# Patient Record
Sex: MALE | Race: BLACK OR AFRICAN AMERICAN | NOT HISPANIC OR LATINO | Employment: FULL TIME | URBAN - METROPOLITAN AREA
[De-identification: names, ages, dates, MRNs, and addresses within clinical notes are randomized per-mention and may not be internally consistent; named-entity substitution may affect disease eponyms.]

---

## 2020-04-05 DIAGNOSIS — F43.12 POST-TRAUMATIC STRESS DISORDER, CHRONIC: ICD-10-CM

## 2020-04-06 RX ORDER — ARIPIPRAZOLE 5 MG/1
5 TABLET ORAL
COMMUNITY
Start: 2020-03-20 | End: 2021-05-24

## 2020-04-06 RX ORDER — CLONAZEPAM 1 MG/1
TABLET ORAL
COMMUNITY
Start: 2020-03-20 | End: 2021-05-24

## 2020-04-06 RX ORDER — MIRTAZAPINE 45 MG/1
45 TABLET, FILM COATED ORAL EVERY EVENING
COMMUNITY
Start: 2020-03-24 | End: 2021-05-24

## 2020-04-06 RX ORDER — TRAZODONE HYDROCHLORIDE 50 MG/1
TABLET ORAL
Qty: 45 TABLET | Refills: 1 | Status: SHIPPED | OUTPATIENT
Start: 2020-04-06 | End: 2020-10-05

## 2020-04-06 RX ORDER — MIRTAZAPINE 15 MG/1
15 TABLET, FILM COATED ORAL DAILY
COMMUNITY
Start: 2020-03-03 | End: 2021-05-24

## 2020-04-06 RX ORDER — DULOXETIN HYDROCHLORIDE 60 MG/1
60 CAPSULE, DELAYED RELEASE ORAL DAILY
COMMUNITY
Start: 2020-03-20 | End: 2021-05-24

## 2020-04-06 RX ORDER — TRAZODONE HYDROCHLORIDE 100 MG/1
TABLET ORAL
COMMUNITY
Start: 2020-02-20 | End: 2021-05-24

## 2020-09-18 RX ORDER — PRAZOSIN HYDROCHLORIDE 2 MG/1
CAPSULE ORAL
COMMUNITY
Start: 2020-09-11 | End: 2021-05-24

## 2020-10-01 ENCOUNTER — HOSPITAL ENCOUNTER (EMERGENCY)
Facility: HOSPITAL | Age: 35
Discharge: HOME | End: 2020-10-01
Attending: EMERGENCY MEDICINE | Admitting: EMERGENCY MEDICINE
Payer: MEDICARE

## 2020-10-01 VITALS
RESPIRATION RATE: 20 BRPM | SYSTOLIC BLOOD PRESSURE: 154 MMHG | HEART RATE: 101 BPM | TEMPERATURE: 97.9 F | DIASTOLIC BLOOD PRESSURE: 86 MMHG

## 2020-10-01 DIAGNOSIS — M43.6 RIGHT TORTICOLLIS: Primary | ICD-10-CM

## 2020-10-01 PROCEDURE — 99284 EMERGENCY DEPT VISIT MOD MDM: CPT | Performed by: PHYSICIAN ASSISTANT

## 2020-10-01 PROCEDURE — 99283 EMERGENCY DEPT VISIT LOW MDM: CPT

## 2020-10-01 RX ORDER — DIAZEPAM 5 MG/1
5 TABLET ORAL ONCE
Status: COMPLETED | OUTPATIENT
Start: 2020-10-01 | End: 2020-10-01

## 2020-10-01 RX ORDER — DIAZEPAM 5 MG/1
5 TABLET ORAL EVERY 8 HOURS PRN
Qty: 15 TABLET | Refills: 0 | Status: SHIPPED | OUTPATIENT
Start: 2020-10-01 | End: 2021-05-24

## 2020-10-01 RX ORDER — IBUPROFEN 600 MG/1
600 TABLET ORAL EVERY 6 HOURS PRN
Qty: 20 TABLET | Refills: 0 | Status: SHIPPED | OUTPATIENT
Start: 2020-10-01 | End: 2021-05-24

## 2020-10-01 RX ORDER — IBUPROFEN 600 MG/1
600 TABLET ORAL ONCE
Status: COMPLETED | OUTPATIENT
Start: 2020-10-01 | End: 2020-10-01

## 2020-10-01 RX ADMIN — IBUPROFEN 600 MG: 600 TABLET, FILM COATED ORAL at 14:21

## 2020-10-01 RX ADMIN — DIAZEPAM 5 MG: 5 TABLET ORAL at 14:21

## 2020-10-04 ENCOUNTER — HOSPITAL ENCOUNTER (EMERGENCY)
Facility: HOSPITAL | Age: 35
Discharge: HOME | End: 2020-10-04
Payer: MEDICARE

## 2020-10-04 ENCOUNTER — APPOINTMENT (EMERGENCY)
Dept: RADIOLOGY | Facility: HOSPITAL | Age: 35
End: 2020-10-04
Payer: MEDICARE

## 2020-10-04 ENCOUNTER — APPOINTMENT (EMERGENCY)
Dept: CT IMAGING | Facility: HOSPITAL | Age: 35
End: 2020-10-04
Payer: MEDICARE

## 2020-10-04 VITALS
HEART RATE: 107 BPM | WEIGHT: 160 LBS | SYSTOLIC BLOOD PRESSURE: 135 MMHG | OXYGEN SATURATION: 98 % | RESPIRATION RATE: 17 BRPM | DIASTOLIC BLOOD PRESSURE: 90 MMHG | TEMPERATURE: 97.6 F

## 2020-10-04 DIAGNOSIS — M25.511 ACUTE PAIN OF RIGHT SHOULDER: Primary | ICD-10-CM

## 2020-10-04 PROCEDURE — 99284 EMERGENCY DEPT VISIT MOD MDM: CPT

## 2020-10-04 PROCEDURE — 72125 CT NECK SPINE W/O DYE: CPT

## 2020-10-04 PROCEDURE — G1004 CDSM NDSC: HCPCS

## 2020-10-04 PROCEDURE — 73030 X-RAY EXAM OF SHOULDER: CPT

## 2020-10-04 PROCEDURE — 96372 THER/PROPH/DIAG INJ SC/IM: CPT

## 2020-10-04 RX ORDER — KETOROLAC TROMETHAMINE 30 MG/ML
60 INJECTION, SOLUTION INTRAMUSCULAR; INTRAVENOUS ONCE
Status: COMPLETED | OUTPATIENT
Start: 2020-10-04 | End: 2020-10-04

## 2020-10-04 RX ORDER — TRAMADOL HYDROCHLORIDE 50 MG/1
50 TABLET ORAL EVERY 6 HOURS PRN
Qty: 12 TABLET | Refills: 0 | Status: SHIPPED | OUTPATIENT
Start: 2020-10-04 | End: 2020-10-14

## 2020-10-04 RX ADMIN — KETOROLAC TROMETHAMINE 60 MG: 30 INJECTION, SOLUTION INTRAMUSCULAR at 17:49

## 2020-10-05 DIAGNOSIS — F43.12 POST-TRAUMATIC STRESS DISORDER, CHRONIC: ICD-10-CM

## 2020-10-05 RX ORDER — TRAZODONE HYDROCHLORIDE 50 MG/1
TABLET ORAL
Qty: 45 TABLET | Refills: 0 | Status: SHIPPED | OUTPATIENT
Start: 2020-10-05 | End: 2021-05-24

## 2020-10-12 ENCOUNTER — HOSPITAL ENCOUNTER (EMERGENCY)
Facility: HOSPITAL | Age: 35
Discharge: HOME | End: 2020-10-12
Attending: EMERGENCY MEDICINE
Payer: MEDICARE

## 2020-10-12 VITALS
SYSTOLIC BLOOD PRESSURE: 140 MMHG | OXYGEN SATURATION: 98 % | TEMPERATURE: 99 F | HEART RATE: 111 BPM | BODY MASS INDEX: 23.15 KG/M2 | RESPIRATION RATE: 18 BRPM | DIASTOLIC BLOOD PRESSURE: 94 MMHG | HEIGHT: 67 IN | WEIGHT: 147.49 LBS

## 2020-10-12 DIAGNOSIS — M54.2 NECK PAIN: Primary | ICD-10-CM

## 2020-10-12 DIAGNOSIS — M54.12 CERVICAL RADICULOPATHY: ICD-10-CM

## 2020-10-12 PROCEDURE — 99284 EMERGENCY DEPT VISIT MOD MDM: CPT

## 2020-10-12 PROCEDURE — 99284 EMERGENCY DEPT VISIT MOD MDM: CPT | Performed by: PHYSICIAN ASSISTANT

## 2020-10-12 RX ORDER — NAPROXEN 500 MG/1
500 TABLET ORAL 2 TIMES DAILY WITH MEALS
Qty: 30 TABLET | Refills: 0 | Status: SHIPPED | OUTPATIENT
Start: 2020-10-12 | End: 2021-05-24

## 2020-10-12 RX ORDER — OXYCODONE HYDROCHLORIDE 5 MG/1
5 TABLET ORAL ONCE
Status: COMPLETED | OUTPATIENT
Start: 2020-10-12 | End: 2020-10-12

## 2020-10-12 RX ORDER — OXYCODONE HYDROCHLORIDE 5 MG/1
5 TABLET ORAL EVERY 6 HOURS PRN
Qty: 6 TABLET | Refills: 0 | Status: SHIPPED | OUTPATIENT
Start: 2020-10-12 | End: 2020-10-15

## 2020-10-12 RX ORDER — METHYLPREDNISOLONE 4 MG/1
TABLET ORAL
Qty: 21 TABLET | Refills: 0 | Status: SHIPPED | OUTPATIENT
Start: 2020-10-12 | End: 2021-05-24

## 2020-10-12 RX ADMIN — OXYCODONE HYDROCHLORIDE 5 MG: 5 TABLET ORAL at 12:28

## 2021-03-06 ENCOUNTER — APPOINTMENT (EMERGENCY)
Dept: RADIOLOGY | Facility: HOSPITAL | Age: 36
DRG: 023 | End: 2021-03-06
Payer: MEDICARE

## 2021-03-06 ENCOUNTER — ANESTHESIA EVENT (INPATIENT)
Dept: PERIOP | Facility: HOSPITAL | Age: 36
DRG: 023 | End: 2021-03-06
Payer: MEDICARE

## 2021-03-06 ENCOUNTER — ANESTHESIA (INPATIENT)
Dept: PERIOP | Facility: HOSPITAL | Age: 36
DRG: 023 | End: 2021-03-06
Payer: MEDICARE

## 2021-03-06 ENCOUNTER — APPOINTMENT (INPATIENT)
Dept: RADIOLOGY | Facility: HOSPITAL | Age: 36
DRG: 023 | End: 2021-03-06
Payer: MEDICARE

## 2021-03-06 ENCOUNTER — HOSPITAL ENCOUNTER (INPATIENT)
Facility: HOSPITAL | Age: 36
LOS: 10 days | DRG: 023 | End: 2021-03-16
Attending: SURGERY | Admitting: SURGERY
Payer: MEDICARE

## 2021-03-06 DIAGNOSIS — S14.129A CENTRAL CORD SYNDROME (HCC): ICD-10-CM

## 2021-03-06 DIAGNOSIS — S01.119A LACERATION OF EYEBROW: ICD-10-CM

## 2021-03-06 DIAGNOSIS — S14.109A INJURY OF CERVICAL SPINAL CORD, INITIAL ENCOUNTER (HCC): Primary | ICD-10-CM

## 2021-03-06 DIAGNOSIS — S14.109A CONTUSION OF CERVICAL CORD, INITIAL ENCOUNTER (HCC): ICD-10-CM

## 2021-03-06 DIAGNOSIS — Z98.890 POST-OPERATIVE STATE: ICD-10-CM

## 2021-03-06 LAB
ABO GROUP BLD: NORMAL
ANION GAP SERPL CALCULATED.3IONS-SCNC: 6 MMOL/L (ref 4–13)
APAP SERPL-MCNC: <2 UG/ML (ref 10–20)
BASE EXCESS BLDA CALC-SCNC: 0 MMOL/L (ref -2–3)
BASOPHILS # BLD AUTO: 0.09 THOUSANDS/ΜL (ref 0–0.1)
BASOPHILS NFR BLD AUTO: 1 % (ref 0–1)
BLD GP AB SCN SERPL QL: NEGATIVE
BUN SERPL-MCNC: 10 MG/DL (ref 5–25)
CALCIUM SERPL-MCNC: 8.5 MG/DL (ref 8.3–10.1)
CHLORIDE SERPL-SCNC: 113 MMOL/L (ref 100–108)
CO2 SERPL-SCNC: 26 MMOL/L (ref 21–32)
CREAT SERPL-MCNC: 1.07 MG/DL (ref 0.6–1.3)
EOSINOPHIL # BLD AUTO: 0.11 THOUSAND/ΜL (ref 0–0.61)
EOSINOPHIL NFR BLD AUTO: 1 % (ref 0–6)
ERYTHROCYTE [DISTWIDTH] IN BLOOD BY AUTOMATED COUNT: 12.4 % (ref 11.6–15.1)
ETHANOL SERPL-MCNC: <3 MG/DL (ref 0–3)
GFR SERPL CREATININE-BSD FRML MDRD: 102 ML/MIN/1.73SQ M
GLUCOSE SERPL-MCNC: 87 MG/DL (ref 65–140)
GLUCOSE SERPL-MCNC: 91 MG/DL (ref 65–140)
HCO3 BLDA-SCNC: 25 MMOL/L (ref 24–30)
HCT VFR BLD AUTO: 35.4 % (ref 36.5–49.3)
HCT VFR BLD CALC: 37 % (ref 36.5–49.3)
HGB BLD-MCNC: 11.7 G/DL (ref 12–17)
HGB BLDA-MCNC: 12.6 G/DL (ref 12–17)
IMM GRANULOCYTES # BLD AUTO: 0.04 THOUSAND/UL (ref 0–0.2)
IMM GRANULOCYTES NFR BLD AUTO: 0 % (ref 0–2)
INR PPP: 1.08 (ref 0.84–1.19)
LYMPHOCYTES # BLD AUTO: 3.89 THOUSANDS/ΜL (ref 0.6–4.47)
LYMPHOCYTES NFR BLD AUTO: 32 % (ref 14–44)
MCH RBC QN AUTO: 31 PG (ref 26.8–34.3)
MCHC RBC AUTO-ENTMCNC: 33.1 G/DL (ref 31.4–37.4)
MCV RBC AUTO: 94 FL (ref 82–98)
MONOCYTES # BLD AUTO: 1.1 THOUSAND/ΜL (ref 0.17–1.22)
MONOCYTES NFR BLD AUTO: 9 % (ref 4–12)
NEUTROPHILS # BLD AUTO: 7.04 THOUSANDS/ΜL (ref 1.85–7.62)
NEUTS SEG NFR BLD AUTO: 57 % (ref 43–75)
NRBC BLD AUTO-RTO: 0 /100 WBCS
PCO2 BLD: 26 MMOL/L (ref 21–32)
PCO2 BLD: 41 MM HG (ref 42–50)
PH BLD: 7.39 [PH] (ref 7.3–7.4)
PLATELET # BLD AUTO: 326 THOUSANDS/UL (ref 149–390)
PMV BLD AUTO: 9.3 FL (ref 8.9–12.7)
PO2 BLD: 72 MM HG (ref 35–45)
POTASSIUM BLD-SCNC: 3.5 MMOL/L (ref 3.5–5.3)
POTASSIUM SERPL-SCNC: 3.9 MMOL/L (ref 3.5–5.3)
PROTHROMBIN TIME: 14 SECONDS (ref 11.6–14.5)
RBC # BLD AUTO: 3.77 MILLION/UL (ref 3.88–5.62)
RH BLD: POSITIVE
SALICYLATES SERPL-MCNC: <3 MG/DL (ref 3–20)
SAO2 % BLD FROM PO2: 94 % (ref 60–85)
SODIUM BLD-SCNC: 143 MMOL/L (ref 136–145)
SODIUM SERPL-SCNC: 145 MMOL/L (ref 136–145)
SPECIMEN EXPIRATION DATE: NORMAL
SPECIMEN SOURCE: ABNORMAL
WBC # BLD AUTO: 12.27 THOUSAND/UL (ref 4.31–10.16)

## 2021-03-06 PROCEDURE — 86850 RBC ANTIBODY SCREEN: CPT | Performed by: SURGERY

## 2021-03-06 PROCEDURE — 99222 1ST HOSP IP/OBS MODERATE 55: CPT | Performed by: SURGERY

## 2021-03-06 PROCEDURE — C1713 ANCHOR/SCREW BN/BN,TIS/BN: HCPCS | Performed by: NEUROLOGICAL SURGERY

## 2021-03-06 PROCEDURE — 86901 BLOOD TYPING SEROLOGIC RH(D): CPT | Performed by: SURGERY

## 2021-03-06 PROCEDURE — 80143 DRUG ASSAY ACETAMINOPHEN: CPT | Performed by: SURGERY

## 2021-03-06 PROCEDURE — 90471 IMMUNIZATION ADMIN: CPT

## 2021-03-06 PROCEDURE — 63015 REMOVE SPINE LAMINA >2 CRVCL: CPT | Performed by: NEUROLOGICAL SURGERY

## 2021-03-06 PROCEDURE — 72040 X-RAY EXAM NECK SPINE 2-3 VW: CPT

## 2021-03-06 PROCEDURE — 84132 ASSAY OF SERUM POTASSIUM: CPT

## 2021-03-06 PROCEDURE — 82803 BLOOD GASES ANY COMBINATION: CPT

## 2021-03-06 PROCEDURE — 85025 COMPLETE CBC W/AUTO DIFF WBC: CPT | Performed by: SURGERY

## 2021-03-06 PROCEDURE — 72141 MRI NECK SPINE W/O DYE: CPT

## 2021-03-06 PROCEDURE — 90715 TDAP VACCINE 7 YRS/> IM: CPT | Performed by: SURGERY

## 2021-03-06 PROCEDURE — 99223 1ST HOSP IP/OBS HIGH 75: CPT | Performed by: NEUROLOGICAL SURGERY

## 2021-03-06 PROCEDURE — 36415 COLL VENOUS BLD VENIPUNCTURE: CPT | Performed by: SURGERY

## 2021-03-06 PROCEDURE — 72146 MRI CHEST SPINE W/O DYE: CPT

## 2021-03-06 PROCEDURE — 82077 ASSAY SPEC XCP UR&BREATH IA: CPT | Performed by: SURGERY

## 2021-03-06 PROCEDURE — 86900 BLOOD TYPING SEROLOGIC ABO: CPT | Performed by: SURGERY

## 2021-03-06 PROCEDURE — G0390 TRAUMA RESPONS W/HOSP CRITI: HCPCS

## 2021-03-06 PROCEDURE — 20936 SP BONE AGRFT LOCAL ADD-ON: CPT | Performed by: NEUROLOGICAL SURGERY

## 2021-03-06 PROCEDURE — 80179 DRUG ASSAY SALICYLATE: CPT | Performed by: SURGERY

## 2021-03-06 PROCEDURE — 22600 ARTHRD PST TQ 1NTRSPC CRV: CPT | Performed by: NEUROLOGICAL SURGERY

## 2021-03-06 PROCEDURE — 85014 HEMATOCRIT: CPT

## 2021-03-06 PROCEDURE — 0RG2071 FUSION OF 2 OR MORE CERVICAL VERTEBRAL JOINTS WITH AUTOLOGOUS TISSUE SUBSTITUTE, POSTERIOR APPROACH, POSTERIOR COLUMN, OPEN APPROACH: ICD-10-PCS | Performed by: NEUROLOGICAL SURGERY

## 2021-03-06 PROCEDURE — 82947 ASSAY GLUCOSE BLOOD QUANT: CPT

## 2021-03-06 PROCEDURE — G1004 CDSM NDSC: HCPCS

## 2021-03-06 PROCEDURE — 71270 CT THORAX DX C-/C+: CPT

## 2021-03-06 PROCEDURE — 22842 INSERT SPINE FIXATION DEVICE: CPT | Performed by: NEUROLOGICAL SURGERY

## 2021-03-06 PROCEDURE — 72148 MRI LUMBAR SPINE W/O DYE: CPT

## 2021-03-06 PROCEDURE — NC001 PR NO CHARGE: Performed by: EMERGENCY MEDICINE

## 2021-03-06 PROCEDURE — 22614 ARTHRD PST TQ 1NTRSPC EA ADD: CPT | Performed by: NEUROLOGICAL SURGERY

## 2021-03-06 PROCEDURE — 85610 PROTHROMBIN TIME: CPT | Performed by: SURGERY

## 2021-03-06 PROCEDURE — 80048 BASIC METABOLIC PNL TOTAL CA: CPT | Performed by: SURGERY

## 2021-03-06 PROCEDURE — 70450 CT HEAD/BRAIN W/O DYE: CPT

## 2021-03-06 PROCEDURE — 84295 ASSAY OF SERUM SODIUM: CPT

## 2021-03-06 PROCEDURE — 72125 CT NECK SPINE W/O DYE: CPT

## 2021-03-06 PROCEDURE — 99291 CRITICAL CARE FIRST HOUR: CPT

## 2021-03-06 PROCEDURE — 74178 CT ABD&PLV WO CNTR FLWD CNTR: CPT

## 2021-03-06 RX ORDER — NOREPINEPHRINE BITARTRATE 1 MG/ML
INJECTION, SOLUTION INTRAVENOUS
Status: COMPLETED
Start: 2021-03-06 | End: 2021-03-07

## 2021-03-06 RX ADMIN — PROPOFOL 160 MCG/KG/MIN: 10 INJECTION, EMULSION INTRAVENOUS at 23:45

## 2021-03-06 RX ADMIN — PROPOFOL 200 MG: 10 INJECTION, EMULSION INTRAVENOUS at 23:42

## 2021-03-06 RX ADMIN — ONDANSETRON 4 MG: 2 INJECTION INTRAMUSCULAR; INTRAVENOUS at 23:46

## 2021-03-06 RX ADMIN — IOHEXOL 100 ML: 350 INJECTION, SOLUTION INTRAVENOUS at 21:19

## 2021-03-06 RX ADMIN — TETANUS TOXOID, REDUCED DIPHTHERIA TOXOID AND ACELLULAR PERTUSSIS VACCINE, ADSORBED 0.5 ML: 5; 2.5; 8; 8; 2.5 SUSPENSION INTRAMUSCULAR at 21:13

## 2021-03-06 RX ADMIN — MIDAZOLAM 2 MG: 1 INJECTION INTRAMUSCULAR; INTRAVENOUS at 23:34

## 2021-03-06 RX ADMIN — SODIUM CHLORIDE 0.2 MCG/KG/HR: 9 INJECTION, SOLUTION INTRAVENOUS at 23:45

## 2021-03-06 RX ADMIN — SODIUM CHLORIDE: 0.9 INJECTION, SOLUTION INTRAVENOUS at 23:56

## 2021-03-06 RX ADMIN — Medication 100 MG: at 23:42

## 2021-03-06 RX ADMIN — FENTANYL CITRATE 25 MCG: 50 INJECTION INTRAMUSCULAR; INTRAVENOUS at 23:49

## 2021-03-06 RX ADMIN — DEXAMETHASONE SODIUM PHOSPHATE 10 MG: 10 INJECTION, SOLUTION INTRAMUSCULAR; INTRAVENOUS at 23:46

## 2021-03-06 RX ADMIN — PHENYLEPHRINE HYDROCHLORIDE 40 MCG/MIN: 10 INJECTION INTRAVENOUS at 23:45

## 2021-03-06 RX ADMIN — REMIFENTANIL HYDROCHLORIDE 0.2 MCG/KG/MIN: 1 INJECTION, POWDER, LYOPHILIZED, FOR SOLUTION INTRAVENOUS at 23:44

## 2021-03-06 RX ADMIN — FENTANYL CITRATE 25 MCG: 50 INJECTION INTRAMUSCULAR; INTRAVENOUS at 23:42

## 2021-03-06 RX ADMIN — SODIUM CHLORIDE, SODIUM LACTATE, POTASSIUM CHLORIDE, AND CALCIUM CHLORIDE: .6; .31; .03; .02 INJECTION, SOLUTION INTRAVENOUS at 23:35

## 2021-03-06 RX ADMIN — LIDOCAINE HYDROCHLORIDE 65 MG: 10 INJECTION, SOLUTION EPIDURAL; INFILTRATION; INTRACAUDAL; PERINEURAL at 23:42

## 2021-03-07 ENCOUNTER — APPOINTMENT (INPATIENT)
Dept: RADIOLOGY | Facility: HOSPITAL | Age: 36
DRG: 023 | End: 2021-03-07
Payer: MEDICARE

## 2021-03-07 PROBLEM — J98.4 PULMONARY INSUFFICIENCY: Status: ACTIVE | Noted: 2021-03-07

## 2021-03-07 PROBLEM — S14.129A CENTRAL CORD SYNDROME (HCC): Status: ACTIVE | Noted: 2021-03-07

## 2021-03-07 LAB
ANION GAP SERPL CALCULATED.3IONS-SCNC: 4 MMOL/L (ref 4–13)
BASE EXCESS BLDA CALC-SCNC: -1.3 MMOL/L
BASE EXCESS BLDA CALC-SCNC: 0.6 MMOL/L
BASE EXCESS BLDA CALC-SCNC: 0.8 MMOL/L
BASOPHILS # BLD AUTO: 0.04 THOUSANDS/ΜL (ref 0–0.1)
BASOPHILS NFR BLD AUTO: 0 % (ref 0–1)
BODY TEMPERATURE: 98.6 DEGREES FEHRENHEIT
BUN SERPL-MCNC: 8 MG/DL (ref 5–25)
CALCIUM SERPL-MCNC: 8.7 MG/DL (ref 8.3–10.1)
CHLORIDE SERPL-SCNC: 111 MMOL/L (ref 100–108)
CO2 SERPL-SCNC: 27 MMOL/L (ref 21–32)
CREAT SERPL-MCNC: 0.89 MG/DL (ref 0.6–1.3)
EOSINOPHIL # BLD AUTO: 0 THOUSAND/ΜL (ref 0–0.61)
EOSINOPHIL NFR BLD AUTO: 0 % (ref 0–6)
ERYTHROCYTE [DISTWIDTH] IN BLOOD BY AUTOMATED COUNT: 12.4 % (ref 11.6–15.1)
GFR SERPL CREATININE-BSD FRML MDRD: 126 ML/MIN/1.73SQ M
GLUCOSE SERPL-MCNC: 152 MG/DL (ref 65–140)
HCO3 BLDA-SCNC: 23 MMOL/L (ref 22–28)
HCO3 BLDA-SCNC: 24.4 MMOL/L (ref 22–28)
HCO3 BLDA-SCNC: 26.1 MMOL/L (ref 22–28)
HCT VFR BLD AUTO: 37.9 % (ref 36.5–49.3)
HGB BLD-MCNC: 12.5 G/DL (ref 12–17)
HOROWITZ INDEX BLDA+IHG-RTO: 40 MM[HG]
IMM GRANULOCYTES # BLD AUTO: 0.09 THOUSAND/UL (ref 0–0.2)
IMM GRANULOCYTES NFR BLD AUTO: 1 % (ref 0–2)
LYMPHOCYTES # BLD AUTO: 1.37 THOUSANDS/ΜL (ref 0.6–4.47)
LYMPHOCYTES NFR BLD AUTO: 8 % (ref 14–44)
MAGNESIUM SERPL-MCNC: 2 MG/DL (ref 1.6–2.6)
MCH RBC QN AUTO: 31 PG (ref 26.8–34.3)
MCHC RBC AUTO-ENTMCNC: 33 G/DL (ref 31.4–37.4)
MCV RBC AUTO: 94 FL (ref 82–98)
MONOCYTES # BLD AUTO: 0.28 THOUSAND/ΜL (ref 0.17–1.22)
MONOCYTES NFR BLD AUTO: 2 % (ref 4–12)
NEUTROPHILS # BLD AUTO: 15.68 THOUSANDS/ΜL (ref 1.85–7.62)
NEUTS SEG NFR BLD AUTO: 89 % (ref 43–75)
NRBC BLD AUTO-RTO: 0 /100 WBCS
O2 CT BLDA-SCNC: 17.8 ML/DL (ref 16–23)
O2 CT BLDA-SCNC: 18.1 ML/DL (ref 16–23)
O2 CT BLDA-SCNC: 18.5 ML/DL (ref 16–23)
OXYHGB MFR BLDA: 96.6 % (ref 94–97)
OXYHGB MFR BLDA: 98.5 % (ref 94–97)
OXYHGB MFR BLDA: 98.6 % (ref 94–97)
PCO2 BLDA: 29.8 MM HG (ref 36–44)
PCO2 BLDA: 36.7 MM HG (ref 36–44)
PCO2 BLDA: 55.4 MM HG (ref 36–44)
PEEP RESPIRATORY: 6 CM[H2O]
PH BLDA: 7.29 [PH] (ref 7.35–7.45)
PH BLDA: 7.44 [PH] (ref 7.35–7.45)
PH BLDA: 7.5 [PH] (ref 7.35–7.45)
PHOSPHATE SERPL-MCNC: 3.6 MG/DL (ref 2.7–4.5)
PLATELET # BLD AUTO: 381 THOUSANDS/UL (ref 149–390)
PMV BLD AUTO: 9.2 FL (ref 8.9–12.7)
PO2 BLDA: 123.9 MM HG (ref 75–129)
PO2 BLDA: 162.7 MM HG (ref 75–129)
PO2 BLDA: 171.2 MM HG (ref 75–129)
POTASSIUM SERPL-SCNC: 4.2 MMOL/L (ref 3.5–5.3)
PRESSURE CONTROL: 15
PRESSURE CONTROL: ABNORMAL
RBC # BLD AUTO: 4.03 MILLION/UL (ref 3.88–5.62)
SODIUM SERPL-SCNC: 142 MMOL/L (ref 136–145)
SPECIMEN SOURCE: ABNORMAL
VENT AC: 10
VENT AC: 14
VENT AC: 14
VENT- AC: AC
VT SETTING VENT: 500 ML
WBC # BLD AUTO: 17.46 THOUSAND/UL (ref 4.31–10.16)

## 2021-03-07 PROCEDURE — 83735 ASSAY OF MAGNESIUM: CPT | Performed by: PHYSICIAN ASSISTANT

## 2021-03-07 PROCEDURE — NC001 PR NO CHARGE: Performed by: SURGERY

## 2021-03-07 PROCEDURE — 99024 POSTOP FOLLOW-UP VISIT: CPT | Performed by: NEUROLOGICAL SURGERY

## 2021-03-07 PROCEDURE — 82805 BLOOD GASES W/O2 SATURATION: CPT | Performed by: EMERGENCY MEDICINE

## 2021-03-07 PROCEDURE — 82805 BLOOD GASES W/O2 SATURATION: CPT | Performed by: PHYSICIAN ASSISTANT

## 2021-03-07 PROCEDURE — 94002 VENT MGMT INPAT INIT DAY: CPT

## 2021-03-07 PROCEDURE — 80048 BASIC METABOLIC PNL TOTAL CA: CPT | Performed by: PHYSICIAN ASSISTANT

## 2021-03-07 PROCEDURE — 12013 RPR F/E/E/N/L/M 2.6-5.0 CM: CPT | Performed by: SURGERY

## 2021-03-07 PROCEDURE — 84100 ASSAY OF PHOSPHORUS: CPT | Performed by: PHYSICIAN ASSISTANT

## 2021-03-07 PROCEDURE — 99291 CRITICAL CARE FIRST HOUR: CPT | Performed by: SURGERY

## 2021-03-07 PROCEDURE — 0HQ1XZZ REPAIR FACE SKIN, EXTERNAL APPROACH: ICD-10-PCS | Performed by: SURGERY

## 2021-03-07 PROCEDURE — 94760 N-INVAS EAR/PLS OXIMETRY 1: CPT

## 2021-03-07 PROCEDURE — 71045 X-RAY EXAM CHEST 1 VIEW: CPT

## 2021-03-07 PROCEDURE — 82805 BLOOD GASES W/O2 SATURATION: CPT | Performed by: NURSE PRACTITIONER

## 2021-03-07 PROCEDURE — 85025 COMPLETE CBC W/AUTO DIFF WBC: CPT | Performed by: PHYSICIAN ASSISTANT

## 2021-03-07 DEVICE — SCREW 6958714 3.5 X 14MM MAS
Type: IMPLANTABLE DEVICE | Site: POSTERIOR CERVICAL | Status: FUNCTIONAL
Brand: VERTEX® RECONSTRUCTION SYSTEM
Removed: 2022-05-12

## 2021-03-07 DEVICE — ROD 7753770 PRE-CUT 3.5MM X 70MM
Type: IMPLANTABLE DEVICE | Site: POSTERIOR CERVICAL | Status: FUNCTIONAL
Brand: VERTEX® RECONSTRUCTION SYSTEM

## 2021-03-07 DEVICE — DBM T42275 8MMX1CMX10CM 2 EACH GRAFTON M
Type: IMPLANTABLE DEVICE | Site: POSTERIOR CERVICAL | Status: FUNCTIONAL
Brand: GRAFTON®AND GRAFTON PLUS®DEMINERALIZED BONE MATRIX (DBM)

## 2021-03-07 DEVICE — SET SCREW 6950315 M6 SET SCREW
Type: IMPLANTABLE DEVICE | Site: POSTERIOR CERVICAL | Status: NON-FUNCTIONAL
Brand: VERTEX® RECONSTRUCTION SYSTEM
Removed: 2022-05-12

## 2021-03-07 RX ORDER — SUCCINYLCHOLINE/SOD CL,ISO/PF 100 MG/5ML
SYRINGE (ML) INTRAVENOUS AS NEEDED
Status: DISCONTINUED | OUTPATIENT
Start: 2021-03-06 | End: 2021-03-07

## 2021-03-07 RX ORDER — PROPOFOL 10 MG/ML
5-50 INJECTION, EMULSION INTRAVENOUS
Status: DISCONTINUED | OUTPATIENT
Start: 2021-03-07 | End: 2021-03-07

## 2021-03-07 RX ORDER — FENTANYL CITRATE 50 UG/ML
50 INJECTION, SOLUTION INTRAMUSCULAR; INTRAVENOUS ONCE
Status: COMPLETED | OUTPATIENT
Start: 2021-03-07 | End: 2021-03-07

## 2021-03-07 RX ORDER — OXYCODONE HYDROCHLORIDE 5 MG/1
5 TABLET ORAL ONCE
Status: COMPLETED | OUTPATIENT
Start: 2021-03-07 | End: 2021-03-07

## 2021-03-07 RX ORDER — FENTANYL CITRATE 50 UG/ML
INJECTION, SOLUTION INTRAMUSCULAR; INTRAVENOUS
Status: COMPLETED
Start: 2021-03-07 | End: 2021-03-07

## 2021-03-07 RX ORDER — SODIUM CHLORIDE, SODIUM LACTATE, POTASSIUM CHLORIDE, CALCIUM CHLORIDE 600; 310; 30; 20 MG/100ML; MG/100ML; MG/100ML; MG/100ML
INJECTION, SOLUTION INTRAVENOUS CONTINUOUS PRN
Status: DISCONTINUED | OUTPATIENT
Start: 2021-03-06 | End: 2021-03-07

## 2021-03-07 RX ORDER — FENTANYL CITRATE-0.9 % NACL/PF 10 MCG/ML
100 PLASTIC BAG, INJECTION (ML) INTRAVENOUS CONTINUOUS
Status: DISCONTINUED | OUTPATIENT
Start: 2021-03-07 | End: 2021-03-07

## 2021-03-07 RX ORDER — AMOXICILLIN 250 MG
1 CAPSULE ORAL 2 TIMES DAILY
Status: DISCONTINUED | OUTPATIENT
Start: 2021-03-07 | End: 2021-03-08

## 2021-03-07 RX ORDER — ACETAMINOPHEN 160 MG/5ML
650 SUSPENSION, ORAL (FINAL DOSE FORM) ORAL ONCE
Status: COMPLETED | OUTPATIENT
Start: 2021-03-07 | End: 2021-03-07

## 2021-03-07 RX ORDER — CEFAZOLIN SODIUM 1 G/50ML
1000 SOLUTION INTRAVENOUS EVERY 8 HOURS
Status: COMPLETED | OUTPATIENT
Start: 2021-03-07 | End: 2021-03-07

## 2021-03-07 RX ORDER — ACETAMINOPHEN 325 MG/1
975 TABLET ORAL EVERY 6 HOURS SCHEDULED
Status: DISCONTINUED | OUTPATIENT
Start: 2021-03-08 | End: 2021-03-07

## 2021-03-07 RX ORDER — PROPOFOL 10 MG/ML
INJECTION, EMULSION INTRAVENOUS CONTINUOUS PRN
Status: DISCONTINUED | OUTPATIENT
Start: 2021-03-06 | End: 2021-03-07

## 2021-03-07 RX ORDER — FENTANYL CITRATE 50 UG/ML
50 INJECTION, SOLUTION INTRAMUSCULAR; INTRAVENOUS ONCE
Status: DISCONTINUED | OUTPATIENT
Start: 2021-03-07 | End: 2021-03-07

## 2021-03-07 RX ORDER — FENTANYL CITRATE 50 UG/ML
INJECTION, SOLUTION INTRAMUSCULAR; INTRAVENOUS AS NEEDED
Status: DISCONTINUED | OUTPATIENT
Start: 2021-03-06 | End: 2021-03-07

## 2021-03-07 RX ORDER — OXYCODONE HYDROCHLORIDE 5 MG/1
5 TABLET ORAL EVERY 6 HOURS
Status: DISCONTINUED | OUTPATIENT
Start: 2021-03-07 | End: 2021-03-08

## 2021-03-07 RX ORDER — SENNOSIDES 8.6 MG
1 TABLET ORAL DAILY
Status: DISCONTINUED | OUTPATIENT
Start: 2021-03-07 | End: 2021-03-07

## 2021-03-07 RX ORDER — VANCOMYCIN HYDROCHLORIDE 1 G/20ML
INJECTION, POWDER, LYOPHILIZED, FOR SOLUTION INTRAVENOUS AS NEEDED
Status: DISCONTINUED | OUTPATIENT
Start: 2021-03-07 | End: 2021-03-07 | Stop reason: HOSPADM

## 2021-03-07 RX ORDER — DEXAMETHASONE SODIUM PHOSPHATE 10 MG/ML
INJECTION, SOLUTION INTRAMUSCULAR; INTRAVENOUS AS NEEDED
Status: DISCONTINUED | OUTPATIENT
Start: 2021-03-06 | End: 2021-03-07

## 2021-03-07 RX ORDER — POLYETHYLENE GLYCOL 3350 17 G/17G
17 POWDER, FOR SOLUTION ORAL DAILY
Status: DISCONTINUED | OUTPATIENT
Start: 2021-03-07 | End: 2021-03-12

## 2021-03-07 RX ORDER — PROPOFOL 10 MG/ML
INJECTION, EMULSION INTRAVENOUS AS NEEDED
Status: DISCONTINUED | OUTPATIENT
Start: 2021-03-06 | End: 2021-03-07

## 2021-03-07 RX ORDER — SODIUM CHLORIDE 9 MG/ML
125 INJECTION, SOLUTION INTRAVENOUS CONTINUOUS
Status: DISCONTINUED | OUTPATIENT
Start: 2021-03-07 | End: 2021-03-07

## 2021-03-07 RX ORDER — ONDANSETRON 2 MG/ML
4 INJECTION INTRAMUSCULAR; INTRAVENOUS EVERY 6 HOURS PRN
Status: DISCONTINUED | OUTPATIENT
Start: 2021-03-07 | End: 2021-03-16 | Stop reason: HOSPADM

## 2021-03-07 RX ORDER — METHOCARBAMOL 750 MG/1
750 TABLET, FILM COATED ORAL EVERY 6 HOURS SCHEDULED
Status: DISCONTINUED | OUTPATIENT
Start: 2021-03-07 | End: 2021-03-16 | Stop reason: HOSPADM

## 2021-03-07 RX ORDER — CEFAZOLIN SODIUM 1 G/3ML
INJECTION, POWDER, FOR SOLUTION INTRAMUSCULAR; INTRAVENOUS AS NEEDED
Status: DISCONTINUED | OUTPATIENT
Start: 2021-03-07 | End: 2021-03-07

## 2021-03-07 RX ORDER — DOCUSATE SODIUM 100 MG/1
100 CAPSULE, LIQUID FILLED ORAL 2 TIMES DAILY
Status: DISCONTINUED | OUTPATIENT
Start: 2021-03-07 | End: 2021-03-07

## 2021-03-07 RX ORDER — ROCURONIUM BROMIDE 10 MG/ML
INJECTION, SOLUTION INTRAVENOUS AS NEEDED
Status: DISCONTINUED | OUTPATIENT
Start: 2021-03-07 | End: 2021-03-07

## 2021-03-07 RX ORDER — FENTANYL CITRATE-0.9 % NACL/PF 10 MCG/ML
PLASTIC BAG, INJECTION (ML) INTRAVENOUS
Status: COMPLETED
Start: 2021-03-07 | End: 2021-03-07

## 2021-03-07 RX ORDER — ACETAMINOPHEN 325 MG/1
975 TABLET ORAL EVERY 6 HOURS PRN
Status: DISCONTINUED | OUTPATIENT
Start: 2021-03-07 | End: 2021-03-08

## 2021-03-07 RX ORDER — SODIUM CHLORIDE 9 MG/ML
INJECTION, SOLUTION INTRAVENOUS CONTINUOUS PRN
Status: DISCONTINUED | OUTPATIENT
Start: 2021-03-06 | End: 2021-03-07

## 2021-03-07 RX ORDER — CHLORHEXIDINE GLUCONATE 0.12 MG/ML
15 RINSE ORAL EVERY 12 HOURS SCHEDULED
Status: DISCONTINUED | OUTPATIENT
Start: 2021-03-07 | End: 2021-03-08

## 2021-03-07 RX ORDER — LIDOCAINE HYDROCHLORIDE AND EPINEPHRINE 10; 10 MG/ML; UG/ML
INJECTION, SOLUTION INFILTRATION; PERINEURAL AS NEEDED
Status: DISCONTINUED | OUTPATIENT
Start: 2021-03-07 | End: 2021-03-07 | Stop reason: HOSPADM

## 2021-03-07 RX ORDER — BUPIVACAINE HYDROCHLORIDE AND EPINEPHRINE 5; 5 MG/ML; UG/ML
INJECTION, SOLUTION EPIDURAL; INTRACAUDAL; PERINEURAL AS NEEDED
Status: DISCONTINUED | OUTPATIENT
Start: 2021-03-07 | End: 2021-03-07 | Stop reason: HOSPADM

## 2021-03-07 RX ORDER — SODIUM CHLORIDE, SODIUM GLUCONATE, SODIUM ACETATE, POTASSIUM CHLORIDE, MAGNESIUM CHLORIDE, SODIUM PHOSPHATE, DIBASIC, AND POTASSIUM PHOSPHATE .53; .5; .37; .037; .03; .012; .00082 G/100ML; G/100ML; G/100ML; G/100ML; G/100ML; G/100ML; G/100ML
50 INJECTION, SOLUTION INTRAVENOUS CONTINUOUS
Status: DISCONTINUED | OUTPATIENT
Start: 2021-03-07 | End: 2021-03-08

## 2021-03-07 RX ORDER — LIDOCAINE HYDROCHLORIDE 10 MG/ML
INJECTION, SOLUTION EPIDURAL; INFILTRATION; INTRACAUDAL; PERINEURAL AS NEEDED
Status: DISCONTINUED | OUTPATIENT
Start: 2021-03-06 | End: 2021-03-07

## 2021-03-07 RX ORDER — ACETAMINOPHEN 325 MG/1
975 TABLET ORAL EVERY 6 HOURS SCHEDULED
Status: DISCONTINUED | OUTPATIENT
Start: 2021-03-07 | End: 2021-03-07

## 2021-03-07 RX ORDER — MIDAZOLAM HYDROCHLORIDE 2 MG/2ML
INJECTION, SOLUTION INTRAMUSCULAR; INTRAVENOUS AS NEEDED
Status: DISCONTINUED | OUTPATIENT
Start: 2021-03-06 | End: 2021-03-07

## 2021-03-07 RX ORDER — MAGNESIUM HYDROXIDE 1200 MG/15ML
LIQUID ORAL AS NEEDED
Status: DISCONTINUED | OUTPATIENT
Start: 2021-03-07 | End: 2021-03-07 | Stop reason: HOSPADM

## 2021-03-07 RX ORDER — PROPOFOL 10 MG/ML
INJECTION, EMULSION INTRAVENOUS
Status: COMPLETED
Start: 2021-03-07 | End: 2021-03-07

## 2021-03-07 RX ORDER — HYDROMORPHONE HCL/PF 1 MG/ML
0.5 SYRINGE (ML) INJECTION
Status: DISCONTINUED | OUTPATIENT
Start: 2021-03-07 | End: 2021-03-08

## 2021-03-07 RX ORDER — ONDANSETRON 2 MG/ML
INJECTION INTRAMUSCULAR; INTRAVENOUS AS NEEDED
Status: DISCONTINUED | OUTPATIENT
Start: 2021-03-06 | End: 2021-03-07

## 2021-03-07 RX ADMIN — CHLORHEXIDINE GLUCONATE 0.12% ORAL RINSE 15 ML: 1.2 LIQUID ORAL at 08:11

## 2021-03-07 RX ADMIN — PROPOFOL 50 MCG/KG/MIN: 10 INJECTION, EMULSION INTRAVENOUS at 03:15

## 2021-03-07 RX ADMIN — POLYETHYLENE GLYCOL 3350 17 G: 17 POWDER, FOR SOLUTION ORAL at 11:16

## 2021-03-07 RX ADMIN — CEFAZOLIN SODIUM 1000 MG: 1 SOLUTION INTRAVENOUS at 07:45

## 2021-03-07 RX ADMIN — HYDROMORPHONE HYDROCHLORIDE 0.5 MG: 1 INJECTION, SOLUTION INTRAMUSCULAR; INTRAVENOUS; SUBCUTANEOUS at 17:07

## 2021-03-07 RX ADMIN — Medication 100 MCG/HR: at 04:25

## 2021-03-07 RX ADMIN — ACETAMINOPHEN 650 MG: 650 SUSPENSION ORAL at 16:30

## 2021-03-07 RX ADMIN — HYDROMORPHONE HYDROCHLORIDE 0.5 MG: 1 INJECTION, SOLUTION INTRAMUSCULAR; INTRAVENOUS; SUBCUTANEOUS at 20:11

## 2021-03-07 RX ADMIN — SODIUM CHLORIDE, SODIUM GLUCONATE, SODIUM ACETATE, POTASSIUM CHLORIDE, MAGNESIUM CHLORIDE, SODIUM PHOSPHATE, DIBASIC, AND POTASSIUM PHOSPHATE 50 ML/HR: .53; .5; .37; .037; .03; .012; .00082 INJECTION, SOLUTION INTRAVENOUS at 04:46

## 2021-03-07 RX ADMIN — CHLORHEXIDINE GLUCONATE 0.12% ORAL RINSE 15 ML: 1.2 LIQUID ORAL at 21:41

## 2021-03-07 RX ADMIN — NOREPINEPHRINE BITARTRATE 3 MCG/MIN: 1 INJECTION, SOLUTION, CONCENTRATE INTRAVENOUS at 02:22

## 2021-03-07 RX ADMIN — FENTANYL CITRATE 50 MCG: 50 INJECTION INTRAMUSCULAR; INTRAVENOUS at 04:00

## 2021-03-07 RX ADMIN — PROPOFOL 20 MCG/KG/MIN: 10 INJECTION, EMULSION INTRAVENOUS at 16:04

## 2021-03-07 RX ADMIN — Medication 100 MCG/HR: at 10:12

## 2021-03-07 RX ADMIN — STANDARDIZED SENNA CONCENTRATE 8.6 MG: 8.6 TABLET ORAL at 08:11

## 2021-03-07 RX ADMIN — FENTANYL CITRATE 50 MCG: 50 INJECTION INTRAMUSCULAR; INTRAVENOUS at 02:08

## 2021-03-07 RX ADMIN — ROCURONIUM BROMIDE 15 MG: 50 INJECTION, SOLUTION INTRAVENOUS at 00:44

## 2021-03-07 RX ADMIN — METHOCARBAMOL TABLETS 750 MG: 750 TABLET, COATED ORAL at 07:45

## 2021-03-07 RX ADMIN — NOREPINEPHRINE BITARTRATE 3 MCG/MIN: 1 INJECTION, SOLUTION, CONCENTRATE INTRAVENOUS at 05:04

## 2021-03-07 RX ADMIN — HYDROMORPHONE HYDROCHLORIDE 0.5 MG: 1 INJECTION, SOLUTION INTRAMUSCULAR; INTRAVENOUS; SUBCUTANEOUS at 23:18

## 2021-03-07 RX ADMIN — OXYCODONE HYDROCHLORIDE 5 MG: 5 TABLET ORAL at 18:11

## 2021-03-07 RX ADMIN — PROPOFOL 45 MCG/KG/MIN: 10 INJECTION, EMULSION INTRAVENOUS at 06:53

## 2021-03-07 RX ADMIN — FENTANYL CITRATE 50 MCG: 50 INJECTION INTRAMUSCULAR; INTRAVENOUS at 00:05

## 2021-03-07 RX ADMIN — SODIUM CHLORIDE 125 ML/HR: 0.9 INJECTION, SOLUTION INTRAVENOUS at 16:04

## 2021-03-07 RX ADMIN — CEFAZOLIN 2000 MG: 1 INJECTION, POWDER, FOR SOLUTION INTRAVENOUS at 00:06

## 2021-03-07 RX ADMIN — OXYCODONE HYDROCHLORIDE 5 MG: 5 TABLET ORAL at 13:12

## 2021-03-07 RX ADMIN — NOREPINEPHRINE BITARTRATE: 1 INJECTION, SOLUTION, CONCENTRATE INTRAVENOUS at 04:27

## 2021-03-07 RX ADMIN — METHOCARBAMOL TABLETS 750 MG: 750 TABLET, COATED ORAL at 11:16

## 2021-03-07 RX ADMIN — CEFAZOLIN SODIUM 1000 MG: 1 SOLUTION INTRAVENOUS at 16:04

## 2021-03-07 RX ADMIN — OXYCODONE HYDROCHLORIDE 5 MG: 5 TABLET ORAL at 21:40

## 2021-03-07 RX ADMIN — SODIUM CHLORIDE 125 ML/HR: 0.9 INJECTION, SOLUTION INTRAVENOUS at 06:56

## 2021-03-07 RX ADMIN — ACETAMINOPHEN 975 MG: 325 TABLET ORAL at 21:40

## 2021-03-07 RX ADMIN — FENTANYL CITRATE 50 MCG: 50 INJECTION INTRAMUSCULAR; INTRAVENOUS at 03:45

## 2021-03-07 NOTE — ANESTHESIA POSTPROCEDURE EVALUATION
Post-Op Assessment Note    CV Status:  Stable  Pain Score: 0    Pain management: adequate     Mental Status:  Somnolent   Hydration Status:  Stable   PONV Controlled:  None   Airway Patency:  Patent  Airway: intubated      Post Op Vitals Reviewed: Yes      Staff: CRNA   Comments: pt transfered to  icu on a transport monitor   ventilated via ambu for transport by crna        No complications documented      BP   124/84   Temp   97 8   Pulse  60   Resp   10   SpO2   99 % 50% fio2

## 2021-03-07 NOTE — OP NOTE
Neurosurgery Operative Room Note    Anel Patel  3/6/2021    Pre-op Diagnosis:   Injury of cervical spinal cord, initial encounter ([facility] ) [S14 109A]  Contusion of cervical cord, initial encounter ([facility] ) [L93 651R]    Post-op Dignosis:     Post-Op Diagnosis Codes:     * Injury of cervical spinal cord, initial encounter ([facility] ) [E28 466M]     * Contusion of cervical cord, initial encounter ([facility] [place] [facility] ) [J69 788D]    Procedure:  Procedure(s):  POSTERIOR C3-5 laminectomy, C2-7 fixation fusion    Surgeon: Surgeon(s) and Role:     * Tex Nyhan, MD - Primary     Anesthesia: General    Staff:   Circulator: Maria Luz Haines RN  Radiology Technologist: Crystal Rosas  Scrub Person: Rachel Moreland CST  No qualified Resident was available  Estimated Blood Loss: 150 mL    Specimens:                * No orders in the log *    Drains:   Closed/Suction Drain Posterior;Right Neck Bulb (Active)       Urethral Catheter Latex 16 Fr  (Active)       Findings:  Fractured C5 lamina and spinous process  Disrupted posterior ligamentous tissues X1-6, D3-5     Complications:  none    OR note:      Details of Procedure    The patient was brought to OR and successfully induced with general endotracheal anesthesia  A radial arterial line was placed  The patient was placed in Oneal pins, and then log rolled onto chest rolls on the OR table, and the Oneal connected to the OR table  A/P and lateral fluoroscopy was used to confirm good alignment, lordosis, etc      A midline posterior incision was marked  Hair was minimally clipped  The posterior cervical area was prepped and draped in the standard fashion  A timeout was performed  The patient received antibiotics per protocol, 10mg of Decadron, and MAPs were kept > 85 at all times  The skin was incised with a skin scalpel and dissection carried down through the midline raphe, and a subperiosteal dissection carried out over the intended posterior cervical spine   There was a fracture of the C5 lamina, and extensive disruption of soft tissues noted  A fluorograph was taken to confirm the levels exposed prior to any bone/ligamentous removal/disruption  The medial aspect of the pars of C2 was palpated with a nerve hook  A  2 mm round ball bur was used to create an entry point in the peak of the lamina/lateral mass ridge of C2  Directing toward the medial border, using lateral fluoroscopy to assess the angle, the trauma drill with the 16 mm depth guard was used to cannulate the bilateral pars  These  holes were sounded, found to be competent  They were tapped with a 3 5 mm tapped, resounded, and then bilaterally 16 mm by 4 mm diameter screws were placed from the [employer] set  The ball bur on the Midas Femi drill was to create entry points to the posterior cortex of the lateral masses of C3-7 bilaterally  The lateral masses were then prepared by drilling in the typical up and out trajectory using a 14 mm depth guard  These were sounded and found to be competent  They were tapped as above  14 mm long by 3 5 mm diameter lateral mass screws were placed at these levels without difficulty  Rods were cut and bent to fit, but not yet secured  The Midas was used to create troughs in the bilateral lamina of C3-5  The C2-3 and C5-6 interspinous ligament was divided  The C5 spinous process was grasped and came part away from its fracture  The rest of C5 and then C4 & C3 lamina were removed as a piece  Epidural hemostasis was achieved  The area was irrigated copiously with antibiotic irrigation  The exposed remaining lateral mass as well as the facets were drilled out with the 538 Stacey drill  The previously cut and bent rods were secured with set screws, and final tightened  Bone graft strips were placed lateral to the hardware over the decorticated bone  and into the decorticated facet joints to create an arthrodesis   Morcelized autograft from the laminectomy was applied over this and compacted onto the decorticated bone  1G of vancomycin powder was applied over the hardware to minimize infection risk  A 7 flat Demetri-De La Torre drain was placed epidural and tunneled out through separate stab incision  The deep musculature and then the fascia was closed with interrupted 0 Vicryl Plus sutures  The deep dermis was closed with inverted interrupted 2-0 Vicryl Plus sutures  The skin was closed staples  Drain secured with drain stitch  Incision was blocked with 0 5% Marcaine with epinephrine  All instrument counts, sponge counts, and needle counts were correct prior to closure the skin  Incision was dressed with Acticoat, 4x4s, and Tegaderms  The drapes were withdrawn  The Orange head reyes detached from the OR table, and the patient rotated supine onto his hospital bed  Oneal pins were removed; there was some bleeding from the patient's right, and this pin site was stapled  The patient was awoken from general endotracheal anesthesia, extubated, and taken to the PACU in cardiovascularly stable condition             Williams Lai MD     Date: 3/7/2021  Time: 2:23 AM

## 2021-03-07 NOTE — RESPIRATORY THERAPY NOTE
resp care      03/07/21 1711   Respiratory Protocol   Protocol Initiated? Yes   Protocol Selection Airway Clearance   Language Barrier? Yes   Medical & Social History Reviewed? Yes   Diagnostic Studies Reviewed? Yes   Physical Assessment Performed? Yes   Respiratory Plan Discontinue Protocol   Airway Clearance Plan Incentive Spirometer   Respiratory Assessment   Assessment Type Assess only   General Appearance Alert; Awake   Respiratory Pattern Normal   Chest Assessment Chest expansion symmetrical   Bilateral Breath Sounds Clear;Diminished   Cough None   Resp Comments pt instructed on IS tolerated well, will continue to monitor per protocol   O2 Device nc

## 2021-03-07 NOTE — RESPIRATORY THERAPY NOTE
RT Ventilator Management Note  Ronald Romero 38 y o  male MRN: [id number]  Unit/Bed#: ICU 06 Encounter: [id number]      Daily Screen       3/7/2021  6913             Patient safety screen outcome[de-identified]  Failed    Not Ready for Weaning due to[de-identified]  Underline problem not resolved            Physical Exam:   Assessment Type: Assess only  General Appearance: Sedated  Respiratory Pattern: Assisted  Chest Assessment: Chest expansion symmetrical  Bilateral Breath Sounds: Coarse, Diminished  O2 Device: (P) vent  Subjective Data: Intubated      Resp Comments: (P) CCS changed vent settings, pt appears comfortable, tolerating well, will continue to monitor

## 2021-03-07 NOTE — NURSING NOTE
Patient to OR for neurosurgery  Preop Patient had yellow ER cervical collar removed and the Aspen collar applied before moving the patient from stretcher to ICU bed with slider board  Levophed running @ 5 mcg/min via left PIV AC  Patient bathed with CHG wipes, preop labs from ER completed  Patient denies taking any medications including OTC aspirin  No allergies to foods or medications

## 2021-03-07 NOTE — H&P
H&P Exam - Trauma   Tolu Hall 38 y o  male MRN: [id number]  Unit/Bed#: [facility] Encounter: [id number]    Assessment/Plan   Trauma Alert: Level A  Model of Arrival: Ambulance  Trauma Team: Attending Flavio Bishop and Elisha Goodman  Consultants: Neurosurgery for surgical cord compression, Dr Flavio Bishop discussed with Dr Ying Savers:   C3-C5 cord compression  Forehead Lac    Trauma Plan:   Emergent MRI of C/T/L spine  Neurosurgery consulted and taking patient for emergent decompression, laminectomy, and cervical fixation  Maintain MAP greater than 85  Tetanus updated  Possible lac repair after OR  Admit to ICU    Chief Complaint: Paralysis    History of Present Illness   HPI:  Tolu Hall is a 38 y o  male who presents with paralysis after being found at the bottom of some stairs  EMS said that friends left him for about 30 minutes and after returning found him at the bottom of a set of stairs  Patient has amnesia of event  Denies change in vision, blood thinner use, or significant past medical history  Mechanism:Fall    Review of Systems   Constitutional: Negative for activity change, chills, fatigue and fever  Respiratory: Negative for cough and shortness of breath  Cardiovascular: Negative for chest pain and palpitations  Gastrointestinal: Negative for abdominal distention, abdominal pain, constipation, diarrhea, nausea and vomiting  Genitourinary: Negative for dysuria and hematuria  Musculoskeletal: Positive for neck pain  Negative for arthralgias and myalgias  Neurological: Positive for weakness and numbness  Negative for dizziness, syncope, facial asymmetry, light-headedness and headaches  All other systems reviewed and are negative  12-point, complete review of systems was reviewed and negative except as stated above  Historical Information   History is unobtainable from the patient due to none    Efforts to obtain history included the following sources: other medical personnel    No past medical history on file  No past surgical history on file  Social History   Social History     Substance and Sexual Activity   Alcohol Use Not on file     Social History     Substance and Sexual Activity   Drug Use Not on file     Social History     Tobacco Use   Smoking Status Not on file     No existing history information found  No existing history information found  Immunization History   Administered Date(s) Administered    Tdap 03/06/2021     Last Tetanus: Today  Family History: Non-contributory  Unable to obtain/limited by None      Meds/Allergies   all current active meds have been reviewed    No Known Allergies      PHYSICAL EXAM    PE limited by: None    Objective   Vitals:   First set: Temperature: 97 9 °F (36 6 °C) (03/06/21 2107)  Pulse: 85 (03/06/21 2104)  Respirations: 22 (03/06/21 2104)  Blood Pressure: 127/73 (03/06/21 2104)    Primary Survey:   (A) Airway: Patent  (B) Breathing: Bilateral breath sound  (C) Circulation: Pulses:   pedal  2/4, radial  2/4 and femoral  2/4  (D) Disabliity:  GCS Total:  15  (E) Expose:  Completed    Secondary Survey: (Click on Physical Exam tab above)  Physical Exam  Vitals signs reviewed  Constitutional:       General: He is not in acute distress  Appearance: He is well-developed  He is not diaphoretic  HENT:      Head: Normocephalic  Comments: Laceration above right eyebrow     Right Ear: External ear normal       Left Ear: External ear normal    Eyes:      General:         Right eye: No discharge  Left eye: No discharge  Conjunctiva/sclera: Conjunctivae normal       Pupils: Pupils are equal, round, and reactive to light  Neck:      Musculoskeletal: Normal range of motion and neck supple  Vascular: No JVD  Trachea: No tracheal deviation  Cardiovascular:      Rate and Rhythm: Normal rate and regular rhythm  Heart sounds: Normal heart sounds  No murmur  No friction rub  No gallop  Pulmonary:      Effort: Pulmonary effort is normal  No respiratory distress  Breath sounds: Normal breath sounds  No wheezing or rales  Abdominal:      General: Bowel sounds are normal  There is no distension  Palpations: Abdomen is soft  There is no mass  Tenderness: There is no abdominal tenderness  There is no guarding  Musculoskeletal:         General: No deformity  Neurological:      Mental Status: He is alert and oriented to person, place, and time  Cranial Nerves: No cranial nerve deficit  Sensory: Sensory deficit present  Motor: Weakness present  No abnormal muscle tone  Deep Tendon Reflexes: Reflexes abnormal       Comments: Patient with flaccid paralysis of all extremities, movement of face only while in collar  No sensation below level of nipple  No babinski  No clonus   Psychiatric:         Behavior: Behavior normal          Thought Content:  Thought content normal          Judgment: Judgment normal          Invasive Devices     Peripheral Intravenous Line            Peripheral IV 03/06/21 Left Antecubital less than 1 day    Peripheral IV 03/06/21 Right Hand less than 1 day                Lab Results:   BMP/CMP:   Lab Results   Component Value Date    SODIUM 145 03/06/2021    K 3 9 03/06/2021     (H) 03/06/2021    CO2 26 03/06/2021    CO2 26 03/06/2021    BUN 10 03/06/2021    CREATININE 1 07 03/06/2021    GLUCOSE 87 03/06/2021    CALCIUM 8 5 03/06/2021    EGFR 102 03/06/2021   , CBC:   Lab Results   Component Value Date    WBC 12 27 (H) 03/06/2021    HGB 11 7 (L) 03/06/2021    HCT 35 4 (L) 03/06/2021    MCV 94 03/06/2021     03/06/2021    MCH 31 0 03/06/2021    MCHC 33 1 03/06/2021    RDW 12 4 03/06/2021    MPV 9 3 03/06/2021    NRBC 0 03/06/2021    and Coagulation:   Lab Results   Component Value Date    INR 1 08 03/06/2021     Imaging/EKG Studies: CT Scan Head: No acute pathology, CT Scan C-Spine: Central disc potrusion of c3-5, CT Scan Abdomen/Pelvis: No acute pathology  Other Studies: MRI of C/T/L spine    Code Status: Level 1 - Full Code  Advance Directive and Living Will:      Power of :    POLST:

## 2021-03-07 NOTE — CONSULTS
Consultation - Neurosurgery   Patrick Ruelas 38 y o  male MRN: [id number]  Unit/Bed#: ICU 06 Encounter: [id number]      Assessment/Plan     · 39 yo M s/p fall down stairs  · [facility]WR[facility] B CSpine on exam, more consistent with central cord  · CT CSpine with C5 spinous process, lamina fracture  · CSpine MRI with contusion centered at C4, posterior ligamentous injury C3-6  · Emergent Posterior C3-5 lami/decompression, C2-7 fixation/fusion, possible additional levels  Verbal consent obtained  · MAP > 85    Case reviewed/discussed with trauma attending at 922pm    History of Present Illness     38y o  year old male s/p fall down stairs  Low neck/upper thoracic pain,     Review of Systems   Unable to perform ROS: Acuity of condition       Historical Information     No past medical history on file  No past surgical history on file  Social History     Substance and Sexual Activity   Alcohol Use Not on file     Social History     Substance and Sexual Activity   Drug Use Not on file     Social History     Tobacco Use   Smoking Status Not on file       No family history on file  Above past medical, surgical, social, and family history personally reviewed  Meds/Allergies     all current active meds have been reviewed, current meds:   Current Facility-Administered Medications   Medication Dose Route Frequency    neomycin-polymyxin B (NEOSPORIN-) 1 mL in sodium chloride 0 9 % 1,000 mL irrigation bottle   Irrigation Once    norepinephrine (LEVOPHED) 1 mg/mL injection **ADS Override Pull**        and PTA meds:   None       No Known Allergies      Objective     No intake or output data in the 24 hours ending 03/06/21 7299    Vitals:Blood pressure 152/86, pulse 70, temperature 97 9 °F (36 6 °C), temperature source Tympanic, resp  rate 17, weight 64 3 kg (141 lb 12 1 oz), SpO2 97 %  ,There is no height or weight on file to calculate BMI  Physical Exam  Vitals signs reviewed     Constitutional:       Appearance: Normal appearance  He is well-developed  HENT:      Head: Normocephalic and atraumatic  Comments: Right frontal laceration  Eyes:      General: No scleral icterus  Neck:      Musculoskeletal: Neck supple  Cardiovascular:      Rate and Rhythm: Normal rate  Pulmonary:      Effort: Pulmonary effort is normal    Abdominal:      General: Abdomen is flat  Skin:     General: Skin is warm and dry  Neurological:      Sensory: No sensory deficit  Comments: Some dysestheic sensations in UE/LE, R>L   Psychiatric:         Speech: Speech normal          Behavior: Behavior is cooperative  Neurologic Exam     Mental Status   Attention: normal  Concentration: normal    Speech: speech is normal     Motor Exam   Right arm tone: decreased  Left arm tone: decreased  Right leg tone: decreased  Left leg tone: decreased    Strength   Right biceps: 0/5  Left biceps: 0/5  Right triceps: 0/5  Left triceps: 0/5  Right wrist flexion: 0/5  Left wrist flexion: 0/5  Right anterior tibial: 0/5  Left anterior tibial: 0/5  Right gastroc: 0/5  Left gastroc: 0/5      Lab Results:   I have personally reviewed pertinent results  Lab Results   Component Value Date    WBC 12 27 (H) 03/06/2021    HGB 11 7 (L) 03/06/2021    HCT 35 4 (L) 03/06/2021    MCV 94 03/06/2021     03/06/2021    MCH 31 0 03/06/2021    MCHC 33 1 03/06/2021    RDW 12 4 03/06/2021    MPV 9 3 03/06/2021    NRBC 0 03/06/2021    SODIUM 145 03/06/2021     (H) 03/06/2021    CO2 26 03/06/2021    BUN 10 03/06/2021    CREATININE 1 07 03/06/2021    GLUCOSE 87 03/06/2021    CALCIUM 8 5 03/06/2021    EGFR 102 03/06/2021    ABO A 03/06/2021    INR 1 08 03/06/2021       Imaging Studies:     Ct Chest Abdomen Pelvis W Wo Contrast    Result Date: 3/6/2021  Narrative: CT CHEST, ABDOMEN AND PELVIS WITH IV CONTRAST INDICATION:   trauma   COMPARISON:  Chest radiograph from earlier the same day TECHNIQUE: CT evaluation of the chest, abdomen and pelvis was performed after the administration of intravenous contrast was performed  Axial, sagittal, and coronal 2D reformatted images were created from the source data and submitted for interpretation  Radiation dose length product (DLP) for this visit:  628 43 mGy-cm   This examination, like all CT scans performed in the [facility], was performed utilizing techniques to minimize radiation dose exposure, including the use of iterative  reconstruction and automated exposure control  IV Contrast:  100 mL of iohexol (OMNIPAQUE) Enteric Contrast:  Enteric contrast was not administered  FINDINGS: LUNGS:  Bilateral dependent atelectasis  No focal consolidation PLEURA:  Unremarkable  HEART/GREAT VESSELS:  Unremarkable for patient's age  MEDIASTINUM AND CONTRERAS:  Small hiatal hernia noted  No mediastinal or hilar lymphadenopathy  CHEST WALL AND LOWER NECK:   Unremarkable  VISUALIZED STRUCTURES IN THE UPPER ABDOMEN:  Unremarkable  OSSEOUS STRUCTURES:  No acute fracture or destructive osseous lesion  ABDOMEN RIGHT KIDNEY AND URETER: No solid renal mass  No detectable urothelial mass  No hydronephrosis or hydroureter  No urinary tract calculi  No perinephric collection  LEFT KIDNEY AND URETER: No solid renal mass  No detectable urothelial mass  No hydronephrosis or hydroureter  No urinary tract calculi  No perinephric collection  URINARY BLADDER: No bladder wall mass  No calculi  LIVER/BILIARY TREE:  Unremarkable  GALLBLADDER:  No calcified gallstones  No pericholecystic inflammatory change  SPLEEN:  Unremarkable  PANCREAS:  Unremarkable  ADRENAL GLANDS:  Unremarkable  STOMACH AND BOWEL:  Unremarkable  ABDOMINOPELVIC CAVITY:  No ascites  No free intraperitoneal air  No lymphadenopathy  VESSELS:  Unremarkable for patient's age  PELVIS REPRODUCTIVE ORGANS:  Unremarkable for patient's age  APPENDIX: No findings to suggest appendicitis  ABDOMINAL WALL/INGUINAL REGIONS:  Unremarkable   OSSEOUS STRUCTURES:  No acute fracture or destructive osseous lesion  Left os acromiale  Impression: No acute traumatic injury identified  I personally discussed this study with [place] on 3/6/2021 at 9:53 PM  Workstation performed: [id number]     Ct Head Without Contrast    Result Date: 3/6/2021  Narrative: CT BRAIN - WITHOUT CONTRAST INDICATION:   trauma  COMPARISON:  None  TECHNIQUE:  CT examination of the brain was performed  In addition to axial images, sagittal and coronal 2D reformatted images were created and submitted for interpretation  Radiation dose length product (DLP) for this visit:  986 27 mGy-cm   This examination, like all CT scans performed in the [facility], was performed utilizing techniques to minimize radiation dose exposure, including the use of iterative  reconstruction and automated exposure control  IMAGE QUALITY:  Diagnostic  FINDINGS: PARENCHYMA:  No intracranial mass, mass effect or midline shift  No CT signs of acute infarction  No acute parenchymal hemorrhage  VENTRICLES AND EXTRA-AXIAL SPACES:  Normal for the patient's age  VISUALIZED ORBITS AND PARANASAL SINUSES:  Unremarkable  CALVARIUM AND EXTRACRANIAL SOFT TISSUES:  Normal      Impression: No acute intracranial abnormality  I personally discussed this study with [place] on 3/6/2021 at 9:53 PM  Workstation performed: [id number]     Ct Spine Cervical Without Contrast    Addendum Date: 3/6/2021 Addendum:   ADDENDUM: There is a nondisplaced fracture through the spinous process and right lamina of C5  I personally discussed this study with [place] on 3/6/2021 at 10:14 PM      Result Date: 3/6/2021  Narrative: CT CERVICAL SPINE - WITHOUT CONTRAST INDICATION:   trauma  COMPARISON:  CT cervical spine 10/4/2020 TECHNIQUE:  CT examination of the cervical spine was performed without intravenous contrast   Contiguous axial images were obtained  Sagittal and coronal reconstructions were performed    Radiation dose length product (DLP) for this visit:  503 56 mGy-cm   This examination, like all CT scans performed in the [facility], was performed utilizing techniques to minimize radiation dose exposure, including the use of iterative  reconstruction and automated exposure control  IMAGE QUALITY:  Diagnostic  FINDINGS: ALIGNMENT:  Normal alignment of the cervical spine  No subluxation  VERTEBRAL BODIES:  No fracture  DEGENERATIVE CHANGES:  No significant cervical degenerative changes are noted  There appear to the central disc protrusions at C2-C3, C3-C4, and C4-C5, causing mild to moderate canal stenosis  PREVERTEBRAL AND PARASPINAL SOFT TISSUES:  Unremarkable  THORACIC INLET:  Normal      Impression: No cervical spine fracture or traumatic malalignment  Central disc protrusions at C2-C3, C3-C4, and C4-C5  Unfortunately, this is incompletely evaluated  Given the reported neurological symptoms in this trauma patient, recommend MRI  I personally discussed this study with Jenna Lee on 3/6/2021 at 9:53 PM  Workstation performed: [id number]       I have personally reviewed pertinent reports     and I have personally reviewed pertinent films in PACS    VTE Prophylaxis: Sequential compression device (Venodyne)  and RX contraindicated due to: emergent surgery

## 2021-03-07 NOTE — ANESTHESIA PREPROCEDURE EVALUATION
Procedure:  POSTERIOR C3-5 laminectomy, C2-7 fixation fusion, possible additional levels (N/A Spine Cervical)    Relevant Problems   No relevant active problems        Physical Exam    Airway  Comment: No exam  Mallampati score: II  TM Distance: >3 FB  Neck ROM: full     Dental   Comment: No exam, No notable dental hx     Cardiovascular  Cardiovascular exam normal    Pulmonary  Pulmonary exam normal     Other Findings        Anesthesia Plan  ASA Score- 4 Emergent    Anesthesia Type- general with ASA Monitors  Additional Monitors: arterial line  Airway Plan: ETT  Plan Factors-Exercise tolerance (METS): >4 METS  Chart reviewed  Existing labs reviewed  Patient is a current smoker  Patient instructed to abstain from smoking on day of procedure  Patient did not smoke on day of surgery  Induction- intravenous  Postoperative Plan-     Informed Consent-   I personally reviewed this patient with the CRNA  Discussed and agreed on the Anesthesia Plan with the CRNA  Malachi Faith

## 2021-03-07 NOTE — RESPIRATORY THERAPY NOTE
RT Ventilator Management Note  Mauricio Car 38 y o  male MRN: [id number]  Unit/Bed#: ICU 06 Encounter: [id number]      Daily Screen       3/7/2021  1026             Patient safety screen outcome[de-identified]  Failed    Not Ready for Weaning due to[de-identified]  Underline problem not resolved            Physical Exam:   Assessment Type: Assess only  General Appearance: Sedated  Respiratory Pattern: Assisted  Chest Assessment: Chest expansion symmetrical  Bilateral Breath Sounds: Coarse, Diminished  O2 Device: (P) vent      Resp Comments: (P) vent changes by CCS, to cpap/ps, pt awake, alert following commands, pt appears much more comfortable will continue to monitor

## 2021-03-07 NOTE — RESPIRATORY THERAPY NOTE
RT Ventilator Management Note  Thomas Magana 38 y o  male MRN: [id number]  Unit/Bed#: ICU 06 Encounter: [id number]      Daily Screen       3/7/2021  6936             Patient safety screen outcome[de-identified]  Failed    Not Ready for Weaning due to[de-identified]  Underline problem not resolved            Physical Exam:   Assessment Type: (P) Assess only  General Appearance: (P) Sedated  Respiratory Pattern: (P) Assisted  Chest Assessment: (P) Chest expansion symmetrical  Bilateral Breath Sounds: (P) Coarse, Diminished  O2 Device: (P) vent  Subjective Data: Intubated      Resp Comments: (P) pt arouses easily, appears comfortable on current settings, will continue to monitor

## 2021-03-07 NOTE — PLAN OF CARE
Problem: Prexisting or High Potential for Compromised Skin Integrity  Goal: Skin integrity is maintained or improved  Description: INTERVENTIONS:  - Identify patients at risk for skin breakdown  - Assess and monitor skin integrity  - Assess and monitor nutrition and hydration status  - Monitor labs   - Assess for incontinence   - Turn and reposition patient  - Assist with mobility/ambulation  - Relieve pressure over bony prominences  - Avoid friction and shearing  - Provide appropriate hygiene as needed including keeping skin clean and dry  - Evaluate need for skin moisturizer/barrier cream  - Collaborate with interdisciplinary team   - Patient/family teaching  - Consider wound care consult   Outcome: Progressing     Problem: Potential for Falls  Goal: Patient will remain free of falls  Description: INTERVENTIONS:  - Assess patient frequently for physical needs  -  Identify cognitive and physical deficits and behaviors that affect risk of falls  -  [place] fall precautions as indicated by assessment   - Educate patient/family on patient safety including physical limitations  - Instruct patient to call for assistance with activity based on assessment  - Modify environment to reduce risk of injury  - Consider OT/PT consult to assist with strengthening/mobility  Outcome: Progressing     Problem: Nutrition/Hydration-ADULT  Goal: Nutrient/Hydration intake appropriate for improving, restoring or maintaining nutritional needs  Description: Monitor and assess patient's nutrition/hydration status for malnutrition  Collaborate with interdisciplinary team and initiate plan and interventions as ordered  Monitor patient's weight and dietary intake as ordered or per policy  Utilize nutrition screening tool and intervene as necessary  Determine patient's food preferences and provide high-protein, high-caloric foods as appropriate       INTERVENTIONS:  - Monitor oral intake, urinary output, labs, and treatment plans  - Assess nutrition and hydration status and recommend course of action  - Evaluate amount of meals eaten  - Assist patient with eating if necessary   - Allow adequate time for meals  - Recommend/ encourage appropriate diets, oral nutritional supplements, and vitamin/mineral supplements  - Order, calculate, and assess calorie counts as needed  - Recommend, monitor, and adjust tube feedings and TPN/PPN based on assessed needs  - Assess need for intravenous fluids  - Provide specific nutrition/hydration education as appropriate  - Include patient/family/caregiver in decisions related to nutrition  Outcome: Progressing     Problem: SAFETY,RESTRAINT: NV/NON-SELF DESTRUCTIVE BEHAVIOR  Goal: Remains free of harm/injury (restraint for non violent/non self-detsructive behavior)  Description: INTERVENTIONS:  - Instruct patient/family regarding restraint use   - Assess and monitor physiologic and psychological status   - Provide interventions and comfort measures to meet assessed patient needs   - Identify and implement measures to help patient regain control  - Assess readiness for release of restraint   Outcome: Progressing  Goal: Returns to optimal restraint-free functioning  Description: INTERVENTIONS:  - Assess the patient's behavior and symptoms that indicate continued need for restraint  - Identify and implement measures to help patient regain control  - Assess readiness for release of restraint   Outcome: Progressing

## 2021-03-07 NOTE — TRAUMA DOCUMENTATION
MAP of 84 obtained while pt being prepped for MRI, per Dr Samra Paredes (trauma) Levophed gtt 4mg/250mL initiated at 5 mcg - 18 8 mL/hr  Will continue to closely monitor pt for bradycardia and hypotension

## 2021-03-07 NOTE — PROCEDURES
Laceration repair    Date/Time: 3/7/2021 5:08 AM  Performed by: Andrea Huerta MD  Authorized by: Andrea Huerta MD   Body area: head/neck  Location details: right eyebrow  Laceration length: 4 cm  Foreign bodies: no foreign bodies  Tendon involvement: none  Nerve involvement: none  Vascular damage: no    Wound Dehiscence:  Superficial Wound Dehiscence: simple closure      Procedure Details:  Irrigation solution: saline  Irrigation method: syringe  Amount of cleaning: standard  Skin closure: Steri-Strips and glue  Approximation: close  Approximation difficulty: simple  Patient tolerance: patient tolerated the procedure well with no immediate complications

## 2021-03-07 NOTE — ANESTHESIA PROCEDURE NOTES
Arterial Line Insertion  Performed by: Lizzy Juan CRNA  Authorized by: Lizzy Juan CRNA   Consent: The procedure was performed in an emergent situation  Verbal consent obtained  Risks and benefits: risks, benefits and alternatives were discussed  Consent given by: patient  Patient understanding: patient states understanding of the procedure being performed  Patient consent: the patient's understanding of the procedure matches consent given  Procedure consent: procedure consent matches procedure scheduled  Relevant documents: relevant documents present and verified  Patient identity confirmed: arm band and hospital-assigned identification number  Time out: Immediately prior to procedure a "time out" was called to verify the correct patient, procedure, equipment, support staff and site/side marked as required  Preparation: Patient was prepped and draped in the usual sterile fashion  Indications: hemodynamic monitoring    Location: radial artery  Sedation:  Patient sedated: geta      Procedure Details:  Aniceto's test normal: yes  Needle gauge: 20  Seldinger technique: Seldinger technique used  Number of attempts: 1    Post-procedure:  Post-procedure: dressing applied

## 2021-03-07 NOTE — RESPIRATORY THERAPY NOTE
resp care      03/07/21 1996   Respiratory Assessment   Resp Comments pt extubated to [place], 02 sat 96%, no stridor noted, prior to extubation +cuff leak, after extubation pt has good verbalization, pt appears comfortable will continue to monitor   O2 Device vent   Additional Assessments   SpO2 96 %

## 2021-03-07 NOTE — ED PROVIDER NOTES
Emergency Department Airway Evaluation and Management Form    History  Obtained from: EMS, pt  Patient has no allergy information on record  No chief complaint on file  HPI    37 yo male BIBA after falling down stairs  Remembers falling, doesn't remember anything else  15 minutes later he was still at the bottom of the stairs not moving, friends called EMS  He was found supine on the ground  C/o unable to feel below his nipples, cannot move his legs  No past medical history on file  No past surgical history on file  No family history on file  Social History     Tobacco Use    Smoking status: Not on file   Substance Use Topics    Alcohol use: Not on file    Drug use: Not on file     I have reviewed and agree with the history as documented      Review of Systems     Sensory loss below nipples  BLE motor loss    Physical Exam  /58   Pulse 77   Temp 97 9 °F (36 6 °C) (Tympanic)   Resp 20   Wt 64 3 kg (141 lb 12 1 oz)   SpO2 98%     Physical Exam     No oral trauma  No stridor  Lungs CTAB  GCS 15    ED Medications  Medications   tetanus-diphtheria-acellular pertussis (BOOSTRIX) IM injection 0 5 mL (0 5 mL Intramuscular Given 3/6/21 2113)       Intubation  Procedures    Notes      Final Diagnosis  Final diagnoses:   None       ED Provider  Electronically Signed by     Fatmata Rebolledo DO  03/06/21 2115

## 2021-03-07 NOTE — PROGRESS NOTES
Pastoral Care Progress Note    3/6/2021  Patient: Danielle Donohue : 1985  Admission Date & Time: 3/6/2021 2102  MRN: [id number] CSN: [id number]           responded to Trauma Alert  Offered silent prayer for the patient during the medical care of the patient   met the patient's girlfriend upon her arrival at [facility]  Provided psychosocial and emotional support to the girlfriend  The Pastoral Care Department will continue to offer spiritual and emotional support as needed       21   Clinical Encounter Type   Visited With Patient not available  (Pt's girlfriend)   Crisis Visit Trauma   Adventist Encounters   Adventist Needs Prayer                 Relationship Building: Cultivated a relationship of care and support, Listened empathically, Hospitality and Provided silent and supportive presence

## 2021-03-07 NOTE — OCCUPATIONAL THERAPY NOTE
Occupational Therapy         Patient Name: Rock Murrell  JYDDZ'L Date: 3/7/2021       03/07/21 0700   OT Last Visit   OT Visit Date 03/07/21   Note Type   Note type Evaluation   Cancel Reasons Other     Pt intubated - will defer and await medical stability to initiate OT zane Cruz, OT

## 2021-03-07 NOTE — RESPIRATORY THERAPY NOTE
RT Ventilator Management Note  Sindy Guan 38 y o  male MRN: [id number]  Unit/Bed#: ICU 06 Encounter: [id number]      Daily Screen       3/7/2021  7987             Patient safety screen outcome[de-identified]  Failed    Not Ready for Weaning due to[de-identified]  Underline problem not resolved            Physical Exam:   Assessment Type: Assess only  General Appearance: Sedated  Respiratory Pattern: Assisted  Chest Assessment: Chest expansion symmetrical  Bilateral Breath Sounds: Coarse, Diminished  O2 Device: vent  Subjective Data: Intubated      Resp Comments: (P) pt placed on cpap/ps, pt's RR <8, pt appeared uncomfortable, pt placed back on previous settings, will continue to monitor

## 2021-03-07 NOTE — NUTRITION
03/07/21 1538   Recommendations/Interventions   Nutrition Recommendations Other (Specify)  (with propofol @13 5ml/hr rec TF with Jevity 1 2 @10ml/hr advance as tolerated to goal rate 40ml/hr + 2 packets liquid prosource daily to provide 960ml, 1628kcal (with propofol), 83g pro, 778ml free water   Monitor weight and electrolytes )

## 2021-03-07 NOTE — PROGRESS NOTES
ICU Acceptance Note - Ladarius Kidd 38 y o  male MRN: [id number]  Unit/Bed#: OR POOL Encounter: [id number]    Assessment & Plan:    Neuro:   1  Traumatic spinal cord injury, POD#0 C3-5 laminectomy + C2-7 fusion  - q 1 hour neuro checks  - maintain map greater than 85  - neurosurgery following  - C-spine precautions    2  Sedation  - propofol 50  - fentanyl 100     CV:  No acute issues  - maintain map greater than 85 as above  - currently on levophed 3 to maintain     Lun  Intubated for airway protection, postop  - Vent: ACPC 15 / 10 / 6 / 40%  - maintain SpO2 greater than 90%  - weaning trial when appropriate     GI:  No acute issues     FEN:   F: isolyte 50 cc/hr  E:  Replete as needed  N:  NPO     :  No acute issues  - Billingsley in place  - monitor urine output  - monitor BUN/creatinine     ID:  No acute issues  - monitor for fever, leukocytosis     Heme:  No acute issues  - holding DVT prophylaxis until cleared by NSG     Endo:  No acute issues  - maintain euglycemia                Msk/Skin:   1  R forehead laceration  - tetanus updated  - repaired with dermabond/steristrips    - local wound care for surgical incision  - PT OT when appropriate  - frequent repositioning, monitor for skin breakdown     Disposition:  Critical care    Chief Complaint: intubated    HPI/24hr events:   49-year-old male with history of depression who presented to [facility]"" on 2021 as a level A trauma alert after falling down stairs  He reportedly was found at the bottom of stairs by friends  They are unsure how long he was down, but they had last seen him approximately 30 minutes prior  Patient does not recall any events regarding how he fell  On arrival to the [facility], he was found to have absent sensation below the nipple line and flaccid paralysis of all 4 extremities  CT C-spine showed C5 spinous process and right lamina fracture, as well as central disc protrusions at C2/C3, C3/4, C4/5    MRI showed evidence of cord edema in these areas  Patient was taken emergently to the OR by neuro surgery for C3-5 laminectomy and C2-7 fixation  On arrival to the ICU, he is intubated and sedated  He is hemodynamically stable  Physical Exam:   Physical Exam  Vitals signs and nursing note reviewed  Constitutional:       Interventions: He is sedated and intubated  Cervical collar in place  HENT:      Head:      Comments: 4 cm laceration through the right eyebrow  Bleeding controlled  Nose: Nose normal       Mouth/Throat:      Mouth: Mucous membranes are dry  Eyes:      Extraocular Movements: Extraocular movements intact  Pupils: Pupils are equal, round, and reactive to light  Neck:      Comments: C-collar in place  Cardiovascular:      Rate and Rhythm: Normal rate and regular rhythm  Heart sounds: No murmur  No friction rub  No gallop  Pulmonary:      Effort: He is intubated  Breath sounds: Normal breath sounds  No wheezing, rhonchi or rales  Abdominal:      General: Bowel sounds are normal  There is no distension  Palpations: Abdomen is soft  Tenderness: There is no abdominal tenderness  Musculoskeletal:         General: No swelling or tenderness  Skin:     General: Skin is warm and dry  Capillary Refill: Capillary refill takes less than 2 seconds  Coloration: Skin is not pale  Findings: No rash  Neurological:      Mental Status: He is easily aroused  Comments: GCS 10T  Insensate below the nipple line  Flaccid paralysis of bilateral upper and lower extremities  There is occasional muscle twitching in the bilateral upper extremities, but no purposeful movement with effort             Vitals:    03/06/21 2145 03/06/21 2200 03/06/21 2215 03/06/21 2300   BP: 119/76 139/85 152/86 133/78   Pulse: 80 63 70 70   Resp: 18 18 17 21   Temp:    98 °F (36 7 °C)   TempSrc:    Oral   SpO2: 99% 97% 97% 98%   Weight:                 Temperature:   Temp (24hrs), Av °F (36 7 °C), Min:97 9 °F (36 6 °C), Max:98 °F (36 7 °C)    Current: Temperature: 98 °F (36 7 °C)    Weights:   IBW: -88 kg    There is no height or weight on file to calculate BMI  Weight (last 2 days)     Date/Time   Weight    21   64 3 (141 76)              Hemodynamic Monitoring:  N/A     Non-Invasive/Invasive Ventilation Settings:  Respiratory    Lab Data (Last 4 hours)    None         O2/Vent Data (Last 4 hours)    None              No results found for: PHART, WQJ4AGS, PO2ART, FSU9TDA, U7EXWGHY, BEART, SOURCE  SpO2: SpO2: 100 %    Intake and Outputs:  I/O     None           Nutrition:        Diet Orders   (From admission, onward)             Start     Ordered    21  Diet NPO  Diet effective now     Question Answer Comment   Diet Type NPO    RD to adjust diet per protocol? No        21                  Labs:   Results from last 7 days   Lab Units 21   WBC Thousand/uL 12 27*  --    HEMOGLOBIN g/dL 11 7*  --    I STAT HEMOGLOBIN g/dl  --  12 6   HEMATOCRIT % 35 4*  --    HEMATOCRIT, ISTAT %  --  37   PLATELETS Thousands/uL 326  --    NEUTROS PCT % 57  --    MONOS PCT % 9  --       Results from last 7 days   Lab Units 21   SODIUM mmol/L 145  --    POTASSIUM mmol/L 3 9  --    CHLORIDE mmol/L 113*  --    CO2 mmol/L 26  --    CO2, I-STAT mmol/L  --  26   BUN mg/dL 10  --    CREATININE mg/dL 1 07  --    CALCIUM mg/dL 8 5  --    GLUCOSE, ISTAT mg/dl  --  87              Results from last 7 days   Lab Units 21   INR  1 08         No results found for: TROPONINI    Imaging:   XR spine cervical 2 or 3 vw injury   Final Result by Shwetha Cheung MD ( [id number])      Fluoroscopic guidance provided for procedure guidance  Please refer to the separate procedure notes for additional details           Workstation performed: [id number]         CT head without contrast   Final Result by Mena Mckee DO ( 0520) No acute intracranial abnormality  I personally discussed this study with Lizzette Vee on 3/6/2021 at 9:53 PM          Workstation performed: [id number]         CT spine cervical without contrast   Final Result by  (03/07 0425)   Addendum 1 of 1 by Kelsey Ardon DO (03/06 2215)   ADDENDUM:      There is a nondisplaced fracture through the spinous process and right    lamina of C5  I personally discussed this study with Lizzette Vee on 3/6/2021 at    10:14 PM                Final      CT chest abdomen pelvis w wo contrast   Final Result by Kelsey Ardon DO (03/06 2154)      No acute traumatic injury identified  I personally discussed this study with Lizzette Vee on 3/6/2021 at 9:53 PM                Workstation performed: [id number]         XR trauma multiple    (Results Pending)   XR chest 1 view    (Results Pending)   MRI cervical spine wo contrast    (Results Pending)   MRI thoracic spine wo contrast    (Results Pending)   MRI lumbar spine wo contrast    (Results Pending)   X-ray chest 1 view    (Results Pending)      I have personally reviewed pertinent reports  Micro:  No results found for: Ashley Maxim, WOUNDCULT, SPUTUMCULTUR    Allergies: No Known Allergies    Medications:   Scheduled Meds:  Current Facility-Administered Medications   Medication Dose Route Frequency Provider Last Rate    neomycin-polymyxin B  1 mL in sodium chloride 0 9% 1000 mL irrigation bottle   Irrigation Once Silvia Mckeon MD      norepinephrine            Continuous Infusions:   PRN Meds:       VTE Pharmacologic Prophylaxis: Reason for no pharmacologic prophylaxis NSG  VTE Mechanical Prophylaxis: sequential compression device    Invasive lines and devices:   Invasive Devices     Peripheral Intravenous Line            Peripheral IV 03/06/21 Left Antecubital 1 day    Peripheral IV 03/06/21 Right Hand 1 day                   Counseling / Coordination of Care  Total Critical Care time spent 15 minutes excluding procedures, teaching and family updates  Code Status: Level 1 - Full Code     Portions of the record may have been created with voice recognition software  Occasional wrong word or "sound a like" substitutions may have occurred due to the inherent limitations of voice recognition software  Read the chart carefully and recognize, using context, where substitutions have occurred       Maddison Krishnamurthy MD

## 2021-03-08 ENCOUNTER — TELEPHONE (OUTPATIENT)
Dept: RADIOLOGY | Facility: HOSPITAL | Age: 36
End: 2021-03-08

## 2021-03-08 ENCOUNTER — APPOINTMENT (INPATIENT)
Dept: RADIOLOGY | Facility: HOSPITAL | Age: 36
DRG: 023 | End: 2021-03-08
Payer: MEDICARE

## 2021-03-08 LAB
ANION GAP SERPL CALCULATED.3IONS-SCNC: 4 MMOL/L (ref 4–13)
BASOPHILS # BLD AUTO: 0.05 THOUSANDS/ΜL (ref 0–0.1)
BASOPHILS NFR BLD AUTO: 0 % (ref 0–1)
BUN SERPL-MCNC: 6 MG/DL (ref 5–25)
CALCIUM SERPL-MCNC: 8.4 MG/DL (ref 8.3–10.1)
CHLORIDE SERPL-SCNC: 109 MMOL/L (ref 100–108)
CO2 SERPL-SCNC: 29 MMOL/L (ref 21–32)
CREAT SERPL-MCNC: 0.95 MG/DL (ref 0.6–1.3)
EOSINOPHIL # BLD AUTO: 0.02 THOUSAND/ΜL (ref 0–0.61)
EOSINOPHIL NFR BLD AUTO: 0 % (ref 0–6)
ERYTHROCYTE [DISTWIDTH] IN BLOOD BY AUTOMATED COUNT: 12.2 % (ref 11.6–15.1)
GFR SERPL CREATININE-BSD FRML MDRD: 118 ML/MIN/1.73SQ M
GLUCOSE SERPL-MCNC: 111 MG/DL (ref 65–140)
HCT VFR BLD AUTO: 34.5 % (ref 36.5–49.3)
HGB BLD-MCNC: 11.3 G/DL (ref 12–17)
IMM GRANULOCYTES # BLD AUTO: 0.13 THOUSAND/UL (ref 0–0.2)
IMM GRANULOCYTES NFR BLD AUTO: 1 % (ref 0–2)
LYMPHOCYTES # BLD AUTO: 4.7 THOUSANDS/ΜL (ref 0.6–4.47)
LYMPHOCYTES NFR BLD AUTO: 22 % (ref 14–44)
MCH RBC QN AUTO: 30.7 PG (ref 26.8–34.3)
MCHC RBC AUTO-ENTMCNC: 32.8 G/DL (ref 31.4–37.4)
MCV RBC AUTO: 94 FL (ref 82–98)
MONOCYTES # BLD AUTO: 2.22 THOUSAND/ΜL (ref 0.17–1.22)
MONOCYTES NFR BLD AUTO: 11 % (ref 4–12)
NEUTROPHILS # BLD AUTO: 13.91 THOUSANDS/ΜL (ref 1.85–7.62)
NEUTS SEG NFR BLD AUTO: 66 % (ref 43–75)
NRBC BLD AUTO-RTO: 0 /100 WBCS
PLATELET # BLD AUTO: 316 THOUSANDS/UL (ref 149–390)
PMV BLD AUTO: 9.1 FL (ref 8.9–12.7)
POTASSIUM SERPL-SCNC: 3.4 MMOL/L (ref 3.5–5.3)
RBC # BLD AUTO: 3.68 MILLION/UL (ref 3.88–5.62)
SODIUM SERPL-SCNC: 142 MMOL/L (ref 136–145)
WBC # BLD AUTO: 21.03 THOUSAND/UL (ref 4.31–10.16)

## 2021-03-08 PROCEDURE — 99223 1ST HOSP IP/OBS HIGH 75: CPT | Performed by: STUDENT IN AN ORGANIZED HEALTH CARE EDUCATION/TRAINING PROGRAM

## 2021-03-08 PROCEDURE — 99291 CRITICAL CARE FIRST HOUR: CPT | Performed by: SURGERY

## 2021-03-08 PROCEDURE — 36569 INSJ PICC 5 YR+ W/O IMAGING: CPT

## 2021-03-08 PROCEDURE — 99024 POSTOP FOLLOW-UP VISIT: CPT | Performed by: NURSE PRACTITIONER

## 2021-03-08 PROCEDURE — NC001 PR NO CHARGE: Performed by: PHYSICIAN ASSISTANT

## 2021-03-08 PROCEDURE — 92610 EVALUATE SWALLOWING FUNCTION: CPT

## 2021-03-08 PROCEDURE — C1751 CATH, INF, PER/CENT/MIDLINE: HCPCS

## 2021-03-08 PROCEDURE — 80048 BASIC METABOLIC PNL TOTAL CA: CPT | Performed by: NURSE PRACTITIONER

## 2021-03-08 PROCEDURE — 36573 INSJ PICC RS&I 5 YR+: CPT | Performed by: RADIOLOGY

## 2021-03-08 PROCEDURE — 94760 N-INVAS EAR/PLS OXIMETRY 1: CPT

## 2021-03-08 PROCEDURE — 97163 PT EVAL HIGH COMPLEX 45 MIN: CPT

## 2021-03-08 PROCEDURE — 02HV33Z INSERTION OF INFUSION DEVICE INTO SUPERIOR VENA CAVA, PERCUTANEOUS APPROACH: ICD-10-PCS | Performed by: RADIOLOGY

## 2021-03-08 PROCEDURE — 97167 OT EVAL HIGH COMPLEX 60 MIN: CPT

## 2021-03-08 PROCEDURE — 85025 COMPLETE CBC W/AUTO DIFF WBC: CPT | Performed by: NURSE PRACTITIONER

## 2021-03-08 RX ORDER — HYDROMORPHONE HCL/PF 1 MG/ML
1 SYRINGE (ML) INJECTION
Status: DISCONTINUED | OUTPATIENT
Start: 2021-03-08 | End: 2021-03-12

## 2021-03-08 RX ORDER — AMOXICILLIN 250 MG
2 CAPSULE ORAL 2 TIMES DAILY
Status: DISCONTINUED | OUTPATIENT
Start: 2021-03-08 | End: 2021-03-10

## 2021-03-08 RX ORDER — MIDODRINE HYDROCHLORIDE 5 MG/1
10 TABLET ORAL
Status: DISCONTINUED | OUTPATIENT
Start: 2021-03-08 | End: 2021-03-09

## 2021-03-08 RX ORDER — OXYCODONE HYDROCHLORIDE 5 MG/1
5 TABLET ORAL
Status: DISCONTINUED | OUTPATIENT
Start: 2021-03-08 | End: 2021-03-08

## 2021-03-08 RX ORDER — BISACODYL 10 MG
10 SUPPOSITORY, RECTAL RECTAL DAILY PRN
Status: DISCONTINUED | OUTPATIENT
Start: 2021-03-08 | End: 2021-03-09

## 2021-03-08 RX ORDER — OXYCODONE HYDROCHLORIDE 10 MG/1
10 TABLET ORAL EVERY 6 HOURS
Status: DISCONTINUED | OUTPATIENT
Start: 2021-03-08 | End: 2021-03-08

## 2021-03-08 RX ORDER — OXYCODONE HYDROCHLORIDE 10 MG/1
10 TABLET ORAL
Status: DISCONTINUED | OUTPATIENT
Start: 2021-03-08 | End: 2021-03-08

## 2021-03-08 RX ORDER — LANOLIN ALCOHOL/MO/W.PET/CERES
3 CREAM (GRAM) TOPICAL
Status: DISCONTINUED | OUTPATIENT
Start: 2021-03-08 | End: 2021-03-16 | Stop reason: HOSPADM

## 2021-03-08 RX ORDER — PANTOPRAZOLE SODIUM 40 MG/1
40 TABLET, DELAYED RELEASE ORAL
Status: DISCONTINUED | OUTPATIENT
Start: 2021-03-08 | End: 2021-03-08

## 2021-03-08 RX ORDER — GABAPENTIN 300 MG/1
300 CAPSULE ORAL 3 TIMES DAILY
Status: DISCONTINUED | OUTPATIENT
Start: 2021-03-08 | End: 2021-03-12

## 2021-03-08 RX ORDER — HYDROMORPHONE HCL/PF 1 MG/ML
0.2 SYRINGE (ML) INJECTION ONCE
Status: COMPLETED | OUTPATIENT
Start: 2021-03-08 | End: 2021-03-08

## 2021-03-08 RX ORDER — LIDOCAINE HYDROCHLORIDE 10 MG/ML
INJECTION, SOLUTION EPIDURAL; INFILTRATION; INTRACAUDAL; PERINEURAL CODE/TRAUMA/SEDATION MEDICATION
Status: COMPLETED | OUTPATIENT
Start: 2021-03-08 | End: 2021-03-08

## 2021-03-08 RX ORDER — OXYCODONE HYDROCHLORIDE 10 MG/1
10 TABLET ORAL
Status: DISCONTINUED | OUTPATIENT
Start: 2021-03-08 | End: 2021-03-10

## 2021-03-08 RX ORDER — POTASSIUM CHLORIDE 20 MEQ/1
40 TABLET, EXTENDED RELEASE ORAL ONCE
Status: COMPLETED | OUTPATIENT
Start: 2021-03-08 | End: 2021-03-08

## 2021-03-08 RX ORDER — MIDODRINE HYDROCHLORIDE 5 MG/1
10 TABLET ORAL
Status: DISCONTINUED | OUTPATIENT
Start: 2021-03-08 | End: 2021-03-08

## 2021-03-08 RX ORDER — ACETAMINOPHEN 325 MG/1
975 TABLET ORAL EVERY 8 HOURS SCHEDULED
Status: DISCONTINUED | OUTPATIENT
Start: 2021-03-08 | End: 2021-03-13

## 2021-03-08 RX ADMIN — METHOCARBAMOL TABLETS 750 MG: 750 TABLET, COATED ORAL at 11:30

## 2021-03-08 RX ADMIN — OXYCODONE HYDROCHLORIDE 10 MG: 10 TABLET ORAL at 06:08

## 2021-03-08 RX ADMIN — ACETAMINOPHEN 975 MG: 325 TABLET, FILM COATED ORAL at 13:00

## 2021-03-08 RX ADMIN — OXYCODONE HYDROCHLORIDE 10 MG: 10 TABLET ORAL at 11:30

## 2021-03-08 RX ADMIN — MIDODRINE HYDROCHLORIDE 10 MG: 5 TABLET ORAL at 16:07

## 2021-03-08 RX ADMIN — METHOCARBAMOL TABLETS 750 MG: 750 TABLET, COATED ORAL at 23:04

## 2021-03-08 RX ADMIN — POLYETHYLENE GLYCOL 3350 17 G: 17 POWDER, FOR SOLUTION ORAL at 08:11

## 2021-03-08 RX ADMIN — HYDROMORPHONE HYDROCHLORIDE 0.5 MG: 1 INJECTION, SOLUTION INTRAMUSCULAR; INTRAVENOUS; SUBCUTANEOUS at 06:25

## 2021-03-08 RX ADMIN — HYDROMORPHONE HYDROCHLORIDE 0.5 MG: 1 INJECTION, SOLUTION INTRAMUSCULAR; INTRAVENOUS; SUBCUTANEOUS at 02:50

## 2021-03-08 RX ADMIN — OXYCODONE HYDROCHLORIDE 15 MG: 10 TABLET ORAL at 19:42

## 2021-03-08 RX ADMIN — METHOCARBAMOL TABLETS 750 MG: 750 TABLET, COATED ORAL at 17:29

## 2021-03-08 RX ADMIN — GABAPENTIN 300 MG: 300 CAPSULE ORAL at 16:07

## 2021-03-08 RX ADMIN — NOREPINEPHRINE BITARTRATE 7 MCG/MIN: 1 INJECTION, SOLUTION, CONCENTRATE INTRAVENOUS at 12:54

## 2021-03-08 RX ADMIN — POTASSIUM CHLORIDE 40 MEQ: 1500 TABLET, EXTENDED RELEASE ORAL at 06:25

## 2021-03-08 RX ADMIN — METHOCARBAMOL TABLETS 750 MG: 750 TABLET, COATED ORAL at 00:33

## 2021-03-08 RX ADMIN — NOREPINEPHRINE BITARTRATE 1 MCG/MIN: 1 INJECTION, SOLUTION, CONCENTRATE INTRAVENOUS at 00:25

## 2021-03-08 RX ADMIN — SENNOSIDES, DOCUSATE SODIUM 2 TABLET: 8.6; 5 TABLET ORAL at 17:29

## 2021-03-08 RX ADMIN — OXYCODONE HYDROCHLORIDE 15 MG: 10 TABLET ORAL at 23:04

## 2021-03-08 RX ADMIN — ACETAMINOPHEN 975 MG: 325 TABLET, FILM COATED ORAL at 21:19

## 2021-03-08 RX ADMIN — ACETAMINOPHEN 975 MG: 325 TABLET ORAL at 04:57

## 2021-03-08 RX ADMIN — GABAPENTIN 300 MG: 300 CAPSULE ORAL at 08:11

## 2021-03-08 RX ADMIN — ENOXAPARIN SODIUM 40 MG: 40 INJECTION SUBCUTANEOUS at 08:11

## 2021-03-08 RX ADMIN — NOREPINEPHRINE BITARTRATE 3 MCG/MIN: 1 INJECTION, SOLUTION, CONCENTRATE INTRAVENOUS at 23:04

## 2021-03-08 RX ADMIN — HYDROMORPHONE HYDROCHLORIDE 1 MG: 1 INJECTION, SOLUTION INTRAMUSCULAR; INTRAVENOUS; SUBCUTANEOUS at 17:29

## 2021-03-08 RX ADMIN — HYDROMORPHONE HYDROCHLORIDE 0.2 MG: 1 INJECTION, SOLUTION INTRAMUSCULAR; INTRAVENOUS; SUBCUTANEOUS at 01:25

## 2021-03-08 RX ADMIN — OXYCODONE HYDROCHLORIDE 15 MG: 10 TABLET ORAL at 16:07

## 2021-03-08 RX ADMIN — GABAPENTIN 300 MG: 300 CAPSULE ORAL at 21:19

## 2021-03-08 RX ADMIN — HYDROMORPHONE HYDROCHLORIDE 1 MG: 1 INJECTION, SOLUTION INTRAMUSCULAR; INTRAVENOUS; SUBCUTANEOUS at 21:18

## 2021-03-08 RX ADMIN — SENNOSIDES, DOCUSATE SODIUM 2 TABLET: 8.6; 5 TABLET ORAL at 08:11

## 2021-03-08 RX ADMIN — HYDROMORPHONE HYDROCHLORIDE 1 MG: 1 INJECTION, SOLUTION INTRAMUSCULAR; INTRAVENOUS; SUBCUTANEOUS at 08:11

## 2021-03-08 RX ADMIN — METHOCARBAMOL TABLETS 750 MG: 750 TABLET, COATED ORAL at 06:08

## 2021-03-08 RX ADMIN — LIDOCAINE HYDROCHLORIDE 5 ML: 10 INJECTION, SOLUTION EPIDURAL; INFILTRATION; INTRACAUDAL; PERINEURAL at 18:40

## 2021-03-08 RX ADMIN — MIDODRINE HYDROCHLORIDE 10 MG: 5 TABLET ORAL at 11:30

## 2021-03-08 RX ADMIN — MELATONIN TAB 3 MG 3 MG: 3 TAB at 23:04

## 2021-03-08 RX ADMIN — HYDROMORPHONE HYDROCHLORIDE 1 MG: 1 INJECTION, SOLUTION INTRAMUSCULAR; INTRAVENOUS; SUBCUTANEOUS at 12:58

## 2021-03-08 NOTE — PHYSICAL THERAPY NOTE
Physical Therapy Evaluation     Patient's Name: Maicol Bonner    Admitting Diagnosis  Injury of cervical spinal cord, initial encounter ([facility] [address] ) [I21 176O]  Contusion of cervical cord, initial encounter ([facility] [address] ) [W33 445C]  Unspecified multiple injuries, initial encounter [T07  XXXA]    Problem List  Patient Active Problem List   Diagnosis    Central cord syndrome [facility])    Pulmonary insufficiency       Past Medical History  No past medical history on file  Past Surgical History  Past Surgical History:   Procedure Laterality Date    CERVICAL FUSION N/A 3/6/2021    Procedure: POSTERIOR C3-5 laminectomy, C2-7 fixation fusion, possible additional levels;  Surgeon: Carlos Rios MD;  Location: [facility];  Service: Neurosurgery        03/08/21 0935   PT Last Visit   PT Visit Date 03/08/21   Note Type   Note type Evaluation   Pain Assessment   Pain Assessment Tool 0-10   Pain Score Worst Possible Pain   Pain Location/Orientation Orientation: Upper;Orientation: Lower; Location: Neck   Hospital Pain Intervention(s) Repositioned; Ambulation/increased activity   Home Living   Type of Home House   Home Layout Two level  (4 DARÍO, 13 steps to 2nd floor)   Bathroom Shower/Tub Tub/shower unit   Bathroom Toilet Standard   Bathroom Equipment Grab bars in shower   Prior Function   Level of [place] Independent with ADLs and functional mobility   Lives With Friend(s)   Receives Help From Friend(s)   ADL Assistance Independent   IADLs Independent   Falls in the last 6 months 1 to 4  (1 current admission)   Vocational Unemployed   Comments does not drive   Restrictions/Precautions   Weight Bearing Precautions Per Order No   Braces or Orthoses C/S Collar   Other Precautions Bed Alarm;Multiple lines;Telemetry; Fall Risk;Pain;Spinal precautions   General   Family/Caregiver Present No   Cognition   Overall Cognitive Status WFL   Orientation Level Oriented X4   Comments Pt cooperative  Encouragement provided throughout session     JARED Assessment   RLE Assessment X  (RLE flaccid)   LLE Assessment   LLE Assessment X  (LLE flaccid)   Coordination   Sensation X   Light Touch   RLE Light Touch Impaired   RLE Light Touch Comments Able to feel, but not identify location, when palpating along medial arch  No sensation above knee per RN   LLE Light Touch Impaired   LLE Light Touch Comments No sensation above left knee per RN  Bed Mobility   Supine to Sit 1  Dependent   Additional items Assist x 2;HOB elevated;Verbal cues   Additional Comments Attempted 3 long sits to assess postural muscle contraction  No palpable core muscle contraction  Ambulation/Elevation   Gait pattern Not appropriate   Balance   Static Sitting Zero   Endurance Deficit   Endurance Deficit Yes   Endurance Deficit Description limited by pain, fatigue, activity tolerance, medical condition   Activity Tolerance   Activity Tolerance Patient limited by fatigue;Patient limited by pain;Treatment limited secondary to medical complications (Comment)   Medical Staff Made Aware OT   Nurse Made Aware Yes   Assessment   Prognosis Fair   Problem List Decreased strength;Decreased endurance; Impaired balance;Decreased mobility; Impaired sensation; Impaired tone;Pain   Assessment Pt is a 38 y o male who presents to [facility] with paralysis after being found at bottom of stairs  CT and MRI imaging revealed injuries to cervical spine associated with Central Cord Syndrome  Pt s/p posterior C3-C5 laminectomy and C2-C7 fusion on 3/7/2021  Pt has no PMH on file  Pt with active PT orders to assess mobility and determine D/C needs  Pt with C/S collar and active spinal precautions  Pt confirmed home setup as documented by CM  Pt lives with roommates in a 2  with 4 DARÍO with a bedroom on the 2nd floor and a full bathroom on 1st floor  Pt states he was independent prior to admission with ADLs/IADLs and does not drive  Pt was cooperative throughout session and educated about importance of early mobilization   Pt was dependent with transfers from supine with HOB elevated to long sit  This transfer was completed three times to improve tolerance to position  Able to maintain long sit position with dependent Ax2 for about a minute before being returned to supported chair position in bed  PT observed slight drop in BP from rest with activity (138/78 > 125/58) but pt reported no orthostatic symptoms  Pt was returned to chair position in bed to improve tolerance to upright position  Modified call bell was issued secondary to pt's inability to use UE's  All other needs met  Other objective measures can be found above  Pt is of high complexity secondary to deficits in strength, sensation, tone, activity tolerance, and pt's level of dependence  Pt would benefit from skilled PT 3-5x/wk to address pt's LE strength deficits, improve pt's seated balance, and increase tolerance to positional changes to improve pt's function to a level appropriate for D/C  The patient's AM-PAC Basic Mobility Inpatient Short Form Low Function Raw Score 13 , Standardized Score is 20  14  A standardized score less 42 9 suggests the patient may benefit from discharge to post-acute rehab services  Please also refer to the recommendation of the Physical Therapist for safe discharge planning  Barriers to Discharge Inaccessible home environment;Decreased caregiver support   Goals   Patient Goals None stated at this time   STG Expiration Date 03/20/21   Short Term Goal #1 STG1: Pt will be able to tolerate 3 hours in the chair position on Kreg bed to improve tolerance to upright position  STG2: Pt will be able to improve B/L LE strength to at least 2/5 to improve capcitance to assist with bed mobility and transfers  STG3: Pt will be able to tolerate 30 minutes of exercise to improve activity tolerance and improve endurance  STG4: Pt will be independent with following spinal precautions to decrease burden on caregiver   STG5: Pt will improve balance to a Mod Ax1 level to decrease burden on caregiver and improve postural stability  PT Treatment Day 0   Plan   Treatment/Interventions Functional transfer training;LE strengthening/ROM; Therapeutic exercise; Bed mobility;Spoke to nursing;Spoke to case management;OT;Equipment eval/education; Compensatory technique education   PT Frequency Other (Comment)  (3-5x/wk)   Recommendation   PT Discharge Recommendation Post-Acute Rehabilitation Services   Equipment Recommended Other (Comment)  (Kreg bed)   PT - OK to Discharge No   AM-PAC Basic Mobility Inpatient   Turning in Bed Without Bedrails 1   Lying on Back to Sitting on Edge of Flat Bed 1   Moving Bed to Chair 1   Standing Up From Chair 1   Walk in Room 1   Climb 3-5 Stairs 1   Basic Mobility Inpatient Raw Score 6   Turning Head Towards Sound 3   Follow Simple Instructions 4   Low Function Basic Mobility Raw Score 13   Low Function Basic Mobility Standardized Score 20 14           Qi Bernal, SPT

## 2021-03-08 NOTE — TELEPHONE ENCOUNTER
Spoke with KAYLA Jerez, requested to try tomorrow for upright cervical spine xrays due to pt being in a lot of pain- may not be comfortable for xrays, also pt having a procedure done in IR and will be off the floor for a period of time  DE

## 2021-03-08 NOTE — PROGRESS NOTES
Progress Note Mable Vargas 1985, 38 y o  male MRN: [id number]    Unit/Bed#: ICU 06 Encounter: [id number]    Primary Care Provider: Anil Drew MD   Date and time admitted to hospital: 3/6/2021  9:02 PM        * Central cord syndrome [facility])  Assessment & Plan  POD1 PCDF C2-7 with C3-5 laminectomy with Dr Martin Begun on 3/7/2021  · LAWRENCE B cord injury s/p fall down stairs with C5 spinous process and lamina fractures  · On exam minimal movement bilateral UE 2/5 IO and wrists, otherwise 0/5 throughout  Partial T8 sensory level bilaterally  Imaging:  · Cervical spine MRI 3/7/2021: Increased T2 cord signal/edema involving stenotic levels C3 to inferior C5 most consistent with central cord syndrome in the setting of trauma  2   Nondisplaced fracture involving C5 spinous process and right lamina  Bone marrow edema within the spinous processes of C3-C5  3   Edema within posterior paravertebral musculature and increased T2 signal along the interspinous ligaments at levels C3-C6 suggestive of ligamentous injury  Anterior and posterior longitudinal ligaments appear intact  4   Degenerative canal stenosis more notable at level C3-4, as detailed  Plan:  · Frequent neuro checks  · MAP goal > 85 mmHg x 5 days, currently on Levophed  · VISTA collar at all times, babak collar for showering  · Post-op x-rays pending  · Bruce drain 170 mL/24 hours  · PMR evaluation  · DVT ppx: SCDs, Lovenox  Neurosurgery will continue to follow  Please call with any questions or concerns  Subjective/Objective   Chief Complaint: "When will I walk again?"    Subjective: Complaining of some neck pain  Still states unable to move extremities  Wondering if he will be able to walk again  Objective: Minimal deltoid strength and movement to bilateral IO and wrists  Sensation to pinprick bilateral LE and DST/JPS but unable to feel tug at jimenez  T8 patchy sensory level       I/O       03/06 6675 - 03/07 0700 03/07 7027 - 03/08 0700 03/08 0701 - 03/09 0700    I V  (mL/kg) 1300 4 (20 2) 1693 5 (25 4) 75 2 (1 1)    IV Piggyback  100     Total Intake(mL/kg) 1300 4 (20 2) 1793 5 (26 9) 75 2 (1 1)    Urine (mL/kg/hr) 1800 2350 (1 5) 350 (1 1)    Emesis/NG output 400      Drains 50 170     Blood 150      Total Output 2400 2520 350    Net -1099 6 -726 5 -274 8                 Invasive Devices     Peripheral Intravenous Line            Peripheral IV 03/07/21 Distal;Right;Upper;Ventral (anterior) Arm 1 day    Peripheral IV 03/07/21 Left Wrist 1 day    Peripheral IV 03/07/21 Right Forearm 1 day          Arterial Line            Arterial Line 03/07/21 Radial 1 day          Drain            Closed/Suction Drain Posterior;Right Neck Bulb 1 day    Urethral Catheter Latex 16 Fr  1 day                Physical Exam:  Vitals: Blood pressure 143/70, pulse 78, temperature 99 1 °F (37 3 °C), temperature source Oral, resp  rate 14, height 5' 11" (1 803 m), weight 66 7 kg (147 lb 0 8 oz), SpO2 94 %  ,Body mass index is 20 51 kg/m²      Hemodynamic Monitoring: MAP: Arterial Line MAP (mmHg): 88 mmHg    General appearance: alert, appears stated age, cooperative and no distress  Head: Normocephalic, right forehead laceration  Eyes: EOMI, PERRL  Neck: supple, VISTA collar  Back: no kyphosis present  Lungs: non labored breathing  Heart: regular heart rate  Neurologic:   Mental status: Alert, oriented x3, thought content appropriate  Cranial nerves: grossly intact (Cranial nerves II-XII)  Sensory: patchy T8 sensory level  Motor: Bilateral wrist IO 2/5, otherwise 0/5      Lab Results:  Results from last 7 days   Lab Units 03/08/21 0446 03/07/21 0402 03/06/21 2116   WBC Thousand/uL 21 03* 17 46* 12 27*   HEMOGLOBIN g/dL 11 3* 12 5 11 7*   HEMATOCRIT % 34 5* 37 9 35 4*   PLATELETS Thousands/uL 316 381 326   NEUTROS PCT % 66 89* 57   MONOS PCT % 11 2* 9     Results from last 7 days   Lab Units 03/08/21 0446 03/07/21  0402 03/06/21 2116 03/06/21 2112   POTASSIUM mmol/L 3 4* 4 2 3 9  --    CHLORIDE mmol/L 109* 111* 113*  --    CO2 mmol/L 29 27 26  --    CO2, I-STAT mmol/L  --   --   --  26   BUN mg/dL 6 8 10  --    CREATININE mg/dL 0 95 0 89 1 07  --    CALCIUM mg/dL 8 4 8 7 8 5  --    GLUCOSE, ISTAT mg/dl  --   --   --  87     Results from last 7 days   Lab Units 03/07/21  0402   MAGNESIUM mg/dL 2 0     Results from last 7 days   Lab Units 03/07/21  0402   PHOSPHORUS mg/dL 3 6     Results from last 7 days   Lab Units 03/06/21  2116   INR  1 08     No results found for: TROPONINT  ABG:  Lab Results   Component Value Date    PHART 7 441 03/07/2021    XLQ1IHM 36 7 03/07/2021    PO2ART 162 7 (H) 03/07/2021    FLT5PUV 24 4 03/07/2021    BEART 0 6 03/07/2021    SOURCE Line, Arterial 03/07/2021       Imaging Studies: I have personally reviewed pertinent reports  and I have personally reviewed pertinent films in PACS    Ct Chest Abdomen Pelvis W Wo Contrast    Result Date: 3/6/2021  Impression: No acute traumatic injury identified  I personally discussed this study with Shaggy Bagley on 3/6/2021 at 9:53 PM  Workstation performed: [id number]     Xr Spine Cervical 2 Or 3 Vw Injury    Result Date: 3/7/2021  Impression: Fluoroscopic guidance provided for procedure guidance  Please refer to the separate procedure notes for additional details  Workstation performed: [id number]     Ct Head Without Contrast    Result Date: 3/6/2021  Impression: No acute intracranial abnormality  I personally discussed this study with Shaggy Bagley on 3/6/2021 at 9:53 PM  Workstation performed: [id number]     Ct Spine Cervical Without Contrast    Addendum Date: 3/6/2021    ADDENDUM: There is a nondisplaced fracture through the spinous process and right lamina of C5  I personally discussed this study with Shaggy Bagley on 3/6/2021 at 10:14 PM      Result Date: 3/6/2021  Impression: No cervical spine fracture or traumatic malalignment  Central disc protrusions at C2-C3, C3-C4, and C4-C5  Unfortunately, this is incompletely evaluated  Given the reported neurological symptoms in this trauma patient, recommend MRI  I personally discussed this study with Bradly Ya on 3/6/2021 at 9:53 PM  Workstation performed: [id number]     Mri Cervical Spine Wo Contrast    Result Date: 3/7/2021  Impression: 1  Increased T2 cord signal/edema involving stenotic levels C3 to inferior C5 most consistent with central cord syndrome in the setting of trauma  2   Nondisplaced fracture involving C5 spinous process and right lamina  Bone marrow edema within the spinous processes of C3-C5  3   Edema within posterior paravertebral musculature and increased T2 signal along the interspinous ligaments at levels C3-C6 suggestive of ligamentous injury  Anterior and posterior longitudinal ligaments appear intact  4   Degenerative canal stenosis more notable at level C3-4, as detailed  I personally discussed this study with Amos Guardado on 3/7/2021 at 7:35 AM  Workstation performed: [id number]     Mri Thoracic Spine Wo Contrast    Result Date: 3/7/2021  Impression: No acute finding  Unremarkable MRI of the thoracic spine  Workstation performed: [id number]     Mri Lumbar Spine Wo Contrast    Result Date: 3/7/2021  Impression: No acute finding  Unremarkable MRI of the lumbar spine  Workstation performed: [id number]     X-ray Chest 1 View    Result Date: 3/7/2021  Impression: The tip of the endotracheal tube projects 2 4 cm above the odalys  Clear lungs  Workstation performed: [id number]     Xr Trauma Multiple    Result Date: 3/7/2021  Impression: No acute cardiopulmonary disease within limitations of supine imaging  Workstation performed: [id number]       EKG, Pathology, and Other Studies: I have personally reviewed pertinent reports        VTE Pharmacologic Prophylaxis: Sequential compression device (Venodyne)  and Enoxaparin (Lovenox)    VTE Mechanical Prophylaxis: sequential compression device

## 2021-03-08 NOTE — PROGRESS NOTES
Pastoral Care Progress Note    3/8/2021  Patient: Sindy Guan : 1985  Admission Date & Time: 3/6/2021 2102  MRN: [id number] [facility]: [id number]                     Chaplaincy Interventions Utilized:   Empowerment: Normalized experience of patient/family and Provided chaplaincy education    Exploration: Explored hope, Explored emotional needs & resources and Explored spiritual needs & resources    Relationship Building: Cultivated a relationship of care and support and Listened empathically    Chaplaincy Outcomes Achieved:   Identified meaningful connections and Unknown outcome    Spiritual Coping Strategies Utilized:   Connectedness     21 1135   Clinical Encounter Type   Visited With Patient   Crisis Visit Critical Care

## 2021-03-08 NOTE — PROGRESS NOTES
PICC team evaluation for bedside picc placement  Per protocol and INS guidelines it is not recommended to have a PICC with flacid/low mobility extremity  D/W Dr Herminia Walker who recommends central line access short term and tunneled PICC if needed long term  Same reported to care team and Jeovany Kelly RN

## 2021-03-08 NOTE — TELEMEDICINE
INTERPROFESSIONAL (PHONE) CONSULTATION - Interventional Radiology  Carmencita Mills 38 y o  male MRN: [id number]  Unit/Bed#: ICU 06 Encounter: [id number]    IR has been consulted to evaluate the patient, determine the appropriate procedure, and whether or not a procedure can and should be performed regarding the care of Carmencita Mills  We were consulted by critical care concerning need for line access, and to possibly perform a central line placement if medically appropriate for the patient  IP Consult to IR  Consult performed by: Sigrid Roque PA-C  Consult ordered by: Elyssa Jones PA-C        03/08/21      Assessment/Recommendation:     38year old male presenting as trauma after fall, current paralysis 4 extremities, need for pressor support    - will plan on placing central line  Will not place PICC as patient is unable to move extremities  Increased risk of DVT   - can tunnel central line at later time in hospital stay if needed  Total time spent in review of data, discussion with requesting provider and rendering advice was 25 minutes       Patient or appropriate family member was verbally informed by critical care of this consultative service on their behalf to provide more timely access to specialty care in lieu of an in person consultation  Verbal consent was obtained  Thank you for allowing Interventional Radiology to participate in the care of Carmencita Mills  Please don't hesitate to call or TigerText us with any questions       Sigrid Roque PA-C

## 2021-03-08 NOTE — CASE MANAGEMENT
Pt recommended for IP rehab  Specifically a dedicated SCI unit  FELIPA discussed with patient  A post acute care recommendation was made by your care team for Acute Rehab  Discussed [place] of Choice with patient  List of facilities given to patient via in person  patient aware the list is custom filtered for them by preference  and that [facility] post acute providers are designated  Pt would like a referral to [facility]  CM placed  [facility] will need, once appropriate, to have the pt be in chair position for 2 hours at a time to ensure he can tolerate without his pressures dropping

## 2021-03-08 NOTE — RESPIRATORY THERAPY NOTE
resp care      03/08/21 0729   Respiratory Assessment   Cough None   Resp Comments no resp meds at home, pt states he has been smoking 1ppd x 19 yrs, breath sounds clear, no distress noted, d/c'd resp protocol   O2 Device ra

## 2021-03-08 NOTE — OCCUPATIONAL THERAPY NOTE
Occupational Therapy Evaluation     Patient Name: Cara Grimm  [id number]'P Date: 3/8/2021  Problem List  Principal Problem:    Central cord syndrome [facility])  Active Problems:    Pulmonary insufficiency    Past Medical History  No past medical history on file  Past Surgical History  Past Surgical History:   Procedure Laterality Date    CERVICAL FUSION N/A 3/6/2021    Procedure: POSTERIOR C3-5 laminectomy, C2-7 fixation fusion, possible additional levels;  Surgeon: Sumeet Spear MD;  Location: [facility];  Service: Neurosurgery           03/08/21 0934   OT Last Visit   OT Visit Date 03/08/21   Note Type   Note type Evaluation   Restrictions/Precautions   Weight Bearing Precautions Per Order No   Braces or Orthoses C/S Collar   Other Precautions Bed Alarm;Multiple lines;Telemetry; Fall Risk;Pain;Spinal precautions   Pain Assessment   Pain Assessment Tool 0-10   Pain Score Worst Possible Pain   Pain Location/Orientation Orientation: Bilateral;Location: Neck   Hospital Pain Intervention(s) Repositioned; Ambulation/increased activity; Emotional support   Home Living   Type of [address] [place] [address] Two level; Access  (2 SH 4 DARÍO with 13 steps to 2nd floor)   Bathroom Shower/Tub Tub/shower unit   Bathroom Toilet Standard   Bathroom Equipment Grab bars in shower   [address] Other (Comment)  (No DME PTA)   Additional Comments Per chart review, pt resides in 2  with 4 DARÍO and 13 steps to 2nd floor with roommate; full bathroom on 2nd floor with tub/shower and standard toilet; bedroom upstairs   Prior Function   Level of [place] Independent with ADLs and functional mobility   Lives With Friend(s)   Receives Help From Friend(s)   ADL Assistance Independent   IADLs Independent   Falls in the last 6 months 1 to 4  (1 this admission)   Vocational Unemployed   Comments (-) driving; reports that he currently is not working, but previously worked in a [employer]   Lifestyle Autonomy PTA, pt was independent in ADLs, IADLs, and functional mobility   Reciprocal Relationships Lives with roommate   Service to Others Currently unemployed, but previously worked in [address] Enjoys watching TV   Psychosocial   Psychosocial ([address]) X   Patient Behaviors/Mood Anxious; Cooperative; Sad   ADL   Eating Assistance 1  Total Assistance   Grooming Assistance 1  Total Assistance   UB Bathing Assistance 1  Total Assistance   LB Bathing Assistance 1  Total Assistance   UB Dressing Assistance 1  Total Assistance   LB Dressing Assistance 1  Total [address]  1  Total Assistance   Functional Assistance 1  Total Assistance   Bed Mobility   Supine to Sit 1  Dependent   Additional items Assist x 2;HOB elevated; Increased time required;Verbal cues   Sit to Supine 1  Dependent   Additional items Assist x 2;HOB elevated; Increased time required;Verbal cues   Additional Comments Attempted 3 long sits in bed with total assistance x2 for UB postural support; unable to maintain unsupported sitting position; pt's b/l UE's flaccid   Transfers   Additional Comments Unable to assess @ this time   Functional Mobility   Additional Comments Unable to assess @ this time   Balance   Static Sitting Zero   Activity Tolerance   Activity Tolerance Patient tolerated treatment well;Patient limited by fatigue;Patient limited by pain   Medical Staff Made Aware PT Nina, [facility] [id number] [facility] yes, RN cleared for OT session   RUE Assessment   RUE Assessment X  (RUE flaccid; per nursing, able to shoulder shrug)   LUE Assessment   LUE Assessment X  (LUE flaccid; per nursing, able to shoulder shrug)   Hand Function   Gross Motor Coordination Impaired   Fine Motor Coordination Impaired   Sensation   Light Touch Severe deficits in the RUE;Severe deficits in the LUE;Severe deficits in the RLE; Severe deficits in the LLE   Additional Comments Inconsistent sensation reports; pt reports that he was unable to feel b/l UE and LE sensation; reports he was able to feel therapy staff don b/l socks; per nursing, pt was able to feel sensation on back; will continue to assess   Vision - Complex Assessment   Ocular Range of Motion WFL   Head Position WDL   Additional Comments Reports no changes in vision   Cognition   Overall Cognitive Status [facility]   Arousal/Participation Responsive; Cooperative   Attention Within functional limits   Orientation Level Oriented X4   Memory Within functional limits   Following Commands Follows all commands and directions without difficulty   Comments Pt was cooperative, but overall presented with flat affect; emotional support and encouragement provided   Assessment   Limitation Decreased ADL status; Decreased UE ROM; Decreased UE strength;Decreased endurance;Decreased sensation;Decreased fine motor control;Decreased self-care trans;Decreased high-level ADLs; Non-func R UE;Non-func L UE   Prognosis Fair   Assessment Pt is a 38 y o male who presents to [facility] with paralysis after being found @ bottom of stairs  Pt diagnosed with central cord syndrome as MRI revealed C2-3, 3-4, 4-5 disc protrusions, cord edema from C3-5, nondisplaced fx of C5 spinous/right lamina, bone marrow edema with spinous processes, and ligamentous C3-6 injuries  Pt s/p  "POSTERIOR C3-5 laminectomy, C2-7 fixation fusion" on 3/7/2021  Pt  has no past medical history on file  Pt with active OT orders and has a c/s collar with spinal precautions  OT consulted to assess pt's functional status and occupational performance to determine d/c needs  Pt lives with his roommate in [address] [place] [address] with 4 DARÍO and 13 steps inside  PTA, pt was independent in ADLs, IADLs, and functional mobility and reports using no DME  Pt reports, ( - ) driving and does not have a license  Currently, pt demonstrates the following occupational deficits: bed mobility with total assistance x2 and UB/LB ADLs with total assistance   These occupational deficits are a result of the following limitations/impairments: quadriplegia, pain, fatigue, spinal precautions, decreased forward functional reach, decreased strength, generalized weakness, decreased endurance, decreased postural stability/trunk control, sensory deficits, decreased activity tolerance, decreased tone, decreased bilateral coordination, and an overall decreased independence in ADLs/IADLs/functional mobility  Treatment interventions to be addressed include: bed mobility, functional transfer training, functional mobility, ADL retraining, UE strengthening/ROM, endurance training, patient education, equipment evaluation/education, neuromuscular re-education, bilateral coordination activities, compensatory technique education, energy conservation techniques, safety awareness, increased activity tolerance and increased social participation  From an OT standpoint, recommend discharge to post-acute rehabilitation services (dedicated SCI rehab) once medically stable  The patient's raw score on the -PAC Daily Activity inpatient short form low function score is 13, standardized score is Low Function Daily Activity Standardized Score: 23 16  Patients with a standardized score less than 39 4 are likely to benefit from discharge to post-acute rehab services  Please refer to the recommendation of the Occupational Therapist for safe discharge planning  Pt would benefit from skilled OT services 3-5/wk to address immediate acute care needs and underlying performance skills to promote safety and enhance occupational performance to return to OF  Goals to be met within the next 10-14 days  Goals   Patient Goals To go home and regain independence   Short Term Goal #1    LTG Time Frame 10-14   Long Term Goal #1 See goals listed below   Plan   Treatment Interventions ADL retraining;Functional transfer training;UE strengthening/ROM; Endurance training;Patient/family training;Equipment evaluation/education; Neuromuscular reeducation; Fine motor coordination activities; Compensatory technique education;Continued evaluation; Energy conservation; Activityengagement   Goal Expiration Date 03/22/21   OT Frequency 3-5x/wk   Recommendation   OT Discharge Recommendation Post-Acute Rehabilitation Services  (dedicated SCI rehab)   OT - OK to Discharge Yes  (When medically appropriate)   AM-PAC Daily Activity Inpatient   Lower Body Dressing 1   Bathing 1   Toileting 1   Upper Body Dressing 1   Grooming 1   Eating 1   Daily Activity Raw Score 6   Turning Head Towards Sound 3   Follow Simple Instructions 4   Low Function Daily Activity Raw Score 13   Low Function Daily Activity Standardized Score 23 16   AM-PAC Applied Cognition Inpatient   Following a Speech/Presentation 3   Understanding Ordinary Conversation 4   Taking Medications 4   Remembering Where Things Are Placed or Put Away 4   Remembering List of 4-5 Errands 4   Taking Care of Complicated Tasks 3   Applied Cognition Raw Score 22   Applied Cognition Standardized Score 47 83       OT GOALS:    Pt will improve activity tolerance during ADL/IADL/leisure tasks for at least 15 minutes with no more than 2 rest breaks to increase endurance during functional tasks using AE/DME prn  Pt will be A/Ox4 100% of the time and follow at least 2 step commands during functional activities with no verbal cues to increase attention, safety, and engagement in ADL/IADL/leisure tasks  Pt will independently demonstrate/verbalize spinal precautions and at least 2 energy conservation techniques to ensure carryover during ADL/IADL/leisure tasks  Pt will demonstrate 100% carryover UE/LE PROM/AAROM/AROM s/p education on exercise program to improve bilateral coordination in ADL/IADL/leisure tasks with S  Pt will independently direct caregivers 100% of the time to ensure carryover of all basic ADL, repositioning, and personal needs      Pt will complete light grooming ADL task with Max A using AE prn to increase functional independence and engagement  Pt will complete UB ADL tasks with Max A using AE/DME prn to increase functional independence in ADL/IADL/leisure tasks  Pt will complete LB ADL tasks with Max A using AE/DME prn to increase functional independence in ADL/IADL/leisure tasks  Pt will engage in depression screen/leisure interest checklist w/ G participation to monitor s/s depression and identify 3 positive coping strategies to assist w/ emotional regulation and management  Pt will tolerate tilting in [facility] bed for at least 20-30 minutes with stable vitals to serve as a prerequisite for EOB/OOB engagement in ADL/IADL/leisure tasks to increase functional independence  Pt will maintain chair position in bed for at least 30 minutes with stable vitals and engage in UB ADL/IADL/leisure tasks to increase functional independence and engagement  Pt will engage in unsupported sitting in chair position with Mod A x2 and stable vitals for at least 5 minutes to serve as a prerequisite for EOB/OOB ADL/IADL/leisure tasks and increased functional independence         Yanick Tse

## 2021-03-08 NOTE — SPEECH THERAPY NOTE
Speech-Language Pathology Bedside Swallow Evaluation      Patient Name: Denver Shadow    [id number]'[id number] Date: 3/8/2021     Problem List  Principal Problem:    Central cord syndrome [facility])  Active Problems:    Pulmonary insufficiency      Past Medical/Surgical History  No past medical/surgical history on file  Summary   Pt presented with functional appearing oral and pharyngeal stage swallowing skills with limited materials administered today  Recommended Diet: regular diet and thin liquids as desired  Recommended Form of Meds: whole with liquid       Current Medical Status  Denver Shadow is a 39 yo male BIBA after falling down stairs  Remembers falling, doesn't remember anything else  15 minutes later he was still at the bottom of the stairs not moving, C/o unable to feel below his nipples, cannot move his legs  DX: central cord syndrome  He is s/p posterior C3-5 laminectomy, C2-7 fixation fusion on 3/6  Pt was extubated last pm   Swallowing Evaluation ordered at this time  Current Precautions:  Fall, Delirium    Allergies:  No known food allergies    Past medical history:  Please see H&P for details    Social/Education/Vocational Hx:  Pt lives in [address] [place] [address]   Current Risks for Dysphagia & Aspiration: recent intubation  Current Diet: NPO   Baseline Diet: regular diet and thin liquids      Baseline Assessment   Behavior/Cognition: alert  Speech/Language Status: comprehended simple questions/commands, participated in simple conversation   Patient Positionin* upright   Pain Status/Interventions/Response to Interventions:  No nonverbal indications of pain    C/O no appetite this am        Swallow Mechanism Exam  Facial: symmetrical  Labial: WFL  Lingual: WFL  Velum: symmetrical  Mandible: adequate ROM  Dentition: adequate  Vocal quality:clear/adequate   Respiratory Status: on RA       Consistencies Assessed and Performance   Consistencies/Materials administered: jello, thin liquids (and meds with thin liquid c RN)    Oral Stage: WFL with limited materials  Adequate retrieval, prompt transfer and no s/s reduced oral control  Pharyngeal Stage: WFL suspected  Swallow Mechanics:  Swallowing initiation appeared prompt  Laryngeal rise was palpated and judged to be within functional limits  No coughing, throat clearing, change in vocal quality or respiratory status noted today       Esophageal Concerns: none reported    Summary and Recommendations (see above)    Results Reviewed with: patient and RN     Treatment Recommended: None at this time

## 2021-03-08 NOTE — PLAN OF CARE
Problem: OCCUPATIONAL THERAPY ADULT  Goal: Performs self-care activities at highest level of function for planned discharge setting  See evaluation for individualized goals  Description: Treatment Interventions: ADL retraining, Functional transfer training, UE strengthening/ROM, Endurance training, Patient/family training, Equipment evaluation/education, Neuromuscular reeducation, Fine motor coordination activities, Compensatory technique education, Continued evaluation, Energy conservation, Activityengagement          See flowsheet documentation for full assessment, interventions and recommendations  Note: Limitation: Decreased ADL status, Decreased UE ROM, Decreased UE strength, Decreased endurance, Decreased sensation, Decreased fine motor control, Decreased self-care trans, Decreased high-level ADLs, Non-func R UE, Non-func L UE  Prognosis: Fair  Assessment: (P) Pt is a 38 y o male who [facility] [place] with paralysis after being found @ bottom of stairs  Pt diagnosed with central cord syndrome as MRI revealed C2-3, 3-4, 4-5 disc protrusions, cord edema from C3-5, nondisplaced fx of C5 spinous/right lamina, bone marrow edema with spinous processes, and ligamentous C3-6 injuries  Pt s/p  "POSTERIOR C3-5 laminectomy, C2-7 fixation fusion" on 3/7/2021  Pt  has no past medical history on file  Pt with active OT orders and has a c/s collar with spinal precautions  OT consulted to assess pt's functional status and occupational performance to determine d/c needs  Pt lives with his roommate in [address] [place] [address] with 4 DARÍO and 13 steps inside  PTA, pt was independent in ADLs, IADLs, and functional mobility and reports using no DME  Pt reports, ( - ) driving and does not have a license  Currently, pt demonstrates the following occupational deficits: bed mobility with total assistance x2 and UB/LB ADLs with total assistance   These occupational deficits are a result of the following limitations/impairments: quadriplegia, pain, fatigue, spinal precautions, decreased forward functional reach, decreased strength, generalized weakness, decreased endurance, decreased postural stability/trunk control, sensory deficits, decreased activity tolerance, decreased tone, decreased bilateral coordination, and an overall decreased independence in ADLs/IADLs/functional mobility  Treatment interventions to be addressed include: bed mobility, functional transfer training, functional mobility, ADL retraining, UE strengthening/ROM, endurance training, patient education, equipment evaluation/education, neuromuscular re-education, bilateral coordination activities, compensatory technique education, energy conservation techniques, safety awareness, increased activity tolerance and increased social participation  From an OT standpoint, recommend discharge to post-acute rehabilitation services (dedicated SCI rehab) once medically stable  The patient's raw score on the AM-PAC Daily Activity inpatient short form low function score is 13, standardized score is Low Function Daily Activity Standardized Score: 23 16  Patients with a standardized score less than 39 4 are likely to benefit from discharge to post-acute rehab services  Please refer to the recommendation of the Occupational Therapist for safe discharge planning   Pt would benefit from skilled OT services 3-5/wk to address immediate acute care needs and underlying performance skills to promote safety and enhanc     OT Discharge Recommendation: Post-Acute Rehabilitation Services(dedicated SCI rehab)  OT - OK to Discharge: Yes(When medically appropriate)     KESHIA Valladares

## 2021-03-08 NOTE — CONSULTS
PHYSICAL MEDICINE AND REHABILITATION CONSULT NOTE  Danielle Donohue 38 y o  male MRN: [id number]  Unit/Bed#: ICU 06 Encounter: [id number]    Requested by (Physician/Service): Humberto Ruiz DO  Reason for Consultation:  Assessment of rehabilitation needs    Assessment:  Rehabilitation Diagnosis:    Traumatic spinal cord injury   S/p C3-C5 laminectomy and C2-C7 fusion    Central cord syndrome    Impaired mobility and self care    Recommendations:  Rehabilitation Plan:   Continue PT/OT while on acute care   Recommend Kreg bed   [place] d/c, monitor for urinary retention   The patient will likely require dedicated spinal cord inpatient rehabilitation  Will continue to follow functional and medical progress   Covid-19 Testing: [facility] rehabilitation units require testing within 48 hours of potential admission for all general admissions  Please re-test if needed  *Re-testing is NOT required for patients recovering from COVID-19 infection if isolation has been discontinued per CDC criteria  Medical Co-morbidities Plan:  · Quadriplegia   · Acute traumatic pain   · Neurogenic bowel  · Neurogenic bladder  · Hypokalemia   · DVT ppx:  lovenox and SCD    Thank you for this consultation  Do not hesitate to contact service with further questions  JAYLYN Mckinney  PM&R    History of Present Illness:  Danielle Donohue is a 38 y o  male who presented to the [address] on 3/6/21 after being found by friends at the bottom of a set of stairs  He has loss of sensation below the nipple line and flaccid paralysis in all 4 extremities  MRI showed increased T2 cord signal/edema involving stenotic levels C3 to inferior C5 most consistent with central cord syndrome  Non-displaced fracture involving C5 spinous process and right lamina, bone marrow edema within the spinous processes of C3-C5    There was edema within posterior paravertebral musculature and increased T2 signal along the interspinous ligaments at levels C3-C6 suggestive of ligamentous injury  He underwent C3-5 laminectomy and C2-C7 fusion as well as laceration repair of right eyebrow on 3/7/21  He has been febrile and required levophed for MAP goals  He was extubated post-operatively  PM&R are consulted for rehabilitation recommendations  The patient was seen in the ICU  He reports pain and is not able to feel below nipple line  Review of Systems: 10 point ROS negative except for what is noted in HPI    Function:  Prior level of function and living situation:  The patient lives with a roommate in a 2 story home with 4 [address]  He is able to have a 1st floor set up  He was independent  Current level of function:  Physical Therapy: Pending   Occupational Therapy:  Pending   Speech Therapy:  Regular diet with thins    Physical Exam:  /68   Pulse 76   Temp 100 2 °F (37 9 °C) (Oral)   Resp 16   Ht 5' 11" (1 803 m)   Wt 66 7 kg (147 lb 0 8 oz)   SpO2 96%   BMI 20 51 kg/m²        Intake/Output Summary (Last 24 hours) at 3/8/2021 0943  Last data filed at 3/8/2021 0600  Gross per 24 hour   Intake 1487 12 ml   Output 2135 ml   Net -647 88 ml       Body mass index is 20 51 kg/m²  Physical Exam  HENT:      Head: Normocephalic  Eyes:      Comments: Laceration above right eye    Cardiovascular:      Rate and Rhythm: Regular rhythm  Pulmonary:      Effort: Pulmonary effort is normal    Musculoskeletal:      Comments: quadriplegia    Neurological:      Mental Status: He is alert  Sensory: Sensory deficit present     Psychiatric:         Mood and Affect: Mood normal         Social History:    Social History     Socioeconomic History    Marital status: Single     Spouse name: Not on file    Number of children: Not on file    Years of education: Not on file    Highest education level: Not on file   Occupational History    Not on file   Social Needs    Financial resource strain: Not on file    Food insecurity     Worry: Not on file     Inability: Not on file    Transportation needs     Medical: Not on file     Non-medical: Not on file   Tobacco Use    Smoking status: Not on file   Substance and Sexual Activity    Alcohol use: Not on file    Drug use: Not on file    Sexual activity: Not on file   Lifestyle    Physical activity     Days per week: Not on file     Minutes per session: Not on file    Stress: Not on file   Relationships    Social connections     Talks on phone: Not on file     Gets together: Not on file     Attends [employer] service: Not on file     Active member of club or organization: Not on file     Attends meetings of clubs or organizations: Not on file     Relationship status: Not on file    Intimate partner violence     Fear of current or ex partner: Not on file     Emotionally abused: Not on file     Physically abused: Not on file     Forced sexual activity: Not on file   Other Topics Concern    Not on file   Social History Narrative    Not on file        Family History:    No family history on file        Medications:     Current Facility-Administered Medications:     acetaminophen (TYLENOL) tablet 975 mg, 975 mg, Oral, Q8H [facility], Sandra Haile PA-C    bisacodyl (DULCOLAX) rectal suppository 10 mg, 10 mg, Rectal, Daily PRN, Sandra Haile PA-C    enoxaparin (LOVENOX) subcutaneous injection 40 mg, 40 mg, Subcutaneous, Daily, Tameka Shipman MD, 40 mg at 03/08/21 0068    gabapentin (NEURONTIN) capsule 300 mg, 300 mg, Oral, TID, Sandra Haile PA-C, 300 mg at 03/08/21 3594    HYDROmorphone (DILAUDID) injection 1 mg, 1 mg, Intravenous, Q3H PRN, Sandra Haile PA-C, 1 mg at 03/08/21 3635    methocarbamol (ROBAXIN) tablet 750 mg, 750 mg, Oral, Q6H [facility], Tameka Shipman MD, 750 mg at 03/08/21 0608    norepinephrine (LEVOPHED) 4 mg (STANDARD CONCENTRATION) IV in sodium chloride 0 9% 250 mL, 1-30 mcg/min, Intravenous, Titrated, Leandra Noyola PA-C, Last Rate: 18 8 mL/hr at 03/08/21 0500, 5 mcg/min at 03/08/21 0500    ondansetron (ZOFRAN) injection 4 mg, 4 mg, Intravenous, Q6H PRN, Tameka Shipman MD    oxyCODONE (ROXICODONE) IR tablet 5 mg, 5 mg, Oral, Q3H PRN **OR** oxyCODONE (ROXICODONE) immediate release tablet 10 mg, 10 mg, Oral, Q3H PRN, Lynsey Marmolejo MD    polyethylene glycol (MIRALAX) packet 17 g, 17 g, Oral, Daily, JAYLYN Beverly, 17 g at 03/08/21 5938    senna-docusate sodium (SENOKOT S) 8 6-50 mg per tablet 2 tablet, 2 tablet, Oral, BID, MICHAEL Hre-BETH, 2 tablet at 03/08/21 9570    Past Medical History:     No past medical history on file  Past Surgical History:     No past surgical history on file  Allergies:     No Known Allergies        LABORATORY RESULTS:      Lab Results   Component Value Date    HGB 11 3 (L) 03/08/2021    HCT 34 5 (L) 03/08/2021    WBC 21 03 (H) 03/08/2021     Lab Results   Component Value Date    BUN 6 03/08/2021    K 3 4 (L) 03/08/2021     (H) 03/08/2021    GLUCOSE 87 03/06/2021    CREATININE 0 95 03/08/2021     Lab Results   Component Value Date    PROTIME 14 0 03/06/2021    INR 1 08 03/06/2021        DIAGNOSTIC STUDIES: Reviewed  Ct Chest Abdomen Pelvis W Wo Contrast    Result Date: 3/6/2021  Impression: No acute traumatic injury identified  I personally discussed this study with Sid Weaver on 3/6/2021 at 9:53 PM  Workstation performed: [id number]     Xr Spine Cervical 2 Or 3 Vw Injury    Result Date: 3/7/2021  Impression: Fluoroscopic guidance provided for procedure guidance  Please refer to the separate procedure notes for additional details  Workstation performed: [id number]     Ct Head Without Contrast    Result Date: 3/6/2021  Impression: No acute intracranial abnormality   I personally discussed this study with Sid Weaver on 3/6/2021 at 9:53 PM  Workstation performed: [id number]     Ct Spine Cervical Without Contrast    Addendum Date: 3/6/2021    ADDENDUM: There is a nondisplaced fracture through the spinous process and right lamina of C5  I personally discussed this study with Angie Ugarte on 3/6/2021 at 10:14 PM      Result Date: 3/6/2021  Impression: No cervical spine fracture or traumatic malalignment  Central disc protrusions at C2-C3, C3-C4, and C4-C5  Unfortunately, this is incompletely evaluated  Given the reported neurological symptoms in this trauma patient, recommend MRI  I personally discussed this study with Angie Ugarte on 3/6/2021 at 9:53 PM  Workstation performed: [id number]     Mri Cervical Spine Wo Contrast    Result Date: 3/7/2021  Impression: 1  Increased T2 cord signal/edema involving stenotic levels C3 to inferior C5 most consistent with central cord syndrome in the setting of trauma  2   Nondisplaced fracture involving C5 spinous process and right lamina  Bone marrow edema within the spinous processes of C3-C5  3   Edema within posterior paravertebral musculature and increased T2 signal along the interspinous ligaments at levels C3-C6 suggestive of ligamentous injury  Anterior and posterior longitudinal ligaments appear intact  4   Degenerative canal stenosis more notable at level C3-4, as detailed  I personally discussed this study with Laron Hoffmann on 3/7/2021 at 7:35 AM  Workstation performed: [id number]     Mri Thoracic Spine Wo Contrast    Result Date: 3/7/2021  Impression: No acute finding  Unremarkable MRI of the thoracic spine  Workstation performed: [id number]     Mri Lumbar Spine Wo Contrast    Result Date: 3/7/2021  Impression: No acute finding  Unremarkable MRI of the lumbar spine  Workstation performed: [id number]     X-ray Chest 1 View    Result Date: 3/7/2021  Impression: The tip of the endotracheal tube projects 2 4 cm above the odalys  Clear lungs   Workstation performed: [id number]     Xr Trauma Multiple    Result Date: 3/7/2021  Impression: No acute cardiopulmonary disease within limitations of supine imaging   Workstation performed: [id number]

## 2021-03-08 NOTE — PROGRESS NOTES
Daily Progress Note - Critical Care   Saeed Lindsey 38 y o  male MRN: [id number]  Unit/Bed#: ICU 06 Encounter: [id number]        ----------------------------------------------------------------------------------------  HPI/24hr events: OR yesterday for posterior C3-C5 laminectomy, C2-C7 fixation/fusion  Extubated post-operatively  Remains of levophed at 5 for MAP goal > 85    ---------------------------------------------------------------------------------------  SUBJECTIVE  "A lot of pain"    Review of Systems  Review of systems was reviewed and negative unless stated above in HPI/24-hour events   ---------------------------------------------------------------------------------------  Assessment and Plan:    Neuro:    Central cord syndrome, POD 1 s/p C3-C5 laminectomy/C2-C7 fusion  o Q2 neuro checks  MAP goal > 85 for cord perfusion  Repeat post-operative c-spine x-rays pending this morning  Appreciate neurosurgery recommendations  PT/OT   Acute pain secondary to trauma  o Scheduled medications  - Start tylenol 975mg Q8 hours  - Start gabapentin 300mg TID  - Robaxin 750mg Q6 hours  - Discontinue scheduled oxycodone  o Prn medications  - Start oxycodone 5mg/10mg Q3 prn - low threshold to increase to 10mg/15mg if needed  - Dilaudid 0 5mg Q3 prn (4 doses post-operatively) - increase to 1mg      CV:    MAP goal > 85 for cord perfusion  Levophed at 5, consider PICC placement today      Pulm:   Encourage IS, pulmonary toilet  Goal SpO2 > 92%  GI:    Bowel regimen with senokot BID, daily miralax, prn dulcolax   Bowel training      :    Trend I/Os, maintain jimenez     Bladder training      F/E/N:    No continuous fluids   Replete electrolytes as needed for goal Mag > 2 0, Phos >3 0, K >4 0   NPO - speech/swallow evaluation pending      Heme/Onc:    Lovenox for DVT ppx   SCDs      Endo:    Goal -180      ID:    Monitor fever curve/white count      MSK/Skin:    Frequent turns/repositioning, offloading   PT/OT   Multipodus boot      Disposition: Continue Critical Care   Code Status: Level 1 - Full Code  ---------------------------------------------------------------------------------------  ICU CORE MEASURES    Prophylaxis   VTE Pharmacologic Prophylaxis: Enoxaparin (Lovenox)  VTE Mechanical Prophylaxis: sequential compression device  Stress Ulcer Prophylaxis: Prophylaxis Not Indicated       Invasive Devices Review  Invasive Devices     Peripheral Intravenous Line            Peripheral IV 21 Distal;Right;Upper;Ventral (anterior) Arm 1 day    Peripheral IV 21 Left Wrist less than 1 day    Peripheral IV 21 Right Forearm less than 1 day          Arterial Line            Arterial Line 21 Radial 1 day          Drain            Closed/Suction Drain Posterior;Right Neck Bulb 1 day    Urethral Catheter Latex 16 Fr  1 day              Can any invasive devices be discontinued today? Yes  ---------------------------------------------------------------------------------------  OBJECTIVE    Vitals   Vitals:    21 0300 21 0400 21 0500 21 0600   BP:       Pulse: 74 70 84 84   Resp: 12 16 19 18   Temp:  100 2 °F (37 9 °C)     TempSrc:  Oral     SpO2: 95% 95% 96% 96%   Weight:    66 7 kg (147 lb 0 8 oz)   Height:         Temp (24hrs), Av °F (37 2 °C), Min:96 6 °F (35 9 °C), Max:101 2 °F (38 4 °C)  Current: Temperature: 100 2 °F (37 9 °C)      Respiratory:  SpO2: SpO2: 96 %       Invasive/non-invasive ventilation settings   Respiratory    Lab Data (Last 4 hours)    None         O2/Vent Data (Last 4 hours)    None                Physical Exam  Vitals signs and nursing note reviewed  Constitutional:       General: He is awake  He is not in acute distress  Appearance: He is not ill-appearing  Interventions: Cervical collar in place  HENT:      Head: Normocephalic        Comments: R eyebrow laceration s/p repair  Eyes:      Pupils: Pupils are equal, round, and reactive to light  Cardiovascular:      Rate and Rhythm: Normal rate and regular rhythm  Pulses:           Radial pulses are 2+ on the right side and 2+ on the left side  Dorsalis pedis pulses are 2+ on the right side and 2+ on the left side  Heart sounds: Normal heart sounds  Heart sounds not distant  No murmur  No friction rub  No gallop  Pulmonary:      Effort: Pulmonary effort is normal       Breath sounds: Normal breath sounds  No decreased breath sounds, wheezing, rhonchi or rales  Abdominal:      General: Bowel sounds are normal  There is no distension  There are no signs of injury  Palpations: Abdomen is soft  Tenderness: There is no abdominal tenderness  Genitourinary:     Comments: Jose Cruz present  Musculoskeletal:      Right lower leg: No edema  Left lower leg: No edema  Skin:     General: Skin is warm and dry  Neurological:      Mental Status: He is alert and oriented to person, place, and time  Comments: +shoulder shrug bilaterally  No further motor function of UE  Decreased sensation in b/l thumbs  No motor function of b/l LE  Sensation intact and equal b/l in feet, decreased sensation in b/l thighs  Psychiatric:         Behavior: Behavior is cooperative           Laboratory and Diagnostics:  Results from last 7 days   Lab Units 03/08/21 0446 03/07/21 0402 03/06/21 2116 03/06/21 2112   WBC Thousand/uL 21 03* 17 46* 12 27*  --    HEMOGLOBIN g/dL 11 3* 12 5 11 7*  --    I STAT HEMOGLOBIN g/dl  --   --   --  12 6   HEMATOCRIT % 34 5* 37 9 35 4*  --    HEMATOCRIT, ISTAT %  --   --   --  37   PLATELETS Thousands/uL 316 381 326  --    NEUTROS PCT % 66 89* 57  --    MONOS PCT % 11 2* 9  --      Results from last 7 days   Lab Units 03/08/21 0446 03/07/21  0402 03/06/21 2116 03/06/21 2112   SODIUM mmol/L 142 142 145  --    POTASSIUM mmol/L 3 4* 4 2 3 9  --    CHLORIDE mmol/L 109* 111* 113*  --    CO2 mmol/L 29 27 26  --    CO2, I-STAT mmol/L  --   --   -- [address] mmol/L 4 4 6  --    BUN mg/dL 6 8 10  --    CREATININE mg/dL 0 95 0 89 1 07  --    CALCIUM mg/dL 8 4 8 7 8 5  --    GLUCOSE RANDOM mg/dL 111 152* 91  --      Results from last 7 days   Lab Units 03/07/21  0402   MAGNESIUM mg/dL 2 0   PHOSPHORUS mg/dL 3 6      Results from last 7 days   Lab Units 03/06/21  2116   INR  1 08              ABG:  Results from last 7 days   Lab Units 03/07/21  1014   PH ART  7 441   PCO2 ART mm Hg 36 7   PO2 ART mm Hg 162 7*   HCO3 ART mmol/L 24 4   BASE EXC ART mmol/L 0 6   ABG SOURCE  Line, Arterial     VBG:  Results from last 7 days   Lab Units 03/07/21  1014   ABG SOURCE  Line, Arterial       Imaging: No new imaging I have personally reviewed pertinent reports  Intake and Output  I/O       03/06 0701 - 03/07 0700 03/07 0701 - 03/08 0700    I V  (mL/kg) 1300 4 (20 2) 1693 5 (25 4)    IV Piggyback  100    Total Intake(mL/kg) 1300 4 (20 2) 1793 5 (26 9)    Urine (mL/kg/hr) 1800 2225 (1 4)    Emesis/NG output 400     Drains 50 110    Blood 150     Total Output 2400 2335    Net -1099 6 -541 5              UOP: 92 ml/hr     Height and Weights   Height: 5' 11" (180 3 cm)  IBW: 75 3 kg  Body mass index is 20 51 kg/m²  Weight (last 2 days)     Date/Time   Weight    03/08/21 0600   66 7 (147 05)    03/07/21 0726   64 3 (141 76)    03/06/21 2107   64 3 (141 76)                Nutrition       Diet Orders   (From admission, onward)             Start     Ordered    03/07/21 1903  Diet NPO  Diet effective now     Question Answer Comment   Diet Type NPO    RD to adjust diet per protocol?  No        03/07/21 1903              Active Medications  Scheduled Meds:  Current Facility-Administered Medications   Medication Dose Route Frequency Provider Last Rate    acetaminophen  975 mg Oral [facility] John Davis PA-C      bisacodyl  10 mg Rectal Daily PRN John Davis PA-C      enoxaparin  40 mg Subcutaneous Daily Kendall Huang MD      gabapentin  300 mg Oral TID Guillermina Goldberg Ramsey Escobar PA-C      HYDROmorphone  1 mg Intravenous Q3H PRN Charoltte Brambila PA-C      methocarbamol  750 mg Oral Q6H [facility] Za Ordaz MD      norepinephrine  1-30 mcg/min Intravenous Titrated Arnulfo Metcalf PA-C 5 mcg/min (03/08/21 0500)    ondansetron  4 mg Intravenous Q6H PRN Za Ordaz MD      oxyCODONE  5 mg Oral Q3H PRN Charlotte Brambila PA-C      Or    oxyCODONE  10 mg Oral Q3H PRN Charlotte Brambila PA-C      polyethylene glycol  17 g Oral Daily JAYLYN Sterling      senna-docusate sodium  2 tablet Oral BID Charlotte Brambila PA-C       Continuous Infusions:  norepinephrine, 1-30 mcg/min, Last Rate: 5 mcg/min (03/08/21 0500)      PRN Meds:   bisacodyl, 10 mg, Daily PRN  HYDROmorphone, 1 mg, Q3H PRN  ondansetron, 4 mg, Q6H PRN  oxyCODONE, 5 mg, Q3H PRN    Or  oxyCODONE, 10 mg, Q3H PRN        Allergies   No Known Allergies  ---------------------------------------------------------------------------------------  Advance Directive and Living Will:      Power of :    POLST:    ---------------------------------------------------------------------------------------  Care Time Delivered:   No Critical Care time spent     Charlotte Brambila PA-C

## 2021-03-08 NOTE — APP STUDENT NOTE
Daily Progress Note - Critical Care   Roseanna Cooper 38 y o  male MRN: [id number]  Unit/Bed#: ICU 06 Encounter: [id number]        ----------------------------------------------------------------------------------------  HPI:  SH is a 38year old male who presented on 3/6 as a level A trauma after being found at the bottom of the stairs by friends  The friends are unsure how long he was down  In the [facility], he was found to have loss of sensation below the nipple line and flaccid paralysis in all 4 extremities  CT shows central disc protrusions C2/3, C3/4, C4/5  MRI shows cord edema from C3-5 that is consistent with central cord syndrome, nondisplaced fracture of C5 spinous process and right lamina, bone marrow edema within the spinous processes of C3-5, and ligamentous injury at levels C3-6  The patient underwent C3-5 laminectomy and C2-7 fusion  Had laceration repair of R eyebrow on 3/7  Extubated 3/7     24hr events:   Off sedation  Fever spike of 101 2 at 1600  Continuous infusions: levophed 5     ---------------------------------------------------------------------------------------  SUBJECTIVE    Review of Systems   Constitutional: Negative for chills and fever  HENT: Negative for hearing loss  Eyes: Negative for visual disturbance  Respiratory: Negative for cough and shortness of breath  Cardiovascular: Negative for chest pain  Gastrointestinal: Negative for abdominal pain and nausea  Musculoskeletal: Positive for neck pain (10/10)  Negative for back pain          Shoulder pain   Neurological: Negative for dizziness and headaches      ---------------------------------------------------------------------------------------  Assessment and Plan:    Neuro:    Traumatic spinal cord injury with central cord syndrome   - 3/6 CT: "Central disc protrusions at C2-C3, C3-C4, and C4-C5 "  - 3/6 MRI: "Increased T2 cord signal/edema involving stenotic levels C3 to inferior C5 most consistent with central cord syndrome in the setting of trauma  Nondisplaced fracture involving C5 spinous process and right lamina  Bone marrow edema within the spinous processes of C3-C5  Edema within posterior paravertebral musculature and increased T2 signal along the interspinous ligaments at levels C3-C6 suggestive of ligamentous injury    Anterior and posterior longitudinal ligaments appear intact "   o S/p C3-5 laminectomy and C2-7 fusion   o Neuro checks Q1 hr  o Maintain MAP >85   o Neurosurgery following    Analgesia   o Tylenol 975mg Q8, neurontin 300mg TID, robaxin 750mg Q6, dilaudid 1mg Q3 PRN, oxycodone 5mg Q3 PRN and 10mg Q3 PRN   o Received dilaudid 0 2mg and oxycodone 5mg today    Delirium precautions   o CAM-ICU daily   o Regulate sleep/wake cycles     CV:    No acute issues    Currently on levophed 5, wean as tolerated to maintain MAP >85       Pulm:   No acute issues    Extubated 3/7    Maintain SpO2 >90%    Incentive spirometer       GI:    No acute issues    Bowel Regimen   - miralax and senokot       :    No acute issues    Billingsley catheter in place    Monitor UOP       F/E/N:    F: No maintenance fluids    E: replete for Mg >2, P >3, K >4    N: NPO  o Consult speech today to start PO diet or TF       Heme/Onc:    No acute issues    Monitor H/H  - Transfuse hbg if <7    DVT PPX: lovenox 40mg daily and SCDs       Endo:    No acute issues    Maintain euglycemia with goal -180       ID:    No acute issues    Monitor WBC and fever curves       MSK/Skin:    R eyebrow laceration   o Wound care administered with steri-strips and glue   o Tetanus updated    PT/OT consult    Frequent repositioning to offload pressure points    Local wound care as needed       Disposition: Continue Critical Care   Code Status: Level 1 - Full Code  ---------------------------------------------------------------------------------------  ICU CORE MEASURES    Prophylaxis   VTE Pharmacologic Prophylaxis: Enoxaparin (Lovenox)  VTE Mechanical Prophylaxis: sequential compression device  Stress Ulcer Prophylaxis: Prophylaxis Not Indicated       Invasive Devices Review  Invasive Devices     Peripheral Intravenous Line            Peripheral IV 21 Distal;Right;Upper;Ventral (anterior) Arm 1 day    Peripheral IV 21 Left Wrist less than 1 day    Peripheral IV 21 Right Forearm less than 1 day          Arterial Line            Arterial Line 21 Radial 1 day          Drain            Closed/Suction Drain Posterior;Right Neck Bulb 1 day    Urethral Catheter Latex 16 Fr  1 day              Can any invasive devices be discontinued today? No  ---------------------------------------------------------------------------------------  OBJECTIVE    Vitals   Vitals:    21 0300 21 0400 21 0500 21 0600   BP:       Pulse: 74 70 84 84   Resp: 12 16 19 18   Temp:  100 2 °F (37 9 °C)     TempSrc:  Oral     SpO2: 95% 95% 96% 96%   Weight:    66 7 kg (147 lb 0 8 oz)   Height:         Temp (24hrs), Av °F (37 2 °C), Min:96 6 °F (35 9 °C), Max:101 2 °F (38 4 °C)  Current: Temperature: 100 2 °F (37 9 °C)    Respiratory:  SpO2: SpO2: 96 %       Invasive/non-invasive ventilation settings   Respiratory    Lab Data (Last 4 hours)    None         O2/Vent Data (Last 4 hours)    None                Physical Exam  Constitutional:       General: He is not in acute distress  Appearance: He is not ill-appearing  HENT:      Head: Normocephalic and atraumatic  Eyes:      Extraocular Movements: Extraocular movements intact  Pupils: Pupils are equal, round, and reactive to light  Cardiovascular:      Rate and Rhythm: Normal rate and regular rhythm  Heart sounds: No murmur  No friction rub  No gallop  Pulmonary:      Breath sounds: No wheezing, rhonchi or rales  Abdominal:      General: Bowel sounds are normal  There is no distension  Tenderness:  There is no abdominal tenderness  Musculoskeletal:      Right lower leg: No edema  Left lower leg: No edema  Skin:     General: Skin is warm and dry  Capillary Refill: Capillary refill takes less than 2 seconds  Neurological:      Mental Status: He is oriented to person, place, and time  Sensory: Sensory deficit present  Motor: Weakness present  Comments: Loss of sensation in L/R thumb and L/R upper leg above knee  Motor deficit in all 4 extremities, can shrug shoulders           Laboratory and Diagnostics:  Results from last 7 days   Lab Units 03/08/21 0446 03/07/21 0402 03/06/21 2116 03/06/21  2112   WBC Thousand/uL 21 03* 17 46* 12 27*  --    HEMOGLOBIN g/dL 11 3* 12 5 11 7*  --    I STAT HEMOGLOBIN g/dl  --   --   --  12 6   HEMATOCRIT % 34 5* 37 9 35 4*  --    HEMATOCRIT, ISTAT %  --   --   --  37   PLATELETS Thousands/uL 316 381 326  --    NEUTROS PCT % 66 89* 57  --    MONOS PCT % 11 2* 9  --      Results from last 7 days   Lab Units 03/08/21 0446 03/07/21 0402 03/06/21 2116 03/06/21 2112   SODIUM mmol/L 142 142 145  --    POTASSIUM mmol/L 3 4* 4 2 3 9  --    CHLORIDE mmol/L 109* 111* 113*  --    CO2 mmol/L 29 27 26  --    CO2, I-STAT mmol/L  --   --   --  26   ANION GAP mmol/L 4 4 6  --    BUN mg/dL 6 8 10  --    CREATININE mg/dL 0 95 0 89 1 07  --    CALCIUM mg/dL 8 4 8 7 8 5  --    GLUCOSE RANDOM mg/dL 111 152* 91  --      Results from last 7 days   Lab Units 03/07/21  0402   MAGNESIUM mg/dL 2 0   PHOSPHORUS mg/dL 3 6      Results from last 7 days   Lab Units 03/06/21 2116   INR  1 08              ABG:  Results from last 7 days   Lab Units 03/07/21  1014   PH ART  7 441   PCO2 ART mm Hg 36 7   PO2 ART mm Hg 162 7*   HCO3 ART mmol/L 24 4   BASE EXC ART mmol/L 0 6   ABG SOURCE  Line, Arterial     VBG:  Results from last 7 days   Lab Units 03/07/21  1014   ABG SOURCE  Line, Arterial           Micro        EKG:   Imaging:     Intake and Output  I/O       03/06 0701 - 03/07 0700 03/07 0701 - 03/08 0700    I V  (mL/kg) 1300 4 (20 2) 1693 5 (25 4)    IV Piggyback  100    Total Intake(mL/kg) 1300 4 (20 2) 1793 5 (26 9)    Urine (mL/kg/hr) 1800 2350 (1 5)    Emesis/NG output 400     Drains 50 170    Blood 150     Total Output 2400 2520    Net -1099 6 -726 5                  Height and Weights   Height: 5' 11" (180 3 cm)  IBW: 75 3 kg  Body mass index is 20 51 kg/m²  Weight (last 2 days)     Date/Time   Weight    03/08/21 0600   66 7 (147 05)    03/07/21 0726   64 3 (141 76)    03/06/21 2107   64 3 (141 76)                Nutrition       Diet Orders   (From admission, onward)             Start     Ordered    03/07/21 1903  Diet NPO  Diet effective now     Question Answer Comment   Diet Type NPO    RD to adjust diet per protocol?  No        03/07/21 1903                    Active Medications  Scheduled Meds:  Current Facility-Administered Medications   Medication Dose Route Frequency Provider Last Rate    acetaminophen  975 mg Oral Q6H PRN Lien Engel PA-C      enoxaparin  40 mg Subcutaneous Daily Shay Rico MD      HYDROmorphone  0 5 mg Intravenous Q3H PRN JAYLYN Matthews      methocarbamol  750 mg Oral Q6H [facility] Shay Rico MD      multi-electrolyte  50 mL/hr Intravenous Continuous Karen Osorio MD Stopped (03/07/21 0657)    norepinephrine  1-30 mcg/min Intravenous Titrated Lien Engel PA-C 5 mcg/min (03/08/21 0500)    ondansetron  4 mg Intravenous Q6H PRN Shay Rico MD      oxyCODONE  10 mg Oral Q6H Lien Engel PA-C      polyethylene glycol  17 g Oral Daily JAYLYN Matthews      potassium chloride  40 mEq Oral Once Lala Dougherty PA-C      senna-docusate sodium  1 tablet Oral BID JAYLYN Matthews       Continuous Infusions:  multi-electrolyte, 50 mL/hr, Last Rate: Stopped (03/07/21 0657)  norepinephrine, 1-30 mcg/min, Last Rate: 5 mcg/min (03/08/21 0500)      PRN Meds:   acetaminophen, 975 mg, Q6H PRN  HYDROmorphone, 0 5 mg, Q3H PRN  ondansetron, 4 mg, Q6H PRN        Allergies   No Known Allergies  ---------------------------------------------------------------------------------------  Advance Directive and Living Will:      Power of :    POLST:    ---------------------------------------------------------------------------------------  Care Time Delivered:       Luke Owens      Portions of the record may have been created with voice recognition software  Occasional wrong word or "sound a like" substitutions may have occurred due to the inherent limitations of voice recognition software    Read the chart carefully and recognize, using context, where substitutions have occurred

## 2021-03-08 NOTE — ASSESSMENT & PLAN NOTE
POD1 PCDF C2-7 with C3-5 laminectomy with Dr Zeina Lawler on 3/7/2021  · LAWRENCE B cord injury s/p fall down stairs with C5 spinous process and lamina fractures  · On exam minimal movement bilateral UE 2/5 IO and wrists, otherwise 0/5 throughout  Partial T8 sensory level bilaterally  Imaging:  · Cervical spine MRI 3/7/2021: Increased T2 cord signal/edema involving stenotic levels C3 to inferior C5 most consistent with central cord syndrome in the setting of trauma  2   Nondisplaced fracture involving C5 spinous process and right lamina  Bone marrow edema within the spinous processes of C3-C5  3   Edema within posterior paravertebral musculature and increased T2 signal along the interspinous ligaments at levels C3-C6 suggestive of ligamentous injury  Anterior and posterior longitudinal ligaments appear intact  4   Degenerative canal stenosis more notable at level C3-4, as detailed  Plan:  · Frequent neuro checks  · MAP goal > 85 mmHg x 5 days, currently on Levophed  · VISTA collar at all times, babak collar for showering  · Post-op x-rays pending  · Bruce drain 170 mL/24 hours  · PMR evaluation  · DVT ppx: SCDs, Lovenox  Neurosurgery will continue to follow  Please call with any questions or concerns

## 2021-03-08 NOTE — CASE MANAGEMENT
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  Pt reports being independent for all ADL/IADLs  Pt's had 1 fall in the last 6 months  Pt enjoys watching TV  Pt owns no DME or living will  Pt's pharmacy is [facility] on [address] in [facility] [place]  Pt reports no hx of mental health, substance abuse, IP rehab, or VNA  Pt would benefit from a PMR consult  CM will appreciate therapy recommendations when appropriate  CM reviewed d/c planning process including the following: identifying help at home, patient preference for d/c planning needs, Discharge Lounge, [facility] Meds to Bed program, availability of treatment team to discuss questions or concerns patient and/or family may have regarding understanding medications and recognizing signs and symptoms once discharged  CM also encouraged patient to follow up with all recommended appointments after discharge  Patient advised of importance for patient and family to participate in managing patients medical well being

## 2021-03-08 NOTE — PLAN OF CARE
Problem: PHYSICAL THERAPY ADULT  Goal: Performs mobility at highest level of function for planned discharge setting  See evaluation for individualized goals  Description: Treatment/Interventions: Functional transfer training, LE strengthening/ROM, Therapeutic exercise, Bed mobility, Spoke to nursing, Spoke to case management, OT, Equipment eval/education, Compensatory technique education  Equipment Recommended: Other (Comment)(Kre bed)       See flowsheet documentation for full assessment, interventions and recommendations  Note: Prognosis: Fair  Problem List: Decreased strength, Decreased endurance, Impaired balance, Decreased mobility, Impaired sensation, Impaired tone, Pain  Assessment: Pt is a 38 y o male who presents to [facility] with paralysis after being found at bottom of stairs  CT and MRI imaging revealed injuries to cervical spine associated with Central Cord Syndrome  Pt s/p posterior C3-C5 laminectomy and C2-C7 fusion on 3/7/2021  Pt has no PMH on file  Pt with active PT orders to assess mobility and determine D/C needs  Pt with C/S collar and active spinal precautions  Pt confirmed home setup as documented by CM  Pt lives with roommates in a 2  with 4 [address] with a bedroom on the 2nd floor and a full bathroom on 1st floor  Pt states he was independent prior to admission with ADLs/IADLs and does not drive  Pt was cooperative throughout session and educated about importance of early mobilization  Pt was dependent with transfers from supine with HOB elevated to long sit  This transfer was completed three times to improve tolerance to position  Able to maintain long sit position with dependent Ax2 for about a minute before being returned to supported chair position in bed  PT observed slight drop in BP from rest with activity (138/78 > 125/58) but pt reported no orthostatic symptoms  Pt was returned to chair position in bed to improve tolerance to upright position   Modified call bell was issued secondary to pt's inability to use UE's  All other needs met  Other objective measures can be found above  Pt is of high complexity secondary to deficits in strength, sensation, tone, activity tolerance, and pt's level of dependence  Pt would benefit from skilled PT 3-5x/wk to address pt's LE strength deficits, improve pt's seated balance, and increase tolerance to positional changes to improve pt's function to a level appropriate for D/C  The patient's AM-PAC Basic Mobility Inpatient Short Form Low Function Raw Score 13 , Standardized Score is 20  14  A standardized score less 42 9 suggests the patient may benefit from discharge to post-acute rehab services  Please also refer to the recommendation of the Physical Therapist for safe discharge planning  Barriers to Discharge: Inaccessible home environment, Decreased caregiver support     PT Discharge Recommendation: [id number]     PT - OK to Discharge: No    See flowsheet documentation for full assessment

## 2021-03-08 NOTE — UTILIZATION REVIEW
Initial Clinical Review    Admission: Date/Time/Statement:   Admission Orders (From admission, onward)     Ordered        03/06/21 2206  Inpatient Admission  Once                   Orders Placed This Encounter   Procedures    Inpatient Admission     Standing Status:   Standing     Number of Occurrences:   1     Order Specific Question:   Level of Care     Answer:   Critical Care [15]     Order Specific Question:   Estimated length of stay     Answer:   More than 2 Midnights     Order Specific Question:   Certification     Answer:   I certify that inpatient services are medically necessary for this patient for a duration of greater than two midnights  See H&P and MD Progress Notes for additional information about the patient's course of treatment  ED Arrival Information     Expected Arrival Acuity Means of Arrival Escorted By Service Admission Type    - 3/6/2021 21:02 Immediate Ambulance [facility] Emergency Squad Critical Care/ICU Emergency    Arrival Complaint    -        Chief Complaint   Patient presents with    Fall - Major     Pt was found supine position at the bottom of the stairs  Pt c/o loss of sensation from the chest down  Pt unable to recall events           HPI:   38 y o  male who presents with paralysis after being found at the bottom of some stairs  EMS said that friends left him for about 30 minutes and after returning found him at the bottom of a set of stairs  Patient has amnesia of event  Physical exam: Flaccid paralysis of all extremities  No sensation below level of nipple  No Baninski, no clonus  Laceration above right eyebrow  Alert and oriented x3  Plan: Admit to Critical Care for evaluation and treatment of C3-C5 cord compression, forehead laceration:  Emergent MRI of C/T/L spine, maintain MAP >85, consult Neurosurgery  3/6 Neurosurgery consult:  LAWRENCE B CSpine on exam, more consistent with central cord  CT CSpine with C5 spinous process, lamina fracture   CSpine MRI with contusion centered at C4, posterior ligamentous injury C3-6  Emergent Posterior C3-5 lami/decompression, C2-7 fixation/fusion, possible additional levels  Verbal consent obtained  MAP > 85     3/7 Neurosurgery Operative Room Note    Pre-op Diagnosis:   Injury of cervical spinal cord, initial encounter ([facility] [address] ) [S14 109A]  Contusion of cervical cord, initial encounter ([facility] [address] ) [S14 109A]     Post-op Dignosis:    Post-Op Diagnosis Codes:    * Injury of cervical spinal cord, initial encounter ([facility] [address] ) [S14 109A]    * Contusion of cervical cord, initial encounter ([facility] [address] ) [S14 109A]     Procedure(s): POSTERIOR C3-5 laminectomy, C2-7 fixation fusion        Anesthesia: General        Estimated Blood Loss: 150 mL        Drains:   Closed/Suction Drain Posterior;Right Neck Bulb (Active)       Urethral Catheter Latex 16 Fr  (Active)         Findings:  Fractured C5 lamina and spinous process  Disrupted posterior ligamentous tissues C4-5, O1-7          Complications:  none      3/7 Trauma:  Traumatic spinal cord injury, POD#0 C3-5 laminectomy + C2-7 fusion  Q 1 hour neuro checks,  maintain map greater than 85, - C-spine precautions  Propofol and fentanyl for sedation  Currently on levophed 3, intubated, vent settings: ACPC, 15/10/6/40%, wean as appropriate  NPO, IVF, Billingsley  3/8 Trauma:  POD 1 s/p C3-C5 laminectomy/C2-C7 fusion  Q2 neuro checks  MAP goal > 85 for cord perfusion  Repeat post-operative c-spine x-rays pending this morning  Appreciate neurosurgery recommendations  PT/OT  Extubated yesterday, remains on levophed  Scheduled pain medications, oxycodone and dilaudid PRN  NPO, Speech eval, PT/OT consult  Physical exam: Alert and oriented x3  +shoulder shrug bilaterally  No further motor function of UE  Decreased sensation in b/l thumbs  No motor function of b/l LE  Sensation intact and equal b/l in feet, decreased sensation in b/l thighs  Neurosurgery:   On exam minimal movement bilateral UE 2/5 IO and wrists, otherwise 0/5 throughout  Partial T8 sensory level bilaterally  Frequent neuro checks, MAP goal >85, VISTA collar at all times, SARANYA drain 170 ml/24 hours, consult Physical Medicine Rehab    Speech Therapy:  Recommended Diet: regular diet and thin liquids as desired        ED Triage Vitals   Temperature Pulse Respirations Blood Pressure SpO2   03/06/21 2107 03/06/21 2104 03/06/21 2104 03/06/21 2104 03/06/21 2104   97 9 °F (36 6 °C) 85 22 127/73 99 %      Temp Source Heart Rate Source Patient Position - Orthostatic VS BP Location FiO2 (%)   03/06/21 2107 03/06/21 2104 03/06/21 2104 -- 03/07/21 1107   Tympanic Monitor Lying  40      Pain Score       03/06/21 2254       No Pain          Wt Readings from Last 1 Encounters:   03/08/21 66 7 kg (147 lb 0 8 oz)     Additional Vital Signs:       Date/Time  Temp  Pulse  Resp  BP  MAP (mmHg)  Arterial Line BP  MAP  SpO2  FiO2 (%)  O2 Device    03/08/21 1400  --  60  15  --  --  146/70  98 mmHg  96 %  --  --    03/08/21 1300  --  72  16  --  --  156/78  108 mmHg  96 %  --  None (Room air)    03/08/21 1200  98 6 °F (37 °C)  68  17  --  --  136/68  94 mmHg  96 %  --  None (Room air)    03/08/21 1100  --  72  15  --  --  128/60  84 mmHg  95 %  --  None (Room air)    03/08/21 1000  --  78  14  --  --  124/68  88 mmHg  94 %  --  None (Room air)    03/08/21 0900  --  86  16  --  --  134/66  92 mmHg  96 %  --  None (Room air)    03/08/21 0800  99 1 °F (37 3 °C)  76  18  143/70  97  140/68  94 mmHg  95 %  --  None (Room air)    03/08/21 0700  --  76  16  --  --  132/66  90 mmHg  96 %  --  --    03/08/21 0600  --  84  18  --  --  134/64  88 mmHg  96 %  --  --    03/08/21 0500  --  84  19  --  --  132/66  88 mmHg  96 %  --  --    03/08/21 0400  100 2 °F (37 9 °C)  70  16  --  --  128/62  84 mmHg  95 %  --  None (Room air)    03/08/21 0300  --  74  12  --  --  114/58  78 mmHg  95 %  --  --    03/08/21 0200  --  72  15  --  --  130/64  86 mmHg  95 %  --  --    03/08/21 0100  --  68  17  --  --  142/66  90 mmHg  95 %  --  --    03/08/21 0000  --  66  15  --  --  134/62  84 mmHg  95 %  --  --    03/07/21 2300  --  64  18  --  --  144/70  94 mmHg  97 %  --  --    03/07/21 2200  --  66  16  --  --  152/72  98 mmHg  97 %  --  --    03/07/21 2100  99 9 °F (37 7 °C)  70  17  --  --  126/60  82 mmHg  96 %  --  --    03/07/21 2000  --  78  16  --  --  130/64  86 mmHg  97 %  --  --    03/07/21 1900  --  86  17  --  --  118/60  80 mmHg  96 %  --  --    03/07/21 1800  --  94  15  --  --  110/62  78 mmHg  95 %  --  --    03/07/21 1700  --  102  15  --  --  112/74  90 mmHg  95 %  --  --    03/07/21 1600  101 2 °F (38 4 °C)Abnormal   86  9Abnormal   --  --  128/74  94 mmHg  99 %  --  --    03/07/21 1500  --  84  15  --  --  154/84  108 mmHg  100 %  --  --    03/07/21 1400  --  68  9Abnormal   --  --  136/76  98 mmHg  100 %  --  --    03/07/21 1300  --  82  9Abnormal   --  --  146/82  106 mmHg  98 %  --  --    03/07/21 1200  97 1 °F (36 2 °C)Abnormal   62  14  --  --  124/70  90 mmHg  100 %  --  --    03/07/21 1107  --  --  --  --  --  --  --  --  40  Ventilator    03/07/21 1100  --  58  13  --  --  152/78  102 mmHg  100 %  --  --    03/07/21 1000  --  56  14  --  --  152/78  104 mmHg  98 %  --  --    03/07/21 0900  --  62  14  --  --  114/64  82 mmHg  100 %  --  --    03/07/21 0800  96 6 °F (35 9 °C)Abnormal   58  25Abnormal   --  --  172/86  120 mmHg  100 %  --  --    03/07/21 0700  --  48Abnormal   14  --  --  150/72  102 mmHg  100 %  --  --    03/07/21 0628  --  50Abnormal   14  --  --  154/74  104 mmHg  100 %  --  --    03/07/21 0600  --  50Abnormal   13  --  --  156/76  106 mmHg  100 %  --  --    03/07/21 0530  --  52Abnormal   14  --  --  150/74  104 mmHg  100 %  --  --    03/07/21 0500  --  56  13  --  --  142/72  100 mmHg  100 %  --  --    03/07/21 0445  --  70  14  --  --  158/78  108 mmHg  100 %  --  --    03/07/21 0430  --  60  10Abnormal   --  --  138/68  96 mmHg  100 %  --  --    03/07/21 0415  --  82  11Abnormal   -- --  172/94  126 mmHg  100 %  --  --    03/07/21 0400  --  86  11Abnormal   --  --  160/74  102 mmHg  100 %  --  --    03/07/21 0345  --  102  28Abnormal   --  --  164/76  106 mmHg  100 %  --  --    03/07/21 0330  --  82  10Abnormal   --  --  144/70  96 mmHg  100 %  --  --    03/07/21 0315  --  98  17  135/68  87  160/78  108 mmHg  100 %  --  --    03/07/21 0302  --  114Abnormal   14  --  --  182/96  126 mmHg  100 %  --  --    03/06/21 2300  98 °F (36 7 °C)  70  21  133/78  105  --  --  98 %  --  --    03/06/21 2215  --  70  17  152/86  --  --  --  97 %  --  --    03/06/21 2200  --  63  18  139/85  --  --  --  97 %  --  --    03/06/21 2145  --  80  18  119/76  --  --  --  99 %  --  --    03/06/21 2130  --  79  19  119/70  --  --  --  98 %  --  --    03/06/21 2115  --  68  20  118/69  --  --  --  99 %  --  --    03/06/21 2107  97 9 °F (36 6 °C)  77  20  114/58  --  --  --  98 %  --  --        Date and Time Eye Opening Best Verbal Response Best Motor Response Sharona Coma Scale Score   03/08/21 1400 4 5 6 15   03/08/21 1200 4 5 6 15   03/08/21 1000 4 5 6 15   03/08/21 0900 4 5 6 15   03/08/21 0800 4 5 6 15   03/08/21 0600 4 5 6 15   03/08/21 0400 4 5 6 15   03/08/21 0200 4 5 6 15   03/08/21 0000 4 5 6 15   03/07/21 2200 4 5 6 15   03/07/21 2000 4 5 6 15   03/07/21 1707 4 5 6 15   03/07/21 1600 4 1 6 11   03/07/21 1400 4 1 6 11   03/07/21 1200 4 1 6 11   03/07/21 0800 4 1 6 11   03/07/21 0700 4 5 6 15   03/07/21 0600 4 5 6 15   03/07/21 0500 4 5 6 15   03/07/21 0400 4 5 6 15   03/07/21 0300 4 5 6 15   03/07/21 0000 4 5 6 15   03/06/21 2254 4 5 6 15   03/06/21 2215 4 5 6 15   03/06/21 2200 4 5 6 15   03/06/21 2145 4 5 6 15   03/06/21 2130 4 5 6 15   03/06/21 2115 4 5 6 15   03/06/21 2107 4 5 6 15   03/06/21 2104 4 5 6 15         Pertinent Labs/Diagnostic Test Results:     3/6 MRI cervical spine:  1    Increased T2 cord signal/edema involving stenotic levels C3 to inferior C5 most consistent with central cord syndrome in the setting of trauma  2   Nondisplaced fracture involving C5 spinous process and right lamina  Bone marrow edema within the spinous processes of C3-C5  3   Edema within posterior paravertebral musculature and increased T2 signal along the interspinous ligaments at levels C3-C6 suggestive of ligamentous injury  Anterior and posterior longitudinal ligaments appear intact  Degenerative canal stenosis more notable at level C3-4  3/6 MRI lumbar spine:  No acute finding  Unremarkable MRI of the lumbar spine        3/6 MRI thoracic spine: No acute finding  Unremarkable MRI of the thoracic spine  3/6 CT cervical spine: nondisplaced fracture through the spinous process and right lamina of C5  Central disc protrusions at C2-C3, C3-C4, and C4-C5  CT CAP:  No acute traumatic injury identified  3/6 CT head: no acute intracranial abnormality      3/6 CXR: No acute cardiopulmonary disease           Results from last 7 days   Lab Units 03/08/21 0446 03/07/21 0402 03/06/21 2116 03/06/21 2112   WBC Thousand/uL 21 03* 17 46* 12 27*  --    HEMOGLOBIN g/dL 11 3* 12 5 11 7*  --    I STAT HEMOGLOBIN g/dl  --   --   --  12 6   HEMATOCRIT % 34 5* 37 9 35 4*  --    HEMATOCRIT, ISTAT %  --   --   --  37   PLATELETS Thousands/uL 316 381 326  --    NEUTROS ABS Thousands/µL 13 91* 15 68* 7 04  --          Results from last 7 days   Lab Units 03/08/21 0446 03/07/21 0402 03/06/21 2116 03/06/21 2112   SODIUM mmol/L 142 142 145  --    POTASSIUM mmol/L 3 4* 4 2 3 9  --    CHLORIDE mmol/L 109* 111* 113*  --    CO2 mmol/L 29 27 26  --    CO2, I-STAT mmol/L  --   --   --  26   ANION GAP mmol/L 4 4 6  --    BUN mg/dL 6 8 10  --    CREATININE mg/dL 0 95 0 89 1 07  --    EGFR ml/min/1 73sq m 118 126 102  --    CALCIUM mg/dL 8 4 8 7 8 5  --    MAGNESIUM mg/dL  --  2 0  --   --    PHOSPHORUS mg/dL  --  3 6  --   --              Results from last 7 days   Lab Units 03/08/21 0446 03/07/21 0402 03/06/21 2116   GLUCOSE RANDOM mg/dL 111 152* 91               Results from last 7 days   Lab Units 03/07/21  1014 03/07/21  0754 03/07/21  0417   PH ART  7 441 7 505* 7 291*   PCO2 ART mm Hg 36 7 29 8* 55 4*   PO2 ART mm Hg 162 7* 171 2* 123 9   HCO3 ART mmol/L 24 4 23 0 26 1   BASE EXC ART mmol/L 0 6 0 8 -1 3   O2 CONTENT ART mL/dL 17 8 18 5 18 1   O2 HGB, ARTERIAL % 98 5* 98 6* 96 6   ABG SOURCE  Line, Arterial Line, Arterial Line, Arterial         Results from last 7 days   Lab Units 03/06/21  2112   PH, PAOLO I-STAT  7 393   PCO2, PAOLO ISTAT mm HG 41 0*   PO2, PAOLO ISTAT mm HG 72 0*   HCO3, PAOLO ISTAT mmol/L 25 0   I STAT BASE EXC mmol/L 0   I STAT O2 SAT % 94*                 Results from last 7 days   Lab Units 03/06/21  2116   PROTIME seconds 14 0   INR  1 08               Results from last 7 days   Lab Units 03/06/21  2116   ETHANOL LVL mg/dL <3   ACETAMINOPHEN LVL ug/mL <2*   SALICYLATE LVL mg/dL <3*             ED Treatment:   Medication Administration from 03/06/2021 2053 to 03/06/2021 2254       Date/Time Order Dose Route Action     03/06/2021 2113 tetanus-diphtheria-acellular pertussis (BOOSTRIX) IM injection 0 5 mL 0 5 mL Intramuscular Given     03/06/2021 2119 iohexol (OMNIPAQUE) 350 MG/ML injection (MULTI-DOSE) 100 mL 100 mL Intravenous Given        No past medical history on file  Present on Admission:   Central cord syndrome [facility])   Pulmonary insufficiency      Admitting Diagnosis: Injury of cervical spinal cord, initial encounter ([facility] [address] ) [S14 109A]  [facility] [S14 109A]  Unspecified multiple injuries, initial encounter [T07  XXXA]  Age/Sex: 38 y o  male       Admission Orders:    Cardio-Pulmonary monitoring, SCD, maintain urinary catheter, neuro checks, nursing dysphagia assessment,         Scheduled Medications:  acetaminophen, 975 mg, Oral, Q8H [facility]  [START ON 3/9/2021] enoxaparin, 30 mg, Subcutaneous, Q12H [facility]  gabapentin, 300 mg, Oral, TID  methocarbamol, 750 mg, Oral, Q6H [address]  midodrine, 10 mg, Oral, TID AC  polyethylene glycol, 17 g, Oral, Daily  senna-docusate sodium, 2 tablet, Oral, BID      Continuous IV Infusions:  norepinephrine, 1-30 mcg/min, Intravenous, Titrated      PRN Meds:  bisacodyl, 10 mg, Rectal, Daily PRN  HYDROmorphone, 1 mg, Intravenous, Q3H PRN  ondansetron, 4 mg, Intravenous, Q6H PRN  oxyCODONE, 10 mg, Oral, Q3H PRN    Or  oxyCODONE, 15 mg, Oral, Q3H PRN        IP CONSULT TO CASE MANAGEMENT  IP CONSULT TO NUTRITION SERVICES  IP CONSULT TO PICC TEAM  IP CONSULT TO PHYSICAL MEDICINE REHAB  INPATIENT CONSULT TO IR    Network Utilization Review Department  ATTENTION: Please call with any questions or concerns to [phone] and carefully listen to the prompts so that you are directed to the right person  All voicemails are confidential   Benigno Ramirez all requests for admission clinical reviews, approved or denied determinations and any other requests to dedicated fax number below belonging to the campus where the patient is receiving treatment   List of dedicated fax numbers for the Facilities:  [address] DENIALS (Administrative/Medical Necessity) [phone]   [address] (Maternity/NICU/Pediatrics) [phone]   [address] Nelson Cuenca 9043 (Itzel Doshi "Giulia" 103) [address] [address] Wilfrido Burk 1481 [address] [place] [address] [phone]

## 2021-03-09 LAB
ANION GAP SERPL CALCULATED.3IONS-SCNC: 3 MMOL/L (ref 4–13)
BASOPHILS # BLD AUTO: 0.07 THOUSANDS/ΜL (ref 0–0.1)
BASOPHILS NFR BLD AUTO: 0 % (ref 0–1)
BUN SERPL-MCNC: 8 MG/DL (ref 5–25)
CALCIUM SERPL-MCNC: 8.9 MG/DL (ref 8.3–10.1)
CHLORIDE SERPL-SCNC: 105 MMOL/L (ref 100–108)
CO2 SERPL-SCNC: 30 MMOL/L (ref 21–32)
CREAT SERPL-MCNC: 0.83 MG/DL (ref 0.6–1.3)
EOSINOPHIL # BLD AUTO: 0.05 THOUSAND/ΜL (ref 0–0.61)
EOSINOPHIL NFR BLD AUTO: 0 % (ref 0–6)
ERYTHROCYTE [DISTWIDTH] IN BLOOD BY AUTOMATED COUNT: 12.2 % (ref 11.6–15.1)
GFR SERPL CREATININE-BSD FRML MDRD: 130 ML/MIN/1.73SQ M
GLUCOSE SERPL-MCNC: 116 MG/DL (ref 65–140)
HCT VFR BLD AUTO: 34.9 % (ref 36.5–49.3)
HGB BLD-MCNC: 11.5 G/DL (ref 12–17)
IMM GRANULOCYTES # BLD AUTO: 0.09 THOUSAND/UL (ref 0–0.2)
IMM GRANULOCYTES NFR BLD AUTO: 0 % (ref 0–2)
LYMPHOCYTES # BLD AUTO: 2.88 THOUSANDS/ΜL (ref 0.6–4.47)
LYMPHOCYTES NFR BLD AUTO: 14 % (ref 14–44)
MAGNESIUM SERPL-MCNC: 1.8 MG/DL (ref 1.6–2.6)
MCH RBC QN AUTO: 31.1 PG (ref 26.8–34.3)
MCHC RBC AUTO-ENTMCNC: 33 G/DL (ref 31.4–37.4)
MCV RBC AUTO: 94 FL (ref 82–98)
MONOCYTES # BLD AUTO: 1.77 THOUSAND/ΜL (ref 0.17–1.22)
MONOCYTES NFR BLD AUTO: 9 % (ref 4–12)
NEUTROPHILS # BLD AUTO: 15.64 THOUSANDS/ΜL (ref 1.85–7.62)
NEUTS SEG NFR BLD AUTO: 77 % (ref 43–75)
NRBC BLD AUTO-RTO: 0 /100 WBCS
PLATELET # BLD AUTO: 312 THOUSANDS/UL (ref 149–390)
PMV BLD AUTO: 9.2 FL (ref 8.9–12.7)
POTASSIUM SERPL-SCNC: 3.8 MMOL/L (ref 3.5–5.3)
RBC # BLD AUTO: 3.7 MILLION/UL (ref 3.88–5.62)
SODIUM SERPL-SCNC: 138 MMOL/L (ref 136–145)
WBC # BLD AUTO: 20.5 THOUSAND/UL (ref 4.31–10.16)

## 2021-03-09 PROCEDURE — 99024 POSTOP FOLLOW-UP VISIT: CPT | Performed by: NEUROLOGICAL SURGERY

## 2021-03-09 PROCEDURE — 97112 NEUROMUSCULAR REEDUCATION: CPT

## 2021-03-09 PROCEDURE — 83735 ASSAY OF MAGNESIUM: CPT | Performed by: PHYSICIAN ASSISTANT

## 2021-03-09 PROCEDURE — 97530 THERAPEUTIC ACTIVITIES: CPT

## 2021-03-09 PROCEDURE — 99291 CRITICAL CARE FIRST HOUR: CPT | Performed by: SURGERY

## 2021-03-09 PROCEDURE — 85025 COMPLETE CBC W/AUTO DIFF WBC: CPT | Performed by: PHYSICIAN ASSISTANT

## 2021-03-09 PROCEDURE — 97535 SELF CARE MNGMENT TRAINING: CPT

## 2021-03-09 PROCEDURE — 97110 THERAPEUTIC EXERCISES: CPT

## 2021-03-09 PROCEDURE — 80048 BASIC METABOLIC PNL TOTAL CA: CPT | Performed by: PHYSICIAN ASSISTANT

## 2021-03-09 PROCEDURE — 94760 N-INVAS EAR/PLS OXIMETRY 1: CPT

## 2021-03-09 RX ORDER — POTASSIUM CHLORIDE 20 MEQ/1
20 TABLET, EXTENDED RELEASE ORAL ONCE
Status: COMPLETED | OUTPATIENT
Start: 2021-03-09 | End: 2021-03-09

## 2021-03-09 RX ORDER — MAGNESIUM SULFATE HEPTAHYDRATE 40 MG/ML
2 INJECTION, SOLUTION INTRAVENOUS ONCE
Status: COMPLETED | OUTPATIENT
Start: 2021-03-09 | End: 2021-03-09

## 2021-03-09 RX ORDER — BISACODYL 10 MG
10 SUPPOSITORY, RECTAL RECTAL DAILY
Status: DISCONTINUED | OUTPATIENT
Start: 2021-03-09 | End: 2021-03-10

## 2021-03-09 RX ADMIN — OXYCODONE HYDROCHLORIDE 15 MG: 10 TABLET ORAL at 08:43

## 2021-03-09 RX ADMIN — MIDODRINE HYDROCHLORIDE 10 MG: 5 TABLET ORAL at 06:40

## 2021-03-09 RX ADMIN — KETAMINE HYDROCHLORIDE 0.1 MG/KG/HR: 50 INJECTION INTRAMUSCULAR; INTRAVENOUS at 11:19

## 2021-03-09 RX ADMIN — GABAPENTIN 300 MG: 300 CAPSULE ORAL at 08:28

## 2021-03-09 RX ADMIN — ENOXAPARIN SODIUM 30 MG: 30 INJECTION SUBCUTANEOUS at 08:29

## 2021-03-09 RX ADMIN — MAGNESIUM SULFATE HEPTAHYDRATE 2 G: 40 INJECTION, SOLUTION INTRAVENOUS at 05:17

## 2021-03-09 RX ADMIN — ONDANSETRON 4 MG: 2 INJECTION INTRAMUSCULAR; INTRAVENOUS at 17:41

## 2021-03-09 RX ADMIN — HYDROMORPHONE HYDROCHLORIDE 1 MG: 1 INJECTION, SOLUTION INTRAMUSCULAR; INTRAVENOUS; SUBCUTANEOUS at 10:43

## 2021-03-09 RX ADMIN — MELATONIN TAB 3 MG 3 MG: 3 TAB at 22:02

## 2021-03-09 RX ADMIN — NOREPINEPHRINE BITARTRATE 5 MCG/MIN: 1 INJECTION, SOLUTION, CONCENTRATE INTRAVENOUS at 11:20

## 2021-03-09 RX ADMIN — HYDROMORPHONE HYDROCHLORIDE 1 MG: 1 INJECTION, SOLUTION INTRAMUSCULAR; INTRAVENOUS; SUBCUTANEOUS at 03:29

## 2021-03-09 RX ADMIN — OXYCODONE HYDROCHLORIDE 15 MG: 10 TABLET ORAL at 05:30

## 2021-03-09 RX ADMIN — HYDROMORPHONE HYDROCHLORIDE 1 MG: 1 INJECTION, SOLUTION INTRAMUSCULAR; INTRAVENOUS; SUBCUTANEOUS at 00:19

## 2021-03-09 RX ADMIN — ENOXAPARIN SODIUM 30 MG: 30 INJECTION SUBCUTANEOUS at 22:02

## 2021-03-09 RX ADMIN — METHOCARBAMOL TABLETS 750 MG: 750 TABLET, COATED ORAL at 05:23

## 2021-03-09 RX ADMIN — METHOCARBAMOL TABLETS 750 MG: 750 TABLET, COATED ORAL at 11:58

## 2021-03-09 RX ADMIN — METHOCARBAMOL TABLETS 750 MG: 750 TABLET, COATED ORAL at 17:15

## 2021-03-09 RX ADMIN — POLYETHYLENE GLYCOL 3350 17 G: 17 POWDER, FOR SOLUTION ORAL at 08:29

## 2021-03-09 RX ADMIN — SENNOSIDES, DOCUSATE SODIUM 2 TABLET: 8.6; 5 TABLET ORAL at 08:29

## 2021-03-09 RX ADMIN — HYDROMORPHONE HYDROCHLORIDE 1 MG: 1 INJECTION, SOLUTION INTRAMUSCULAR; INTRAVENOUS; SUBCUTANEOUS at 06:34

## 2021-03-09 RX ADMIN — POTASSIUM CHLORIDE 20 MEQ: 1500 TABLET, EXTENDED RELEASE ORAL at 05:23

## 2021-03-09 RX ADMIN — SENNOSIDES, DOCUSATE SODIUM 2 TABLET: 8.6; 5 TABLET ORAL at 17:15

## 2021-03-09 RX ADMIN — BISACODYL 10 MG: 10 SUPPOSITORY RECTAL at 15:56

## 2021-03-09 RX ADMIN — GABAPENTIN 300 MG: 300 CAPSULE ORAL at 15:54

## 2021-03-09 RX ADMIN — GABAPENTIN 300 MG: 300 CAPSULE ORAL at 22:02

## 2021-03-09 NOTE — ASSESSMENT & PLAN NOTE
POD2 PCDF C2-7 with C3-5 laminectomy with Dr Sayda Nieto on 3/7/2021  · LAWRENCE B cord injury s/p fall down stairs with C5 spinous process and lamina fractures  · On exam minimal movement bilateral UE 1/4 forearms, otherwise 0/5 throughout  Patchy sensory level bilaterally  JPS intact  Imaging:  · Cervical spine MRI 3/7/2021: Increased T2 cord signal/edema involving stenotic levels C3 to inferior C5 most consistent with central cord syndrome in the setting of trauma  2   Nondisplaced fracture involving C5 spinous process and right lamina  Bone marrow edema within the spinous processes of C3-C5  3   Edema within posterior paravertebral musculature and increased T2 signal along the interspinous ligaments at levels C3-C6 suggestive of ligamentous injury  Anterior and posterior longitudinal ligaments appear intact  4   Degenerative canal stenosis more notable at level C3-4, as detailed  Plan:  · Frequent neuro checks  · MAP goal > 85 mmHg x 7 days, currently on Levophed  · VISTA collar at all times, babak collar for showering  · Post-op x-rays pending  · Bruce drain 210 mL/24 hours - will maintain  · PMR evaluation - needs dedicated SCI rehab program   · DVT ppx: SCDs, Lovenox  Neurosurgery will continue to follow  Please call with any questions or concerns

## 2021-03-09 NOTE — PROGRESS NOTES
Progress Note - Critical Care   Verónica Johnson 38 y o  male MRN: [id number]  Unit/Bed#: ICU 06 Encounter: [id number]    Assessment & Plan:    Neuro:  1  Central cord syndrome, pod 2 s/p C3-C5 laminectomy and C2-C7 fusion  - Q2 hour neurovascular checks  - maintain map greater than 85 for cord perfusion  - neurosurgery following    2  Acute pain secondary to trauma  - Tylenol 975 mg q 8 hours  - gabapentin 300 mg t i d   - Robaxin 750 mg q 6 hours  - Dilaudid 1 mg q 3 hours p r n   - oxycodone 10 mg and 15 mg q 3 hours p r n   - consider APS consult     CV:   - maintain map greater than 85 for cord perfusion  - Levophed titrated, currently at 4  - PICC placed yesterday evening     Lung:   - incentive spirometry  - maintain SpO2 greater than 92%     GI:   - bowel regimen:  Senokot b i d , daily MiraLax, Dulcolax p r n   - bowel training     FEN:   F:  None  E:  Replete as needed  N:  Regular diet     :   - Billingsley in place  - monitor I/Os  - bladder training     ID:   - monitor for fever, leukocytosis     Heme:   - DVT prophylaxis:  Lovenox, SCDs     Endo:   - maintain euglycemia, goal blood sugar 120-180                Msk/Skin:   - frequent repositioning, monitor for skin breakdown  - PT OT  - multipodus boot     Disposition:  Critical care    Chief Complaint:  Neck pain    HPI/24hr events:   64-year-old male with no past medical history who presented on 03/06/2021 after a presumed fall downstairs  Patient was found to have central cord syndrome  He is status post C3-C5 laminectomy and C2-C7 fixation/fusion  He has had no acute overnight events  Physical Exam:   Physical Exam  Vitals signs and nursing note reviewed  Constitutional:       General: He is not in acute distress  HENT:      Head:      Comments: Laceration of R eyebrow, steristrips intact       Nose: Nose normal       Mouth/Throat:      Mouth: Mucous membranes are moist       Pharynx: No oropharyngeal exudate or posterior oropharyngeal erythema  Eyes:      Extraocular Movements: Extraocular movements intact  Pupils: Pupils are equal, round, and reactive to light  Neck:      Comments: Aspen collar in place  Cardiovascular:      Rate and Rhythm: Normal rate and regular rhythm  Heart sounds: No murmur  No friction rub  No gallop  Pulmonary:      Breath sounds: Normal breath sounds  No wheezing, rhonchi or rales  Abdominal:      General: Bowel sounds are normal  There is no distension  Palpations: Abdomen is soft  Tenderness: There is no abdominal tenderness  Musculoskeletal:         General: No swelling  Skin:     General: Skin is warm and dry  Coloration: Skin is not pale  Findings: No rash  Neurological:      Mental Status: He is alert and oriented to person, place, and time  Comments: Sensation to light touch intact in bilateral upper extremities and bilateral lower extremities above the knee  0/5 motor strength in bilateral upper and lower extremities  Review of Systems   Constitutional: Negative for chills and fever  HENT: Negative for congestion, rhinorrhea and sore throat  Respiratory: Negative for cough, chest tightness and shortness of breath  Cardiovascular: Negative for chest pain and leg swelling  Gastrointestinal: Negative for abdominal pain, diarrhea and nausea  Musculoskeletal: Positive for neck pain  Negative for back pain  Skin: Negative for pallor and rash  Neurological: Negative for light-headedness and headaches         Vitals:    21 0300 21 0400 21 0531 21 0600   BP:       Pulse: 84 78  66   Resp: 15 14  13   Temp:       TempSrc:       SpO2: 96% 96%     Weight:   71 kg (156 lb 8 4 oz)    Height:         Arterial Line BP: 142/62  Arterial Line MAP (mmHg): 90 mmHg    Temperature:   Temp (24hrs), Av 8 °F (37 1 °C), Min:98 5 °F (36 9 °C), Max:99 1 °F (37 3 °C)    Current: Temperature: 98 5 °F (36 9 °C)    Weights:   IBW: 75 3 kg Body mass index is 21 83 kg/m²  Weight (last 2 days)     Date/Time   Weight    03/09/21 0531   71 (156 53)    03/08/21 0600   66 7 (147 05)    03/07/21 0726   64 3 (141 76)              Hemodynamic Monitoring:  N/A     Non-Invasive/Invasive Ventilation Settings:  Respiratory    Lab Data (Last 4 hours)    None         O2/Vent Data (Last 4 hours)    None              No results found for: PHART, WCE2PVZ, PO2ART, DVZ0NUQ, X1FKXIEF, BEART, SOURCE  SpO2: SpO2: 96 %    Intake and Outputs:  I/O       03/07 0701 - 03/08 0700 03/08 0701 - 03/09 0700    P  O   250    I V  (mL/kg) 1693 5 (25 4) 378 4 (5 7)    IV Piggyback 100     Total Intake(mL/kg) 1793 5 (26 9) 628 4 (9 4)    Urine (mL/kg/hr) 2350 (1 5) 1625 (1)    Drains 170 130    Total Output 2520 1755    Net -726 5 -1126 6                 Nutrition:        Diet Orders   (From admission, onward)             Start     Ordered    03/08/21 1431  Diet Regular; Regular House  Diet effective now     Question Answer Comment   Diet Type Regular    Regular Regular House    RD to adjust diet per protocol?  Yes        03/08/21 1431    03/08/21 1430  Dietary nutrition supplements  Once     Question Answer Comment   Select Supplement: Ensure Enlive-Strawberry    Frequency Breakfast, Lunch, Dinner        03/08/21 1429                  Labs:   Results from last 7 days   Lab Units 03/09/21 0436 03/08/21 0446 03/07/21  0402   WBC Thousand/uL 20 50* 21 03* 17 46*   HEMOGLOBIN g/dL 11 5* 11 3* 12 5   HEMATOCRIT % 34 9* 34 5* 37 9   PLATELETS Thousands/uL 312 316 381   NEUTROS PCT % 77* 66 89*   MONOS PCT % 9 11 2*      Results from last 7 days   Lab Units 03/09/21  0434 03/08/21  0446 03/07/21  0402  03/06/21  2112   SODIUM mmol/L 138 142 142   < >  --    POTASSIUM mmol/L 3 8 3 4* 4 2   < >  --    CHLORIDE mmol/L 105 109* 111*   < >  --    CO2 mmol/L 30 29 27   < >  --    CO2, I-STAT mmol/L  --   --   --   --  26   BUN mg/dL 8 6 8   < >  --    CREATININE mg/dL 0 83 0 95 0 89   < >  -- CALCIUM mg/dL 8 9 8 4 8 7   < >  --    GLUCOSE, ISTAT mg/dl  --   --   --   --  87    < > = values in this interval not displayed  Results from last 7 days   Lab Units 03/09/21  0434 03/07/21  0402   MAGNESIUM mg/dL 1 8 2 0     Results from last 7 days   Lab Units 03/07/21  0402   PHOSPHORUS mg/dL 3 6      Results from last 7 days   Lab Units 03/06/21  2116   INR  1 08         No results found for: TROPONINI    Imaging:   X-ray chest 1 view   Final Result by Regino Curry MD (03/07 7906)      The tip of the endotracheal tube projects 2 4 cm above the odalys  Clear lungs  Workstation performed: [id number]         XR spine cervical 2 or 3 vw injury   Final Result by Roger Metz MD (03/07 9908)      Fluoroscopic guidance provided for procedure guidance  Please refer to the separate procedure notes for additional details  Workstation performed: [id number]         MRI lumbar spine wo contrast   Final Result by Nicole Rojas MD (03/07 7515)      No acute finding  Unremarkable MRI of the lumbar spine  Workstation performed: [id number]         MRI thoracic spine wo contrast   Final Result by Nicole Rojas MD (03/07 6267)      No acute finding  Unremarkable MRI of the thoracic spine  Workstation performed: [id number]         MRI cervical spine wo contrast   Final Result by Nicole Rojas MD (03/07 5003)      1  Increased T2 cord signal/edema involving stenotic levels C3 to inferior C5 most consistent with central cord syndrome in the setting of trauma  2   Nondisplaced fracture involving C5 spinous process and right lamina  Bone marrow edema within the spinous processes of C3-C5  3   Edema within posterior paravertebral musculature and increased T2 signal along the interspinous ligaments at levels C3-C6 suggestive of ligamentous injury  Anterior and posterior longitudinal ligaments appear intact        4   Degenerative canal stenosis more notable at level C3-4, as detailed  I personally discussed this study with Ericka Palencia on 3/7/2021 at 7:35 AM                Workstation performed: [id number]         CT head without contrast   Final Result by Dante Knott DO (03/06 2155)      No acute intracranial abnormality  I personally discussed this study with Polo Watson on 3/6/2021 at 9:53 PM          Workstation performed: [id number]         CT spine cervical without contrast   Final Result by  (03/09 4338)   Addendum 1 of 1 by Dante Knott DO (03/06 2215)   ADDENDUM:      There is a nondisplaced fracture through the spinous process and right    lamina of C5  I personally discussed this study with Polo Watson on 3/6/2021 at    10:14 PM                Final      CT chest abdomen pelvis w wo contrast   Final Result by Dante Knott DO (03/06 2154)      No acute traumatic injury identified  I personally discussed this study with Polo Watson on 3/6/2021 at 9:53 PM                Workstation performed: [id number]         XR trauma multiple   Final Result by Lamar Narayan MD (03/07 1349)      No acute cardiopulmonary disease within limitations of supine imaging  Workstation performed: [id number]         XR chest 1 view    (Results Pending)   XR cervical spine 2 or 3 views    (Results Pending)   IR PICC placement double lumen    (Results Pending)      I have personally reviewed pertinent reports            Micro:  No results found for: Blanca Ax, WOUNDCULT, SPUTUMCULTUR    Allergies: No Known Allergies    Medications:   Scheduled Meds:  Current Facility-Administered Medications   Medication Dose Route Frequency Provider Last Rate    acetaminophen  975 mg Oral [facility] Shameka Marsh PA-C      bisacodyl  10 mg Rectal Daily PRN Shameka Marsh PA-C      enoxaparin  30 mg Subcutaneous Q12H [address] [facility] [place]      gabapentin  300 mg Oral TID Shameka Marsh PA-C      HYDROmorphone  1 mg Intravenous Q3H PRN Joseline Saul PA-C      magnesium sulfate  2 g Intravenous Once Tanya Conway MD      melatonin  3 mg Oral HS JAYLYN Carreno      methocarbamol  750 mg Oral Q6H [address] Sudheer Christine MD      midodrine  10 mg Oral TID [facility] Bill Tafoya PA-C      norepinephrine  1-30 mcg/min Intravenous Titrated Rodrigo Mata PA-C 6 mcg/min (03/09/21 0600)    ondansetron  4 mg Intravenous Q6H PRN Sudheer Christine MD      oxyCODONE  10 mg Oral Q3H PRN Joseline Saul PA-C      Or    oxyCODONE  15 mg Oral Q3H PRN Joseline Saul PA-C      polyethylene glycol  17 g Oral Daily JAYLYN Figueredo      senna-docusate sodium  2 tablet Oral BID Joseline Saul PA-C       Continuous Infusions:norepinephrine, 1-30 mcg/min, Last Rate: 6 mcg/min (03/09/21 0600)      PRN Meds:  bisacodyl, 10 mg, Daily PRN  HYDROmorphone, 1 mg, Q3H PRN  ondansetron, 4 mg, Q6H PRN  oxyCODONE, 10 mg, Q3H PRN    Or  oxyCODONE, 15 mg, Q3H PRN        VTE Pharmacologic Prophylaxis: Enoxaparin (Lovenox)  VTE Mechanical Prophylaxis: sequential compression device    Invasive lines and devices: Invasive Devices     Peripherally Inserted Central Catheter Line            PICC Line 37/24/94 Right Basilic less than 1 day          Peripheral Intravenous Line            Peripheral IV 03/07/21 Distal;Right;Upper;Ventral (anterior) Arm 2 days    Peripheral IV 03/07/21 Left Wrist 1 day    Peripheral IV 03/07/21 Right Forearm 1 day          Arterial Line            Arterial Line 03/07/21 Radial 2 days          Drain            Closed/Suction Drain Posterior;Right Neck Bulb 2 days                   Counseling / Coordination of Care  Total Critical Care time spent 15 minutes excluding procedures, teaching and family updates  Code Status: Level 1 - Full Code     Portions of the record may have been created with voice recognition software    Occasional wrong word or "sound a like" substitutions may have occurred due to the inherent limitations of voice recognition software  Read the chart carefully and recognize, using context, where substitutions have occurred       Salma Solis MD

## 2021-03-09 NOTE — OCCUPATIONAL THERAPY NOTE
OccupationalTherapy Progress Note     Patient Name: Anel SANTANA Date: 3/9/2021  Problem List  Principal Problem:    Central cord syndrome [facility])  Active Problems:    Pulmonary insufficiency              03/09/21 1434   OT Last Visit   OT Visit Date 03/09/21   Note Type   Note Type Treatment   Restrictions/Precautions   Weight Bearing Precautions Per Order No   Braces or Orthoses C/S Collar   Other Precautions Bed Alarm;Multiple lines;Telemetry; Fall Risk;Pain;Spinal precautions   Lifestyle   Autonomy PTA, pt was independent in ADLs, IADLs, and functional mobility   Reciprocal Relationships Lives with roommate   Service to Others Currently unemployed, but previously worked in [employer] [address] [employer] watching [employer]   Pain Assessment   Pain Assessment Tool 0-10   Pain Score No Pain   Hospital Pain Intervention(s) Repositioned; Ambulation/increased activity; Emotional support   ADL   Grooming Assistance 1  Total Assistance   Grooming Deficit Setup;Supervision/safety; Increased time to complete;Wash/dry face   Grooming Comments Pt required total assistance during grooming activites during tilting session; AROM observed in b/l UEs however pt was unable to  wash cloth and bring to face, requiring total assistance   Functional Standing Tolerance   Time Approx 30-35 minutes   Activity Pt tolerated tilting session for approx 30-35 minutes in Kreg bed; see PT note for all tilting details and vitals   Comments Engaged in grooming tasks and UE PROM   Bed Mobility   Additional Comments Required total assistance x2 during repositioning in Kreg bed for UB postural support/ unable to maintain unsupported sitting position   Therapeutic Exercise - ROM   UE-ROM Yes   ROM- Right Upper Extremities   R Shoulder PROM   R Elbow AROM   R Wrist AROM   RUE ROM Comment 2 sets of 10 of elbow flexion, 2 sets of 10 of shoulder flexion, and 1 set of 10 for wrist flexion   ROM - Left Upper Extremities    L Shoulder PROM   L Elbow AROM   L Wrist AROM   LUE ROM Comment 2 sets of 10 of elbow flexion, 2 sets of 10 of shoulder flexion, and 1 set of 10 for wrist flexion   Coordination   Gross Motor Able to initiate slight AROM in b/l UEs   Fine Motor Unable to  washcloth during grooming activities   Cognition   Overall Cognitive Status WFL   Arousal/Participation Alert; Responsive;Arousable; Cooperative   Attention Within functional limits   Orientation Level Oriented X4   Memory Within functional limits   Following Commands Follows all commands and directions without difficulty   Comments Pt was pleasant and cooperative; presented with overall flat affect in begining; throughout session, pt became more interative and smiling @ end of session reporting that he is happy he has slight AROM in UEs; increased encourgement provided throughout sesion   Activity Tolerance   Activity Tolerance Patient tolerated treatment well;Patient limited by fatigue   Medical Staff Made Aware PT Shaggy Marking   Assessment   Assessment Pt is a 38 y o male who participated in OT session on 3/9/2021  Treatment focused to build tolearance for further OOB functional activity and engagement in ADL/IADL/leisure tasks  Upon arrival, pt found lying supine in bed and was agreeable to OT session  Pt tolerated tilting session well and maintained stable vitals throughout tilting (see PT note for tilting/vital details)  During tilting, pt tolerated AAROM/PROM exercises well (2 sets of 10 elbow flexion, 2 sets of 10 for shoulder flexion, and 1 setof 10 for wrist flexion)  Pt able to initiate AROM in b/l UEs, but unable to  wash cloth and bring to face to perform grooming activities  Pt currently performing at a level of: total assistance of x2 for repositioning in bed and grooming activities with total assistance during tilting session   In the beginning of the session, pt presented with an overall flat affect however throughout session, pt became more interactive, reporting that he is happy he is able to initiate some AROM  Pt is still limited by the following limitations/impairments which were addressed through skilled instruction: pain, fatigue, spinal precautions, decreased forward functional reach, decreased strength, generalized weakness, decreased endurance, decreased postural stability/trunk control, decreased activity tolerance, and an overall decreased independence in ADLs/IADLs/functional mobility  At the end of the session, pt was left in chair position in Diana bed with all functional needs in reach  At this time, recommend discharge to post-acute rehabilitation services (dedicated SCI rehab) when medically appropriate  The patient's raw score on the AM-PAC Daily Activity inpatient short form low function score is 14, standardized score is Low Function Daily Activity Standardized Score: 24 79  Patients with a standardized score less than 39 4 are likely to benefit from discharge to post-acute rehab services  Please refer to the recommendation of the Occupational Therapist for safe discharge planning  Established OT goals will be continued 3-5/wk to address immediate acute care needs and underlying performance skills to maximize occupational performance and safety to return to OF  Plan   Treatment Interventions ADL retraining;Functional transfer training;UE strengthening/ROM; Endurance training;Patient/family training;Equipment evaluation/education; Compensatory technique education;Continued evaluation; Energy conservation; Activityengagement   Goal Expiration Date 03/22/21   OT Treatment Day 1   OT Frequency 3-5x/wk   Recommendation   OT Discharge Recommendation Post-Acute Rehabilitation Services  (dedicated SCI rehab)   OT - OK to Discharge Yes  (When medically appropriate)   AM-PAC Daily Activity Inpatient   Lower Body Dressing 1   Bathing 1   Toileting 1   Upper Body Dressing 1   Grooming 1   Eating 1   Daily Activity Raw Score 6   Turning Head Towards Sound 4 Follow Simple Instructions 4   Low Function Daily Activity Raw Score 14   Low   Function Daily Activity Standardized Score 24 79   AM-PAC Applied Cognition Inpatient   Following a Speech/Presentation 4   Understanding Ordinary Conversation 4   Taking Medications 4   Remembering Where Things Are Placed or Put Away 4   Remembering List of 4-5 Errands 4   Taking Care of Complicated Tasks 4   Applied Cognition Raw Score 24   Applied Cognition Standardized Score 62 21       Navid Arthur, [facility]

## 2021-03-09 NOTE — CASE MANAGEMENT
[facility] is out of network with pt's insurance  They can obtain an OON exception but CM needs to reach out to other SCI rehabs within a 45 mile radius to check on their bed availability  CM placed referrals with Andrea Romero, and Emir Ramesh in that order   Pt is aware and in agreement

## 2021-03-09 NOTE — PLAN OF CARE
Problem: PHYSICAL THERAPY ADULT  Goal: Performs mobility at highest level of function for planned discharge setting  See evaluation for individualized goals  Description: Treatment/Interventions: Functional transfer training, LE strengthening/ROM, Therapeutic exercise, Bed mobility, Spoke to nursing, Spoke to case management, OT, Equipment eval/education, Compensatory technique education  Equipment Recommended: Other (Comment)(Kre bed)       See flowsheet documentation for full assessment, interventions and recommendations  Outcome: Progressing  Note: Prognosis: Fair  Problem List: Decreased strength, Decreased endurance, Impaired balance, Decreased mobility, Decreased coordination, Decreased safety awareness, Orthopedic restrictions, Pain, Decreased cognition  Assessment: Trial with protective standing in Kreg bed this session  Pt able to tolerate 60 degrees with long rest breaks at every 10 degrees  BP at start was 139/83 with lowest reading at 117/87 although demonstrated ability to stablize after that reading with increased time  Pt demonstrated good motivation to continue with protective standing and demonstrated no additional signs of intolerance  Demonstrated improve UE strength this session which is noted in OT  Demonstrated movement in L toe and questionable trace hamstring contraction in L LE  Pt will benefit from continued inpt skilled PT to maximize functional mobility & safety  Barriers to Discharge: Inaccessible home environment, Decreased caregiver support     PT Discharge Recommendation: [address]     PT - OK to Discharge: No    See flowsheet documentation for full assessment

## 2021-03-09 NOTE — APP STUDENT NOTE
Daily Progress Note - Critical Care   Tolu Hall 38 y o  male MRN: [id number]  Unit/Bed#: ICU 06 Encounter: [id number]        ----------------------------------------------------------------------------------------  HPI  SH is a 38year old male who presented on 3/6 as a level A trauma after being found at the bottom of the stairs by friends  The friends are unsure how long he was down  In the [facility], he was found to have loss of sensation below the nipple line and flaccid paralysis in all 4 extremities  CT shows central disc protrusions C2/3, C3/4, C4/5  MRI shows cord edema from C3-5 that is consistent with central cord syndrome, nondisplaced fracture of C5 spinous process and right lamina, bone marrow edema within the spinous processes of C3-5, and ligamentous injury at levels C3-6  The patient underwent C3-5 laminectomy and C2-7 fusion  Had laceration repair of R eyebrow on 3/7  Extubated 3/7    24hr events:   PICC placed  Complaining of pain     Continuous infusions: levophed 5    ---------------------------------------------------------------------------------------  SUBJECTIVE    Review of Systems   Constitutional: Negative for chills and fever  Respiratory: Negative for cough and shortness of breath  Cardiovascular: Negative for chest pain  Gastrointestinal: Negative for abdominal distention, nausea and vomiting  Musculoskeletal: Positive for neck pain  Negative for back pain  Neurological: Negative for dizziness and headaches      ---------------------------------------------------------------------------------------  Assessment and Plan:    Neuro:   · Traumatic spinal cord injury with central cord syndrome   § 3/6 CT: "Central disc protrusions at C2-C3, C3-C4, and C4-C5 "  § 3/6 MRI: "Increased T2 cord signal/edema involving stenotic levels C3 to inferior C5 most consistent with central cord syndrome in the setting of trauma   Nondisplaced fracture involving C5 spinous process and right lamina   Bone marrow edema within the spinous processes of C3-C5  Edema within posterior paravertebral musculature and increased T2 signal along the interspinous ligaments at levels C3-C6 suggestive of ligamentous injury   Anterior and posterior longitudinal ligaments appear intact "   ? S/p C3-5 laminectomy and C2-7 fusion   ? Neuro checks Q1 hr  ? Maintain MAP >85   ? Neurosurgery following   · Analgesia   ? Tylenol 975mg Q8, neurontin 300mg TID, robaxin 750mg Q6, dilaudid 1mg Q3 PRN, oxycodone 5mg Q3 PRN and 10mg Q3 PRN   · Delirium precautions   ? CAM-ICU daily   ? Regulate sleep/wake cycles      CV:   · No acute issues   · Currently on levophed 5 and midodrine 10mg  · Wean as tolerated to maintain MAP >85         Pulm:  · No acute issues   · Extubated 3/7   · Maintain SpO2 >90%   · Incentive spirometer         GI:   · No acute issues   · Bowel Regimen   § Scheduled miralax and senokot  § Dulcolax PRN         :   · No acute issues   · Billingsley catheter removed yesterday   · Bladder training   · Monitor UOP         F/E/N:   · F: No maintenance fluids   · E: replete for Mg >2, P >3, K >4   · N: regular house diet         Heme/Onc:   · No acute issues   · Monitor H/H  § Transfuse hbg if <7   · DVT PPX: lovenox 30mg daily and SCDs         Endo:   · No acute issues   · Maintain euglycemia with goal -180         ID:   · No acute issues   · Monitor WBC and fever curves         MSK/Skin:   · R eyebrow laceration   ? Wound care administered with steri-strips and glue   ?  Tetanus updated   · PT/OT consulted   · Frequent repositioning to offload pressure points   · Local wound care as needed     Disposition: Continue Critical Care   Code Status: Level 1 - Full Code  ---------------------------------------------------------------------------------------  ICU CORE MEASURES    Prophylaxis   VTE Pharmacologic Prophylaxis: Enoxaparin (Lovenox)  VTE Mechanical Prophylaxis: sequential compression device  Stress Ulcer Prophylaxis: Prophylaxis Not Indicated     Invasive Devices Review  Invasive Devices     Peripherally Inserted Central Catheter Line            PICC Line 77/43/72 Right Basilic less than 1 day          Peripheral Intravenous Line            Peripheral IV 21 Distal;Right;Upper;Ventral (anterior) Arm 2 days    Peripheral IV 21 Left Wrist 1 day    Peripheral IV 21 Right Forearm 1 day          Arterial Line            Arterial Line 21 Radial 2 days          Drain            Closed/Suction Drain Posterior;Right Neck Bulb 2 days              Can any invasive devices be discontinued today? No  ---------------------------------------------------------------------------------------  OBJECTIVE    Vitals   Vitals:    21 0200 21 0300 21 0400 21 0531   BP:       Pulse: 78 84 78    Resp: 12 15 14    Temp:       TempSrc:       SpO2: 95% 96% 96%    Weight:    71 kg (156 lb 8 4 oz)   Height:         Temp (24hrs), Av 8 °F (37 1 °C), Min:98 5 °F (36 9 °C), Max:99 1 °F (37 3 °C)  Current: Temperature: 98 5 °F (36 9 °C)    Respiratory:  SpO2: SpO2: 96 %       Invasive/non-invasive ventilation settings   Respiratory    Lab Data (Last 4 hours)    None         O2/Vent Data (Last 4 hours)    None                Physical Exam  Constitutional:       General: He is not in acute distress  Appearance: He is not ill-appearing  HENT:      Head: Normocephalic and atraumatic  Eyes:      Extraocular Movements: Extraocular movements intact  Pupils: Pupils are equal, round, and reactive to light  Cardiovascular:      Rate and Rhythm: Normal rate and regular rhythm  Heart sounds: No murmur  No friction rub  No gallop  Pulmonary:      Effort: No respiratory distress  Breath sounds: No wheezing, rhonchi or rales  Abdominal:      General: Bowel sounds are normal  There is no distension  Tenderness: There is no abdominal tenderness     Musculoskeletal:      Right lower leg: No edema  Left lower leg: No edema  Skin:     General: Skin is warm  Capillary Refill: Capillary refill takes less than 2 seconds  Findings: No bruising or erythema  Neurological:      Mental Status: He is oriented to person, place, and time  Sensory: Sensory deficit present  Motor: Weakness present  Comments: Loss of sensation in R/L thumb, R/L upper leg above the knee  Flaccid paralysis in all 4 extremities, can shrug shoulders            Laboratory and Diagnostics:  Results from last 7 days   Lab Units 03/09/21 0436 03/08/21 0446 03/07/21 0402 03/06/21 2116 03/06/21 2112   WBC Thousand/uL 20 50* 21 03* 17 46* 12 27*  --    HEMOGLOBIN g/dL 11 5* 11 3* 12 5 11 7*  --    I STAT HEMOGLOBIN g/dl  --   --   --   --  12 6   HEMATOCRIT % 34 9* 34 5* 37 9 35 4*  --    HEMATOCRIT, ISTAT %  --   --   --   --  37   PLATELETS Thousands/uL 312 316 381 326  --    NEUTROS PCT % 77* 66 89* 57  --    MONOS PCT % 9 11 2* 9  --      Results from last 7 days   Lab Units 03/09/21 0434 03/08/21 0446 03/07/21 0402 03/06/21 2116 03/06/21 2112   SODIUM mmol/L 138 142 142 145  --    POTASSIUM mmol/L 3 8 3 4* 4 2 3 9  --    CHLORIDE mmol/L 105 109* 111* 113*  --    CO2 mmol/L 30 29 27 26  --    CO2, I-STAT mmol/L  --   --   --   --  26   ANION GAP mmol/L 3* 4 4 6  --    BUN mg/dL 8 6 8 10  --    CREATININE mg/dL 0 83 0 95 0 89 1 07  --    CALCIUM mg/dL 8 9 8 4 8 7 8 5  --    GLUCOSE RANDOM mg/dL 116 111 152* 91  --      Results from last 7 days   Lab Units 03/09/21 0434 03/07/21  0402   MAGNESIUM mg/dL 1 8 2 0   PHOSPHORUS mg/dL  --  3 6      Results from last 7 days   Lab Units 03/06/21 2116   INR  1 08              ABG:  Results from last 7 days   Lab Units 03/07/21  1014   PH ART  7 441   PCO2 ART mm Hg 36 7   PO2 ART mm Hg 162 7*   HCO3 ART mmol/L 24 4   BASE EXC ART mmol/L 0 6   ABG SOURCE  Line, Arterial     VBG:  Results from last 7 days   Lab Units 03/07/21  1014   ABG SOURCE  Line, Arterial           Micro        EKG:   Imaging:    Intake and Output  I/O       03/07 0701 - 03/08 0700 03/08 0701 - 03/09 0700    P  O   250    I V  (mL/kg) 1693 5 (25 4) 408 4 (5 8)    IV Piggyback 100     Total Intake(mL/kg) 1793 5 (26 9) 658 4 (9 3)    Urine (mL/kg/hr) 2350 (1 5) 1625 (1)    Drains 170 170    Total Output 2520 1795    Net -726 5 -1136 6                  Height and Weights   Height: 5' 11" (180 3 cm)  IBW: 75 3 kg  Body mass index is 21 83 kg/m²  Weight (last 2 days)     Date/Time   Weight    03/09/21 0531   71 (156 53)    03/08/21 0600   66 7 (147 05)    03/07/21 0726   64 3 (141 76)                Nutrition       Diet Orders   (From admission, onward)             Start     Ordered    03/08/21 1431  Diet Regular; Regular House  Diet effective now     Question Answer Comment   Diet Type Regular    Regular Regular House    RD to adjust diet per protocol?  Yes        03/08/21 1431    03/08/21 1430  Dietary nutrition supplements  Once     Question Answer Comment   Select Supplement: Ensure Enlive-Strawberry    Frequency Breakfast, Lunch, Dinner        03/08/21 1429                    Active Medications  Scheduled Meds:  Current Facility-Administered Medications   Medication Dose Route Frequency Provider Last Rate    acetaminophen  975 mg Oral [facility] John Davis PA-C      bisacodyl  10 mg Rectal Daily PRN John Davis PA-C      enoxaparin  30 mg Subcutaneous Q12H [address] [place], [place]      gabapentin  300 mg Oral TID John Davis PA-C      HYDROmorphone  1 mg Intravenous Q3H PRN John Davis PA-C      magnesium sulfate  2 g Intravenous Once Priscilla Barker MD      melatonin  3 mg Oral HS JAYLYN Carreno      methocarbamol  750 mg Oral Q6H [address] Kendall Huang MD      [facility]  [facility]ID [facility]      norepinephrine  1-30 mcg/min Intravenous Titrated Nella Claire PA-C 7 mcg/min (03/09/21 0536)    ondansetron  4 mg Intravenous Q6H PRN Yo Blizzard Marvin Matthews MD      oxyCODONE  10 mg Oral Q3H PRN Tristan Oviedo PA-C      Or    oxyCODONE  15 mg Oral Q3H PRN Tristan Oviedo PA-C      polyethylene glycol  17 g Oral Daily JAYLYN Angeles-docusate sodium  2 tablet Oral BID Tristan Oviedo PA-C       Continuous Infusions:  norepinephrine, 1-30 mcg/min, Last Rate: 7 mcg/min (03/09/21 0536)      PRN Meds:   bisacodyl, 10 mg, Daily PRN  HYDROmorphone, 1 mg, Q3H PRN  ondansetron, 4 mg, Q6H PRN  oxyCODONE, 10 mg, Q3H PRN    Or  oxyCODONE, 15 mg, Q3H PRN        Allergies   No Known Allergies  ---------------------------------------------------------------------------------------  Advance Directive and Living Will:      Power of :    POLST:    ---------------------------------------------------------------------------------------  Care Time Delivered:       Prachi Sanabria      Portions of the record may have been created with voice recognition software  Occasional wrong word or "sound a like" substitutions may have occurred due to the inherent limitations of voice recognition software    Read the chart carefully and recognize, using context, where substitutions have occurred

## 2021-03-09 NOTE — PROCEDURES
Insert PICC line    Date/Time: 3/8/2021 7:01 PM  Performed by: Abhishek Chauhan MD  Authorized by: Black Perdomo PA-C     Patient location:  IR  Consent:     Consent obtained:  Verbal    Consent given by:  Patient    Risks discussed:  Arterial puncture, incorrect placement, nerve damage, pneumothorax, bleeding and infection    Alternatives discussed:  No treatment and delayed treatment  Universal protocol:     Procedure explained and questions answered to patient or proxy's satisfaction: yes      Relevant documents present and verified: yes      Test results available and properly labeled: yes      Radiology Images displayed and confirmed  If images not available, report reviewed: yes      Required blood products, implants, devices, and special equipment available: yes      Site/side marked: yes      Immediately prior to procedure, a time out was called: yes      Patient identity confirmed:  Verbally with patient and arm band  Pre-procedure details:     Hand hygiene: Hand hygiene performed prior to insertion      Sterile barrier technique: All elements of maximal sterile technique followed      Skin preparation:  ChloraPrep    Skin preparation agent: Skin preparation agent completely dried prior to procedure    Indications:     PICC line indications: medications requiring central line    Anesthesia (see MAR for exact dosages):      Anesthesia method:  Local infiltration    Local anesthetic:  Lidocaine 1% w/o epi  Procedure details:     Location:  Basilic    Vessel type: vein      Laterality:  Right    Approach: percutaneous technique used      Patient position:  Flat    Procedural supplies:  Double lumen    Catheter size:  5 Fr    Landmarks identified: yes      Ultrasound guidance: yes      Ultrasound image availability:  Images available in PACS    Sterile ultrasound techniques: Sterile gel and sterile probe covers were used      Number of attempts:  1    Successful placement: yes      Total catheter length (cm):  45 Catheter out on skin (cm):  0    Arm circumference:  29  Post-procedure details:     Post-procedure:  Securement device placed    Assessment:  Blood return through all ports and placement verified by x-ray    Post-procedure complications: none      Patient tolerance of procedure:   Tolerated well, no immediate complications    Observer: Yes      Observer name:  Radha Kelley, RT

## 2021-03-09 NOTE — PROGRESS NOTES
Progress Note - Franko Melendez 1985, 38 y o  male MRN: [id number]    Unit/Bed#: ICU 06 Encounter: [id number]    Primary Care Provider: Barbara Moreno MD   Date and time admitted to hospital: 3/6/2021  9:02 PM        * Central cord syndrome [facility])  Assessment & Plan  POD2 PCDF C2-7 with C3-5 laminectomy with Dr Veronica Eugene on 3/7/2021  · LAWRENCE B cord injury s/p fall down stairs with C5 spinous process and lamina fractures  · On exam minimal movement bilateral UE 1/4 forearms, otherwise 0/5 throughout  Patchy sensory level bilaterally  JPS intact  Imaging:  · Cervical spine MRI 3/7/2021: Increased T2 cord signal/edema involving stenotic levels C3 to inferior C5 most consistent with central cord syndrome in the setting of trauma  2   Nondisplaced fracture involving C5 spinous process and right lamina  Bone marrow edema within the spinous processes of C3-C5  3   Edema within posterior paravertebral musculature and increased T2 signal along the interspinous ligaments at levels C3-C6 suggestive of ligamentous injury  Anterior and posterior longitudinal ligaments appear intact  4   Degenerative canal stenosis more notable at level C3-4, as detailed  Plan:  · Frequent neuro checks  · MAP goal > 85 mmHg x 7 days, currently on Levophed  · VISTA collar at all times, babak collar for showering  · Post-op x-rays pending  · Bruce drain 210 mL/24 hours - will maintain  · PMR evaluation - needs dedicated SCI rehab program   · DVT ppx: SCDs, Lovenox  Neurosurgery will continue to follow  Please call with any questions or concerns  Subjective/Objective   Chief Complaint: "I"m ok "    Subjective: Continues to endorse severe neck pain  Also complaining of lack of appetite  Objective: Laying in bed  Cervical collar in place  BRUCE  Very minimal twitching movement right wrist > left 1/5  Patchy/inconsistent T4 sensory level  JPS intact but some numbness inconsistently      I/O       03/07 0701 - 03/08 0700 03/08 0701 - 03/09 0700 03/09 0701 - 03/10 0700    P  O   250 480    I V  (mL/kg) 1693 5 (25 4) 444 6 (6 3)     IV Piggyback 100  50    Total Intake(mL/kg) 1793 5 (26 9) 694 6 (9 8) 530 (7 5)    Urine (mL/kg/hr) 2350 (1 5) 1625 (1)     Emesis/NG output       Drains 170 210     Blood       Total Output 2520 1835     Net -726 5 -1140 4 +530                 Invasive Devices     Peripherally Inserted Central Catheter Line            PICC Line 90/82/37 Right Basilic less than 1 day          Peripheral Intravenous Line            Peripheral IV 03/07/21 Distal;Right;Upper;Ventral (anterior) Arm 2 days    Peripheral IV 03/07/21 Left Wrist 2 days          Arterial Line            Arterial Line 03/07/21 Radial 2 days          Drain            Closed/Suction Drain Posterior;Right Neck Bulb 2 days                Physical Exam:  Vitals: Blood pressure 143/70, pulse 74, temperature 98 5 °F (36 9 °C), temperature source Oral, resp  rate 15, height 5' 11" (1 803 m), weight 71 kg (156 lb 8 4 oz), SpO2 96 %  ,Body mass index is 21 83 kg/m²      Hemodynamic Monitoring: MAP: Arterial Line MAP (mmHg): 106 mmHg    General appearance: alert, appears stated age, cooperative and no distress  Head: Normocephalic, without obvious abnormality, atraumatic  Eyes: EOMI, PERRL  Neck: supple, symmetrical, cervical VISTA brace  Back: no kyphosis present,   Lungs: non labored breathing  Heart: regular heart rate  Neurologic:   Mental status: Alert, oriented x3, thought content appropriate  Cranial nerves: grossly intact (Cranial nerves II-XII)  Sensory: patchy T4 sensory level  Motor: Minimal movement 1/4 in bilateral wrists R > L  Reflexes: none, no Gomez's or clonus        Lab Results:  Results from last 7 days   Lab Units 03/09/21  0436 03/08/21  0446 03/07/21  0402   WBC Thousand/uL 20 50* 21 03* 17 46*   HEMOGLOBIN g/dL 11 5* 11 3* 12 5   HEMATOCRIT % 34 9* 34 5* 37 9   PLATELETS Thousands/uL 312 316 381   NEUTROS PCT % 77* 66 89*   MONOS PCT % 9 11 2*     Results from last 7 days   Lab Units 03/09/21  0434 03/08/21  0446 03/07/21  0402  03/06/21  2112   POTASSIUM mmol/L 3 8 3 4* 4 2   < >  --    CHLORIDE mmol/L 105 109* 111*   < >  --    CO2 mmol/L 30 29 27   < >  --    CO2, I-STAT mmol/L  --   --   --   --  26   BUN mg/dL 8 6 8   < >  --    CREATININE mg/dL 0 83 0 95 0 89   < >  --    CALCIUM mg/dL 8 9 8 4 8 7   < >  --    GLUCOSE, ISTAT mg/dl  --   --   --   --  87    < > = values in this interval not displayed  Results from last 7 days   Lab Units 03/09/21  0434 03/07/21  0402   MAGNESIUM mg/dL 1 8 2 0     Results from last 7 days   Lab Units 03/07/21  0402   PHOSPHORUS mg/dL 3 6     Results from last 7 days   Lab Units 03/06/21 2116   INR  1 08     No results found for: TROPONINT  ABG:  Lab Results   Component Value Date    PHART 7 441 03/07/2021    ARY2JRO 36 7 03/07/2021    PO2ART 162 7 (H) 03/07/2021    ACI1CIA 24 4 03/07/2021    BEART 0 6 03/07/2021    SOURCE Line, Arterial 03/07/2021       Imaging Studies: I have personally reviewed pertinent reports  and I have personally reviewed pertinent films in PACS    Ct Chest Abdomen Pelvis W Wo Contrast    Result Date: 3/6/2021  Impression: No acute traumatic injury identified  I personally discussed this study with Carlos Evans on 3/6/2021 at 9:53 PM  Workstation performed: [id number]     Xr Spine Cervical 2 Or 3 Vw Injury    Result Date: 3/7/2021  Impression: Fluoroscopic guidance provided for procedure guidance  Please refer to the separate procedure notes for additional details  Workstation performed: [id number]     Ct Head Without Contrast    Result Date: 3/6/2021  Impression: No acute intracranial abnormality   I personally discussed this study with Carlos Evans on 3/6/2021 at 9:53 PM  Workstation performed: [id number]     Ct Spine Cervical Without Contrast    Addendum Date: 3/6/2021    ADDENDUM: There is a nondisplaced fracture through the spinous process and right lamina of C5   I personally discussed this study with Markywesley Elham on 3/6/2021 at 10:14 PM      Result Date: 3/6/2021  Impression: No cervical spine fracture or traumatic malalignment  Central disc protrusions at C2-C3, C3-C4, and C4-C5  Unfortunately, this is incompletely evaluated  Given the reported neurological symptoms in this trauma patient, recommend MRI  I personally discussed this study with Thee Lizarraga on 3/6/2021 at 9:53 PM  Workstation performed: [id number]     Mri Cervical Spine Wo Contrast    Result Date: 3/7/2021  Impression: 1  Increased T2 cord signal/edema involving stenotic levels C3 to inferior C5 most consistent with central cord syndrome in the setting of trauma  2   Nondisplaced fracture involving C5 spinous process and right lamina  Bone marrow edema within the spinous processes of C3-C5  3   Edema within posterior paravertebral musculature and increased T2 signal along the interspinous ligaments at levels C3-C6 suggestive of ligamentous injury  Anterior and posterior longitudinal ligaments appear intact  4   Degenerative canal stenosis more notable at level C3-4, as detailed  I personally discussed this study with Jessica  on 3/7/2021 at 7:35 AM  Workstation performed: [id number]     Mri Thoracic Spine Wo Contrast    Result Date: 3/7/2021  Impression: No acute finding  Unremarkable MRI of the thoracic spine  Workstation performed: [id number]     Mri Lumbar Spine Wo Contrast    Result Date: 3/7/2021  Impression: No acute finding  Unremarkable MRI of the lumbar spine  Workstation performed: [id number]     X-ray Chest 1 View    Result Date: 3/7/2021  Impression: The tip of the endotracheal tube projects 2 4 cm above the odalys  Clear lungs  Workstation performed: [id number]     Xr Trauma Multiple    Result Date: 3/7/2021  Impression: No acute cardiopulmonary disease within limitations of supine imaging   Workstation performed: [id number]       EKG, Pathology, and Other Studies: I have personally reviewed pertinent reports        VTE Pharmacologic Prophylaxis: Sequential compression device (Venodyne)  and Enoxaparin (Lovenox)    VTE Mechanical Prophylaxis: sequential compression device

## 2021-03-09 NOTE — PLAN OF CARE
Problem: OCCUPATIONAL THERAPY ADULT  Goal: Performs self-care activities at highest level of function for planned discharge setting  See evaluation for individualized goals  Description: Treatment Interventions: ADL retraining, Functional transfer training, UE strengthening/ROM, Endurance training, Patient/family training, Equipment evaluation/education, Compensatory technique education, Continued evaluation, Energy conservation, Activityengagement          See flowsheet documentation for full assessment, interventions and recommendations  Outcome: Progressing  Note: Limitation: Decreased ADL status, Decreased UE ROM, Decreased UE strength, Decreased endurance, Decreased sensation, Decreased fine motor control, Decreased self-care trans, Decreased high-level ADLs, Non-func R UE, Non-func L UE  Prognosis: Fair  Assessment: Pt is a 38 y o male who participated in OT session on 3/9/2021  Treatment focused to build tolearance for further OOB functional activity and engagement in ADL/IADL/leisure tasks  Upon arrival, pt found lying supine in bed and was agreeable to OT session  Pt tolerated tilting session well and maintained stable vitals throughout tilting (see PT note for tilting/vital details)  During tilting, pt tolerated AAROM/PROM exercises well (2 sets of 10 elbow flexion, 2 sets of 10 for shoulder flexion, and 1 setof 10 for wrist flexion)  Pt able to initiate AROM in b/l UEs, but unable to  wash cloth and bring to face to perform grooming activities  Pt currently performing at a level of: total assistance of x2 for repositioning in bed and grooming activities with total assistance during tilting session  In the beginning of the session, pt presented with an overall flat affect however throughout session, pt became more interactive, reporting that he is happy he is able to initiate some AROM   Pt is still limited by the following limitations/impairments which were addressed through skilled instruction: pain, fatigue, spinal precautions, decreased forward functional reach, decreased strength, generalized weakness, decreased endurance, decreased postural stability/trunk control, decreased activity tolerance, and an overall decreased independence in ADLs/IADLs/functional mobility  At the end of the session, pt was left in chair position in Diana bed with all functional needs in reach  At this time, recommend discharge to post-acute rehabilitation services (dedicated SCI rehab) when medically appropriate  The patient's raw score on the AM-PAC Daily Activity inpatient short form low function score is 14, standardized score is Low Function Daily Activity Standardized Score: 24 79  Patients with a standardized score less than 39 4 are likely to benefit from discharge to post-acute rehab services  Please refer to the recommendation of the Occupational Therapist for safe discharge planning  Established OT goals will be continued 3-5/wk to address immediate acute care needs and underlying performance skills to maximize occupational performance and safety to return to OF       OT Discharge Recommendation: Post-Acute Rehabilitation Services(dedicated SCI rehab)  OT - OK to Discharge: Yes(When medically appropriate)     KESHIA Armenta

## 2021-03-09 NOTE — SEDATION DOCUMENTATION
Right basilic PICC placed by Dr Burch Screen  Patient tolerated well and VSS  Report given to Sandy Desai RN at bedside and patient to return to room

## 2021-03-09 NOTE — PHYSICAL THERAPY NOTE
PHYSICAL THERAPY NOTE          Patient Name: Rachelle Jones  ZYPZD'C Date: 3/9/2021     03/09/21 1450   PT Last Visit   PT Visit Date 03/09/21   Note Type   Note Type Treatment   Pain Assessment   Pain Assessment Tool 0-10   Pain Score No Pain   Hospital Pain Intervention(s) Repositioned   Restrictions/Precautions   Weight Bearing Precautions Per Order No   Braces or Orthoses C/S Collar   Other Precautions Chair Alarm; Bed Alarm;Multiple lines;Telemetry; Fall Risk;Pain   General   Chart Reviewed Yes   Family/Caregiver Present No   Cognition   Orientation Level Oriented X4   Subjective   Subjective Pt willing and agreeable to PT session   Bed Mobility   Supine to Sit 1  Dependent   Additional items Assist x 2   Sit to Supine 1  Dependent   Additional items Assist x 1   Transfers   Sit to Stand Unable to assess   Stand to Sit Unable to assess   Ambulation/Elevation   Gait pattern Not appropriate   Balance   Static Sitting Zero   Endurance Deficit   Endurance Deficit Yes   Endurance Deficit Description limited activity tolerance   Activity Tolerance   Activity Tolerance Patient limited by fatigue;Patient limited by pain   Medical Staff Made Aware PT   Nurse Made Aware yes   Assessment   Prognosis Fair   Problem List Decreased strength;Decreased endurance; Impaired balance;Decreased mobility; Decreased coordination;Decreased safety awareness;Orthopedic restrictions;Pain;Decreased cognition   Assessment Trial with protective standing in Kreg bed this session  Pt able to tolerate 60 degrees with long rest breaks at every 10 degrees  BP at start was 139/83 with lowest reading at 117/87 although demonstrated ability to stablize after that reading with increased time  Pt demonstrated good motivation to continue with protective standing and demonstrated no additional signs of intolerance   Demonstrated improve UE strength this session which is noted in OT  Demonstrated movement in L toe and questionable trace hamstring contraction in L LE  Pt will benefit from continued inpt skilled PT to maximize functional mobility & safety  Barriers to Discharge Inaccessible home environment;Decreased caregiver support   Goals   Patient Goals None stated   STG Expiration Date 03/20/21   Plan   Treatment/Interventions OT; Spoke to case management;Spoke to nursing;Gait training;Bed mobility; Patient/family training; Endurance training;Functional transfer training;LE strengthening/ROM   PT Frequency Other (Comment)  (3-5x/wk)   Recommendation   PT Discharge Recommendation Post-Acute Rehabilitation Services   Equipment Recommended Other (Comment)   PT - OK to Discharge No   AM-PAC Basic Mobility Inpatient   Turning in Bed Without Bedrails 1   Lying on Back to Sitting on Edge of Flat Bed 1   Moving Bed to Chair 1   Standing Up From Chair 1   Walk in Room 1   Climb 3-5 Stairs 1   Basic Mobility Inpatient Raw Score 6     Pauline Blackman, PT

## 2021-03-10 ENCOUNTER — APPOINTMENT (INPATIENT)
Dept: RADIOLOGY | Facility: HOSPITAL | Age: 36
DRG: 023 | End: 2021-03-10
Payer: MEDICARE

## 2021-03-10 PROBLEM — S12.400A CLOSED FRACTURE OF FIFTH CERVICAL VERTEBRA (HCC): Status: ACTIVE | Noted: 2021-03-10
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
## 2021-03-10 PROBLEM — R52 PAIN: Status: ACTIVE | Noted: 2021-03-10

## 2021-03-10 PROBLEM — S14.109A SPINAL CORD INJURY, C1-C7 (HCC): Status: ACTIVE | Noted: 2021-03-10

## 2021-03-10 LAB
ANION GAP SERPL CALCULATED.3IONS-SCNC: 4 MMOL/L (ref 4–13)
BASOPHILS # BLD AUTO: 0.07 THOUSANDS/ΜL (ref 0–0.1)
BASOPHILS NFR BLD AUTO: 0 % (ref 0–1)
BUN SERPL-MCNC: 9 MG/DL (ref 5–25)
CALCIUM SERPL-MCNC: 8.7 MG/DL (ref 8.3–10.1)
CHLORIDE SERPL-SCNC: 105 MMOL/L (ref 100–108)
CO2 SERPL-SCNC: 29 MMOL/L (ref 21–32)
CREAT SERPL-MCNC: 0.92 MG/DL (ref 0.6–1.3)
EOSINOPHIL # BLD AUTO: 0.24 THOUSAND/ΜL (ref 0–0.61)
EOSINOPHIL NFR BLD AUTO: 1 % (ref 0–6)
ERYTHROCYTE [DISTWIDTH] IN BLOOD BY AUTOMATED COUNT: 12 % (ref 11.6–15.1)
GFR SERPL CREATININE-BSD FRML MDRD: 122 ML/MIN/1.73SQ M
GLUCOSE SERPL-MCNC: 92 MG/DL (ref 65–140)
HCT VFR BLD AUTO: 34.1 % (ref 36.5–49.3)
HGB BLD-MCNC: 11.3 G/DL (ref 12–17)
IMM GRANULOCYTES # BLD AUTO: 0.11 THOUSAND/UL (ref 0–0.2)
IMM GRANULOCYTES NFR BLD AUTO: 1 % (ref 0–2)
LYMPHOCYTES # BLD AUTO: 4.61 THOUSANDS/ΜL (ref 0.6–4.47)
LYMPHOCYTES NFR BLD AUTO: 26 % (ref 14–44)
MAGNESIUM SERPL-MCNC: 1.8 MG/DL (ref 1.6–2.6)
MCH RBC QN AUTO: 31.1 PG (ref 26.8–34.3)
MCHC RBC AUTO-ENTMCNC: 33.1 G/DL (ref 31.4–37.4)
MCV RBC AUTO: 94 FL (ref 82–98)
MONOCYTES # BLD AUTO: 2.14 THOUSAND/ΜL (ref 0.17–1.22)
MONOCYTES NFR BLD AUTO: 12 % (ref 4–12)
NEUTROPHILS # BLD AUTO: 10.38 THOUSANDS/ΜL (ref 1.85–7.62)
NEUTS SEG NFR BLD AUTO: 60 % (ref 43–75)
NRBC BLD AUTO-RTO: 0 /100 WBCS
PHOSPHATE SERPL-MCNC: 3.4 MG/DL (ref 2.7–4.5)
PLATELET # BLD AUTO: 303 THOUSANDS/UL (ref 149–390)
PMV BLD AUTO: 9.4 FL (ref 8.9–12.7)
POTASSIUM SERPL-SCNC: 3.6 MMOL/L (ref 3.5–5.3)
RBC # BLD AUTO: 3.63 MILLION/UL (ref 3.88–5.62)
SODIUM SERPL-SCNC: 138 MMOL/L (ref 136–145)
WBC # BLD AUTO: 17.55 THOUSAND/UL (ref 4.31–10.16)

## 2021-03-10 PROCEDURE — 99024 POSTOP FOLLOW-UP VISIT: CPT | Performed by: PHYSICIAN ASSISTANT

## 2021-03-10 PROCEDURE — 80048 BASIC METABOLIC PNL TOTAL CA: CPT | Performed by: EMERGENCY MEDICINE

## 2021-03-10 PROCEDURE — 85025 COMPLETE CBC W/AUTO DIFF WBC: CPT | Performed by: EMERGENCY MEDICINE

## 2021-03-10 PROCEDURE — 99223 1ST HOSP IP/OBS HIGH 75: CPT | Performed by: PHYSICIAN ASSISTANT

## 2021-03-10 PROCEDURE — 83735 ASSAY OF MAGNESIUM: CPT | Performed by: EMERGENCY MEDICINE

## 2021-03-10 PROCEDURE — 94760 N-INVAS EAR/PLS OXIMETRY 1: CPT

## 2021-03-10 PROCEDURE — 99233 SBSQ HOSP IP/OBS HIGH 50: CPT | Performed by: SURGERY

## 2021-03-10 PROCEDURE — 72040 X-RAY EXAM NECK SPINE 2-3 VW: CPT

## 2021-03-10 PROCEDURE — 84100 ASSAY OF PHOSPHORUS: CPT | Performed by: EMERGENCY MEDICINE

## 2021-03-10 RX ORDER — DIAZEPAM 5 MG/1
5 TABLET ORAL EVERY 6 HOURS
Status: DISCONTINUED | OUTPATIENT
Start: 2021-03-10 | End: 2021-03-16

## 2021-03-10 RX ORDER — GLYCOPYRROLATE 0.2 MG/ML
0.2 INJECTION INTRAMUSCULAR; INTRAVENOUS EVERY 4 HOURS PRN
Status: DISCONTINUED | OUTPATIENT
Start: 2021-03-10 | End: 2021-03-16

## 2021-03-10 RX ORDER — LIDOCAINE 50 MG/G
1 PATCH TOPICAL DAILY
Status: DISCONTINUED | OUTPATIENT
Start: 2021-03-10 | End: 2021-03-16 | Stop reason: HOSPADM

## 2021-03-10 RX ORDER — HYDROMORPHONE HYDROCHLORIDE 2 MG/1
4 TABLET ORAL EVERY 4 HOURS PRN
Status: DISCONTINUED | OUTPATIENT
Start: 2021-03-10 | End: 2021-03-12

## 2021-03-10 RX ORDER — HALOPERIDOL 5 MG/ML
2 INJECTION INTRAMUSCULAR
Status: DISCONTINUED | OUTPATIENT
Start: 2021-03-10 | End: 2021-03-16

## 2021-03-10 RX ORDER — HYDROMORPHONE HYDROCHLORIDE 2 MG/1
6 TABLET ORAL EVERY 4 HOURS PRN
Status: DISCONTINUED | OUTPATIENT
Start: 2021-03-10 | End: 2021-03-12

## 2021-03-10 RX ORDER — DIAZEPAM 2 MG/1
2 TABLET ORAL ONCE
Status: COMPLETED | OUTPATIENT
Start: 2021-03-10 | End: 2021-03-10

## 2021-03-10 RX ORDER — DIAZEPAM 2 MG/1
2 TABLET ORAL EVERY 6 HOURS
Status: DISCONTINUED | OUTPATIENT
Start: 2021-03-10 | End: 2021-03-10

## 2021-03-10 RX ORDER — LORAZEPAM 2 MG/ML
1 INJECTION INTRAMUSCULAR
Status: DISCONTINUED | OUTPATIENT
Start: 2021-03-10 | End: 2021-03-16

## 2021-03-10 RX ADMIN — ENOXAPARIN SODIUM 30 MG: 30 INJECTION SUBCUTANEOUS at 23:02

## 2021-03-10 RX ADMIN — GABAPENTIN 300 MG: 300 CAPSULE ORAL at 23:02

## 2021-03-10 RX ADMIN — OXYCODONE HYDROCHLORIDE 10 MG: 10 TABLET ORAL at 06:34

## 2021-03-10 RX ADMIN — SENNOSIDES, DOCUSATE SODIUM 2 TABLET: 8.6; 5 TABLET ORAL at 09:14

## 2021-03-10 RX ADMIN — LIDOCAINE 1 PATCH: 50 PATCH TOPICAL at 09:15

## 2021-03-10 RX ADMIN — GABAPENTIN 300 MG: 300 CAPSULE ORAL at 09:14

## 2021-03-10 RX ADMIN — METHOCARBAMOL TABLETS 750 MG: 750 TABLET, COATED ORAL at 17:58

## 2021-03-10 RX ADMIN — HYDROMORPHONE HYDROCHLORIDE 1 MG: 1 INJECTION, SOLUTION INTRAMUSCULAR; INTRAVENOUS; SUBCUTANEOUS at 11:21

## 2021-03-10 RX ADMIN — DIAZEPAM 5 MG: 5 TABLET ORAL at 15:38

## 2021-03-10 RX ADMIN — OXYCODONE HYDROCHLORIDE 15 MG: 10 TABLET ORAL at 10:10

## 2021-03-10 RX ADMIN — DIAZEPAM 5 MG: 5 TABLET ORAL at 23:02

## 2021-03-10 RX ADMIN — HYDROMORPHONE HYDROCHLORIDE 1 MG: 1 INJECTION, SOLUTION INTRAMUSCULAR; INTRAVENOUS; SUBCUTANEOUS at 02:21

## 2021-03-10 RX ADMIN — HYDROMORPHONE HYDROCHLORIDE 6 MG: 2 TABLET ORAL at 17:58

## 2021-03-10 RX ADMIN — MELATONIN TAB 3 MG 3 MG: 3 TAB at 23:02

## 2021-03-10 RX ADMIN — KETAMINE HYDROCHLORIDE 0.2 MG/KG/HR: 50 INJECTION INTRAMUSCULAR; INTRAVENOUS at 15:38

## 2021-03-10 RX ADMIN — METHOCARBAMOL TABLETS 750 MG: 750 TABLET, COATED ORAL at 12:53

## 2021-03-10 RX ADMIN — DIAZEPAM 2 MG: 2 TABLET ORAL at 13:16

## 2021-03-10 RX ADMIN — METHOCARBAMOL TABLETS 750 MG: 750 TABLET, COATED ORAL at 06:34

## 2021-03-10 RX ADMIN — KETAMINE HYDROCHLORIDE 0.2 MG/KG/HR: 50 INJECTION INTRAMUSCULAR; INTRAVENOUS at 19:27

## 2021-03-10 RX ADMIN — DIAZEPAM 2 MG: 2 TABLET ORAL at 09:17

## 2021-03-10 RX ADMIN — ENOXAPARIN SODIUM 30 MG: 30 INJECTION SUBCUTANEOUS at 09:15

## 2021-03-10 RX ADMIN — METHOCARBAMOL TABLETS 750 MG: 750 TABLET, COATED ORAL at 00:39

## 2021-03-10 RX ADMIN — NOREPINEPHRINE BITARTRATE 3 MCG/MIN: 1 INJECTION, SOLUTION, CONCENTRATE INTRAVENOUS at 07:30

## 2021-03-10 RX ADMIN — GABAPENTIN 300 MG: 300 CAPSULE ORAL at 15:38

## 2021-03-10 NOTE — PROGRESS NOTES
Daily Progress Note - Critical Care   Thomas Magana 38 y o  male MRN: [id number]  Unit/Bed#: ICU 06 Encounter: [id number]        ----------------------------------------------------------------------------------------  HPI/24hr events:   64-year-old male with no past medical history who presented on 03/06/2021 after a presumed fall downstairs  Patient was found to have central cord syndrome  He is status post C3-C5 laminectomy and C2-C7 fixation/fusion  Billingsley was placed for urinary tension overnight  Ketamine drip was started yesterday, worked well initially but not anymore per patient      ---------------------------------------------------------------------------------------  SUBJECTIVE  Complains 12/10 back pain, no appetite, have loose bowel movement    Review of Systems   Constitutional: Positive for appetite change  HENT: Negative  Eyes: Negative  Respiratory: Negative  Cardiovascular: Negative  Gastrointestinal: Negative  Genitourinary: Positive for difficulty urinating  Musculoskeletal: Positive for neck pain  All other systems reviewed and are negative  Review of systems was reviewed and negative unless stated above in HPI/24-hour events   ---------------------------------------------------------------------------------------  Assessment and Plan:    Neuro:  1  Central cord syndrome, pod 3 s/p C3-C5 laminectomy and C2-C7 fusion  - Q2 hour neurovascular checks  - maintain map greater than 85 for cord perfusion for total of 7 days  - neurosurgery following     2   Acute pain secondary to trauma  - Tylenol 975 mg q 8 hours  - gabapentin 300 mg t i d   - Robaxin 750 mg q 6 hours  - Dilaudid 1 mg q 3 hours p r n   - oxycodone 10 mg and 15 mg q 3 hours p r n   - ketamine drip 0 1mg/kg/h  - Will add lidocaine patch, consider starting valium     CV:   - maintain map greater than 85 for cord perfusion  - Levophed titrated, currently at 4  - PICC placed 3/8     Lung:   - incentive spirometry  - maintain SpO2 greater than 92%     GI:   - On bowel regimen:  Senokot b i d , daily MiraLax, Dulcolax p r n  Consider decreasing bowel resimen  - bowel training     FEN:   F:  None  E:  Replete as needed  N:  Regular diet     :   - Jimenez in place  - monitor I/Os  - Consider removing jimenez for bladder training      ID:   - monitor for fever, leukocytosis     Heme:   - DVT prophylaxis:  Lovenox, SCDs     Endo:   - maintain euglycemia, goal blood sugar 120-180                Msk/Skin:   - frequent repositioning, monitor for skin breakdown  - PT OT  - multipodus boot    Disposition: Continue Critical Care   Code Status: Level 1 - Full Code  ---------------------------------------------------------------------------------------  ICU CORE MEASURES    Prophylaxis   VTE Pharmacologic Prophylaxis: Enoxaparin (Lovenox)  VTE Mechanical Prophylaxis: sequential compression device  Stress Ulcer Prophylaxis: Prophylaxis Not Indicated     ABCDE Protocol (if indicated)  Plan to perform spontaneous awakening trial today? Not applicable  Plan to perform spontaneous breathing trial today? Not applicable  Obvious barriers to extubation? Not applicable  CAM-ICU: Negative    Invasive Devices Review  Invasive Devices     Peripherally Inserted Central Catheter Line            PICC Line 83/75/55 Right Basilic 1 day          Peripheral Intravenous Line            Peripheral IV 03/07/21 Distal;Right;Upper;Ventral (anterior) Arm 3 days          Arterial Line            Arterial Line 03/07/21 Radial 3 days          Drain            Closed/Suction Drain Posterior;Right Neck Bulb 3 days    Urethral Catheter Temperature probe 16 Fr  less than 1 day              Can any invasive devices be discontinued today?  Not applicable  ---------------------------------------------------------------------------------------  OBJECTIVE    Vitals   Vitals:    03/10/21 0000 03/10/21 0300 03/10/21 0400 03/10/21 0407   BP:       BP Location: Pulse: 74 82 84 82   Resp: 14 15 15 14   Temp:   100 °F (37 8 °C)    TempSrc:       SpO2: 97% 97% 98% 98%   Weight:       Height:         Temp (24hrs), Av 5 °F (37 5 °C), Min:99 °F (37 2 °C), Max:100 °F (37 8 °C)  Current: Temperature: 100 °F (37 8 °C)  HR: 76  BP: 100/85  RR: 14  SpO2: 97%    Respiratory:  SpO2: SpO2: 98 %       Invasive/non-invasive ventilation settings   Respiratory    Lab Data (Last 4 hours)    None         O2/Vent Data (Last 4 hours)    None                Physical Exam  Vitals signs and nursing note reviewed  Constitutional:       Appearance: He is normal weight  HENT:      Head: Normocephalic and atraumatic  Right Ear: External ear normal       Left Ear: External ear normal       Nose: Nose normal       Mouth/Throat:      Mouth: Mucous membranes are moist       Pharynx: Oropharynx is clear  Eyes:      Extraocular Movements: Extraocular movements intact  Conjunctiva/sclera: Conjunctivae normal       Pupils: Pupils are equal, round, and reactive to light  Neck:      Comments: In collar  Cardiovascular:      Rate and Rhythm: Normal rate and regular rhythm  Pulses: Normal pulses  Heart sounds: Normal heart sounds  Pulmonary:      Effort: Pulmonary effort is normal       Breath sounds: Normal breath sounds  Abdominal:      General: Abdomen is flat  Bowel sounds are normal       Palpations: Abdomen is soft  Musculoskeletal:      Comments: Shoulder shrug bilaterally, bilateral biceps movement 2/5  No motor function in bilateral LE  Sensation + on bilateral UE, and bilateral LE bellow knee   Skin:     General: Skin is warm  Capillary Refill: Capillary refill takes less than 2 seconds  Neurological:      General: No focal deficit present  Mental Status: He is alert and oriented to person, place, and time  Mental status is at baseline     Psychiatric:         Mood and Affect: Mood normal          Behavior: Behavior normal          Thought Content: Thought content normal          Judgment: Judgment normal          Laboratory and Diagnostics:  Results from last 7 days   Lab Units 03/09/21 0436 03/08/21 0446 03/07/21 0402 03/06/21 2116 03/06/21 2112   WBC Thousand/uL 20 50* 21 03* 17 46* 12 27*  --    HEMOGLOBIN g/dL 11 5* 11 3* 12 5 11 7*  --    I STAT HEMOGLOBIN g/dl  --   --   --   --  12 6   HEMATOCRIT % 34 9* 34 5* 37 9 35 4*  --    HEMATOCRIT, ISTAT %  --   --   --   --  37   PLATELETS Thousands/uL 312 316 381 326  --    NEUTROS PCT % 77* 66 89* 57  --    MONOS PCT % 9 11 2* 9  --      Results from last 7 days   Lab Units 03/09/21 0434 03/08/21 0446 03/07/21 0402 03/06/21 2116 03/06/21 2112   SODIUM mmol/L 138 142 142 145  --    POTASSIUM mmol/L 3 8 3 4* 4 2 3 9  --    CHLORIDE mmol/L 105 109* 111* 113*  --    CO2 mmol/L 30 29 27 26  --    CO2, I-STAT mmol/L  --   --   --   --  26   ANION GAP mmol/L 3* 4 4 6  --    BUN mg/dL 8 6 8 10  --    CREATININE mg/dL 0 83 0 95 0 89 1 07  --    CALCIUM mg/dL 8 9 8 4 8 7 8 5  --    GLUCOSE RANDOM mg/dL 116 111 152* 91  --      Results from last 7 days   Lab Units 03/09/21 0434 03/07/21  0402   MAGNESIUM mg/dL 1 8 2 0   PHOSPHORUS mg/dL  --  3 6      Results from last 7 days   Lab Units 03/06/21 2116   INR  1 08              ABG:  Results from last 7 days   Lab Units 03/07/21  1014   PH ART  7 441   PCO2 ART mm Hg 36 7   PO2 ART mm Hg 162 7*   HCO3 ART mmol/L 24 4   BASE EXC ART mmol/L 0 6   ABG SOURCE  Line, Arterial     VBG:  Results from last 7 days   Lab Units 03/07/21  1014   ABG SOURCE  Line, Arterial           Micro        EKG: NSR      Intake and Output  I/O       03/08 0701 - 03/09 0700 03/09 0701 - 03/10 0700    P  O  250 710    I V  (mL/kg) 444 6 (6 3) 477 5 (6 7)    IV Piggyback  50    Total Intake(mL/kg) 694 6 (9 8) 1237 5 (17 4)    Urine (mL/kg/hr) 1625 (1) 1725 (1)    Drains 210 90    Total Output 1835 1815    Net -1140 4 -577 5              UOP: 150 ml/hr     Height and Weights   Height: 5' 11" (180 3 cm)  IBW: 75 3 kg  Body mass index is 21 83 kg/m²  Weight (last 2 days)     Date/Time   Weight    03/09/21 0531   71 (156 53)    03/08/21 0600   66 7 (147 05)                Nutrition       Diet Orders   (From admission, onward)             Start     Ordered    03/09/21 1633  Dietary nutrition supplements  Once     Question Answer Comment   Select Supplement: [employer], Dinner        03/09/21 1633    03/08/21 1431  Diet Regular; Regular House  Diet effective now     Question Answer Comment   Diet Type Regular    Regular Regular House    RD to adjust diet per protocol?  Yes        03/08/21 1431    03/08/21 1430  Dietary nutrition supplements  Once     Question Answer Comment   Select Supplement: Ensure Enlive-Strawberry    Frequency Breakfast, Lunch, Dinner        03/08/21 1429                  Active Medications  Scheduled Meds:  Current Facility-Administered Medications   Medication Dose Route Frequency Provider Last Rate    acetaminophen  975 mg Oral [facility] Sandra Haile PA-C      bisacodyl  10 mg Rectal Daily Sandra Haile PA-C      enoxaparin  30 mg Subcutaneous Q12H [address] [place], [place]      gabapentin  300 mg Oral TID Sandra Haile PA-C      HYDROmorphone  1 mg Intravenous Q3H PRN Sandra Haile PA-C      ketamine  0 1 mg/kg/hr Intravenous Continuous PiedadMICHAEL Dunlap-BETH 0 1 mg/kg/hr (03/09/21 1119)    melatonin  3 mg Oral HS JAYLYN Carreno      methocarbamol  750 mg Oral Q6H Ravi Brown MD      norepinephrine  1-30 mcg/min Intravenous Titrated Leandra Noyola PA-C 4 mcg/min (03/10/21 0406)    ondansetron  4 mg Intravenous Q6H PRN Tameka Shipman MD      oxyCODONE  10 mg Oral Q3H PRN Sandra Haile PA-C      Or    oxyCODONE  15 mg Oral Q3H PRN Sandra Haile PA-C      polyethylene glycol  17 g Oral Daily JAYLYN Beverly      senna-docusate sodium  2 tablet Oral BID Sandra Haile PA-C       Continuous Infusions:  ketamine, 0 1 mg/kg/hr, Last Rate: 0 1 mg/kg/hr (03/09/21 1119)  norepinephrine, 1-30 mcg/min, Last Rate: 4 mcg/min (03/10/21 2386)      PRN Meds:   HYDROmorphone, 1 mg, Q3H PRN  ondansetron, 4 mg, Q6H PRN  oxyCODONE, 10 mg, Q3H PRN    Or  oxyCODONE, 15 mg, Q3H PRN        Allergies   No Known Allergies  ---------------------------------------------------------------------------------------  Advance Directive and Living Will:      Power of :    POLST:    ---------------------------------------------------------------------------------------  Care Time Delivered:   No Critical Care time spent     Milagros Sifuentes MD      Portions of the record may have been created with voice recognition software  Occasional wrong word or "sound a like" substitutions may have occurred due to the inherent limitations of voice recognition software    Read the chart carefully and recognize, using context, where substitutions have occurred

## 2021-03-10 NOTE — ASSESSMENT & PLAN NOTE
POD3 PCDF C2-7 with C3-5 laminectomy with Dr Julio Cesar Muniz on 3/7/2021  · LAWRENCE B cord injury s/p fall down stairs with C5 spinous process and lamina fractures  · On exam minimal movement bilateral UE 1/4 forearms, otherwise 0/5 throughout  Patchy sensory level bilaterally  JPS intact  Imaging:  · Cervical spine MRI 3/7/2021: Increased T2 cord signal/edema involving stenotic levels C3 to inferior C5 most consistent with central cord syndrome in the setting of trauma  2   Nondisplaced fracture involving C5 spinous process and right lamina  Bone marrow edema within the spinous processes of C3-C5  3   Edema within posterior paravertebral musculature and increased T2 signal along the interspinous ligaments at levels C3-C6 suggestive of ligamentous injury  Anterior and posterior longitudinal ligaments appear intact  4   Degenerative canal stenosis more notable at level C3-4, as detailed  Plan:  · Frequent neuro checks  · MAP goal > 85 mmHg x 7 days, currently on Levophed  · VISTA collar at all times, babak collar for showering  · Post-op x-rays pending  · Bruce drain 100mL/24 hours - will maintain  · PMR evaluation - needs dedicated SCI rehab program  Discussed further with patient  · PT/OT   · DVT ppx: SCDs, Lovenox  Neurosurgery will continue to follow  Please call with any questions or concerns

## 2021-03-10 NOTE — RESTORATIVE TECHNICIAN NOTE
Restorative Specialist Mobility Note       Pt tilted in Kreg bed for a total of 40 mins, maximum tilt achived was 61 degrees  Brian Ser assisted RN aware

## 2021-03-10 NOTE — CONSULTS
Consult Note- Acute Pain Service   Shefali Herrera 38 y o  male MRN: [id number]  Unit/Bed#: ICU 06 Encounter: [id number]               Assessment/Plan     Assessment:   Patient Active Problem List   Diagnosis    Central cord syndrome [facility])    Pulmonary insufficiency    Closed fracture of fifth cervical vertebra (HCC)    Spinal cord injury, C1-C7 ([facility] [address] )    Fall    Pain      Shefali Herrera is a 38 y o  male  Status post fall down stairs resulting in central cord syndrome and quadriplegia  Underwent C3 through C5 laminectomy and C2 through C7 fixation/fusion  Patient has had significant neck pain, placed on ketamine infusion   3/9/21with good results until  3/10/21 when pain began to worsen again  Plan:    Continue Tylenol 975 mg p o  q 8 hours scheduled   Discontinue oral oxycodone   Start Dilaudid 4 mg p o  q 4 hours p r n  moderate pain   Start Dilaudid 6 mg p o  q 4 hours p r n  severe pain   Continue Dilaudid 1 mg IV q 3 hours p r n  breakthrough pain   Increase ketamine infusion to 0 2 mg/kg /HR   Continue Valium 5 mg p o  q 6 hours scheduled   Continue gabapentin 300 mg p o  t i d  scheduled   Continue Robaxin 750 mg p o  q 6 hours scheduled   Continue lidocaine patch for 12 hours daily   Continue bowel regimen to avoid opioid induced constipation  APS will continue to follow  Please call  / [phone] or [facility] (/ between [phone] and on weekends) with questions or concerns    History of Present Illness    Admit Date:  3/6/2021  Hospital Day:  4 days  Primary Service:  Critical Care/ICU  Attending Provider:  Bj Webster DO  Reason for Consult / Principal Problem:   Neck pain  HPI: Shefali Herrera is a 38 y o  male who presents with  Fall down stairs resulting in central cord syndrome and  Quadriplegia  Underwent cervical decompression and fusion    Continues to have significant neck pain not well controlled on oral oxycodone  Started on ketamine infusion yesterday at 0 1 mg/ kg / HR and had good relief but states that throughout the day today his pain has continued to worsen again  Describes the pain as midline neck radiating to the shoulders bilaterally  States it is a burning, sharp, tight sensation at 10/10 currently  Patient denies current tobacco use or alcohol use  Patient also denies any use of drugs or marijuana in the past or present  Review of patient's PDMP reveals a prescription for Suboxone in September of 2019 written by physician at a drug and alcohol rehab center  Current pain location(s):   Midline neck  Pain Scale:    10/10  Quality:  Sharp, burning,  tight  Current Analgesic regimen:    Tylenol 975 mg p o  q 8 hours scheduled  Oxycodone 10 mg p o  q 4 hours p r n  moderate pain  oxycodone 15 mg p o  q 4 hours p r n  severe pain  Dilaudid 1 mg IV q 3 hours p r n  breakthrough pain  Ketamine infusion at 0 1 mg/kg / HR  Valium 5 mg p o  q 6 hours scheduled  Gabapentin 300 mg p o  t i d  scheduled  Robaxin 750 mg p o  q 6 hours scheduled  Lidocaine patch for up to 12 hours daily  Pain History: No history of chronic pain  Pain Management Provider:   No outpatient pain management provider    I have reviewed the patient's controlled substance dispensing history in the Prescription Drug Monitoring Program in compliance with the [facility] regulations before prescribing any controlled substances       Past 1 year review of PDMP:  Fill Date ID   Written Drug Qty Days Prescriber Rx # Pharmacy Refill   Daily Dose* Pymt Type      11/15/2020  2   10/26/2020  Clonazepam 0 5 MG Tablet  60 00  30 Ma Buc   [id number]   Pen (0832)   [place]   10/12/2020  2   10/12/2020  Oxycodone Hcl 5 MG Tablet  6 00  2 Gi Randy   [id number]   Pen (2066)   0  22 50 MME  [place]   10/05/2020  3   10/04/2020  Tramadol Hcl 50 MG Tablet  12 00  3 Ri Randy   [id number]   Pen (8066)   0  20 00 [place]   10/01/2020  2   10/01/2020  Diazepam 5 MG Tablet  15 00  5 Mo Gar   [id number]   Pen (5729)   [place]   08/15/2020  2   07/06/2020  Clonazepam 1 MG Tablet  60 00  30 Ma Buc   [id number]   Pen (8490)   1   [place]   07/06/2020  2   07/06/2020  Clonazepam 1 MG Tablet  60 00  30 Ma Buc   [id number]   Pen (3484)   [place]   05/13/2020  2   04/09/2020  Clonazepam 1 MG Tablet  90 00  30 Je Britany   [id number]   Pen (6818)   [place]   04/16/2020  2   02/27/2020  Clonazepam 1 MG Tablet  90 00  30 Je Britany   [id number]   Pen (8082)   1   [place]   03/20/2020  2   02/27/2020  Clonazepam 1 MG Tablet  90 00  30 Je Britany   [id number]   Pen (1722)   [place]         Consults    Review of Systems   Musculoskeletal: Positive for myalgias and neck pain  All other systems reviewed and are negative  Historical Information   No past medical history on file  Past Surgical History:   Procedure Laterality Date    CERVICAL FUSION N/A 3/6/2021    Procedure: POSTERIOR C3-5 laminectomy, C2-7 fixation fusion, possible additional levels;  Surgeon: Axel Eaton MD;  [facility];  Service: Neurosurgery    IR PICC LINE PLACEMENT DOUBLE LUMEN  3/8/2021     Social History   Social History     Substance and Sexual Activity   Alcohol Use Not on file     Social History     Substance and Sexual Activity   Drug Use Not on file     Social History     Tobacco Use   Smoking Status Not on file     Family History: No family history on file      Meds/Allergies   all current active meds have been reviewed, current meds:   Current Facility-Administered Medications   Medication Dose Route Frequency    acetaminophen (TYLENOL) tablet 975 mg  975 mg Oral Q8H [facility]    diazepam (VALIUM) tablet 5 mg  5 mg Oral Q6H    enoxaparin (LOVENOX) subcutaneous injection 30 mg  30 mg Subcutaneous Q12H [facility]    gabapentin (NEURONTIN) capsule 300 mg  300 mg Oral TID    glycopyrrolate (ROBINUL) injection 0 2 mg  0 2 mg Intravenous Q4H PRN    haloperidol lactate (HALDOL) injection 2 mg  2 mg Intramuscular Q30 Min PRN    HYDROmorphone (DILAUDID) injection 1 mg  1 mg Intravenous Q3H PRN    HYDROmorphone (DILAUDID) tablet 4 mg  4 mg Oral Q4H PRN    Or    HYDROmorphone (DILAUDID) tablet 6 mg  6 mg Oral Q4H PRN    ketamine 250 mg (STANDARD CONCENTRATION) IV in sodium chloride 0 9% 250 mL  0 2 mg/kg/hr Intravenous Continuous    lidocaine (LIDODERM) 5 % patch 1 patch  1 patch Topical Daily    LORazepam (ATIVAN) injection 1 mg  1 mg Intravenous Q1H PRN    melatonin tablet 3 mg  3 mg Oral HS    methocarbamol (ROBAXIN) tablet 750 mg  750 mg Oral Q6H [address]    norepinephrine (LEVOPHED) 4 mg (STANDARD CONCENTRATION) IV in sodium chloride 0 9% 250 mL  1-30 mcg/min Intravenous Titrated    ondansetron (ZOFRAN) injection 4 mg  4 mg Intravenous Q6H PRN    polyethylene glycol (MIRALAX) packet 17 g  17 g Oral Daily    and PTA meds:   None       No Known Allergies    Objective   Temp:  [99 °F (37 2 °C)-100 °F (37 8 °C)] 99 2 °F (37 3 °C)  HR:  [] 94  Resp:  [13-32] 13  BP: (150)/(85) 150/85  Arterial Line BP: ()/(62-90) 132/66    Intake/Output Summary (Last 24 hours) at 3/10/2021 1415  Last data filed at 3/10/2021 1300  Gross per 24 hour   Intake 1144 56 ml   Output 1875 ml   Net -730 44 ml       Physical Exam  Vitals signs and nursing note reviewed  Constitutional:       General: He is awake  He is not in acute distress  Appearance: Normal appearance  He is not ill-appearing, toxic-appearing or diaphoretic  Eyes:      Conjunctiva/sclera: Conjunctivae normal       Pupils: Pupils are equal, round, and reactive to light  Cardiovascular:      Rate and Rhythm: Normal rate and regular rhythm  Skin:     General: Skin is warm and dry  Neurological:      Mental Status: He is alert and oriented to person, place, and time  GCS: GCS eye subscore is 4  GCS verbal subscore is 5  GCS motor subscore is 6     Psychiatric: Behavior: Behavior is cooperative  Lab Results:   I have personally reviewed pertinent labs  , CBC:   Lab Results   Component Value Date    WBC 17 55 (H) 03/10/2021    HGB 11 3 (L) 03/10/2021    HCT 34 1 (L) 03/10/2021    MCV 94 03/10/2021     03/10/2021    MCH 31 1 03/10/2021    MCHC 33 1 03/10/2021    RDW 12 0 03/10/2021    MPV 9 4 03/10/2021    NRBC 0 03/10/2021   , CMP:   Lab Results   Component Value Date    SODIUM 138 03/10/2021    K 3 6 03/10/2021     03/10/2021    CO2 29 03/10/2021    BUN 9 03/10/2021    CREATININE 0 92 03/10/2021    CALCIUM 8 7 03/10/2021    EGFR 122 03/10/2021   , BMP:  Lab Results   Component Value Date    SODIUM 138 03/10/2021    K 3 6 03/10/2021     03/10/2021    CO2 29 03/10/2021    BUN 9 03/10/2021    CREATININE 0 92 03/10/2021    GLUC 92 03/10/2021    CALCIUM 8 7 03/10/2021    AGAP 4 03/10/2021    EGFR 122 03/10/2021   , PT/PTT:No results found for: PT, PTT    Imaging Studies: I have personally reviewed pertinent reports  EKG, Pathology, and Other Studies: I have personally reviewed pertinent reports  Counseling / Coordination of Care  Total floor / unit time spent today Level 5 = 110 minutes  Greater than 50% of total time was spent with the patient and / or family counseling and / or coordination of care  A description of the counseling / coordination of care:  Patient interview, physical examination, review of PDMP, review of medical record, review of imaging and laboratory data, development of pain management plan, discussion of pain management plan with patient and primary service  Please note that the APS provides consultative services regarding pain management only  With the exception of ketamine and epidural infusions and except when indicated, final decisions regarding starting or changing doses of analgesic medications are at the discretion of the consulting service    Off hours consultation and/or medication management is generally not available      Regino Recinos PA-C  Acute Pain Service

## 2021-03-10 NOTE — RESTORATIVE TECHNICIAN NOTE
Restorative Specialist Mobility Note       Activity: (unable to Kreg tilt patient at this time as he states he needs to be medicated; pt's nurse not available)

## 2021-03-10 NOTE — PROGRESS NOTES
[address] [facility]  Progress Note Chyrel Lo 1985, 38 y o  male MRN: [id number]  Unit/Bed#: ICU 06 Encounter: [id number]  Primary Care Provider: Reva Luz MD   Date and time admitted to hospital: 3/6/2021  9:02 PM    * Central cord syndrome [facility])  Assessment & Plan  POD3 PCDF C2-7 with C3-5 laminectomy with Dr Rodney [place]burg on 3/7/2021  · LAWRENCE B cord injury s/p fall down stairs with C5 spinous process and lamina fractures  · On exam minimal movement bilateral UE 1/4 forearms, otherwise 0/5 throughout  Patchy sensory level bilaterally  JPS intact  Imaging:  · Cervical spine MRI 3/7/2021: Increased T2 cord signal/edema involving stenotic levels C3 to inferior C5 most consistent with central cord syndrome in the setting of trauma  2   Nondisplaced fracture involving C5 spinous process and right lamina  Bone marrow edema within the spinous processes of C3-C5  3   Edema within posterior paravertebral musculature and increased T2 signal along the interspinous ligaments at levels C3-C6 suggestive of ligamentous injury  Anterior and posterior longitudinal ligaments appear intact  4   Degenerative canal stenosis more notable at level C3-4, as detailed  Plan:  · Frequent neuro checks  · MAP goal > 85 mmHg x 7 days, currently on Levophed  · VISTA collar at all times, babak collar for showering  · Post-op x-rays pending  · Bruce drain 100mL/24 hours - will maintain  · PMR evaluation - needs dedicated SCI rehab program  Discussed further with patient  · PT/OT   · DVT ppx: SCDs, Lovenox  Neurosurgery will continue to follow  Please call with any questions or concerns  Subjective/Objective   Chief Complaint: I still have pain/postopertaive follow-up    Subjective: Patient continues with postoperative neck pain into the scapular region  He was started on ketamine and original felt some effect but states it is no longer helping   Received IV Dilaudid and rating pain 9/10  He admits to [facility] and feeling when being cleaned  Billingsley placed today for urinary retention and he did not have much sensation during placement but "felt it in his bladder/on the inside "  Complaining of LLQ pain since yesterday  Objective: lying in bed/NAD    I/O       03/08 0701 - 03/09 0700 03/09 0701 - 03/10 0700 03/10 0701 - 03/11 0700    P  O  250 710 460    I V  (mL/kg) 444 6 (6 3) 507 9 (7 2) 126 9 (1 8)    IV Piggyback  50     Total Intake(mL/kg) 694 6 (9 8) 1267 9 (17 9) 586 9 (8 3)    Urine (mL/kg/hr) 1625 (1) 1900 (1 1) 375 (0 8)    Drains 210 100     Total Output 1835 2000 375    Net -1140 4 -732 1 +211 9                 Invasive Devices     Peripherally Inserted Central Catheter Line            PICC Line 04/12/09 Right Basilic 1 day          Peripheral Intravenous Line            Peripheral IV 03/07/21 Distal;Right;Upper;Ventral (anterior) Arm 3 days          Arterial Line            Arterial Line 03/07/21 Radial 3 days          Drain            Closed/Suction Drain Posterior;Right Neck Bulb 3 days    Urethral Catheter Temperature probe 16 Fr  less than 1 day                Physical Exam:  Vitals: Blood pressure 150/85, pulse 94, temperature 99 2 °F (37 3 °C), temperature source Oral, resp  rate 13, height 5' 11" (1 803 m), weight 71 kg (156 lb 8 4 oz), SpO2 97 %  ,Body mass index is 21 83 kg/m²  Hemodynamic Monitoring: MAP: Arterial Line MAP (mmHg): 86 mmHg    General appearance: alert, appears stated age, cooperative and no distress  Head: Normocephalic, without obvious abnormality, atraumatic  Eyes: conjugate gaze and tracks appropriately in room  Neck: collar in place  Drain in place  Lungs: non labored breathing  Heart: regular heart rate  Neurologic:   Mental status: Alert, oriented, thought content appropriate  Cranial nerves: grossly intact (Cranial nerves II-XII)  Sensory: pin and needle to LT diffuse left hand and right tricep, lateral forearm and diffuse hand   States some sensation to LT throughout arms bilaterally and upper chest  Limited sensation in abd below T4/5  States some sensation to light touch diffuse legs L>R with pins and needles bilateral feet  Able to localize touch on legs  Motor: quadraparesis  Right bicep 3-/5, left bicep 2/5  0/5 bilateral tricep and poor tone in b/l wrist  Bilateral quad 3-/5, left PF 2/5  Reflexes: no carson or clonus b/l   No JPS in fingers or wrist      Lab Results:  Results from last 7 days   Lab Units 03/10/21  0640 03/09/21 0436 03/08/21 0446   WBC Thousand/uL 17 55* 20 50* 21 03*   HEMOGLOBIN g/dL 11 3* 11 5* 11 3*   HEMATOCRIT % 34 1* 34 9* 34 5*   PLATELETS Thousands/uL 303 312 316   NEUTROS PCT % 60 77* 66   MONOS PCT % 12 9 11     Results from last 7 days   Lab Units 03/10/21  0640 03/09/21  0434 03/08/21 0446  03/06/21 2112   POTASSIUM mmol/L 3 6 3 8 3 4*   < >  --    CHLORIDE mmol/L 105 105 109*   < >  --    CO2 mmol/L 29 30 29   < >  --    CO2, I-STAT mmol/L  --   --   --   --  26   BUN mg/dL 9 8 6   < >  --    CREATININE mg/dL 0 92 0 83 0 95   < >  --    CALCIUM mg/dL 8 7 8 9 8 4   < >  --    GLUCOSE, ISTAT mg/dl  --   --   --   --  87    < > = values in this interval not displayed  Results from last 7 days   Lab Units 03/10/21  0640 03/09/21  0434 03/07/21  0402   MAGNESIUM mg/dL 1 8 1 8 2 0     Results from last 7 days   Lab Units 03/10/21  0640 03/07/21  0402   PHOSPHORUS mg/dL 3 4 3 6     Results from last 7 days   Lab Units 03/06/21 2116   INR  1 08     No results found for: TROPONINT  ABG:  Lab Results   Component Value Date    PHART 7 441 03/07/2021    GTJ5DBK 36 7 03/07/2021    PO2ART 162 7 (H) 03/07/2021    WLI8ILE 24 4 03/07/2021    BEART 0 6 03/07/2021    SOURCE Line, Arterial 03/07/2021       Imaging Studies: I have personally reviewed pertinent reports     and I have personally reviewed pertinent films in PACS    Ct Chest Abdomen Pelvis W Wo Contrast    Result Date: 3/6/2021  Impression: No acute traumatic injury identified  I personally discussed this study with Carlos Evans on 3/6/2021 at 9:53 PM  Workstation performed: [id number]     Xr Spine Cervical 2 Or 3 Vw Injury    Result Date: 3/7/2021  Impression: Fluoroscopic guidance provided for procedure guidance  Please refer to the separate procedure notes for additional details  Workstation performed: [id number]     Ct Head Without Contrast    Result Date: 3/6/2021  Impression: No acute intracranial abnormality  I personally discussed this study with Carlos Evans on 3/6/2021 at 9:53 PM  Workstation performed: [id number]     Ct Spine Cervical Without Contrast    Addendum Date: 3/6/2021    ADDENDUM: There is a nondisplaced fracture through the spinous process and right lamina of C5  I personally discussed this study with Carlos Evans on 3/6/2021 at 10:14 PM      Result Date: 3/6/2021  Impression: No cervical spine fracture or traumatic malalignment  Central disc protrusions at C2-C3, C3-C4, and C4-C5  Unfortunately, this is incompletely evaluated  Given the reported neurological symptoms in this trauma patient, recommend MRI  I personally discussed this study with Carlos Evans on 3/6/2021 at 9:53 PM  Workstation performed: [id number]     Mri Cervical Spine Wo Contrast    Result Date: 3/7/2021  Impression: 1  Increased T2 cord signal/edema involving stenotic levels C3 to inferior C5 most consistent with central cord syndrome in the setting of trauma  2   Nondisplaced fracture involving C5 spinous process and right lamina  Bone marrow edema within the spinous processes of C3-C5  3   Edema within posterior paravertebral musculature and increased T2 signal along the interspinous ligaments at levels C3-C6 suggestive of ligamentous injury  Anterior and posterior longitudinal ligaments appear intact  4   Degenerative canal stenosis more notable at level C3-4, as detailed    I personally discussed this study with Silvia Tran on 3/7/2021 at 7:35 AM  Workstation performed: [id number]     Mri Thoracic Spine Wo Contrast    Result Date: 3/7/2021  Impression: No acute finding  Unremarkable MRI of the thoracic spine  Workstation performed: [id number]     Mri Lumbar Spine Wo Contrast    Result Date: 3/7/2021  Impression: No acute finding  Unremarkable MRI of the lumbar spine  Workstation performed: [id number]     Xr Chest 1 View    Result Date: 3/10/2021  Impression: No acute cardiopulmonary disease within limitations of supine imaging  Workstation performed: [id number]     X-ray Chest 1 View    Result Date: 3/7/2021  Impression: The tip of the endotracheal tube projects 2 4 cm above the odalys  Clear lungs  Workstation performed: [id number]     Xr Trauma Multiple    Result Date: 3/7/2021  Impression: No acute cardiopulmonary disease within limitations of supine imaging  Workstation performed: [id number]     Ir Picc Placement Double Lumen    Result Date: 3/9/2021  Impression: Ultrasound and fluoroscopically guided placement of a power injectable dual lumen peripherally inserted central venous catheter via the right basilic vein with its tip at the cavoatrial junction under ultrasound and fluoroscopic guidance  PLAN: Patient is at higher than usual risk of thrombosis of his PICC access site due to immobility  When patient is able to remove the cervical collar and position his neck for internal jugular access recommend return to IR for placement of tunneled jugular central venous access and removal of PICC Workstation performed: [id number]       EKG, Pathology, and Other Studies: I have personally reviewed pertinent reports        VTE Pharmacologic Prophylaxis: Enoxaparin (Lovenox)    VTE Mechanical Prophylaxis: sequential compression device

## 2021-03-10 NOTE — RESPIRATORY THERAPY NOTE
RT Protocol Note  Fallon Pepe 38 y o  male MRN: [id number]  Unit/Bed#: ICU 06 Encounter: [id number]    Assessment    Principal Problem:    Central cord syndrome [facility])  Active Problems:    Pulmonary insufficiency      Home Pulmonary Medications:  none       No past medical history on file    Social History     Socioeconomic History    Marital status: Single     Spouse name: Not on file    Number of children: Not on file    Years of education: Not on file    Highest education level: Not on file   Occupational History    Not on file   Social Needs    Financial resource strain: Not on file    Food insecurity     Worry: Not on file     Inability: Not on file    Transportation needs     Medical: Not on file     Non-medical: Not on file   Tobacco Use    Smoking status: Not on file   Substance and Sexual Activity    Alcohol use: Not on file    Drug use: Not on file    Sexual activity: Not on file   Lifestyle    Physical activity     Days per week: Not on file     Minutes per session: Not on file    Stress: Not on file   Relationships    Social connections     Talks on phone: Not on file     Gets together: Not on file     Attends Hindu service: Not on file     Active member of club or organization: Not on file     Attends meetings of clubs or organizations: Not on file     Relationship status: Not on file    Intimate partner violence     Fear of current or ex partner: Not on file     Emotionally abused: Not on file     Physically abused: Not on file     Forced sexual activity: Not on file   Other Topics Concern    Not on file   Social History Narrative    Not on file       Subjective    Subjective Data: Intubated    Objective    Physical Exam:   Assessment Type: Assess only  General Appearance: Alert, Awake  Respiratory Pattern: Normal  Chest Assessment: Chest expansion symmetrical  Bilateral Breath Sounds: Diminished, Clear    Vitals:  Blood pressure 150/85, pulse 87, temperature 100 °F (37 8 °C), resp  rate 14, height 5' 11" (1 803 m), weight 71 kg (156 lb 8 4 oz), SpO2 98 %  Results from last 7 days   Lab Units 03/07/21  1014   PH ART  7 441   PCO2 ART mm Hg 36 7   PO2 ART mm Hg 162 7*   HCO3 ART mmol/L 24 4   BASE EXC ART mmol/L 0 6   O2 CONTENT ART mL/dL 17 8   O2 HGB, ARTERIAL % 98 5*   ABG SOURCE  Line, Arterial       Imaging and other studies: I have personally reviewed pertinent reports  O2 Device: ra     Plan    Respiratory Plan: Discontinue Protocol  Airway Clearance Plan: Discontinue Protocol     Resp Comments: Pt in no acut resp distress  Pt does well with IS, assistance needed  No further resp interventions indicated ACP will be D/C at this time

## 2021-03-10 NOTE — UTILIZATION REVIEW
Continued Stay Review    Date: 3/10/21                          Current Patient Class: inpatient  Current Level of Care: ICU  HPI:35 y o  male initially admitted on 3/6/21 after a presumed fall downstairs  Erick Francis was found to have central cord syndrome   He is status post C3-C5 laminectomy and C2-C7 fixation/fusion  Assessment/Plan:   POD#2: PCDF C2-7 with C3-5 laminectomy  Billingsley was placed for urinary tension overnight   Ketamine drip was started yesterday, worked well initially only, adding Lidoderm patch   Musculoskeletal:   Shoulder shrug bilaterally, bilateral biceps movement 2/5  No motor function in bilateral LE  Sensation + on bilateral UE, and bilateral LE bellow knee   Pertinent Labs/Diagnostic Results:   Results from last 7 days   Lab Units 03/10/21  0640 03/09/21  0436 03/08/21 0446 03/07/21 0402 03/06/21  2116   WBC Thousand/uL 17 55* 20 50* 21 03* 17 46* 12 27*   HEMOGLOBIN g/dL 11 3* 11 5* 11 3* 12 5 11 7*   HEMATOCRIT % 34 1* 34 9* 34 5* 37 9 35 4*   PLATELETS Thousands/uL 303 312 316 381 326   NEUTROS ABS Thousands/µL 10 38* 15 64* 13 91* 15 68* 7 04     Results from last 7 days   Lab Units 03/10/21  0640 03/09/21 0434 03/08/21 0446 03/07/21 0402 03/06/21 2116   SODIUM mmol/L 138 138 142 142 145   POTASSIUM mmol/L 3 6 3 8 3 4* 4 2 3 9   CHLORIDE mmol/L 105 105 109* 111* 113*   CO2 mmol/L 29 30 29 27 26   ANION GAP mmol/L 4 3* 4 4 6   BUN mg/dL 9 8 6 8 10   CREATININE mg/dL 0 92 0 83 0 95 0 89 1 07   EGFR ml/min/1 73sq m 122 130 118 126 102   CALCIUM mg/dL 8 7 8 9 8 4 8 7 8 5   MAGNESIUM mg/dL 1 8 1 8  --  2 0  --    PHOSPHORUS mg/dL 3 4  --   --  3 6  --      Results from last 7 days   Lab Units 03/10/21  0640 03/09/21  0434 03/08/21 0446 03/07/21 0402 03/06/21  2116   GLUCOSE RANDOM mg/dL 92 116 111 152* 91     Results from last 7 days   Lab Units 03/07/21  1014 03/07/21  0754 03/07/21  0417   PH ART  7 441 7 505* 7 291*   PCO2 ART mm Hg 36 7 29 8* 55 4*   PO2 ART mm Hg 162 7* 171 2* 123 9   HCO3 ART mmol/L 24 4 23 0 26 1   BASE EXC ART mmol/L 0 6 0 8 -1 3   O2 CONTENT ART mL/dL 17 8 18 5 18 1   O2 HGB, ARTERIAL % 98 5* 98 6* 96 6   ABG SOURCE  Line, Arterial Line, Arterial Line, Arterial     Results from last 7 days   Lab Units 03/06/21 2112   PH, PAOLO I-STAT  7 393   PCO2, PAOLO ISTAT mm HG 41 0*   PO2, PAOLO ISTAT mm HG 72 0*   HCO3, PAOLO ISTAT mmol/L 25 0   I STAT BASE EXC mmol/L 0   I STAT O2 SAT % 94*     Results from last 7 days   Lab Units 03/06/21 2116   PROTIME seconds 14 0   INR  1 08     Results from last 7 days   Lab Units 03/06/21 2116   ETHANOL LVL mg/dL <3   ACETAMINOPHEN LVL ug/mL <2*   SALICYLATE LVL mg/dL <3*       Vital Signs: /85 (BP Location: Left arm)   Pulse 102   Temp 99 2 °F (37 3 °C) (Oral)   Resp 21   Ht 5' 11" (1 803 m)   Wt 71 kg (156 lb 8 4 oz)   SpO2 98%   BMI 21 83 kg/m²     Medications:   acetaminophen, 975 mg, Oral, Q8H GILMAR  bisacodyl, 10 mg, Rectal, Daily  diazepam, 2 mg, Oral, Q6H  enoxaparin, 30 mg, Subcutaneous, Q12H GILMAR  gabapentin, 300 mg, Oral, TID  lidocaine, 1 patch, Topical, Daily  melatonin, 3 mg, Oral, HS  methocarbamol, 750 mg, Oral, Q6H GILMAR  polyethylene glycol, 17 g, Oral, Daily  senna-docusate sodium, 2 tablet, Oral, BID    Continuous IV Infusions:  ketamine, 0 1 mg/kg/hr, Intravenous, Continuous  norepinephrine, 1-30 mcg/min, Intravenous, Titrated    PRN Meds:  HYDROmorphone, 1 mg, Intravenous, Q3H PRN  ondansetron, 4 mg, Intravenous, Q6H PRN  oxyCODONE, 10 mg, Oral, Q3H PRN    Or  oxyCODONE, 15 mg, Oral, Q3H PRN    Discharge Plan: [facility]    Network Utilization Review Department  ATTENTION: Please call with any questions or concerns to [phone] and carefully listen to the prompts so that you are directed to the right person   All voicemails are confidential   Niraj Peguero all requests for admission clinical reviews, approved or denied determinations and any other requests to dedicated fax number below belonging to the campus where the patient is receiving treatment   List of dedicated fax numbers for the Facilities:  [address] [place] [address] DENIALS (Administrative/Medical Necessity) [phone]   [address] (Maternity/NICU/Pediatrics) [phone] [address] [place] [address] Dr Nelson Cuenca [phone] (  Henry Doshi "Giulia" 103) [phone] [address] [place] [address] Arian Burk 1481 [address] [place] [address]) [address] [place], [place] [address] [phone]

## 2021-03-11 ENCOUNTER — APPOINTMENT (INPATIENT)
Dept: RADIOLOGY | Facility: HOSPITAL | Age: 36
DRG: 023 | End: 2021-03-11
Payer: MEDICARE

## 2021-03-11 LAB
ALBUMIN SERPL BCP-MCNC: 2.8 G/DL (ref 3.5–5)
ALP SERPL-CCNC: 53 U/L (ref 46–116)
ALT SERPL W P-5'-P-CCNC: 11 U/L (ref 12–78)
ANION GAP SERPL CALCULATED.3IONS-SCNC: 4 MMOL/L (ref 4–13)
AST SERPL W P-5'-P-CCNC: 14 U/L (ref 5–45)
BASOPHILS # BLD AUTO: 0.07 THOUSANDS/ΜL (ref 0–0.1)
BASOPHILS NFR BLD AUTO: 0 % (ref 0–1)
BILIRUB SERPL-MCNC: 0.57 MG/DL (ref 0.2–1)
BUN SERPL-MCNC: 9 MG/DL (ref 5–25)
CA-I BLD-SCNC: 1.12 MMOL/L (ref 1.12–1.32)
CALCIUM ALBUM COR SERPL-MCNC: 9.8 MG/DL (ref 8.3–10.1)
CALCIUM SERPL-MCNC: 8.8 MG/DL (ref 8.3–10.1)
CHLORIDE SERPL-SCNC: 104 MMOL/L (ref 100–108)
CO2 SERPL-SCNC: 31 MMOL/L (ref 21–32)
CREAT SERPL-MCNC: 0.83 MG/DL (ref 0.6–1.3)
EOSINOPHIL # BLD AUTO: 0.23 THOUSAND/ΜL (ref 0–0.61)
EOSINOPHIL NFR BLD AUTO: 2 % (ref 0–6)
ERYTHROCYTE [DISTWIDTH] IN BLOOD BY AUTOMATED COUNT: 12.1 % (ref 11.6–15.1)
GFR SERPL CREATININE-BSD FRML MDRD: 130 ML/MIN/1.73SQ M
GLUCOSE SERPL-MCNC: 96 MG/DL (ref 65–140)
HCT VFR BLD AUTO: 32.6 % (ref 36.5–49.3)
HGB BLD-MCNC: 10.6 G/DL (ref 12–17)
IMM GRANULOCYTES # BLD AUTO: 0.08 THOUSAND/UL (ref 0–0.2)
IMM GRANULOCYTES NFR BLD AUTO: 1 % (ref 0–2)
LYMPHOCYTES # BLD AUTO: 3.85 THOUSANDS/ΜL (ref 0.6–4.47)
LYMPHOCYTES NFR BLD AUTO: 25 % (ref 14–44)
MAGNESIUM SERPL-MCNC: 1.8 MG/DL (ref 1.6–2.6)
MCH RBC QN AUTO: 30.5 PG (ref 26.8–34.3)
MCHC RBC AUTO-ENTMCNC: 32.5 G/DL (ref 31.4–37.4)
MCV RBC AUTO: 94 FL (ref 82–98)
MONOCYTES # BLD AUTO: 2.16 THOUSAND/ΜL (ref 0.17–1.22)
MONOCYTES NFR BLD AUTO: 14 % (ref 4–12)
NEUTROPHILS # BLD AUTO: 9.26 THOUSANDS/ΜL (ref 1.85–7.62)
NEUTS SEG NFR BLD AUTO: 58 % (ref 43–75)
NRBC BLD AUTO-RTO: 0 /100 WBCS
PLATELET # BLD AUTO: 323 THOUSANDS/UL (ref 149–390)
PMV BLD AUTO: 9.3 FL (ref 8.9–12.7)
POTASSIUM SERPL-SCNC: 3.7 MMOL/L (ref 3.5–5.3)
PROT SERPL-MCNC: 6.8 G/DL (ref 6.4–8.2)
RBC # BLD AUTO: 3.47 MILLION/UL (ref 3.88–5.62)
SODIUM SERPL-SCNC: 139 MMOL/L (ref 136–145)
WBC # BLD AUTO: 15.65 THOUSAND/UL (ref 4.31–10.16)

## 2021-03-11 PROCEDURE — 97535 SELF CARE MNGMENT TRAINING: CPT

## 2021-03-11 PROCEDURE — 97110 THERAPEUTIC EXERCISES: CPT

## 2021-03-11 PROCEDURE — 85025 COMPLETE CBC W/AUTO DIFF WBC: CPT | Performed by: NURSE PRACTITIONER

## 2021-03-11 PROCEDURE — 80053 COMPREHEN METABOLIC PANEL: CPT | Performed by: NURSE PRACTITIONER

## 2021-03-11 PROCEDURE — 99024 POSTOP FOLLOW-UP VISIT: CPT | Performed by: NURSE PRACTITIONER

## 2021-03-11 PROCEDURE — 99233 SBSQ HOSP IP/OBS HIGH 50: CPT | Performed by: SURGERY

## 2021-03-11 PROCEDURE — 97530 THERAPEUTIC ACTIVITIES: CPT

## 2021-03-11 PROCEDURE — 83735 ASSAY OF MAGNESIUM: CPT | Performed by: PHYSICIAN ASSISTANT

## 2021-03-11 PROCEDURE — 74018 RADEX ABDOMEN 1 VIEW: CPT

## 2021-03-11 PROCEDURE — 82330 ASSAY OF CALCIUM: CPT | Performed by: NURSE PRACTITIONER

## 2021-03-11 RX ORDER — MAGNESIUM SULFATE HEPTAHYDRATE 40 MG/ML
2 INJECTION, SOLUTION INTRAVENOUS ONCE
Status: COMPLETED | OUTPATIENT
Start: 2021-03-11 | End: 2021-03-11

## 2021-03-11 RX ORDER — AMOXICILLIN 250 MG
1 CAPSULE ORAL 2 TIMES DAILY
Status: DISCONTINUED | OUTPATIENT
Start: 2021-03-11 | End: 2021-03-16 | Stop reason: HOSPADM

## 2021-03-11 RX ORDER — BISACODYL 10 MG
10 SUPPOSITORY, RECTAL RECTAL DAILY PRN
Status: DISCONTINUED | OUTPATIENT
Start: 2021-03-11 | End: 2021-03-12

## 2021-03-11 RX ORDER — POTASSIUM CHLORIDE 20 MEQ/1
40 TABLET, EXTENDED RELEASE ORAL ONCE
Status: COMPLETED | OUTPATIENT
Start: 2021-03-11 | End: 2021-03-11

## 2021-03-11 RX ORDER — DEXTROSE, SODIUM CHLORIDE, AND POTASSIUM CHLORIDE 5; .45; .15 G/100ML; G/100ML; G/100ML
100 INJECTION INTRAVENOUS CONTINUOUS
Status: DISCONTINUED | OUTPATIENT
Start: 2021-03-11 | End: 2021-03-13

## 2021-03-11 RX ADMIN — SERTRALINE HYDROCHLORIDE 50 MG: 50 TABLET ORAL at 10:55

## 2021-03-11 RX ADMIN — DIAZEPAM 5 MG: 5 TABLET ORAL at 22:10

## 2021-03-11 RX ADMIN — MELATONIN TAB 3 MG 3 MG: 3 TAB at 22:10

## 2021-03-11 RX ADMIN — GABAPENTIN 300 MG: 300 CAPSULE ORAL at 09:26

## 2021-03-11 RX ADMIN — HYDROMORPHONE HYDROCHLORIDE 1 MG: 1 INJECTION, SOLUTION INTRAMUSCULAR; INTRAVENOUS; SUBCUTANEOUS at 17:55

## 2021-03-11 RX ADMIN — SENNOSIDES, DOCUSATE SODIUM 1 TABLET: 8.6; 5 TABLET ORAL at 10:54

## 2021-03-11 RX ADMIN — METHOCARBAMOL TABLETS 750 MG: 750 TABLET, COATED ORAL at 12:23

## 2021-03-11 RX ADMIN — GABAPENTIN 300 MG: 300 CAPSULE ORAL at 15:55

## 2021-03-11 RX ADMIN — DIAZEPAM 5 MG: 5 TABLET ORAL at 15:55

## 2021-03-11 RX ADMIN — LIDOCAINE 1 PATCH: 50 PATCH TOPICAL at 08:25

## 2021-03-11 RX ADMIN — HYDROMORPHONE HYDROCHLORIDE 6 MG: 2 TABLET ORAL at 10:54

## 2021-03-11 RX ADMIN — POLYETHYLENE GLYCOL 3350 17 G: 17 POWDER, FOR SOLUTION ORAL at 08:25

## 2021-03-11 RX ADMIN — BISACODYL 10 MG: 10 SUPPOSITORY RECTAL at 16:45

## 2021-03-11 RX ADMIN — DIAZEPAM 5 MG: 5 TABLET ORAL at 09:26

## 2021-03-11 RX ADMIN — HYDROMORPHONE HYDROCHLORIDE 1 MG: 1 INJECTION, SOLUTION INTRAMUSCULAR; INTRAVENOUS; SUBCUTANEOUS at 14:21

## 2021-03-11 RX ADMIN — KETAMINE HYDROCHLORIDE 0.2 MG/KG/HR: 50 INJECTION INTRAMUSCULAR; INTRAVENOUS at 14:21

## 2021-03-11 RX ADMIN — METHOCARBAMOL TABLETS 750 MG: 750 TABLET, COATED ORAL at 00:07

## 2021-03-11 RX ADMIN — ENOXAPARIN SODIUM 30 MG: 30 INJECTION SUBCUTANEOUS at 08:24

## 2021-03-11 RX ADMIN — MAGNESIUM SULFATE IN WATER 2 G: 40 INJECTION, SOLUTION INTRAVENOUS at 07:24

## 2021-03-11 RX ADMIN — DIAZEPAM 5 MG: 5 TABLET ORAL at 04:52

## 2021-03-11 RX ADMIN — SENNOSIDES, DOCUSATE SODIUM 1 TABLET: 8.6; 5 TABLET ORAL at 17:55

## 2021-03-11 RX ADMIN — ENOXAPARIN SODIUM 30 MG: 30 INJECTION SUBCUTANEOUS at 22:12

## 2021-03-11 RX ADMIN — HYDROMORPHONE HYDROCHLORIDE 1 MG: 1 INJECTION, SOLUTION INTRAMUSCULAR; INTRAVENOUS; SUBCUTANEOUS at 08:25

## 2021-03-11 RX ADMIN — METHOCARBAMOL TABLETS 750 MG: 750 TABLET, COATED ORAL at 05:48

## 2021-03-11 RX ADMIN — METHYLNALTREXONE BROMIDE 12 MG: 12 INJECTION, SOLUTION SUBCUTANEOUS at 12:23

## 2021-03-11 RX ADMIN — DEXTROSE, SODIUM CHLORIDE, AND POTASSIUM CHLORIDE 100 ML/HR: 5; .45; .15 INJECTION INTRAVENOUS at 16:42

## 2021-03-11 RX ADMIN — POTASSIUM CHLORIDE 40 MEQ: 1500 TABLET, EXTENDED RELEASE ORAL at 07:24

## 2021-03-11 RX ADMIN — GABAPENTIN 300 MG: 300 CAPSULE ORAL at 22:10

## 2021-03-11 RX ADMIN — METHOCARBAMOL TABLETS 750 MG: 750 TABLET, COATED ORAL at 23:56

## 2021-03-11 RX ADMIN — METHOCARBAMOL TABLETS 750 MG: 750 TABLET, COATED ORAL at 17:53

## 2021-03-11 RX ADMIN — HYDROMORPHONE HYDROCHLORIDE 6 MG: 2 TABLET ORAL at 05:48

## 2021-03-11 RX ADMIN — HYDROMORPHONE HYDROCHLORIDE 6 MG: 2 TABLET ORAL at 00:07

## 2021-03-11 RX ADMIN — NOREPINEPHRINE BITARTRATE 4 MCG/MIN: 1 INJECTION, SOLUTION, CONCENTRATE INTRAVENOUS at 05:47

## 2021-03-11 RX ADMIN — HYDROMORPHONE HYDROCHLORIDE 6 MG: 2 TABLET ORAL at 15:55

## 2021-03-11 NOTE — OCCUPATIONAL THERAPY NOTE
OccupationalTherapy Progress Note     Patient Name: Gretchen Rivera  [id number] Date: 3/11/2021  Problem List  Principal Problem:    Central cord syndrome [facility])  Active Problems:    Pulmonary insufficiency    Closed fracture of fifth cervical vertebra (HCC)    Spinal cord injury, C1-C7 ([facility] [address] )    Fall    Pain          03/11/21 0827   OT Last Visit   OT Visit Date 03/11/21   Note Type   Note Type Treatment   Restrictions/Precautions   Weight Bearing Precautions Per Order No   Braces or Orthoses C/S Collar   Other Precautions Bed Alarm;Multiple lines;Telemetry; Fall Risk;Pain;Spinal precautions  (A-line)   Lifestyle   Autonomy PTA, pt was independent in ADLs, IADLs, and functional mobility   Reciprocal Relationships Lives with roommate   Service to Others Currently unemployed, but previously worked in [address] [place] [address] Enjoys watching TV   Pain Assessment   Pain Assessment Tool 0-10   Pain Score No Pain   [facility] Pain Intervention(s) Repositioned; Ambulation/increased activity; Emotional support   ADL   Where Assessed Supine, bed   Eating Assistance 1  Total Assistance   Eating Deficit Setup; Beverage management   Grooming Assistance 1  Total Assistance   Grooming Deficit Setup; Wash/dry face   Grooming Comments Pt required total assistance with grooming activities during tilting session; slight AROM observed in b/l UE, but patient unable to bring wash cloth to face   Functional Standing Tolerance   Time Approx 45 minutes   Activity Tilting in Kreg bed   Comments Tolerated approx 45 minutes during tilting session with maximum tilt of 68 degrees; maintained stable vitals throughout: 15 degrees BP reading was 110/76, 27 degrees BP reading of 110/56, 40 degrees BP reading was 120/63, 61 degrees BP reading was 102/75, and 68 degrees BP reading was 102/75   Therapeutic Exercise - ROM   UE-ROM Yes   ROM- Right Upper Extremities   R Shoulder AAROM   R Wrist PROM   RUE ROM Comment 2 sets of 10 reps of shoulder flexion AAROM, 2 sets of 10 wrist flexion/extension; deferred elbow ROM 2/2 multiple IV sites in anticubital space   ROM - Left Upper Extremities    L Shoulder AAROM   L Elbow AAROM   L Wrist PROM   LUE ROM Comment 2 sets of 10 shoulder flexion, elbow flexion/extension, and wrist flexion/extension   Neuromuscular Education   Trunk Control Tolerated tilting session well with stable vitals   Coordination   Gross Motor Able to initiate slight AROM in b/l UEs   Fine Motor Unable to  squeeze ball in b/l hands, unable to  wash cloth during grooming activities   Cognition   Overall Cognitive Status WFL   Arousal/Participation Responsive;Arousable; Cooperative; Alert   Attention Within functional limits   Orientation Level Oriented X4   Memory Within functional limits   Following Commands Follows all commands and directions without difficulty   Comments Pt was pleasant and cooperative; encouragement/emotional support provided throughout session   Vision   Vision Comments No new changes in vision   Activity Tolerance   Activity Tolerance Patient tolerated treatment well;Patient limited by fatigue   Medical Staff Made Aware PT Isaias Rae RN cleared for OT session   Assessment   Assessment Pt is a 37 yo male who participated in OT session on 3/11/2021  Treatment focused to improve overall activity tolerance during tilting session in Kreg bed with functional use of b/l UE's, and safety awareness in ADL/IADL/leisure tasks  Upon arrival, pt found lying supine in bed and was agreeable to OT session  Pt tolerated tilting session well, was motivated, and maintained stable vitals throughout (see flowsheet for detailed BP readings and tilting degrees)  Able to tolerate 68 degrees of tilting  Pt was able to initiate slight movement in b/l UEs to assist with therapeutic shoulder, elbow, and wrist ROM  However, pt was unable to  squeeze ball or common ADL objects for grooming tasks   Pt currently performing at a level of: total assistance with grooming tasks and feeding tasks  Pt is still limited by the following limitations/impairments which were addressed through skilled instruction: paralysis, pain, fatigue, spinal precautions, decreased strength, generalized weakness, decreased endurance, decreased postural stability/trunk control, sensory deficits, decreased activity tolerance, and an overall decreased independence in ADLs/IADLs/functional mobility  At the end of the session, pt was left sitting in chair position with all functional needs in reach  At this time, recommend discharge to post-acute rehabilitation services (dedicated SCI rehab)  The patient's raw score on the AM-PAC Daily Activity inpatient short form low function score is 13, standardized score is Low Function Daily Activity Standardized Score: 23 16  Patients with a standardized score less than 39 4 are likely to benefit from discharge to post-acute rehab services  Please refer to the recommendation of the Occupational Therapist for safe discharge planning  Established OT goals will be continued 3-5/wk to address immediate acute care needs and underlying performance skills to maximize occupational performance and safety to return to [facility]  Plan   Treatment Interventions ADL retraining;Functional transfer training;UE strengthening/ROM; Endurance training;Patient/family training;Equipment evaluation/education; Fine motor coordination activities; Compensatory technique education;Continued evaluation; Energy conservation; Activityengagement   Goal Expiration Date 03/22/21   OT Treatment Day 2   OT Frequency 3-5x/wk   Recommendation   OT Discharge Recommendation Other (Comment)  (dedicated SCI rehab)   OT - OK to Discharge Yes  (When medically appropriate)   AM-PAC Daily Activity Inpatient   Lower Body Dressing 1   Bathing 1   Toileting 1   Upper Body Dressing 1   Grooming 1   Eating 1   Daily Activity Raw Score 6   Turning Head Towards Sound 3   Follow Simple Instructions 4 Low Function Daily Activity Raw Score 13   Low Function Daily Activity Standardized Score 23 16   AM-PAC Applied Cognition Inpatient   Following a Speech/Presentation 4   Understanding Ordinary Conversation 4   Taking Medications 4   Remembering Where Things Are Placed or Put Away 4   Remembering List of 4-5 Errands 4   Taking Care of Complicated Tasks 4   Applied Cognition Raw Score 24   Applied Cognition Standardized Score 62 21     Darby Mar, OTS

## 2021-03-11 NOTE — PROGRESS NOTES
Daily Progress Note - Critical Care   Nesha Mueller 38 y o  male MRN: [id number]  Unit/Bed#: ICU 06 Encounter: [id number]        ----------------------------------------------------------------------------------------  HPI/24hr events:   Complained increased pain yesterday, APS was consulted, increased the dose of ketamine, changed to dilaudid po  No other active issues    ---------------------------------------------------------------------------------------  SUBJECTIVE  Pain is 9/10, better compared to yesterday, complains LLQ pain    Review of Systems   All other systems reviewed and are negative      Review of systems was reviewed and negative unless stated above in HPI/24-hour events   ---------------------------------------------------------------------------------------  Assessment and Plan:  Neuro:  1  Central cord syndrome, pod 3 s/p C3-C5 laminectomy and C2-C7 fusion  - Q2 hour neurovascular checks  - maintain map greater than 85 for cord perfusion for total of 7 days  - neurosurgery following     2  Acute pain secondary to trauma  - Tylenol 975 mg q 8 hours  - gabapentin 300 mg t i d   - Robaxin 750 mg q 6 hours  - Valium 5 mg q 6 hours  - Dilaudid 1 mg q 3 hours p r n   - Dilaudid 4 mg and 6 mg q 4 hours p r n   - ketamine drip 0 2mg/kg/h  - Lidocaine patch q 12 hours     CV:   - maintain map greater than 85 for cord perfusion  - Levophed titrated, currently at [address] [place] [address] 3/8     Lung:   - incentive spirometry  - maintain SpO2 greater than 92%     GI:   - On bowel regimen:  daily MiraLax, Dulcolax p r n   - bowel training     FEN:   F:  None  E:  Replete as needed  N:  Regular diet     :   - Billingsley in place, will remove it for bladder training  - monitor I/Os       ID:   - monitor for fever, leukocytosis     Heme:   - DVT prophylaxis:  Lovenox, SCDs     Endo:   - maintain euglycemia, goal blood sugar 120-180                Msk/Skin:   - frequent repositioning, monitor for skin breakdown  - PT OT  - multipodus boot      Disposition: Continue Critical Care   Code Status: Level 1 - Full Code  ---------------------------------------------------------------------------------------  ICU CORE MEASURES    Prophylaxis   VTE Pharmacologic Prophylaxis: Enoxaparin (Lovenox)  VTE Mechanical Prophylaxis: sequential compression device  Stress Ulcer Prophylaxis: Prophylaxis Not Indicated     ABCDE Protocol (if indicated)  Plan to perform spontaneous awakening trial today? Not applicable  Plan to perform spontaneous breathing trial today? Not applicable  Obvious barriers to extubation? Not applicable  CAM-ICU: Negative    Invasive Devices Review  Invasive Devices     Peripherally Inserted Central Catheter Line            PICC Line  Right Basilic 2 days          Peripheral Intravenous Line            Peripheral IV 21 Distal;Right;Upper;Ventral (anterior) Arm 4 days          Arterial Line            Arterial Line 21 Radial 4 days          Drain            Closed/Suction Drain Posterior;Right Neck Bulb 4 days    Urethral Catheter Temperature probe 16 Fr  1 day              Can any invasive devices be discontinued today? Not applicable  ---------------------------------------------------------------------------------------  OBJECTIVE    Vitals   Vitals:    21 0500 21 0547 21 0600 21 0608   BP:       BP Location:       Pulse: 84 92 94 98   Resp: 18 20 19 15   Temp:       TempSrc:       SpO2: 99% 98% 98% 99%   Weight:       Height:         Temp (24hrs), Av 5 °F (37 5 °C), Min:99 °F (37 2 °C), Max:100 1 °F (37 8 °C)  Current: Temperature: 100 1 °F (37 8 °C)  HR: 84  BP: 146/95  RR: 20  SpO2: 99    Respiratory:  SpO2: SpO2: 99 %       Invasive/non-invasive ventilation settings   Respiratory    Lab Data (Last 4 hours)    None         O2/Vent Data (Last 4 hours)    None                Physical Exam  Vitals signs and nursing note reviewed     Constitutional:       Appearance: Normal appearance  HENT:      Head: Normocephalic  Right Ear: External ear normal       Left Ear: External ear normal       Nose: Nose normal       Mouth/Throat:      Mouth: Mucous membranes are moist       Pharynx: Oropharynx is clear  Eyes:      Extraocular Movements: Extraocular movements intact  Conjunctiva/sclera: Conjunctivae normal       Pupils: Pupils are equal, round, and reactive to light  Neck:      Comments: In cervical collar  Cardiovascular:      Rate and Rhythm: Normal rate and regular rhythm  Pulses: Normal pulses  Heart sounds: Normal heart sounds  Pulmonary:      Effort: Pulmonary effort is normal       Breath sounds: Normal breath sounds  Abdominal:      General: Abdomen is flat  Bowel sounds are normal       Palpations: Abdomen is soft  Tenderness: There is abdominal tenderness  There is no guarding or rebound  Comments: Mild tenderness in LLQ   Musculoskeletal:      Comments: 2/5 bilateral shoulder shrug  2/5 bilateral biceps  No motor function in LE  Sensory positive UE and LE below knee   Skin:     General: Skin is warm  Capillary Refill: Capillary refill takes less than 2 seconds  Neurological:      General: No focal deficit present  Mental Status: He is alert and oriented to person, place, and time  Mental status is at baseline  Psychiatric:         Mood and Affect: Mood normal          Behavior: Behavior normal          Thought Content:  Thought content normal          Judgment: Judgment normal          Laboratory and Diagnostics:  Results from last 7 days   Lab Units 03/11/21  0454 03/10/21  0640 03/09/21  0436 03/08/21  0446 03/07/21  0402 03/06/21  2116 03/06/21  2112   WBC Thousand/uL 15 65* 17 55* 20 50* 21 03* 17 46* 12 27*  --    HEMOGLOBIN g/dL 10 6* 11 3* 11 5* 11 3* 12 5 11 7*  --    I STAT HEMOGLOBIN g/dl  --   --   --   --   --   --  12 6   HEMATOCRIT % 32 6* 34 1* 34 9* 34 5* 37 9 35 4*  --    HEMATOCRIT, ISTAT %  --   --   --   -- --   --  37   PLATELETS Thousands/uL 323 303 312 316 381 326  --    NEUTROS PCT % 58 60 77* 66 89* 57  --    MONOS PCT % 14* 12 9 11 2* 9  --      Results from last 7 days   Lab Units 03/11/21  0454 03/10/21  0640 03/09/21 0434 03/08/21 0446 03/07/21  0402 03/06/21 2116 03/06/21 2112   SODIUM mmol/L 139 138 138 142 142 145  --    POTASSIUM mmol/L 3 7 3 6 3 8 3 4* 4 2 3 9  --    CHLORIDE mmol/L 104 105 105 109* 111* 113*  --    CO2 mmol/L 31 29 30 29 27 26  --    CO2, I-STAT mmol/L  --   --   --   --   --   --  26   ANION GAP mmol/L 4 4 3* 4 4 6  --    BUN mg/dL 9 9 8 6 8 10  --    CREATININE mg/dL 0 83 0 92 0 83 0 95 0 89 1 07  --    CALCIUM mg/dL 8 8 8 7 8 9 8 4 8 7 8 5  --    GLUCOSE RANDOM mg/dL 96 92 116 111 152* 91  --    ALT U/L 11*  --   --   --   --   --   --    AST U/L 14  --   --   --   --   --   --    ALK PHOS U/L 53  --   --   --   --   --   --    ALBUMIN g/dL 2 8*  --   --   --   --   --   --    TOTAL BILIRUBIN mg/dL 0 57  --   --   --   --   --   --      Results from last 7 days   Lab Units 03/11/21  0454 03/10/21  0640 03/09/21 0434 03/07/21  0402   MAGNESIUM mg/dL 1 8 1 8 1 8 2 0   PHOSPHORUS mg/dL  --  3 4  --  3 6      Results from last 7 days   Lab Units 03/06/21  2116   INR  1 08              ABG:  Results from last 7 days   Lab Units 03/07/21  1014   PH ART  7 441   PCO2 ART mm Hg 36 7   PO2 ART mm Hg 162 7*   HCO3 ART mmol/L 24 4   BASE EXC ART mmol/L 0 6   ABG SOURCE  Line, Arterial     VBG:  Results from last 7 days   Lab Units 03/07/21  1014   ABG SOURCE  Line, Arterial           Micro        EKG: NSR    Intake and Output  I/O       03/09 0701 - 03/10 0700 03/10 0701 - 03/11 0700    P  O  710 460    I V  (mL/kg) 507 9 (7 2) 598 1 (8 4)    IV Piggyback 50     Total Intake(mL/kg) 1267 9 (17 9) 1058 1 (14 9)    Urine (mL/kg/hr) 1900 (1 1) 1700 (1)    Drains 100 10    Stool 0     Total Output 2000 1710    Net -732 1 -651 9          Unmeasured Stool Occurrence 2 x         UOP: 70 ml/hr Height and Weights   Height: 5' 11" (180 3 cm)  IBW: 75 3 kg  Body mass index is 21 83 kg/m²  Weight (last 2 days)     Date/Time   Weight    03/09/21 0531   71 (156 53)                Nutrition       Diet Orders   (From admission, onward)             Start     Ordered    03/09/21 1633  Dietary nutrition supplements  Once     Question Answer Comment   Select Supplement: [employer], Dinner        03/09/21 1633    03/08/21 1431  Diet Regular; Regular House  Diet effective now     Question Answer Comment   Diet Type Regular    Regular Regular House    RD to adjust diet per protocol?  Yes        03/08/21 1431    03/08/21 1430  Dietary nutrition supplements  Once     Question Answer Comment   Select Supplement: Ensure Enlive-Strawberry    Frequency Breakfast, Lunch, Dinner        03/08/21 1429                    Active Medications  Scheduled Meds:  Current Facility-Administered Medications   Medication Dose Route Frequency Provider Last Rate    acetaminophen  975 mg Oral Q8H [facility] Shameka Saldaña PA-C      diazepam  5 mg Oral Q6H JAYLYN Garcia      enoxaparin  30 mg Subcutaneous Q12H [facility] [place] [place]      gabapentin  300 mg Oral TID Shameka Saldaña PA-C      glycopyrrolate  0 2 mg Intravenous Q4H PRN Celestina Bailey PA-C      haloperidol lactate  2 mg Intramuscular Q30 Min PRN Celestina Bailey PA-C      HYDROmorphone  1 mg Intravenous Q3H PRN Shameka Saldaña PA-C      HYDROmorphone  4 mg Oral Q4H PRN JAYLYN Garcia      Or    HYDROmorphone  6 mg Oral Q4H PRN JAYLYN Garcia      ketamine  0 2 mg/kg/hr Intravenous Continuous Celestina Bailey PA-C 0 2 mg/kg/hr (03/10/21 1927)    lidocaine  1 patch Topical Daily Stacy Paulino MD      LORazepam  1 mg Intravenous Q1H PRN Celestina Bailey PA-C      magnesium sulfate  2 g Intravenous Once Stacy Paulino MD      melatonin  3 mg Oral HS JAYLYN Carreno      methocarbamol  750 mg Oral Q6H [address] Silvia Mckeon MD      norepinephrine  1-30 mcg/min Intravenous Titrated Jan Gould PA-C 3 mcg/min (03/11/21 2928)    ondansetron  4 mg Intravenous Q6H PRN Silvia Mckeon MD      polyethylene glycol  17 g Oral Daily Rebbeca Smoker, CRNP      potassium chloride  40 mEq Oral Once Maria Eugenia Goode MD       Continuous Infusions:  ketamine, 0 2 mg/kg/hr, Last Rate: 0 2 mg/kg/hr (03/10/21 1927)  norepinephrine, 1-30 mcg/min, Last Rate: 3 mcg/min (03/11/21 0607)      PRN Meds:   glycopyrrolate, 0 2 mg, Q4H PRN  haloperidol lactate, 2 mg, Q30 Min PRN  HYDROmorphone, 1 mg, Q3H PRN  HYDROmorphone, 4 mg, Q4H PRN    Or  HYDROmorphone, 6 mg, Q4H PRN  LORazepam, 1 mg, Q1H PRN  ondansetron, 4 mg, Q6H PRN        Allergies   No Known Allergies  ---------------------------------------------------------------------------------------  Advance Directive and Living Will:      Power of :    POLST:    ---------------------------------------------------------------------------------------  Care Time Delivered:   No Critical Care time spent     Maria Eugenia Goode MD      Portions of the record may have been created with voice recognition software  Occasional wrong word or "sound a like" substitutions may have occurred due to the inherent limitations of voice recognition software    Read the chart carefully and recognize, using context, where substitutions have occurred

## 2021-03-11 NOTE — PHYSICAL THERAPY NOTE
Physical Therapy Treatment note     Patient Name: Latoya Martinez    XBFHI'C Date: 3/11/2021     Problem List  Principal Problem:    Central cord syndrome [facility])  Active Problems:    Pulmonary insufficiency    Closed fracture of fifth cervical vertebra ([facility] [address] )    Spinal cord injury, C1-C7 ([facility] [address] )    Fall    Pain       Past Medical History  No past medical history on file  Past Surgical History  Past Surgical History:   Procedure Laterality Date    CERVICAL FUSION N/A 3/6/2021    Procedure: POSTERIOR C3-5 laminectomy, C2-7 fixation fusion, possible additional levels;  Surgeon: Aditya Canales MD;  Location: [facility];  Service: Neurosurgery    IR PICC LINE PLACEMENT DOUBLE LUMEN  3/8/2021         03/11/21 0923   PT Last Visit   PT Visit Date 03/11/21   Note Type   Note Type Treatment   Pain Assessment   Pain Assessment Tool Pain Assessment not indicated - pt denies pain   Pain Score No Pain   Restrictions/Precautions   Weight Bearing Precautions Per Order No   Braces or Orthoses C/S Collar   Other Precautions Chair Alarm; Bed Alarm;Multiple lines;Telemetry; Fall Risk  ([place])   General   Chart Reviewed Yes   Family/Caregiver Present No   Cognition   Overall Cognitive Status WFL   Arousal/Participation Alert; Responsive; Cooperative   Attention Within functional limits   Orientation Level Oriented X4   Memory Within functional limits   Following Commands Follows all commands and directions without difficulty   Balance   Static Sitting Zero   Endurance Deficit   Endurance Deficit Yes   Activity Tolerance   Activity Tolerance Patient limited by fatigue   Medical Staff Made Aware OT and RN   Nurse Made Aware nurse approved therapy session   Exercises   Heel Cord Stretch Supine;Bilateral  (PROM DF, PF, inversion, eversion while tilting )   Assessment   Prognosis Fair   Problem List Decreased strength;Decreased endurance; Impaired balance;Decreased mobility   Assessment Pt presents to therapy today with reduced mobility, high risk of falling, reduced B LE strength, reduced B UE strength, poor sitting tolerance and balance  These impairments limit the patient by requiring increased assistance for mobility and places him at risk of falling  Pt would benefit from continued skilled therapy while in the hospital to improve overall mobility and work towards a safe d/c  Recommend rehab  At end of session patient was left seated in chair position in bed with call bell within reach  The patient's AM-[facility] Basic Mobility Inpatient Short Form Low Function Raw Score 14 , Standardized Score is 22 01  A standardized score less 42 9 suggests the patient may benefit from discharge to post-acute rehab services  Please also refer to the recommendation of the Physical Therapist for safe discharge planning  Pt tolerated tilting today supine BP: 129/79 mmhg, 15 degree incline 106/57 mmHg, incline at 27 degrees 118/60 mmHg, incline of 40 degrees 110/ 63 mmHg, 50 degress incline 109/74 mmHg, 61 degree incline 102/75 mmHg, 68 degree incline 104/75 mmHg  Pt denied any dizziness throughout session  Barriers to Discharge Inaccessible home environment;Decreased caregiver support   Goals   STG Expiration Date 03/20/21   PT Treatment Day 1   Plan   Treatment/Interventions LE strengthening/ROM; Therapeutic exercise; Endurance training;Bed mobility; Equipment eval/education;OT   Progress Progressing toward goals   PT Frequency Other (Comment)  (3-5xwk)   Recommendation   PT Discharge Recommendation Post-Acute Rehabilitation Services   PT - OK to Discharge Yes   Additional Comments when medically appropraite    AM-[facility] Basic Mobility Inpatient   Turning in Bed Without Bedrails 1   Lying on Back to Sitting on Edge of Flat Bed 1   Moving Bed to Chair 1   Standing Up From Chair 1   Walk in Room 1   Climb 3-5 Stairs 1   Basic Mobility Inpatient Raw Score 6   Turning Head Towards Sound 4   Follow Simple Instructions 4 Low Function Basic Mobility Raw Score 14   Low Function Basic Mobility Standardized Score 22 01   Scott Gregg, Pt, DPT

## 2021-03-11 NOTE — PLAN OF CARE
Problem: PHYSICAL THERAPY ADULT  Goal: Performs mobility at highest level of function for planned discharge setting  See evaluation for individualized goals  Description: Treatment/Interventions: Functional transfer training, LE strengthening/ROM, Therapeutic exercise, Bed mobility, Spoke to nursing, Spoke to case management, OT, Equipment eval/education, Compensatory technique education  Equipment Recommended: Other (Comment)(Kre bed)       See flowsheet documentation for full assessment, interventions and recommendations  Outcome: Progressing  Note: Prognosis: Fair  Problem List: Decreased strength, Decreased endurance, Impaired balance, Decreased mobility  Assessment: Pt presents to therapy today with reduced mobility, high risk of falling, reduced B LE strength, reduced B UE strength  These impairments limit the patient by requiring increased assistance for mobility and places him at risk of falling  Pt would benefit from continued skilled therapy while in the hospital to improve overall mobility and work towards a safe d/c  Recommend rehab  At end of session patient was left seated in chair position in bed with call bell within reach  The patient's AM-[facility] Basic Mobility Inpatient Short Form Low Function Raw Score 14 , Standardized Score is 22 01  A standardized score less 42 9 suggests the patient may benefit from discharge to post-acute rehab services  Please also refer to the recommendation of the Physical Therapist for safe discharge planning  Pt tolerated tilting today supine BP: 129/79 mmhg, 15 degree incline 106/57 mmHg, incline at 27 degrees 118/60 mmHg, incline of 40 degrees 110/ 63 mmHg, 50 degress incline 109/74 mmHg, 61 degree incline 102/75 mmHg, 68 degree incline 104/75 mmHg  Pt denied any dizziness throughout session     Barriers to Discharge: Inaccessible home environment, Decreased caregiver support     PT Discharge Recommendation: Post-Acute Rehabilitation Services     PT - OK to Discharge: Yes    See flowsheet documentation for full assessment

## 2021-03-11 NOTE — PROGRESS NOTES
Pastoral Care Progress Note    3/11/2021  Patient: Beltran Corbin : 1985  Admission Date & Time: 3/6/2021 2102  MRN: [id number] CSN: [id number]         visited patient as follow up to previous pastoral care visits to offer support        21 1110   Clinical Encounter Type   Visited With Patient   Routine Visit Follow-up   Crisis Visit Critical Care

## 2021-03-11 NOTE — PROGRESS NOTES
Progress Note - Acute Pain Service    Oskar Rothman 38 y o  male MRN: [id number]  Unit/Bed#: ICU 06 Encounter: [id number]      Assessment:   Principal Problem:    Central cord syndrome [facility])  Active Problems:    Pulmonary insufficiency    Closed fracture of fifth cervical vertebra (HCC)    Spinal cord injury, C1-C7 ([facility] [address] )    Fall    Pain    Oskar Rothman is a 38 y o  male  Status post fall down stairs resulting in central cord syndrome and quadriplegia  Underwent C3 through C5 laminectomy and C2 through C7 fixation/fusion  Patient has had significant neck pain, placed on ketamine infusion   3/9/21with good results until  3/10/21 when pain began to worsen again  Plan:    Continue Tylenol 975 mg p o  q 8 hours scheduled   Continue ketamine infusion at 0 2 mg/ kg /HR  May need to be reduced or discontinued based on episodes of hypertension overnight  Currently normotensive     Continue Dilaudid 4 mg p o  q 4 hours p r n  moderate pain   Continue Dilaudid 6 mg p o  q 4 hours p r n  severe pain   Continue Dilaudid 1 mg IV q 3 hours p r n  breakthrough pain   Encourage patient to use p r n  medications (has used only 3 doses of oral Dilaudid and 1 dose of IV Dilaudid in past 24 hours)   Continue Valium 5 mg p o  q 6 hours scheduled   Increase gabapentin to 400 mg p o  t i d  scheduled   Continue Robaxin 750 mg p o  q 6 hours scheduled   Continue lidocaine patch for up to 12 hours daily   Continue bowel regimen to avoid opioid induced constipation  APS will continue to follow  Please call  / [phone] or [facility] (/ between [phone] and on weekends) with questions or concerns    Pain History  Current pain location(s):   Neck  Pain Scale:     9/10  Quality:  Sharp, aching  24 hour history:  Ketamine increased yesterday and patient opioid rotated to oral Dilaudid    States his pain is slightly better today but still has significant discomfort  Opioid requirement previous 24 hours:   Oxycodone 15 mg p o , Dilaudid 2 mg IV, Dilaudid 18 mg p o     Meds/Allergies   all current active meds have been reviewed, current meds:   Current Facility-Administered Medications   Medication Dose Route Frequency    acetaminophen (TYLENOL) tablet 975 mg  975 mg Oral Q8H [facility]    bisacodyl (DULCOLAX) rectal suppository 10 mg  10 mg Rectal Daily PRN    diazepam (VALIUM) tablet 5 mg  5 mg Oral Q6H    enoxaparin (LOVENOX) subcutaneous injection 30 mg  30 mg Subcutaneous Q12H [facility]    gabapentin (NEURONTIN) capsule 300 mg  300 mg Oral TID    glycopyrrolate (ROBINUL) injection 0 2 mg  0 2 mg Intravenous Q4H PRN    haloperidol lactate (HALDOL) injection 2 mg  2 mg Intramuscular Q30 Min PRN    HYDROmorphone (DILAUDID) injection 1 mg  1 mg Intravenous Q3H PRN    HYDROmorphone (DILAUDID) tablet 4 mg  4 mg Oral Q4H PRN    Or    HYDROmorphone (DILAUDID) tablet 6 mg  6 mg Oral Q4H PRN    ketamine 250 mg (STANDARD CONCENTRATION) IV in sodium chloride 0 9% 250 mL  0 2 mg/kg/hr Intravenous Continuous    lidocaine (LIDODERM) 5 % patch 1 patch  1 patch Topical Daily    LORazepam (ATIVAN) injection 1 mg  1 mg Intravenous Q1H PRN    magnesium sulfate 2 g/50 mL IVPB (premix) 2 g  2 g Intravenous Once    melatonin tablet 3 mg  3 mg Oral HS    methocarbamol (ROBAXIN) tablet 750 mg  750 mg Oral Q6H [facility]    norepinephrine (LEVOPHED) 4 mg (STANDARD CONCENTRATION) IV in sodium chloride 0 9% 250 mL  1-30 mcg/min Intravenous Titrated    ondansetron (ZOFRAN) injection 4 mg  4 mg Intravenous Q6H PRN    polyethylene glycol (MIRALAX) packet 17 g  17 g Oral Daily    and PTA meds:   None       No Known Allergies    Objective     Temp:  [99 °F (37 2 °C)-100 1 °F (37 8 °C)] 100 1 °F (37 8 °C)  HR:  [] 124  Resp:  [13-23] 17  Arterial Line BP: (104-186)/(60-94) 104/64    Physical Exam  Vitals signs and nursing note reviewed  Constitutional:       General: He is awake   He is not in acute distress  Skin:     General: Skin is warm and dry  Neurological:      Mental Status: He is alert and oriented to person, place, and time  GCS: GCS eye subscore is 4  GCS verbal subscore is 5  GCS motor subscore is 6  Psychiatric:         Behavior: Behavior is cooperative  Lab Results:   Results from last 7 days   Lab Units 03/11/21  0454   WBC Thousand/uL 15 65*   HEMOGLOBIN g/dL 10 6*   HEMATOCRIT % 32 6*   PLATELETS Thousands/uL 323      Results from last 7 days   Lab Units 03/11/21 0454  03/06/21  2112   POTASSIUM mmol/L 3 7   < >  --    CHLORIDE mmol/L 104   < >  --    CO2 mmol/L 31   < >  --    CO2, I-STAT mmol/L  --   --  26   BUN mg/dL 9   < >  --    CREATININE mg/dL 0 83   < >  --    CALCIUM mg/dL 8 8   < >  --    ALK PHOS U/L 53  --   --    ALT U/L 11*  --   --    AST U/L 14  --   --    GLUCOSE, ISTAT mg/dl  --   --  87    < > = values in this interval not displayed  Imaging Studies: I have personally reviewed pertinent reports  EKG, Pathology, and Other Studies: I have personally reviewed pertinent reports  Counseling / Coordination of Care  Total floor / unit time spent today 20 minutes  Greater than 50% of total time was spent with the patient and / or family counseling and / or coordination of care  A description of the counseling / coordination of care:  Patient interview, physical examination, review of medical record, review of imaging and laboratory data, development of pain management plan, discussion of pain management plan with patient and primary service  Please note that the APS provides consultative services regarding pain management only  With the exception of ketamine and epidural infusions and except when indicated, final decisions regarding starting or changing doses of analgesic medications are at the discretion of the consulting service  Off hours consultation and/or medication management is generally not available      Celestina Bailey DENITA  Acute Pain Service

## 2021-03-11 NOTE — CASE MANAGEMENT
Rafael spoke to Matheus Flores of [facility] [phone]  She is interested in the patient and they should have a bed on Monday  Cm faxed updated notes to her  Plan to have therapy treat the pt on Sunday and then get auth

## 2021-03-11 NOTE — PLAN OF CARE
Problem: OCCUPATIONAL THERAPY ADULT  Goal: Performs self-care activities at highest level of function for planned discharge setting  See evaluation for individualized goals  Description:     Outcome: Progressing  Note: Limitation: Decreased ADL status, Decreased UE ROM, Decreased UE strength, Decreased endurance, Decreased sensation, Decreased fine motor control, Decreased self-care trans, Decreased high-level ADLs, Non-func R UE, Non-func L UE  Prognosis: Fair  Assessment: Pt is a 39 yo male who participated in OT session on 3/11/2021  Treatment focused to improve overall activity tolerance during tilting session in Kreg bed with functional use of b/l UE's, and safety awareness in ADL/IADL/leisure tasks  Upon arrival, pt found lying supine in bed and was agreeable to OT session  Pt tolerated tilting session well, was motivated, and maintained stable vitals throughout (see flowsheet for detailed BP readings and tilting degrees)  Able to tolerate 68 degrees of tilting  Pt was able to initiate slight movement in b/l UEs to assist with therapeutic shoulder, elbow, and wrist ROM  However, pt was unable to  squeeze ball or common ADL objects for grooming tasks  Pt currently performing at a level of: total assistance with grooming tasks and feeding tasks  Pt is still limited by the following limitations/impairments which were addressed through skilled instruction: paralysis, pain, fatigue, spinal precautions, decreased strength, generalized weakness, decreased endurance, decreased postural stability/trunk control, sensory deficits, decreased activity tolerance, and an overall decreased independence in ADLs/IADLs/functional mobility  At the end of the session, pt was left sitting in chair position with all functional needs in reach  At this time, recommend discharge to post-acute rehabilitation services (dedicated SCI rehab)   The patient's raw score on the AM-PAC Daily Activity inpatient short form low function score is 13, standardized score is Low Function Daily Activity Standardized Score: 23 16  Patients with a standardized score less than 39 4 are likely to benefit from discharge to post-acute rehab services  Please refer to the recommendation of the Occupational Therapist for safe discharge planning  Established OT goals will be continued 3-5/wk to address immediate acute care needs and underlying performance skills to maximize occupational performance and safety to return to PLOF        OT Discharge Recommendation: Other (Comment)(dedicated SCI rehab)  OT - OK to Discharge: Yes(When medically appropriate)     Shari Medina, OTS

## 2021-03-11 NOTE — ASSESSMENT & PLAN NOTE
POD4 PCDF C2-7 with C3-5 laminectomy with Dr Stefanie Rubio on 3/7/2021  · LAWRENCE B cord injury s/p fall down stairs with C5 spinous process and lamina fractures  · On exam minimal R bicep 3/5, L bicep 2/5, minimal wrist/hand movement  otherwise 0/5 throughout  Patchy sensory level bilaterally  JPS intact in feet but not hands  Imaging:  · Upright cervical spine x-rays 3/11/21: Status post posterior fusion from C2 through C7  · Cervical spine MRI 3/7/2021: Increased T2 cord signal/edema involving stenotic levels C3 to inferior C5 most consistent with central cord syndrome in the setting of trauma  2   Nondisplaced fracture involving C5 spinous process and right lamina  Bone marrow edema within the spinous processes of C3-C5  3   Edema within posterior paravertebral musculature and increased T2 signal along the interspinous ligaments at levels C3-C6 suggestive of ligamentous injury  Anterior and posterior longitudinal ligaments appear intact  4   Degenerative canal stenosis more notable at level C3-4, as detailed  Plan:  · Frequent neuro checks  · MAP goal > 85 mmHg x 7 days, currently on Levophed (day 4/7)  · VISTA collar at all times, babak collar for showering  · Bruce drain discontinued  · PMR evaluation - needs dedicated SCI rehab program    · PT/OT   · DVT ppx: SCDs, Lovenox  Neurosurgery will continue to follow  Please call with any questions or concerns

## 2021-03-11 NOTE — PROGRESS NOTES
[address] [facility]  Progress Note Grace Patten 1985, 38 y o  male MRN: [id number]  Unit/Bed#: ICU 06 Encounter: [id number]  Primary Care Provider: Jaylene Hager MD   Date and time admitted to hospital: 3/6/2021  9:02 PM    * Central cord syndrome [facility])  Assessment & Plan  POD4 PCDF C2-7 with C3-5 laminectomy with Dr Roberto Carlos Lovelace on 3/7/2021  · LAWRENCE B cord injury s/p fall down stairs with C5 spinous process and lamina fractures  · On exam minimal R bicep 3/5, L bicep 2/5, minimal wrist/hand movement  otherwise 0/5 throughout  Patchy sensory level bilaterally  JPS intact in feet but not hands  Imaging:  · Upright cervical spine x-rays 3/11/21: Status post posterior fusion from C2 through C7  · Cervical spine MRI 3/7/2021: Increased T2 cord signal/edema involving stenotic levels C3 to inferior C5 most consistent with central cord syndrome in the setting of trauma  2   Nondisplaced fracture involving C5 spinous process and right lamina  Bone marrow edema within the spinous processes of C3-C5  3   Edema within posterior paravertebral musculature and increased T2 signal along the interspinous ligaments at levels C3-C6 suggestive of ligamentous injury  Anterior and posterior longitudinal ligaments appear intact  4   Degenerative canal stenosis more notable at level C3-4, as detailed  Plan:  · Frequent neuro checks  · MAP goal > 85 mmHg x 7 days, currently on Levophed (day 4/7)  · VISTA collar at all times, babak collar for showering  · Bruce drain discontinued  · PMR evaluation - needs dedicated SCI rehab program    · PT/OT   · DVT ppx: SCDs, Lovenox  Neurosurgery will continue to follow  Please call with any questions or concerns  Subjective/Objective   Chief Complaint: "I"m ok "    Subjective: Continues to endorse severe neck pain, states Ketamine is minimally helping  Denies any new numbness or weakness  Objective:  Increased motor strength in bilateral biceps since yesterday as described above  Mobilized upright in Kreg bed today  Tolerated well with some tachycardia  Posterior cervical incision clean and dry with staples, well-approximated  I/O       03/09 0701 - 03/10 0700 03/10 0701 - 03/11 0700 03/11 0701 - 03/12 0700    P  O  710 460     I V  (mL/kg) 507 9 (7 2) 598 1 (8 4)     IV Piggyback 50      Total Intake(mL/kg) 1267 9 (17 9) 1058 1 (14 9)     Urine (mL/kg/hr) 1900 (1 1) 1700 (1)     Drains 100 10     Stool 0      Total Output 2000 1710     Net -732 1 -651 9            Unmeasured Stool Occurrence 2 x            Invasive Devices     Peripherally Inserted Central Catheter Line            PICC Line 37/50/50 Right Basilic 2 days          Peripheral Intravenous Line            Peripheral IV 03/07/21 Distal;Right;Upper;Ventral (anterior) Arm 4 days          Arterial Line            Arterial Line 03/07/21 Radial 4 days          Drain            Closed/Suction Drain Posterior;Right Neck Bulb 4 days    Urethral Catheter Temperature probe 16 Fr  1 day                Physical Exam:  Vitals: Blood pressure 150/85, pulse (!) 124, temperature 100 1 °F (37 8 °C), temperature source Oral, resp  rate 17, height 5' 11" (1 803 m), weight 71 kg (156 lb 8 4 oz), SpO2 98 %  ,Body mass index is 21 83 kg/m²  Hemodynamic Monitoring: MAP: Arterial Line MAP (mmHg): 76 mmHg    General appearance: alert, appears stated age, cooperative and no distress  Head: Normocephalic, right forehead abrasion  Eyes: EOMI, PERRL  Neck: supple, symmetrical, posterior cervical incision clean and dry, well-approximated, SARANYA drain d/c'd  VISTA in place  Back: no kyphosis present,   Lungs: non labored breathing  Heart: regular heart rate  Neurologic:   Mental status: Alert, oriented x3, thought content appropriate  Cranial nerves: grossly intact (Cranial nerves II-XII)  Sensory: patchy sensory level at T6/7 bilaterally   +DST and JPS in LE  Motor: R bicep 3/5, L bicep 2/5, wrists 2/5, otherwise 0/5  Reflexes: decreased throughout      Lab Results:  Results from last 7 days   Lab Units 03/11/21  0454 03/10/21  0640 03/09/21  0436   WBC Thousand/uL 15 65* 17 55* 20 50*   HEMOGLOBIN g/dL 10 6* 11 3* 11 5*   HEMATOCRIT % 32 6* 34 1* 34 9*   PLATELETS Thousands/uL 323 303 312   NEUTROS PCT % 58 60 77*   MONOS PCT % 14* 12 9     Results from last 7 days   Lab Units 03/11/21  0454 03/10/21  0640 03/09/21  0434  03/06/21  2112   POTASSIUM mmol/L 3 7 3 6 3 8   < >  --    CHLORIDE mmol/L 104 105 105   < >  --    CO2 mmol/L 31 29 30   < >  --    CO2, I-STAT mmol/L  --   --   --   --  26   BUN mg/dL 9 9 8   < >  --    CREATININE mg/dL 0 83 0 92 0 83   < >  --    CALCIUM mg/dL 8 8 8 7 8 9   < >  --    ALK PHOS U/L 53  --   --   --   --    ALT U/L 11*  --   --   --   --    AST U/L 14  --   --   --   --    GLUCOSE, ISTAT mg/dl  --   --   --   --  87    < > = values in this interval not displayed  Results from last 7 days   Lab Units 03/11/21  0454 03/10/21  0640 03/09/21  0434   MAGNESIUM mg/dL 1 8 1 8 1 8     Results from last 7 days   Lab Units 03/10/21  0640 03/07/21  0402   PHOSPHORUS mg/dL 3 4 3 6     Results from last 7 days   Lab Units 03/06/21  2116   INR  1 08     No results found for: TROPONINT  ABG:  Lab Results   Component Value Date    PHART 7 441 03/07/2021    NKC6AKR 36 7 03/07/2021    PO2ART 162 7 (H) 03/07/2021    EEU7KIW 24 4 03/07/2021    BEART 0 6 03/07/2021    SOURCE Line, Arterial 03/07/2021       Imaging Studies: I have personally reviewed pertinent reports  and I have personally reviewed pertinent films in PACS    Ct Chest Abdomen Pelvis W Wo Contrast    Result Date: 3/6/2021  Impression: No acute traumatic injury identified  I personally discussed this study with Kathleen Conti on 3/6/2021 at 9:53 PM  Workstation performed: [id number]     Xr Cervical Spine 2 Or 3 Views    Result Date: 3/11/2021  Impression: Status post posterior fusion from C2 through C7   Workstation performed: [id number]    Xr Spine Cervical 2 Or 3 Vw Injury    Result Date: 3/7/2021  Impression: Fluoroscopic guidance provided for procedure guidance  Please refer to the separate procedure notes for additional details  Workstation performed: [id number]     Ct Head Without Contrast    Result Date: 3/6/2021  Impression: No acute intracranial abnormality  I personally discussed this study with Carlos Evans on 3/6/2021 at 9:53 PM  Workstation performed: [id number]     Ct Spine Cervical Without Contrast    Addendum Date: 3/6/2021    ADDENDUM: There is a nondisplaced fracture through the spinous process and right lamina of C5  I personally discussed this study with Carlos Evans on 3/6/2021 at 10:14 PM      Result Date: 3/6/2021  Impression: No cervical spine fracture or traumatic malalignment  Central disc protrusions at C2-C3, C3-C4, and C4-C5  Unfortunately, this is incompletely evaluated  Given the reported neurological symptoms in this trauma patient, recommend MRI  I personally discussed this study with Carlos Evans on 3/6/2021 at 9:53 PM  Workstation performed: [id number]     Mri Cervical Spine Wo Contrast    Result Date: 3/7/2021  Impression: 1  Increased T2 cord signal/edema involving stenotic levels C3 to inferior C5 most consistent with central cord syndrome in the setting of trauma  2   Nondisplaced fracture involving C5 spinous process and right lamina  Bone marrow edema within the spinous processes of C3-C5  3   Edema within posterior paravertebral musculature and increased T2 signal along the interspinous ligaments at levels C3-C6 suggestive of ligamentous injury  Anterior and posterior longitudinal ligaments appear intact  4   Degenerative canal stenosis more notable at level C3-4, as detailed    I personally discussed this study with Silvia Tran on 3/7/2021 at 7:35 AM  Workstation performed: [id number]     Mri Thoracic Spine Wo Contrast    Result Date: 3/7/2021  Impression: No acute finding  Unremarkable MRI of the thoracic spine  Workstation performed: [id number]     Mri Lumbar Spine Wo Contrast    Result Date: 3/7/2021  Impression: No acute finding  Unremarkable MRI of the lumbar spine  Workstation performed: [id number]     Xr Chest 1 View    Result Date: 3/10/2021  Impression: No acute cardiopulmonary disease within limitations of supine imaging  Workstation performed: [id number]     X-ray Chest 1 View    Result Date: 3/7/2021  Impression: The tip of the endotracheal tube projects 2 4 cm above the odalys  Clear lungs  Workstation performed: [id number]     Xr Trauma Multiple    Result Date: 3/7/2021  Impression: No acute cardiopulmonary disease within limitations of supine imaging  Workstation performed: [id number]     Ir Picc Placement Double Lumen    Result Date: 3/9/2021  Impression: Ultrasound and fluoroscopically guided placement of a power injectable dual lumen peripherally inserted central venous catheter via the right basilic vein with its tip at the cavoatrial junction under ultrasound and fluoroscopic guidance  PLAN: Patient is at higher than usual risk of thrombosis of his PICC access site due to immobility  When patient is able to remove the cervical collar and position his neck for internal jugular access recommend return to IR for placement of tunneled jugular central venous access and removal of PICC Workstation performed: [id number]       EKG, Pathology, and Other Studies: I have personally reviewed pertinent reports        VTE Pharmacologic Prophylaxis: Sequential compression device (Venodyne)  and Enoxaparin (Lovenox)    VTE Mechanical Prophylaxis: sequential compression device

## 2021-03-12 ENCOUNTER — APPOINTMENT (INPATIENT)
Dept: RADIOLOGY | Facility: HOSPITAL | Age: 36
DRG: 023 | End: 2021-03-12
Payer: MEDICARE

## 2021-03-12 LAB
ANION GAP SERPL CALCULATED.3IONS-SCNC: 7 MMOL/L (ref 4–13)
BUN SERPL-MCNC: 12 MG/DL (ref 5–25)
CALCIUM SERPL-MCNC: 9 MG/DL (ref 8.3–10.1)
CHLORIDE SERPL-SCNC: 100 MMOL/L (ref 100–108)
CO2 SERPL-SCNC: 28 MMOL/L (ref 21–32)
CREAT SERPL-MCNC: 0.78 MG/DL (ref 0.6–1.3)
ERYTHROCYTE [DISTWIDTH] IN BLOOD BY AUTOMATED COUNT: 12.1 % (ref 11.6–15.1)
GFR SERPL CREATININE-BSD FRML MDRD: 133 ML/MIN/1.73SQ M
GLUCOSE SERPL-MCNC: 118 MG/DL (ref 65–140)
HCT VFR BLD AUTO: 33.1 % (ref 36.5–49.3)
HGB BLD-MCNC: 10.8 G/DL (ref 12–17)
MAGNESIUM SERPL-MCNC: 2.1 MG/DL (ref 1.6–2.6)
MCH RBC QN AUTO: 30.6 PG (ref 26.8–34.3)
MCHC RBC AUTO-ENTMCNC: 32.6 G/DL (ref 31.4–37.4)
MCV RBC AUTO: 94 FL (ref 82–98)
PLATELET # BLD AUTO: 351 THOUSANDS/UL (ref 149–390)
PMV BLD AUTO: 9.2 FL (ref 8.9–12.7)
POTASSIUM SERPL-SCNC: 4.3 MMOL/L (ref 3.5–5.3)
RBC # BLD AUTO: 3.53 MILLION/UL (ref 3.88–5.62)
SODIUM SERPL-SCNC: 135 MMOL/L (ref 136–145)
WBC # BLD AUTO: 15.55 THOUSAND/UL (ref 4.31–10.16)

## 2021-03-12 PROCEDURE — 83735 ASSAY OF MAGNESIUM: CPT | Performed by: PHYSICIAN ASSISTANT

## 2021-03-12 PROCEDURE — 80048 BASIC METABOLIC PNL TOTAL CA: CPT | Performed by: PHYSICIAN ASSISTANT

## 2021-03-12 PROCEDURE — 85027 COMPLETE CBC AUTOMATED: CPT | Performed by: PHYSICIAN ASSISTANT

## 2021-03-12 PROCEDURE — 99024 POSTOP FOLLOW-UP VISIT: CPT | Performed by: NURSE PRACTITIONER

## 2021-03-12 PROCEDURE — 99232 SBSQ HOSP IP/OBS MODERATE 35: CPT | Performed by: PHYSICIAN ASSISTANT

## 2021-03-12 PROCEDURE — 99233 SBSQ HOSP IP/OBS HIGH 50: CPT | Performed by: SURGERY

## 2021-03-12 PROCEDURE — 74018 RADEX ABDOMEN 1 VIEW: CPT

## 2021-03-12 RX ORDER — HYDROMORPHONE HCL/PF 1 MG/ML
1 SYRINGE (ML) INJECTION EVERY 4 HOURS PRN
Status: DISCONTINUED | OUTPATIENT
Start: 2021-03-12 | End: 2021-03-16

## 2021-03-12 RX ORDER — HYDROMORPHONE HYDROCHLORIDE 2 MG/1
4 TABLET ORAL
Status: DISCONTINUED | OUTPATIENT
Start: 2021-03-12 | End: 2021-03-16 | Stop reason: HOSPADM

## 2021-03-12 RX ORDER — HYDROMORPHONE HYDROCHLORIDE 2 MG/1
6 TABLET ORAL
Status: DISCONTINUED | OUTPATIENT
Start: 2021-03-12 | End: 2021-03-16 | Stop reason: HOSPADM

## 2021-03-12 RX ORDER — GABAPENTIN 400 MG/1
400 CAPSULE ORAL 3 TIMES DAILY
Status: DISCONTINUED | OUTPATIENT
Start: 2021-03-12 | End: 2021-03-14

## 2021-03-12 RX ORDER — POLYETHYLENE GLYCOL 3350 17 G/17G
17 POWDER, FOR SOLUTION ORAL DAILY PRN
Status: DISCONTINUED | OUTPATIENT
Start: 2021-03-12 | End: 2021-03-15

## 2021-03-12 RX ORDER — BISACODYL 10 MG
10 SUPPOSITORY, RECTAL RECTAL DAILY
Status: DISCONTINUED | OUTPATIENT
Start: 2021-03-13 | End: 2021-03-16 | Stop reason: HOSPADM

## 2021-03-12 RX ADMIN — GABAPENTIN 400 MG: 400 CAPSULE ORAL at 22:34

## 2021-03-12 RX ADMIN — DIAZEPAM 5 MG: 5 TABLET ORAL at 03:17

## 2021-03-12 RX ADMIN — NOREPINEPHRINE BITARTRATE 3 MCG/MIN: 1 INJECTION, SOLUTION, CONCENTRATE INTRAVENOUS at 01:51

## 2021-03-12 RX ADMIN — LIDOCAINE 1 PATCH: 50 PATCH TOPICAL at 09:21

## 2021-03-12 RX ADMIN — SERTRALINE HYDROCHLORIDE 50 MG: 50 TABLET ORAL at 09:20

## 2021-03-12 RX ADMIN — HYDROMORPHONE HYDROCHLORIDE 6 MG: 2 TABLET ORAL at 00:10

## 2021-03-12 RX ADMIN — KETAMINE HYDROCHLORIDE 0.2 MG/KG/HR: 50 INJECTION INTRAMUSCULAR; INTRAVENOUS at 11:35

## 2021-03-12 RX ADMIN — SENNOSIDES, DOCUSATE SODIUM 1 TABLET: 8.6; 5 TABLET ORAL at 17:36

## 2021-03-12 RX ADMIN — DIAZEPAM 5 MG: 5 TABLET ORAL at 09:21

## 2021-03-12 RX ADMIN — DEXTROSE, SODIUM CHLORIDE, AND POTASSIUM CHLORIDE 100 ML/HR: 5; .45; .15 INJECTION INTRAVENOUS at 12:30

## 2021-03-12 RX ADMIN — DEXTROSE, SODIUM CHLORIDE, AND POTASSIUM CHLORIDE 100 ML/HR: 5; .45; .15 INJECTION INTRAVENOUS at 22:35

## 2021-03-12 RX ADMIN — HYDROMORPHONE HYDROCHLORIDE 1 MG: 1 INJECTION, SOLUTION INTRAMUSCULAR; INTRAVENOUS; SUBCUTANEOUS at 09:21

## 2021-03-12 RX ADMIN — MELATONIN TAB 3 MG 3 MG: 3 TAB at 22:34

## 2021-03-12 RX ADMIN — DIAZEPAM 5 MG: 5 TABLET ORAL at 22:34

## 2021-03-12 RX ADMIN — SENNOSIDES, DOCUSATE SODIUM 1 TABLET: 8.6; 5 TABLET ORAL at 09:20

## 2021-03-12 RX ADMIN — ENOXAPARIN SODIUM 30 MG: 30 INJECTION SUBCUTANEOUS at 22:33

## 2021-03-12 RX ADMIN — HYDROMORPHONE HYDROCHLORIDE 6 MG: 2 TABLET ORAL at 05:51

## 2021-03-12 RX ADMIN — HYDROMORPHONE HYDROCHLORIDE 1 MG: 1 INJECTION, SOLUTION INTRAMUSCULAR; INTRAVENOUS; SUBCUTANEOUS at 21:43

## 2021-03-12 RX ADMIN — GABAPENTIN 300 MG: 300 CAPSULE ORAL at 09:20

## 2021-03-12 RX ADMIN — ENOXAPARIN SODIUM 30 MG: 30 INJECTION SUBCUTANEOUS at 09:20

## 2021-03-12 RX ADMIN — HYDROMORPHONE HYDROCHLORIDE 1 MG: 1 INJECTION, SOLUTION INTRAMUSCULAR; INTRAVENOUS; SUBCUTANEOUS at 13:24

## 2021-03-12 RX ADMIN — POLYETHYLENE GLYCOL 3350 17 G: 17 POWDER, FOR SOLUTION ORAL at 09:20

## 2021-03-12 RX ADMIN — DEXTROSE, SODIUM CHLORIDE, AND POTASSIUM CHLORIDE 100 ML/HR: 5; .45; .15 INJECTION INTRAVENOUS at 01:49

## 2021-03-12 RX ADMIN — BISACODYL 10 MG: 10 SUPPOSITORY RECTAL at 11:29

## 2021-03-12 RX ADMIN — METHOCARBAMOL TABLETS 750 MG: 750 TABLET, COATED ORAL at 05:51

## 2021-03-12 RX ADMIN — HYDROMORPHONE HYDROCHLORIDE 1 MG: 1 INJECTION, SOLUTION INTRAMUSCULAR; INTRAVENOUS; SUBCUTANEOUS at 17:36

## 2021-03-12 NOTE — ORTHOTIC NOTE
Orthotic Note            Date: 3/12/2021      Patient Name: Carmencita Mills            Reason for Consult:  Patient Active Problem List   Diagnosis    Central cord syndrome [facility])    Pulmonary insufficiency    Closed fracture of fifth cervical vertebra ([facility] [address] )    Spinal cord injury, C1-C7 ([facility] [address] )    Fall    Pain     [employer] [place] an don pt in a [employer] Multipodus Boot to LLE while supine in bed  Kickstand deployed to prevent external rotation  RN aware, Radha Alejo will continue to follow  Recommendations:  Please call Mobility Coordinator at ext  [phone] in regards to bracing instruction and/or adjustment    Pauline Dugan Restorative Technician, BS

## 2021-03-12 NOTE — PROGRESS NOTES
[address] [facility]  Progress Note Ami Stevenss 1985, 38 y o  male MRN: [id number]  Unit/Bed#: ICU 06 Encounter: [id number]  Primary Care Provider: Lorne Sam MD   Date and time admitted to hospital: 3/6/2021  9:02 PM    * Central cord syndrome [facility])  Assessment & Plan  POD5 PCDF C2-7 with C3-5 laminectomy with Dr Roxy Kowalski on 3/7/2021  · LAWRENCE B cord injury s/p fall down stairs with C5 spinous process and lamina fractures  · On exam minimal R bicep 3/5, L bicep 2/5, minimal wrist/hand movement  otherwise 0/5 throughout  Patchy sensory level bilaterally  JPS intact in feet but not hands  Imaging:  · Upright cervical spine x-rays 3/11/21: Status post posterior fusion from C2 through C7  · Cervical spine MRI 3/7/2021: Increased T2 cord signal/edema involving stenotic levels C3 to inferior C5 most consistent with central cord syndrome in the setting of trauma  2   Nondisplaced fracture involving C5 spinous process and right lamina  Bone marrow edema within the spinous processes of C3-C5  3   Edema within posterior paravertebral musculature and increased T2 signal along the interspinous ligaments at levels C3-C6 suggestive of ligamentous injury  Anterior and posterior longitudinal ligaments appear intact  4   Degenerative canal stenosis more notable at level C3-4, as detailed  Plan:  · Frequent neuro checks  · MAP goal > 85 mmHg x 7 days, currently on Levophed (day 5/7)  · VISTA collar at all times, babak collar for showering  · Bruce drain discontinued  · PMR evaluation - needs dedicated SCI rehab program    · PT/OT   · DVT ppx: SCDs, Lovenox  Neurosurgery will continue to follow  Please call with any questions or concerns  Subjective/Objective   Chief Complaint: "I"m ok "    Subjective: Continues to endorse severe posterior cervical pain  Denies New or worsening numbness or weakness  Complaining of abdominal pain although had a BM yesterday      Objective: Right biceps 3/5, left biceps 3/5, minimal movement in wrists and flickers in fingers  + saddle anesthesia  No bowel or bladder sensation  Laying in bed  Posterior cervical incision clean and dry, well-approximated  I/O       03/10 0701 - 03/11 0700 03/11 0701 - 03/12 0700 03/12 0701 - 03/13 0700    P  O  460      I V  (mL/kg) 598 1 (8 4) 1990 (28) 516 8 (7 3)    IV Piggyback  50     Total Intake(mL/kg) 1058 1 (14 9) 2040 (28 7) 516 8 (7 3)    Urine (mL/kg/hr) 1700 (1) 1705 (1)     Drains 10      Stool 0      Total Output 1710 1705     Net -651 9 +335 +516 8           Unmeasured Stool Occurrence 1 x            Invasive Devices     Peripherally Inserted Central Catheter Line            PICC Line 02/45/18 Right Basilic 3 days          Peripheral Intravenous Line            Peripheral IV 03/07/21 Distal;Right;Upper;Ventral (anterior) Arm 5 days          Arterial Line            Arterial Line 03/07/21 Radial 5 days                Physical Exam:  Vitals: Blood pressure 150/85, pulse 84, temperature 99 5 °F (37 5 °C), temperature source Oral, resp  rate 15, height 5' 11" (1 803 m), weight 71 kg (156 lb 8 4 oz), SpO2 97 %  ,Body mass index is 21 83 kg/m²  Hemodynamic Monitoring: MAP: Arterial Line MAP (mmHg): 114 mmHg    General appearance: alert, appears stated age, cooperative and no distress  Head: Normocephalic, without obvious abnormality, atraumatic  Eyes: EOMI, PERRL  Neck: supple, symmetrical, VISTA collar  Posterior cervical incision clean and dry    Back: no kyphosis present  Lungs: non labored breathing  Heart: regular heart rate  Neurologic:   Mental status: Alert, oriented x3, thought content appropriate  Cranial nerves: grossly intact (Cranial nerves II-XII)  Sensory: patchy T6-T7 sensory level  Motor: Right/left biceps 3/5, otherwise 0/5  Reflexes: depressed reflexes      Lab Results:  Results from last 7 days   Lab Units 03/12/21  0553 03/11/21  0454 03/10/21  0640 03/09/21  0436   WBC Thousand/uL 15 55* 15 65* 17 55* 20 50*   HEMOGLOBIN g/dL 10 8* 10 6* 11 3* 11 5*   HEMATOCRIT % 33 1* 32 6* 34 1* 34 9*   PLATELETS Thousands/uL 351 323 303 312   NEUTROS PCT %  --  58 60 77*   MONOS PCT %  --  14* 12 9     Results from last 7 days   Lab Units 03/12/21  0553 03/11/21  0454 03/10/21  0640  03/06/21  2112   POTASSIUM mmol/L 4 3 3 7 3 6   < >  --    CHLORIDE mmol/L 100 104 105   < >  --    CO2 mmol/L 28 31 29   < >  --    CO2, I-STAT mmol/L  --   --   --   --  26   BUN mg/dL 12 9 9   < >  --    CREATININE mg/dL 0 78 0 83 0 92   < >  --    CALCIUM mg/dL 9 0 8 8 8 7   < >  --    ALK PHOS U/L  --  53  --   --   --    ALT U/L  --  11*  --   --   --    AST U/L  --  14  --   --   --    GLUCOSE, ISTAT mg/dl  --   --   --   --  87    < > = values in this interval not displayed  Results from last 7 days   Lab Units 03/12/21  0553 03/11/21  0454 03/10/21  0640   MAGNESIUM mg/dL 2 1 1 8 1 8     Results from last 7 days   Lab Units 03/10/21  0640 03/07/21  0402   PHOSPHORUS mg/dL 3 4 3 6     Results from last 7 days   Lab Units 03/06/21  2116   INR  1 08     No results found for: TROPONINT  ABG:  Lab Results   Component Value Date    PHART 7 441 03/07/2021    SCG5DAC 36 7 03/07/2021    PO2ART 162 7 (H) 03/07/2021    TGJ5TSR 24 4 03/07/2021    BEART 0 6 03/07/2021    SOURCE Line, Arterial 03/07/2021       Imaging Studies: I have personally reviewed pertinent reports  and I have personally reviewed pertinent films in PACS    Ct Chest Abdomen Pelvis W Wo Contrast    Result Date: 3/6/2021  Impression: No acute traumatic injury identified  I personally discussed this study with Amador Hollins on 3/6/2021 at 9:53 PM  Workstation performed: [id number]     Xr Cervical Spine 2 Or 3 Views    Result Date: 3/11/2021  Impression: Status post posterior fusion from C2 through C7   Workstation performed: [id number]    Xr Spine Cervical 2 Or 3 Vw Injury    Result Date: 3/7/2021  Impression: Fluoroscopic guidance provided for procedure guidance  Please refer to the separate procedure notes for additional details  Workstation performed: [id number]     Xr Abdomen 1 View Kub    Result Date: 3/11/2021  Impression: Diffuse large and small bowel distention in keeping with an ileus  Workstation performed: [id number]    Ct Head Without Contrast    Result Date: 3/6/2021  Impression: No acute intracranial abnormality  I personally discussed this study with Polo Watson on 3/6/2021 at 9:53 PM  Workstation performed: [id number]     Ct Spine Cervical Without Contrast    Addendum Date: 3/6/2021    ADDENDUM: There is a nondisplaced fracture through the spinous process and right lamina of C5  I personally discussed this study with Polo Watson on 3/6/2021 at 10:14 PM      Result Date: 3/6/2021  Impression: No cervical spine fracture or traumatic malalignment  Central disc protrusions at C2-C3, C3-C4, and C4-C5  Unfortunately, this is incompletely evaluated  Given the reported neurological symptoms in this trauma patient, recommend MRI  I personally discussed this study with Polo Watson on 3/6/2021 at 9:53 PM  Workstation performed: [id number]     Mri Cervical Spine Wo Contrast    Result Date: 3/7/2021  Impression: 1  Increased T2 cord signal/edema involving stenotic levels C3 to inferior C5 most consistent with central cord syndrome in the setting of trauma  2   Nondisplaced fracture involving C5 spinous process and right lamina  Bone marrow edema within the spinous processes of C3-C5  3   Edema within posterior paravertebral musculature and increased T2 signal along the interspinous ligaments at levels C3-C6 suggestive of ligamentous injury  Anterior and posterior longitudinal ligaments appear intact  4   Degenerative canal stenosis more notable at level C3-4, as detailed    I personally discussed this study with Ericka Palencia on 3/7/2021 at 7:35 AM  Workstation performed: [id number]     [facility] Thoracic Spine Wo Contrast    Result Date: 3/7/2021  Impression: No acute finding  Unremarkable MRI of the thoracic spine  Workstation performed: [id number]     Mri Lumbar Spine Wo Contrast    Result Date: 3/7/2021  Impression: No acute finding  Unremarkable MRI of the lumbar spine  Workstation performed: [id number]     Xr Chest 1 View    Result Date: 3/10/2021  Impression: No acute cardiopulmonary disease within limitations of supine imaging  Workstation performed: [id number]     X-ray Chest 1 View    Result Date: 3/7/2021  Impression: The tip of the endotracheal tube projects 2 4 cm above the odalys  Clear lungs  Workstation performed: [id number]     Xr Trauma Multiple    Result Date: 3/7/2021  Impression: No acute cardiopulmonary disease within limitations of supine imaging  Workstation performed: [id number]     Ir Picc Placement Double Lumen    Result Date: 3/9/2021  Impression: Ultrasound and fluoroscopically guided placement of a power injectable dual lumen peripherally inserted central venous catheter via the right basilic vein with its tip at the cavoatrial junction under ultrasound and fluoroscopic guidance  PLAN: Patient is at higher than usual risk of thrombosis of his PICC access site due to immobility  When patient is able to remove the cervical collar and position his neck for internal jugular access recommend return to IR for placement of tunneled jugular central venous access and removal of PICC Workstation performed: [id number]       EKG, Pathology, and Other Studies: I have personally reviewed pertinent reports        VTE Pharmacologic Prophylaxis: Sequential compression device (Venodyne)  and Enoxaparin (Lovenox)    VTE Mechanical Prophylaxis: sequential compression device

## 2021-03-12 NOTE — PROGRESS NOTES
Progress Note - Acute Pain Service    David Keith 38 y o  male MRN: [id number]  Unit/Bed#: ICU 06 Encounter: [id number]      Assessment:   Principal Problem:    Central cord syndrome [facility])  Active Problems:    Pulmonary insufficiency    Closed fracture of fifth cervical vertebra (HCC)    Spinal cord injury, C1-C7 ([facility] [address] )    Fall    Pain    David Keith is a 38 y o  male   Status post fall down stairs resulting in central cord syndrome and quadriplegia   Underwent C3 through C5 laminectomy and C2 through C7 fixation/fusion   Patient has had significant neck pain, placed on ketamine infusion   3/9/21with good results until  3/10/21 when pain began to worsen again  Plan:    Continue Tylenol 975 mg p o  q 8 hours scheduled   Change Dilaudid to 4 mg p o  q 3 hours p r n  moderate pain   Change Dilaudid to 6 mg p o  q 3 hours p r n  severe pain   Change Dilaudid 1 mg IV q 4 hours p r n  breakthrough pain   Continue ketamine infusion at 0 2 mg/ kg/ HR  Decreased to 0 1   Mg/kg /HR  tomorrow morning  then discontinue in the a m  on 3/14/21   Continue Valium 5 mg p o  q 6 hours scheduled   Increase gabapentin to 400 mg p o  t i d  scheduled   Continue Robaxin 750 mg p o  q 6 hours scheduled   Continue lidocaine patch for up to 12 hours daily   Continue bowel regimen to avoid opioid induced constipation  APS will continue to follow  Please call  / Avila Puga or [facility] (/ between [phone] and on weekends) with questions or concerns    Pain History  Current pain location(s):   Neck, shoulders  Pain Scale:    10/10  Quality:  Sharp, aching  24 hour history:  Patient changed to oral Dilaudid yesterday  Continues to complain of significant pain in his neck and bilateral shoulders which is only temporarily relieved with p r n  pain medications  Opioid requirement previous 24 hours:   Dilaudid 24 mg p o , Dilaudid 3 mg IV      Meds/Allergies all current active meds have been reviewed, current meds:   Current Facility-Administered Medications   Medication Dose Route Frequency    acetaminophen (TYLENOL) tablet 975 mg  975 mg Oral Q8H [address]    bisacodyl (DULCOLAX) rectal suppository 10 mg  10 mg Rectal Daily PRN    dextrose 5 % and sodium chloride 0 45 % with KCl 20 mEq/L infusion  100 mL/hr Intravenous Continuous    diazepam (VALIUM) tablet 5 mg  5 mg Oral Q6H    enoxaparin (LOVENOX) subcutaneous injection 30 mg  30 mg Subcutaneous Q12H [address]    gabapentin (NEURONTIN) capsule 300 mg  300 mg Oral TID    glycopyrrolate (ROBINUL) injection 0 2 mg  0 2 mg Intravenous Q4H PRN    haloperidol lactate (HALDOL) injection 2 mg  2 mg Intramuscular Q30 Min PRN    HYDROmorphone (DILAUDID) injection 1 mg  1 mg Intravenous Q3H PRN    HYDROmorphone (DILAUDID) tablet 4 mg  4 mg Oral Q4H PRN    Or    HYDROmorphone (DILAUDID) tablet 6 mg  6 mg Oral Q4H PRN    ketamine 250 mg (STANDARD CONCENTRATION) IV in sodium chloride 0 9% 250 mL  0 2 mg/kg/hr Intravenous Continuous    lidocaine (LIDODERM) 5 % patch 1 patch  1 patch Topical Daily    LORazepam (ATIVAN) injection 1 mg  1 mg Intravenous Q1H PRN    melatonin tablet 3 mg  3 mg Oral HS    methocarbamol (ROBAXIN) tablet 750 mg  750 mg Oral Q6H [address]    norepinephrine (LEVOPHED) 4 mg (STANDARD CONCENTRATION) IV in sodium chloride 0 9% 250 mL  1-30 mcg/min Intravenous Titrated    ondansetron (ZOFRAN) injection 4 mg  4 mg Intravenous Q6H PRN    polyethylene glycol (MIRALAX) packet 17 g  17 g Oral Daily    senna-docusate sodium (SENOKOT S) 8 6-50 mg per tablet 1 tablet  1 tablet Oral BID    sertraline (ZOLOFT) tablet 50 mg  50 mg Oral Daily    and PTA meds:   None       No Known Allergies    Objective     Temp:  [98 2 °F (36 8 °C)-99 3 °F (37 4 °C)] 99 °F (37 2 °C)  HR:  [] 94  Resp:  [14-26] 17  Arterial Line BP: (122-156)/(60-80) 148/74    Physical Exam  Vitals signs and nursing note reviewed  Constitutional:       General: He is awake  He is not in acute distress  Skin:     General: Skin is warm and dry  Neurological:      Mental Status: He is alert and oriented to person, place, and time  GCS: GCS eye subscore is 4  GCS verbal subscore is 5  GCS motor subscore is 6  Psychiatric:         Behavior: Behavior is cooperative  Lab Results:   Results from last 7 days   Lab Units 03/12/21  0553   WBC Thousand/uL 15 55*   HEMOGLOBIN g/dL 10 8*   HEMATOCRIT % 33 1*   PLATELETS Thousands/uL 351      Results from last 7 days   Lab Units 03/12/21  0553 03/11/21  0454  03/06/21  2112   POTASSIUM mmol/L 4 3 3 7   < >  --    CHLORIDE mmol/L 100 104   < >  --    CO2 mmol/L 28 31   < >  --    CO2, I-STAT mmol/L  --   --   --  26   BUN mg/dL 12 9   < >  --    CREATININE mg/dL 0 78 0 83   < >  --    CALCIUM mg/dL 9 0 8 8   < >  --    ALK PHOS U/L  --  53  --   --    ALT U/L  --  11*  --   --    AST U/L  --  14  --   --    GLUCOSE, ISTAT mg/dl  --   --   --  87    < > = values in this interval not displayed  Imaging Studies: I have personally reviewed pertinent reports  EKG, Pathology, and Other Studies: I have personally reviewed pertinent reports  Counseling / Coordination of Care  Total floor / unit time spent today 20 minutes  Greater than 50% of total time was spent with the patient and / or family counseling and / or coordination of care  A description of the counseling / coordination of care:  Patient interview, physical examination, review of medical record, review of imaging and laboratory data, development of pain management plan, discussion of pain management plan with patient and primary service  Please note that the APS provides consultative services regarding pain management only    With the exception of ketamine and epidural infusions and except when indicated, final decisions regarding starting or changing doses of analgesic medications are at the discretion of the consulting service  Off hours consultation and/or medication management is generally not available      Celestina Bailey PA-C  Acute Pain Service

## 2021-03-12 NOTE — OCCUPATIONAL THERAPY NOTE
Occupational Therapy         Patient Name: Shefali HERNANDEZJ Date: 3/12/2021       03/12/21 1000   OT Last Visit   OT Visit Date 03/12/21   Note Type   Note Type Treatment   Cancel Reasons Other   Cognition   Orientation Level Oriented X4     Attempted to see pt this am for OT tx - pt declined c/o severe abdominal pain - checked with RN re: ability for pt to receive pain medication however she reports pt was medicated w/in the last hour and cannot receive any additional medications - will check back with pt at later time    Shameka Carl, OT

## 2021-03-12 NOTE — ASSESSMENT & PLAN NOTE
POD5 PCDF C2-7 with C3-5 laminectomy with Dr Veronica Eugene on 3/7/2021  · LAWRENCE B cord injury s/p fall down stairs with C5 spinous process and lamina fractures  · On exam minimal R bicep 3/5, L bicep 2/5, minimal wrist/hand movement  otherwise 0/5 throughout  Patchy sensory level bilaterally  JPS intact in feet but not hands  Imaging:  · Upright cervical spine x-rays 3/11/21: Status post posterior fusion from C2 through C7  · Cervical spine MRI 3/7/2021: Increased T2 cord signal/edema involving stenotic levels C3 to inferior C5 most consistent with central cord syndrome in the setting of trauma  2   Nondisplaced fracture involving C5 spinous process and right lamina  Bone marrow edema within the spinous processes of C3-C5  3   Edema within posterior paravertebral musculature and increased T2 signal along the interspinous ligaments at levels C3-C6 suggestive of ligamentous injury  Anterior and posterior longitudinal ligaments appear intact  4   Degenerative canal stenosis more notable at level C3-4, as detailed  Plan:  · Frequent neuro checks  · MAP goal > 85 mmHg x 7 days, currently on Levophed (day 5/7)  · VISTA collar at all times, babak collar for showering  · Bruce drain discontinued  · PMR evaluation - needs dedicated SCI rehab program    · PT/OT   · DVT ppx: SCDs, Lovenox  Neurosurgery will continue to follow  Please call with any questions or concerns

## 2021-03-12 NOTE — PHYSICAL THERAPY NOTE
Physical Therapy Cancellation Note    PT orders received chart review completed  Pt is currently with increased abdominal pain attempted 2x times today although pt refusing 2* to pain at this time       03/12/21 1500   Note Type   Cancel Reasons Other       Sudheer Herr, PT

## 2021-03-12 NOTE — UTILIZATION REVIEW
Continued Stay Review    Date: 3/12/21                          Current Patient Class: inpatient  Current Level of Care: ICU  HPI:35 y o  male initially admitted on 3/6/21     Assessment/Plan:  POD5 PCDF C2-7 with C3-5 laminectomy    HPI/24hr events:  No acute events overnight  Pt remains on levo @ 4 for MAP > 85  Pt remains ketamine @ 0 2mg/kg/hr with persistent pain which he rates as a 10/10  Patient states that when he gets additional pain medications that helped only temporarily  Patient states that he feels the pain in his neck and his shoulders    Acute pain consulted, meds adjusted for better control  Denies New or worsening numbness or weakness  Complaining of abdominal pain although had a BM yesterday, ileus on xray  Right biceps 3/5, left biceps 3/5, minimal movement in wrists and flickers in fingers  + saddle anesthesia  No bowel or bladder sensation  Laying in bed  Posterior cervical incision clean and dry, well-approximated       Pertinent Labs/Diagnostic Results:   Results from last 7 days   Lab Units 03/12/21  0553 03/11/21  0454 03/10/21  0640 03/09/21  0436 03/08/21  0446   WBC Thousand/uL 15 55* 15 65* 17 55* 20 50* 21 03*   HEMOGLOBIN g/dL 10 8* 10 6* 11 3* 11 5* 11 3*   HEMATOCRIT % 33 1* 32 6* 34 1* 34 9* 34 5*   PLATELETS Thousands/uL 351 323 303 312 316   NEUTROS ABS Thousands/µL  --  9 26* 10 38* 15 64* 13 91*     Results from last 7 days   Lab Units 03/12/21  0553 03/11/21  0454 03/10/21  0640 03/09/21  0434 03/08/21  0446 03/07/21  0402   SODIUM mmol/L 135* 139 138 138 142 142   POTASSIUM mmol/L 4 3 3 7 3 6 3 8 3 4* 4 2   CHLORIDE mmol/L 100 104 105 105 109* 111*   CO2 mmol/L 28 31 29 30 29 27   ANION GAP mmol/L 7 4 4 3* 4 4   BUN mg/dL 12 9 9 8 6 8   CREATININE mg/dL 0 78 0 83 0 92 0 83 0 95 0 89   EGFR ml/min/1 73sq m 133 130 122 130 118 126   CALCIUM mg/dL 9 0 8 8 8 7 8 9 8 4 8 7   CALCIUM, IONIZED mmol/L  --  1 12  --   --   --   --    MAGNESIUM mg/dL 2 1 1 8 1 8 1 8  --  2 0 PHOSPHORUS mg/dL  --   --  3 4  --   --  3 6     Results from last 7 days   Lab Units 03/11/21  0454   AST U/L 14   ALT U/L 11*   ALK PHOS U/L 53   TOTAL PROTEIN g/dL 6 8   ALBUMIN g/dL 2 8*   TOTAL BILIRUBIN mg/dL 0 57     Results from last 7 days   Lab Units 03/12/21  0553 03/11/21  0454 03/10/21  0640 03/09/21  0434 03/08/21  0446 03/07/21  0402 03/06/21  2116   GLUCOSE RANDOM mg/dL 118 96 92 116 111 152* 91     Results from last 7 days   Lab Units 03/07/21  1014 03/07/21  0754 03/07/21  0417   PH ART  7 441 7 505* 7 291*   PCO2 ART mm Hg 36 7 29 8* 55 4*   PO2 ART mm Hg 162 7* 171 2* 123 9   HCO3 ART mmol/L 24 4 23 0 26 1   BASE EXC ART mmol/L 0 6 0 8 -1 3   O2 CONTENT ART mL/dL 17 8 18 5 18 1   O2 HGB, ARTERIAL % 98 5* 98 6* 96 6   ABG SOURCE  Line, Arterial Line, Arterial Line, Arterial     Results from last 7 days   Lab Units 03/06/21  2112   PH, PAOLO I-STAT  7 393   PCO2, PAOLO ISTAT mm HG 41 0*   PO2, PAOLO ISTAT mm HG 72 0*   HCO3, PAOLO ISTAT mmol/L 25 0   I STAT BASE EXC mmol/L 0   I STAT O2 SAT % 94*     Results from last 7 days   Lab Units 03/06/21  2116   PROTIME seconds 14 0   INR  1 08     Results from last 7 days   Lab Units 03/06/21  2116   ETHANOL LVL mg/dL <3   ACETAMINOPHEN LVL ug/mL <2*   SALICYLATE LVL mg/dL <3*     3/11  KUB=  Diffuse large and small bowel distention in keeping with an ileus      Vital Signs:   /85 (BP Location: Left arm)   Pulse 86   Temp 99 °F (37 2 °C) (Oral)   Resp 14   Ht 5' 11" (1 803 m)   Wt 71 kg (156 lb 8 4 oz)   SpO2 98%   BMI 21 83 kg/m²     Medications:   acetaminophen, 975 mg, Oral, Q8H GILMAR  diazepam, 5 mg, Oral, Q6H  enoxaparin, 30 mg, Subcutaneous, Q12H GILMAR  gabapentin, 300 mg, Oral, TID  lidocaine, 1 patch, Topical, Daily  melatonin, 3 mg, Oral, HS  methocarbamol, 750 mg, Oral, Q6H GILMAR  polyethylene glycol, 17 g, Oral, Daily  senna-docusate sodium, 1 tablet, Oral, BID  sertraline, 50 mg, Oral, Daily    Continuous IV Infusions:  dextrose 5 % and sodium chloride 0 45 % with KCl 20 mEq/L, 100 mL/hr, Intravenous, Continuous  ketamine, 0 2 mg/kg/hr, Intravenous, Continuous  [START ON 3/13/2021] ketamine, 0 1 mg/kg/hr, Intravenous, Continuous  norepinephrine, 1-30 mcg/min, Intravenous, Titrated    PRN Meds:  bisacodyl, 10 mg, Rectal, Daily PRN  glycopyrrolate, 0 2 mg, Intravenous, Q4H PRN  haloperidol lactate, 2 mg, Intramuscular, Q30 Min PRN  HYDROmorphone, 1 mg, Intravenous, Q3H PRN-used x4/24hrs  HYDROmorphone, 4 mg, Oral, Q4H PRN    Or  HYDROmorphone, 6 mg, Oral, Q4H PRN-used x5/24hrs  LORazepam, 1 mg, Intravenous, Q1H PRN  ondansetron, 4 mg, Intravenous, Q6H PRN    Discharge Plan: d    Network Utilization Review Department  ATTENTION: Please call with any questions or concerns to [phone] and carefully listen to the prompts so that you are directed to the right person  All voicemails are confidential   Cameron Hillman all requests for admission clinical reviews, approved or denied determinations and any other requests to dedicated fax number below belonging to the campus where the patient is receiving treatment   List of dedicated fax numbers for the Facilities:  [address] DENIALS (Administrative/Medical Necessity) [phone]   [address] (Maternity/NICU/Pediatrics) [phone]   [address] Nelson Cuenca 0455 (  [facility]) [address] [place] [address] [address] Wilfr[address] [facility] [address] [place] [address] [phone]

## 2021-03-12 NOTE — PROGRESS NOTES
Daily Progress Note - Critical Care   Patrick Ruelas 38 y o  male MRN: [id number]  Unit/Bed#: ICU 06 Encounter: [id number]      ----------------------------------------------------------------------------------------  HPI/24hr events:  No acute events overnight  Pt remains on levo @ 4 for MAP > 85  Pt remains ketamine @ 0 2mg/kg/hr with persistent pain which he rates as a 10/10  Patient states that when he gets additional pain medications that helped only temporarily  Patient states that he feels the pain in his neck and his shoulders  ---------------------------------------------------------------------------------------  SUBJECTIVE  Patient seen and examined this morning on rounds  Patient complains of 10/10 pain in his neck and shoulders  Patient states that the additional pain medications help but only temporarily  Review of Systems  Review of systems was reviewed and negative unless stated above in HPI/24-hour events   ---------------------------------------------------------------------------------------  Assessment and Plan:    Neuro:    Diagnosis:  Spinal cord injury with central cord syndrome  o LAWRENCE B cord injury s/p fall downstairs w/ C5 spinous process fracture and lamina fracture   o POD5 PCDF  C2-C7 with C3-C5 laminectomy with Dr Dimitri wilson nsgy following   o Maintain MAP > 80   Continue permissive hypertension x7 days   Currently day 6/7   Levo @ 4   - Wean as tolerated   o Maintain vista collar at all times  o Continue to monitor exam  o Continue daily PT/OT  o Continue dispo planning for spinal cord injury rehab program  o Continue pain control    See below    Diagnosis:  Post trauma depression/anxiety  - Continue daily Zoloft 50 mg  - Continue Valium 5 mg q 6 hours  - P r n   Ativan   Diagnosis: Pain and sedation   o Difficult pain to control   o APS following   o Current gtts: ketamine 0 2mg/kg/hr   o Scheduled:    Tylenol 975 mg q 8 hours   Gabapentin 300 mg t i d   Robaxin 750 mg q 6 hours   Lidocaine patch  o Prn:    PO Dilaudid 4 mg and 6 mg   IV dilaudid 1mg q 3hrs for breakthrough     CV:    Diagnosis:  Permissive hypertension  o Maintain MAP > 85 in the setting of spinal cord injury  o Maintain MAP goal for 7 days    Currently day 6/7   o Currently on levo @ 4    Wean as tolerated in order to maintain goal  o Continue to closely monitor hemodynamics  o Continue to closely monitor in telemetry    Pulm:   Diagnosis:  No acute issues  o Currently on remained  o Maintain SaO2 > 92%  o Encourage good pulmonary hygiene   Frequent coughing   Incentive spirometry as able/with assistance    GI:    Diagnosis:  Ileus  o Continue aggressive bowel regimen   Scheduled Senokot, daily MiraLax   P r n  Suppository   Advanced as needed  · One liquid bowel movement overnight    Renal/:    Diagnosis:  Urinary retention in the setting of spinal cord injury  o Continue with bladder training  o Bladder scan with p r n   Straight cath  o Continue to monitor UOP closely  o Strict I&Os  o Daily BMP    F/E/N:    Fluids - continue D5 half-normal saline at 100 cc/hours   Electrolytes - trend and replete as needed   Nutrition - NPO sepsis meds    Heme/Onc:    Diagnosis:  No acute issues   DVT ppx: SCDs, Lovenox    Endo:    Diagnosis:  No acute issues    ID:    Diagnosis:  No acute issues  o Trend daily fever curve and white blood cell count  o Monitor closely for signs of infection    MSK/Skin:    Diagnosis:  Paraplegia with bilateral upper extremity weakness in the setting of C-spine injury  o Continue with aggressive PT/OT q d   o Continue with dispo planning to rehab with case management      Disposition: Continue Critical Care   Code Status: Level 1 - Full Code  ---------------------------------------------------------------------------------------  ICU CORE MEASURES    Prophylaxis   VTE Pharmacologic Prophylaxis: Enoxaparin (Lovenox)  VTE Mechanical Prophylaxis: sequential compression device  Stress Ulcer Prophylaxis: Prophylaxis Not Indicated     ABCDE Protocol (if indicated)  Plan to perform spontaneous awakening trial today? Not applicable  Plan to perform spontaneous breathing trial today? Not applicable  Obvious barriers to extubation? Not applicable  CAM-ICU: Negative    Invasive Devices Review  Invasive Devices     Peripherally Inserted Central Catheter Line            PICC Line  Right Basilic 3 days          Peripheral Intravenous Line            Peripheral IV 21 Distal;Right;Upper;Ventral (anterior) Arm 5 days          Arterial Line            Arterial Line 21 Radial 5 days              Can any invasive devices be discontinued today? No  ---------------------------------------------------------------------------------------  OBJECTIVE    Vitals   Vitals:    21 2300 21 0000 21 0100 21 0200   BP:       BP Location:       Pulse: 100 102 96 96   Resp: 16 19 16 16   Temp:  99 °F (37 2 °C)     TempSrc:       SpO2: 96% 97% 96% 97%   Weight:       Height:         Temp (24hrs), Av 8 °F (37 1 °C), Min:98 2 °F (36 8 °C), Max:99 3 °F (37 4 °C)  Current: Temperature: 99 °F (37 2 °C)  HR: 91  BP: 141/72  RR: 16  SpO2: 97%    Respiratory:  SpO2: SpO2: 97 %, SpO2 Device: O2 Device: None (Room air)       Invasive/non-invasive ventilation settings   Respiratory    Lab Data (Last 4 hours)    None         O2/Vent Data (Last 4 hours)    None                Physical Exam  Constitutional:       General: He is not in acute distress  Appearance: He is normal weight  He is ill-appearing  He is not toxic-appearing  Comments: Alert, young male resting in bed  In no acute distress  Ill-appearing   HENT:      Head: Normocephalic and atraumatic  Nose: Nose normal  No congestion or rhinorrhea  Mouth/Throat:      Mouth: Mucous membranes are moist       Pharynx: Oropharynx is clear   No oropharyngeal exudate or posterior oropharyngeal erythema  Eyes:      Extraocular Movements: Extraocular movements intact  Conjunctiva/sclera: Conjunctivae normal       Pupils: Pupils are equal, round, and reactive to light  Neck:      Comments: In vista collar  Cardiovascular:      Rate and Rhythm: Normal rate and regular rhythm  Pulses: Normal pulses  Heart sounds: No murmur  Pulmonary:      Effort: Pulmonary effort is normal  No respiratory distress  Breath sounds: Normal breath sounds  No wheezing, rhonchi or rales  Comments: Breath sounds clear and equal bilaterally  No respiratory distress on room air  Abdominal:      General: Abdomen is flat  Palpations: Abdomen is soft  Musculoskeletal:         General: No tenderness, deformity or signs of injury  Right lower leg: No edema  Left lower leg: No edema  Skin:     General: Skin is warm and dry  Capillary Refill: Capillary refill takes less than 2 seconds  Findings: No erythema or rash  Neurological:      Mental Status: He is alert and oriented to person, place, and time  Mental status is at baseline  Cranial Nerves: No cranial nerve deficit  Sensory: Sensory deficit present  Motor: Weakness present  Comments: A&O x3  Appropriately answering questions and following commands  No appreciated cranial nerve deficits  Sensation intact in all 4 extremities, however decreases distally in all 4 extremities  Equal bilaterally  Strength 0/5 bilateral lower extremities  Strength 3/5 to RUE and 2/5 to LUE    Psychiatric:         Thought Content:  Thought content normal          Judgment: Judgment normal        Laboratory and Diagnostics:  Results from last 7 days   Lab Units 03/11/21  0454 03/10/21  0640 03/09/21  0436 03/08/21  0446 03/07/21  0402 03/06/21  2116 03/06/21  2112   WBC Thousand/uL 15 65* 17 55* 20 50* 21 03* 17 46* 12 27*  --    HEMOGLOBIN g/dL 10 6* 11 3* 11 5* 11 3* 12 5 11 7*  --    I STAT HEMOGLOBIN g/dl  -- --   --   --   --   --  12 6   HEMATOCRIT % 32 6* 34 1* 34 9* 34 5* 37 9 35 4*  --    HEMATOCRIT, ISTAT %  --   --   --   --   --   --  37   PLATELETS Thousands/uL 323 303 312 316 381 326  --    NEUTROS PCT % 58 60 77* 66 89* 57  --    MONOS PCT % 14* 12 9 11 2* 9  --      Results from last 7 days   Lab Units 03/11/21  0454 03/10/21  0640 03/09/21  0434 03/08/21  0446 03/07/21  0402 03/06/21  2116 03/06/21  2112   SODIUM mmol/L 139 138 138 142 142 145  --    POTASSIUM mmol/L 3 7 3 6 3 8 3 4* 4 2 3 9  --    CHLORIDE mmol/L 104 105 105 109* 111* 113*  --    CO2 mmol/L 31 29 30 29 27 26  --    CO2, I-STAT mmol/L  --   --   --   --   --   --  26   ANION GAP mmol/L 4 4 3* 4 4 6  --    BUN mg/dL 9 9 8 6 8 10  --    CREATININE mg/dL 0 83 0 92 0 83 0 95 0 89 1 07  --    CALCIUM mg/dL 8 8 8 7 8 9 8 4 8 7 8 5  --    GLUCOSE RANDOM mg/dL 96 92 116 111 152* 91  --    ALT U/L 11*  --   --   --   --   --   --    AST U/L 14  --   --   --   --   --   --    ALK PHOS U/L 53  --   --   --   --   --   --    ALBUMIN g/dL 2 8*  --   --   --   --   --   --    TOTAL BILIRUBIN mg/dL 0 57  --   --   --   --   --   --      Results from last 7 days   Lab Units 03/11/21  0454 03/10/21  0640 03/09/21  0434 03/07/21  0402   MAGNESIUM mg/dL 1 8 1 8 1 8 2 0   PHOSPHORUS mg/dL  --  3 4  --  3 6      Results from last 7 days   Lab Units 03/06/21  2116   INR  1 08              ABG:  Results from last 7 days   Lab Units 03/07/21  1014   PH ART  7 441   PCO2 ART mm Hg 36 7   PO2 ART mm Hg 162 7*   HCO3 ART mmol/L 24 4   BASE EXC ART mmol/L 0 6   ABG SOURCE  Line, Arterial     VBG:  Results from last 7 days   Lab Units 03/07/21  1014   ABG SOURCE  Line, Arterial           Micro        Imaging:  I have personally reviewed pertinent reports  Intake and Output  I/O       03/10 0701 - 03/11 0700 03/11 0701 - 03/12 0700    P  O  460     I V  (mL/kg) 598 1 (8 4) 464 1 (6 5)    IV Piggyback  50    Total Intake(mL/kg) 1058 1 (14 9) 514 1 (7 2)    Urine (mL/kg/hr) 1700 (1) 1105 (0 6)    Drains 10     Total Output 1710 1105    Net -651 9 -590 9              Height and Weights   Height: 5' 11" (180 3 cm)  IBW: 75 3 kg  Body mass index is 21 83 kg/m²  Weight (last 2 days)     None        Nutrition       Diet Orders   (From admission, onward)             Start     Ordered    03/11/21 1506  Diet NPO; Sips with meds  Diet effective now     Question Answer Comment   Diet Type NPO    NPO Except: Sips with meds    RD to adjust diet per protocol?  Yes        03/11/21 1505    03/09/21 1633  Dietary nutrition supplements  Once     Question Answer Comment   Select Supplement: [employer], Dinner        03/09/21 1633    03/08/21 1430  Dietary nutrition supplements  Once     Question Answer Comment   Select Supplement: Ensure Enlive-Strawberry    Frequency Breakfast, Lunch, Dinner        03/08/21 1429              Active Medications  Scheduled Meds:  Current Facility-Administered Medications   Medication Dose Route Frequency Provider Last Rate    acetaminophen  975 mg Oral [facility] Inderjit Lorenz PA-C      bisacodyl  10 mg Rectal Daily PRN Edwin Bernheim, MD      dextrose 5 % and sodium chloride 0 45 % with KCl 20 mEq/L  100 mL/hr Intravenous Continuous Edwin Bernheim,  mL/hr (03/12/21 0149)    diazepam  5 mg Oral Q6H JAYLYN Burgos      enoxaparin  30 mg Subcutaneous Q12H [facility] [place], [place]      gabapentin  300 mg Oral TID Inderjit Lorenz PA-C      glycopyrrolate  0 2 mg Intravenous Q4H PRN MICHAEL Lopez-BETH      haloperidol lactate  2 mg Intramuscular Q30 Min PRN Harrel Lemme, PA-C      HYDROmorphone  1 mg Intravenous Q3H PRN Inderjit Lorenz PA-C      HYDROmorphone  4 mg Oral Q4H PRN JAYLYN Sánchez      Or    HYDROmorphone  6 mg Oral Q4H PRN JAYLYN Sánchez      ketamine  0 2 mg/kg/hr Intravenous Continuous Harrel Lemme, PA-C 0 2 mg/kg/hr (03/11/21 1421)    lidocaine  1 patch Topical Daily Chilo Meeks MD      LORazepam  1 mg Intravenous Q1H PRN Ericka Braswell PA-C      melatonin  3 mg Oral HS Valerie PleitezJAYLYN burgess      methocarbamol  750 mg Oral Q6H [address] Eneida Garcia MD      norepinephrine  1-30 mcg/min Intravenous Titrated Robert Mireles PA-C 4 mcg/min (03/12/21 0307)    ondansetron  4 mg Intravenous Q6H PRN Eneida Garcia MD      polyethylene glycol  17 g Oral Daily JAYLYN Angeles      senna-docusate sodium  1 tablet Oral BID Rj Zurita PA-C      sertraline  50 mg Oral Daily Rj Zurita PA-C       Continuous Infusions:  dextrose 5 % and sodium chloride 0 45 % with KCl 20 mEq/L, 100 mL/hr, Last Rate: 100 mL/hr (03/12/21 0149)  ketamine, 0 2 mg/kg/hr, Last Rate: 0 2 mg/kg/hr (03/11/21 1421)  norepinephrine, 1-30 mcg/min, Last Rate: 4 mcg/min (03/12/21 0307)      PRN Meds:   bisacodyl, 10 mg, Daily PRN  glycopyrrolate, 0 2 mg, Q4H PRN  haloperidol lactate, 2 mg, Q30 Min PRN  HYDROmorphone, 1 mg, Q3H PRN  HYDROmorphone, 4 mg, Q4H PRN    Or  HYDROmorphone, 6 mg, Q4H PRN  LORazepam, 1 mg, Q1H PRN  ondansetron, 4 mg, Q6H PRN        Allergies   No Known Allergies  ---------------------------------------------------------------------------------------  Advance Directive and Living Will:      Power of :    POLST:    ---------------------------------------------------------------------------------------  Care Time Delivered:   No Critical Care time spent     Robert Mireles PA-C      Portions of the record may have been created with voice recognition software  Occasional wrong word or "sound a like" substitutions may have occurred due to the inherent limitations of voice recognition software    Read the chart carefully and recognize, using context, where substitutions have occurred

## 2021-03-13 LAB
ANION GAP SERPL CALCULATED.3IONS-SCNC: 6 MMOL/L (ref 4–13)
BUN SERPL-MCNC: 11 MG/DL (ref 5–25)
CALCIUM SERPL-MCNC: 8.6 MG/DL (ref 8.3–10.1)
CHLORIDE SERPL-SCNC: 105 MMOL/L (ref 100–108)
CO2 SERPL-SCNC: 26 MMOL/L (ref 21–32)
CREAT SERPL-MCNC: 0.67 MG/DL (ref 0.6–1.3)
ERYTHROCYTE [DISTWIDTH] IN BLOOD BY AUTOMATED COUNT: 12.2 % (ref 11.6–15.1)
GFR SERPL CREATININE-BSD FRML MDRD: 142 ML/MIN/1.73SQ M
GLUCOSE SERPL-MCNC: 115 MG/DL (ref 65–140)
HCT VFR BLD AUTO: 33 % (ref 36.5–49.3)
HGB BLD-MCNC: 10.6 G/DL (ref 12–17)
MAGNESIUM SERPL-MCNC: 1.9 MG/DL (ref 1.6–2.6)
MCH RBC QN AUTO: 30.2 PG (ref 26.8–34.3)
MCHC RBC AUTO-ENTMCNC: 32.1 G/DL (ref 31.4–37.4)
MCV RBC AUTO: 94 FL (ref 82–98)
PLATELET # BLD AUTO: 380 THOUSANDS/UL (ref 149–390)
PMV BLD AUTO: 9.2 FL (ref 8.9–12.7)
POTASSIUM SERPL-SCNC: 4.2 MMOL/L (ref 3.5–5.3)
RBC # BLD AUTO: 3.51 MILLION/UL (ref 3.88–5.62)
SODIUM SERPL-SCNC: 137 MMOL/L (ref 136–145)
WBC # BLD AUTO: 15.2 THOUSAND/UL (ref 4.31–10.16)

## 2021-03-13 PROCEDURE — 80048 BASIC METABOLIC PNL TOTAL CA: CPT | Performed by: PHYSICIAN ASSISTANT

## 2021-03-13 PROCEDURE — 83735 ASSAY OF MAGNESIUM: CPT | Performed by: PHYSICIAN ASSISTANT

## 2021-03-13 PROCEDURE — 99024 POSTOP FOLLOW-UP VISIT: CPT | Performed by: NEUROLOGICAL SURGERY

## 2021-03-13 PROCEDURE — 99232 SBSQ HOSP IP/OBS MODERATE 35: CPT | Performed by: ANESTHESIOLOGY

## 2021-03-13 PROCEDURE — 99233 SBSQ HOSP IP/OBS HIGH 50: CPT | Performed by: SURGERY

## 2021-03-13 PROCEDURE — 85027 COMPLETE CBC AUTOMATED: CPT | Performed by: PHYSICIAN ASSISTANT

## 2021-03-13 RX ORDER — ACETAMINOPHEN 325 MG/1
975 TABLET ORAL EVERY 8 HOURS PRN
Status: DISCONTINUED | OUTPATIENT
Start: 2021-03-13 | End: 2021-03-16 | Stop reason: HOSPADM

## 2021-03-13 RX ORDER — MAGNESIUM SULFATE 1 G/100ML
1 INJECTION INTRAVENOUS ONCE
Status: COMPLETED | OUTPATIENT
Start: 2021-03-13 | End: 2021-03-13

## 2021-03-13 RX ORDER — LACTULOSE 20 G/30ML
10 SOLUTION ORAL ONCE
Status: COMPLETED | OUTPATIENT
Start: 2021-03-13 | End: 2021-03-13

## 2021-03-13 RX ORDER — SODIUM CHLORIDE, SODIUM GLUCONATE, SODIUM ACETATE, POTASSIUM CHLORIDE, MAGNESIUM CHLORIDE, SODIUM PHOSPHATE, DIBASIC, AND POTASSIUM PHOSPHATE .53; .5; .37; .037; .03; .012; .00082 G/100ML; G/100ML; G/100ML; G/100ML; G/100ML; G/100ML; G/100ML
100 INJECTION, SOLUTION INTRAVENOUS CONTINUOUS
Status: DISCONTINUED | OUTPATIENT
Start: 2021-03-13 | End: 2021-03-15

## 2021-03-13 RX ADMIN — SODIUM CHLORIDE, SODIUM GLUCONATE, SODIUM ACETATE, POTASSIUM CHLORIDE, MAGNESIUM CHLORIDE, SODIUM PHOSPHATE, DIBASIC, AND POTASSIUM PHOSPHATE 100 ML/HR: .53; .5; .37; .037; .03; .012; .00082 INJECTION, SOLUTION INTRAVENOUS at 10:00

## 2021-03-13 RX ADMIN — BISACODYL 10 MG: 10 SUPPOSITORY RECTAL at 09:17

## 2021-03-13 RX ADMIN — DIAZEPAM 5 MG: 5 TABLET ORAL at 21:01

## 2021-03-13 RX ADMIN — DIAZEPAM 5 MG: 5 TABLET ORAL at 03:45

## 2021-03-13 RX ADMIN — GABAPENTIN 400 MG: 400 CAPSULE ORAL at 09:17

## 2021-03-13 RX ADMIN — SENNOSIDES, DOCUSATE SODIUM 1 TABLET: 8.6; 5 TABLET ORAL at 18:09

## 2021-03-13 RX ADMIN — HYDROMORPHONE HYDROCHLORIDE 6 MG: 2 TABLET ORAL at 07:50

## 2021-03-13 RX ADMIN — GABAPENTIN 400 MG: 400 CAPSULE ORAL at 21:01

## 2021-03-13 RX ADMIN — SENNOSIDES, DOCUSATE SODIUM 1 TABLET: 8.6; 5 TABLET ORAL at 09:17

## 2021-03-13 RX ADMIN — MAGNESIUM SULFATE HEPTAHYDRATE 1 G: 1 INJECTION, SOLUTION INTRAVENOUS at 09:18

## 2021-03-13 RX ADMIN — SODIUM CHLORIDE, SODIUM GLUCONATE, SODIUM ACETATE, POTASSIUM CHLORIDE, MAGNESIUM CHLORIDE, SODIUM PHOSPHATE, DIBASIC, AND POTASSIUM PHOSPHATE 100 ML/HR: .53; .5; .37; .037; .03; .012; .00082 INJECTION, SOLUTION INTRAVENOUS at 19:50

## 2021-03-13 RX ADMIN — HYDROMORPHONE HYDROCHLORIDE 6 MG: 2 TABLET ORAL at 21:10

## 2021-03-13 RX ADMIN — NOREPINEPHRINE BITARTRATE 3 MCG/MIN: 1 INJECTION, SOLUTION, CONCENTRATE INTRAVENOUS at 18:28

## 2021-03-13 RX ADMIN — METHOCARBAMOL TABLETS 750 MG: 750 TABLET, COATED ORAL at 13:08

## 2021-03-13 RX ADMIN — HYDROMORPHONE HYDROCHLORIDE 1 MG: 1 INJECTION, SOLUTION INTRAMUSCULAR; INTRAVENOUS; SUBCUTANEOUS at 14:54

## 2021-03-13 RX ADMIN — ENOXAPARIN SODIUM 30 MG: 30 INJECTION SUBCUTANEOUS at 09:17

## 2021-03-13 RX ADMIN — SERTRALINE HYDROCHLORIDE 50 MG: 50 TABLET ORAL at 09:17

## 2021-03-13 RX ADMIN — METHOCARBAMOL TABLETS 750 MG: 750 TABLET, COATED ORAL at 00:24

## 2021-03-13 RX ADMIN — NOREPINEPHRINE BITARTRATE 3 MCG/MIN: 1 INJECTION, SOLUTION, CONCENTRATE INTRAVENOUS at 00:24

## 2021-03-13 RX ADMIN — HYDROMORPHONE HYDROCHLORIDE 1 MG: 1 INJECTION, SOLUTION INTRAMUSCULAR; INTRAVENOUS; SUBCUTANEOUS at 19:32

## 2021-03-13 RX ADMIN — HYDROMORPHONE HYDROCHLORIDE 6 MG: 2 TABLET ORAL at 16:41

## 2021-03-13 RX ADMIN — DIAZEPAM 5 MG: 5 TABLET ORAL at 14:54

## 2021-03-13 RX ADMIN — ENOXAPARIN SODIUM 30 MG: 30 INJECTION SUBCUTANEOUS at 21:01

## 2021-03-13 RX ADMIN — METHOCARBAMOL TABLETS 750 MG: 750 TABLET, COATED ORAL at 18:16

## 2021-03-13 RX ADMIN — DIAZEPAM 5 MG: 5 TABLET ORAL at 09:17

## 2021-03-13 RX ADMIN — HYDROMORPHONE HYDROCHLORIDE 1 MG: 1 INJECTION, SOLUTION INTRAMUSCULAR; INTRAVENOUS; SUBCUTANEOUS at 02:22

## 2021-03-13 RX ADMIN — MELATONIN TAB 3 MG 3 MG: 3 TAB at 21:01

## 2021-03-13 RX ADMIN — HYDROMORPHONE HYDROCHLORIDE 1 MG: 1 INJECTION, SOLUTION INTRAMUSCULAR; INTRAVENOUS; SUBCUTANEOUS at 10:42

## 2021-03-13 RX ADMIN — KETAMINE HYDROCHLORIDE 0.1 MG/KG/HR: 50 INJECTION INTRAMUSCULAR; INTRAVENOUS at 07:56

## 2021-03-13 RX ADMIN — HYDROMORPHONE HYDROCHLORIDE 6 MG: 2 TABLET ORAL at 13:38

## 2021-03-13 RX ADMIN — METHOCARBAMOL TABLETS 750 MG: 750 TABLET, COATED ORAL at 07:16

## 2021-03-13 RX ADMIN — GABAPENTIN 400 MG: 400 CAPSULE ORAL at 16:36

## 2021-03-13 RX ADMIN — METHYLNALTREXONE BROMIDE 12 MG: 12 INJECTION, SOLUTION SUBCUTANEOUS at 13:09

## 2021-03-13 RX ADMIN — LIDOCAINE 1 PATCH: 50 PATCH TOPICAL at 09:17

## 2021-03-13 RX ADMIN — LACTULOSE 10 G: 10 SOLUTION ORAL at 02:22

## 2021-03-13 RX ADMIN — KETAMINE HYDROCHLORIDE 0.2 MG/KG/HR: 50 INJECTION INTRAMUSCULAR; INTRAVENOUS at 14:57

## 2021-03-13 NOTE — PROGRESS NOTES
Daily Progress Note - Critical Care   Jey Alcaraz 38 y o  male MRN: [id number]  Unit/Bed#: ICU 06 Encounter: [id number]        ----------------------------------------------------------------------------------------  HPI/24hr events:   Ketamine drip was decreased to 0 1 this morning, suppository and lactulose were given, patients having multiple loose bowel movement  ---------------------------------------------------------------------------------------  SUBJECTIVE  Pain is controlled, abdomen feels less discomfortable     Review of Systems   Constitutional: Negative  HENT: Negative  Eyes: Negative  Respiratory: Negative  Cardiovascular: Negative  Gastrointestinal: Positive for abdominal distention and abdominal pain  Endocrine: Negative  All other systems reviewed and are negative  Review of systems was reviewed and negative unless stated above in HPI/24-hour events   ---------------------------------------------------------------------------------------  Assessment and Plan:    Neuro:   · Diagnosis:  Spinal cord injury with central cord syndrome  ? LAWRENCE B cord injury s/p fall downstairs w/ C5 spinous process fracture and lamina fracture   ? POD6 PCDF  C2-C7 with C3-C5 laminectomy with Dr Jayce Kidd  ? nsgy following   ? Maintain MAP > 85  § Continue permissive hypertension x7 days  § Currently day 6/7  § Levo @ 4   § Wean as tolerated   ? Maintain vista collar at all times  ? Continue to monitor exam  ? Continue daily PT/OT  ? Continue dispo planning for spinal cord injury rehab program  ? Continue pain control   § See below   · Diagnosis:  Post trauma depression/anxiety  § Continue daily Zoloft 50 mg  § Continue Valium 5 mg q 6 hours  § P r n  Ativan  · Diagnosis: Pain and sedation   ? Difficult pain to control   ? APS following   ? Current gtts: ketamine decreased to 0 1mg/kg/hr AM, to be discontinued tomorrow  ?  Scheduled:   § Tylenol 975 mg q 8 hours  § Gabapentin 300 mg t i d  § Robaxin 750 mg q 6 hours  § Lidocaine patch  ? Prn:   § PO Dilaudid 4 mg and 6 mg  § IV dilaudid 1mg q 3hrs for breakthrough      CV:   · Diagnosis:  Permissive hypertension  ? Maintain MAP > 85 in the setting of spinal cord injury  ? Maintain MAP goal for 7 days   § Currently day 6/7   ? Currently on levo @ 4   § Wean as tolerated in order to maintain goal  ? Continue to closely monitor hemodynamics  ? Continue to closely monitor in telemetry     Pulm:  · Diagnosis:  No acute issues  ? Currently on remained  ? Maintain SaO2 > 92%  ? Encourage good pulmonary hygiene  § Frequent coughing  § Incentive spirometry as able/with assistance     GI:   · Diagnosis:  Ileus  ? Continue aggressive bowel regimen  § Scheduled Senokot and dulcolax supp  § P r n  Miralax  § Advanced as needed  § Multiple liquid bowel movement overnight     Renal/:   · Diagnosis:  Urinary retention in the setting of spinal cord injury  ? Continue with bladder training  ? Bladder scan with p r n  Straight cath  ? Continue to monitor UOP closely  ? Strict I&Os  ? Daily BMP     F/E/N:   · Fluids - continue D5 half-normal saline at 100 cc/hours  · Electrolytes - trend and replete as needed  · Nutrition - NPO except meds     Heme/Onc:   · Diagnosis:  No acute issues  · DVT ppx: SCDs, Lovenox     Endo:   · Diagnosis:  No acute issues     ID:   · Diagnosis:  No acute issues  ? Trend daily fever curve and white blood cell count  ? Monitor closely for signs of infection     MSK/Skin:   · Diagnosis:  Paraplegia with bilateral upper extremity weakness in the setting of C-spine injury  ? Continue with aggressive PT/OT q d   ?  Continue with dispo planning to rehab with case management  Disposition: Continue Critical Care   Code Status: Level 1 - Full Code  ---------------------------------------------------------------------------------------  ICU CORE MEASURES    Prophylaxis   VTE Pharmacologic Prophylaxis: Enoxaparin (Lovenox)  VTE Mechanical Prophylaxis: sequential compression device  Stress Ulcer Prophylaxis: Prophylaxis Not Indicated     ABCDE Protocol (if indicated)  Plan to perform spontaneous awakening trial today? Not applicable  Plan to perform spontaneous breathing trial today? Not applicable  Obvious barriers to extubation? Not applicable  CAM-ICU: Negative    Invasive Devices Review  Invasive Devices     Peripherally Inserted Central Catheter Line            PICC Line  Right Basilic 4 days          Arterial Line            Arterial Line 21 Radial 6 days              Can any invasive devices be discontinued today? Not applicable  ---------------------------------------------------------------------------------------  OBJECTIVE    Vitals   Vitals:    21 2300 21 0000 21 0100 21 0200   BP:       BP Location:       Pulse: 92 100 92 94   Resp: 16 16 16 17   Temp:       TempSrc:       SpO2: 97% 95% 98% 98%   Weight:       Height:         Temp (24hrs), Av 4 °F (37 4 °C), Min:99 °F (37 2 °C), Max:99 6 °F (37 6 °C)  Current: Temperature: 99 6 °F (37 6 °C)  HR: 90  BP: 134/73  RR: 16  SpO2: 97    Respiratory:  SpO2: SpO2: 96 %       Invasive/non-invasive ventilation settings   Respiratory    Lab Data (Last 4 hours)    None         O2/Vent Data (Last 4 hours)    None                Physical Exam  Vitals signs and nursing note reviewed  Constitutional:       Appearance: He is normal weight  HENT:      Head: Normocephalic  Right Ear: External ear normal       Left Ear: External ear normal       Nose: Nose normal       Mouth/Throat:      Mouth: Mucous membranes are moist       Pharynx: Oropharynx is clear  Eyes:      Extraocular Movements: Extraocular movements intact  Conjunctiva/sclera: Conjunctivae normal       Pupils: Pupils are equal, round, and reactive to light  Neck:      Comments: In cervical collar  Cardiovascular:      Rate and Rhythm: Normal rate and regular rhythm        Pulses: Normal pulses  Heart sounds: Normal heart sounds  Pulmonary:      Effort: Pulmonary effort is normal       Breath sounds: Normal breath sounds  Abdominal:      General: Bowel sounds are normal  There is distension  Palpations: Abdomen is soft  Tenderness: There is abdominal tenderness  Musculoskeletal:      Comments: 3/5 shoulder shrug, 2/5 triceps function  0/5 , 0/5 LLE     Neurological:      Mental Status: He is alert           Laboratory and Diagnostics:  Results from last 7 days   Lab Units 03/13/21  0629 03/12/21  0553 03/11/21  0454 03/10/21  0640 03/09/21  0436 03/08/21  0446 03/07/21  0402 03/06/21  2116   WBC Thousand/uL 15 20* 15 55* 15 65* 17 55* 20 50* 21 03* 17 46* 12 27*   HEMOGLOBIN g/dL 10 6* 10 8* 10 6* 11 3* 11 5* 11 3* 12 5 11 7*   HEMATOCRIT % 33 0* 33 1* 32 6* 34 1* 34 9* 34 5* 37 9 35 4*   PLATELETS Thousands/uL 380 351 323 303 312 316 381 326   NEUTROS PCT %  --   --  58 60 77* 66 89* 57   MONOS PCT %  --   --  14* 12 9 11 2* 9     Results from last 7 days   Lab Units 03/13/21  0629 03/12/21  0553 03/11/21 0454 03/10/21  0640 03/09/21  0434 03/08/21  0446 03/07/21  0402   SODIUM mmol/L 137 135* 139 138 138 142 142   POTASSIUM mmol/L 4 2 4 3 3 7 3 6 3 8 3 4* 4 2   CHLORIDE mmol/L 105 100 104 105 105 109* 111*   CO2 mmol/L 26 28 31 29 30 29 27   ANION GAP mmol/L 6 7 4 4 3* 4 4   BUN mg/dL 11 12 9 9 8 6 8   CREATININE mg/dL 0 67 0 78 0 83 0 92 0 83 0 95 0 89   CALCIUM mg/dL 8 6 9 0 8 8 8 7 8 9 8 4 8 7   GLUCOSE RANDOM mg/dL 115 118 96 92 116 111 152*   ALT U/L  --   --  11*  --   --   --   --    AST U/L  --   --  14  --   --   --   --    ALK PHOS U/L  --   --  53  --   --   --   --    ALBUMIN g/dL  --   --  2 8*  --   --   --   --    TOTAL BILIRUBIN mg/dL  --   --  0 57  --   --   --   --      Results from last 7 days   Lab Units 03/13/21  0629 03/12/21  0553 03/11/21  0454 03/10/21  0640 03/09/21  0434 03/07/21  0402   MAGNESIUM mg/dL 1 9 2 1 1 8 1 8 1 8 2 0   PHOSPHORUS mg/dL --   --   --  3 4  --  3 6      Results from last 7 days   Lab Units 03/06/21  2116   INR  1 08              ABG:  Results from last 7 days   Lab Units 03/07/21  1014   PH ART  7 441   PCO2 ART mm Hg 36 7   PO2 ART mm Hg 162 7*   HCO3 ART mmol/L 24 4   BASE EXC ART mmol/L 0 6   ABG SOURCE  Line, Arterial     VBG:  Results from last 7 days   Lab Units 03/07/21  1014   ABG SOURCE  Line, Arterial           Micro        EKG: NSR  Imaging: None     Intake and Output  I/O       03/11 0701 - 03/12 0700 03/12 0701 - 03/13 0700 03/13 0701 - 03/14 0700    P  O        I V  (mL/kg) 1990 (28) 3053 8 (43)     IV Piggyback 50      Total Intake(mL/kg) 2040 (28 7) 3053 8 (43)     Urine (mL/kg/hr) 1705 (1) 1650 (1)     Drains       Stool  0     Total Output 1705 1650     Net +335 +1403 8            Unmeasured Urine Occurrence  1 x     Unmeasured Stool Occurrence  1 x         UOP: 60 ml/hr     Height and Weights   Height: 5' 11" (180 3 cm)  IBW: 75 3 kg  Body mass index is 21 83 kg/m²  Weight (last 2 days)     None            Nutrition       Diet Orders   (From admission, onward)             Start     Ordered    03/11/21 1506  Diet NPO; Sips with meds  Diet effective now     Question Answer Comment   Diet Type NPO    NPO Except: Sips with meds    RD to adjust diet per protocol?  Yes        03/11/21 1505    03/09/21 1633  Dietary nutrition supplements  Once     Question Answer Comment   Select Supplement: [employer], Dinner        03/09/21 1633    03/08/21 1430  Dietary nutrition supplements  Once     Question Answer Comment   Select Supplement: Ensure Enlive-Strawberry    Frequency Breakfast, Lunch, Dinner        03/08/21 1429                    Active Medications  Scheduled Meds:  Current Facility-Administered Medications   Medication Dose Route Frequency Provider Last Rate    acetaminophen  975 mg Oral [facility] Jazlyn Ca PA-C      bisacodyl  10 mg Rectal Daily Peggy Wynn PA-C      dextrose 5 % and sodium chloride 0 45 % with KCl 20 mEq/L  100 mL/hr Intravenous Continuous Arelia Alpers,  mL/hr (03/12/21 2235)    diazepam  5 mg Oral Q6H JAYLYN Burgos      enoxaparin  30 mg Subcutaneous Q12H [address] [place], [place]      gabapentin  400 mg Oral TID Ciprianobeny Hudson PA-C      glycopyrrolate  0 2 mg Intravenous Q4H PRN Shawanda Rivera PA-C      haloperidol lactate  2 mg Intramuscular Q30 Min PRN Shawanda Rivera, DENITA      HYDROmorphone  1 mg Intravenous Q4H PRN [place] Tristan, DENITA      HYDROmorphone  4 mg Oral Q3H PRN Cipriano Tristan, EDNITA      Or    HYDROmorphone  6 mg Oral Q3H PRN Ciprianobeny Hudson, DENITA      ketamine  0 1 mg/kg/hr Intravenous Continuous JAYY JeanC 0 1 mg/kg/hr (03/13/21 0602)    lidocaine  1 patch Topical Daily Arelia Alpers, MD      LORazepam  1 mg Intravenous Q1H PRN Shawanda Rivera PA-C      melatonin  3 mg Oral HS JAYLYN Carreno      methocarbamol  750 mg Oral Q6H [address] Za Ordaz MD      norepinephrine  1-30 mcg/min Intravenous Titrated Arnulfo Metcalf PA-C 3 mcg/min (03/13/21 0024)    ondansetron  4 mg Intravenous Q6H PRN Za Ordaz MD      polyethylene glycol  17 g Oral Daily PRN Ciprianobeny Hudson PA-C      senna-docusate sodium  1 tablet Oral BID [place] PA-C      sertraline  50 mg Oral Daily [place] PA-C       Continuous Infusions:  dextrose 5 % and sodium chloride 0 45 % with KCl 20 mEq/L, 100 mL/hr, Last Rate: 100 mL/hr (03/12/21 2235)  ketamine, 0 1 mg/kg/hr, Last Rate: 0 1 mg/kg/hr (03/13/21 0602)  norepinephrine, 1-30 mcg/min, Last Rate: 3 mcg/min (03/13/21 0024)      PRN Meds:   glycopyrrolate, 0 2 mg, Q4H PRN  haloperidol lactate, 2 mg, Q30 Min PRN  HYDROmorphone, 1 mg, Q4H PRN  HYDROmorphone, 4 mg, Q3H PRN    Or  HYDROmorphone, 6 mg, Q3H PRN  LORazepam, 1 mg, Q1H PRN  ondansetron, 4 mg, Q6H PRN  polyethylene glycol, 17 g, Daily PRN        Allergies   No Known Allergies  ---------------------------------------------------------------------------------------  Advance Directive and Living Will:      Power of :    POLST:    ---------------------------------------------------------------------------------------  Care Time Delivered:   No Critical Care time spent     Bin Almonte MD      Portions of the record may have been created with voice recognition software  Occasional wrong word or "sound a like" substitutions may have occurred due to the inherent limitations of voice recognition software    Read the chart carefully and recognize, using context, where substitutions have occurred

## 2021-03-13 NOTE — PROGRESS NOTES
Progress Note - Neurosurgery   Roseanna Cooper 38 y o  male MRN: [id number]  Unit/Bed#: ICU 06 Encounter: [id number]    Assessment:  POD6 C2-7 PCDF    Plan:  -Neuro stable some interval improvement (able to void spontaneously today)  -Cont MAP push over weekend  -SCI protocol  -PT/OT  -Cont c-collar  -Drips per ICU      Subjective/Objective   Chief Complaint: SCI    Subjective: No major complaints    Objective:   Awake  Alert  Oriented  BUE bicep 3/5, triceps 3/5, no notable movement WF/WE/  BLE no movement  Normal to light touch sensation T6, hypoesthesia below      Invasive Devices     Peripherally Inserted Central Catheter Line            PICC Line 63/57/47 Right Basilic 4 days          Peripheral Intravenous Line            Peripheral IV 03/07/21 Distal;Right;Upper;Ventral (anterior) Arm 6 days          Arterial Line            Arterial Line 03/07/21 Radial 6 days                Physical Exam:     Vitals: Blood pressure 150/85, pulse 88, temperature 99 °F (37 2 °C), resp  rate 15, height 5' 11" (1 803 m), weight 71 kg (156 lb 8 4 oz), SpO2 96 %  ,Body mass index is 21 83 kg/m²  Hemodynamic Monitoring: MAP: Arterial Line MAP (mmHg): 100 mmHg    Lab Results: I have personally reviewed pertinent results  Imaging Studies: I have personally reviewed pertinent reports

## 2021-03-13 NOTE — PROGRESS NOTES
Progress Note - Acute Pain Service    David Keith 38 y o  male MRN: [id number]  Unit/Bed#: ICU 06 Encounter: [id number]      Assessment:   Principal Problem:    Central cord syndrome [facility])  Active Problems:    Pulmonary insufficiency    Closed fracture of fifth cervical vertebra (HCC)    Spinal cord injury, C1-C7 ([facility] [address] )    Fall    Pain    David Keith is a 38 y o  male  POD #7 S/P PCF  Patient is presently on a ketamine infusion, and even though he says the ketamine and IV dilaudid help, he still says he has 10/10 pain  We will increase the ketamine infusion to 0 2 mg/kg/hr    Plan:   - IV Dilaudid 1 0 mg q4h   - PO Dilaudid 4-6 mg q4h prn  - gabapentin 400 mg TID  - Robaxin 750 mg q6h  - Lidocaine patches to affected areas 12 hours on, 12 hours off   - Increase ketamine to 0 2 mg/kg/hr (already ordered)    - Bowel regimen when appropriate from surgical perspective    APS will continue to follow   Please call  / [phone] or [facility] Acute Pain Service - [facility] (/ between [phone] and on weekends) with questions or concerns    Pain History  Current pain location(s): cervical spine  Pain Scale:   10/10  Quality: burning, aching  24 hour history: unchanged    Opioid requirement previous 24 hours: 24 mg PO dilaudid, 6 mg IV dilaudid    Meds/Allergies   current meds:   Current Facility-Administered Medications   Medication Dose Route Frequency    acetaminophen (TYLENOL) tablet 975 mg  975 mg Oral Q8H PRN    bisacodyl (DULCOLAX) rectal suppository 10 mg  10 mg Rectal Daily    diazepam (VALIUM) tablet 5 mg  5 mg Oral Q6H    enoxaparin (LOVENOX) subcutaneous injection 30 mg  30 mg Subcutaneous Q12H [facility]    gabapentin (NEURONTIN) capsule 400 mg  400 mg Oral TID    glycopyrrolate (ROBINUL) injection 0 2 mg  0 2 mg Intravenous Q4H PRN    haloperidol lactate (HALDOL) injection 2 mg  2 mg Intramuscular Q30 Min PRN    HYDROmorphone (DILAUDID) injection 1 mg  1 mg Intravenous Q4H PRN    HYDROmorphone (DILAUDID) tablet 4 mg  4 mg Oral Q3H PRN    Or    HYDROmorphone (DILAUDID) tablet 6 mg  6 mg Oral Q3H PRN    ketamine 250 mg (STANDARD CONCENTRATION) IV in sodium chloride 0 9% 250 mL  0 1 mg/kg/hr Intravenous Continuous    lidocaine (LIDODERM) 5 % patch 1 patch  1 patch Topical Daily    LORazepam (ATIVAN) injection 1 mg  1 mg Intravenous Q1H PRN    melatonin tablet 3 mg  3 mg Oral HS    methocarbamol (ROBAXIN) tablet 750 mg  750 mg Oral Q6H [address]    multi-electrolyte (PLASMALYTE-A/ISOLYTE-S PH 7 4) IV solution  100 mL/hr Intravenous Continuous    norepinephrine (LEVOPHED) 4 mg (STANDARD CONCENTRATION) IV in sodium chloride 0 9% 250 mL  1-30 mcg/min Intravenous Titrated    ondansetron (ZOFRAN) injection 4 mg  4 mg Intravenous Q6H PRN    polyethylene glycol (MIRALAX) packet 17 g  17 g Oral Daily PRN    senna-docusate sodium (SENOKOT S) 8 6-50 mg per tablet 1 tablet  1 tablet Oral BID    sertraline (ZOLOFT) tablet 50 mg  50 mg Oral Daily       No Known Allergies    Objective     Temp:  [97 7 °F (36 5 °C)-99 6 °F (37 6 °C)] 97 7 °F (36 5 °C)  HR:  [] 80  Resp:  [11-17] 14  Arterial Line BP: (116-158)/(70-83) 153/83    Physical Exam  Vitals signs and nursing note reviewed  Constitutional:       General: He is not in acute distress  Appearance: He is ill-appearing  HENT:      Head: Normocephalic  Eyes:      Pupils: Pupils are equal, round, and reactive to light  Neck:      Comments: Neck brace in place  Cardiovascular:      Rate and Rhythm: Normal rate and regular rhythm  Pulses: Normal pulses  Heart sounds: Normal heart sounds  Pulmonary:      Effort: Pulmonary effort is normal       Breath sounds: Normal breath sounds  Abdominal:      General: Abdomen is flat  Musculoskeletal:      Comments: Unable to move arms or squeeze his hands  Neurological:      Mental Status: He is alert     Psychiatric:         Behavior: Behavior normal          Thought Content: Thought content normal          Lab Results:   Results from last 7 days   Lab Units 03/13/21  0629   WBC Thousand/uL 15 20*   HEMOGLOBIN g/dL 10 6*   HEMATOCRIT % 33 0*   PLATELETS Thousands/uL 380      Results from last 7 days   Lab Units 03/13/21  0629  03/11/21  0454  03/06/21  2112   POTASSIUM mmol/L 4 2   < > 3 7   < >  --    CHLORIDE mmol/L 105   < > 104   < >  --    CO2 mmol/L 26   < > 31   < >  --    CO2, I-STAT mmol/L  --   --   --   --  26   BUN mg/dL 11   < > 9   < >  --    CREATININE mg/dL 0 67   < > 0 83   < >  --    CALCIUM mg/dL 8 6   < > 8 8   < >  --    ALK PHOS U/L  --   --  53  --   --    ALT U/L  --   --  11*  --   --    AST U/L  --   --  14  --   --    GLUCOSE, ISTAT mg/dl  --   --   --   --  87    < > = values in this interval not displayed  Imaging Studies: I have personally reviewed pertinent reports  EKG, Pathology, and Other Studies: I have personally reviewed pertinent reports  Counseling / Coordination of Care  Total floor / unit time spent today 15 minutes  Greater than 50% of total time was spent with the patient and / or family counseling and / or coordination of care  Please note that the APS provides consultative services regarding pain management only  With the exception of ketamine and epidural infusions and except when indicated, final decisions regarding starting or changing doses of analgesic medications are at the discretion of the consulting service  Off hours consultation and/or medication management is generally not available      Arron Oscar MD  Acute Pain Service

## 2021-03-14 LAB
ANION GAP SERPL CALCULATED.3IONS-SCNC: 6 MMOL/L (ref 4–13)
BUN SERPL-MCNC: 13 MG/DL (ref 5–25)
CALCIUM SERPL-MCNC: 8.8 MG/DL (ref 8.3–10.1)
CHLORIDE SERPL-SCNC: 100 MMOL/L (ref 100–108)
CO2 SERPL-SCNC: 30 MMOL/L (ref 21–32)
CREAT SERPL-MCNC: 0.64 MG/DL (ref 0.6–1.3)
ERYTHROCYTE [DISTWIDTH] IN BLOOD BY AUTOMATED COUNT: 12.1 % (ref 11.6–15.1)
GFR SERPL CREATININE-BSD FRML MDRD: 145 ML/MIN/1.73SQ M
GLUCOSE SERPL-MCNC: 84 MG/DL (ref 65–140)
HCT VFR BLD AUTO: 30.7 % (ref 36.5–49.3)
HGB BLD-MCNC: 10 G/DL (ref 12–17)
MAGNESIUM SERPL-MCNC: 2.1 MG/DL (ref 1.6–2.6)
MCH RBC QN AUTO: 30.7 PG (ref 26.8–34.3)
MCHC RBC AUTO-ENTMCNC: 32.6 G/DL (ref 31.4–37.4)
MCV RBC AUTO: 94 FL (ref 82–98)
PLATELET # BLD AUTO: 386 THOUSANDS/UL (ref 149–390)
PMV BLD AUTO: 8.8 FL (ref 8.9–12.7)
POTASSIUM SERPL-SCNC: 3.7 MMOL/L (ref 3.5–5.3)
RBC # BLD AUTO: 3.26 MILLION/UL (ref 3.88–5.62)
SODIUM SERPL-SCNC: 136 MMOL/L (ref 136–145)
WBC # BLD AUTO: 10.02 THOUSAND/UL (ref 4.31–10.16)

## 2021-03-14 PROCEDURE — 99024 POSTOP FOLLOW-UP VISIT: CPT | Performed by: NEUROLOGICAL SURGERY

## 2021-03-14 PROCEDURE — 85027 COMPLETE CBC AUTOMATED: CPT | Performed by: EMERGENCY MEDICINE

## 2021-03-14 PROCEDURE — 97112 NEUROMUSCULAR REEDUCATION: CPT

## 2021-03-14 PROCEDURE — 99232 SBSQ HOSP IP/OBS MODERATE 35: CPT | Performed by: ANESTHESIOLOGY

## 2021-03-14 PROCEDURE — 83735 ASSAY OF MAGNESIUM: CPT | Performed by: EMERGENCY MEDICINE

## 2021-03-14 PROCEDURE — 97530 THERAPEUTIC ACTIVITIES: CPT

## 2021-03-14 PROCEDURE — 97535 SELF CARE MNGMENT TRAINING: CPT

## 2021-03-14 PROCEDURE — 80048 BASIC METABOLIC PNL TOTAL CA: CPT | Performed by: EMERGENCY MEDICINE

## 2021-03-14 PROCEDURE — 99233 SBSQ HOSP IP/OBS HIGH 50: CPT | Performed by: SURGERY

## 2021-03-14 RX ORDER — GABAPENTIN 300 MG/1
600 CAPSULE ORAL 3 TIMES DAILY
Status: DISCONTINUED | OUTPATIENT
Start: 2021-03-14 | End: 2021-03-16 | Stop reason: HOSPADM

## 2021-03-14 RX ORDER — POTASSIUM CHLORIDE 29.8 MG/ML
40 INJECTION INTRAVENOUS ONCE
Status: COMPLETED | OUTPATIENT
Start: 2021-03-14 | End: 2021-03-14

## 2021-03-14 RX ORDER — SODIUM CHLORIDE, SODIUM GLUCONATE, SODIUM ACETATE, POTASSIUM CHLORIDE, MAGNESIUM CHLORIDE, SODIUM PHOSPHATE, DIBASIC, AND POTASSIUM PHOSPHATE .53; .5; .37; .037; .03; .012; .00082 G/100ML; G/100ML; G/100ML; G/100ML; G/100ML; G/100ML; G/100ML
1000 INJECTION, SOLUTION INTRAVENOUS ONCE
Status: COMPLETED | OUTPATIENT
Start: 2021-03-14 | End: 2021-03-14

## 2021-03-14 RX ADMIN — GABAPENTIN 600 MG: 300 CAPSULE ORAL at 20:25

## 2021-03-14 RX ADMIN — KETAMINE HYDROCHLORIDE 0.2 MG/KG/HR: 50 INJECTION INTRAMUSCULAR; INTRAVENOUS at 00:17

## 2021-03-14 RX ADMIN — SENNOSIDES, DOCUSATE SODIUM 1 TABLET: 8.6; 5 TABLET ORAL at 09:18

## 2021-03-14 RX ADMIN — METHOCARBAMOL TABLETS 750 MG: 750 TABLET, COATED ORAL at 12:14

## 2021-03-14 RX ADMIN — HYDROMORPHONE HYDROCHLORIDE 1 MG: 1 INJECTION, SOLUTION INTRAMUSCULAR; INTRAVENOUS; SUBCUTANEOUS at 12:15

## 2021-03-14 RX ADMIN — HYDROMORPHONE HYDROCHLORIDE 6 MG: 2 TABLET ORAL at 20:25

## 2021-03-14 RX ADMIN — HYDROMORPHONE HYDROCHLORIDE 1 MG: 1 INJECTION, SOLUTION INTRAMUSCULAR; INTRAVENOUS; SUBCUTANEOUS at 00:22

## 2021-03-14 RX ADMIN — HYDROMORPHONE HYDROCHLORIDE 6 MG: 2 TABLET ORAL at 06:23

## 2021-03-14 RX ADMIN — HYDROMORPHONE HYDROCHLORIDE 1 MG: 1 INJECTION, SOLUTION INTRAMUSCULAR; INTRAVENOUS; SUBCUTANEOUS at 08:03

## 2021-03-14 RX ADMIN — DIAZEPAM 5 MG: 5 TABLET ORAL at 21:02

## 2021-03-14 RX ADMIN — HYDROMORPHONE HYDROCHLORIDE 6 MG: 2 TABLET ORAL at 10:51

## 2021-03-14 RX ADMIN — HYDROMORPHONE HYDROCHLORIDE 1 MG: 1 INJECTION, SOLUTION INTRAMUSCULAR; INTRAVENOUS; SUBCUTANEOUS at 21:02

## 2021-03-14 RX ADMIN — ENOXAPARIN SODIUM 30 MG: 30 INJECTION SUBCUTANEOUS at 09:18

## 2021-03-14 RX ADMIN — MELATONIN TAB 3 MG 3 MG: 3 TAB at 21:02

## 2021-03-14 RX ADMIN — SENNOSIDES, DOCUSATE SODIUM 1 TABLET: 8.6; 5 TABLET ORAL at 17:43

## 2021-03-14 RX ADMIN — BISACODYL 10 MG: 10 SUPPOSITORY RECTAL at 09:17

## 2021-03-14 RX ADMIN — HYDROMORPHONE HYDROCHLORIDE 6 MG: 2 TABLET ORAL at 17:42

## 2021-03-14 RX ADMIN — HYDROMORPHONE HYDROCHLORIDE 6 MG: 2 TABLET ORAL at 13:48

## 2021-03-14 RX ADMIN — DIAZEPAM 5 MG: 5 TABLET ORAL at 16:14

## 2021-03-14 RX ADMIN — SODIUM CHLORIDE, SODIUM GLUCONATE, SODIUM ACETATE, POTASSIUM CHLORIDE, MAGNESIUM CHLORIDE, SODIUM PHOSPHATE, DIBASIC, AND POTASSIUM PHOSPHATE 100 ML/HR: .53; .5; .37; .037; .03; .012; .00082 INJECTION, SOLUTION INTRAVENOUS at 17:51

## 2021-03-14 RX ADMIN — METHOCARBAMOL TABLETS 750 MG: 750 TABLET, COATED ORAL at 05:18

## 2021-03-14 RX ADMIN — DIAZEPAM 5 MG: 5 TABLET ORAL at 03:27

## 2021-03-14 RX ADMIN — ENOXAPARIN SODIUM 30 MG: 30 INJECTION SUBCUTANEOUS at 20:25

## 2021-03-14 RX ADMIN — GABAPENTIN 600 MG: 300 CAPSULE ORAL at 16:14

## 2021-03-14 RX ADMIN — SERTRALINE HYDROCHLORIDE 50 MG: 50 TABLET ORAL at 09:17

## 2021-03-14 RX ADMIN — METHOCARBAMOL TABLETS 750 MG: 750 TABLET, COATED ORAL at 00:51

## 2021-03-14 RX ADMIN — SODIUM CHLORIDE, SODIUM GLUCONATE, SODIUM ACETATE, POTASSIUM CHLORIDE, MAGNESIUM CHLORIDE, SODIUM PHOSPHATE, DIBASIC, AND POTASSIUM PHOSPHATE 1000 ML: .53; .5; .37; .037; .03; .012; .00082 INJECTION, SOLUTION INTRAVENOUS at 17:53

## 2021-03-14 RX ADMIN — HYDROMORPHONE HYDROCHLORIDE 1 MG: 1 INJECTION, SOLUTION INTRAMUSCULAR; INTRAVENOUS; SUBCUTANEOUS at 04:34

## 2021-03-14 RX ADMIN — SODIUM CHLORIDE, SODIUM GLUCONATE, SODIUM ACETATE, POTASSIUM CHLORIDE, MAGNESIUM CHLORIDE, SODIUM PHOSPHATE, DIBASIC, AND POTASSIUM PHOSPHATE 100 ML/HR: .53; .5; .37; .037; .03; .012; .00082 INJECTION, SOLUTION INTRAVENOUS at 05:58

## 2021-03-14 RX ADMIN — GABAPENTIN 400 MG: 400 CAPSULE ORAL at 09:17

## 2021-03-14 RX ADMIN — KETAMINE HYDROCHLORIDE 0.2 MG/KG/HR: 50 INJECTION INTRAMUSCULAR; INTRAVENOUS at 18:33

## 2021-03-14 RX ADMIN — DIAZEPAM 5 MG: 5 TABLET ORAL at 09:17

## 2021-03-14 RX ADMIN — HYDROMORPHONE HYDROCHLORIDE 1 MG: 1 INJECTION, SOLUTION INTRAMUSCULAR; INTRAVENOUS; SUBCUTANEOUS at 16:14

## 2021-03-14 RX ADMIN — METHOCARBAMOL TABLETS 750 MG: 750 TABLET, COATED ORAL at 17:42

## 2021-03-14 RX ADMIN — HYDROMORPHONE HYDROCHLORIDE 6 MG: 2 TABLET ORAL at 03:28

## 2021-03-14 RX ADMIN — POTASSIUM CHLORIDE 40 MEQ: 29.8 INJECTION, SOLUTION INTRAVENOUS at 06:23

## 2021-03-14 RX ADMIN — LIDOCAINE 1 PATCH: 50 PATCH TOPICAL at 09:17

## 2021-03-14 NOTE — PLAN OF CARE
Problem: PHYSICAL THERAPY ADULT  Goal: Performs mobility at highest level of function for planned discharge setting  See evaluation for individualized goals  Description: Treatment/Interventions: Functional transfer training, LE strengthening/ROM, Therapeutic exercise, Bed mobility, Spoke to nursing, Spoke to case management, OT, Equipment eval/education, Compensatory technique education  Equipment Recommended: Other (Comment)(Kreg bed)       See flowsheet documentation for full assessment, interventions and recommendations  Outcome: Progressing  Note: Prognosis: Guarded  Problem List: Decreased strength, Decreased endurance, Impaired balance, Decreased mobility, Impaired sensation, Pain  Assessment: Pt seen by PT today for continued treatment  Pt continues with c/o neck pain but also reports having some muscle activation in his LEs  Pt repositioned to try to alleviate pain symptoms  Pt tolerated previous session of tilting on Kreg bed with appropriate BP responses  Treatment today focused on transferring supine to sitting EOB to assess vital responses and assess pt's EOB balance  Pt's BP in supine prior to transfer was 110/72  Pt required Dependent Ax2 to transfer from supine>sitting with UB support and LE management  Pt reported lightheadedness but dissipated 30 seconds after with BP recorded at 113/75  Pt demonstrates ability to sit EOB for 10 minutes while maintaining good vitals, requiring a Max Ax1 to maintain seated balance  PT assessed B/L LE: B/L knee extension and L ankle DF 1/5 with 0/5 B/L knee flexion, R ankle DF and B/L PF  Plan to continue to reassess as pt progresses  Pt also demonstrated trace core activation with anterior/posterior displacement but could not correct lateral displacement  Pt returned to supine with Dependent Ax2 with UB and LE management and repositioned into chair position in Kreg bed at end of session with call bell in reach and all other needs met   PT treatment focused on core stability, static/dynamic balance, bed mobility, and tolerance to upright positioning to continue to decrease burden on caregiver and improve functional mobility with the goal to D/C to rehab when medically cleared  At this time, pt would continue to benefit from skilled PT 3-5x/wk to address deficits in bed mobility, transfers, LE strength and ROM, endurance, balance, and activity tolerance as well as immediate acute care needs and underlying performance skills to maximize functional mobility and safety to return to PLOF  Plan to increase tolerance to sitting EOB next session  The patient's AM-PAC Basic Mobility Inpatient Short Form Low Function Raw Score 14 , Standardized Score is 22 01  A standardized score less 42 9 suggests the patient may benefit from discharge to post-acute rehab services  Please also refer to the recommendation of the Physical Therapist for safe discharge planning  Barriers to Discharge: Inaccessible home environment, Decreased caregiver support     PT Discharge Recommendation: [id number]     PT - OK to Discharge: Yes    See flowsheet documentation for full assessment

## 2021-03-14 NOTE — CASE MANAGEMENT
Therapy notes faxed to [facility] at [phone]  Also sent via [facility]

## 2021-03-14 NOTE — PROGRESS NOTES
Daily Progress Note - Critical Care   Verónica Johnson 38 y o  male MRN: [id number]  Unit/Bed#: ICU 06 Encounter: [id number]        ----------------------------------------------------------------------------------------  HPI/24hr events: Still with significant complaints of pain  Ketamine gtt increased from 0 1 to 0 2 per APS  Rectal tube inserted with copious amount of stool put out  Discontinued this AM     ---------------------------------------------------------------------------------------  SUBJECTIVE  "I'm doing okay right now"  Denies any significant pain at this time  Denies N/V, abdominal pain  Denies SOB, difficulty breathing, wheezing, cough  Review of Systems  Review of systems was reviewed and negative unless stated above in HPI/24-hour events   ---------------------------------------------------------------------------------------  Assessment and Plan:    Neuro:   · Diagnosis:  Spinal cord injury with central cord syndrome  ? LAWRENCE B cord injury s/p fall downstairs w/ C5 spinous process fracture and lamina fracture   ? POD7 PCDF  C2-C7 with C3-C5 laminectomy with Dr Darrow Kayser  ? nsgy following   ? Maintain MAP > 85  § Continue permissive hypertension x7 days  § Currently day 7/7  § Levo currently on hold this AM  ? Maintain vista collar at all times  ? Continue to monitor exam  ? Continue daily PT/OT  ? Continue dispo planning for spinal cord injury rehab program  ? Continue pain control   § See below   · Diagnosis:  Post trauma depression/anxiety  § Continue daily Zoloft 50 mg  § Continue Valium 5 mg q 6 hours  § P r n  Ativan  · Diagnosis: Pain and sedation   ? Difficult pain to control   ? APS following   ? Current gtts: ketamine increased to 0 2mg/kg/hr AM  ? Scheduled:   § Gabapentin 300 mg t i d   § Robaxin 750 mg q 6 hours  § Lidocaine patch  ?  Prn:   § Tylenol 975 q8  § PO Dilaudid 4 mg and 6 mg  § IV dilaudid 1mg q 3hrs for breakthrough      CV:   · Diagnosis:  Permissive hypertension  ? Maintain MAP > 85 in the setting of spinal cord injury  ? Maintain MAP goal for 7 days   § Currently day 7/7   ? Currently levo on hold  ? Continue to closely monitor hemodynamics  ? Continue to closely monitor in telemetry     Pulm:  · Diagnosis:  No acute issues  ? Currently on room air  ? Maintain SaO2 > 92%  ? Encourage good pulmonary hygiene  § Frequent coughing  § Incentive spirometry as able/with assistance     GI:   · Diagnosis:  Ileus  ? Continue aggressive bowel regimen  § Scheduled Senokot, dulcolax suppository  § P r n  Miralax   § Large amount of stool from rectal tube  § Discontinued this AM     Renal/:   · Diagnosis:  Urinary retention in the setting of spinal cord injury  ? Continue with bladder training  ? Bladder scan with p r n  Straight cath  ? Continue to monitor UOP closely  ? Strict I&Os  ? Daily BMP     F/E/N:   · Fluids - isolyte 100cc/hr  · Electrolytes - trend and replete as needed  · Nutrition - Consider clear liquid diet today to observe patient's tolerance     Heme/Onc:   · Diagnosis:  No acute issues  · DVT ppx: SCDs, Lovenox     Endo:   · Diagnosis:  No acute issues     ID:   · Diagnosis:  Leukocytosis-- resolved  ? Trend daily fever curve and white blood cell count  ? Monitor closely for signs of infection     MSK/Skin:   · Federico Tsang with bilateral upper extremity weakness in the setting of C-spine injury  ? Continue with aggressive PT/OT q d   ?  Continue with dispo planning to rehab with case management    Disposition: Maintain ICU care for last day of MAP pushes  Code Status: Level 1 - Full Code  ---------------------------------------------------------------------------------------  ICU CORE MEASURES    Prophylaxis   VTE Pharmacologic Prophylaxis: Enoxaparin (Lovenox)  VTE Mechanical Prophylaxis: sequential compression device  Stress Ulcer Prophylaxis: Prophylaxis Not Indicated     Invasive Devices Review  Invasive Devices     Peripherally Inserted Central Catheter Line            PICC Line 9893/84 Right Basilic 5 days          Peripheral Intravenous Line            Peripheral IV 21 Distal;Right;Upper;Ventral (anterior) Arm 7 days          Arterial Line            Arterial Line 21 Radial 7 days          Drain            Rectal Tube Without balloon less than 1 day              Can any invasive devices be discontinued today? No  ---------------------------------------------------------------------------------------  OBJECTIVE    Vitals   Vitals:    21 0400 21 0500 21 0529 21 0600   BP:       BP Location:       Pulse: 94 92 100 98   Resp: 12 19 16 18   Temp:   98 8 °F (37 1 °C)    TempSrc:   Oral    SpO2: 97% 95% 96% 96%   Weight:       Height:         Temp (24hrs), Av 7 °F (37 1 °C), Min:97 7 °F (36 5 °C), Max:99 2 °F (37 3 °C)  Current: Temperature: 98 8 °F (37 1 °C)    Respiratory:  SpO2: SpO2: 96 %, SpO2 Activity: SpO2 Activity: At Rest, SpO2 Device: O2 Device: None (Room air)       Invasive/non-invasive ventilation settings   Respiratory    Lab Data (Last 4 hours)    None         O2/Vent Data (Last 4 hours)    None                Physical Exam  Vitals signs and nursing note reviewed  Constitutional:       Appearance: Normal appearance  He is well-developed  He is not ill-appearing  HENT:      Head: Normocephalic and atraumatic  Mouth/Throat:      Mouth: Mucous membranes are moist       Comments: Tongue midline  Eyes:      Extraocular Movements: Extraocular movements intact  Conjunctiva/sclera: Conjunctivae normal    Neck:      Musculoskeletal: Neck supple  Comments: C-collar in place  Cardiovascular:      Rate and Rhythm: Regular rhythm  Bradycardia present  Heart sounds: No murmur  No friction rub  Pulmonary:      Effort: Pulmonary effort is normal  No respiratory distress  Breath sounds: Normal breath sounds  No wheezing, rhonchi or rales     Abdominal:      General: Bowel sounds are normal  There is no distension  Palpations: Abdomen is soft  Tenderness: There is no abdominal tenderness  There is no guarding  Musculoskeletal:      Right lower leg: No edema  Left lower leg: No edema  Comments: B/L UE with 3/5 strength, LLE with 2/5 strength, RLE 1/5  No  strength  Skin:     General: Skin is warm and dry  Neurological:      Mental Status: He is alert and oriented to person, place, and time           Laboratory and Diagnostics:  Results from last 7 days   Lab Units 03/14/21  0514 03/13/21  0629 03/12/21  0553 03/11/21  0454 03/10/21  0640 03/09/21  0436 03/08/21  0446   WBC Thousand/uL 10 02 15 20* 15 55* 15 65* 17 55* 20 50* 21 03*   HEMOGLOBIN g/dL 10 0* 10 6* 10 8* 10 6* 11 3* 11 5* 11 3*   HEMATOCRIT % 30 7* 33 0* 33 1* 32 6* 34 1* 34 9* 34 5*   PLATELETS Thousands/uL 386 380 351 323 303 312 316   NEUTROS PCT %  --   --   --  58 60 77* 66   MONOS PCT %  --   --   --  14* 12 9 11     Results from last 7 days   Lab Units 03/14/21  0514 03/13/21  0629 03/12/21  0553 03/11/21  0454 03/10/21  0640 03/09/21  0434 03/08/21  0446   SODIUM mmol/L 136 137 135* 139 138 138 142   POTASSIUM mmol/L 3 7 4 2 4 3 3 7 3 6 3 8 3 4*   CHLORIDE mmol/L 100 105 100 104 105 105 109*   CO2 mmol/L 30 26 28 31 29 30 29   ANION GAP mmol/L 6 6 7 4 4 3* 4   BUN mg/dL 13 11 12 9 9 8 6   CREATININE mg/dL 0 64 0 67 0 78 0 83 0 92 0 83 0 95   CALCIUM mg/dL 8 8 8 6 9 0 8 8 8 7 8 9 8 4   GLUCOSE RANDOM mg/dL 84 115 118 96 92 116 111   ALT U/L  --   --   --  11*  --   --   --    AST U/L  --   --   --  14  --   --   --    ALK PHOS U/L  --   --   --  53  --   --   --    ALBUMIN g/dL  --   --   --  2 8*  --   --   --    TOTAL BILIRUBIN mg/dL  --   --   --  0 57  --   --   --      Results from last 7 days   Lab Units 03/14/21  0514 03/13/21  0629 03/12/21  0553 03/11/21  0454 03/10/21  0640 03/09/21  0434   MAGNESIUM mg/dL 2 1 1 9 2 1 1 8 1 8 1 8   PHOSPHORUS mg/dL  --   --   --   --  3 4  -- ABG:  Results from last 7 days   Lab Units 03/07/21  1014   PH ART  7 441   PCO2 ART mm Hg 36 7   PO2 ART mm Hg 162 7*   HCO3 ART mmol/L 24 4   BASE EXC ART mmol/L 0 6   ABG SOURCE  Line, Arterial     VBG:  Results from last 7 days   Lab Units 03/07/21  1014   ABG SOURCE  Line, Arterial           Micro        Imaging:  I have personally reviewed pertinent reports  and I have personally reviewed pertinent films in PACS    Intake and Output  I/O       03/12 0701 - 03/13 0700 03/13 0701 - 03/14 0700    P  O   100    I V  (mL/kg) 3053 8 (43) 2914 3 (41)    IV Piggyback  50    Total Intake(mL/kg) 3053 8 (43) 3064 3 (43 2)    Urine (mL/kg/hr) 1650 (1) 2450 (1 4)    Stool 0 1600    Total Output 1650 4050    Net +1403 8 -985  7          Unmeasured Urine Occurrence 1 x     Unmeasured Stool Occurrence 2 x 3 x        UOP: 100 ml/hr     Height and Weights   Height: 5' 11" (180 3 cm)  IBW: 75 3 kg  Body mass index is 21 83 kg/m²  Weight (last 2 days)     None            Nutrition       Diet Orders   (From admission, onward)             Start     Ordered    03/11/21 1506  Diet NPO; Sips with meds  Diet effective now     Question Answer Comment   Diet Type NPO    NPO Except: Sips with meds    RD to adjust diet per protocol?  Yes        03/11/21 1505    03/09/21 1633  Dietary nutrition supplements  Once     Question Answer Comment   Select Supplement: [employer], Dinner        03/09/21 1633    03/08/21 1430  Dietary nutrition supplements  Once     Question Answer Comment   Select Supplement: Ensure Enlive-Strawberry    Frequency Breakfast, Lunch, Dinner        03/08/21 1429              Active Medications  Scheduled Meds:  Current Facility-Administered Medications   Medication Dose Route Frequency Provider Last Rate    acetaminophen  975 mg Oral Q8H PRN Kenyetta Shetty PA-C      bisacodyl  10 mg Rectal Daily Sulma Beckman PA-C      diazepam  5 mg Oral Q6H JAYLYN Lee      enoxaparin  30 mg Subcutaneous Q12H [facility] [place]      gabapentin  400 mg Oral TID Dane Cantu PA-C      glycopyrrolate  0 2 mg Intravenous Q4H PRN Lamar Brand PA-C      haloperidol lactate  2 mg Intramuscular Q30 Min PRN Lamar [place], [place]      HYDROmorphone  1 mg Intravenous Q4H PRN Zoran Beaulieu      HYDROmorphone  4 mg Oral Q3H PRN Dane Cantu PA-C      Or    HYDROmorphone  6 mg Oral Q3H PRN Dane Cantu PA-C      ketamine  0 2 mg/kg/hr Intravenous Continuous Chance Harrison MD 0 2 mg/kg/hr (03/14/21 0017)    lidocaine  1 patch Topical Daily Riaz Bledsoe MD      LORazepam  1 mg Intravenous Q1H PRN Lamar Brand PA-C      melatonin  3 mg Oral HS JAYLYN Carreno      methocarbamol  750 mg Oral Q6H Isabela Loco MD      multi-electrolyte  100 mL/hr Intravenous Continuous Beth Magnus Gordon PA-C 100 mL/hr (03/14/21 0558)    norepinephrine  1-30 mcg/min Intravenous Titrated Nicci Thompson PA-C Stopped (03/14/21 0529)    ondansetron  4 mg Intravenous Q6H PRN Renato Sharpe MD      polyethylene glycol  17 g Oral Daily PRN Dane Cantu PA-C      potassium chloride  40 mEq Intravenous Once Ash Colbert PA-C 40 mEq (03/14/21 4905)    senna-docusate sodium  1 tablet Oral BID Dane Cantu PA-C      sertraline  50 mg Oral Daily Dane Cantu PA-C       Continuous Infusions:  ketamine, 0 2 mg/kg/hr, Last Rate: 0 2 mg/kg/hr (03/14/21 0017)  multi-electrolyte, 100 mL/hr, Last Rate: 100 mL/hr (03/14/21 0558)  norepinephrine, 1-30 mcg/min, Last Rate: Stopped (03/14/21 0529)      PRN Meds:   acetaminophen, 975 mg, Q8H PRN  glycopyrrolate, 0 2 mg, Q4H PRN  haloperidol lactate, 2 mg, Q30 Min PRN  HYDROmorphone, 1 mg, Q4H PRN  HYDROmorphone, 4 mg, Q3H PRN    Or  HYDROmorphone, 6 mg, Q3H PRN  LORazepam, 1 mg, Q1H PRN  ondansetron, 4 mg, Q6H PRN  polyethylene glycol, 17 g, Daily PRN        Allergies   No Known Allergies  ---------------------------------------------------------------------------------------  Advance Directive and Living Will:      Power of :    POLST:    ---------------------------------------------------------------------------------------  Care Time Delivered:   No Critical Care time spent     Sofia Mckeon PA-C      Portions of the record may have been created with voice recognition software  Occasional wrong word or "sound a like" substitutions may have occurred due to the inherent limitations of voice recognition software    Read the chart carefully and recognize, using context, where substitutions have occurred

## 2021-03-14 NOTE — PROGRESS NOTES
Progress Note - Acute Pain Service    Roland Zacarias 38 y o  male MRN: [id number]  Unit/Bed#: ICU 06 Encounter: [id number]      Assessment:   Principal Problem:    Central cord syndrome [facility])  Active Problems:    Pulmonary insufficiency    Closed fracture of fifth cervical vertebra (HCC)    Spinal cord injury, C1-C7 ([facility] [address] )    Fall    Pain    Roland Zacarias is a 38 y o  male  POD #8 S/P PCF  Patient is presently on a ketamine infusion, the rate was increased to 0 2mg/kg/hr yesterday and he states he did not notice an improvement in pain, he still says he has 10/10 pain  He describes neuropathic pain traveling from his neck to his shoulders bilaterally and would likely benefit from an uptitration in gabapentin today      Plan:   - Increase gabapentin to 600mg q8h  - IV Dilaudid 1 0 mg q4h   - PO Dilaudid 4-6 mg q4h prn  - Robaxin 750 mg q6h  - Lidocaine patches to affected areas 12 hours on, 12 hours off   - Ketamine 0 2 mg/kg/hr     - Bowel regimen when appropriate from surgical perspective     APS will continue to follow  Please call [facility] / Sridevi Kelly or [facility] Acute [facility] (/ between [phone] and on weekends) with questions or concerns     Pain History  Current pain location(s): cervical spine to shoulders bilaterally  Pain Scale:   10/10  Quality: burning, aching  24 hour history: unchanged    Meds/Allergies     No Known Allergies    Objective     Temp:  [98 7 °F (37 1 °C)-99 2 °F (37 3 °C)] 98 9 °F (37 2 °C)  HR:  [] 94  Resp:  [10-19] 12  Arterial Line BP: (122-156)/(60-82) 124/62    Physical Exam  Vitals signs and nursing note reviewed  Constitutional:       Appearance: Normal appearance  He is normal weight  HENT:      Head: Normocephalic and atraumatic  Neck:      Comments: c-collar in situ  Cardiovascular:      Rate and Rhythm: Normal rate and regular rhythm  Pulses: Normal pulses  Heart sounds: Normal heart sounds     Pulmonary:      Effort: Pulmonary effort is normal       Breath sounds: Normal breath sounds  Abdominal:      General: Abdomen is flat  Bowel sounds are normal       Palpations: Abdomen is soft  Musculoskeletal: Normal range of motion  Skin:     General: Skin is warm and dry  Neurological:      General: No focal deficit present  Mental Status: He is alert and oriented to person, place, and time  Psychiatric:         Mood and Affect: Mood normal             Lab Results:   Results from last 7 days   Lab Units 03/14/21  0514   WBC Thousand/uL 10 02   HEMOGLOBIN g/dL 10 0*   HEMATOCRIT % 30 7*   PLATELETS Thousands/uL 386      Results from last 7 days   Lab Units 03/14/21 0514  03/11/21  0454   POTASSIUM mmol/L 3 7   < > 3 7   CHLORIDE mmol/L 100   < > 104   CO2 mmol/L 30   < > 31   BUN mg/dL 13   < > 9   CREATININE mg/dL 0 64   < > 0 83   CALCIUM mg/dL 8 8   < > 8 8   ALK PHOS U/L  --   --  53   ALT U/L  --   --  11*   AST U/L  --   --  14    < > = values in this interval not displayed  Counseling / Coordination of Care  Total floor / unit time spent today 15 minutes  Greater than 50% of total time was spent with the patient and / or family counseling and / or coordination of care  Please note that the APS provides consultative services regarding pain management only  With the exception of ketamine and epidural infusions and except when indicated, final decisions regarding starting or changing doses of analgesic medications are at the discretion of the consulting service  Off hours consultation and/or medication management is generally not available      Joanna Sheth MD  Acute Pain Service

## 2021-03-14 NOTE — OCCUPATIONAL THERAPY NOTE
OccupationalTherapy Progress Note     Patient Name: Rachelle Jones  IFTKT'T Date: 3/14/2021  Problem List  Principal Problem:    Central cord syndrome [facility])  Active Problems:    Pulmonary insufficiency    Closed fracture of fifth cervical vertebra (HCC)    Spinal cord injury, C1-C7 ([facility] [address] )    Fall    Pain            03/14/21 1359   OT Last Visit   OT Visit Date 03/14/21   Note Type   Note Type Treatment for insurance authorization   Restrictions/Precautions   Weight Bearing Precautions Per Order No   Braces or Orthoses C/S Collar  (Multipodus boot)   Other Precautions Bed Alarm;Multiple lines;Telemetry; Fall Risk;Pain;Spinal precautions   Lifestyle   Autonomy PTA, pt was independent in ADLs, IADLs, and functional mobility   Reciprocal Relationships Lives with roommate   Service to Others Currently unemployed, but 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   Pain Assessment   Pain Assessment Tool 0-10   Pain Score Worst Possible Pain  (11/10 with increased activity)   Pain Location/Orientation Orientation: Bilateral;Location: Neck   Hospital Pain Intervention(s) Repositioned; Ambulation/increased activity; Emotional support   ADL   Where Assessed Supine, bed   Eating Assistance 1  Total Assistance   Eating Deficit Beverage management   Toileting Comments Pt reported that he felt he needed to have a bowel movement however did not want to use bedpan @ this time   Bed Mobility   Supine to Sit 1  Dependent   Additional items Assist x 2;HOB elevated; Increased time required;Verbal cues;LE management   Sit to Supine 1  Dependent   Additional items Assist x 2;HOB elevated; Increased time required;Verbal cues;LE management   Additional Comments Pt required total assistance x2 during supine to sit for UB postural control/ LE management with b/l knee block; initially reported he was feeling lightheaded which quickly subsided (maintained stable vitals); sat EOB for approx 10 minutes with total assistance x2 during transition period supine to sit; @ times, required Max A x1 EOB/total assistance x1; dynamic weightshifting activites EOB, unable to maintain unsupported sitting balance; @ rest, BP reading was 110/72; EOB BP reading was 113/75   Neuromuscular Education   Trunk Control Sat EOB for approx 10 minutes with total assistance x2 initially however @ times, pt required total assistance x1/Max Ax1 during core stability activities   Coordination   Gross Motor Slight AROM observed in b/l UEs however pt would required total assistance during ADL tasks   Cognition   Overall Cognitive Status [facility]   Arousal/Participation Alert; Responsive;Arousable; Cooperative   Attention Within functional limits   Orientation Level Oriented X4   Memory Within functional limits   Following Commands Follows all commands and directions without difficulty   Comments Pt was pleasant and cooperative, but presents with overall flat affect; motivated to continue engaging in therapy session; increased verbal cues for encouragement/emotional support   Additional Activities   Additional Activities Comments Offered ADL tasks however pt declined @ this time; able to initiate slight AROM in b/l UEs, but would required total assistance during ADL tasks    Activity Tolerance   Activity Tolerance Patient tolerated treatment well;Patient limited by fatigue;Patient limited by pain   Medical Staff Made Aware PT Feroz Fierro, SPT Kali Jensen, RN cleared for OT session   Assessment   Assessment Pt is a 37 yo male who participated in OT session on 3/14/2021  Treatment focused to improve bed mobility, static/dynamic balance, postural/trunk control, proper body mechanics, safety awareness, and overall increased activity tolerance in ADL/IADL/leisure tasks  Upon arrival, pt found lying supine in bed and was agreeable to OT session  Overall, pt tolerated session well however presented with a flat affect   Pt tolerated approx 10 minutes sitting EOB and maintained good vitals with total assist x2 to promote dynamic core stability  ADL tasks offered however @ this time, pt deferred  Pt currently performing at a level of: total assistance x2 for bed mobility, EOB with total assistance x2 (@ times, total x1/Max Ax1) and UB/LB ADLs with total assistance  Pt is still limited by the following limitations/impairments which were addressed through skilled instruction: pain, fatigue, spinal precautions, decreased strength, generalized weakness, decreased endurance, decreased postural stability/trunk control, decreased activity tolerance, and an overall decreased independence in ADLs/IADLs/functional mobility  At the end of the session, pt was left sitting in chair position with all functional needs in reach  At this time, recommend discharge to post-acute rehabilitation services (dedicated SCI rehab)  The patient's raw score on the -PAC Daily Activity inpatient short form low function score is 13, standardized score is Low Function Daily Activity Standardized Score: 23 16  Patients with a standardized score less than 39 4 are likely to benefit from discharge to post-acute rehab services  Please refer to the recommendation of the Occupational Therapist for safe discharge planning  Established OT goals will be continued 3-5/wk to address immediate acute care needs and underlying performance skills to maximize occupational performance and safety to return to OF  Plan   Treatment Interventions ADL retraining;Functional transfer training;UE strengthening/ROM; Endurance training;Patient/family training;Equipment evaluation/education; Compensatory technique education; Fine motor coordination activities; Neuromuscular reeducation;Continued evaluation; Energy conservation; Activityengagement   Goal Expiration Date 03/22/21   OT Frequency 3-5x/wk   Recommendation   OT Discharge Recommendation Post-Acute Rehabilitation Services  (dedicated SCI rehab)   OT - OK to Discharge Yes  (When medically appropriate)   AM-PAC Daily Activity Inpatient   Lower Body Dressing 1   Bathing 1   Toileting 1   Upper Body Dressing 1   Grooming 1   Eating 1   Daily Activity Raw Score 6   Turning Head Towards Sound 3   Follow Simple Instructions 4   Low Function Daily Activity Raw Score 13   Low Function Daily Activity Standardized Score 23 16   AM-PAC Applied Cognition Inpatient   Following a Speech/Presentation 3   Understanding Ordinary Conversation 4   Taking Medications 4   Remembering Where Things Are Placed or Put Away 4   Remembering List of 4-5 Errands 4   Taking Care of Complicated Tasks 3   Applied Cognition Raw Score 22   Applied Cognition Standardized Score 47 83     Dewey Cruz, OTS

## 2021-03-14 NOTE — PHYSICAL THERAPY NOTE
Physical Therapy Treatment    Patient's Name: Maicol Bonner    Admitting Diagnosis  Injury of cervical spinal cord, initial encounter [facility]) [J17 [id number]]  Contusion of cervical cord, initial encounter ([facility] 75 ) [W25 586A]  Unspecified multiple injuries, initial encounter [T07  XXXA]        21 1400   PT Last Visit   PT Visit Date 21   Note Type   Note Type Treatment for insurance authorization   Pain Assessment   Pain Assessment Tool 0-10   Pain Score Worst Possible Pain   Pain Location/Orientation Location: [facility]   Hospital Pain Intervention(s) Repositioned; Ambulation/increased activity; Emotional support   Restrictions/Precautions   Weight Bearing Precautions Per Order No   Braces or Orthoses C/S Collar; Other (Comment)  (Multipodus boot RLE upon arrival, LLE at conclusion)   Other Precautions Bed Alarm;Multiple lines;Telemetry; Fall Risk;Pain   General   Chart Reviewed Yes   Response to Previous Treatment Patient with no complaints from previous session  Family/Caregiver Present Yes  (family; daughter)   Cognition   Overall Cognitive Status WFL   Orientation Level Oriented X4   Comments Pt cooperative and pleasant  Motivated to participate in PT   Subjective   Subjective "My neck is still killing me"   Bed Mobility   Supine to Sit 1  Dependent   Additional items Assist x 2;HOB elevated; Increased time required;LE management  (UB support)   Sit to Supine 1  Dependent   Additional items Assist x 2;LE management   Additional Comments Pt's BP in supine with HOB elevated at rest: 110/72; Pt's BP in sittin/75 with initial c/o lightheadedness that subsided after ~30 seconds  Balance   Static Sitting Poor -   Dynamic Sitting Poor -   Endurance Deficit   Endurance Deficit Yes   Endurance Deficit Description pt limited by fatigue, weakness, pain, associated SCI deficits     Activity Tolerance   Activity Tolerance Patient limited by fatigue;Treatment limited secondary to medical complications (Comment)  (SCI) Medical Staff Made Aware OT   Nurse Made Aware Yes   Assessment   Prognosis Guarded   Problem List Decreased strength;Decreased endurance; Impaired balance;Decreased mobility; Impaired sensation;Pain   Assessment Pt seen by PT today for continued treatment  Pt continues with c/o neck pain but also reports having some muscle activation in his LEs  Pt repositioned to try to alleviate pain symptoms  Pt tolerated previous session of tilting on Kreg bed with appropriate BP responses  Treatment today focused on transferring supine to sitting EOB to assess vital responses and assess pt's EOB balance  Pt's BP in supine prior to transfer was 110/72  Pt required Dependent Ax2 to transfer from supine>sitting with UB support and LE management  Pt reported lightheadedness but dissipated 30 seconds after with BP recorded at 113/75  Pt demonstrates ability to sit EOB for 10 minutes while maintaining good vitals, requiring a Max Ax1 to maintain seated balance  PT assessed B/L LE: B/L knee extension and L ankle DF 1/5 with 0/5 B/L knee flexion, R ankle DF and B/L PF  Plan to continue to reassess as pt progresses  Pt also demonstrated trace core activation with anterior/posterior displacement but could not correct lateral displacement  Pt returned to supine with Dependent Ax2 with UB and LE management and repositioned into chair position in Kreg bed at end of session with call bell in reach and all other needs met  PT treatment focused on core stability, static/dynamic balance, bed mobility, and tolerance to upright positioning to continue to decrease burden on caregiver and improve functional mobility with the goal to D/C to rehab when medically cleared   At this time, pt would continue to benefit from skilled PT 3-5x/wk to address deficits in bed mobility, transfers, LE strength and ROM, endurance, balance, and activity tolerance as well as immediate acute care needs and underlying performance skills to maximize functional mobility and safety to return to PLOF  Plan to increase tolerance to sitting EOB next session  The patient's AM-PAC Basic Mobility Inpatient Short Form Low Function Raw Score 14 , Standardized Score is 22 01  A standardized score less 42 9 suggests the patient may benefit from discharge to post-acute rehab services  Please also refer to the recommendation of the Physical Therapist for safe discharge planning     Barriers to Discharge Inaccessible home environment;Decreased caregiver support   Goals   Patient Goals "I'll try to sit up"   STG Expiration Date 03/20/21   PT Treatment Day 3   Plan   Treatment/Interventions Patient/family training;Bed mobility;Spoke to nursing;Spoke to case management;OT   Progress Slow progress, medical status limitations   PT Frequency Other (Comment)  (3-5x/wk)   Recommendation   PT Discharge Recommendation Post-Acute Rehabilitation Services   PT - OK to Discharge Yes   Additional Comments to rehab pending medical clearance   AM-PAC Basic Mobility Inpatient   Turning in Bed Without Bedrails 1   Lying on Back to Sitting on Edge of Flat Bed 1   Moving Bed to Chair 1   Standing Up From Chair 1   Walk in Room 1   Climb 3-5 Stairs 1   Basic Mobility Inpatient Raw Score 6   Turning Head Towards Sound 4   Follow Simple Instructions 4   Low Function Basic Mobility Raw Score 14   Low Function Basic Mobility Standardized Score 22 01         Rozina Michaels, SPT

## 2021-03-14 NOTE — PROGRESS NOTES
Progress Note - Neurosurgery   Karel Dukes 38 y o  male MRN: [id number]  Unit/Bed#: ICU 06 Encounter: [id number]    Assessment:  POD7 C2-7 PCDF    Plan:  -Neuro improved 2/5 BLE  -Cont MAP  -SCI protocol  -PT/OT  -Cont c-collar  -Drips per ICU      Subjective/Objective   Chief Complaint: SCI    Subjective: No major complaints    Objective:   Awake  Alert  Oriented  BUE bicep 3/5, triceps 3/5, no notable movement WF/WE/  BLE 2/5 distal  Normal to light touch sensation T6, hypoesthesia below      Invasive Devices     Peripherally Inserted Central Catheter Line            PICC Line 84/88/41 Right Basilic 5 days                Physical Exam:     Vitals: Blood pressure 114/73, pulse (!) 110, temperature 99 4 °F (37 4 °C), temperature source Oral, resp  rate 18, height 5' 11" (1 803 m), weight 71 kg (156 lb 8 4 oz), SpO2 95 %  ,Body mass index is 21 83 kg/m²  Hemodynamic Monitoring: MAP: Arterial Line MAP (mmHg): 80 mmHg    Lab Results: I have personally reviewed pertinent results  Imaging Studies: I have personally reviewed pertinent reports

## 2021-03-14 NOTE — PLAN OF CARE
Problem: OCCUPATIONAL THERAPY ADULT  Goal: Performs self-care activities at highest level of function for planned discharge setting  See evaluation for individualized goals  Description: Treatment Interventions: ADL retraining, Functional transfer training, UE strengthening/ROM, Endurance training, Patient/family training, Equipment evaluation/education, Compensatory technique education, Fine motor coordination activities, Neuromuscular reeducation, Continued evaluation, Energy conservation, Activityengagement          See flowsheet documentation for full assessment, interventions and recommendations  Outcome: Progressing  Note: Limitation: Decreased ADL status, Decreased UE ROM, Decreased UE strength, Decreased endurance, Decreased sensation, Decreased fine motor control, Decreased self-care trans, Decreased high-level ADLs, Non-func R UE, Non-func L UE  Prognosis: Fair  Assessment: Pt is a 39 yo male who participated in OT session on 3/14/2021  Treatment focused to improve bed mobility, static/dynamic balance, postural/trunk control, proper body mechanics, safety awareness, and overall increased activity tolerance in ADL/IADL/leisure tasks  Upon arrival, pt found lying supine in bed and was agreeable to OT session  Overall, pt tolerated session well however presented with a flat affect  Pt tolerated approx 10 minutes sitting EOB and maintained good vitals with total assist x2 to promote dynamic core stability  ADL tasks offered however @ this time, pt deferred  Pt currently performing at a level of: total assistance x2 for bed mobility, EOB with total assistance x2 (@ times, total x1/Max Ax1) and UB/LB ADLs with total assistance   Pt is still limited by the following limitations/impairments which were addressed through skilled instruction: pain, fatigue, spinal precautions, decreased strength, generalized weakness, decreased endurance, decreased postural stability/trunk control, decreased activity tolerance, and an overall decreased independence in ADLs/IADLs/functional mobility  At the end of the session, pt was left sitting in chair position with all functional needs in reach  At this time, recommend discharge to post-acute rehabilitation services (dedicated SCI rehab)  The patient's raw score on the AM-PAC Daily Activity inpatient short form low function score is 13, standardized score is Low Function Daily Activity Standardized Score: 23 16  Patients with a standardized score less than 39 4 are likely to benefit from discharge to post-acute rehab services  Please refer to the recommendation of the Occupational Therapist for safe discharge planning  Established OT goals will be continued 3-5/wk to address immediate acute care needs and underlying performance skills to maximize occupational performance and safety to return to OF        OT Discharge Recommendation: Post-Acute Rehabilitation Services(dedicated SCI rehab)  OT - OK to Discharge: Yes(When medically appropriate)     Orville Cavanaugh, KESHIA

## 2021-03-15 LAB
ANION GAP SERPL CALCULATED.3IONS-SCNC: 4 MMOL/L (ref 4–13)
BUN SERPL-MCNC: 12 MG/DL (ref 5–25)
CALCIUM SERPL-MCNC: 9.1 MG/DL (ref 8.3–10.1)
CHLORIDE SERPL-SCNC: 99 MMOL/L (ref 100–108)
CO2 SERPL-SCNC: 30 MMOL/L (ref 21–32)
CREAT SERPL-MCNC: 0.69 MG/DL (ref 0.6–1.3)
ERYTHROCYTE [DISTWIDTH] IN BLOOD BY AUTOMATED COUNT: 12.2 % (ref 11.6–15.1)
GFR SERPL CREATININE-BSD FRML MDRD: 140 ML/MIN/1.73SQ M
GLUCOSE SERPL-MCNC: 72 MG/DL (ref 65–140)
HCT VFR BLD AUTO: 28 % (ref 36.5–49.3)
HGB BLD-MCNC: 9.2 G/DL (ref 12–17)
MCH RBC QN AUTO: 31 PG (ref 26.8–34.3)
MCHC RBC AUTO-ENTMCNC: 32.9 G/DL (ref 31.4–37.4)
MCV RBC AUTO: 94 FL (ref 82–98)
PLATELET # BLD AUTO: 383 THOUSANDS/UL (ref 149–390)
PMV BLD AUTO: 8.8 FL (ref 8.9–12.7)
POTASSIUM SERPL-SCNC: 4 MMOL/L (ref 3.5–5.3)
RBC # BLD AUTO: 2.97 MILLION/UL (ref 3.88–5.62)
SODIUM SERPL-SCNC: 133 MMOL/L (ref 136–145)
WBC # BLD AUTO: 11.19 THOUSAND/UL (ref 4.31–10.16)

## 2021-03-15 PROCEDURE — 80048 BASIC METABOLIC PNL TOTAL CA: CPT | Performed by: PHYSICIAN ASSISTANT

## 2021-03-15 PROCEDURE — NC001 PR NO CHARGE: Performed by: PHYSICIAN ASSISTANT

## 2021-03-15 PROCEDURE — 85027 COMPLETE CBC AUTOMATED: CPT | Performed by: PHYSICIAN ASSISTANT

## 2021-03-15 PROCEDURE — 99233 SBSQ HOSP IP/OBS HIGH 50: CPT | Performed by: EMERGENCY MEDICINE

## 2021-03-15 PROCEDURE — 99232 SBSQ HOSP IP/OBS MODERATE 35: CPT | Performed by: PHYSICIAN ASSISTANT

## 2021-03-15 PROCEDURE — 99024 POSTOP FOLLOW-UP VISIT: CPT | Performed by: NURSE PRACTITIONER

## 2021-03-15 RX ORDER — POLYETHYLENE GLYCOL 3350 17 G/17G
17 POWDER, FOR SOLUTION ORAL DAILY
Status: CANCELLED | OUTPATIENT
Start: 2021-03-15

## 2021-03-15 RX ORDER — POLYETHYLENE GLYCOL 3350 17 G/17G
17 POWDER, FOR SOLUTION ORAL DAILY
Status: DISCONTINUED | OUTPATIENT
Start: 2021-03-15 | End: 2021-03-16 | Stop reason: HOSPADM

## 2021-03-15 RX ADMIN — MELATONIN TAB 3 MG 3 MG: 3 TAB at 21:11

## 2021-03-15 RX ADMIN — HYDROMORPHONE HYDROCHLORIDE 1 MG: 1 INJECTION, SOLUTION INTRAMUSCULAR; INTRAVENOUS; SUBCUTANEOUS at 15:07

## 2021-03-15 RX ADMIN — SENNOSIDES, DOCUSATE SODIUM 1 TABLET: 8.6; 5 TABLET ORAL at 08:31

## 2021-03-15 RX ADMIN — HYDROMORPHONE HYDROCHLORIDE 1 MG: 1 INJECTION, SOLUTION INTRAMUSCULAR; INTRAVENOUS; SUBCUTANEOUS at 20:27

## 2021-03-15 RX ADMIN — DIAZEPAM 5 MG: 5 TABLET ORAL at 21:11

## 2021-03-15 RX ADMIN — HYDROMORPHONE HYDROCHLORIDE 6 MG: 2 TABLET ORAL at 08:30

## 2021-03-15 RX ADMIN — HYDROMORPHONE HYDROCHLORIDE 6 MG: 2 TABLET ORAL at 00:59

## 2021-03-15 RX ADMIN — GABAPENTIN 600 MG: 300 CAPSULE ORAL at 08:29

## 2021-03-15 RX ADMIN — SERTRALINE HYDROCHLORIDE 50 MG: 50 TABLET ORAL at 08:34

## 2021-03-15 RX ADMIN — ENOXAPARIN SODIUM 30 MG: 30 INJECTION SUBCUTANEOUS at 08:29

## 2021-03-15 RX ADMIN — GABAPENTIN 600 MG: 300 CAPSULE ORAL at 15:07

## 2021-03-15 RX ADMIN — HYDROMORPHONE HYDROCHLORIDE 6 MG: 2 TABLET ORAL at 12:22

## 2021-03-15 RX ADMIN — ENOXAPARIN SODIUM 30 MG: 30 INJECTION SUBCUTANEOUS at 20:27

## 2021-03-15 RX ADMIN — POLYETHYLENE GLYCOL 3350 17 G: 17 POWDER, FOR SOLUTION ORAL at 12:22

## 2021-03-15 RX ADMIN — METHOCARBAMOL TABLETS 750 MG: 750 TABLET, COATED ORAL at 07:11

## 2021-03-15 RX ADMIN — METHOCARBAMOL TABLETS 750 MG: 750 TABLET, COATED ORAL at 00:59

## 2021-03-15 RX ADMIN — DIAZEPAM 5 MG: 5 TABLET ORAL at 02:58

## 2021-03-15 RX ADMIN — HYDROMORPHONE HYDROCHLORIDE 4 MG: 2 TABLET ORAL at 04:09

## 2021-03-15 RX ADMIN — SODIUM CHLORIDE, SODIUM GLUCONATE, SODIUM ACETATE, POTASSIUM CHLORIDE, MAGNESIUM CHLORIDE, SODIUM PHOSPHATE, DIBASIC, AND POTASSIUM PHOSPHATE 100 ML/HR: .53; .5; .37; .037; .03; .012; .00082 INJECTION, SOLUTION INTRAVENOUS at 04:03

## 2021-03-15 RX ADMIN — HYDROMORPHONE HYDROCHLORIDE 6 MG: 2 TABLET ORAL at 17:37

## 2021-03-15 RX ADMIN — GABAPENTIN 600 MG: 300 CAPSULE ORAL at 20:27

## 2021-03-15 RX ADMIN — DIAZEPAM 5 MG: 5 TABLET ORAL at 08:31

## 2021-03-15 RX ADMIN — METHOCARBAMOL TABLETS 750 MG: 750 TABLET, COATED ORAL at 12:22

## 2021-03-15 RX ADMIN — DIAZEPAM 5 MG: 5 TABLET ORAL at 15:07

## 2021-03-15 RX ADMIN — METHOCARBAMOL TABLETS 750 MG: 750 TABLET, COATED ORAL at 17:37

## 2021-03-15 RX ADMIN — HYDROMORPHONE HYDROCHLORIDE 1 MG: 1 INJECTION, SOLUTION INTRAMUSCULAR; INTRAVENOUS; SUBCUTANEOUS at 10:07

## 2021-03-15 RX ADMIN — HYDROMORPHONE HYDROCHLORIDE 1 MG: 1 INJECTION, SOLUTION INTRAMUSCULAR; INTRAVENOUS; SUBCUTANEOUS at 02:58

## 2021-03-15 NOTE — PLAN OF CARE
Problem: Prexisting or High Potential for Compromised Skin Integrity  Goal: Skin integrity is maintained or improved  Description: INTERVENTIONS:  - Identify patients at risk for skin breakdown  - Assess and monitor skin integrity  - Assess and monitor nutrition and hydration status  - Monitor labs   - Assess for incontinence   - Turn and reposition patient  - Assist with mobility/ambulation  - Relieve pressure over bony prominences  - Avoid friction and shearing  - Provide appropriate hygiene as needed including keeping skin clean and dry  - Evaluate need for skin moisturizer/barrier cream  - Collaborate with interdisciplinary team   - Patient/family teaching  - Consider wound care consult   Outcome: Progressing     Problem: Potential for Falls  Goal: Patient will remain free of falls  Description: INTERVENTIONS:  - Assess patient frequently for physical needs  -  Identify cognitive and physical deficits and behaviors that affect risk of falls  -  [place] fall precautions as indicated by assessment   - Educate patient/family on patient safety including physical limitations  - Instruct patient to call for assistance with activity based on assessment  - Modify environment to reduce risk of injury  - Consider OT/PT consult to assist with strengthening/mobility  Outcome: Progressing     Problem: Nutrition/Hydration-ADULT  Goal: Nutrient/Hydration intake appropriate for improving, restoring or maintaining nutritional needs  Description: Monitor and assess patient's nutrition/hydration status for malnutrition  Collaborate with interdisciplinary team and initiate plan and interventions as ordered  Monitor patient's weight and dietary intake as ordered or per policy  Utilize nutrition screening tool and intervene as necessary  Determine patient's food preferences and provide high-protein, high-caloric foods as appropriate       INTERVENTIONS:  - Monitor oral intake, urinary output, labs, and treatment plans  - Assess nutrition and hydration status and recommend course of action  - Evaluate amount of meals eaten  - Assist patient with eating if necessary   - Allow adequate time for meals  - Recommend/ encourage appropriate diets, oral nutritional supplements, and vitamin/mineral supplements  - Order, calculate, and assess calorie counts as needed  - Recommend, monitor, and adjust tube feedings and TPN/PPN based on assessed needs  - Assess need for intravenous fluids  - Provide specific nutrition/hydration education as appropriate  - Include patient/family/caregiver in decisions related to nutrition  Outcome: Progressing     Problem: COPING  Goal: Pt/Family able to verbalize concerns and demonstrate effective coping strategies  Description: INTERVENTIONS:  - Assist patient/family to identify coping skills, available support systems and cultural and spiritual values  - Provide emotional support, including active listening and acknowledgement of concerns of patient and caregivers  - Reduce environmental stimuli, as able  - Provide patient education  - Assess for spiritual pain/suffering and initiate spiritual care, including notification of Pastoral Care or nando based community as needed  - Assess effectiveness of coping strategies  Outcome: Progressing  Goal: Will report anxiety at manageable levels  Description: INTERVENTIONS:  - Administer medication as ordered  - Teach and encourage coping skills  - Provide emotional support  - Assess patient/family for anxiety and ability to cope  Outcome: Progressing     Problem: CARDIOVASCULAR - ADULT  Goal: Maintains optimal cardiac output and hemodynamic stability  Description: INTERVENTIONS:  - Monitor I/O, vital signs and rhythm  - Monitor for S/S and trends of decreased cardiac output  - Administer and titrate ordered vasoactive medications to optimize hemodynamic stability  - Assess quality of pulses, skin color and temperature  - Assess for signs of decreased coronary artery perfusion  - Instruct patient to report change in severity of symptoms  Outcome: Progressing  Goal: Absence of cardiac dysrhythmias or at baseline rhythm  Description: INTERVENTIONS:  - Continuous cardiac monitoring, vital signs, obtain 12 lead EKG if ordered  - Administer antiarrhythmic and heart rate control medications as ordered  - Monitor electrolytes and administer replacement therapy as ordered  Outcome: Progressing     Problem: GASTROINTESTINAL - ADULT  Goal: Maintains or returns to baseline bowel function  Description: INTERVENTIONS:  - Assess bowel function  - Encourage oral fluids to ensure adequate hydration  - Administer IV fluids if ordered to ensure adequate hydration  - Administer ordered medications as needed  - Encourage mobilization and activity  - Consider nutritional services referral to assist patient with adequate nutrition and appropriate food choices  Outcome: Progressing  Goal: Maintains adequate nutritional intake  Description: INTERVENTIONS:  - Monitor percentage of each meal consumed  - Identify factors contributing to decreased intake, treat as appropriate  - Assist with meals as needed  - Monitor I&O, weight, and lab values if indicated  - Obtain nutrition services referral as needed  Outcome: Progressing     Problem: GENITOURINARY - ADULT  Goal: Maintains or returns to baseline urinary function  Description: INTERVENTIONS:  - Assess urinary function  - Encourage oral fluids to ensure adequate hydration if ordered  - Administer IV fluids as ordered to ensure adequate hydration  - Administer ordered medications as needed  - Offer frequent toileting  - Follow urinary retention protocol if ordered  Outcome: Progressing  Goal: Absence of urinary retention  Description: INTERVENTIONS:  - Assess patients ability to void and empty bladder  - Monitor I/O  - Bladder scan as needed  - Discuss with physician/AP medications to alleviate retention as needed  - Discuss catheterization for long term situations as appropriate  Outcome: Progressing     Problem: MUSCULOSKELETAL - ADULT  Goal: Maintain or return mobility to safest level of function  Description: INTERVENTIONS:  - Assess patient's ability to carry out ADLs; assess patient's baseline for ADL function and identify physical deficits which impact ability to perform ADLs (bathing, care of mouth/teeth, toileting, grooming, dressing, etc )  - Assess/evaluate cause of self-care deficits   - Assess range of motion  - Assess patient's mobility  - Assess patient's need for assistive devices and provide as appropriate  - Encourage maximum independence but intervene and supervise when necessary  - Involve family in performance of ADLs  - Assess for home care needs following discharge   - Consider OT consult to assist with ADL evaluation and planning for discharge  - Provide patient education as appropriate  Outcome: Progressing  Goal: Maintain proper alignment of affected body part  Description: INTERVENTIONS:  - Support, maintain and protect limb and body alignment  - Provide patient/ family with appropriate education  Outcome: Progressing     Problem: PAIN - ADULT  Goal: Verbalizes/displays adequate comfort level or baseline comfort level  Description: Interventions:  - Encourage patient to monitor pain and request assistance  - Assess pain using appropriate pain scale  - Administer analgesics based on type and severity of pain and evaluate response  - Implement non-pharmacological measures as appropriate and evaluate response  - Consider cultural and social influences on pain and pain management  - Notify physician/advanced practitioner if interventions unsuccessful or patient reports new pain  Outcome: Progressing     Problem: INFECTION - ADULT  Goal: Absence or prevention of progression during hospitalization  Description: INTERVENTIONS:  - Assess and monitor for signs and symptoms of infection  - Monitor lab/diagnostic results  - Monitor all insertion sites, i e  indwelling lines, tubes, and drains  - Monitor endotracheal if appropriate and nasal secretions for changes in amount and color  - [place] appropriate cooling/warming therapies per order  - Administer medications as ordered  - Instruct and encourage patient and family to use good hand hygiene technique  - Identify and instruct in appropriate isolation precautions for identified infection/condition  Outcome: Progressing     Problem: SAFETY ADULT  Goal: Patient will remain free of falls  Description: INTERVENTIONS:  - Assess patient frequently for physical needs  -  Identify cognitive and physical deficits and behaviors that affect risk of falls    -  [place] fall precautions as indicated by assessment   - Educate patient/family on patient safety including physical limitations  - Instruct patient to call for assistance with activity based on assessment  - Modify environment to reduce risk of injury  - Consider OT/PT consult to assist with strengthening/mobility  Outcome: Progressing  Goal: Maintain or return to baseline ADL function  Description: INTERVENTIONS:  -  Assess patient's ability to carry out ADLs; assess patient's baseline for ADL function and identify physical deficits which impact ability to perform ADLs (bathing, care of mouth/teeth, toileting, grooming, dressing, etc )  - Assess/evaluate cause of self-care deficits   - Assess range of motion  - Assess patient's mobility; develop plan if impaired  - Assess patient's need for assistive devices and provide as appropriate  - Encourage maximum independence but intervene and supervise when necessary  - Involve family in performance of ADLs  - Assess for home care needs following discharge   - Consider OT consult to assist with ADL evaluation and planning for discharge  - Provide patient education as appropriate  Outcome: Progressing  Goal: Maintain or return mobility status to optimal level  Description: INTERVENTIONS:  - Assess patient's baseline mobility status (ambulation, transfers, stairs, etc )    - Identify cognitive and physical deficits and behaviors that affect mobility  - Identify mobility aids required to assist with transfers and/or ambulation (gait belt, sit-to-stand, lift, walker, cane, etc )  - [place] fall precautions as indicated by assessment  - Record patient progress and toleration of activity level on Mobility SBAR; progress patient to next Phase/Stage  - Instruct patient to call for assistance with activity based on assessment  - Consider rehabilitation consult to assist with strengthening/weightbearing, etc   Outcome: Progressing     Problem: DISCHARGE PLANNING  Goal: Discharge to home or other facility with appropriate resources  Description: INTERVENTIONS:  - Identify barriers to discharge w/patient and caregiver  - Arrange for needed discharge resources and transportation as appropriate  - Identify discharge learning needs (meds, wound care, etc )  - Arrange for interpretive services to assist at discharge as needed  - Refer to Case Management Department for coordinating discharge planning if the patient needs post-hospital services based on physician/advanced practitioner order or complex needs related to functional status, cognitive ability, or social support system  Outcome: Progressing     Problem: Knowledge Deficit  Goal: Patient/family/caregiver demonstrates understanding of disease process, treatment plan, medications, and discharge instructions  Description: Complete learning assessment and assess knowledge base    Interventions:  - Provide teaching at level of understanding  - Provide teaching via preferred learning methods  Outcome: Progressing

## 2021-03-15 NOTE — PROGRESS NOTES
[address] [facility]  ICU Transfer Note - Karel Dukes 1985, 38 y o  male MRN: [id number]  Unit/Bed#: [facility] 608-01 Encounter: [id number]  Primary Care Provider: Anuj Ramirez MD   Date and time admitted to hospital: 3/6/2021  9:02 PM    Pain  Assessment & Plan  APS following  Ketamine infusion DC'd today     * Central cord syndrome [facility])  Assessment & Plan  S/p  C3-5 laminectomy, C2-7 fusion on 3/7  APS following for pain control   Continue neuro exam:   Strength 2/5 LLE  1/5 RLE   3/5 BL UE     PMR following  Plan for rehab in coming day or 2  CM following to assist with placement       Code Status: Level 1 - Full Code  POA:    POLST:      Reason for ICU admission:   SCI     Active problems:   Principal Problem:    Central cord syndrome ([address] )  Active Problems:    Pulmonary insufficiency    Closed fracture of fifth cervical vertebra ([address] )    Spinal cord injury, C1-C7 ([address] )    Fall    Pain  Resolved Problems:    * No resolved hospital problems  *      Consultants:   NS  PMR   APS  SLP    History of Present Illness:   47-year-old male with history of depression who presented to [facility] on 03/06/2021 as a level A trauma alert after falling down stairs  He reportedly was found at the bottom of stairs by friends  They are unsure how long he was down, but they had last seen him approximately 30 minutes prior  Patient does not recall any events regarding how he fell  On arrival to the [facility], he was found to have absent sensation below the nipple line and flaccid paralysis of all 4 extremities  CT C-spine showed C5 spinous process and right lamina fracture, as well as central disc protrusions at C2/C3, C3/4, C4/5  MRI showed evidence of cord edema in these areas  Patient was taken emergently to the OR by neuro surgery for C3-5 laminectomy and C2-7 fixation       Summary of clinical course:   Extubated on 3/7  Weaned off vasopressors 3/14     Recent or scheduled procedures:   3/7 C3-5 laminectomy, C2-7 fusion  3/7 laceration repair of R eyebrow  3/7 Extubated  3/8 PICC      Outstanding/pending diagnostics:   NA    Cultures:   NA       Mobilization Plan:   PMR   PT/OT     Nutrition Plan:   Regular diet     Invasive Devices Review  Invasive Devices     Peripherally Inserted Central Catheter Line            PICC Line 60/11/35 Right Basilic 6 days                Rationale for remaining devices: NA     VTE Pharmacologic Prophylaxis: Enoxaparin (Lovenox)  VTE Mechanical Prophylaxis: sequential compression device    Discharge Plan:   Patient should be ready for discharge to rehab     Initial Physical Therapy Recommendations: Following  Initial Occupational Therapy Recommendations: Following   Initial /Plan: Following     Home medications that are not reordered and reason why:   NA    Spoke with Samantha Mao  regarding transfer  Please contact critical care via [employer] with any questions or concerns  Portions of the record may have been created with voice recognition software  Occasional wrong word or "sound a like" substitutions may have occurred due to the inherent limitations of voice recognition software  Read the chart carefully and recognize, using context, where substitutions have occurred        Jr Arcos PA-C

## 2021-03-15 NOTE — ASSESSMENT & PLAN NOTE
S/p  C3-5 laminectomy, C2-7 fusion on 3/7  APS following for pain control   Continue neuro exam:   Strength 2/5 LLE  1/5 RLE   3/5 BL UE     PMR following  Plan for rehab in coming day or 2  CM following to assist with placement

## 2021-03-15 NOTE — CASE MANAGEMENT
Petra Angulo is out  [facility] is waiting on determination   Pt will need to be OFF IV pain medications for 24 hour prior to d/c

## 2021-03-15 NOTE — ASSESSMENT & PLAN NOTE
POD 8 PCDF C2-7 with C3-5 laminectomy with Dr Hargrove Situ on 3/7/2021  · LAWRENCE B cord injury s/p fall down stairs with C5 spinous process and lamina fractures  · On exam minimal R bicep 3/5, L bicep 2/5, no wrist/hand movement  HFs 1/5, otherwise 0/5 throughout  Patchy sensory level bilaterally  JPS intact in feet but not hands  Imaging:  · Upright cervical spine x-rays 3/10/21: Status post posterior fusion from C2 through C7  · Cervical spine MRI 3/7/2021: Increased T2 cord signal/edema involving stenotic levels C3 to inferior C5 most consistent with central cord syndrome in the setting of trauma  2   Nondisplaced fracture involving C5 spinous process and right lamina  Bone marrow edema within the spinous processes of C3-C5  3   Edema within posterior paravertebral musculature and increased T2 signal along the interspinous ligaments at levels C3-C6 suggestive of ligamentous injury  Anterior and posterior longitudinal ligaments appear intact  4   Degenerative canal stenosis more notable at level C3-4, as detailed  Plan:  · Frequent neuro checks  · MAP goal > 85 mmHg x 7 days, completed, off Levophed  · VISTA collar at all times, babak collar for showering  · SARANYA drain discontinued  · PMR evaluation - needs dedicated SCI rehab program    · PT/OT recommending acute rehab  · Medical management and pain control per primary team     · APS following, input appreciated  · DVT ppx: SCDs, Lovenox  Neurosurgery will sign off, patient will follow-up in 2 weeks for incision check and 6 weeks for postoperative visit with upright cervical spine x-ray, call with any further questions or concerns

## 2021-03-15 NOTE — PROGRESS NOTES
[address] [facility]  Progress Note Lisandra Trammell 1985, 38 y o  male MRN: [id number]  Unit/Bed#: ICU 06 Encounter: [id number]  Primary Care Provider: Stephen Romero MD   Date and time admitted to hospital: 3/6/2021  9:02 PM    * Central cord syndrome [facility])  Assessment & Plan  POD 8 PCDF C2-7 with C3-5 laminectomy with Dr Reji Hawk on 3/7/2021  · LAWRENCE B cord injury s/p fall down stairs with C5 spinous process and lamina fractures  · On exam minimal R bicep 3/5, L bicep 2/5, no wrist/hand movement  HFs 1/5, otherwise 0/5 throughout  Patchy sensory level bilaterally  JPS intact in feet but not hands  Imaging:  · Upright cervical spine x-rays 3/10/21: Status post posterior fusion from C2 through C7  · Cervical spine MRI 3/7/2021: Increased T2 cord signal/edema involving stenotic levels C3 to inferior C5 most consistent with central cord syndrome in the setting of trauma  2   Nondisplaced fracture involving C5 spinous process and right lamina  Bone marrow edema within the spinous processes of C3-C5  3   Edema within posterior paravertebral musculature and increased T2 signal along the interspinous ligaments at levels C3-C6 suggestive of ligamentous injury  Anterior and posterior longitudinal ligaments appear intact  4   Degenerative canal stenosis more notable at level C3-4, as detailed  Plan:  · Frequent neuro checks  · MAP goal > 85 mmHg x 7 days, completed, off Levophed  · VISTA collar at all times, babak collar for showering  · SARANYA drain discontinued  · PMR evaluation - needs dedicated SCI rehab program    · PT/OT recommending acute rehab  · Medical management and pain control per primary team     · APS following, input appreciated  · DVT ppx: SCDs, Lovenox      Neurosurgery will sign off, patient will follow-up in 2 weeks for incision check and 6 weeks for postoperative visit with upright cervical spine x-ray, call with any further questions or concerns  Subjective/Objective      Chief Complaint: "I still have neck pain"    Subjective: Spoke with nurse, patient able to void spontaneously but does require intermittent straight caths  Patient states he is able to feel when urine and stool is "coming out"  He states he can not feel being straight cath  He reports having a BM yesterday  Patient currently on Ketamine gtt  Patient currently complaining of 10/10 sharp/throbbing neck pain that radiates into his shoulders  He also reports abdominal pain  He states he has N/T in arms and legs  He reports he current pain regimen helps with his pain  He denies any HAs, dizziness, blurry vision, chest pain, SOB, N, V, D, no new weakness or N/T  Objective: Patient comfortably laying in bed with vista collar in place, NAD    I/O       03/13 0701 - 03/14 0700 03/14 0701 - 03/15 0700 03/15 0701 - 03/16 0700    P  O  200 480 30    I V  (mL/kg) 2962 1 (41 7) 3732 5 (52 6) 228 4 (3 2)    IV Piggyback 50 100     Total Intake(mL/kg) 3212 1 (45 2) 4312 5 (60 7) 258 4 (3 6)    Urine (mL/kg/hr) 2450 (1 4) 5155 (3) 500 (2 8)    Stool 1600 0     Total Output 4050 5155 500    Net -837 9 -842 5 -241  6           Unmeasured Urine Occurrence  3 x 1 x    Unmeasured Stool Occurrence 3 x 4 x           Invasive Devices     Peripherally Inserted Central Catheter Line            PICC Line 24/75/98 Right Basilic 6 days                Physical Exam:  Vitals: Blood pressure 119/73, pulse (!) 106, temperature 99 5 °F (37 5 °C), temperature source Oral, resp  rate (!) 11, height 5' 11" (1 803 m), weight 71 kg (156 lb 8 4 oz), SpO2 94 %  ,Body mass index is 21 83 kg/m²  General appearance: alert, appears stated age, cooperative and no distress  Head: Normocephalic, without obvious abnormality, atraumatic  Eyes: EOMI, PERRL, conjugate gaze  Neck:  Vista collar in place, posterior cervical incision clean and dry, no erythema or drainage noted   Incision well approximated with staples, some TTP  Lungs: non labored breathing  Heart:  Tachycardic  Neurologic:   Mental status: Alert, oriented x3, thought content appropriate, speech is clear, following commands  Cranial nerves: grossly intact (Cranial nerves II-XII)  Sensory:  Patchy T6-T7 sensory level, JPS intact in toes not fingers and DST intact  Motor: R biceps/triceps 3/5, L bicep/tricep 2/5, no  or wrist movement and B/L HF 1/5 and able to wiggle L toes  Reflexes: Depressed reflexes, No Hoffmans or clonus appreciated       Lab Results:  Results from last 7 days   Lab Units 03/14/21  0514 03/13/21  0629 03/12/21  0553 03/11/21  0454 03/10/21  0640 03/09/21  0436   WBC Thousand/uL 10 02 15 20* 15 55* 15 65* 17 55* 20 50*   HEMOGLOBIN g/dL 10 0* 10 6* 10 8* 10 6* 11 3* 11 5*   HEMATOCRIT % 30 7* 33 0* 33 1* 32 6* 34 1* 34 9*   PLATELETS Thousands/uL 386 380 351 323 303 312   NEUTROS PCT %  --   --   --  58 60 77*   MONOS PCT %  --   --   --  14* 12 9     Results from last 7 days   Lab Units 03/14/21  0514 03/13/21  0629 03/12/21  0553 03/11/21  0454   POTASSIUM mmol/L 3 7 4 2 4 3 3 7   CHLORIDE mmol/L 100 105 100 104   CO2 mmol/L 30 26 28 31   BUN mg/dL 13 11 12 9   CREATININE mg/dL 0 64 0 67 0 78 0 83   CALCIUM mg/dL 8 8 8 6 9 0 8 8   ALK PHOS U/L  --   --   --  53   ALT U/L  --   --   --  11*   AST U/L  --   --   --  14     Results from last 7 days   Lab Units 03/14/21  0514 03/13/21  0629 03/12/21  0553   MAGNESIUM mg/dL 2 1 1 9 2 1     Results from last 7 days   Lab Units 03/10/21  0640   PHOSPHORUS mg/dL 3 4         No results found for: TROPONINT  ABG:  Lab Results   Component Value Date    PHART 7 441 03/07/2021    VTY2KXD 36 7 03/07/2021    PO2ART 162 7 (H) 03/07/2021    ZVM4RTY 24 4 03/07/2021    BEART 0 6 03/07/2021    SOURCE Line, Arterial 03/07/2021       Imaging Studies: I have personally reviewed pertinent reports     and I have personally reviewed pertinent films in PACS    Ct Chest Abdomen Pelvis W Wo Contrast    Result Date: 3/6/2021  Impression: No acute traumatic injury identified  I personally discussed this study with Jael Gibson on 3/6/2021 at 9:53 PM  Workstation performed: [id number]     Xr Cervical Spine 2 Or 3 Views    Result Date: 3/11/2021  Impression: Status post posterior fusion from C2 through C7  Workstation performed: [id number]    Xr Spine Cervical 2 Or 3 Vw Injury    Result Date: 3/7/2021  Impression: Fluoroscopic guidance provided for procedure guidance  Please refer to the separate procedure notes for additional details  Workstation performed: [id number]     Xr Abdomen 1 View Kub    Result Date: 3/11/2021  Impression: Diffuse large and small bowel distention in keeping with an ileus  Workstation performed: [id number]    Ct Head Without Contrast    Result Date: 3/6/2021  Impression: No acute intracranial abnormality  I personally discussed this study with Jael Gibson on 3/6/2021 at 9:53 PM  Workstation performed: [id number]     Ct Spine Cervical Without Contrast    Addendum Date: 3/6/2021    ADDENDUM: There is a nondisplaced fracture through the spinous process and right lamina of C5  I personally discussed this study with Jael Gibson on 3/6/2021 at 10:14 PM      Result Date: 3/6/2021  Impression: No cervical spine fracture or traumatic malalignment  Central disc protrusions at C2-C3, C3-C4, and C4-C5  Unfortunately, this is incompletely evaluated  Given the reported neurological symptoms in this trauma patient, recommend MRI  I personally discussed this study with Jael Gibson on 3/6/2021 at 9:53 PM  Workstation performed: [id number]     Mri Cervical Spine Wo Contrast    Result Date: 3/7/2021  Impression: 1  Increased T2 cord signal/edema involving stenotic levels C3 to inferior C5 most consistent with central cord syndrome in the setting of trauma  2   Nondisplaced fracture involving C5 spinous process and right lamina    Bone marrow edema within the spinous processes of C3-C5  3   Edema within posterior paravertebral musculature and increased T2 signal along the interspinous ligaments at levels C3-C6 suggestive of ligamentous injury  Anterior and posterior longitudinal ligaments appear intact  4   Degenerative canal stenosis more notable at level C3-4, as detailed  I personally discussed this study with Paige Shipman on 3/7/2021 at 7:35 AM  Workstation performed: [id number]     Mri Thoracic Spine Wo Contrast    Result Date: 3/7/2021  Impression: No acute finding  Unremarkable MRI of the thoracic spine  Workstation performed: [id number]     Mri Lumbar Spine Wo Contrast    Result Date: 3/7/2021  Impression: No acute finding  Unremarkable MRI of the lumbar spine  Workstation performed: [id number]     Xr Chest 1 View    Result Date: 3/10/2021  Impression: No acute cardiopulmonary disease within limitations of supine imaging  Workstation performed: [id number]     X-ray Chest 1 View    Result Date: 3/7/2021  Impression: The tip of the endotracheal tube projects 2 4 cm above the odalys  Clear lungs  Workstation performed: [id number]     Xr Trauma Multiple    Result Date: 3/7/2021  Impression: No acute cardiopulmonary disease within limitations of supine imaging  Workstation performed: [id number]     Ir Picc Placement Double Lumen    Result Date: 3/9/2021  Impression: Ultrasound and fluoroscopically guided placement of a power injectable dual lumen peripherally inserted central venous catheter via the right basilic vein with its tip at the cavoatrial junction under ultrasound and fluoroscopic guidance  PLAN: Patient is at higher than usual risk of thrombosis of his PICC access site due to immobility   When patient is able to remove the cervical collar and position his neck for internal jugular access recommend return to IR for placement of tunneled jugular central venous access and removal of PICC Workstation performed: [id number]       EKG, Pathology, and Other Studies: I have personally reviewed pertinent reports        VTE Pharmacologic Prophylaxis: Enoxaparin (Lovenox)    VTE Mechanical Prophylaxis: sequential compression device

## 2021-03-15 NOTE — CASE MANAGEMENT
CM spoke to Chepe Sales [phone] of [employer]  FELIPA faxed clinical to [phone]   They will submit for insurance auth

## 2021-03-15 NOTE — PROGRESS NOTES
Progress Note - Critical Care   Latoya Martinez 38 y o  male MRN: [id number]  Unit/Bed#: ICU 06 Encounter: [id number]    Assessment & Plan:    Neuro:   1  Central cord syndrome secondary to traumatic spinal cord injury  - POD 8 after C2-C7 decompression with C3-C5 laminectomy  - neurosurgery following  - permissive hypertension x7 days completed  - no Levophed requirements for 24 hours  - maintain Vista collar  - continue to monitor exam for improvement  - continue PT OT  - dispo planning for spinal cord injury rehab    2  Depression/anxiety  - continue Zoloft 50 mg daily  - continue Valium 5 mg q 6 hours  - continue Ativan p r n     3  Analgesia  - APS following  - continue ketamine at 0 2 mg/kg/hr  - continue gabapentin 300 mg t i d   - continue Robaxin 750 mg q 6 hours  - continue lidocaine patch  - continue p r n  Tylenol, Dilaudid     CV:   1  Permissive hypertension  - completed 7 days  - continue to monitor hemodynamics  - telemetry monitoring     Lung:  No acute issues  - maintain SpO2 greater than 92%  - incentive spirometry  - pulmonary hygiene     GI:   1  Ileus  - continue bowel regimen with scheduled Senokot and Dulcolax suppository, MiraLax p r n   - +BMs  - consider advancing diet     FEN:   F:  Isolyte at 100 cc/hour while NPO  E:  Replete as needed  N:  NPO     :   1  Urinary retention secondary to spinal cord injury  - continue bladder training  - currently without a Billingsley in place  - bladder scan with p r n  Straight caths  - continue monitor urine output  - monitor BUN/creatinine, lab holiday today     ID:  No acute issues  - monitor for fever  - monitor for leukocytosis, lab holiday today     Heme:  No acute issues  - Lovenox and SCDs for DVT prophylaxis     Endo:  No acute issues  - maintain euglycemia                Msk/Skin:   1   Paraplegia with bilateral upper and lower extremity weakness secondary to C-spine injury  - aggressive PT OT  - dispo planning for rehab     Disposition: Consider transfer to med surg    Chief Complaint: neck pain    HPI/24hr events:   33-year-old male with no past medical history who presented on 03/06/2021 after a fall down stairs  Found to have central cord syndrome  Underwent laminectomy and fixation/fusion  No overnight events  He had 3 spontaneous urine voids and 3 spontaneous bowel movements  Physical Exam:   Physical Exam  Vitals signs and nursing note reviewed  Constitutional:       General: He is not in acute distress  Appearance: He is not ill-appearing  HENT:      Head: Normocephalic and atraumatic  Mouth/Throat:      Mouth: Mucous membranes are moist       Pharynx: No oropharyngeal exudate or posterior oropharyngeal erythema  Eyes:      Extraocular Movements: Extraocular movements intact  Pupils: Pupils are equal, round, and reactive to light  Neck:      Comments: Collar in place  Cardiovascular:      Rate and Rhythm: Normal rate and regular rhythm  Heart sounds: No murmur  No friction rub  No gallop  Pulmonary:      Breath sounds: Normal breath sounds  No wheezing, rhonchi or rales  Abdominal:      General: Abdomen is flat  Bowel sounds are normal  There is no distension  Palpations: Abdomen is soft  Tenderness: There is no abdominal tenderness  Musculoskeletal: Normal range of motion  General: No swelling or tenderness  Skin:     General: Skin is warm and dry  Coloration: Skin is not pale  Findings: No rash  Neurological:      Mental Status: He is alert and oriented to person, place, and time  Comments: Sensation intact in bilateral upper and lower extremities  1/5 strength in the LUE, 3/5 strength in the RUE  1/5 strength in bilateral LEs  Review of Systems   Constitutional: Negative for chills and fever  HENT: Negative for congestion, rhinorrhea and sore throat  Respiratory: Negative for cough, chest tightness and shortness of breath      Cardiovascular: Negative for chest pain and leg swelling  Gastrointestinal: Negative for abdominal pain, nausea and vomiting  Genitourinary: Positive for difficulty urinating  Negative for flank pain and frequency  Musculoskeletal: Positive for neck pain  Negative for back pain  Skin: Negative for pallor and rash  Neurological: Negative for light-headedness and headaches  Vitals:    03/15/21 0300 03/15/21 0400 03/15/21 0500 03/15/21 0600   BP: 119/73 111/74 113/70 119/75   BP Location: Left arm Left arm Left arm Left arm   Pulse: (!) 108 (!) 110 102 100   Resp: 22 14 12 13   Temp:  99 6 °F (37 6 °C)     TempSrc:  Oral     SpO2: 93% 93% 94% 93%   Weight:       Height:         Arterial Line BP: 122/60  Arterial Line MAP (mmHg): 80 mmHg    Temperature:   Temp (24hrs), Av 4 °F (37 4 °C), Min:98 9 °F (37 2 °C), Max:100 1 °F (37 8 °C)    Current: Temperature: 99 6 °F (37 6 °C)    Weights:   IBW: 75 3 kg    Body mass index is 21 83 kg/m²  Weight (last 2 days)     None          Hemodynamic Monitoring:  N/A     Non-Invasive/Invasive Ventilation Settings:  Respiratory    Lab Data (Last 4 hours)    None         O2/Vent Data (Last 4 hours)    None              No results found for: PHART, SXN6XBB, PO2ART, USD5RCE, Y4BIFBIG, BEART, SOURCE  SpO2: SpO2: 93 %    Intake and Outputs:  I/O        07 -  0700  07 - 03/15 0700    P  O  200 480    I V  (mL/kg) 2962 1 (41 7) 3353 5 (47 2)    IV Piggyback 50 100    Total Intake(mL/kg) 3212 1 (45 2) 3933 5 (55 4)    Urine (mL/kg/hr) 2450 (1 4) 4705 (2 8)    Stool 1600 0    Total Output 4050 4705    Net -837 9 -771 5          Unmeasured Urine Occurrence  2 x    Unmeasured Stool Occurrence 3 x 3 x           Nutrition:        Diet Orders   (From admission, onward)             Start     Ordered    21 1506  Diet NPO; Sips with meds  Diet effective now     Question Answer Comment   Diet Type NPO    NPO Except: Sips with meds    RD to adjust diet per protocol?  Yes 03/11/21 1505    03/09/21 1633  Dietary nutrition supplements  Once     Question Answer Comment   Select Supplement: [employer], Dinner        03/09/21 1633    03/08/21 1430  Dietary nutrition supplements  Once     Question Answer Comment   Select Supplement: Ensure Enlive-Strawberry    Frequency Breakfast, Lunch, Dinner        03/08/21 1429                  Labs:   Results from last 7 days   Lab Units 03/14/21  0514 03/13/21  0629 03/12/21  0553 03/11/21  0454 03/10/21  0640 03/09/21  0436   WBC Thousand/uL 10 02 15 20* 15 55* 15 65* 17 55* 20 50*   HEMOGLOBIN g/dL 10 0* 10 6* 10 8* 10 6* 11 3* 11 5*   HEMATOCRIT % 30 7* 33 0* 33 1* 32 6* 34 1* 34 9*   PLATELETS Thousands/uL 386 380 351 323 303 312   NEUTROS PCT %  --   --   --  58 60 77*   MONOS PCT %  --   --   --  14* 12 9      Results from last 7 days   Lab Units 03/14/21  0514 03/13/21  0629 03/12/21  0553 03/11/21  0454   SODIUM mmol/L 136 137 135* 139   POTASSIUM mmol/L 3 7 4 2 4 3 3 7   CHLORIDE mmol/L 100 105 100 104   CO2 mmol/L 30 26 28 31   BUN mg/dL 13 11 12 9   CREATININE mg/dL 0 64 0 67 0 78 0 83   CALCIUM mg/dL 8 8 8 6 9 0 8 8   ALK PHOS U/L  --   --   --  53   ALT U/L  --   --   --  11*   AST U/L  --   --   --  14     Results from last 7 days   Lab Units 03/14/21  0514 03/13/21  0629 03/12/21  0553   MAGNESIUM mg/dL 2 1 1 9 2 1     Results from last 7 days   Lab Units 03/10/21  0640   PHOSPHORUS mg/dL 3 4              No results found for: TROPONINI    Imaging:   XR abdomen 1 view kub   Final Result by Paula Herr MD (03/12 1322)   Persistent diffuse colonic and small bowel air-filled distention, likely representing ileus, similar to 3/11/2021, somewhat more pronounced than 3/6/2021    If further investigation is desired, repeat abdominal pelvic CT could be considered   Workstation performed: [id number]      XR abdomen 1 view kub   Final Result by Elvira Weinberg MD (03/11 1441)   Diffuse large and small bowel distention in keeping with an ileus  Workstation performed: [id number]      XR cervical spine 2 or 3 views   Final Result by Chetna Oro MD (03/11 0830)   Status post posterior fusion from C2 through C7  Workstation performed: [id number]      IR PICC placement double lumen   Final Result by Tito Hardin MD (03/09 1110)   Ultrasound and fluoroscopically guided placement of a power injectable dual lumen peripherally inserted central venous catheter via the right basilic vein with its tip at the cavoatrial junction under ultrasound and fluoroscopic guidance  PLAN:      Patient is at higher than usual risk of thrombosis of his PICC access site due to immobility  When patient is able to remove the cervical collar and position his neck for internal jugular access recommend return to IR for placement of tunneled jugular central venous access and removal of PICC                  Workstation performed: [id number]         X-ray chest 1 view   Final Result by Naz Araujo MD (03/07 1409)      The tip of the endotracheal tube projects 2 4 cm above the odalys  Clear lungs  Workstation performed: [id number]         XR spine cervical 2 or 3 vw injury   Final Result by Ayla Lang MD (03/07 4291)      Fluoroscopic guidance provided for procedure guidance  Please refer to the separate procedure notes for additional details  Workstation performed: [id number]         MRI lumbar spine wo contrast   Final Result by Missy Rich MD (03/07 0708)      No acute finding  Unremarkable MRI of the lumbar spine  Workstation performed: [id number]         MRI thoracic spine wo contrast   Final Result by Missy Rich MD (03/07 9693)      No acute finding  Unremarkable MRI of the thoracic spine  Workstation performed: [id number]         MRI cervical spine wo contrast   Final Result by Missy Rich MD (03/07 0815)      1    Increased T2 cord signal/edema involving stenotic levels C3 to inferior C5 most consistent with central cord syndrome in the setting of trauma  2   Nondisplaced fracture involving C5 spinous process and right lamina  Bone marrow edema within the spinous processes of C3-C5  3   Edema within posterior paravertebral musculature and increased T2 signal along the interspinous ligaments at levels C3-C6 suggestive of ligamentous injury  Anterior and posterior longitudinal ligaments appear intact  4   Degenerative canal stenosis more notable at level C3-4, as detailed  I personally discussed this study with Paige Ramonita on 3/7/2021 at 7:35 AM                Workstation performed: [id number]         CT head without contrast   Final Result by Aleshia Sauceda DO (03/06 2155)      No acute intracranial abnormality  I personally discussed this study with Ramos Sykes on 3/6/2021 at 9:53 PM          Workstation performed: [id number]         CT spine cervical without contrast   Final Result by  (03/15 7454)   Addendum 1 of 1 by Aleshia Sauceda DO (03/06 2215)   ADDENDUM:      There is a nondisplaced fracture through the spinous process and right    lamina of C5  I personally discussed this study with Ramos Sykes on 3/6/2021 at    10:14 PM                Final      CT chest abdomen pelvis w wo contrast   Final Result by Aleshia Sauceda DO (03/06 2154)      No acute traumatic injury identified  I personally discussed this study with Ramos Sykes on 3/6/2021 at 9:53 PM                Workstation performed: [id number]         XR trauma multiple   Final Result by Maude Kasper MD (03/07 1346)      No acute cardiopulmonary disease within limitations of supine imaging  Workstation performed: [id number]         XR chest 1 view   Final Result by Maude Kasper MD (03/10 1947)      No acute cardiopulmonary disease within limitations of supine imaging                 Workstation performed: [id number]            I have personally reviewed pertinent reports            Micro:  No results found for: Lo Backer, WOUNDCULT, SPUTUMCULTUR    Allergies: No Known Allergies    Medications:   Scheduled Meds:  Current Facility-Administered Medications   Medication Dose Route Frequency Provider Last Rate    acetaminophen  975 mg Oral Q8H PRN Shama Ogden PA-C      bisacodyl  10 mg Rectal Daily Shabbir De Los Santos PA-C      diazepam  5 mg Oral Q6H JAYLYN Reynolds      enoxaparin  30 mg Subcutaneous Q12H [address] [place], [place]      gabapentin  600 mg Oral TID Vineet Pittman PA-C      glycopyrrolate  0 2 mg Intravenous Q4H PRN Lamona Meals, DENITA      haloperidol lactate  2 mg Intramuscular Q30 Min PRN Lamona Meals, DENITA      HYDROmorphone  1 mg Intravenous Q4H PRN Shabbir De Los Santos, DENITA      HYDROmorphone  4 mg Oral Q3H PRN Shabbir De Los Santos PA-C      Or    HYDROmorphone  6 mg Oral Q3H PRN Shabbir De Los Santos PA-C      ketamine  0 2 mg/kg/hr Intravenous Continuous Madalyn Casas MD 0 2 mg/kg/hr (03/14/21 2000)    lidocaine  1 patch Topical Daily Cris Kwok MD      LORazepam  1 mg Intravenous Q1H PRN Lamona MealsDENITA      melatonin  3 mg Oral HS JAYLYN Carreno      methocarbamol  750 mg Oral Q6H [address] Lacy Fontaine MD      multi-electrolyte  100 mL/hr Intravenous Continuous Nereida Gordon PA-C 100 mL/hr (03/15/21 0403)    norepinephrine  1-30 mcg/min Intravenous Titrated Tatyana Ambrosio PA-C Stopped (03/14/21 0529)    ondansetron  4 mg Intravenous Q6H PRN Lacy Fontaine MD      polyethylene glycol  17 g Oral Daily PRN Shabbir De Los Santos PA-C      senna-docusate sodium  1 tablet Oral BID Shabbir De Los Santos PA-C      sertraline  50 mg Oral Daily Shabbir De Los Santos PA-C       Continuous Infusions:ketamine, 0 2 mg/kg/hr, Last Rate: 0 2 mg/kg/hr (03/14/21 2000)  multi-electrolyte, 100 mL/hr, Last Rate: 100 mL/hr (03/15/21 0403)  norepinephrine, 1-30 mcg/min, Last Rate: Stopped (03/14/21 4953)      PRN Meds:  acetaminophen, 975 mg, Q8H PRN  glycopyrrolate, 0 2 mg, Q4H PRN  haloperidol lactate, 2 mg, Q30 Min PRN  HYDROmorphone, 1 mg, Q4H PRN  HYDROmorphone, 4 mg, Q3H PRN    Or  HYDROmorphone, 6 mg, Q3H PRN  LORazepam, 1 mg, Q1H PRN  ondansetron, 4 mg, Q6H PRN  polyethylene glycol, 17 g, Daily PRN        VTE Pharmacologic Prophylaxis: Enoxaparin (Lovenox)  VTE Mechanical Prophylaxis: sequential compression device    Invasive lines and devices: Invasive Devices     Peripherally Inserted Central Catheter Line            PICC Line 86/74/26 Right Basilic 6 days                   Counseling / Coordination of Care  Total Critical Care time spent 10 minutes excluding procedures, teaching and family updates  Code Status: Level 1 - Full Code     Portions of the record may have been created with voice recognition software  Occasional wrong word or "sound a like" substitutions may have occurred due to the inherent limitations of voice recognition software  Read the chart carefully and recognize, using context, where substitutions have occurred       Karen Osorio MD

## 2021-03-15 NOTE — UTILIZATION REVIEW
Continued Stay Review    Date: 3/14                          Current Patient Class: Inpatient  Current Level of Care: Critical Care    HPI:35 y o  male initially admitted on 3/6 presents with paralysis after being found at the bottom of some stairs  Assessment/Plan:  Spinal cord injury with central cord syndrome,  Urinary retention in the setting of spinal cord injury, Paraplegia with bilateral upper extremity weakness in the setting of C-spine injury  POD7 PCDF  C2-C7 with C3-C5 laminectomy    24hr events: Still with significant complaints of pain  Ketamine gtt increased from 0 1 to 0 2 per APS  Rectal tube inserted with copious amount of stool put out  Discontinued this AM     Levo on hold this am  Continue pain control - Ketamine gtt increased this am, pt states no improvement in pain from yesterday  Increase gabapentin to 600  Mg q8h  On room air  Continue with bladder training  Strict I&Os  Daily BMP  Continue IVFs  Consider clear liquid diet today to observe pt's tolerance      Pertinent Labs/Diagnostic Results:       Results from last 7 days   Lab Units 03/14/21  0514 03/13/21  0629 03/12/21  0553 03/11/21  0454 03/10/21  0640 03/09/21  0436   WBC Thousand/uL 10 02 15 20* 15 55* 15 65* 17 55* 20 50*   HEMOGLOBIN g/dL 10 0* 10 6* 10 8* 10 6* 11 3* 11 5*   HEMATOCRIT % 30 7* 33 0* 33 1* 32 6* 34 1* 34 9*   PLATELETS Thousands/uL 386 380 351 323 303 312   NEUTROS ABS Thousands/µL  --   --   --  9 26* 10 38* 15 64*         Results from last 7 days   Lab Units 03/14/21  0514 03/13/21  0629 03/12/21  0553 03/11/21  0454 03/10/21  0640   SODIUM mmol/L 136 137 135* 139 138   POTASSIUM mmol/L 3 7 4 2 4 3 3 7 3 6   CHLORIDE mmol/L 100 105 100 104 105   CO2 mmol/L 30 26 28 31 29   ANION GAP mmol/L 6 6 7 4 4   BUN mg/dL 13 11 12 9 9   CREATININE mg/dL 0 64 0 67 0 78 0 83 0 92   EGFR ml/min/1 73sq m 145 142 133 130 122   CALCIUM mg/dL 8 8 8 6 9 0 8 8 8 7   CALCIUM, IONIZED mmol/L  --   --   --  1 12  --    MAGNESIUM mg/dL 2 1 1 9 2 1 1 8 1 8   PHOSPHORUS mg/dL  --   --   --   --  3 4     Results from last 7 days   Lab Units 03/11/21  0454   AST U/L 14   ALT U/L 11*   ALK PHOS U/L 53   TOTAL PROTEIN g/dL 6 8   ALBUMIN g/dL 2 8*   TOTAL BILIRUBIN mg/dL 0 57         Results from last 7 days   Lab Units 03/14/21  0514 03/13/21  0629 03/12/21  0553 03/11/21  0454 03/10/21  0640 03/09/21  0434   GLUCOSE RANDOM mg/dL 84 115 118 96 92 116       Vital Signs:   /14/21 2000  100 1 °F (37 8 °C)  110Abnormal   11Abnormal   109/67  78  --  --  95 %  None (Room air)   Lying     03/14/21 1600  99 °F (37 2 °C)  108Abnormal   10Abnormal   119/68  79  --  --  97 %  None (Room air)  Lying   03/14/21 1500  --  108Abnormal   12  112/69  81  --  --  98 %  None (Room air)  Lying   03/14/21 1400  --  110Abnormal   18  114/73  86  --  --  95 %  None (Room air)  Sitting       Medications:   Scheduled Medications:  bisacodyl, 10 mg, Rectal, Daily  diazepam, 5 mg, Oral, Q6H  enoxaparin, 30 mg, Subcutaneous, Q12H GILMAR  gabapentin, 600 mg, Oral, TID  lidocaine, 1 patch, Topical, Daily  melatonin, 3 mg, Oral, HS  methocarbamol, 750 mg, Oral, Q6H GILMAR  polyethylene glycol, 17 g, Oral, Daily  senna-docusate sodium, 1 tablet, Oral, BID  sertraline, 50 mg, Oral, Daily      Continuous IV Infusions:    ketamine 250 mg (STANDARD CONCENTRATION) IV in sodium chloride 0 9% 250 mL   Rate: 14 2 mL/hr Dose: 0 2 mg/kg/hr  Weight Dosing Info: 71 kg  Freq: Continuous Route: IV  Start: 03/13/21 1345    multi-electrolyte (PLASMALYTE-A/ISOLYTE-S PH 7 4) IV solution   Rate: 100 mL/hr Dose: 100 mL/hr  Freq: Continuous Route: IV  Start: 03/13/21 1000     norepinephrine (LEVOPHED) 4 mg (STANDARD CONCENTRATION) IV in sodium chloride 0 9% 250 mL   Rate: 3 8-112 5 mL/hr Dose: 1-30 mcg/min  Freq: Titrated Route: IV  Start: 03/07/21 0345    PRN Meds:  acetaminophen, 975 mg, Oral, Q8H PRN  glycopyrrolate, 0 2 mg, Intravenous, Q4H PRN  haloperidol lactate, 2 mg, Intramuscular, Q30 Min PRN  HYDROmorphone, 1 mg, Intravenous, Q4H PRN 3/14 x6  HYDROmorphone, 4 mg, Oral, Q3H PRN    Or  HYDROmorphone, 6 mg, Oral, Q3H PRN 3/14 x6  LORazepam, 1 mg, Intravenous, Q1H PRN  ondansetron, 4 mg, Intravenous, Q6H PRN      Discharge Plan: D    Network Utilization Review Department  ATTENTION: Please call with any questions or concerns to [phone] and carefully listen to the prompts so that you are directed to the right person  All voicemails are confidential   Chetna Nieto all requests for admission clinical reviews, approved or denied determinations and any other requests to dedicated fax number below belonging to the campus where the patient is receiving treatment   List of dedicated fax numbers for the Facilities:  [address] DENIALS (Administrative/Medical Necessity) [phone]   [address] (Maternity/NICU/Pediatrics) [phone] [address] Nelson Cuenca 2584 (  Henry Doshi "Giulia" 103) [address] [place] [address] Arian Burk 1481 [address] [place] [address] [phone]

## 2021-03-15 NOTE — DISCHARGE INSTRUCTIONS
Peripherally Inserted Central Catheter     WHAT YOU NEED TO KNOW:   A PICC is an IV placed into a large blood vessel near your heart  It is usually inserted through a blood vessel in your arm  Your PICC may have multiple ports  Ports are tubes where you can inject medicine  A PICC can stay in place for several weeks or months  You may need a PICC to get nutrition, medicine, or fluids  Blood samples can be removed from your PICC and sent to the lab for tests  DISCHARGE INSTRUCTIONS:    [facility] patients,    Contact Interventional Radiology at [phone] PATIENTS: Contact Interventional Radiology at [phone]   [facility] PATIENTS: Contact Interventional Radiology at [phone] if:  · Blood soaks through your bandage  · Your arm or leg feels warm, tender, and painful  It may look swollen and red  · You have trouble moving your arm  · Your catheter falls out  · You have a fever or swelling, redness, pain, or pus where the catheter was inserted  · Persistent nausea or vomiting  · You cannot flush your catheter, or you feel pain when you flush your catheter  · You see a hole or crack in the tubing of your catheter  · You see fluid leaking from the insertion site  · You run out of supplies to care for your catheter  · You have questions or concerns about your condition or care  Discharge Instructions  Posterior cervical decompression and fixation/fusion      Surgical incisional care:   Keep incision clean and dry  Avoid applying creams, lotion or antiseptic to incision area   Check the wound daily  If the incision becomes red, swollen, tender, warm, or has increased drainage please notify physician immediately   Okay to apply a padded dressing over incision while wearing VISTA collar in order to reduce risk of irritation   May shower 3 days after surgery, but do not soak in a tub and no swimming    o Use mild antimicrobial soap and water  Pat incision dry after showering   Cervical VISTA collar to be worn at all times except for showering  Change from VISTA (grey) collar to [place] (peach[place] collar prior to showering  Incision may be cleaned with water and a mild antimicrobial soap using a clean washcloth  Incision is to be gently patted dry with a clean towel  Once dry, collar should be changed back to a VISTA (grey) collar with clean pads in place   Hand wash collar pads with mild soap and water  They are to be laid flat to dry on a clean towel  Recommend changing every 1-2 days   Please refer to VISTA collar instructions for further details  Activity Restrictions:   No heavy lifting greater than 5 - 10lbs  No strenuous activities   May walk as tolerated  Encourage at least 4 short walks per day   No driving while requiring cervical collar, anticipated six weeks   No significant neck movement  Postoperative medication:   Please take pain medications to relieve incision pain, and muscle relaxants to prevent spasms as directed  Please see after visit summary (AVS) for details   Please take over the counter stool softeners such as colace or senna-s to avoid constipation while on narcotics   Do not take ibuprofen, Naproxen/Aleve or any NSAID until cleared by surgeon  May take Tylenol instead   If taking Coumadin, Aspirin, or Plavix, you may resume these medications when cleared by Neurosurgery  Follow-up as scheduled for a 2 week incision check  Follow-up 6 weeks after surgery with a repeat cervical spine upright x-rays to be completed prior to visit  **Please notify MD immediately if you experience a fever of 101  F, have increased neck or arm pain, new numbness and/or weakness in your arms, difficulty swallowing or breathing especially while lying down, numbness or weakness in arms or legs  **

## 2021-03-15 NOTE — PROGRESS NOTES
Progress Note - Acute Pain Service    Doretha Reis 38 y o  male MRN: [id number]  Unit/Bed#: ICU 06 Encounter: [id number]      Assessment:   Principal Problem:    Central cord syndrome [facility])  Active Problems:    Pulmonary insufficiency    Closed fracture of fifth cervical vertebra (HCC)    Spinal cord injury, C1-C7 ([facility] [address] )    Fall    Pain    Doretha Reis is a 38 y o  male   Status post fall down stairs resulting in central cord syndrome and quadriplegia   Underwent C3 through C5 laminectomy and C2 through C7 fixation/fusion   Patient has had significant neck pain, placed on ketamine infusion   3/9/21with good results until  3/10/21 when pain began to worsen again  Plan:    Continue Tylenol 975 mg p o  q 8 hours p r n  mild pain   Continue Dilaudid 4 mg p o  q 3 hours p r n  moderate pain   Continue Dilaudid 6 mg p o  q 3 hours p r n  severe pain   Decrease Dilaudid to 0 5 mg IV q 4 hours p r n  breakthrough pain   Discontinue ketamine infusion in anticipation of discharge to rehab in the very near future   Continue Valium 5 mg p o  q 6 hours scheduled   Continue Neurontin 600 mg p o  t i d  scheduled   Continue Robaxin 750 mg p o  q 6 hours scheduled   Continue lidocaine patch for up to 12 hours daily   Continue bowel regimen to avoid opioid induced constipation   At discharge, continue Tylenol, Valium, Neurontin, Robaxin, lidocaine patch, bowel regimen as currently ordered   At discharge, continue Dilaudid 4 mg p o  q 3 hours p r n  moderate to severe pain  APS will continue to follow  Please call  / [phone] or [facility] (/ between [phone] and on weekends) with questions or concerns    Pain History  Current pain location(s):   Neck  Pain Scale:     8/10  Quality:  Sharp, aching  24 hour history:  Patient states his neck pain has improved significantly   Patient denies history of opioid use or opioid use disorder, however review of PDMP reveals a previous prescription in late 2019 for Suboxone  Written by a physician at [facility] (drug and alcohol rehab facility)  When questioned about this, patient states he cannot remember this  Opioid requirement previous 24 hours:   Dilaudid 40 mg p o , Dilaudid 6 mg IV      Meds/Allergies   all current active meds have been reviewed, current meds:   Current Facility-Administered Medications   Medication Dose Route Frequency    acetaminophen (TYLENOL) tablet 975 mg  975 mg Oral Q8H PRN    bisacodyl (DULCOLAX) rectal suppository 10 mg  10 mg Rectal Daily    diazepam (VALIUM) tablet 5 mg  5 mg Oral Q6H    enoxaparin (LOVENOX) subcutaneous injection 30 mg  30 mg Subcutaneous Q12H [address]    gabapentin (NEURONTIN) capsule 600 mg  600 mg Oral TID    glycopyrrolate (ROBINUL) injection 0 2 mg  0 2 mg Intravenous Q4H PRN    haloperidol lactate (HALDOL) injection 2 mg  2 mg Intramuscular Q30 Min PRN    HYDROmorphone (DILAUDID) injection 1 mg  1 mg Intravenous Q4H PRN    HYDROmorphone (DILAUDID) tablet 4 mg  4 mg Oral Q3H PRN    Or    HYDROmorphone (DILAUDID) tablet 6 mg  6 mg Oral Q3H PRN    ketamine 250 mg (STANDARD CONCENTRATION) IV in sodium chloride 0 9% 250 mL  0 2 mg/kg/hr Intravenous Continuous    lidocaine (LIDODERM) 5 % patch 1 patch  1 patch Topical Daily    LORazepam (ATIVAN) injection 1 mg  1 mg Intravenous Q1H PRN    melatonin tablet 3 mg  3 mg Oral HS    methocarbamol (ROBAXIN) tablet 750 mg  750 mg Oral Q6H [address]    ondansetron (ZOFRAN) injection 4 mg  4 mg Intravenous Q6H PRN    polyethylene glycol (MIRALAX) packet 17 g  17 g Oral Daily PRN    senna-docusate sodium (SENOKOT S) 8 6-50 mg per tablet 1 tablet  1 tablet Oral BID    sertraline (ZOLOFT) tablet 50 mg  50 mg Oral Daily    and PTA meds:   None       No Known Allergies    Objective     Temp:  [99 °F (37 2 °C)-100 1 °F (37 8 °C)] 99 5 °F (37 5 °C)  HR:  [] 106  Resp:  [10-22] 11  BP: (102-132)/(67-82) 119/73  Arterial Line BP: (122-124)/(60) 122/60    Physical Exam  Vitals signs and nursing note reviewed  Constitutional:       General: He is awake  He is not in acute distress  Skin:     General: Skin is warm  Neurological:      Mental Status: He is alert and oriented to person, place, and time  GCS: GCS eye subscore is 4  GCS verbal subscore is 5  GCS motor subscore is 6  Psychiatric:         Behavior: Behavior is cooperative  Lab Results:   Results from last 7 days   Lab Units 03/14/21  0514   WBC Thousand/uL 10 02   HEMOGLOBIN g/dL 10 0*   HEMATOCRIT % 30 7*   PLATELETS Thousands/uL 386      Results from last 7 days   Lab Units 03/14/21  0514  03/11/21  0454   POTASSIUM mmol/L 3 7   < > 3 7   CHLORIDE mmol/L 100   < > 104   CO2 mmol/L 30   < > 31   BUN mg/dL 13   < > 9   CREATININE mg/dL 0 64   < > 0 83   CALCIUM mg/dL 8 8   < > 8 8   ALK PHOS U/L  --   --  53   ALT U/L  --   --  11*   AST U/L  --   --  14    < > = values in this interval not displayed  Imaging Studies: I have personally reviewed pertinent reports  EKG, Pathology, and Other Studies: I have personally reviewed pertinent reports  Counseling / Coordination of Care  Total floor / unit time spent today 30 minutes  Greater than 50% of total time was spent with the patient and / or family counseling and / or coordination of care  A description of the counseling / coordination of care:  Patient interview, physical examination, review of medical record, review of imaging and laboratory data, development of pain management plan, discussion of pain management plan with patient and primary service  Please note that the APS provides consultative services regarding pain management only  With the exception of ketamine and epidural infusions and except when indicated, final decisions regarding starting or changing doses of analgesic medications are at the discretion of the consulting service    Off hours consultation and/or medication management is generally not available      Carol Hughes PA-C  Acute Pain Service

## 2021-03-16 VITALS
RESPIRATION RATE: 18 BRPM | HEART RATE: 96 BPM | SYSTOLIC BLOOD PRESSURE: 119 MMHG | OXYGEN SATURATION: 96 % | TEMPERATURE: 98.7 F | WEIGHT: 163.58 LBS | HEIGHT: 71 IN | DIASTOLIC BLOOD PRESSURE: 78 MMHG | BODY MASS INDEX: 22.9 KG/M2

## 2021-03-16 PROBLEM — R06.6 INTRACTABLE HICCUPS: Status: ACTIVE | Noted: 2021-03-16

## 2021-03-16 PROBLEM — R33.9 URINARY RETENTION: Status: ACTIVE | Noted: 2021-03-16

## 2021-03-16 PROBLEM — F32.A DEPRESSION: Status: ACTIVE | Noted: 2021-03-16

## 2021-03-16 LAB
FLUAV RNA RESP QL NAA+PROBE: NEGATIVE
FLUBV RNA RESP QL NAA+PROBE: NEGATIVE
RSV RNA RESP QL NAA+PROBE: NEGATIVE
SARS-COV-2 RNA RESP QL NAA+PROBE: NEGATIVE

## 2021-03-16 PROCEDURE — 99238 HOSP IP/OBS DSCHRG MGMT 30/<: CPT | Performed by: PHYSICIAN ASSISTANT

## 2021-03-16 PROCEDURE — 97530 THERAPEUTIC ACTIVITIES: CPT

## 2021-03-16 PROCEDURE — 97110 THERAPEUTIC EXERCISES: CPT

## 2021-03-16 PROCEDURE — 97112 NEUROMUSCULAR REEDUCATION: CPT

## 2021-03-16 PROCEDURE — 0241U HB NFCT DS VIR RESP RNA 4 TRGT: CPT | Performed by: PHYSICIAN ASSISTANT

## 2021-03-16 PROCEDURE — 97535 SELF CARE MNGMENT TRAINING: CPT

## 2021-03-16 RX ORDER — CHLORPROMAZINE HYDROCHLORIDE 25 MG/1
25 TABLET, FILM COATED ORAL 3 TIMES DAILY
Qty: 15 TABLET | Refills: 0 | Status: SHIPPED | OUTPATIENT
Start: 2021-03-16 | End: 2021-05-24

## 2021-03-16 RX ORDER — LANOLIN ALCOHOL/MO/W.PET/CERES
3 CREAM (GRAM) TOPICAL
Qty: 10 TABLET | Refills: 0 | Status: SHIPPED | OUTPATIENT
Start: 2021-03-16

## 2021-03-16 RX ORDER — CHLORPROMAZINE HYDROCHLORIDE 25 MG/1
25 TABLET, FILM COATED ORAL 3 TIMES DAILY
Status: DISCONTINUED | OUTPATIENT
Start: 2021-03-16 | End: 2021-03-16 | Stop reason: HOSPADM

## 2021-03-16 RX ORDER — BISACODYL 10 MG
10 SUPPOSITORY, RECTAL RECTAL DAILY
Qty: 12 SUPPOSITORY | Refills: 0 | Status: ON HOLD | OUTPATIENT
Start: 2021-03-17 | End: 2021-07-23 | Stop reason: SDUPTHER

## 2021-03-16 RX ORDER — AMOXICILLIN 250 MG
1 CAPSULE ORAL 2 TIMES DAILY
Qty: 10 TABLET | Refills: 0 | Status: SHIPPED | OUTPATIENT
Start: 2021-03-16 | End: 2022-07-21 | Stop reason: CLARIF

## 2021-03-16 RX ORDER — GABAPENTIN 300 MG/1
600 CAPSULE ORAL 3 TIMES DAILY
Qty: 30 CAPSULE | Refills: 0 | Status: SHIPPED | OUTPATIENT
Start: 2021-03-16 | End: 2022-05-31

## 2021-03-16 RX ORDER — TAMSULOSIN HYDROCHLORIDE 0.4 MG/1
0.4 CAPSULE ORAL
Qty: 10 CAPSULE | Refills: 0 | Status: SHIPPED | OUTPATIENT
Start: 2021-03-16 | End: 2022-02-24 | Stop reason: SDUPTHER

## 2021-03-16 RX ORDER — METHOCARBAMOL 750 MG/1
750 TABLET, FILM COATED ORAL EVERY 6 HOURS SCHEDULED
Qty: 20 TABLET | Refills: 0 | Status: SHIPPED | OUTPATIENT
Start: 2021-03-16 | End: 2021-05-24

## 2021-03-16 RX ORDER — DIAZEPAM 5 MG/1
7.5 TABLET ORAL EVERY 6 HOURS
Status: DISCONTINUED | OUTPATIENT
Start: 2021-03-16 | End: 2021-03-16 | Stop reason: HOSPADM

## 2021-03-16 RX ORDER — TAMSULOSIN HYDROCHLORIDE 0.4 MG/1
0.4 CAPSULE ORAL
Status: DISCONTINUED | OUTPATIENT
Start: 2021-03-16 | End: 2021-03-16 | Stop reason: HOSPADM

## 2021-03-16 RX ORDER — DIAZEPAM 5 MG/1
7.5 TABLET ORAL EVERY 6 HOURS PRN
Qty: 30 TABLET | Refills: 0 | Status: SHIPPED | OUTPATIENT
Start: 2021-03-16 | End: 2021-05-24

## 2021-03-16 RX ORDER — HYDROMORPHONE HYDROCHLORIDE 4 MG/1
4 TABLET ORAL
Qty: 24 TABLET | Refills: 0 | Status: SHIPPED | OUTPATIENT
Start: 2021-03-16 | End: 2021-03-26

## 2021-03-16 RX ADMIN — DIAZEPAM 7.5 MG: 5 TABLET ORAL at 14:58

## 2021-03-16 RX ADMIN — DIAZEPAM 5 MG: 5 TABLET ORAL at 03:33

## 2021-03-16 RX ADMIN — SERTRALINE HYDROCHLORIDE 50 MG: 50 TABLET ORAL at 08:31

## 2021-03-16 RX ADMIN — HYDROMORPHONE HYDROCHLORIDE 6 MG: 2 TABLET ORAL at 14:59

## 2021-03-16 RX ADMIN — HYDROMORPHONE HYDROCHLORIDE 6 MG: 2 TABLET ORAL at 11:38

## 2021-03-16 RX ADMIN — HYDROMORPHONE HYDROCHLORIDE 1 MG: 1 INJECTION, SOLUTION INTRAMUSCULAR; INTRAVENOUS; SUBCUTANEOUS at 05:55

## 2021-03-16 RX ADMIN — HYDROMORPHONE HYDROCHLORIDE 6 MG: 2 TABLET ORAL at 08:32

## 2021-03-16 RX ADMIN — METHOCARBAMOL TABLETS 750 MG: 750 TABLET, COATED ORAL at 17:42

## 2021-03-16 RX ADMIN — METHOCARBAMOL TABLETS 750 MG: 750 TABLET, COATED ORAL at 00:31

## 2021-03-16 RX ADMIN — CHLORPROMAZINE HYDROCHLORIDE 25 MG: 25 TABLET, SUGAR COATED ORAL at 11:38

## 2021-03-16 RX ADMIN — GABAPENTIN 600 MG: 300 CAPSULE ORAL at 17:41

## 2021-03-16 RX ADMIN — CHLORPROMAZINE HYDROCHLORIDE 25 MG: 25 TABLET, SUGAR COATED ORAL at 17:43

## 2021-03-16 RX ADMIN — ENOXAPARIN SODIUM 30 MG: 30 INJECTION SUBCUTANEOUS at 08:32

## 2021-03-16 RX ADMIN — METHOCARBAMOL TABLETS 750 MG: 750 TABLET, COATED ORAL at 11:38

## 2021-03-16 RX ADMIN — METHOCARBAMOL TABLETS 750 MG: 750 TABLET, COATED ORAL at 05:55

## 2021-03-16 RX ADMIN — DIAZEPAM 5 MG: 5 TABLET ORAL at 08:31

## 2021-03-16 RX ADMIN — TAMSULOSIN HYDROCHLORIDE 0.4 MG: 0.4 CAPSULE ORAL at 17:42

## 2021-03-16 RX ADMIN — HYDROMORPHONE HYDROCHLORIDE 6 MG: 2 TABLET ORAL at 03:33

## 2021-03-16 RX ADMIN — GABAPENTIN 600 MG: 300 CAPSULE ORAL at 08:31

## 2021-03-16 RX ADMIN — HYDROMORPHONE HYDROCHLORIDE 6 MG: 2 TABLET ORAL at 00:29

## 2021-03-16 RX ADMIN — HYDROMORPHONE HYDROCHLORIDE 6 MG: 2 TABLET ORAL at 17:59

## 2021-03-16 RX ADMIN — HYDROMORPHONE HYDROCHLORIDE 1 MG: 1 INJECTION, SOLUTION INTRAMUSCULAR; INTRAVENOUS; SUBCUTANEOUS at 01:41

## 2021-03-16 NOTE — ASSESSMENT & PLAN NOTE
Flomax  Continue bladder training and management without Billingsley catheter  Bladder scan protocol with PRN straight catheterizations

## 2021-03-16 NOTE — DISCHARGE SUMMARY
[address] [facility]  Discharge- Roseanna Cooper 1985, 38 y o  male MRN: [id number]  Unit/Bed#: [facility] 608-01 Encounter: [id number]  Primary Care Provider: Jeanie Ramirez MD   Date and time admitted to hospital: 3/6/2021  9:02 PM    Urinary retention  Assessment & Plan  Flomax  Continue bladder training and management without Billingsley catheter  Bladder scan protocol with PRN straight catheterizations    Depression  Assessment & Plan  Zoloft    Intractable hiccups  Assessment & Plan  Thorazine    Pain  Assessment & Plan  APS following  Ketamine infusion DC'd  Patient requires being off IV pain medications to go to his rehabilitation facility - APS recommending increasing Valium to 7 5 and [address] IV Dilaudid    Fall  Assessment & Plan  Patient found at the bottom of the stairs for an unknown amount of time    Closed fracture of fifth cervical vertebra [facility])  Assessment & Plan  Continue vista collar at all times  Neurosurgery signed off - recommending incision check in 2 weeks and post-op check in 6 weeks - also recommending upright C-Spine imaging prior to neurosurgery re-evaluation  Will give patient information for neurosurgical follow-up outpatient    * Central cord syndrome [facility])  Assessment & Plan  S/p  C3-5 laminectomy, C2-7 fusion on 3/7  APS following for pain control   Current neuro exam shows strength 2/5 LLE, 1/5 RLE, 1/5 BL UE   PT/OT recommending rehab facility  Patient to be discharged to rehab facility today - will arrange dispo planning with case management    Resolved Problems  Date Reviewed: 3/16/2021    None        Admission Date:   Admission Orders (From admission, onward)     Ordered        03/06/21 2206  Inpatient Admission  Once                   Admitting Diagnosis: Injury of cervical spinal cord, initial encounter ([facility] [address] ) [S14 109A]  Contusion of cervical cord, initial encounter ([facility] [address] ) [S14 109A]  Unspecified multiple injuries, initial encounter [T07  XXXA]    HPI: Per Loni Christian MD: "Lc Stockton is a 38 y o  male who presents with paralysis after being found at the bottom of some stairs  EMS said that friends left him for about 30 minutes and after returning found him at the bottom of a set of stairs  Patient has amnesia of event  Denies change in vision, blood thinner use, or significant past medical history "    Procedures Performed:   Orders Placed This Encounter   Procedures    Laceration repair     Summary of Hospital Course:  · The patient presented to the Trauma Service after being found at the bottom of flight of stairs for an unknown amount of time  When he presented to the [facility], the patient was found to have flaccid paralysis of all extremities and movement only of the face while in his collar with no sensation below the level of the nipple  Trauma imaging significant for C2-C3 C3-C4 and C4-C5 central disc protrusions  MRI revealed edema within posterior parous vertebral musculature and increased T2 signal long interspinous ligaments significant for central cord syndrome in addition to a C5 spinous process and right laminal fracture  · The patient underwent C3 through C5 laminectomy with C2-C7 fusion secondary to his central cord syndrome  The patient remained intubated for time after his procedure  · The patient had hypotension intermittently throughout his admission here in the hospital   He was on and off of vasopressors frequently but will be discharged off of vasopressors as he has not been vasopressor dependent as of late and has been maintaining normal near an arterial pressures  · The patient worked with Physical and Occupational therapy who is recommending acute rehab facility for the patient    · The patient was also seen by acute pain services who recommended various pain motives including gabapentin, Robaxin, Dilaudid, oxycodone, ketamine drip, etc   The patient will be discharged on Robaxin, dilaudid by mouth, Valium, and other pain medications  · Throughout his stay, the patient's neurologic examination steadily improved  The patient originally had no sensation or movement below his nipple line  On discharge, the patient had 2/5 left lower extremity, 1/5 right lower extremity, and 1/5 bilateral upper extremity movement with some sensation throughout his distal extremities  · The patient will be followed closely by Neurosurgery, who is recommending a 2 week incision check in addition to a 6 week postoperative check  He should also undergo upright imaging of his cervical spine before his postoperative appointment  · The patient is medically stable for discharge at this time  I believe the patient will benefit greatly from being at a rehab facility as opposed here in the hospital   I discussed return precautions to the patient including chest pain, shortness of breath, palpitations, syncope, dizziness, severely low blood pressures, severe pain, etc   The patient verifies understanding and agrees to the plan at this time  All questions answered to the patient's satisfaction  Significant Findings, Care, Treatment and Services Provided:  · Urinary retention  · Spinal cord injury of C1 through C7  · Significant pain  · Intractable hiccups  · Depression  · Closed fracture of the 5th cervical vertebrae  · Central cord syndrome status post C3 through C5 laminectomy and C2 through C7 fusion    Complications: None    Condition at Discharge: fair     Discharge instructions/Information to patient and family:   See after visit summary for information provided to patient and family  Provisions for Follow-Up Care:  See after visit summary for information related to follow-up care and any pertinent home health orders  PCP: Mary Degroot MD    Disposition: Short-term rehab at [facility]    Planned Readmission: No    Discharge Statement   I spent 30 minutes discharging the patient  This time was spent on the day of discharge   I had direct contact with the patient on the day of discharge  Additional documentation is required if more than 30 minutes were spent on discharge  Discharge Medications:  See after visit summary for reconciled discharge medications provided to patient and family  Physical Examination:  Physical Exam  Vitals signs and nursing note reviewed  Constitutional:       General: He is not in acute distress  Appearance: Normal appearance  He is normal weight  He is not ill-appearing, toxic-appearing or diaphoretic  HENT:      Head: Normocephalic and atraumatic  Nose: Nose normal       Comments: Atraumatic     Mouth/Throat:      Mouth: Mucous membranes are moist       Comments: Atraumatic  Eyes:      General: No scleral icterus  Right eye: No discharge  Left eye: No discharge  Extraocular Movements: Extraocular movements intact  Conjunctiva/sclera: Conjunctivae normal    Neck:      Comments: Patient is immobilized in a Vista collar  Subjective pain to the patient's cervical spine  Cardiovascular:      Rate and Rhythm: Normal rate and regular rhythm  Pulses: Normal pulses  Heart sounds: Normal heart sounds  No murmur  No friction rub  No gallop  Comments: 2+ radial and DP pulses bilaterally  Pulmonary:      Effort: Pulmonary effort is normal  No respiratory distress  Breath sounds: Normal breath sounds  No stridor  No wheezing, rhonchi or rales  Chest:      Chest wall: No tenderness  Abdominal:      Tenderness: There is no right CVA tenderness or left CVA tenderness  Comments: Soft, nontender, nondistended, and without organomegaly   Musculoskeletal:      Right lower leg: No edema  Left lower leg: No edema        Comments: Decreased range of motion in bilateral upper and lower extremities secondary to severity of the patient's central cord syndrome and neuro surgical injuries  No tenderness to palpation of bilateral upper lower extremities  No clavicular, pelvic, or chest wall tenderness  No pelvic instability  Negative Jessica bilaterally   Skin:     General: Skin is warm and dry  Capillary Refill: Capillary refill takes less than 2 seconds  Coloration: Skin is not jaundiced or pale  Neurological:      Mental Status: He is alert and oriented to person, place, and time  Cranial Nerves: No cranial nerve deficit  Sensory: Sensory deficit present  Motor: Weakness present  Comments:  The patient has 2/5 strength in the left lower extremity, 1/5 strength in the right lower extremity, and 1/5 strength in bilateral upper extremities on my examination today  Sensation is not intact bilaterally  The patient is alert oriented to person place and time  The patient is able to smile, puff cheeks, and raise eyebrows without difficulty  No significant finding suggestive of stroke   Psychiatric:         Mood and Affect: Mood normal          Behavior: Behavior normal

## 2021-03-16 NOTE — CASE MANAGEMENT
Pt approved for [facility] rehab as per insurance  Pt can d/c there today any time for 2100 as per Charlotte Blanca  Pt will d/c at 1800 via [address]  Pt made aware as well as Kailey

## 2021-03-16 NOTE — OCCUPATIONAL THERAPY NOTE
Occupational Therapy Treatment Note     03/16/21 1408   OT Last Visit   OT Visit Date 03/16/21   Note Type   Note Type Treatment   Restrictions/Precautions   Weight Bearing Precautions Per Order No   Braces or Orthoses C/S Collar; Other (Comment)  (Multipodus boot RLE upon arrival, LLE at conclusion)   Lifestyle   Autonomy PTA, pt was independent in ADLs, IADLs, and functional mobility   Reciprocal Relationships Lives with roommate   Service to Others Currently unemployed, but previously worked in [address] [place] [address] Enjoys watching TV   Pain Assessment   Pain Assessment Tool 0-10   Pain Location/Orientation Location: Neck; Location: Shoulder   ADL   Where Assessed Edge of bed   Grooming Assistance 1  Total Assistance   Grooming Deficit Wash/dry hands; Wash/dry face; Teeth care   Bed Mobility   Supine to Sit 1  Dependent   Additional items Assist x 2   Sit to Supine 1  Dependent   Additional items Assist x 2   Therapeutic Exercise - ROM   UE-ROM Yes   ROM- Right Upper Extremities   R Shoulder AAROM   R Elbow PROM   R Wrist PROM   ROM - Left Upper Extremities    L Shoulder AAROM   L Elbow AAROM;AROM   L Wrist PROM   L Weight/Reps/Sets 5 reps x 1 each    Coordination   Gross Motor R UE shoulder to allow pt to hit nurse call    Fine Motor no grasp strength noted /pt required hand over hand assist for grooming tasks both edge of pt bed and bed level attempts    Cognition   Overall Cognitive Status [facility]   Arousal/Participation Alert; Responsive;Arousable; Cooperative   Attention Within functional limits   Orientation Level Oriented X4   Memory Within functional limits   Following Commands Follows all commands and directions without difficulty   Activity Tolerance   Activity Tolerance Patient tolerated treatment well;Patient limited by fatigue;Patient limited by pain   Assessment   Assessment Pt participated in occupational therapy with focus on activity tolerance, AROM/AAROM attempted PROM to pt BUE for decreased swelling and increased function, bed mob, unsupported sitting balance and tolerance for pt engagement in functional self-care task/oral care  Pt cleared by RN/Al for pt participation in therapy  Pt received sitting edge of pt bed with physical therapy  Pt Identifiers confirmed and pt agreeable to participate in grooming tasks  Pt required max x2 for bed/supine to sit and full assist required to maintain unsupported sitting balance  Pt required hand over hand assist for all grooming tasks initiated however pt unable to carryover 2* Pt decreased strength  Pt able to tolerated PROM to B/LUE with min complaints of pain/pt reported pain in b/l ue shoulders by end of session  Pt will require post acute rehab services to continue to address these above noted pt deficits which currently impair pt ADL and functional mob     Plan   Treatment Interventions ADL retraining   Goal Expiration Date 03/22/21   OT Treatment Day 3   OT Frequency 3-5x/wk   Recommendation   OT Discharge Recommendation Post-Acute Rehabilitation Services   AM-PAC Daily Activity Inpatient   Lower Body Dressing 1   Bathing 1   Toileting 1   Upper Body Dressing 1   Grooming 1   Eating 1   Daily Activity Raw Score 6   Turning Head Towards Sound 3   Follow Simple Instructions 4   Low Function Daily Activity Raw Score 13   Low Function Daily Activity Standardized Score 23 16   AM-PAC Applied Cognition Inpatient   Following a Speech/Presentation 3   Understanding Ordinary Conversation 4   Taking Medications 4   Remembering Where Things Are Placed or Put Away 4   Remembering List of 4-5 Errands 4   Taking Care of Complicated Tasks 3   Applied Cognition Raw Score 22   Applied Cognition Standardized Score 47 83       Allison Pouch  MCBRIDE/L

## 2021-03-16 NOTE — TRANSPORTATION MEDICAL NECESSITY
Section I - General Information    Name of Patient: Carmencita Mills                 : 1985    Medicare #: [id number]  Transport Date: 21 (PCS is valid for round trips on this date and for all repetitive trips in the 60-day range as noted below )  Origin: [address] [facility]                                                         Destination: [facility]  Is the pt's stay covered under Medicare Part A (PPS/DRG)   []     Closest appropriate facility? If no, why is transport to more distant facility required? Yes  If hospice pt, is this transport related to pt's terminal illness? No       Section II - Medical Necessity Questionnaire  Ambulance transportation is medically necessary only if other means of transport are contraindicated or would be potentially harmful to the patient  To meet this requirement, the patient must either be "bed confined" or suffer from a condition such that transport by means other than ambulance is contraindicated by the patient's condition  The following questions must be answered by the medical professional signing below for this form to be valid:    1)  Describe the MEDICAL CONDITION (physical and/or mental) of this patient AT [address] [place] [address] that requires the patient to be transported in an ambulance and why transport by other means is contraindicated by the patient's condition: fall, central cord syndrome, Spinal cord injury, C1-C7, C5 injury    2) Is the patient "bed confined" as defined below? Yes  To be "be confined" the patient must satisfy all three of the following conditions: (1) unable to get up from bed without Assistance; AND (2) unable to ambulate; AND (3) unable to sit in a chair or wheelchair  3) Can this patient safely be transported by car or wheelchair van (i e , seated during transport without a medical attendant or monitoring)?    No    4) In addition to completing questions 1-3 above, please check any of the following conditions that apply*:   *Note: supporting documentation for any boxes checked must be maintained in the patient's medical records  If hosp-hosp transfer, describe services needed at 2nd facility not available at 1st facility? Moderate/severe pain on movement   Unable to tolerate seated position for time needed to transport   Other(specify) quad due to central cord      Section III - Signature of Physician or Healthcare Professional  I certify that the above information is true and correct based on my evaluation of this patient, and represent that the patient requires transport by ambulance and that other forms of transport are contraindicated  I understand that this information will be used by the Centers for Medicare and Medicaid Services (CMS) to support the determination of medical necessity for ambulance services, and I represent that I have personal knowledge of the patient's condition at time of transport  []  If this box is checked, I also certify that the patient is physically or mentally incapable of signing the ambulance service's claim and that the institution with which I am affiliated has furnished care, services, or assistance to the patient  My signature below is made on behalf of the patient pursuant to 42 CFR §424 36(b)(4)  In accordance with 42 CFR §424 37, the specific reason(s) that the patient is physically or mentally incapable of signing the claim form is as follows:      Signature of Physician* or Healthcare Professional______________________________________________________________  Signature Date 03/16/21 (For scheduled repetitive transports, this form is not valid for transports performed more than 60 days after this date)    Printed Name & Credentials of Physician or Healthcare Professional (MD, DO, RN, etc )__Yves Vasquez ______________________________  *Form must be signed by patient's attending physician for scheduled, repetitive transports   For non-repetitive, unscheduled ambulance transports, if unable to obtain the signature of the attending physician, any of the following may sign (choose appropriate option below)  [] Physician Assistant []  Clinical Nurse Specialist []  Registered Nurse  []  Nurse Practitioner  [x] Discharge Planner

## 2021-03-16 NOTE — PLAN OF CARE
Problem: Prexisting or High Potential for Compromised Skin Integrity  Goal: Skin integrity is maintained or improved  Description: INTERVENTIONS:  - Identify patients at risk for skin breakdown  - Assess and monitor skin integrity  - Assess and monitor nutrition and hydration status  - Monitor labs   - Assess for incontinence   - Turn and reposition patient  - Assist with mobility/ambulation  - Relieve pressure over bony prominences  - Avoid friction and shearing  - Provide appropriate hygiene as needed including keeping skin clean and dry  - Evaluate need for skin moisturizer/barrier cream  - Collaborate with interdisciplinary team   - Patient/family teaching  - Consider wound care consult   Outcome: Progressing     Problem: Potential for Falls  Goal: Patient will remain free of falls  Description: INTERVENTIONS:  - Assess patient frequently for physical needs  -  Identify cognitive and physical deficits and behaviors that affect risk of falls  -  [place] fall precautions as indicated by assessment   - Educate patient/family on patient safety including physical limitations  - Instruct patient to call for assistance with activity based on assessment  - Modify environment to reduce risk of injury  - Consider OT/PT consult to assist with strengthening/mobility  Outcome: Progressing     Problem: Nutrition/Hydration-ADULT  Goal: Nutrient/Hydration intake appropriate for improving, restoring or maintaining nutritional needs  Description: Monitor and assess patient's nutrition/hydration status for malnutrition  Collaborate with interdisciplinary team and initiate plan and interventions as ordered  Monitor patient's weight and dietary intake as ordered or per policy  Utilize nutrition screening tool and intervene as necessary  Determine patient's food preferences and provide high-protein, high-caloric foods as appropriate       INTERVENTIONS:  - Monitor oral intake, urinary output, labs, and treatment plans  - Assess nutrition and hydration status and recommend course of action  - Evaluate amount of meals eaten  - Assist patient with eating if necessary   - Allow adequate time for meals  - Recommend/ encourage appropriate diets, oral nutritional supplements, and vitamin/mineral supplements  - Order, calculate, and assess calorie counts as needed  - Recommend, monitor, and adjust tube feedings and TPN/PPN based on assessed needs  - Assess need for intravenous fluids  - Provide specific nutrition/hydration education as appropriate  - Include patient/family/caregiver in decisions related to nutrition  Outcome: Progressing     Problem: GASTROINTESTINAL - ADULT  Goal: Maintains or returns to baseline bowel function  Description: INTERVENTIONS:  - Assess bowel function  - Encourage oral fluids to ensure adequate hydration  - Administer IV fluids if ordered to ensure adequate hydration  - Administer ordered medications as needed  - Encourage mobilization and activity  - Consider nutritional services referral to assist patient with adequate nutrition and appropriate food choices  Outcome: Progressing  Goal: Maintains adequate nutritional intake  Description: INTERVENTIONS:  - Monitor percentage of each meal consumed  - Identify factors contributing to decreased intake, treat as appropriate  - Assist with meals as needed  - Monitor I&O, weight, and lab values if indicated  - Obtain nutrition services referral as needed  Outcome: Progressing     Problem: MUSCULOSKELETAL - ADULT  Goal: Maintain or return mobility to safest level of function  Description: INTERVENTIONS:  - Assess patient's ability to carry out ADLs; assess patient's baseline for ADL function and identify physical deficits which impact ability to perform ADLs (bathing, care of mouth/teeth, toileting, grooming, dressing, etc )  - Assess/evaluate cause of self-care deficits   - Assess range of motion  - Assess patient's mobility  - Assess patient's need for assistive devices and provide as appropriate  - Encourage maximum independence but intervene and supervise when necessary  - Involve family in performance of ADLs  - Assess for home care needs following discharge   - Consider OT consult to assist with ADL evaluation and planning for discharge  - Provide patient education as appropriate  Outcome: Progressing  Goal: Maintain proper alignment of affected body part  Description: INTERVENTIONS:  - Support, maintain and protect limb and body alignment  - Provide patient/ family with appropriate education  Outcome: Progressing     Problem: PAIN - ADULT  Goal: Verbalizes/displays adequate comfort level or baseline comfort level  Description: Interventions:  - Encourage patient to monitor pain and request assistance  - Assess pain using appropriate pain scale  - Administer analgesics based on type and severity of pain and evaluate response  - Implement non-pharmacological measures as appropriate and evaluate response  - Consider cultural and social influences on pain and pain management  - Notify physician/advanced practitioner if interventions unsuccessful or patient reports new pain  Outcome: Progressing     Problem: SAFETY ADULT  Goal: Patient will remain free of falls  Description: INTERVENTIONS:  - Assess patient frequently for physical needs  -  Identify cognitive and physical deficits and behaviors that affect risk of falls    -  [place] fall precautions as indicated by assessment   - Educate patient/family on patient safety including physical limitations  - Instruct patient to call for assistance with activity based on assessment  - Modify environment to reduce risk of injury  - Consider OT/PT consult to assist with strengthening/mobility  Outcome: Progressing  Goal: Maintain or return to baseline ADL function  Description: INTERVENTIONS:  -  Assess patient's ability to carry out ADLs; assess patient's baseline for ADL function and identify physical deficits which impact ability to perform ADLs (bathing, care of mouth/teeth, toileting, grooming, dressing, etc )  - Assess/evaluate cause of self-care deficits   - Assess range of motion  - Assess patient's mobility; develop plan if impaired  - Assess patient's need for assistive devices and provide as appropriate  - Encourage maximum independence but intervene and supervise when necessary  - Involve family in performance of ADLs  - Assess for home care needs following discharge   - Consider OT consult to assist with ADL evaluation and planning for discharge  - Provide patient education as appropriate  Outcome: Progressing  Goal: Maintain or return mobility status to optimal level  Description: INTERVENTIONS:  - Assess patient's baseline mobility status (ambulation, transfers, stairs, etc )    - Identify cognitive and physical deficits and behaviors that affect mobility  - Identify mobility aids required to assist with transfers and/or ambulation (gait belt, sit-to-stand, lift, walker, cane, etc )  - [place] fall precautions as indicated by assessment  - Record patient progress and toleration of activity level on Mobility SBAR; progress patient to next Phase/Stage  - Instruct patient to call for assistance with activity based on assessment  - Consider rehabilitation consult to assist with strengthening/weightbearing, etc   Outcome: Progressing     Problem: GENITOURINARY - ADULT  Goal: Maintains or returns to baseline urinary function  Description: INTERVENTIONS:  - Assess urinary function  - Encourage oral fluids to ensure adequate hydration if ordered  - Administer IV fluids as ordered to ensure adequate hydration  - Administer ordered medications as needed  - Offer frequent toileting  - Follow urinary retention protocol if ordered  Outcome: Not Progressing  Goal: Absence of urinary retention  Description: INTERVENTIONS:  - Assess patients ability to void and empty bladder  - Monitor I/O  - Bladder scan as needed  - Discuss with physician/AP medications to alleviate retention as needed  - Discuss catheterization for long term situations as appropriate  Outcome: Not Progressing

## 2021-03-16 NOTE — PLAN OF CARE
Problem: OCCUPATIONAL THERAPY ADULT  Goal: Performs self-care activities at highest level of function for planned discharge setting  See evaluation for individualized goals  Description: Treatment Interventions: ADL retraining          See flowsheet documentation for full assessment, interventions and recommendations  Outcome: Progressing  Note: Limitation: Decreased ADL status, Decreased UE ROM, Decreased UE strength, Decreased endurance, Decreased sensation, Decreased fine motor control, Decreased self-care trans, Decreased high-level ADLs, Non-func R UE, Non-func L UE  Prognosis: Fair  Assessment: Pt participated in occupational therapy with focus on activity tolerance, AROM/AAROM attempted PROM to pt BUE for decreased swelling and increased function, bed mob, unsupported sitting balance and tolerance for pt engagement in functional self-care task/oral care  Pt cleared by RN/Al for pt participation in therapy  Pt received sitting edge of pt bed with physical therapy  Pt Identifiers confirmed and pt agreeable to participate in grooming tasks  Pt required max x2 for bed/supine to sit and full assist required to maintain unsupported sitting balance  Pt required hand over hand assist for all grooming tasks initiated however pt unable to carryover 2* Pt decreased strength  Pt able to tolerated PROM to B/LUE with min complaints of pain/pt reported pain in b/l ue shoulders by end of session  Pt will require post acute rehab services to continue to address these above noted pt deficits which currently impair pt ADL and functional mob       OT Discharge Recommendation: Post-Acute Rehabilitation Services  OT - OK to Discharge: Yes(When medically appropriate)

## 2021-03-16 NOTE — INCIDENTAL FINDINGS
The following findings require follow up:  Radiographic finding   Finding: Small hiatal hernia   Follow up required: Primary care physician   Follow up should be done at the patient's earliest convenience    Please notify the following clinician to assist with the follow up: your primary care physician

## 2021-03-16 NOTE — ASSESSMENT & PLAN NOTE
S/p  C3-5 laminectomy, C2-7 fusion on 3/7  APS following for pain control   Current neuro exam shows strength 2/5 LLE, 1/5 RLE, 1/5 BL UE   PT/OT recommending rehab facility  Patient to be discharged to rehab facility today - will arrange dispo planning with case management

## 2021-03-16 NOTE — ASSESSMENT & PLAN NOTE
Continue vista collar at all times  Neurosurgery signed off - recommending incision check in 2 weeks and post-op check in 6 weeks - also recommending upright C-Spine imaging prior to neurosurgery re-evaluation  Will give patient information for neurosurgical follow-up outpatient

## 2021-03-16 NOTE — PROGRESS NOTES
Progress Note - Acute Pain Service    Roland Zacarias 38 y o  male MRN: [id number]  Unit/Bed#: [facility] 608-01 Encounter: [id number]      Assessment:   Principal Problem:    Central cord syndrome [facility])  Active Problems:    Pulmonary insufficiency    Closed fracture of fifth cervical vertebra (HCC)    Spinal cord injury, C1-C7 ([facility] [address] )    Fall    Pain    Roland Zacarias is a 38 y o  male  Status post fall down stairs resulting in central cord syndrome and quadriplegia   Underwent C3 through C5 laminectomy and C2 through C7 fixation/fusion   Patient has had significant neck pain, placed on ketamine infusion   3/9/21with good results until  3/10/21 when pain began to worsen again  Plan:    Continue Tylenol 975 mg p o  q 8 hours p r n  mild pain   Continue Dilaudid 4 mg p o  q 3 hours p r n  moderate pain   Continue Dilaudid 6 mg p o  q 3 hours p r n  severe pain   Discontinue IV Dilaudid in order to facilitate transfer to spinal cord rehab   Continue Valium 5 mg p o  q 6 hours scheduled  Can increase to 7 5 mg p o  q 6 hours scheduled if pain worsens after discontinuation of IV Dilaudid   Continue Neurontin 600 mg p o  t i d  scheduled   Continue Robaxin 750 mg p o  q 6 hours scheduled   Continue lidocaine patch up to 12 hours daily   Continue bowel regimen to avoid opioid induced constipation  APS will continue to follow  Please call  / [phone] or [facility] [employer] Acute [facility] (/ between [phone] and on weekends) with questions or concerns    Pain History  Current pain location(s):   Neck  Pain Scale:    8/10  Quality:  Aching, sore  24 hour history:  Patient states his pain is improving slowly in his neck  Patient will be required to be off IV opioids for 24 hours prior to transfer to spinal cord rehab  Patient aware  Opioid requirement previous 24 hours:   Dilaudid 36 mg p o , Dilaudid 5 mg IV      Meds/Allergies   all current active meds have been reviewed, current meds:   Current Facility-Administered Medications   Medication Dose Route Frequency    acetaminophen (TYLENOL) tablet 975 mg  975 mg Oral Q8H PRN    bisacodyl (DULCOLAX) rectal suppository 10 mg  10 mg Rectal Daily    diazepam (VALIUM) tablet 5 mg  5 mg Oral Q6H    enoxaparin (LOVENOX) subcutaneous injection 30 mg  30 mg Subcutaneous Q12H [address]    gabapentin (NEURONTIN) capsule 600 mg  600 mg Oral TID    glycopyrrolate (ROBINUL) injection 0 2 mg  0 2 mg Intravenous Q4H PRN    haloperidol lactate (HALDOL) injection 2 mg  2 mg Intramuscular Q30 Min PRN    HYDROmorphone (DILAUDID) injection 1 mg  1 mg Intravenous Q4H PRN    HYDROmorphone (DILAUDID) tablet 4 mg  4 mg Oral Q3H PRN    Or    HYDROmorphone (DILAUDID) tablet 6 mg  6 mg Oral Q3H PRN    lidocaine (LIDODERM) 5 % patch 1 patch  1 patch Topical Daily    LORazepam (ATIVAN) injection 1 mg  1 mg Intravenous Q1H PRN    melatonin tablet 3 mg  3 mg Oral HS    methocarbamol (ROBAXIN) tablet 750 mg  750 mg Oral Q6H [address]    ondansetron (ZOFRAN) injection 4 mg  4 mg Intravenous Q6H PRN    polyethylene glycol (MIRALAX) packet 17 g  17 g Oral Daily    senna-docusate sodium (SENOKOT S) 8 6-50 mg per tablet 1 tablet  1 tablet Oral BID    sertraline (ZOLOFT) tablet 50 mg  50 mg Oral Daily    and PTA meds:   None       No Known Allergies    Objective     Temp:  [98 9 °F (37 2 °C)-99 6 °F (37 6 °C)] 99 6 °F (37 6 °C)  HR:  [] 106  Resp:  [11-18] 18  BP: (112-125)/(72-79) 112/72    Physical Exam  Vitals signs and nursing note reviewed  Constitutional:       General: He is awake  He is not in acute distress  Skin:     General: Skin is warm and dry  Neurological:      Mental Status: He is alert and oriented to person, place, and time  GCS: GCS eye subscore is 4  GCS verbal subscore is 5  GCS motor subscore is 6  Psychiatric:         Behavior: Behavior is cooperative           Lab Results:   Results from last 7 days   Lab Units 03/15/21  1022 WBC Thousand/uL 11 19*   HEMOGLOBIN g/dL 9 2*   HEMATOCRIT % 28 0*   PLATELETS Thousands/uL 383      Results from last 7 days   Lab Units 03/15/21  1022  03/11/21  0454   POTASSIUM mmol/L 4 0   < > 3 7   CHLORIDE mmol/L 99*   < > 104   CO2 mmol/L 30   < > 31   BUN mg/dL 12   < > 9   CREATININE mg/dL 0 69   < > 0 83   CALCIUM mg/dL 9 1   < > 8 8   ALK PHOS U/L  --   --  53   ALT U/L  --   --  11*   AST U/L  --   --  14    < > = values in this interval not displayed  Imaging Studies: I have personally reviewed pertinent reports  EKG, Pathology, and Other Studies: I have personally reviewed pertinent reports  Counseling / Coordination of Care  Total floor / unit time spent today 30 minutes  Greater than 50% of total time was spent with the patient and / or family counseling and / or coordination of care  A description of the counseling / coordination of care:  Patient interview, physical examination, review of medical record, review of imaging and laboratory data, development of pain management plan, discussion of pain management plan with patient and primary service  Please note that the APS provides consultative services regarding pain management only  With the exception of ketamine and epidural infusions and except when indicated, final decisions regarding starting or changing doses of analgesic medications are at the discretion of the consulting service  Off hours consultation and/or medication management is generally not available      Delfino Balderrama PA-C  Acute Pain Service

## 2021-03-16 NOTE — PLAN OF CARE
Problem: PHYSICAL THERAPY ADULT  Goal: Performs mobility at highest level of function for planned discharge setting  See evaluation for individualized goals  Description: Treatment/Interventions: Functional transfer training, LE strengthening/ROM, Therapeutic exercise, Bed mobility, Spoke to nursing, Spoke to case management, OT, Equipment eval/education, Compensatory technique education  Equipment Recommended: Other (Comment)(Kreg bed)       See flowsheet documentation for full assessment, interventions and recommendations  Note: Prognosis: Fair  Problem List: Decreased strength, Decreased endurance, Impaired balance, Decreased mobility, Impaired sensation, Pain, Orthopedic restrictions  Assessment: Patient lying supine in bed, agreeable and eager to participate in therapy  Patient dependent x2 with transfers, and requires max x 1 to sit EOB supported  Assisted patient with LE exercises, which he was able to perform AROM on L and trace movement on R  Patient sat EOB 30 minutes with BP assessed 115/75  Patient limited by pain in shivani shoulders and neck throughout session for increase tolerance  He would continue to benefit from skilled PT to maximize functional independence  Barriers to Discharge: Inaccessible home environment, Decreased caregiver support     PT Discharge Recommendation: [id number]     PT - OK to Discharge: Yes    See flowsheet documentation for full assessment

## 2021-03-16 NOTE — ASSESSMENT & PLAN NOTE
APS following  Ketamine infusion DC'd  Patient requires being off IV pain medications to go to his rehabilitation facility - APS recommending increasing Valium to 7 5 and DCing IV Dilaudid

## 2021-03-16 NOTE — CASE MANAGEMENT
FELIPA spoke to Adam Mayo [phone] of [facility]  Maryse Echols is out and Idalia Medel will call once obtained   Kailey plans on calling the pt's nurse to discuss

## 2021-03-17 ENCOUNTER — TELEPHONE (OUTPATIENT)
Dept: NEUROSURGERY | Facility: CLINIC | Age: 36
End: 2021-03-17

## 2021-03-17 NOTE — TELEPHONE ENCOUNTER
Attempted to reach patient at [facility]  I was transferred to the nurses' station at [phone] because the supervisor was in a meeting  Confirmed date time and location of the 2 and 6 week POVs   Reminded them that the 6 week POV will require an XR c-spine prior to the appt  They are to call our office if they will not be bringing the patient to the 2 week POV  Nurse verbalized an understanding

## 2021-03-19 NOTE — TELEPHONE ENCOUNTER
[facility]  She said that their MD is willing to remove any staples/sutures as long as out office is okay with it and then we can cx 2 week POV with nurse  Informed her that this is perfectly fine and to just contact us with any incisional issues, questions or concerns  Reminded her of his 6 week POV with [facility] and provided the date time and location and reminded her that he needs c-spine xr prior  If going outside of [facility], will need to bring disc with images on it  She verbalized an understanding and was appreciative

## 2021-04-12 ENCOUNTER — OFFICE VISIT (OUTPATIENT)
Dept: NEUROSURGERY | Facility: CLINIC | Age: 36
End: 2021-04-12

## 2021-04-12 VITALS
TEMPERATURE: 96.7 F | SYSTOLIC BLOOD PRESSURE: 122 MMHG | BODY MASS INDEX: 22.81 KG/M2 | HEIGHT: 71 IN | RESPIRATION RATE: 14 BRPM | DIASTOLIC BLOOD PRESSURE: 86 MMHG | HEART RATE: 101 BPM

## 2021-04-12 DIAGNOSIS — S14.109A: Primary | ICD-10-CM

## 2021-04-12 DIAGNOSIS — S14.129A CENTRAL CORD SYNDROME (HCC): ICD-10-CM

## 2021-04-12 DIAGNOSIS — S12.400A: ICD-10-CM

## 2021-04-12 PROCEDURE — 99024 POSTOP FOLLOW-UP VISIT: CPT | Performed by: NEUROLOGICAL SURGERY

## 2021-04-12 RX ORDER — HYDROMORPHONE HYDROCHLORIDE 2 MG/1
2 TABLET ORAL
Status: ON HOLD | COMMUNITY
End: 2021-07-23 | Stop reason: SDUPTHER

## 2021-04-12 RX ORDER — CELECOXIB 100 MG/1
100 CAPSULE ORAL 2 TIMES DAILY
COMMUNITY
End: 2022-07-21 | Stop reason: CLARIF

## 2021-04-12 RX ORDER — ACETAMINOPHEN 500 MG
500 TABLET ORAL EVERY 8 HOURS
COMMUNITY

## 2021-04-12 RX ORDER — BACLOFEN 20 MG/1
20 TABLET ORAL 3 TIMES DAILY
COMMUNITY
End: 2022-07-29

## 2021-04-12 RX ORDER — DOCUSATE SODIUM 100 MG/1
100 CAPSULE, LIQUID FILLED ORAL 2 TIMES DAILY
COMMUNITY
End: 2021-05-24

## 2021-04-12 RX ORDER — NORTRIPTYLINE HYDROCHLORIDE 50 MG/1
50 CAPSULE ORAL 2 TIMES DAILY
COMMUNITY
End: 2022-07-21 | Stop reason: CLARIF

## 2021-04-12 NOTE — PROGRESS NOTES
Neurosurgery Office Note  Beverly Seat 38 y o  male MRN: [id number]      Assessment/Plan      Diagnoses and all orders for this visit:    Spinal cord injury, C1-C7 ([facility] [address] )    Closed fracture of fifth cervical vertebra ([facility] [address] )    Central cord syndrome ([facility] [address] )    Other orders  -     acetaminophen (TYLENOL) 500 mg tablet; Take 1,000 mg by mouth every 6 (six) hours as needed for mild pain  -     baclofen 10 mg tablet; Take 5 mg by mouth 3 (three) times a day  -     celecoxib (CeleBREX) 100 mg capsule; Take 100 mg by mouth 2 (two) times a day  -     Diclofenac Sodium (VOLTAREN) 1 %; Apply 2 g topically 3 (three) times a day as needed  -     docusate sodium (COLACE) 100 mg capsule; Take 100 mg by mouth 2 (two) times a day  -     enoxaparin (LOVENOX) 40 mg/0 4 mL; Inject under the skin  -     nortriptyline (PAMELOR) 10 mg capsule; Take 10 mg by mouth daily at bedtime  -     Calcium Carbonate Antacid (CALCIUM CARBONATE PO); Take by mouth  -     HYDROmorphone (DILAUDID) 2 mg tablet; Take 2 mg by mouth every 3 (three) hours as needed for moderate pain          Discussion:    Status post PDCF for  Severe central cord injury, LAWRENCE B SCI; surgery was 3/7/21  He is presently residing at [facility] in [place]  He is wearing his cervical collar  His incision is well healed  On exam, biceps on the right 4/5, 1/5 finger movement  On the left biceps 3/5, again minimal finger movement  He is hyperreflexic and has some clonus in his lowers  He is able to stand with assist of 2, so presently he has improved to LAWRENCE C if not D   He does have dysesthetic pain in his hands, pins and needles, range on gabapentin  He mentions some tightness, stiffness in his posterior neck, left shoulder  Upright x-rays of cervical spine show stable hardware configuration  I suggested he continue in the collar 247 for the next 3 weeks  After that, I have instructed the patient to wean from their cervical collar    They should do this by not wearing the collar 1-2 hours per day for the next week, then 2-4 hours per day the week following, then 4-8 hours per day the subsequent week, such that within 3-4 weeks and they will of wean out of the collar entirely  These periods without the collar should be during more sedentary periods, e g  Reading, watching TV  They should continue to wear the collar when doing strenuous activity (such as therapy etc ) until the weaning period is complete  They do not need to wear the collar while sleeping, but can during this weaning period if by doing so they feel more comfortable  We will plan to see the patient back in about 6 weeks to assess his progress, new studies  Will plan for follow-up x-rays at 6 months postop     04/12/21 Metrics: [id number] 07703=2 580; VAS 50; MJOA 11/17          CHIEF COMPLAINT    Chief Complaint   Patient presents with    Post-op     6WK POV W/ MOULDING CSPINE XRAY PER MAYRA       HISTORY    History of Present Illness     38y o  year old male     HPI    See Discussion    REVIEW OF SYSTEMS    Review of Systems   Constitutional: Negative  HENT: Negative  Eyes: Negative  Respiratory: Negative  Cardiovascular: Negative  Gastrointestinal: Negative  Endocrine: Negative  Genitourinary: Negative  Musculoskeletal: Negative  Skin: Negative  Allergic/Immunologic: Negative  Neurological: Positive for weakness (bilateral legs) and numbness (bilateral arms/hands, mainly in hands)  Negative for dizziness, seizures, syncope and headaches  Hematological: Negative  Psychiatric/Behavioral: Negative            Meds/Allergies     Current Outpatient Medications   Medication Sig Dispense Refill    acetaminophen (TYLENOL) 500 mg tablet Take 1,000 mg by mouth every 6 (six) hours as needed for mild pain      baclofen 10 mg tablet Take 5 mg by mouth 3 (three) times a day      bisacodyl (DULCOLAX) 10 mg suppository Insert 1 suppository (10 mg total) into the rectum daily 12 suppository 0    Calcium Carbonate Antacid (CALCIUM CARBONATE PO) Take by mouth      celecoxib (CeleBREX) 100 mg capsule Take 100 mg by mouth 2 (two) times a day      diazepam (VALIUM) 5 mg tablet Take 1 5 tablets (7 5 mg total) by mouth every 6 (six) hours as needed for anxiety 30 tablet 0    Diclofenac Sodium (VOLTAREN) 1 % Apply 2 g topically 3 (three) times a day as needed      docusate sodium (COLACE) 100 mg capsule Take 100 mg by mouth 2 (two) times a day      enoxaparin (LOVENOX) 40 mg/0 4 mL Inject under the skin      gabapentin (NEURONTIN) 300 mg capsule Take 2 capsules (600 mg total) by mouth 3 (three) times a day 30 capsule 0    HYDROmorphone (DILAUDID) 2 mg tablet Take 2 mg by mouth every 3 (three) hours as needed for moderate pain      melatonin 3 mg Take 1 tablet (3 mg total) by mouth daily at bedtime 10 tablet 0    nortriptyline (PAMELOR) 10 mg capsule Take 10 mg by mouth daily at bedtime      senna-docusate sodium (SENOKOT S) 8 6-50 mg per tablet Take 1 tablet by mouth 2 (two) times a day 10 tablet 0    tamsulosin (FLOMAX) 0 4 mg Take 1 capsule (0 4 mg total) by mouth daily with dinner 10 capsule 0    ARIPiprazole (ABILIFY) 5 mg tablet Take 5 mg by mouth daily at bedtime      chlorproMAZINE (THORAZINE) 25 mg tablet Take 1 tablet (25 mg total) by mouth 3 (three) times a day (Patient not taking: Reported on 4/12/2021) 15 tablet 0    clonazePAM (KlonoPIN) 1 mg tablet TAKE 1 TABLET BY ORAL ROUTE 3 TIME(S) PER DAY AS NEEDED FOR ANXIETY      diazepam (VALIUM) 5 mg tablet Take 1 tablet (5 mg total) by mouth every 8 (eight) hours as needed for muscle spasms for up to 10 days 15 tablet 0    DULoxetine (CYMBALTA) 60 mg delayed release capsule Take 60 mg by mouth daily      ibuprofen (MOTRIN) 600 mg tablet Take 1 tablet (600 mg total) by mouth every 6 (six) hours as needed for mild pain (Patient not taking: Reported on 4/12/2021) 20 tablet 0    methocarbamol (ROBAXIN) 750 mg tablet Take 1 tablet (750 mg total) by mouth every 6 (six) hours (Patient not taking: Reported on 4/12/2021) 20 tablet 0    methylprednisolone (MEDROL) 4 mg tablet 6 tb po on day 1; 5 tb po on day 2; 4 tb po on day 3; 3 tb po on day 4; 2 tb po on day 5; 1 tb po on day 6 (Patient not taking: Reported on 4/12/2021) 21 tablet 0    mirtazapine (REMERON) 15 mg tablet Take 15 mg by mouth daily      mirtazapine (REMERON) 45 MG tablet Take 45 mg by mouth every evening      naproxen (NAPROSYN) 500 mg tablet Take 1 tablet (500 mg total) by mouth 2 (two) times a day with meals (Patient not taking: Reported on 4/12/2021) 30 tablet 0    prazosin (MINIPRESS) 2 mg capsule       sertraline (ZOLOFT) 50 mg tablet Take 1 tablet (50 mg total) by mouth daily (Patient not taking: Reported on 4/12/2021) 10 tablet 0    traZODone (DESYREL) 100 mg tablet TAKE 1 TABLET BY ORAL ROUTE PER AT BEDTIME FOR SLEEP      traZODone (DESYREL) 50 mg tablet TAKE 1/2 TABLET BY MOUTH EVERY DAY (Patient not taking: Reported on 10/12/2020) 45 tablet 0     No current facility-administered medications for this visit          No Known Allergies    PAST HISTORY    Past Medical History:   Diagnosis Date    Chronic depression     PTSD (post-traumatic stress disorder)        Past Surgical History:   Procedure Laterality Date    CERVICAL FUSION N/A 3/6/2021    Procedure: POSTERIOR C3-5 laminectomy, C2-7 fixation fusion, possible additional levels;  Surgeon: Neela Mancia MD;  [facility];  Service: Neurosurgery    IR PICC PLACEMENT DOUBLE LUMEN  3/8/2021       Social History     Tobacco Use    Smoking status: Former Smoker     Packs/day: 0 25     Years: 10 00     Pack years: 2 50     Types: Cigarettes    Smokeless tobacco: Never Used   Substance Use Topics    Alcohol use: Not Currently     Frequency: Never     Drinks per session: 1 or 2     Binge frequency: Never     Comment: none    Drug use: Never     Comment: none       Family History   Problem Relation Age of Onset    No Known Problems Mother     No Known Problems Father          The following portions of the patient's history were reviewed in this encounter and updated as appropriate: Past medical, surgical, family, and social history, as well as medications, allergies, and review of systems  EXAM    Vitals:Blood pressure 122/86, pulse 101, temperature (!) 96 7 °F (35 9 °C), resp  rate 14, height 5' 11" (1 803 m)  ,Body mass index is 22 81 kg/m²  Physical Exam    Neurologic Exam      MEDICAL DECISION MAKING    Imaging Studies:     Xr Outside Images    Result Date: 4/12/2021  Narrative: 1 2 410 [id number]      I have personally reviewed pertinent reports     and I have personally reviewed pertinent films in PACS

## 2021-05-18 ENCOUNTER — TELEPHONE (OUTPATIENT)
Dept: NEUROSURGERY | Facility: CLINIC | Age: 36
End: 2021-05-18

## 2021-05-18 NOTE — TELEPHONE ENCOUNTER
Received a call from Kaylan Malik PT stating patient is requesting a call regarding his insurance and the rehab facility he is currently at  Reached out to patient and was unable to leave a voicemail d/t not having a mailbox set up

## 2021-05-18 NOTE — TELEPHONE ENCOUNTER
Patient called nurseline looking to transfer [facility] either [facility] or [facility] rehab but would need a prior auth  Reached out to [facility] and left a message on the social workers line advising patient is looking to transfer care and is in need of help with this  Advised not sure what exactly is needed from our office for this transfer to take place since patient originally came from [facility] prior to [facility]  Provided office number should there be any questions

## 2021-05-21 NOTE — TELEPHONE ENCOUNTER
Reached out to patient and advised this RN left a voicemail for the  at [facility] a few days ago  Advised transferring facilities does not require input from our office  Instructed patient to contact his  to help facilitate the patient moving to another facility  Patient appreciative of call

## 2021-05-21 NOTE — TELEPHONE ENCOUNTER
I called patient to confirm his appointment for Monday  In the conversation he asked me about an authorization to be transferred to a different facility  I looked in the chart and saw this msg  I explained to him and our nurse called [facility] and left msg fro a  but it does not seem like a phone call was returned regarding this matter  I advised hi I would let our nurse know he was checking up on update regarding this

## 2021-05-24 ENCOUNTER — OFFICE VISIT (OUTPATIENT)
Dept: NEUROSURGERY | Facility: CLINIC | Age: 36
End: 2021-05-24

## 2021-05-24 VITALS
HEIGHT: 71 IN | RESPIRATION RATE: 16 BRPM | WEIGHT: 140 LBS | TEMPERATURE: 97.8 F | HEART RATE: 109 BPM | SYSTOLIC BLOOD PRESSURE: 127 MMHG | DIASTOLIC BLOOD PRESSURE: 80 MMHG | BODY MASS INDEX: 19.6 KG/M2

## 2021-05-24 DIAGNOSIS — S14.109D INJURY OF CERVICAL SPINAL CORD, SUBSEQUENT ENCOUNTER (HCC): Primary | ICD-10-CM

## 2021-05-24 DIAGNOSIS — S12.491D OTHER CLOSED NONDISPLACED FRACTURE OF FIFTH CERVICAL VERTEBRA WITH ROUTINE HEALING, SUBSEQUENT ENCOUNTER: ICD-10-CM

## 2021-05-24 PROCEDURE — 99024 POSTOP FOLLOW-UP VISIT: CPT | Performed by: NEUROLOGICAL SURGERY

## 2021-05-24 NOTE — PATIENT INSTRUCTIONS
Patient presented today for three month follow-up status post severe spinal cord injury after falling down stairs March 6, 2021   Patient underwent a posterior cervical decompression fixation and fusion on March 7, 2021  · Patient continues to show slow improvement in his neurological status  He would likely benefit from ongoing aggressive therapy to improve his functional outcome/recovery  He is interest in acute rehab facilities such as [facility]  I recommend he review with the  at his current facility, [facility]  · Continue pain management as needed  · Cervical collar may be as needed for comfort  · Will plan for six month postoperative visit in September 2021 with repeat cervical spine upright x-rays AP and laterals to be completed prior to visit

## 2021-05-24 NOTE — ASSESSMENT & PLAN NOTE
Patient present for 3 month POV for clinical follow-up after sustaining severe central cord injury, Jania B spinal cord injury March 2021 after falling down stairs  · Status post posterior cervical decompression fixation and fusion C2-C7 3/7/2021  · [facility]  · Patient has been weaned completely from his collar  · States able to ambulate but requires assistance with ADLs given UE weakness    Plan:  · Continue monitor neurological status and monitor for any new symptoms  · Prior imaging reviewed with patient  · Patient has been showing improvement in his neurological status following his SCI  He would likely benefit from additional high intensity therapy to improve upon his functional recovery  · He is interested in a higher level of rehab such as [facility]  I was informed the rehab referral should be completed by the  at his current facility and discussed such with the patient  · Continue therapeutic exercises  · Continue pain management as needed  · May consider referral to Physical Medicine and Rehabilitation  · Regarding the cervical collar, it may be worn on as needed basis at this time  · All questions were answered  · Plan for six month postoperative visit (9/2021) as a joint appoint with Dr Junior Her with repeat upright cervical spine x-rays

## 2021-05-24 NOTE — PROGRESS NOTES
Neurosurgery Office Note  Hayley Stack 38 y o  male MRN: [id number]      Assessment/Plan     Spinal cord injury, C1-C7 [facility])  Patient present for 3 month POV for clinical follow-up after sustaining severe central cord injury, Jania B spinal cord injury March 2021 after falling down stairs  · Status post posterior cervical decompression fixation and fusion C2-C7 3/7/2021  · Currently residing at [facility]  AND [facility]  · Patient has been weaned completely from his collar  · States able to ambulate but requires assistance with ADLs given UE weakness    Plan:  · Continue monitor neurological status and monitor for any new symptoms  · Prior imaging reviewed with patient  · Patient has been showing improvement in his neurological status following his SCI  He would likely benefit from additional high intensity therapy to improve upon his functional recovery  · He is interested in a higher level of rehab such as [facility]  I was informed the rehab referral should be completed by the  at his current facility and discussed such with the patient  · Continue therapeutic exercises  · Continue pain management as needed  · May consider referral to Physical Medicine and Rehabilitation  · Regarding the cervical collar, it may be worn on as needed basis at this time  · All questions were answered  · Plan for six month postoperative visit (9/2021) as a joint appoint with Dr Cori Deleon with repeat upright cervical spine x-rays       Central cord syndrome [facility])  See plan as above    Closed fracture of fifth cervical vertebra ([facility] )  See plan as above       Diagnoses and all orders for this visit:    Injury of cervical spinal cord, subsequent encounter ([facility] )    Other closed nondisplaced fracture of fifth cervical vertebra with routine healing, subsequent encounter            [facility]    Chief Complaint   Patient presents with    Follow-up     Shared GB/HDM; 6 weeks to assess his progress, no new studies, Status post PDCF for  Severe central cord injury, JANIA B SCI; surgery was 3/7/21  HISTORY    History of Present Illness     This is a 37 y/o male without significant past medical history who presents today for approximate level week postoperative follow-up and clinical evaluation  Patient sustained a fall downstairs on March 6, 2021 suffering a severe spinal cord injury, Jania B requiring urgent posterior cervical decompression fixation and fusion on March 7, 2021  Patient was ultimately discharged to [facility] on March 16, 2021  Patient states after approximately four weeks of rehab he was transferred to [facility]  AND [facility] secondary to insurance coverage  Patient felt that he was making good improvement while receiving aggressive rehab  He states at this time he received approximately 1 hour of rehab per day  Patient in is eager to restart a regimen more consistent with acute rehab and enquiring about transitioning to a center such as [address] [place] [address]  Patient states he is ambulatory to complete his therapies  He continues with significant upper extremity weakness requiring assistance for ADLs which is dressing and feeding  He currently has a condom cath in place  Patient states secondary to slow response time, inability to hold urinal and concern for waiting on himself  He states he has full urge and knowledge when he has to void  He denies any saddle anesthesias  Patient continues with neck pain radiating into the bilateral scapular region  He is on scheduled baclofen along with gabapentin and Dilaudid p r n  He states his medication doses have been decreased with left axis efficacy  He continues with numbness of his bilateral hands which has improved and is now mild without involvement of his arms  Patient is currently waiting from his collar and states that currently is being removed for approximately 8 hours per day  He states he is tolerating this well       REVIEW OF SYSTEMS    Review of Systems   Constitutional: Negative  HENT: Negative  Eyes: Negative  Respiratory: Negative  Cardiovascular: Positive for leg swelling (occasional)  Gastrointestinal: Negative  Endocrine: Negative  Genitourinary: Negative  Musculoskeletal: Positive for gait problem  Skin: Negative  Allergic/Immunologic: Negative  Neurological: Positive for weakness (bilateral legs) and numbness (mild in bilateral hands)  Hematological: Negative  Psychiatric/Behavioral: Negative  All other systems reviewed and are negative  ROS personally reviewed    Meds/Allergies     Current Outpatient Medications   Medication Sig Dispense Refill    acetaminophen (TYLENOL) 500 mg tablet Take 1,000 mg by mouth every 6 (six) hours as needed for mild pain      baclofen 10 mg tablet Take 5 mg by mouth 3 (three) times a day      bisacodyl (DULCOLAX) 10 mg suppository Insert 1 suppository (10 mg total) into the rectum daily 12 suppository 0    Calcium Carbonate Antacid (CALCIUM CARBONATE PO) Take by mouth      celecoxib (CeleBREX) 100 mg capsule Take 100 mg by mouth 2 (two) times a day      gabapentin (NEURONTIN) 300 mg capsule Take 2 capsules (600 mg total) by mouth 3 (three) times a day 30 capsule 0    HYDROmorphone (DILAUDID) 2 mg tablet Take 2 mg by mouth every 3 (three) hours as needed for moderate pain      melatonin 3 mg Take 1 tablet (3 mg total) by mouth daily at bedtime 10 tablet 0    nortriptyline (PAMELOR) 10 mg capsule Take 10 mg by mouth daily at bedtime      senna-docusate sodium (SENOKOT S) 8 6-50 mg per tablet Take 1 tablet by mouth 2 (two) times a day 10 tablet 0    tamsulosin (FLOMAX) 0 4 mg Take 1 capsule (0 4 mg total) by mouth daily with dinner 10 capsule 0     No current facility-administered medications for this visit          No Known Allergies    PAST HISTORY    Past Medical History:   Diagnosis Date    Chronic depression     PTSD (post-traumatic stress disorder)        Past Surgical History:   Procedure Laterality Date    CERVICAL FUSION N/A 3/6/2021    Procedure: POSTERIOR C3-5 laminectomy, C2-7 fixation fusion, possible additional levels;  Surgeon: Ronnie Briones MD;  [facility];  Service: Neurosurgery    IR PICC PLACEMENT DOUBLE LUMEN  3/8/2021       Social History     Tobacco Use    Smoking status: Former Smoker     Packs/day: 0 25     Years: 10 00     Pack years: 2 50     Types: Cigarettes    Smokeless tobacco: Never Used   Substance Use Topics    Alcohol use: Not Currently     Frequency: Never     Drinks per session: 1 or 2     Binge frequency: Never     Comment: none    Drug use: Never     Comment: none       Family History   Problem Relation Age of Onset    No Known Problems Mother     No Known Problems Father        Above history personally reviewed  EXAM    Vitals:Blood pressure 127/80, pulse (!) 109, temperature 97 8 °F (36 6 °C), temperature source Probe, resp  rate 16, height 5' 11" (1 803 m), weight 63 5 kg (140 lb)  ,Body mass index is 19 53 kg/m²  Physical Exam  Constitutional:       General: He is not in acute distress  Appearance: Normal appearance  He is well-developed  He is not ill-appearing  HENT:      Head: Normocephalic and atraumatic  Right Ear: External ear normal       Left Ear: External ear normal       Nose: Nose normal    Eyes:      General: No scleral icterus  Right eye: No discharge  Left eye: No discharge  Extraocular Movements: Extraocular movements intact and EOM normal       Conjunctiva/sclera: Conjunctivae normal    Neck:      Comments: Collar in place  Cardiovascular:      Rate and Rhythm: Normal rate  Pulmonary:      Effort: Pulmonary effort is normal  No respiratory distress  Musculoskeletal:         General: No tenderness  Skin:     General: Skin is warm and dry  Neurological:      Mental Status: He is alert        Coordination: Finger-Nose-Finger Test normal  Deep Tendon Reflexes:      Reflex Scores:       Bicep reflexes are 2+ on the right side and 2+ on the left side  Brachioradialis reflexes are 2+ on the right side and 2+ on the left side  Patellar reflexes are 2+ on the right side and 2+ on the left side  Achilles reflexes are 2+ on the right side and 2+ on the left side  Psychiatric:         Speech: Speech normal          Behavior: Behavior normal          Thought Content: Thought content normal          Judgment: Judgment normal          Neurologic Exam     Mental Status   Follows 3 step commands  Attention: normal  Concentration: normal    Speech: speech is normal   Level of consciousness: alert  Knowledge: good  Normal comprehension  Cranial Nerves     CN III, IV, VI   Extraocular motions are normal    Nystagmus: none   Conjugate gaze: present    CN VII   Facial expression full, symmetric       CN VIII   Hearing: intact    CN XI   Right trapezius strength: normal  Left trapezius strength: normal    CN XII   Tongue: not atrophic  Fasciculations: absent    Motor Exam   Muscle bulk: decreased  RUE -  3, WF 2, WE 2, Bicep 4, Tricep 4, Shoulder abduction 3+, finger extension 2  LUE -  3, WF 2, WE 2, Bicep 4-, Tricep 4, Shoulder abduction 3+, finger extension 2  BLE 4+ throughout      Sensory Exam   Light touch normal    Pinprick normal      Gait, Coordination, and Reflexes     Coordination   Finger to nose coordination: normal    Tremor   Resting tremor: absent  Intention tremor: absent  Action tremor: absent    Reflexes   Right brachioradialis: 2+  Left brachioradialis: 2+  Right biceps: 2+  Left biceps: 2+  Right patellar: 2+  Left patellar: 2+  Right achilles: 2+  Left achilles: 2+  Right Gomez: absent  Left Gomez: absent  Right ankle clonus: absent  Left ankle clonus: absent        MEDICAL DECISION MAKING    Imaging Studies: No recent imaging

## 2021-07-15 ENCOUNTER — HOSPITAL ENCOUNTER (EMERGENCY)
Facility: HOSPITAL | Age: 36
End: 2021-07-16
Attending: EMERGENCY MEDICINE
Payer: COMMERCIAL

## 2021-07-15 DIAGNOSIS — N45.3 ORCHITIS AND EPIDIDYMITIS: ICD-10-CM

## 2021-07-15 DIAGNOSIS — A41.9 SEPSIS (HCC): Primary | ICD-10-CM

## 2021-07-15 PROCEDURE — 93005 ELECTROCARDIOGRAM TRACING: CPT

## 2021-07-15 PROCEDURE — 99285 EMERGENCY DEPT VISIT HI MDM: CPT

## 2021-07-15 RX ORDER — KETOROLAC TROMETHAMINE 30 MG/ML
30 INJECTION, SOLUTION INTRAMUSCULAR; INTRAVENOUS ONCE
Status: DISCONTINUED | OUTPATIENT
Start: 2021-07-16 | End: 2021-07-15

## 2021-07-15 NOTE — Clinical Note
Dinah Degree should be transferred out to [facility] for Step down 2 in the service of Dr Jacob Torres

## 2021-07-16 ENCOUNTER — APPOINTMENT (EMERGENCY)
Dept: ULTRASOUND IMAGING | Facility: HOSPITAL | Age: 36
End: 2021-07-16
Payer: COMMERCIAL

## 2021-07-16 ENCOUNTER — APPOINTMENT (EMERGENCY)
Dept: RADIOLOGY | Facility: HOSPITAL | Age: 36
End: 2021-07-16
Payer: COMMERCIAL

## 2021-07-16 ENCOUNTER — APPOINTMENT (EMERGENCY)
Dept: CT IMAGING | Facility: HOSPITAL | Age: 36
End: 2021-07-16
Payer: COMMERCIAL

## 2021-07-16 ENCOUNTER — HOSPITAL ENCOUNTER (INPATIENT)
Facility: HOSPITAL | Age: 36
LOS: 7 days | Discharge: NURSING, REHAB OR LONG-TERM CARE | DRG: 483 | End: 2021-07-23
Attending: INTERNAL MEDICINE | Admitting: INTERNAL MEDICINE
Payer: COMMERCIAL

## 2021-07-16 VITALS
SYSTOLIC BLOOD PRESSURE: 104 MMHG | HEART RATE: 145 BPM | BODY MASS INDEX: 27.16 KG/M2 | WEIGHT: 173.06 LBS | OXYGEN SATURATION: 95 % | RESPIRATION RATE: 17 BRPM | HEIGHT: 67 IN | TEMPERATURE: 99.5 F | DIASTOLIC BLOOD PRESSURE: 54 MMHG

## 2021-07-16 DIAGNOSIS — N43.1 INFECTED HYDROCELE: ICD-10-CM

## 2021-07-16 DIAGNOSIS — N50.819 TESTICULAR DISCOMFORT: Primary | ICD-10-CM

## 2021-07-16 DIAGNOSIS — S14.109A INJURY OF CERVICAL SPINAL CORD, INITIAL ENCOUNTER (HCC): ICD-10-CM

## 2021-07-16 DIAGNOSIS — N45.3 ORCHITIS AND EPIDIDYMITIS: ICD-10-CM

## 2021-07-16 DIAGNOSIS — E78.5 HYPERLIPIDEMIA, UNSPECIFIED HYPERLIPIDEMIA TYPE: ICD-10-CM

## 2021-07-16 PROBLEM — A41.9 SEPSIS (HCC): Status: ACTIVE | Noted: 2021-07-16

## 2021-07-16 LAB
ALBUMIN SERPL BCP-MCNC: 3.7 G/DL (ref 3.5–5)
ALP SERPL-CCNC: 77 U/L (ref 46–116)
ALT SERPL W P-5'-P-CCNC: 20 U/L (ref 12–78)
ANION GAP SERPL CALCULATED.3IONS-SCNC: 8 MMOL/L (ref 4–13)
APTT PPP: 31 SECONDS (ref 23–37)
AST SERPL W P-5'-P-CCNC: 17 U/L (ref 5–45)
ATRIAL RATE: 144 BPM
ATRIAL RATE: 148 BPM
BACTERIA UR QL AUTO: ABNORMAL /HPF
BASOPHILS # BLD AUTO: 0.04 THOUSANDS/ΜL (ref 0–0.1)
BASOPHILS NFR BLD AUTO: 0 % (ref 0–1)
BILIRUB SERPL-MCNC: 0.48 MG/DL (ref 0.2–1)
BILIRUB UR QL STRIP: NEGATIVE
BUN SERPL-MCNC: 11 MG/DL (ref 5–25)
CALCIUM SERPL-MCNC: 9.2 MG/DL (ref 8.3–10.1)
CHLORIDE SERPL-SCNC: 104 MMOL/L (ref 100–108)
CLARITY UR: ABNORMAL
CO2 SERPL-SCNC: 31 MMOL/L (ref 21–32)
COLOR UR: YELLOW
CREAT SERPL-MCNC: 1.2 MG/DL (ref 0.6–1.3)
EOSINOPHIL # BLD AUTO: 0.01 THOUSAND/ΜL (ref 0–0.61)
EOSINOPHIL NFR BLD AUTO: 0 % (ref 0–6)
ERYTHROCYTE [DISTWIDTH] IN BLOOD BY AUTOMATED COUNT: 12.7 % (ref 11.6–15.1)
GFR SERPL CREATININE-BSD FRML MDRD: 89 ML/MIN/1.73SQ M
GLUCOSE SERPL-MCNC: 110 MG/DL (ref 65–140)
GLUCOSE UR STRIP-MCNC: NEGATIVE MG/DL
HCT VFR BLD AUTO: 39.9 % (ref 36.5–49.3)
HGB BLD-MCNC: 12.9 G/DL (ref 12–17)
HGB UR QL STRIP.AUTO: ABNORMAL
IMM GRANULOCYTES # BLD AUTO: 0.12 THOUSAND/UL (ref 0–0.2)
IMM GRANULOCYTES NFR BLD AUTO: 1 % (ref 0–2)
INR PPP: 1.07 (ref 0.84–1.19)
KETONES UR STRIP-MCNC: NEGATIVE MG/DL
LACTATE SERPL-SCNC: 0.9 MMOL/L (ref 0.5–2)
LACTATE SERPL-SCNC: 2.1 MMOL/L (ref 0.5–2)
LEUKOCYTE ESTERASE UR QL STRIP: ABNORMAL
LYMPHOCYTES # BLD AUTO: 1.26 THOUSANDS/ΜL (ref 0.6–4.47)
LYMPHOCYTES NFR BLD AUTO: 8 % (ref 14–44)
MCH RBC QN AUTO: 31 PG (ref 26.8–34.3)
MCHC RBC AUTO-ENTMCNC: 32.3 G/DL (ref 31.4–37.4)
MCV RBC AUTO: 96 FL (ref 82–98)
MONOCYTES # BLD AUTO: 1.12 THOUSAND/ΜL (ref 0.17–1.22)
MONOCYTES NFR BLD AUTO: 8 % (ref 4–12)
NEUTROPHILS # BLD AUTO: 12.37 THOUSANDS/ΜL (ref 1.85–7.62)
NEUTS SEG NFR BLD AUTO: 83 % (ref 43–75)
NITRITE UR QL STRIP: POSITIVE
NON-SQ EPI CELLS URNS QL MICRO: ABNORMAL /HPF
NRBC BLD AUTO-RTO: 0 /100 WBCS
OTHER STN SPEC: ABNORMAL
P AXIS: 55 DEGREES
P AXIS: 69 DEGREES
PH UR STRIP.AUTO: 6.5 [PH]
PLATELET # BLD AUTO: 322 THOUSANDS/UL (ref 149–390)
PLATELET # BLD AUTO: 360 THOUSANDS/UL (ref 149–390)
PMV BLD AUTO: 10.2 FL (ref 8.9–12.7)
PMV BLD AUTO: 9.8 FL (ref 8.9–12.7)
POTASSIUM SERPL-SCNC: 3.4 MMOL/L (ref 3.5–5.3)
PR INTERVAL: 120 MS
PR INTERVAL: 130 MS
PROT SERPL-MCNC: 8.1 G/DL (ref 6.4–8.2)
PROT UR STRIP-MCNC: ABNORMAL MG/DL
PROTHROMBIN TIME: 14 SECONDS (ref 11.6–14.5)
QRS AXIS: 65 DEGREES
QRS AXIS: 68 DEGREES
QRSD INTERVAL: 72 MS
QRSD INTERVAL: 78 MS
QT INTERVAL: 270 MS
QT INTERVAL: 272 MS
QTC INTERVAL: 421 MS
QTC INTERVAL: 423 MS
RBC # BLD AUTO: 4.16 MILLION/UL (ref 3.88–5.62)
RBC #/AREA URNS AUTO: ABNORMAL /HPF
SODIUM SERPL-SCNC: 143 MMOL/L (ref 136–145)
SP GR UR STRIP.AUTO: 1.02 (ref 1–1.03)
T WAVE AXIS: -22 DEGREES
T WAVE AXIS: -26 DEGREES
TROPONIN I SERPL-MCNC: <0.02 NG/ML
UROBILINOGEN UR QL STRIP.AUTO: 0.2 E.U./DL
VENTRICULAR RATE: 144 BPM
VENTRICULAR RATE: 148 BPM
WBC # BLD AUTO: 14.92 THOUSAND/UL (ref 4.31–10.16)
WBC #/AREA URNS AUTO: ABNORMAL /HPF

## 2021-07-16 PROCEDURE — 85730 THROMBOPLASTIN TIME PARTIAL: CPT

## 2021-07-16 PROCEDURE — 80053 COMPREHEN METABOLIC PANEL: CPT

## 2021-07-16 PROCEDURE — 83605 ASSAY OF LACTIC ACID: CPT

## 2021-07-16 PROCEDURE — 85025 COMPLETE CBC W/AUTO DIFF WBC: CPT

## 2021-07-16 PROCEDURE — 76870 US EXAM SCROTUM: CPT

## 2021-07-16 PROCEDURE — 93010 ELECTROCARDIOGRAM REPORT: CPT | Performed by: INTERNAL MEDICINE

## 2021-07-16 PROCEDURE — 96375 TX/PRO/DX INJ NEW DRUG ADDON: CPT

## 2021-07-16 PROCEDURE — 71045 X-RAY EXAM CHEST 1 VIEW: CPT

## 2021-07-16 PROCEDURE — 87086 URINE CULTURE/COLONY COUNT: CPT

## 2021-07-16 PROCEDURE — 96365 THER/PROPH/DIAG IV INF INIT: CPT

## 2021-07-16 PROCEDURE — 87040 BLOOD CULTURE FOR BACTERIA: CPT

## 2021-07-16 PROCEDURE — 81001 URINALYSIS AUTO W/SCOPE: CPT

## 2021-07-16 PROCEDURE — 87186 SC STD MICRODIL/AGAR DIL: CPT

## 2021-07-16 PROCEDURE — 84484 ASSAY OF TROPONIN QUANT: CPT

## 2021-07-16 PROCEDURE — 99254 IP/OBS CNSLTJ NEW/EST MOD 60: CPT | Performed by: PHYSICIAN ASSISTANT

## 2021-07-16 PROCEDURE — 99285 EMERGENCY DEPT VISIT HI MDM: CPT | Performed by: EMERGENCY MEDICINE

## 2021-07-16 PROCEDURE — 99285 EMERGENCY DEPT VISIT HI MDM: CPT

## 2021-07-16 PROCEDURE — 85049 AUTOMATED PLATELET COUNT: CPT | Performed by: INTERNAL MEDICINE

## 2021-07-16 PROCEDURE — 96368 THER/DIAG CONCURRENT INF: CPT

## 2021-07-16 PROCEDURE — 87077 CULTURE AEROBIC IDENTIFY: CPT

## 2021-07-16 PROCEDURE — 85610 PROTHROMBIN TIME: CPT

## 2021-07-16 PROCEDURE — 74177 CT ABD & PELVIS W/CONTRAST: CPT

## 2021-07-16 PROCEDURE — 36415 COLL VENOUS BLD VENIPUNCTURE: CPT

## 2021-07-16 PROCEDURE — 93005 ELECTROCARDIOGRAM TRACING: CPT

## 2021-07-16 PROCEDURE — 96376 TX/PRO/DX INJ SAME DRUG ADON: CPT

## 2021-07-16 PROCEDURE — 99223 1ST HOSP IP/OBS HIGH 75: CPT | Performed by: INTERNAL MEDICINE

## 2021-07-16 PROCEDURE — 96366 THER/PROPH/DIAG IV INF ADDON: CPT

## 2021-07-16 RX ORDER — ACETAMINOPHEN 325 MG/1
650 TABLET ORAL ONCE
Status: COMPLETED | OUTPATIENT
Start: 2021-07-16 | End: 2021-07-16

## 2021-07-16 RX ORDER — ACETAMINOPHEN 325 MG/1
975 TABLET ORAL ONCE
Status: COMPLETED | OUTPATIENT
Start: 2021-07-16 | End: 2021-07-16

## 2021-07-16 RX ORDER — IBUPROFEN 200 MG
200 TABLET ORAL ONCE
Status: COMPLETED | OUTPATIENT
Start: 2021-07-16 | End: 2021-07-16

## 2021-07-16 RX ORDER — ACETAMINOPHEN 325 MG/1
650 TABLET ORAL EVERY 6 HOURS PRN
Status: DISCONTINUED | OUTPATIENT
Start: 2021-07-16 | End: 2021-07-17

## 2021-07-16 RX ORDER — HYDROMORPHONE HCL/PF 1 MG/ML
0.5 SYRINGE (ML) INJECTION EVERY 4 HOURS PRN
Status: DISCONTINUED | OUTPATIENT
Start: 2021-07-16 | End: 2021-07-16

## 2021-07-16 RX ORDER — ACETAMINOPHEN 325 MG/1
975 TABLET ORAL ONCE
Status: DISCONTINUED | OUTPATIENT
Start: 2021-07-16 | End: 2021-07-16

## 2021-07-16 RX ORDER — HYDROMORPHONE HCL/PF 1 MG/ML
1 SYRINGE (ML) INJECTION EVERY 4 HOURS PRN
Status: DISCONTINUED | OUTPATIENT
Start: 2021-07-16 | End: 2021-07-17

## 2021-07-16 RX ORDER — ACETAMINOPHEN 325 MG/1
325 TABLET ORAL ONCE
Status: COMPLETED | OUTPATIENT
Start: 2021-07-16 | End: 2021-07-16

## 2021-07-16 RX ORDER — HYDROMORPHONE HCL/PF 1 MG/ML
0.5 SYRINGE (ML) INJECTION
Status: COMPLETED | OUTPATIENT
Start: 2021-07-16 | End: 2021-07-16

## 2021-07-16 RX ORDER — HYDROMORPHONE HCL/PF 1 MG/ML
1 SYRINGE (ML) INJECTION ONCE
Status: COMPLETED | OUTPATIENT
Start: 2021-07-16 | End: 2021-07-16

## 2021-07-16 RX ORDER — HYDROMORPHONE HCL/PF 1 MG/ML
0.5 SYRINGE (ML) INJECTION ONCE
Status: COMPLETED | OUTPATIENT
Start: 2021-07-16 | End: 2021-07-16

## 2021-07-16 RX ORDER — SODIUM CHLORIDE 9 MG/ML
125 INJECTION, SOLUTION INTRAVENOUS CONTINUOUS
Status: DISCONTINUED | OUTPATIENT
Start: 2021-07-16 | End: 2021-07-20

## 2021-07-16 RX ORDER — HYDROMORPHONE HYDROCHLORIDE 2 MG/1
2 TABLET ORAL
Status: DISCONTINUED | OUTPATIENT
Start: 2021-07-16 | End: 2021-07-19

## 2021-07-16 RX ORDER — MORPHINE SULFATE 4 MG/ML
4 INJECTION, SOLUTION INTRAMUSCULAR; INTRAVENOUS ONCE
Status: COMPLETED | OUTPATIENT
Start: 2021-07-16 | End: 2021-07-16

## 2021-07-16 RX ORDER — MUSCLE RUB CREAM 100; 150 MG/G; MG/G
CREAM TOPICAL 4 TIMES DAILY PRN
Status: DISCONTINUED | OUTPATIENT
Start: 2021-07-16 | End: 2021-07-23 | Stop reason: HOSPADM

## 2021-07-16 RX ORDER — LIDOCAINE 50 MG/G
1 PATCH TOPICAL DAILY
Status: DISCONTINUED | OUTPATIENT
Start: 2021-07-16 | End: 2021-07-23 | Stop reason: HOSPADM

## 2021-07-16 RX ORDER — CLINDAMYCIN PHOSPHATE 600 MG/50ML
600 INJECTION INTRAVENOUS ONCE
Status: COMPLETED | OUTPATIENT
Start: 2021-07-16 | End: 2021-07-16

## 2021-07-16 RX ADMIN — HYDROMORPHONE HYDROCHLORIDE 2 MG: 2 TABLET ORAL at 11:00

## 2021-07-16 RX ADMIN — HYDROMORPHONE HYDROCHLORIDE 0.5 MG: 1 INJECTION, SOLUTION INTRAMUSCULAR; INTRAVENOUS; SUBCUTANEOUS at 13:37

## 2021-07-16 RX ADMIN — PIPERACILLIN AND TAZOBACTAM 3.38 G: 3; .375 INJECTION, POWDER, LYOPHILIZED, FOR SOLUTION INTRAVENOUS at 00:49

## 2021-07-16 RX ADMIN — SODIUM CHLORIDE 500 ML: 0.9 INJECTION, SOLUTION INTRAVENOUS at 18:04

## 2021-07-16 RX ADMIN — HYDROMORPHONE HYDROCHLORIDE 0.5 MG: 1 INJECTION, SOLUTION INTRAMUSCULAR; INTRAVENOUS; SUBCUTANEOUS at 05:00

## 2021-07-16 RX ADMIN — HYDROMORPHONE HYDROCHLORIDE 0.5 MG: 1 INJECTION, SOLUTION INTRAMUSCULAR; INTRAVENOUS; SUBCUTANEOUS at 07:16

## 2021-07-16 RX ADMIN — HYDROMORPHONE HYDROCHLORIDE 2 MG: 2 TABLET ORAL at 14:26

## 2021-07-16 RX ADMIN — ACETAMINOPHEN 650 MG: 325 TABLET, FILM COATED ORAL at 14:23

## 2021-07-16 RX ADMIN — HYDROMORPHONE HYDROCHLORIDE 1 MG: 1 INJECTION, SOLUTION INTRAMUSCULAR; INTRAVENOUS; SUBCUTANEOUS at 20:45

## 2021-07-16 RX ADMIN — PIPERACILLIN AND TAZOBACTAM 3.38 G: 3; .375 INJECTION, POWDER, LYOPHILIZED, FOR SOLUTION INTRAVENOUS at 07:18

## 2021-07-16 RX ADMIN — IOHEXOL 85 ML: 350 INJECTION, SOLUTION INTRAVENOUS at 02:12

## 2021-07-16 RX ADMIN — HYDROMORPHONE HYDROCHLORIDE 0.5 MG: 1 INJECTION, SOLUTION INTRAMUSCULAR; INTRAVENOUS; SUBCUTANEOUS at 06:08

## 2021-07-16 RX ADMIN — HYDROMORPHONE HYDROCHLORIDE 0.5 MG: 1 INJECTION, SOLUTION INTRAMUSCULAR; INTRAVENOUS; SUBCUTANEOUS at 02:26

## 2021-07-16 RX ADMIN — ENOXAPARIN SODIUM 40 MG: 40 INJECTION SUBCUTANEOUS at 10:39

## 2021-07-16 RX ADMIN — HYDROMORPHONE HYDROCHLORIDE 0.5 MG: 1 INJECTION, SOLUTION INTRAMUSCULAR; INTRAVENOUS; SUBCUTANEOUS at 16:15

## 2021-07-16 RX ADMIN — SODIUM CHLORIDE 1000 ML: 0.9 INJECTION, SOLUTION INTRAVENOUS at 04:43

## 2021-07-16 RX ADMIN — IBUPROFEN 200 MG: 200 TABLET, FILM COATED ORAL at 22:35

## 2021-07-16 RX ADMIN — HYDROMORPHONE HYDROCHLORIDE 2 MG: 2 TABLET ORAL at 17:50

## 2021-07-16 RX ADMIN — HYDROMORPHONE HYDROCHLORIDE 0.5 MG: 1 INJECTION, SOLUTION INTRAMUSCULAR; INTRAVENOUS; SUBCUTANEOUS at 01:39

## 2021-07-16 RX ADMIN — SODIUM CHLORIDE 125 ML/HR: 0.9 INJECTION, SOLUTION INTRAVENOUS at 10:38

## 2021-07-16 RX ADMIN — SODIUM CHLORIDE 3 G: 9 INJECTION, SOLUTION INTRAVENOUS at 17:38

## 2021-07-16 RX ADMIN — ACETAMINOPHEN 650 MG: 325 TABLET, FILM COATED ORAL at 19:59

## 2021-07-16 RX ADMIN — MORPHINE SULFATE 4 MG: 4 INJECTION INTRAVENOUS at 00:34

## 2021-07-16 RX ADMIN — VANCOMYCIN HYDROCHLORIDE 1500 MG: 1 INJECTION, POWDER, LYOPHILIZED, FOR SOLUTION INTRAVENOUS at 01:39

## 2021-07-16 RX ADMIN — CLINDAMYCIN PHOSPHATE 600 MG: 600 INJECTION, SOLUTION INTRAVENOUS at 00:48

## 2021-07-16 RX ADMIN — SODIUM CHLORIDE 1000 ML: 0.9 INJECTION, SOLUTION INTRAVENOUS at 01:19

## 2021-07-16 RX ADMIN — SODIUM CHLORIDE 3 G: 9 INJECTION, SOLUTION INTRAVENOUS at 11:07

## 2021-07-16 RX ADMIN — ACETAMINOPHEN 975 MG: 325 TABLET, FILM COATED ORAL at 07:15

## 2021-07-16 RX ADMIN — ACETAMINOPHEN 325 MG: 325 TABLET, FILM COATED ORAL at 22:35

## 2021-07-16 RX ADMIN — HYDROMORPHONE HYDROCHLORIDE 1 MG: 1 INJECTION, SOLUTION INTRAMUSCULAR; INTRAVENOUS; SUBCUTANEOUS at 09:48

## 2021-07-16 RX ADMIN — ACETAMINOPHEN 650 MG: 325 TABLET, FILM COATED ORAL at 00:38

## 2021-07-16 RX ADMIN — SODIUM CHLORIDE 3 G: 9 INJECTION, SOLUTION INTRAVENOUS at 22:35

## 2021-07-16 NOTE — ED NOTES
Dr Shay Salazar at bedside and made aware of abnormal vitals and pt request for pain medicine        Mecca Diamond RN  07/16/21 [id number]

## 2021-07-16 NOTE — ED PROVIDER NOTES
History  Chief Complaint   Patient presents with    Testicle Pain     Pt [place], [place] [facility]  [place] c/o right shooting testicular swelling and pain that radiates to his legs & stomach  Pt has a 103 3 fever during triage  denies any sick contacts   Ankle Pain     c/o R & L ankle pain, pt states he is paralyzed from the shoulder & down  Patient is a 55-year-old male here today with sharp, sudden, 10/10, right testicular pain  Pain radiates into the right leg and the right side of his abdomen  Pain began at around 10:30 a m  this morning  Patient awoke from his sleep and felt immediate pain  Pain continued to worsen throughout the day, pain was worse with any movement especially with ambulating  Patient took some Tylenol but it did not relieve the pain  Patient denies any fevers or chills  Denies any shortness of breath or chest pain  Patient denies any recent strenuous activity  Patient has a history of the cervical spine fracture, resulting in central cord syndrome  Patient is a quadriplegic  Patient denies any other significant past medical history  History provided by:  Patient   used: No    Testicle Pain  Quality:  10/10  Severity:  Severe  Onset quality:  Sudden  Timing:  Constant  Progression:  Worsening  Chronicity:  New  Relieved by:  Nothing  Worsened by: Movement  Associated symptoms: abdominal pain    Associated symptoms: no chest pain, no cough, no diarrhea, no ear pain, no fever, no headaches, no nausea, no rash, no shortness of breath, no sore throat and no vomiting    Ankle Pain  Associated symptoms: no back pain and no fever        Prior to Admission Medications   Prescriptions Last Dose Informant Patient Reported? Taking?    Calcium Carbonate Antacid (CALCIUM CARBONATE PO) 7/16/2021 at Unknown time Outside Facility ([facility]) Yes Yes   Sig: Take by mouth   HYDROmorphone (DILAUDID) 2 mg tablet 7/16/2021 at Unknown time Outside Facility (Specify) Yes Yes   Sig: Take 2 mg by mouth every 3 (three) hours as needed for moderate pain   acetaminophen (TYLENOL) 500 mg tablet 7/16/2021 at Unknown time Outside Facility ([address] [place] [address]) Yes Yes   Sig: Take 1,000 mg by mouth every 6 (six) hours as needed for mild pain   baclofen 10 mg tablet 7/16/2021 at Unknown time Outside Facility ([address] [place] [address]) Yes Yes   Sig: Take 5 mg by mouth 3 (three) times a day   bisacodyl (DULCOLAX) 10 mg suppository 7/16/2021 at Unknown time Outside Facility (Specify) No Yes   Sig: Insert 1 suppository (10 mg total) into the rectum daily   celecoxib (CeleBREX) 100 mg capsule 7/16/2021 at Unknown time Outside Facility ([address] [place] [address]) Yes Yes   Sig: Take 100 mg by mouth 2 (two) times a day   gabapentin (NEURONTIN) 300 mg capsule 7/16/2021 at Unknown time Outside Facility (Specify) No Yes   Sig: Take 2 capsules (600 mg total) by mouth 3 (three) times a day   melatonin 3 mg 7/16/2021 at Unknown time Outside Facility (Specify) No Yes   Sig: Take 1 tablet (3 mg total) by mouth daily at bedtime   nortriptyline (PAMELOR) 10 mg capsule 7/16/2021 at Unknown time Outside Facility ([address] [place] [address]) Yes Yes   Sig: Take 10 mg by mouth daily at bedtime   senna-docusate sodium (SENOKOT S) 8 6-50 mg per tablet 7/16/2021 at Unknown time Outside Facility (Specify) No Yes   Sig: Take 1 tablet by mouth 2 (two) times a day   tamsulosin (FLOMAX) 0 4 mg 7/16/2021 at Unknown time Outside Facility (Specify) No Yes   Sig: Take 1 capsule (0 4 mg total) by mouth daily with dinner      Facility-Administered Medications: None       Past Medical History:   Diagnosis Date    Chronic depression     PTSD (post-traumatic stress disorder)        Past Surgical History:   Procedure Laterality Date    CERVICAL FUSION N/A 3/6/2021    Procedure: POSTERIOR C3-5 laminectomy, C2-7 fixation fusion, possible additional levels;  Surgeon: Meek Wheat MD;  [facility];  Service: Neurosurgery    IR PICC PLACEMENT DOUBLE LUMEN  3/8/2021 Family History   Problem Relation Age of Onset    No Known Problems Mother     No Known Problems Father      I have reviewed and agree with the history as documented  E-Cigarette/Vaping    E-Cigarette Use Never User      E-Cigarette/Vaping Substances    Nicotine No     THC No     CBD No     Flavoring No     Other No     Unknown No      Social History     Tobacco Use    Smoking status: Former Smoker     Packs/day: 0 25     Years: 10 00     Pack years: 2 50     Types: Cigarettes    Smokeless tobacco: Never Used   Vaping Use    Vaping Use: Never used   Substance Use Topics    Alcohol use: Not Currently     Comment: none    Drug use: Never     Comment: none        Review of Systems   Constitutional: Negative for chills and fever  HENT: Negative for ear pain and sore throat  Eyes: Negative for pain and visual disturbance  Respiratory: Negative for cough and shortness of breath  Cardiovascular: Negative for chest pain and palpitations  Gastrointestinal: Positive for abdominal pain  Negative for diarrhea, nausea and vomiting  Genitourinary: Positive for testicular pain  Negative for difficulty urinating, discharge, dysuria, flank pain, hematuria and penile pain  Musculoskeletal: Negative for arthralgias and back pain  Skin: Negative for rash  Neurological: Negative for syncope, numbness and headaches  Psychiatric/Behavioral: Negative for agitation and confusion  All other systems reviewed and are negative        Physical Exam  ED Triage Vitals   Temperature Pulse Respirations Blood Pressure SpO2   07/15/21 2332 07/15/21 2332 07/15/21 2332 07/15/21 2332 07/15/21 2332   (!) 103 3 °F (39 6 °C) (!) 147 19 142/75 99 %      Temp Source Heart Rate Source Patient Position - Orthostatic VS BP Location FiO2 (%)   07/15/21 2332 07/15/21 2332 07/15/21 2332 07/15/21 2332 --   Oral Monitor Lying Left arm       Pain Score       07/16/21 0034       Worst Possible Pain             Orthostatic Vital Signs  Vitals:    07/16/21 0400 07/16/21 0500 07/16/21 0600 07/16/21 0708   BP: 108/56 113/57 122/67 115/55   Pulse: (!) 148 (!) 142 (!) 140 (S) (!) 150   Patient Position - Orthostatic VS:    Lying       Physical Exam  Vitals and nursing note reviewed  Constitutional:       Appearance: Normal appearance  HENT:      Head: Normocephalic and atraumatic  Right Ear: External ear normal       Left Ear: External ear normal       Mouth/Throat:      Mouth: Mucous membranes are moist       Pharynx: Oropharynx is clear  Eyes:      General: No scleral icterus  Conjunctiva/sclera: Conjunctivae normal    Cardiovascular:      Rate and Rhythm: Normal rate and regular rhythm  Pulses: Normal pulses  Heart sounds: Normal heart sounds  Pulmonary:      Effort: Pulmonary effort is normal  No respiratory distress  Breath sounds: Normal breath sounds  Abdominal:      General: Abdomen is flat  There is no distension  Tenderness: There is abdominal tenderness  Comments: Right sided abdominal tenderness   Genitourinary:     Pubic Area: No rash  Penis: Normal        Testes:         Right: Tenderness and swelling present  Mass not present  Comments: Right testicle is hardened  Musculoskeletal:         General: No tenderness or signs of injury  Cervical back: Neck supple  No rigidity  Skin:     General: Skin is warm  Coloration: Skin is not jaundiced  Findings: No erythema or rash  Neurological:      General: No focal deficit present  Mental Status: He is alert  Mental status is at baseline     Psychiatric:         Mood and Affect: Mood normal          Behavior: Behavior normal          ED Medications  Medications   piperacillin-tazobactam (ZOSYN) 3 375 g in sodium chloride 0 9 % 100 mL IVPB (0 g Intravenous Stopped 7/16/21 0119)   morphine (PF) 4 mg/mL injection 4 mg (4 mg Intravenous Given 7/16/21 0034)   acetaminophen (TYLENOL) tablet 650 mg (650 mg Oral Given 7/16/21 0038)   vancomycin (VANCOCIN) 1500 mg in sodium chloride 0 9% 250 mL IVPB (0 mg Intravenous Stopped 7/16/21 0347)   clindamycin (CLEOCIN) IVPB (premix in dextrose) 600 mg 50 mL (0 mg Intravenous Stopped 7/16/21 0138)   sodium chloride 0 9 % bolus 1,000 mL (0 mL Intravenous Stopped 7/16/21 0323)   HYDROmorphone (DILAUDID) injection 0 5 mg (0 5 mg Intravenous Given 7/16/21 0226)   iohexol (OMNIPAQUE) 350 MG/ML injection (SINGLE-DOSE) 85 mL (85 mL Intravenous Given 7/16/21 0212)   sodium chloride 0 9 % bolus 1,000 mL (0 mL Intravenous Stopped 7/16/21 0707)   HYDROmorphone (DILAUDID) injection 0 5 mg (0 5 mg Intravenous Given 7/16/21 0608)   piperacillin-tazobactam (ZOSYN) 3 375 g in sodium chloride 0 9 % 100 mL IVPB (3 375 g Intravenous New Bag 7/16/21 0718)   acetaminophen (TYLENOL) tablet 975 mg (975 mg Oral Given 7/16/21 0715)   HYDROmorphone (DILAUDID) injection 0 5 mg (0 5 mg Intravenous Given 7/16/21 0716)       Diagnostic Studies  Results Reviewed     Procedure Component Value Units Date/Time    Lactic acid 2 Hours [[id number]]  (Normal) Collected: 07/16/21 0200    Lab Status: Final result Specimen: Blood from Arm, Left Updated: 07/16/21 0300     LACTIC ACID 0 9 mmol/L     Narrative:      Result may be elevated if tourniquet was used during collection  Urine Microscopic [[id number]]  (Abnormal) Collected: 07/16/21 0047    Lab Status: Final result Specimen: Urine, Straight Cath Updated: 07/16/21 0106     RBC, UA 0-1 /hpf      WBC, UA 10-20 /hpf      Epithelial Cells Occasional /hpf      Bacteria, UA Innumerable /hpf      OTHER OBSERVATIONS Renal Epithelial Cells Present    Urine culture [[id number]] Collected: 07/16/21 0047    Lab Status:  In process Specimen: Urine, Straight Cath Updated: 07/16/21 0106    Lactic acid [[id number]]  (Abnormal) Collected: 07/16/21 0000    Lab Status: Final result Specimen: Blood from Arm, Right Updated: 07/16/21 0100     LACTIC ACID 2 1 mmol/L     Narrative:      Result may be elevated if tourniquet was used during collection      UA w Reflex to Microscopic w Reflex to Culture [[id number]]  (Abnormal) Collected: 07/16/21 0047    Lab Status: Final result Specimen: Urine, Straight Cath Updated: 07/16/21 0057     Color, UA Yellow     Clarity, UA Slightly Cloudy     Specific Gravity, UA 1 025     pH, UA 6 5     Leukocytes, UA Trace     Nitrite, UA Positive     Protein, UA 30 (1+) mg/dl      Glucose, UA Negative mg/dl      Ketones, UA Negative mg/dl      Urobilinogen, UA 0 2 E U /dl      Bilirubin, UA Negative     Blood, UA Small    Troponin I [[id number]]  (Normal) Collected: 07/16/21 0000    Lab Status: Final result Specimen: Blood from Arm, Right Updated: 07/16/21 0034     Troponin I <0 02 ng/mL     Comprehensive metabolic panel [[id number]]  (Abnormal) Collected: 07/16/21 0000    Lab Status: Final result Specimen: Blood from Arm, Right Updated: 07/16/21 0028     Sodium 143 mmol/L      Potassium 3 4 mmol/L      Chloride 104 mmol/L      CO2 31 mmol/L      ANION GAP 8 mmol/L      BUN 11 mg/dL      Creatinine 1 20 mg/dL      Glucose 110 mg/dL      Calcium 9 2 mg/dL      AST 17 U/L      ALT 20 U/L      Alkaline Phosphatase 77 U/L      Total Protein 8 1 g/dL      Albumin 3 7 g/dL      Total Bilirubin 0 48 mg/dL      eGFR 89 ml/min/1 73sq m     Narrative:      [place] guidelines for Chronic Kidney Disease (CKD):     Stage 1 with normal or high GFR (GFR > 90 mL/min/1 73 square meters)    Stage 2 Mild CKD (GFR = 60-89 mL/min/1 73 square meters)    Stage 3A Moderate CKD (GFR = 45-59 mL/min/1 73 square meters)    Stage 3B Moderate CKD (GFR = 30-44 mL/min/1 73 square meters)    Stage 4 Severe CKD (GFR = 15-29 mL/min/1 73 square meters)    Stage 5 End Stage CKD (GFR <15 mL/min/1 73 square meters)  Note: GFR calculation is accurate only with a steady state creatinine    Protime-INR [[id number]]  (Normal) Collected: 07/16/21 0000    Lab Status: Final result Specimen: Blood from Arm, Left Updated: 07/16/21 0025     Protime 14 0 seconds      INR 1 07    APTT [[id number]]  (Normal) Collected: 07/16/21 0000    Lab Status: Final result Specimen: Blood from Arm, Left Updated: 07/16/21 0025     PTT 31 seconds     CBC and differential [[id number]]  (Abnormal) Collected: 07/16/21 0000    Lab Status: Final result Specimen: Blood from Arm, Right Updated: 07/16/21 0015     WBC 14 92 Thousand/uL      RBC 4 16 Million/uL      Hemoglobin 12 9 g/dL      Hematocrit 39 9 %      MCV 96 fL      MCH 31 0 pg      MCHC 32 3 g/dL      RDW 12 7 %      MPV 9 8 fL      Platelets 544 Thousands/uL      nRBC 0 /100 WBCs      Neutrophils Relative 83 %      Immat GRANS % 1 %      Lymphocytes Relative 8 %      Monocytes Relative 8 %      Eosinophils Relative 0 %      Basophils Relative 0 %      Neutrophils Absolute 12 37 Thousands/µL      Immature Grans Absolute 0 12 Thousand/uL      Lymphocytes Absolute 1 26 Thousands/µL      Monocytes Absolute 1 12 Thousand/µL      Eosinophils Absolute 0 01 Thousand/µL      Basophils Absolute 0 04 Thousands/µL     Blood culture #2 [[id number]] Collected: 07/16/21 0000    Lab Status: In process Specimen: Blood from Arm, Right Updated: 07/16/21 0010    Blood culture #1 [[id number]] Collected: 07/16/21 0000    Lab Status: In process Specimen: Blood from Arm, Left Updated: 07/16/21 0010                 XR chest 1 view portable   ED Interpretation by Matti Holbrook DO (07/16 0502)   No abnormalities, no focal consolidation  CT abdomen pelvis with contrast   Final Result by Diamante Lafleur MD (07/16 1002)      Right-sided hydrocele  Otherwise unremarkable CT of the abdomen and pelvis with IV without oral contrast       Workstation performed: [id number]         US scrotum and testicles   Final Result by Ann Farley MD (07/16 4193)       Bilateral testicles with symmetric Doppler flow  Mild asymmetric right epididymal hyperemia    Infectious/inflammatory etiology not excluded  Loculated complex right hydrocele  Small right epididymal head cyst       Workstation performed: [id number]               Procedures  Procedures      ED Course  ED Course as of Jul 16 0755   Fri Jul 16, 2021   0404 Urology consulted to discuss this case further                            Initial Sepsis Screening     [address] Name 07/16/21 0405                Is the patient's history suggestive of a new or worsening infection? (!) Yes (Proceed)  -CR        Suspected source of infection  urinary tract infection  -CR        Are two or more of the following signs & symptoms of infection both present and new to the patient? (!) Yes (Proceed)  -CR        Indicate SIRS criteria  Hyperthemia > 38 3C (100 9F); Tachycardia > 90 bpm;Leukocytosis (WBC > 28404 IJL)  -CR        If the answer is yes to both questions, suspicion of sepsis is present  --        If severe sepsis is present AND tissue hypoperfusion perists in the hour after fluid resuscitation or lactate > 4, the patient meets criteria for SEPTIC SHOCK  --        Are any of the following organ dysfunction criteria present within 6 hours of suspected infection and SIRS criteria that are NOT considered to be chronic conditions?   (!) Yes  -CR        Organ dysfunction  Lactate > 2 0 mmol/L  -CR        Date of presentation of severe sepsis  07/16/21  -CR        Time of presentation of severe sepsis  0100  -CR        Tissue hypoperfusion persists in the hour after crystalloid fluid administration, evidenced, by either:  --        Was hypotension present within one hour of the conclusion of crystalloid fluid administration?  --        Date of presentation of septic shock  --        Time of presentation of septic shock  --          User Key  (r) = Recorded By, (t) = Taken By, (c) = Cosigned By    [address] Name Provider Type    CR Miah Cavazos DO Resident           Default [facility] (last 720 hours)      Sepsis Reassess     Row Name 07/16/21 2258 Repeat Volume Status and Tissue Perfusion Assessment Performed    Repeat Volume Status and Tissue Perfusion Assessment Performed  Yes  -CR           Volume Status and Tissue Perfusion Post Fluid Resuscitation * Must Document All *    Vital Signs Reviewed (HR, RR, BP, T)  Yes Still tachycardic in the 150s  -CR        Shock Index Reviewed  Yes  -CR        Arterial Oxygen Saturation Reviewed (POx, SaO2 or SpO2)  Yes (comment %) 95%  -CR        Cardio  (!) Tachycardia  -CR        Pulmonary  Normal effort  -CR        Capillary Refill  Brisk  -CR        Peripheral Pulses  Radial;Dorsalis Pedis  -CR        Peripheral Pulse  +2  -CR        Dorsalis Pedis  +2  -CR        Skin  Normal  -CR        Urine output assessed  Other (comment) Patient unable to void on his own and requires a straight cath  -CR           *OR*   Intensive Monitoring- Must Document One of the Following Four *:    Vital Signs Reviewed  --        * Central Venous Pressure (CVP or RAP)  --        * Central Venous Oxygen (SVO2, ScvO2 or Oxygen saturation via central catheter)  --        * Bedside Cardiovascular US in IVC diameter and % collapse  --        * Passive Leg Raise OR Crystalloid Challenge  --          User Key  (r) = Recorded By, (t) = Taken By, (c) = Cosigned By    Initials Name Provider Type    LIBBY Reese DO Resident        SBIRT 22yo+      Most Recent Value   SBIRT (25 yo +)   In order to provide better care to our patients, we are screening all of our patients for alcohol and drug use  Would it be okay to ask you these screening questions?   Unable to answer at this time Filed at: 07/16/2021 5300                MDM  Number of Diagnoses or Management Options  Sepsis [facility]): new and requires workup     Amount and/or Complexity of Data Reviewed  Clinical lab tests: ordered and reviewed  Tests in the radiology section of CPT®: ordered and reviewed  Review and summarize past medical records: yes  Independent visualization of images, tracings, or specimens: yes    Risk of Complications, Morbidity, and/or Mortality  General comments: Patient is a 38-year-old male here today with severe right testicular pain that began when the patient awoke this morning  On initial vitals, patient was tachycardic and had a fever over 103  Sepsis protocol was initiated, an ultrasound of the testicle was ordered, and a CT scan of the abdomen and pelvis was ordered  Broad-spectrum antibiotics were started and 1 L of normal saline was initiated  Patient was given Tylenol for his fever  Patient was given over IV morphine, and later IV Dilaudid as his pain was 10 out 10 and very severe  Differential diagnosis included but was not limited to testicular torsion, orchitis, epididymitis, urinary tract infection, Dragan's gangrene  Initial lactic acid came back elevated at 2 1  A later decreased to 0 9  Urinalysis showed positive nitrates  CBC showed a elevated white blood cell count  Ultrasound of the testicle demonstrated complex loculated hydrocele, as well as orchitis versus epididymitis  There is no free air on the CT scan, low suspicion for Dragan's gangrene  Patient was having difficulty urinating on his own  Several straight caths were done  We discussed placing a Billingsley catheter several times to the patient, however he adamantly refused  Urology was consulted to inquire about potential surgical intervention for this patient  Urology acknowledge that surgical intervention might be necessary in this patient, they recommended that we place a Billingsley catheter and transfer him to [facility] where they may be able to intervene surgically if needed  After further discussion with the patient a Billingsley catheter was placed  Transfer order was placed and accepted for step-down 2 at [facility]      Patient Progress  Patient progress: stable      Disposition  Final diagnoses:   Orchitis and epididymitis   Sepsis [facility])     Time reflects when diagnosis was documented in both MDM as applicable and the Disposition within this note     Time User Action Codes Description Comment    7/16/2021  5:50 AM Miah Cao Add [N45 3] Orchitis and epididymitis     7/16/2021  5:51 AM Miah Cao Add [A41 9] Sepsis ([address] )     7/16/2021  5:51 AM Miah Cao Modify [N45 3] Orchitis and epididymitis     7/16/2021  5:51 AM Miah Cao Modify [A41 9] Sepsis [facility])       ED Disposition     ED Disposition Condition Date/Time Comment    Transfer to Another Facility-In Network  Fri Jul 16, 2021  5:52 AM Charli Waggoner should be transferred out to [facility] for Step down 2          MD Documentation      Most Recent Value   Patient Condition  The patient has been stabilized such that within reasonable medical probability, no material deterioration of the patient condition or the condition of the unborn child(jimmie) is likely to result from the transfer   Reason for Transfer  Level of Care needed not available at this facility   Benefits of Transfer  Specialized equipment and/or services available at the receiving facility (Include comment)________________________   Risks of Transfer  Potential for delay in receiving treatment, Potential deterioration of medical condition, Loss of IV, Increased discomfort during transfer, Possible worsening of condition or death during transfer   Accepting Physician  Arnulfo Hernandez   Sending MD Dr Aleda Libman   Provider Certification  General risk, such as traffic hazards, adverse weather conditions, rough terrain or turbulence, possible failure of equipment (including vehicle or aircraft), or consequences of actions of persons outside the control of the transport personnel, Unanticipated needs of medical equipment and personnel during transport, Risk of worsening condition, The possibility of a transport vehicle being unavailable      RN Documentation      Most Recent Value   Accepting Facility Name, [address]   [facility]      Follow-up Information    None         Patient's Medications   Discharge Prescriptions    No medications on file     No discharge procedures on file  PDMP Review       Value Time User    PDMP Reviewed  Yes 3/16/2021  5:57 PM Alyssa Edouard PA-C           ED Provider  Attending physically available and evaluated [facility] MARIAH PRABHAKAR managed the patient along with the ED Attending      Electronically Signed by         Michelle Munoz,   07/16/21 32 Savannah Simon, DO  07/16/21 32 Savannah Simon, DO  07/16/21 3589

## 2021-07-16 NOTE — EMTALA/ACUTE CARE TRANSFER
[address] [place] [address]  Dept: [phone]      EMTALA TRANSFER CONSENT    NAME Teodora Barreto                                         1985                              MRN [id number]    I have been informed of my rights regarding examination, treatment, and transfer   by Dr Rody Fernandez MD    Benefits: Specialized equipment and/or services available at the receiving facility (Include comment)________________________    Risks: Potential for delay in receiving treatment, Potential deterioration of medical condition, Loss of IV, Increased discomfort during transfer, Possible worsening of condition or death during transfer      Transfer Request   I acknowledge that my medical condition has been evaluated and explained to me by the emergency department physician or other qualified medical person and/or my attending physician who has recommended and offered to me further medical examination and treatment  I understand the Hospital's obligation with respect to the treatment and stabilization of my emergency medical condition  I nevertheless request to be transferred  I release the Hospital, the doctor, and any other persons caring for me from all responsibility or liability for any injury or ill effects that may result from my transfer and agree to accept all responsibility for the consequences of my choice to transfer, rather than receive stabilizing treatment at the Hospital  I understand that because the transfer is my request, my insurance may not provide reimbursement for the services  The Hospital will assist and direct me and my family in how to make arrangements for transfer, but the hospital is not liable for any fees charged by the transport service    In spite of this understanding, I refuse to consent to further medical examination and treatment which has been offered to me, and request transfer to [address] Name, City & State : [facility]  I authorize the performance of emergency medical procedures and treatments upon me in both transit and upon arrival at the receiving facility  Additionally, I authorize the release of any and all medical records to the receiving facility and request they be transported with me, if possible  I authorize the performance of emergency medical procedures and treatments upon me in both transit and upon arrival at the receiving facility  Additionally, I authorize the release of any and all medical records to the receiving facility and request they be transported with me, if possible  I understand that the safest mode of transportation during a medical emergency is an ambulance and that the Hospital advocates the use of this mode of transport  Risks of traveling to the receiving facility by car, including absence of medical control, life sustaining equipment, such as oxygen, and medical personnel has been explained to me and I fully understand them  (ILANA CORRECT BOX BELOW)  [  ]  I consent to the stated transfer and to be transported by ambulance/helicopter  [  ]  I consent to the stated transfer, but refuse transportation by ambulance and accept full responsibility for my transportation by car  I understand the risks of non-ambulance transfers and I exonerate the Hospital and its staff from any deterioration in my condition that results from this refusal     X___________________________________________    DATE  21  TIME________  Signature of patient or legally responsible individual signing on patient behalf           RELATIONSHIP TO PATIENT_________________________          Provider Certification    NAME Luis A Gomez                                        SAMIR 1985                              MRN [id number]    A medical screening exam was performed on the above named patient    Based on the examination:    Condition Necessitating Transfer The primary encounter diagnosis was Sepsis ([facility] [address] )  A diagnosis of Orchitis and epididymitis was also pertinent to this visit  Patient Condition: The patient has been stabilized such that within reasonable medical probability, no material deterioration of the patient condition or the condition of the unborn child(jimmie) is likely to result from the transfer    Reason for Transfer: Level of Care needed not available at this facility    Transfer Requirements: Facility [facility]   · Space available and qualified personnel available for treatment as acknowledged by    · Agreed to accept transfer and to provide appropriate medical treatment as acknowledged by       Dr Ludin Weinberg  · Appropriate medical records of the examination and treatment of the patient are provided at the time of transfer   [address] [facility], [address] _______  · Transfer will be performed by qualified personnel from    and appropriate transfer equipment as required, including the use of necessary and appropriate life support measures      Provider Certification: I have examined the patient and explained the following risks and benefits of being transferred/refusing transfer to the patient/family:  General risk, such as traffic hazards, adverse weather conditions, rough terrain or turbulence, possible failure of equipment (including vehicle or aircraft), or consequences of actions of persons outside the control of the transport personnel, Unanticipated needs of medical equipment and personnel during transport, Risk of worsening condition, The possibility of a transport vehicle being unavailable      Based on these reasonable risks and benefits to the patient and/or the unborn child(jimmie), and based upon the information available at the time of the patients examination, I certify that the medical benefits reasonably to be expected from the provision of appropriate medical treatments at another medical facility outweigh the increasing risks, if any, to the individuals medical condition, and in the case of labor to the unborn child, from effecting the transfer      X____________________________________________ DATE 07/16/21        TIME_______      ORIGINAL - SEND TO MEDICAL RECORDS   COPY - SEND WITH PATIENT DURING TRANSFER

## 2021-07-16 NOTE — ED NOTES
Pt unable to sign consent for transfer due to chronic medical condition, gave verbal consent to provider and this KAYLA Soto, KAYLA  07/16/21 7859

## 2021-07-16 NOTE — CONSULTS
[address] NOTE   Admission Date: 7/16/2021    Patient Identifiers: Tio Mulligan (MRN: [id number])  Service Requesting Consultation: Mariah Shaikh MD  Service Providing Consultation:  Urology, Juana Lane PA-C  Consults  Date of Service: 7/16/2021    Reason for Consultation:  Epididymal orchitis    History of Present Illness:     Tio Mulligan is a 38 y o  male with history of spinal cord injury from a fall in March of 2021 presented to the [facility] emergency room with bilateral scrotal pain which started last night  The pain was on a scale of 8/10 and was worse with movement  WBC  14 92  Creatinine 1 20  T-max was 103 7°  Scrotal ultrasound showed asymmetric epididymal hyperemia with a reactive hydrocele on the right  CT showed no other acute findings  Billingsley catheter was placed and the urine is clear  Bladder did appear distended on the CT scan  He denies any voiding problems prior to hospitalization      Past Medical, Past Surgical History:     Past Medical History:   Diagnosis Date    Chronic depression     PTSD (post-traumatic stress disorder)    :    Past Surgical History:   Procedure Laterality Date    CERVICAL FUSION N/A 3/6/2021    Procedure: POSTERIOR C3-5 laminectomy, C2-7 fixation fusion, possible additional levels;  Surgeon: Jose Miguel Esqueda MD;  [facility]: [facility];  Service: Neurosurgery    IR PICC PLACEMENT DOUBLE LUMEN  3/8/2021   :    Medications, Allergies:     Current Facility-Administered Medications:     acetaminophen (TYLENOL) tablet 650 mg, 650 mg, Oral, Q6H PRN, Mariah Shaikh MD, 650 mg at 07/16/21 1423    acetaminophen (TYLENOL) tablet 975 mg, 975 mg, Oral, Once, Mariah Shaikh MD    ampicillin-sulbactam (UNASYN) 3 g in sodium chloride 0 9 % 100 mL IVPB, 3 g, Intravenous, Q6H, Sarah Gee MD, Last Rate: 200 mL/hr at 07/16/21 1107, 3 g at 07/16/21 1107    enoxaparin (LOVENOX) subcutaneous injection 40 mg, 40 mg, Subcutaneous, Daily, Partha Torres MD, 40 mg at 07/16/21 1039    HYDROmorphone (DILAUDID) injection 0 5 mg, 0 5 mg, Intravenous, Q4H PRN, Partha Torres MD, 0 5 mg at 07/16/21 1337    HYDROmorphone (DILAUDID) tablet 2 mg, 2 mg, Oral, Q3H PRN, Partha Torres MD, 2 mg at 07/16/21 1426    sodium chloride 0 9 % infusion, 125 mL/hr, Intravenous, Continuous, Partha Torres MD, Last Rate: 125 mL/hr at 07/16/21 1038, 125 mL/hr at 07/16/21 1038    Current Outpatient Medications:     acetaminophen (TYLENOL) 500 mg tablet, Take 1,000 mg by mouth every 6 (six) hours as needed for mild pain, Disp: , Rfl:     baclofen 10 mg tablet, Take 5 mg by mouth 3 (three) times a day, Disp: , Rfl:     bisacodyl (DULCOLAX) 10 mg suppository, Insert 1 suppository (10 mg total) into the rectum daily, Disp: 12 suppository, Rfl: 0    Calcium Carbonate Antacid (CALCIUM CARBONATE PO), Take by mouth, Disp: , Rfl:     celecoxib (CeleBREX) 100 mg capsule, Take 100 mg by mouth 2 (two) times a day, Disp: , Rfl:     gabapentin (NEURONTIN) 300 mg capsule, Take 2 capsules (600 mg total) by mouth 3 (three) times a day, Disp: 30 capsule, Rfl: 0    HYDROmorphone (DILAUDID) 2 mg tablet, Take 2 mg by mouth every 3 (three) hours as needed for moderate pain, Disp: , Rfl:     melatonin 3 mg, Take 1 tablet (3 mg total) by mouth daily at bedtime, Disp: 10 tablet, Rfl: 0    nortriptyline (PAMELOR) 10 mg capsule, Take 10 mg by mouth daily at bedtime, Disp: , Rfl:     senna-docusate sodium (SENOKOT S) 8 6-50 mg per tablet, Take 1 tablet by mouth 2 (two) times a day, Disp: 10 tablet, Rfl: 0    tamsulosin (FLOMAX) 0 4 mg, Take 1 capsule (0 4 mg total) by mouth daily with dinner, Disp: 10 capsule, Rfl: 0    Allergies:  No Known Allergies:    Social and Family History:   Social History:   Social History     Tobacco Use    Smoking status: Former Smoker     Packs/day: 0 25     Years: 10 00     Pack years: 2 50 Types: Cigarettes    Smokeless tobacco: Never Used   Vaping Use    Vaping Use: Never used   Substance Use Topics    Alcohol use: Not Currently     Comment: none    Drug use: Never     Comment: none     Social History     Tobacco Use   Smoking Status Former Smoker    Packs/day: 0 25    Years: 10 00    Pack years: 2 50    Types: Cigarettes   Smokeless Tobacco Never Used       Family History:  Family History   Problem Relation Age of Onset    No Known Problems Mother     No Known Problems Father    :     Review of Systems:     General: Fever, chills, or night sweats: negative  Cardiac: Negative for chest pain  Pulmonary: Negative for shortness of breath  Gastrointestinal: Abdominal pain negative  Nausea, vomiting, or diarrhea negative,  Genitourinary: See HPI above  Patient does not have hematuria  All other systems queried were negative  Physical Exam:   General: Patient is pleasant and in NAD  Awake and alert  /60 (BP Location: Right arm)   Pulse (!) 138   Temp (!) 101 5 °F (38 6 °C) (Oral)   Resp 18   SpO2 97%   HEENT:  Conjunctiva are clear  Constitutional:  pleasant and cooperative     no apparent distress  Cardiac: Peripheral edema: negative  Pulmonary: Non-labored breathing  Abdomen: Soft, non-tender, non-distended  No surgical scars  No masses, tenderness, hernias noted  Genitourinary: Negative CVA tenderness, negative suprapubic tenderness  Extremities:  Wrist braces bilaterally on the upper extremities  Neurological:  Bilateral leg weakness bilateral hands in arms numbness and tingling  Psychiatric:mood affect and behavior normal    (Male): Penis uncircumcised, phallus normal, meatus patent    Testicles descended into scrotum bilaterally left testes swollen indurated and tender greater than right there is some right-sided fluid    NUÑEZ: in place draining clear yellow urine  no clots and       Labs:     Lab Results   Component Value Date    HGB 12 9 07/16/2021    HCT 39 9 07/16/2021    WBC 14 92 (H) 07/16/2021     07/16/2021   ]    Lab Results   Component Value Date    K 3 4 (L) 07/16/2021     07/16/2021    CO2 31 07/16/2021    BUN 11 07/16/2021    CREATININE 1 20 07/16/2021    CALCIUM 9 2 07/16/2021    GLUCOSE 87 03/06/2021   ]    Imaging:   I personally reviewed the images and report of the following studies, and reviewed them with the patient:  SCROTAL ULTRASOUND     IMPRESSION:     Bilateral testicles with symmetric Doppler flow  Mild asymmetric right epididymal hyperemia  Infectious/inflammatory etiology not excluded  Loculated complex right hydrocele  Small right epididymal head cyst        ASSESSMENT:     1  Bilateral epididymal orchitis  2  Severe cervical cord injury status post PDCF-in March of 2021  3  Probable urinary tract infection     PLAN:   -medical management with IV fluids broad-spectrum antibiotics awaiting cultures  -maintain Billingsley catheter for now  -voiding trial prior to discharge  -I suspect he may have incomplete emptying which may have contributed to the situation  -no urologic intervention anticipated  -will continue to follow    Thank you for allowing me to participate in this patients care  Please do not hesitate to call with any additional questions    Gilson Larry PA-C

## 2021-07-16 NOTE — ED NOTES
Pt reported he had chest pain, EKG was done & provider was made aware       Eliel Kiser RN  07/16/21 1823

## 2021-07-16 NOTE — PLAN OF CARE
Problem: Potential for Falls  Goal: Patient will remain free of falls  Description: INTERVENTIONS:  - Educate patient/family on patient safety including physical limitations  - Instruct patient to call for assistance with activity   - Consult OT/PT to assist with strengthening/mobility   - Keep Call bell within reach  - Keep bed low and locked with side rails adjusted as appropriate  - Keep care items and personal belongings within reach  - Initiate and maintain comfort rounds  - Make Fall Risk Sign visible to staff  - Offer Toileting every 2 Hours, in advance of need  - Initiate/Maintain bed alarm  - Obtain necessary fall risk management equipment: yellow non slip socks  - Apply yellow socks and bracelet for high fall risk patients  - Consider moving patient to room near nurses station  Outcome: Progressing     Problem: Prexisting or High Potential for Compromised Skin Integrity  Goal: Skin integrity is maintained or improved  Description: INTERVENTIONS:  - Identify patients at risk for skin breakdown  - Assess and monitor skin integrity  - Assess and monitor nutrition and hydration status  - Monitor labs   - Assess for incontinence   - Turn and reposition patient  - Assist with mobility/ambulation  - Relieve pressure over bony prominences  - Avoid friction and shearing  - Provide appropriate hygiene as needed including keeping skin clean and dry  - Evaluate need for skin moisturizer/barrier cream  - Collaborate with interdisciplinary team   - Patient/family teaching  - Consider wound care consult   Outcome: Progressing     Problem: MOBILITY - ADULT  Goal: Maintain or return to baseline ADL function  Description: INTERVENTIONS:  -  Assess patient's ability to carry out ADLs; assess patient's baseline for ADL function and identify physical deficits which impact ability to perform ADLs (bathing, care of mouth/teeth, toileting, grooming, dressing, etc )  - Assess/evaluate cause of self-care deficits   - Assess range of motion  - Assess patient's mobility; develop plan if impaired  - Assess patient's need for assistive devices and provide as appropriate  - Encourage maximum independence but intervene and supervise when necessary  - Involve family in performance of ADLs  - Assess for home care needs following discharge   - Consider OT consult to assist with ADL evaluation and planning for discharge  - Provide patient education as appropriate  Outcome: Progressing  Goal: Maintains/Returns to pre admission functional level  Description: INTERVENTIONS:  - Perform BMAT or MOVE assessment daily    - Set and communicate daily mobility goal to care team and patient/family/caregiver  - Collaborate with rehabilitation services on mobility goals if consulted  - Perform Range of Motion 3 times a day  - Reposition patient every 2 hours    - Dangle patient 3 times a day  - Stand patient 3 times a day  - Ambulate patient 3 times a day  - Out of bed to chair 3 times a day   - Out of bed for meals 3 times a day  - Out of bed for toileting  - Record patient progress and toleration of activity level   Outcome: Progressing

## 2021-07-16 NOTE — ED NOTES
Pt refused jimenez placement, willing to allow straight cath        Renetta Yeboah RN  07/16/21 3432

## 2021-07-16 NOTE — ED NOTES
Attempted to place ice packs in multiple places in effort to bring temperature down  Pt denied all ice packs & states he didn't want them because he is already cold  This RN educated pt on current high temperature and the importance to try alternative measures  Pt agreed to only 1 ice pack  Ice pack was applied behind the neck        Sussy Islas RN  07/16/21 3176

## 2021-07-16 NOTE — ED NOTES
information:    time at 0830 by [facility]  Accepting provider Dr Yoli Tamayo   Number for report: [phone]     [facility]  07/16/21 2902

## 2021-07-16 NOTE — ED NOTES
Provider in room with pt, with pt again refusing jimenez cath, only will to be straight cath        Chele Bender, KAYLA  07/16/21 6337

## 2021-07-16 NOTE — NURSING NOTE
Patient temperature 102 8 at this time, tachycardic in 120s consistently  Patient not due for another dose of tylenol until 2000  Patient refusing alterative treatments such as ice packs as this time  Patient educated about the importance of these  Dr Casimiro Sims with [facility] notified at this time  Patients other vitals signs stable, PRN pain medication given

## 2021-07-16 NOTE — H&P
[address] [facility]  H&P- Jp Key 1985, 38 y o  male MRN: [id number]  Unit/Bed#: ED 14 Encounter: [id number]  Primary Care Provider: Kanu Raphael MD   Date and time admitted to hospital: 7/16/2021  9:07 AM    Hydrocele, infected  Assessment & Plan  Suspected infected hydrocele  C/s urology - empiric iv unasyn   Supportive care    * Sepsis [facility])  Assessment & Plan  Present on admission  Source most likely UTI vs infected hydrocele  Meets criteria for sepsis - tachycardia, leukocytosis, lactic acidosis at ER at outside hospital  Plan - admit to stepdown for close monitoring, iv unasyn   Follow blood cultures   Follow urine cultures   Aggressive iv hydration   Repeat lactate normal   Supportive care - tylenol prn  C/s urology for ? Infected hydrocele    Testicular discomfort  Assessment & Plan  Suspect Due to infected hydrocele - plan as above    Central cord syndrome [facility])  Assessment & Plan  Resume his home meds once correct list available    VTE Pharmacologic Prophylaxis: VTE Score: 5 Moderate Risk (Score 3-4) - Pharmacological DVT Prophylaxis Ordered: enoxaparin (Lovenox)  Code Status: Level 1 - Full Code full  Discussion with family: patient  Anticipated Length of Stay: Patient will be admitted on an inpatient basis with an anticipated length of stay of greater than 2 midnights secondary to sepsis  Total Time for Visit, including Counseling / Coordination of Care: 45 minutes Greater than 50% of this total time spent on direct patient counseling and coordination of care  Chief Complaint: testicular pain -bilateral    History of Present Illness:  Jp Key is a 38 y o  male with a PMH of spinal cord injury after a fall in March 2021 who presented to [facility] ER with c/o bilateral scrotal pain that started last night  Pain is severe, 8/10 continuous, worse with any touch or movement  Denies any urinary symptoms  He does c/o fever and chills    No chest pain or dyspnea  In ER at [facility], his labs showed leukocytosis, lactic acidosis  USG of testes showed Mild asymmetric right epididymal hyperemia  Infectious/inflammatory etiology not excluded and loculated complex right hydrocele  CT abdomen showed right hydrocele but no other acute findings  He was sent here for urology evaluation  During my assessment, he had fever, and was complaining of severe scrotal pain  Review of Systems:  Review of Systems   Constitutional: Positive for fever  HENT: Negative  Eyes: Negative  Respiratory: Negative  Cardiovascular: Negative  Gastrointestinal: Negative  Genitourinary: Positive for testicular pain  Neurological: Negative  Hematological: Negative  Psychiatric/Behavioral: Negative  Past Medical and Surgical History:   Past Medical History:   Diagnosis Date    Chronic depression     PTSD (post-traumatic stress disorder)        Past Surgical History:   Procedure Laterality Date    CERVICAL FUSION N/A 3/6/2021    Procedure: POSTERIOR C3-5 laminectomy, C2-7 fixation fusion, possible additional levels;  Surgeon: Sal Rice MD;  [facility];  Service: Neurosurgery    IR PICC PLACEMENT DOUBLE LUMEN  3/8/2021       Meds/Allergies:  Prior to Admission medications    Medication Sig Start Date End Date Taking?  Authorizing Provider   acetaminophen (TYLENOL) 500 mg tablet Take 1,000 mg by mouth every 6 (six) hours as needed for mild pain    Historical Provider, MD   baclofen 10 mg tablet Take 5 mg by mouth 3 (three) times a day    Historical Provider, MD   bisacodyl (DULCOLAX) 10 mg suppository Insert 1 suppository (10 mg total) into the rectum daily 3/17/21   Ashly Infante PA-C   Calcium Carbonate Antacid (CALCIUM CARBONATE PO) Take by mouth    Historical Provider, MD   celecoxib (CeleBREX) 100 mg capsule Take 100 mg by mouth 2 (two) times a day    Historical Provider, MD   gabapentin (NEURONTIN) 300 mg capsule Take 2 capsules (600 mg total) by mouth 3 (three) times a day 3/16/21   Wayne Infante PA-C   HYDROmorphone (DILAUDID) 2 mg tablet Take 2 mg by mouth every 3 (three) hours as needed for moderate pain    Historical Provider, MD   melatonin 3 mg Take 1 tablet (3 mg total) by mouth daily at bedtime 3/16/21   Wayne Infante PA-C   nortriptyline (PAMELOR) 10 mg capsule Take 10 mg by mouth daily at bedtime    Historical Provider, MD   senna-docusate sodium (SENOKOT S) 8 6-50 mg per tablet Take 1 tablet by mouth 2 (two) times a day 3/16/21   Wayne Infante PA-C   tamsulosin (FLOMAX) 0 4 mg Take 1 capsule (0 4 mg total) by mouth daily with dinner 3/16/21   Sumi Walsh PA-C         Allergies: No Known Allergies    Social History:  Marital Status: Single   Occupation:   Patient Pre-hospital Living Situation: Home, Assisted Living  Patient Pre-hospital Level of Mobility: walks  Patient Pre-hospital Diet Restrictions: none  Substance Use History:   Social History     Substance and Sexual Activity   Alcohol Use Not Currently    Comment: none     Social History     Tobacco Use   Smoking Status Former Smoker    Packs/day: 0 25    Years: 10 00    Pack years: 2 50    Types: Cigarettes   Smokeless Tobacco Never Used     Social History     Substance and Sexual Activity   Drug Use Never    Comment: none       Family History:  no known heart disease    Physical Exam:     Vitals:   Blood Pressure: 111/55 (07/16/21 0919)  Pulse: (!) 137 (07/16/21 0919)  Temperature: (!) 101 5 °F (38 6 °C) (07/16/21 0919)  Temp Source: Oral (07/16/21 0919)  Respirations: 16 (07/16/21 0919)  SpO2: 97 % (07/16/21 0919)    Physical Exam  Vitals and nursing note reviewed  Constitutional:       Appearance: Normal appearance  He is well-developed  HENT:      Head: Normocephalic and atraumatic  Mouth/Throat:      Mouth: Mucous membranes are moist    Eyes:      Extraocular Movements: Extraocular movements intact  Conjunctiva/sclera: Conjunctivae normal       Pupils: Pupils are equal, round, and reactive to light  Cardiovascular:      Rate and Rhythm: Regular rhythm  Tachycardia present  Heart sounds: No gallop  Pulmonary:      Effort: Pulmonary effort is normal  No respiratory distress  Breath sounds: Normal breath sounds  Abdominal:      General: Abdomen is flat  Bowel sounds are normal       Palpations: Abdomen is soft  Tenderness: There is no abdominal tenderness  Genitourinary:     Comments: Bilateral testicular tenderness - swollen bilaterally, redness noted  Musculoskeletal:      Cervical back: Neck supple  Skin:     General: Skin is warm and dry  Neurological:      Mental Status: He is alert  Additional Data:     Lab Results:  Results from last 7 days   Lab Units 07/16/21  0000   WBC Thousand/uL 14 92*   HEMOGLOBIN g/dL 12 9   HEMATOCRIT % 39 9   PLATELETS Thousands/uL 360   NEUTROS PCT % 83*   LYMPHS PCT % 8*   MONOS PCT % 8   EOS PCT % 0     Results from last 7 days   Lab Units 07/16/21  0000   SODIUM mmol/L 143   POTASSIUM mmol/L 3 4*   CHLORIDE mmol/L 104   CO2 mmol/L 31   BUN mg/dL 11   CREATININE mg/dL 1 20   ANION GAP mmol/L 8   CALCIUM mg/dL 9 2   ALBUMIN g/dL 3 7   TOTAL BILIRUBIN mg/dL 0 48   ALK PHOS U/L 77   ALT U/L 20   AST U/L 17   GLUCOSE RANDOM mg/dL 110     Results from last 7 days   Lab Units 07/16/21  0000   INR  1 07             Results from last 7 days   Lab Units 07/16/21  0200 07/16/21  0000   LACTIC ACID mmol/L 0 9 2 1*       Imaging: Reviewed radiology reports from this admission including: testes usg  No orders to display       EKG and Other Studies Reviewed on Admission:   · EKG:     ** Please Note: This note has been constructed using a voice recognition system   **

## 2021-07-16 NOTE — SEPSIS NOTE
Sepsis Note   Elaine Paul 38 y o  male MRN: [id number]  Unit/Bed#: ED 12 Encounter: [id number]      qSOFA     Row Name 07/16/21 0400 07/16/21 0324 07/16/21 0230 07/16/21 0146 07/16/21 0130    Altered mental status GCS < 15  --  --  --  --  --    Respiratory Rate > / =22  0  1  0  0  0    Systolic BP < / =499  0  0  0  0  0    Q Sofa Score  0  1  0  0  0    Row Name 07/16/21 0115 07/16/21 0105 07/16/21 0023 07/16/21 0015 07/15/21 2332    Altered mental status GCS < 15  --  --  0  --  --    Respiratory Rate > / =22  0  0  --  0  0    Systolic BP < / =095  0  0  --  0  0    Q Sofa Score  0  0  0  0  0        Initial Sepsis Screening     Row Name 07/16/21 0405                Is the patient's history suggestive of a new or worsening infection? (!) Yes (Proceed)  -CR        Suspected source of infection  urinary tract infection  -CR        Are two or more of the following signs & symptoms of infection both present and new to the patient? (!) Yes (Proceed)  -CR        Indicate SIRS criteria  Hyperthemia > 38 3C (100 9F); Tachycardia > 90 bpm;Leukocytosis (WBC > 83[address] IJL)  -CR        If the answer is yes to both questions, suspicion of sepsis is present  --        If severe sepsis is present AND tissue hypoperfusion perists in the hour after fluid resuscitation or lactate > 4, the patient meets criteria for SEPTIC SHOCK  --        Are any of the following organ dysfunction criteria present within 6 hours of suspected infection and SIRS criteria that are NOT considered to be chronic conditions?   (!) Yes  -CR        Organ dysfunction  Lactate > 2 0 mmol/L  -CR        Date of presentation of severe sepsis  07/16/21  -CR        Time of presentation of severe sepsis  0100  -CR        Tissue hypoperfusion persists in the hour after crystalloid fluid administration, evidenced, by either:  --        Was hypotension present within one hour of the conclusion of crystalloid fluid administration?  --        Date of presentation of septic shock  --        Time of presentation of septic shock  --          User Key  (r) = Recorded By, (t) = Taken By, (c) = Cosigned By    [address] Name Provider Type    LIBBY Maldonado DO Resident

## 2021-07-16 NOTE — ED NOTES
Received report from Andrea Eden RN and assumed care of patient at this time        Shadi Pink RN  07/16/21 3339

## 2021-07-16 NOTE — ASSESSMENT & PLAN NOTE
Present on admission  Source most likely UTI vs infected hydrocele  Meets criteria for sepsis - tachycardia, leukocytosis, lactic acidosis at ER at outside hospital  Plan - admit to stepdown for close monitoring, iv unasyn   Follow blood cultures   Follow urine cultures   Aggressive iv hydration   Repeat lactate normal   Supportive care - tylenol prn  C/s urology for ?  Infected hydrocele

## 2021-07-16 NOTE — NURSING NOTE
Per Abdiel Schmidt with [facility], provide patient with ice packs and fluid bolus given at this time  Will continue to monitor temperatures

## 2021-07-16 NOTE — ED NOTES
Messaged [facility] provider Shaila on pt arrival     Vicente Betts, [address]  07/16/21 4137

## 2021-07-17 ENCOUNTER — APPOINTMENT (INPATIENT)
Dept: RADIOLOGY | Facility: HOSPITAL | Age: 36
DRG: 483 | End: 2021-07-17
Payer: COMMERCIAL

## 2021-07-17 PROBLEM — I10 BENIGN ESSENTIAL HTN: Status: ACTIVE | Noted: 2021-07-17

## 2021-07-17 PROBLEM — E78.5 HLD (HYPERLIPIDEMIA): Status: ACTIVE | Noted: 2021-07-17

## 2021-07-17 PROBLEM — E87.20 LACTIC ACIDOSIS: Status: ACTIVE | Noted: 2021-07-17

## 2021-07-17 PROBLEM — N45.3 ORCHITIS AND EPIDIDYMITIS: Status: ACTIVE | Noted: 2021-07-16

## 2021-07-17 PROBLEM — E87.2 LACTIC ACIDOSIS: Status: ACTIVE | Noted: 2021-07-17

## 2021-07-17 PROBLEM — E87.6 HYPOKALEMIA: Status: ACTIVE | Noted: 2021-07-17

## 2021-07-17 PROBLEM — M54.50 ACUTE LOW BACK PAIN: Status: ACTIVE | Noted: 2021-07-17

## 2021-07-17 LAB
ALBUMIN SERPL BCP-MCNC: 2.4 G/DL (ref 3.5–5)
ANION GAP SERPL CALCULATED.3IONS-SCNC: 6 MMOL/L (ref 4–13)
BUN SERPL-MCNC: 8 MG/DL (ref 5–25)
CALCIUM ALBUM COR SERPL-MCNC: 9.4 MG/DL (ref 8.3–10.1)
CALCIUM SERPL-MCNC: 8.1 MG/DL (ref 8.3–10.1)
CHLORIDE SERPL-SCNC: 105 MMOL/L (ref 100–108)
CO2 SERPL-SCNC: 25 MMOL/L (ref 21–32)
CREAT SERPL-MCNC: 0.91 MG/DL (ref 0.6–1.3)
ERYTHROCYTE [DISTWIDTH] IN BLOOD BY AUTOMATED COUNT: 12.7 % (ref 11.6–15.1)
GFR SERPL CREATININE-BSD FRML MDRD: 124 ML/MIN/1.73SQ M
GLUCOSE SERPL-MCNC: 94 MG/DL (ref 65–140)
HCT VFR BLD AUTO: 33 % (ref 36.5–49.3)
HGB BLD-MCNC: 10.7 G/DL (ref 12–17)
MCH RBC QN AUTO: 31.1 PG (ref 26.8–34.3)
MCHC RBC AUTO-ENTMCNC: 32.4 G/DL (ref 31.4–37.4)
MCV RBC AUTO: 96 FL (ref 82–98)
PHOSPHATE SERPL-MCNC: 2.1 MG/DL (ref 2.7–4.5)
PLATELET # BLD AUTO: 282 THOUSANDS/UL (ref 149–390)
PMV BLD AUTO: 10.2 FL (ref 8.9–12.7)
POTASSIUM SERPL-SCNC: 3 MMOL/L (ref 3.5–5.3)
PROCALCITONIN SERPL-MCNC: 4.82 NG/ML
RBC # BLD AUTO: 3.44 MILLION/UL (ref 3.88–5.62)
SODIUM SERPL-SCNC: 136 MMOL/L (ref 136–145)
WBC # BLD AUTO: 26.89 THOUSAND/UL (ref 4.31–10.16)

## 2021-07-17 PROCEDURE — 80069 RENAL FUNCTION PANEL: CPT | Performed by: INTERNAL MEDICINE

## 2021-07-17 PROCEDURE — 99233 SBSQ HOSP IP/OBS HIGH 50: CPT | Performed by: NURSE PRACTITIONER

## 2021-07-17 PROCEDURE — 99232 SBSQ HOSP IP/OBS MODERATE 35: CPT | Performed by: UROLOGY

## 2021-07-17 PROCEDURE — 84145 PROCALCITONIN (PCT): CPT | Performed by: NURSE PRACTITIONER

## 2021-07-17 PROCEDURE — 72100 X-RAY EXAM L-S SPINE 2/3 VWS: CPT

## 2021-07-17 PROCEDURE — 85027 COMPLETE CBC AUTOMATED: CPT | Performed by: INTERNAL MEDICINE

## 2021-07-17 RX ORDER — CELECOXIB 100 MG/1
100 CAPSULE ORAL 2 TIMES DAILY
Status: DISCONTINUED | OUTPATIENT
Start: 2021-07-17 | End: 2021-07-23 | Stop reason: HOSPADM

## 2021-07-17 RX ORDER — ATORVASTATIN CALCIUM 40 MG/1
40 TABLET, FILM COATED ORAL
Status: DISCONTINUED | OUTPATIENT
Start: 2021-07-17 | End: 2021-07-23 | Stop reason: HOSPADM

## 2021-07-17 RX ORDER — ACETAMINOPHEN 325 MG/1
975 TABLET ORAL EVERY 8 HOURS PRN
Status: DISCONTINUED | OUTPATIENT
Start: 2021-07-17 | End: 2021-07-19

## 2021-07-17 RX ORDER — BACLOFEN 10 MG/1
15 TABLET ORAL 3 TIMES DAILY
Status: DISCONTINUED | OUTPATIENT
Start: 2021-07-17 | End: 2021-07-23 | Stop reason: HOSPADM

## 2021-07-17 RX ORDER — SENNOSIDES 8.6 MG
2 TABLET ORAL
Status: DISCONTINUED | OUTPATIENT
Start: 2021-07-17 | End: 2021-07-23 | Stop reason: HOSPADM

## 2021-07-17 RX ORDER — HYDROMORPHONE HCL/PF 1 MG/ML
1 SYRINGE (ML) INJECTION
Status: DISCONTINUED | OUTPATIENT
Start: 2021-07-17 | End: 2021-07-17

## 2021-07-17 RX ORDER — POTASSIUM CHLORIDE 20 MEQ/1
40 TABLET, EXTENDED RELEASE ORAL 2 TIMES DAILY
Status: COMPLETED | OUTPATIENT
Start: 2021-07-17 | End: 2021-07-17

## 2021-07-17 RX ORDER — HYDROMORPHONE HCL/PF 1 MG/ML
0.5 SYRINGE (ML) INJECTION ONCE
Status: COMPLETED | OUTPATIENT
Start: 2021-07-17 | End: 2021-07-17

## 2021-07-17 RX ORDER — HYDROMORPHONE HCL/PF 1 MG/ML
1 SYRINGE (ML) INJECTION
Status: DISCONTINUED | OUTPATIENT
Start: 2021-07-17 | End: 2021-07-20

## 2021-07-17 RX ORDER — NORTRIPTYLINE HYDROCHLORIDE 10 MG/1
10 CAPSULE ORAL DAILY
Status: DISCONTINUED | OUTPATIENT
Start: 2021-07-17 | End: 2021-07-23 | Stop reason: HOSPADM

## 2021-07-17 RX ORDER — GABAPENTIN 300 MG/1
600 CAPSULE ORAL 3 TIMES DAILY
Status: DISCONTINUED | OUTPATIENT
Start: 2021-07-17 | End: 2021-07-23 | Stop reason: HOSPADM

## 2021-07-17 RX ORDER — TAMSULOSIN HYDROCHLORIDE 0.4 MG/1
0.4 CAPSULE ORAL
Status: DISCONTINUED | OUTPATIENT
Start: 2021-07-17 | End: 2021-07-23 | Stop reason: HOSPADM

## 2021-07-17 RX ORDER — LANOLIN ALCOHOL/MO/W.PET/CERES
3 CREAM (GRAM) TOPICAL
Status: DISCONTINUED | OUTPATIENT
Start: 2021-07-17 | End: 2021-07-23 | Stop reason: HOSPADM

## 2021-07-17 RX ADMIN — HYDROMORPHONE HYDROCHLORIDE 2 MG: 2 TABLET ORAL at 17:13

## 2021-07-17 RX ADMIN — HYDROMORPHONE HYDROCHLORIDE 0.5 MG: 1 INJECTION, SOLUTION INTRAMUSCULAR; INTRAVENOUS; SUBCUTANEOUS at 11:58

## 2021-07-17 RX ADMIN — SODIUM CHLORIDE 125 ML/HR: 0.9 INJECTION, SOLUTION INTRAVENOUS at 00:57

## 2021-07-17 RX ADMIN — HYDROMORPHONE HYDROCHLORIDE 2 MG: 2 TABLET ORAL at 13:26

## 2021-07-17 RX ADMIN — SENNOSIDES 17.2 MG: 8.6 TABLET ORAL at 22:23

## 2021-07-17 RX ADMIN — GABAPENTIN 600 MG: 300 CAPSULE ORAL at 17:16

## 2021-07-17 RX ADMIN — HYDROMORPHONE HYDROCHLORIDE 2 MG: 2 TABLET ORAL at 01:56

## 2021-07-17 RX ADMIN — BACLOFEN 15 MG: 10 TABLET ORAL at 20:10

## 2021-07-17 RX ADMIN — CELECOXIB 100 MG: 100 CAPSULE ORAL at 13:31

## 2021-07-17 RX ADMIN — POTASSIUM CHLORIDE 40 MEQ: 1500 TABLET, EXTENDED RELEASE ORAL at 17:13

## 2021-07-17 RX ADMIN — GABAPENTIN 600 MG: 300 CAPSULE ORAL at 11:57

## 2021-07-17 RX ADMIN — HYDROMORPHONE HYDROCHLORIDE 2 MG: 2 TABLET ORAL at 20:12

## 2021-07-17 RX ADMIN — SODIUM CHLORIDE 3 G: 9 INJECTION, SOLUTION INTRAVENOUS at 10:52

## 2021-07-17 RX ADMIN — CEFEPIME HYDROCHLORIDE 2000 MG: 2 INJECTION, POWDER, FOR SOLUTION INTRAVENOUS at 13:26

## 2021-07-17 RX ADMIN — LIDOCAINE 1 PATCH: 50 PATCH TOPICAL at 20:14

## 2021-07-17 RX ADMIN — HYDROMORPHONE HYDROCHLORIDE 1 MG: 1 INJECTION, SOLUTION INTRAMUSCULAR; INTRAVENOUS; SUBCUTANEOUS at 22:23

## 2021-07-17 RX ADMIN — ENOXAPARIN SODIUM 40 MG: 40 INJECTION SUBCUTANEOUS at 09:16

## 2021-07-17 RX ADMIN — HYDROMORPHONE HYDROCHLORIDE 1 MG: 1 INJECTION, SOLUTION INTRAMUSCULAR; INTRAVENOUS; SUBCUTANEOUS at 19:03

## 2021-07-17 RX ADMIN — CELECOXIB 100 MG: 100 CAPSULE ORAL at 17:13

## 2021-07-17 RX ADMIN — MELATONIN 3 MG: at 22:23

## 2021-07-17 RX ADMIN — HYDROMORPHONE HYDROCHLORIDE 1 MG: 1 INJECTION, SOLUTION INTRAMUSCULAR; INTRAVENOUS; SUBCUTANEOUS at 15:22

## 2021-07-17 RX ADMIN — HYDROMORPHONE HYDROCHLORIDE 2 MG: 2 TABLET ORAL at 05:12

## 2021-07-17 RX ADMIN — NORTRIPTYLINE HYDROCHLORIDE 10 MG: 10 CAPSULE ORAL at 13:31

## 2021-07-17 RX ADMIN — ACETAMINOPHEN 975 MG: 325 TABLET, FILM COATED ORAL at 20:12

## 2021-07-17 RX ADMIN — GABAPENTIN 600 MG: 300 CAPSULE ORAL at 20:09

## 2021-07-17 RX ADMIN — HYDROMORPHONE HYDROCHLORIDE 2 MG: 2 TABLET ORAL at 09:15

## 2021-07-17 RX ADMIN — TAMSULOSIN HYDROCHLORIDE 0.4 MG: 0.4 CAPSULE ORAL at 17:13

## 2021-07-17 RX ADMIN — SODIUM CHLORIDE 3 G: 9 INJECTION, SOLUTION INTRAVENOUS at 04:42

## 2021-07-17 RX ADMIN — ATORVASTATIN CALCIUM 40 MG: 40 TABLET, FILM COATED ORAL at 17:13

## 2021-07-17 RX ADMIN — ACETAMINOPHEN 975 MG: 325 TABLET, FILM COATED ORAL at 11:59

## 2021-07-17 RX ADMIN — BACLOFEN 15 MG: 10 TABLET ORAL at 17:13

## 2021-07-17 RX ADMIN — POTASSIUM CHLORIDE 40 MEQ: 1500 TABLET, EXTENDED RELEASE ORAL at 12:01

## 2021-07-17 RX ADMIN — BACLOFEN 15 MG: 10 TABLET ORAL at 11:57

## 2021-07-17 NOTE — ASSESSMENT & PLAN NOTE
· Pt reporting acute low back pain after a mechanical fall he experienced at [facility] yesterday prior to admission  · Check lumbar xrays   · Fall precautions   · Restart home medications that have been held that may be contributing to worsening low back pain- baclofen, gabapentin, celecoxib, dilaudid, nortriptyline

## 2021-07-17 NOTE — PROGRESS NOTES
Progress Note - Oliver Kelly 38 y o  male MRN: [id number]    Unit/Bed#: [facility] 810-01 Encounter: [id number]      Assessment:  Right epididymal orchitis with complex right hydrocele, currently on Unasyn  Discussed with Ghislaine DE LA O-decided to switch him to cefepime although I would normally consider Unasyn  to be good coverage for these sorts of things  I told the patient that if he does not respond antibiotics, and this was in response to his question regarding further management, he will need orchiectomy  He is not want that at this time  Plan:  Changed to cefepime, continue to monitor exam   Repeat scrotal ultrasound tomorrow  Subjective:   More pain in the testicle    Objective:  Right epididymal orchitis, with complex right hydrocele  Has spinal cord injury that was incurred this year when he fell down steps  At home he has been voiding on his own  He currently has a Billingsley catheter in  Ultrasound yesterday showed complex right hydrocele, no evidence of abscess  He complains of increased pain today and his white blood count has gone up to 26,000  T-max 101 9°, currently 99  Vitals: Blood pressure 134/84, pulse (!) 126, temperature (!) 101 9 °F (38 8 °C), resp  rate 20, height 5' 7" (1 702 m), weight 75 kg (165 lb 5 5 oz), SpO2 95 %  ,Body mass index is 25 9 kg/m²  Intake/Output Summary (Last 24 hours) at 7/17/2021 1349  Last data filed at 7/17/2021 0915  Gross per 24 hour   Intake 1000 ml   Output 1000 ml   Net 0 ml       Physical Exam: /84   Pulse (!) 126   Temp (!) 101 9 °F (38 8 °C)   Resp 20   Ht 5' 7" (1 702 m)   Wt 75 kg (165 lb 5 5 oz)   SpO2 95%   BMI 25 90 kg/m²   General appearance: alert and oriented, in no acute distress  Male genitalia:  Normal uncircumcised phallus, Billingsley catheter placed  Tender but not erythematous right hemiscrotum, no evidence of Dragan's or abscess  Tense right hydrocele    Left testicle normal       Invasive Devices Peripheral Intravenous Line            Peripheral IV 07/16/21 Distal;Left;Upper;Ventral (anterior) Arm 1 day          Drain            Urethral Catheter 16 Fr  1 day                Lab, Imaging and other studies: I have personally reviewed pertinent reports     and I have personally reviewed pertinent films in PACS  VTE Pharmacologic Prophylaxis: Sequential compression device (Venodyne)   VTE Mechanical Prophylaxis: sequential compression device

## 2021-07-17 NOTE — PROGRESS NOTES
[facility]  Progress Note Nonda Console 1985, 38 y o  male MRN: [id number]  Unit/Bed#: [facility] 810-01 Encounter: [id number]  Primary Care Provider: Odessa Bence, MD   Date and time admitted to hospital: 7/16/2021  9:07 AM    * Sepsis ([address] )  Assessment & Plan  · POA secondary to UTI, orchitis, epididymitis   · Meets criteria for sepsis - tachycardia, leukocytosis, lactic acidosis (now resolved) at ER at outside hospital  · admitted to stepdown   · Follow cultures   · Blood cultures NGTD  · Urine culture >100,000 GNR- follow to end points   · Check procal level   · Change abxs to cefepime from unasyn given worsening leukocytosis and high risk given nursing home status   · Urology following- plan for repeat scrotal US in am     Orchitis and epididymitis  Assessment & Plan  · Likely from urinary source possibly from not completely emptying his bladder (pt was noted to have urinary retention during his last admission in March but was discharged without a jimenez to rehab)  · Urology following  · Maintain jimenez for now- voiding trial prior to discharge  · Follow urine culture   · Change abxs to cefepime today   · Plan for scrotal US in am   · Pain meds   · Follow serial exams     Acute low back pain  Assessment & Plan  · Pt reporting acute low back pain after a mechanical fall he experienced at [facility] yesterday prior to admission  · Check lumbar xrays   · Fall precautions   · Restart home medications that have been held that may be contributing to worsening low back pain- baclofen, gabapentin, celecoxib, dilaudid, nortriptyline     HLD (hyperlipidemia)  Assessment & Plan  C/w statin    Benign essential HTN  Assessment & Plan  Hold HCTZ in the setting of sepsis   BP stable off meds     Lactic acidosis  Assessment & Plan  In the setting of sepsis   Resolved with ivfs     Hypokalemia  Assessment & Plan  · Replete   · K level 3 0    Central cord syndrome [facility])  Assessment & Plan  · Patient sustained a fall downstairs on 2021 suffering a severe spinal cord injury, S/p  C3-5 laminectomy, C2-7 fusion on 3/7  · Currently residing at [facility]   · Pt was noted to have urinary retention during his admission but was able to have jimenez removed at discharge   · Medication personally reviewed with RN at [facility] and ordered   · Gabapentin 600mg TID   · Baclofen 15mg TID   · Dilaudid 2mg PO q6hprn  · nortriptyline 10mg daily   · Celecoxib 100mg bid   · tyelenol 1000 mg TID - ordered PRN given sepsis   · flomax 0 4mg daily  · lipitor 40mg daily   · hctz 25mg daily- hold in the setting of sepsis       VTE Pharmacologic Prophylaxis: VTE Score: 5 High Risk (Score >/= 5) - Pharmacological DVT Prophylaxis Ordered: enoxaparin (Lovenox)  Sequential Compression Devices Ordered  Patient Centered Rounds: I performed bedside rounds with nursing staff today  Discussions with Specialists or Other Care Team Provider: D/W RN d/w Dr Ar Merida and Discussions with Family / Patient: Patient declined call to   Time Spent for Care: 30 minutes  More than 50% of total time spent on counseling and coordination of care as described above  Current Length of Stay: 1 day(s)  Current Patient Status: Inpatient   Certification Statement: The patient will continue to require additional inpatient hospital stay due to sepsis iv abxs   Discharge Plan: Anticipate discharge in 48 hrs to home  Code Status: Level 1 - Full Code    Subjective:   pt reports he is having severe pain in his scrotum  10/10  He is also reporting low back pain after experiencing a mechanical fall yesterday at [facility] 10/10  Pt reports he is not getting his medicaiton he was getting at [facility] and requesting it get reviewed  Denies any n/v/d/c  Denies sob  No other complaints at this time       Objective:     Vitals:   Temp (24hrs), Av 3 °F (38 5 °C), Min:98 2 °F (36 8 °C), Max:103 °F (39 4 °C)    Temp:  [98 2 °F (36 8 °C)-103 °F (39 4 °C)] 102 9 °F (39 4 °C)  HR:  [110-138] 126  Resp:  [16-20] 20  BP: (102-134)/(60-84) 134/84  SpO2:  [95 %-97 %] 95 %  Body mass index is 25 9 kg/m²  Input and Output Summary (last 24 hours): Intake/Output Summary (Last 24 hours) at 7/17/2021 1153  Last data filed at 7/17/2021 0915  Gross per 24 hour   Intake 1000 ml   Output 1000 ml   Net 0 ml       Physical Exam:   Physical Exam  Vitals and nursing note reviewed  Constitutional:       Appearance: He is well-developed  He is ill-appearing  Cardiovascular:      Rate and Rhythm: Normal rate and regular rhythm  Heart sounds: Normal heart sounds  No murmur heard  Pulmonary:      Effort: Pulmonary effort is normal  No respiratory distress  Breath sounds: Normal breath sounds  No wheezing or rales  Abdominal:      General: Bowel sounds are normal  There is no distension  Palpations: Abdomen is soft  Tenderness: There is no abdominal tenderness  Musculoskeletal:         General: No swelling  Comments: + left straight leg raise    Skin:     General: Skin is warm and dry  Neurological:      Mental Status: He is alert  Motor: Weakness (lower extremities b/l ) present     Psychiatric:      Comments: Pt is cooperative but is in pain during exam           Additional Data:     Labs:  Results from last 7 days   Lab Units 07/17/21  0504 07/16/21  0000   WBC Thousand/uL 26 89* 14 92*   HEMOGLOBIN g/dL 10 7* 12 9   HEMATOCRIT % 33 0* 39 9   PLATELETS Thousands/uL 282 360   NEUTROS PCT %  --  83*   LYMPHS PCT %  --  8*   MONOS PCT %  --  8   EOS PCT %  --  0     Results from last 7 days   Lab Units 07/17/21  0504 07/16/21  0000   SODIUM mmol/L 136 143   POTASSIUM mmol/L 3 0* 3 4*   CHLORIDE mmol/L 105 104   CO2 mmol/L 25 31   BUN mg/dL 8 11   CREATININE mg/dL 0 91 1 20   ANION GAP mmol/L 6 8   CALCIUM mg/dL 8 1* 9 2   ALBUMIN g/dL 2 4* 3 7   TOTAL BILIRUBIN mg/dL  --  0 48   ALK PHOS U/L --  77   ALT U/L  --  20   AST U/L  --  17   GLUCOSE RANDOM mg/dL 94 110     Results from last 7 days   Lab Units 07/16/21  0000   INR  1 07             Results from last 7 days   Lab Units 07/17/21  1015 07/16/21  0200 07/16/21  0000   LACTIC ACID mmol/L  --  0 9 2 1*   PROCALCITONIN ng/ml 4 82*  --   --        Lines/Drains:  Invasive Devices     Peripheral Intravenous Line            Peripheral IV 07/16/21 Distal;Left;Upper;Ventral (anterior) Arm 1 day          Drain            Urethral Catheter 16 Fr  1 day              Urinary Catheter:  Goal for removal: voiding trial when approved by urology                Imaging: Reviewed radiology reports from this admission including: chest xray, abdominal/pelvic CT and ultrasound(s)    Recent Cultures (last 7 days):   Results from last 7 days   Lab Units 07/16/21  0047 07/16/21  0000   BLOOD CULTURE   --  No Growth at 24 hrs  No Growth at 24 hrs     URINE CULTURE  >100,000 cfu/ml Klebsiella pneumoniae*  --        Last 24 Hours Medication List:   Current Facility-Administered Medications   Medication Dose Route Frequency Provider Last Rate    acetaminophen  975 mg Oral Q8H PRN JAYYLN Davis      atorvastatin  40 mg Oral Daily With [employer], JAYLYN      baclofen  15 mg Oral TID JAYLYN Davis      cefepime  2,000 mg Intravenous Q12H JAYLYN Davis      celecoxib  100 mg Oral BID Shirleen Persaud, JAYLYN      enoxaparin  40 mg Subcutaneous Daily Brandee Up MD      gabapentin  600 mg Oral TID JAYLYN Davis      HYDROmorphone  0 5 mg Intravenous Once Shirlene Persaud, JAYLYN      HYDROmorphone  1 mg Intravenous Q3H PRN JAYLYN Davis      HYDROmorphone  2 mg Oral Q3H PRN Brandee Up MD      lidocaine  1 patch Topical Daily Kailey Vasquez PA-C      melatonin  3 mg Oral HS JAYLYN Rodriguez      menthol-methyl salicylate   Apply externally 4x Daily PRN Kailey Vasquez PA-C      nortriptyline  10 mg Oral Daily JAYLYN Lord      senna  2 tablet Oral HS JAYLYN Lord      sodium chloride  125 mL/hr Intravenous Continuous Patricia Camargo  mL/hr (07/17/21 0057)    tamsulosin  0 4 mg Oral Daily With Dinner JAYLYN Lord          Today, Patient Was Seen By: JAYLYN Lord    **Please Note: This note may have been constructed using a voice recognition system  **

## 2021-07-17 NOTE — UTILIZATION REVIEW
Initial Clinical Review    Admission: Date/Time/Statement:   Admission Orders (From admission, onward)     Ordered        07/16/21 0943  Inpatient Admission  Once                   Orders Placed This Encounter   Procedures    Inpatient Admission     Standing Status:   Standing     Number of Occurrences:   1     Order Specific Question:   Level of Care     Answer:   Level 2 Stepdown / HOT [14]     Order Specific Question:   Estimated length of stay     Answer:   More than 2 Midnights     Order Specific Question:   Certification     Answer:   I certify that inpatient services are medically necessary for this patient for a duration of greater than two midnights  See H&P and MD Progress Notes for additional information about the patient's course of treatment  ED Arrival Information     Expected Arrival Acuity    7/16/2021 7/16/2021 09:07 Emergent         Means of arrival Escorted by Service Admission type    Ambulance [facility] Emergency         Arrival complaint    Sepsis        Chief Complaint   Patient presents with    Possible UTI     Pt diagnosed with urosepsis and transferred here from [facility] for [address]       Initial Presentation: 39 y/o male transferred from [facility] to [address] [place] [address] due for urology evaluation  Pt with hx spinal cord injury presented to [facility] with c/o BL scrotal pain, worse with any touch or movement  Labs showed leukocytosis and lactic acidosis  USG of testes showed mild asymmetric right epididymal hyperemia with infectious/inflammatory etiology not excluded, also loculated complex right hydrocele  Pt febrile  Meets criteria for sepsis - tachycardia, leukocytosis, lactic acidosis at ER at outside hospital   Admit to stepdown/HOT unit due to infected Hydrocele, IV ABX, f/up on blood and urine cxs, aggressive IV hydration, repeat labs, consult urology      Date: 7/17   Day 2:   Per Hospitalist Progress Note:  Sepsis:  Pt continues with tachycardia, leukocytosis, febrile  Lactic acidosis resolved with IVF  Blood cultures NGTD  Urine culture >100,000 GNR- follow to end points   Check procal level, change IV ABX to cefepime from unasyn given worsening leukocytosis and high risk given nursing home status   Urology planning for repeat scrotal US in am   Maintain jimenez  Pain management  Potassium 3 0, replete  Continue PTA meds      ED Triage Vitals   Temperature Pulse Respirations Blood Pressure SpO2   07/16/21 0919 07/16/21 0919 07/16/21 0919 07/16/21 0919 07/16/21 0919   (!) 101 5 °F (38 6 °C) (!) 137 16 111/55 97 %      Temp Source Heart Rate Source Patient Position - Orthostatic VS BP Location FiO2 (%)   07/16/21 0919 07/16/21 0919 07/16/21 1045 07/16/21 1045 --   Oral Monitor Lying Right arm       Pain Score       07/16/21 0919       Worst Possible Pain          Wt Readings from Last 1 Encounters:   07/16/21 75 kg (165 lb 5 5 oz)     Additional Vital Signs:   Date/Time  Temp  Pulse  Resp  BP  MAP (mmHg)  SpO2  O2 Device   07/17/21 11:35:59  102 9 °F (39 4 °C)Abnormal   --  20  134/84  101  --  --   07/17/21 08:15:19  99 6 °F (37 6 °C)  --  18  127/81  96  --  --   07/17/21 04:46:17  98 2 °F (36 8 °C)  --  --  --  --  --  --   07/17/21 02:33:14  98 5 °F (36 9 °C)  --  17  112/71  85  --  --   07/17/21 01:57:03  99 6 °F (37 6 °C)  --  --  --  --  --  --   07/17/21 0042  100 9 °F (38 3 °C)Abnormal   --  --  --  --  --  --   07/16/21 22:16:52  102 7 °F (39 3 °C)Abnormal   126Abnormal   18  114/67  83  95 %  --   07/16/21 22:05:49  102 8 °F (39 3 °C)Abnormal   124Abnormal   18  127/79  95  96 %  --   07/16/21 21:58:19  102 9 °F (39 4 °C)Abnormal   128Abnormal   --  --  --  96 %  --   07/16/21 21:57:19  99 2 °F (37 3 °C)  130Abnormal   --  --  --  97 %  --   07/16/21 21:01:48  102 5 °F (39 2 °C)Abnormal   121Abnormal   18  --  --  97 %  --   07/16/21 19:16:06  102 9 °F (39 4 °C)Abnormal   119Abnormal   16  107/63  78  97 %  --   07/16/21 17:41:22 102 8 °F (39 3 °C)Abnormal   129Abnormal   --  --  --  96 %  --   07/16/21 15:38:45  101 7 °F (38 7 °C)Abnormal   110Abnormal   20  102/64  77  --  --   07/16/21 1500  100 3 °F (37 9 °C)  136Abnormal   18  130/64  88  97 %  None (Room air)   07/16/21 1415  103 °F (39 4 °C)Abnormal   --  --  --  --  --  --   07/16/21 1300  --  138Abnormal   18  131/60  86  97 %  None (Room air)   07/16/21 1100  --  136Abnormal   18  146/89  109  97 %  None (Room air)   07/16/21 1045  --  132Abnormal   --  145/74  101  97 %  None (Room air)       Pertinent Labs/Diagnostic Test Results:   7/16 US Scrotum and testicles:  Bilateral testicles with symmetric Doppler flow  Mild asymmetric right epididymal hyperemia  Infectious/inflammatory etiology not excluded  Loculated complex right hydrocele  Small right epididymal head cyst     7/16 CT Abd/pelvis:  Right-sided hydrocele      7/16 CXR:  NAD  7/16 EKG:  ST    Results from last 7 days   Lab Units 07/17/21  0504 07/16/21  1039 07/16/21  0000   WBC Thousand/uL 26 89*  --  14 92*   HEMOGLOBIN g/dL 10 7*  --  12 9   HEMATOCRIT % 33 0*  --  39 9   PLATELETS Thousands/uL 282 322 360   NEUTROS ABS Thousands/µL  --   --  12 37*     Results from last 7 days   Lab Units 07/17/21  0504 07/16/21  0000   SODIUM mmol/L 136 143   POTASSIUM mmol/L 3 0* 3 4*   CHLORIDE mmol/L 105 104   CO2 mmol/L 25 31   ANION GAP mmol/L 6 8   BUN mg/dL 8 11   CREATININE mg/dL 0 91 1 20   EGFR ml/min/1 73sq m 124 89   CALCIUM mg/dL 8 1* 9 2   PHOSPHORUS mg/dL 2 1*  --      Results from last 7 days   Lab Units 07/17/21  0504 07/16/21  0000   AST U/L  --  17   ALT U/L  --  20   ALK PHOS U/L  --  77   TOTAL PROTEIN g/dL  --  8 1   ALBUMIN g/dL 2 4* 3 7   TOTAL BILIRUBIN mg/dL  --  0 48     Results from last 7 days   Lab Units 07/17/21  0504 07/16/21  0000   GLUCOSE RANDOM mg/dL 94 110     Results from last 7 days   Lab Units 07/16/21  0000   TROPONIN I ng/mL <0 02     Results from last 7 days   Lab Units 07/16/21  0000 PROTIME seconds 14 0   INR  1 07   PTT seconds 31     Results from last 7 days   Lab Units 07/17/21  1015   PROCALCITONIN ng/ml 4 82*     Results from last 7 days   Lab Units 07/16/21  0200 07/16/21  0000   LACTIC ACID mmol/L 0 9 2 1*       Results from last 7 days   Lab Units 07/16/21  0047   CLARITY UA  Slightly Cloudy   COLOR UA  Yellow   SPEC GRAV UA  1 025   PH UA  6 5   GLUCOSE UA mg/dl Negative   KETONES UA mg/dl Negative   BLOOD UA  Small*   PROTEIN UA mg/dl 30 (1+)*   NITRITE UA  Positive*   BILIRUBIN UA  Negative   UROBILINOGEN UA E U /dl 0 2   LEUKOCYTES UA  Trace*   WBC UA /hpf 10-20*   RBC UA /hpf 0-1   BACTERIA UA /hpf Innumerable*   EPITHELIAL CELLS WET PREP /hpf Occasional     Results from last 7 days   Lab Units 07/16/21  0047 07/16/21  0000   BLOOD CULTURE   --  No Growth at 24 hrs  No Growth at 24 hrs     URINE CULTURE  >100,000 cfu/ml Klebsiella pneumoniae*  --      ED Treatment:   Medication Administration from 07/16/2021 0655 to 07/16/2021 1531       Date/Time Order Dose Route Action     07/16/2021 0948 HYDROmorphone (DILAUDID) injection 1 mg 1 mg Intravenous Given     07/16/2021 1038 sodium chloride 0 9 % infusion 125 mL/hr Intravenous New Bag     07/16/2021 1423 acetaminophen (TYLENOL) tablet 650 mg 650 mg Oral Given     07/16/2021 1039 enoxaparin (LOVENOX) subcutaneous injection 40 mg 40 mg Subcutaneous Given     07/16/2021 1107 ampicillin-sulbactam (UNASYN) 3 g in sodium chloride 0 9 % 100 mL IVPB 3 g Intravenous New Bag     07/16/2021 1426 HYDROmorphone (DILAUDID) tablet 2 mg 2 mg Oral Given     07/16/2021 1100 HYDROmorphone (DILAUDID) tablet 2 mg 2 mg Oral Given     07/16/2021 1337 HYDROmorphone (DILAUDID) injection 0 5 mg 0 5 mg Intravenous Given        Past Medical History:   Diagnosis Date    Chronic depression     PTSD (post-traumatic stress disorder)      Present on Admission:   Sepsis ([address] )   Orchitis and epididymitis   Central cord syndrome [facility])      Admitting Diagnosis: Testicular discomfort [N50 819]  Sepsis ([facility] [address] ) [A41 9]  Age/Sex: 38 y o  male  Admission Orders:  Scheduled Medications:  atorvastatin, 40 mg, Oral, Daily With Dinner  baclofen, 15 mg, Oral, TID  cefepime, 2,000 mg, Intravenous, Q12H  celecoxib, 100 mg, Oral, BID  enoxaparin, 40 mg, Subcutaneous, Daily  gabapentin, 600 mg, Oral, TID  HYDROmorphone, 0 5 mg, Intravenous, Once  lidocaine, 1 patch, Topical, Daily  melatonin, 3 mg, Oral, HS  nortriptyline, 10 mg, Oral, Daily  senna, 2 tablet, Oral, HS  tamsulosin, 0 4 mg, Oral, Daily With Dinner      Continuous IV Infusions:  sodium chloride, 125 mL/hr, Intravenous, Continuous      PRN Meds:  acetaminophen, 975 mg, Oral, Q8H PRN x3 thus far  HYDROmorphone, 1 mg, Intravenous, Q3H PRN x1 thus far  HYDROmorphone, 2 mg, Oral, Q3H PRN x6 thus far  menthol-methyl salicylate, , Apply externally, 4x Daily PRN      scd  IP CONSULT TO UROLOGY    Network Utilization Review Department  ATTENTION: Please call with any questions or concerns to [phone] and carefully listen to the prompts so that you are directed to the right person  All voicemails are confidential   Malik Warren all requests for admission clinical reviews, approved or denied determinations and any other requests to dedicated fax number below belonging to the campus where the patient is receiving treatment   List of dedicated fax numbers for the Facilities:  [address] DENIALS (Administrative/Medical Necessity) [phone]   [address] (Maternity/NICU/Pediatrics) 261 [facility] Nelson Cuenca 1605 [id number] [address]  [address] [facility] Wilfrido Burk 1481 [address] [facility] [phone]

## 2021-07-17 NOTE — ASSESSMENT & PLAN NOTE
· Likely from urinary source possibly from not completely emptying his bladder (pt was noted to have urinary retention during his last admission in March but was discharged without a jimenez to rehab)  · Urology following  · Maintain jimenez for now- voiding trial prior to discharge  · Follow urine culture   · Change abxs to cefepime today   · Plan for scrotal US in am   · Pain meds   · Follow serial exams

## 2021-07-17 NOTE — ASSESSMENT & PLAN NOTE
· POA secondary to UTI, orchitis, epididymitis   · Meets criteria for sepsis - tachycardia, leukocytosis, lactic acidosis (now resolved) at ER at outside hospital  · admitted to stepdown   · Follow cultures   · Blood cultures NGTD  · Urine culture >100,000 GNR- follow to end points   · Check procal level   · Change abxs to cefepime from unasyn given worsening leukocytosis and high risk given nursing home status   · Urology following- plan for repeat scrotal US in am

## 2021-07-17 NOTE — NURSING NOTE
Patient's temp 102 7 after PRN tylenol  Fatoumata Arguelles with [facility] made aware  Additional dose of tylenol and motrin given per [facility]  Will continue to monitor temperature

## 2021-07-17 NOTE — ASSESSMENT & PLAN NOTE
· Patient sustained a fall downstairs on March 6, 2021 suffering a severe spinal cord injury, S/p  C3-5 laminectomy, C2-7 fusion on 3/7  · Currently residing at [facility] [place]   · Pt was noted to have urinary retention during his admission but was able to have jimenez removed at discharge   · Medication personally reviewed with RN at [facility] and ordered   · Gabapentin 600mg TID   · Baclofen 15mg TID   · Dilaudid 2mg PO q6hprn  · nortriptyline 10mg daily   · Celecoxib 100mg bid   · tyelenol 1000 mg TID - ordered PRN given sepsis   · flomax 0 4mg daily  · lipitor 40mg daily   · hctz 25mg daily- hold in the setting of sepsis

## 2021-07-18 ENCOUNTER — APPOINTMENT (INPATIENT)
Dept: RADIOLOGY | Facility: HOSPITAL | Age: 36
DRG: 483 | End: 2021-07-18
Payer: COMMERCIAL

## 2021-07-18 PROBLEM — E87.2 LACTIC ACIDOSIS: Status: RESOLVED | Noted: 2021-07-17 | Resolved: 2021-07-18

## 2021-07-18 PROBLEM — E87.20 LACTIC ACIDOSIS: Status: RESOLVED | Noted: 2021-07-17 | Resolved: 2021-07-18

## 2021-07-18 LAB
ANION GAP SERPL CALCULATED.3IONS-SCNC: 7 MMOL/L (ref 4–13)
BACTERIA UR CULT: ABNORMAL
BUN SERPL-MCNC: 6 MG/DL (ref 5–25)
CALCIUM SERPL-MCNC: 8.4 MG/DL (ref 8.3–10.1)
CHLORIDE SERPL-SCNC: 106 MMOL/L (ref 100–108)
CO2 SERPL-SCNC: 23 MMOL/L (ref 21–32)
CREAT SERPL-MCNC: 0.8 MG/DL (ref 0.6–1.3)
ERYTHROCYTE [DISTWIDTH] IN BLOOD BY AUTOMATED COUNT: 12.7 % (ref 11.6–15.1)
GFR SERPL CREATININE-BSD FRML MDRD: 132 ML/MIN/1.73SQ M
GLUCOSE SERPL-MCNC: 97 MG/DL (ref 65–140)
HCT VFR BLD AUTO: 29.6 % (ref 36.5–49.3)
HGB BLD-MCNC: 9.8 G/DL (ref 12–17)
MCH RBC QN AUTO: 31.3 PG (ref 26.8–34.3)
MCHC RBC AUTO-ENTMCNC: 33.1 G/DL (ref 31.4–37.4)
MCV RBC AUTO: 95 FL (ref 82–98)
PLATELET # BLD AUTO: 277 THOUSANDS/UL (ref 149–390)
PMV BLD AUTO: 10.5 FL (ref 8.9–12.7)
POTASSIUM SERPL-SCNC: 3.2 MMOL/L (ref 3.5–5.3)
PROCALCITONIN SERPL-MCNC: 3.73 NG/ML
RBC # BLD AUTO: 3.13 MILLION/UL (ref 3.88–5.62)
SODIUM SERPL-SCNC: 136 MMOL/L (ref 136–145)
WBC # BLD AUTO: 23.76 THOUSAND/UL (ref 4.31–10.16)

## 2021-07-18 PROCEDURE — 99232 SBSQ HOSP IP/OBS MODERATE 35: CPT | Performed by: UROLOGY

## 2021-07-18 PROCEDURE — 84145 PROCALCITONIN (PCT): CPT | Performed by: NURSE PRACTITIONER

## 2021-07-18 PROCEDURE — 85027 COMPLETE CBC AUTOMATED: CPT | Performed by: NURSE PRACTITIONER

## 2021-07-18 PROCEDURE — 80048 BASIC METABOLIC PNL TOTAL CA: CPT | Performed by: NURSE PRACTITIONER

## 2021-07-18 PROCEDURE — 97163 PT EVAL HIGH COMPLEX 45 MIN: CPT

## 2021-07-18 PROCEDURE — 99232 SBSQ HOSP IP/OBS MODERATE 35: CPT | Performed by: NURSE PRACTITIONER

## 2021-07-18 PROCEDURE — 76870 US EXAM SCROTUM: CPT

## 2021-07-18 RX ORDER — POTASSIUM CHLORIDE 20 MEQ/1
40 TABLET, EXTENDED RELEASE ORAL 2 TIMES DAILY
Status: COMPLETED | OUTPATIENT
Start: 2021-07-18 | End: 2021-07-18

## 2021-07-18 RX ADMIN — TAMSULOSIN HYDROCHLORIDE 0.4 MG: 0.4 CAPSULE ORAL at 15:54

## 2021-07-18 RX ADMIN — HYDROMORPHONE HYDROCHLORIDE 1 MG: 1 INJECTION, SOLUTION INTRAMUSCULAR; INTRAVENOUS; SUBCUTANEOUS at 10:09

## 2021-07-18 RX ADMIN — LIDOCAINE 1 PATCH: 50 PATCH TOPICAL at 21:39

## 2021-07-18 RX ADMIN — HYDROMORPHONE HYDROCHLORIDE 1 MG: 1 INJECTION, SOLUTION INTRAMUSCULAR; INTRAVENOUS; SUBCUTANEOUS at 05:28

## 2021-07-18 RX ADMIN — MELATONIN 3 MG: at 21:40

## 2021-07-18 RX ADMIN — GABAPENTIN 600 MG: 300 CAPSULE ORAL at 15:53

## 2021-07-18 RX ADMIN — GABAPENTIN 600 MG: 300 CAPSULE ORAL at 08:12

## 2021-07-18 RX ADMIN — BACLOFEN 15 MG: 10 TABLET ORAL at 15:54

## 2021-07-18 RX ADMIN — HYDROMORPHONE HYDROCHLORIDE 2 MG: 2 TABLET ORAL at 03:32

## 2021-07-18 RX ADMIN — CEFEPIME HYDROCHLORIDE 2000 MG: 2 INJECTION, POWDER, FOR SOLUTION INTRAVENOUS at 12:36

## 2021-07-18 RX ADMIN — CEFEPIME HYDROCHLORIDE 2000 MG: 2 INJECTION, POWDER, FOR SOLUTION INTRAVENOUS at 00:25

## 2021-07-18 RX ADMIN — ENOXAPARIN SODIUM 40 MG: 40 INJECTION SUBCUTANEOUS at 08:12

## 2021-07-18 RX ADMIN — BACLOFEN 15 MG: 10 TABLET ORAL at 08:12

## 2021-07-18 RX ADMIN — HYDROMORPHONE HYDROCHLORIDE 1 MG: 1 INJECTION, SOLUTION INTRAMUSCULAR; INTRAVENOUS; SUBCUTANEOUS at 19:40

## 2021-07-18 RX ADMIN — HYDROMORPHONE HYDROCHLORIDE 2 MG: 2 TABLET ORAL at 21:40

## 2021-07-18 RX ADMIN — POTASSIUM CHLORIDE 40 MEQ: 1500 TABLET, EXTENDED RELEASE ORAL at 17:13

## 2021-07-18 RX ADMIN — ACETAMINOPHEN 975 MG: 325 TABLET, FILM COATED ORAL at 04:20

## 2021-07-18 RX ADMIN — SODIUM CHLORIDE 125 ML/HR: 0.9 INJECTION, SOLUTION INTRAVENOUS at 10:09

## 2021-07-18 RX ADMIN — HYDROMORPHONE HYDROCHLORIDE 2 MG: 2 TABLET ORAL at 18:31

## 2021-07-18 RX ADMIN — CELECOXIB 100 MG: 100 CAPSULE ORAL at 08:14

## 2021-07-18 RX ADMIN — ATORVASTATIN CALCIUM 40 MG: 40 TABLET, FILM COATED ORAL at 15:53

## 2021-07-18 RX ADMIN — SODIUM CHLORIDE 125 ML/HR: 0.9 INJECTION, SOLUTION INTRAVENOUS at 18:37

## 2021-07-18 RX ADMIN — SODIUM CHLORIDE 125 ML/HR: 0.9 INJECTION, SOLUTION INTRAVENOUS at 03:32

## 2021-07-18 RX ADMIN — GABAPENTIN 600 MG: 300 CAPSULE ORAL at 21:40

## 2021-07-18 RX ADMIN — CELECOXIB 100 MG: 100 CAPSULE ORAL at 17:12

## 2021-07-18 RX ADMIN — HYDROMORPHONE HYDROCHLORIDE 2 MG: 2 TABLET ORAL at 08:12

## 2021-07-18 RX ADMIN — ACETAMINOPHEN 975 MG: 325 TABLET, FILM COATED ORAL at 12:36

## 2021-07-18 RX ADMIN — POTASSIUM CHLORIDE 40 MEQ: 1500 TABLET, EXTENDED RELEASE ORAL at 12:36

## 2021-07-18 RX ADMIN — NORTRIPTYLINE HYDROCHLORIDE 10 MG: 10 CAPSULE ORAL at 08:14

## 2021-07-18 RX ADMIN — HYDROMORPHONE HYDROCHLORIDE 1 MG: 1 INJECTION, SOLUTION INTRAMUSCULAR; INTRAVENOUS; SUBCUTANEOUS at 15:54

## 2021-07-18 RX ADMIN — ACETAMINOPHEN 975 MG: 325 TABLET, FILM COATED ORAL at 19:23

## 2021-07-18 RX ADMIN — HYDROMORPHONE HYDROCHLORIDE 2 MG: 2 TABLET ORAL at 12:36

## 2021-07-18 NOTE — ASSESSMENT & PLAN NOTE
· POA secondary to UTI, orchitis, epididymitis   · Meets criteria for sepsis - tachycardia, leukocytosis, lactic acidosis (now resolved) at ER at outside hospital  · admitted to stepdown   · Follow cultures   · Blood cultures NGTD  · Urine culture +klebsiella  Lab Results   Component Value Date    PROCALCITONI 3 73 (H) 07/18/2021    PROCALCITONI 4 82 (H) 07/17/2021   · c/w cefepime   · Urology following- plan for repeat scrotal US today

## 2021-07-18 NOTE — PROGRESS NOTES
Progress Note - Mari Streeter 38 y o  male MRN: [id number]    Unit/Bed#: [facility] 810-01 Encounter: [id number]      Assessment:  Febrile right epididymal orchitis with complex right hydrocele  Discussed continuing Cefepime vs orchiectomy  He wishes to continue abx  Plan:  Await scrotal US  Subjective:   No new complaints    Objective:  Right epididymal orchitis with complex right hydrocele, was on Unasyn and was switched to cefepime yesterday  He is due to get a scrotal ultrasound repeat today  T-max 102 9, which is the same fever spike he has been having every night for the past couple of days     Repeat labs pending  No improvement in pain, but no worse  Vitals: Blood pressure 141/85, pulse (!) 122, temperature 100 4 °F (38 °C), resp  rate 18, height 5' 7" (1 702 m), weight 75 kg (165 lb 5 5 oz), SpO2 98 %  ,Body mass index is 25 9 kg/m²  Intake/Output Summary (Last 24 hours) at 7/18/2021 0626  Last data filed at 7/18/2021 0318  Gross per 24 hour   Intake 0 ml   Output 3600 ml   Net -3600 ml       Physical Exam: /91   Pulse 105   Temp 98 7 °F (37 1 °C)   Resp 18   Ht 5' 7" (1 702 m)   Wt 75 kg (165 lb 5 5 oz)   SpO2 98%   BMI 25 90 kg/m²   General appearance: alert and oriented, in no acute distress  Male genitalia: Normal phalluys and left testicle, right still tense hydrocele but no cellulitis/gangrene, less tender than before  Invasive Devices     Peripheral Intravenous Line            Peripheral IV 07/16/21 Distal;Left;Upper;Ventral (anterior) Arm 2 days          Drain            Urethral Catheter 16 Fr  1 day                Lab, Imaging and other studies: I have personally reviewed pertinent reports      VTE Pharmacologic Prophylaxis: Sequential compression device (Venodyne)   VTE Mechanical Prophylaxis: sequential compression device

## 2021-07-18 NOTE — ASSESSMENT & PLAN NOTE
· Pt reporting acute low back pain after a mechanical fall he experienced at [facility] prior to admission- improved today   · lumbar xray (-)    · Fall precautions   · PT/OT  · Restarted home medications

## 2021-07-18 NOTE — PROGRESS NOTES
[address] [facility]  Progress Note Gerri Jo 1985, 38 y o  male MRN: [id number]  Unit/Bed#: [facility] 810-01 Encounter: [id number]  Primary Care Provider: Clarke Lopes MD   Date and time admitted to hospital: 7/16/2021  9:07 AM    * Sepsis ([address] )  Assessment & Plan  · POA secondary to UTI, orchitis, epididymitis   · Meets criteria for sepsis - tachycardia, leukocytosis, lactic acidosis (now resolved) at ER at outside hospital  · admitted to stepdown   · Follow cultures   · Blood cultures NGTD  · Urine culture +klebsiella  Lab Results   Component Value Date    PROCALCITONI 3 73 (H) 07/18/2021    PROCALCITONI 4 82 (H) 07/17/2021   · c/w cefepime   · Urology following- plan for repeat scrotal US today    Orchitis and epididymitis  Assessment & Plan  · Likely from urinary source possibly from not completely emptying his bladder (pt was noted to have urinary retention during his last admission in March but was discharged without a jimenez to rehab)  · Urology following  · Maintain jimenez for now- voiding trial prior to discharge  · urine culture (+) klebsiella - c/w cefepime today Day #2  · Pending scrotal US today   · Pain meds   · Follow serial exams     Acute low back pain  Assessment & Plan  · Pt reporting acute low back pain after a mechanical fall he experienced at [facility] prior to admission- improved today   · lumbar xray (-)    · Fall precautions   · PT/OT  · Restarted home medications     HLD (hyperlipidemia)  Assessment & Plan  C/w statin    Benign essential HTN  Assessment & Plan  Hold HCTZ in the setting of sepsis   BP stable off meds     Lactic acidosis  Assessment & Plan  In the setting of sepsis   Resolved with ivfs     Hypokalemia  Assessment & Plan  · Replete   · K level 3 2    Urinary retention  Assessment & Plan  · Noted on last admission but was discharged after passing a voiding trial without a jimenez   · Jimenez in place now- attempt voiding trial prior to discharge when cleared by urology   · C/w flomax    Central cord syndrome [facility])  Assessment & Plan  · Patient sustained a fall downstairs on March 6, 2021 suffering a severe spinal cord injury, S/p  C3-5 laminectomy, C2-7 fusion on 3/7  · Currently residing at [facility]   · Pt was noted to have urinary retention during his last admission but was able to have jimenez removed at discharge- monitor for urinary retention prior to discharge    · Medication personally reviewed with RN at [facility] and ordered   · Gabapentin 600mg TID   · Baclofen 15mg TID   · Dilaudid 2mg PO q6hprn  · nortriptyline 10mg daily   · Celecoxib 100mg bid   · tyelenol 1000 mg TID - ordered PRN given sepsis   · flomax 0 4mg daily  · lipitor 40mg daily   · hctz 25mg daily- hold in the setting of sepsis         VTE Pharmacologic Prophylaxis: VTE Score: 5 High Risk (Score >/= 5) - Pharmacological DVT Prophylaxis Ordered: enoxaparin (Lovenox)  Sequential Compression Devices Ordered  Patient Centered Rounds: I performed bedside rounds with nursing staff today  Discussions with Specialists or Other Care Team Provider: d/w RN and urology     Education and Discussions with Family / Patient: Patient declined call to   Time Spent for Care: 30 minutes  More than 50% of total time spent on counseling and coordination of care as described above  Current Length of Stay: 2 day(s)  Current Patient Status: Inpatient   Certification Statement: The patient will continue to require additional inpatient hospital stay due to iv abxs   Discharge Plan: Anticipate discharge in >72 hrs to rehab facility  Code Status: Level 1 - Full Code    Subjective:   Pt reports having severe pain in his right testicle 10/10  He reports the iv pain medication does improve the pain for about 10 minutes and then its a 10/10 again  He reports low back has improved since yesterday  B/l ankle pain which he reports he has had for about 2 weeks now   He reports that it started with ankle swelling at [facility] but now the swelling has resolved  Objective:     Vitals:   Temp (24hrs), Av 5 °F (38 6 °C), Min:99 4 °F (37 4 °C), Max:102 9 °F (39 4 °C)    Temp:  [99 4 °F (37 4 °C)-102 9 °F (39 4 °C)] 99 4 °F (37 4 °C)  HR:  [110-122] 122  Resp:  [18-20] 18  BP: (109-141)/(59-86) 140/86  SpO2:  [93 %-98 %] 98 %  Body mass index is 25 9 kg/m²  Input and Output Summary (last 24 hours): Intake/Output Summary (Last 24 hours) at 2021 1048  Last data filed at 2021 1009  Gross per 24 hour   Intake 1440 ml   Output 3600 ml   Net -2160 ml       Physical Exam:   Physical Exam  Constitutional:       General: He is not in acute distress  Cardiovascular:      Rate and Rhythm: Normal rate and regular rhythm  Pulses: Normal pulses  Heart sounds: Normal heart sounds  No murmur heard  Pulmonary:      Effort: No respiratory distress  Breath sounds: Normal breath sounds  No wheezing or rales  Abdominal:      General: Bowel sounds are normal  There is no distension  Palpations: Abdomen is soft  Tenderness: There is no abdominal tenderness  Genitourinary:     Comments: Right testicle swelling and tenderness- elevated  Yellow urine    Musculoskeletal:      Right lower leg: No edema  Left lower leg: No edema  Skin:     General: Skin is warm and dry  Findings: No erythema or rash  Neurological:      General: No focal deficit present  Mental Status: He is alert  Mental status is at baseline     Psychiatric:         Attention and Perception: Attention normal          Mood and Affect: Mood normal           Additional Data:     Labs:  Results from last 7 days   Lab Units 21  0521 21  1039 21  0000   WBC Thousand/uL 23 76*   < > 14 92*   HEMOGLOBIN g/dL 9 8*   < > 12 9   HEMATOCRIT % 29 6*   < > 39 9   PLATELETS Thousands/uL 277  --  360   NEUTROS PCT %  --   --  83*   LYMPHS PCT %  --   --  8*   MONOS PCT %  --   --  8   EOS PCT %  --   --  0    < > = values in this interval not displayed  Results from last 7 days   Lab Units 07/18/21  0521 07/17/21  0504 07/16/21  0000   SODIUM mmol/L 136 136 143   POTASSIUM mmol/L 3 2* 3 0* 3 4*   CHLORIDE mmol/L 106 105 104   CO2 mmol/L 23 25 31   BUN mg/dL 6 8 11   CREATININE mg/dL 0 80 0 91 1 20   ANION GAP mmol/L 7 6 8   CALCIUM mg/dL 8 4 8 1* 9 2   ALBUMIN g/dL  --  2 4* 3 7   TOTAL BILIRUBIN mg/dL  --   --  0 48   ALK PHOS U/L  --   --  77   ALT U/L  --   --  20   AST U/L  --   --  17   GLUCOSE RANDOM mg/dL 97 94 110     Results from last 7 days   Lab Units 07/16/21  0000   INR  1 07             Results from last 7 days   Lab Units 07/18/21  0521 07/17/21  1015 07/16/21  0200 07/16/21  0000   LACTIC ACID mmol/L  --   --  0 9 2 1*   PROCALCITONIN ng/ml 3 73* 4 82*  --   --        Lines/Drains:  Invasive Devices     Peripheral Intravenous Line            Peripheral IV 07/16/21 Distal;Left;Upper;Ventral (anterior) Arm 2 days          Drain            Urethral Catheter 16 Fr  2 days              Urinary Catheter:  Goal for removal: when cleared by urology               Imaging: Reviewed radiology reports from this admission including: xray(s)    Recent Cultures (last 7 days):   Results from last 7 days   Lab Units 07/16/21  0047 07/16/21  0000   BLOOD CULTURE   --  No Growth at 48 hrs  No Growth at 48 hrs     URINE CULTURE  >100,000 cfu/ml Klebsiella pneumoniae*  --        Last 24 Hours Medication List:   Current Facility-Administered Medications   Medication Dose Route Frequency Provider Last Rate    acetaminophen  975 mg Oral Q8H PRN JAYLYN Pino      atorvastatin  40 mg Oral Daily With [employer] JAYLYN Penaloza      baclofen  15 mg Oral TID JAYLYN Pino      cefepime  2,000 mg Intravenous Q12H JAYLYN Rodriguez 2,000 mg (07/18/21 0025)    celecoxib  100 mg Oral BID JAYLYN Pino      enoxaparin  40 mg Subcutaneous Daily Chakra B Yris Escalona MD      gabapentin  600 mg Oral TID JAYLYN Plummer      HYDROmorphone  1 mg Intravenous Q3H PRN JAYLYN Plummer      HYDROmorphone  2 mg Oral Q3H PRN Moi Amaya MD      lidocaine  1 patch Topical Daily Kailey Vasquez PA-C      melatonin  3 mg Oral HS JAYLYN Rodriguez      menthol-methyl salicylate   Apply externally 4x Daily PRN Kailey Vasquez PA-C      nortriptyline  10 mg Oral Daily JAYLYN Plummer      senna  2 tablet Oral HS JAYLYN Rodriguez      sodium chloride  125 mL/hr Intravenous Continuous Moi Amaya  mL/hr (07/18/21 1009)    tamsulosin  0 4 mg Oral Daily With Dinner JAYLYN Plummer          Today, Patient Was Seen By: JAYLYN Plummer    **Please Note: This note may have been constructed using a voice recognition system  **

## 2021-07-18 NOTE — ASSESSMENT & PLAN NOTE
· Patient sustained a fall downstairs on March 6, 2021 suffering a severe spinal cord injury, S/p  C3-5 laminectomy, C2-7 fusion on 3/7  · Currently residing at [facility]   · Pt was noted to have urinary retention during his last admission but was able to have jimenez removed at discharge- monitor for urinary retention prior to discharge    · Medication personally reviewed with RN at [facility] and ordered   · Gabapentin 600mg TID   · Baclofen 15mg TID   · Dilaudid 2mg PO q6hprn  · nortriptyline 10mg daily   · Celecoxib 100mg bid   · tyelenol 1000 mg TID - ordered PRN given sepsis   · flomax 0 4mg daily  · lipitor 40mg daily   · hctz 25mg daily- hold in the setting of sepsis

## 2021-07-18 NOTE — QUICK NOTE
Scrotal ultrasound shows possible evolving abscess, and I reviewed the films personally and I do not see much of a difference between this and the previous 1  That being said I had a discussion with the patient  Clinically I think he is improving a little bit but it remains to be seen what his temperature does tonight  He has been drinking fluids all day, just 20 minutes ago he had his last drink  I considered taking him to the operating room soon, but I think we better to see what his temperature does overnight as he does seem to be responding somewhat to the cefepime  If his temperature still was up to 102 9, we will proceed with drainage of a possible infected hydrocele  I will place a case request   We will make him NPO after midnight  He is agreeable to this plan  No plans for the OR tonight

## 2021-07-18 NOTE — PHYSICAL THERAPY NOTE
Physical Therapy Evaluation    Patient's Name: Rupinder Adams    Admitting Diagnosis  Testicular discomfort [N50 819]  Sepsis ([facility] [address] ) [A41 9]    Problem List  Patient Active Problem List   Diagnosis    Central cord syndrome [facility])    Pulmonary insufficiency    Closed fracture of fifth cervical vertebra (HCC)    Spinal cord injury, C1-C7 ([facility] [address] )    Fall    Pain    Intractable hiccups    Depression    Urinary retention    Testicular discomfort    Sepsis ([facility] )    Orchitis and epididymitis    Acute low back pain    Hypokalemia    Benign essential HTN    HLD (hyperlipidemia)       Past Medical History  Past Medical History:   Diagnosis Date    Chronic depression     PTSD (post-traumatic stress disorder)        Past Surgical History  Past Surgical History:   Procedure Laterality Date    CERVICAL FUSION N/A 3/6/2021    Procedure: POSTERIOR C3-5 laminectomy, C2-7 fixation fusion, possible additional levels;  Surgeon: Guerda Echols MD;  [facility]: [facility];  Service: Neurosurgery    IR PICC PLACEMENT DOUBLE LUMEN  3/8/2021        07/18/21 1441   PT Last Visit   PT Visit Date 07/18/21   Note Type   Note type Evaluation   Pain Assessment   Pain Assessment Tool 0-10   Pain Score Worst Possible Pain   Pain Location/Orientation Orientation: Bilateral;Location: Back   Hospital Pain Intervention(s) Repositioned; Ambulation/increased activity   Home Living   Type of [address] level   Additional Comments Pt has been at rehab since fall down stairs in March resulting in [address]  Prior to fall, pt states he was staying with a friend  Prior Function   Level of [place] Independent with ADLs and functional mobility   Falls in the last 6 months 1 to 4  (1-leading to admission)   Comments Pt states he has been making great progress at rehab since March  He was independent with ambulation without AD, denies any recent falls besides 1 fall immediately prior to admission    Pt states he was no longer recieving PT at SNF, only OT due to B/L UE weakness and impaired ADL's, CM confirmed with facility   Restrictions/Precautions   Weight Bearing Precautions Per Order No   Other Precautions Chair Alarm; Bed Alarm; Fall Risk;Pain;Multiple lines   General   Family/Caregiver Present No   Cognition   Overall Cognitive Status WFL   Orientation Level Oriented X4   Following Commands Follows multistep commands with increased time or repetition   Comments Pt cooperative despite high pain levels   RLE Assessment   RLE Assessment X   Strength RLE   RLE Overall Strength 4/5   LLE Assessment   LLE Assessment X   Strength LLE   LLE Overall Strength 4/5   Light Touch   RLE Light Touch Grossly intact   LLE Light Touch Grossly intact   Bed Mobility   Supine to Sit 3  Moderate assistance   Additional items Assist x 1; Increased time required;Verbal cues   Additional Comments lEmo initially for sitting balance at EOB   Transfers   Sit to Stand 4  Minimal assistance   Additional items Assist x 1; Increased time required;Verbal cues   Stand to Sit 4  Minimal assistance   Additional items Assist x 1; Increased time required;Verbal cues   Additional Comments refused RW use   Ambulation/Elevation   Gait pattern Decreased foot clearance;Narrow ANNELISE; Excessively slow; Short stride   Gait Assistance 4  Minimal assist   Additional items Assist x 1   Assistive Device None   Distance 40 ft, limited by fatigue, tachycardic to 152 bpm with activity, 2x LOB required Elmo to correct, poor foot clearance B/L    Balance   Static Sitting Fair -   Dynamic Sitting Poor +   Static Standing Poor +   Dynamic Standing Poor +   Ambulatory Poor +   Activity Tolerance   Activity Tolerance Patient limited by pain; Patient limited by fatigue   Nurse Made Aware RN updated  Bed alarm engaged at end of session   Assessment   Prognosis Fair   Problem List Decreased strength;Decreased endurance; Impaired balance;Decreased mobility; Decreased safety awareness;Pain   Assessment Pt is a 38 y o  male seen for PT evaluation s/p admit to [facility] on 7/16/2021  Pt was admitted with a primary dx of: Sepsis  PT now consulted for assessment of mobility and d/c needs  Pt with Up with assistance orders  Pts current comorbidities and personal factors effecting treatment include: HTN, Central Cord syndrome s/p fall on 3/6/2021, attending rehab since fall, limited social support  Pts current clinical presentation is Unstable/ Unpredictable (high complexity) due to Ongoing medical management for primary dx, Decreased activity tolerance compared to baseline, Fall risk, Increased assistance needed from caregiver at current time, Trending lab values  Prior to admission, pt was independent with ambulation at [facility]  Upon evaluation, pt currently is requiring modA for bed mobility; Elmo for transfers and Elmo for ambulation 40 ft w/ no AD with 2x LOB  Pt presents at PT eval functioning below baseline and currently w/ overall mobility deficits 2* to: BLE weakness, impaired balance, decreased endurance, gait deviations, pain, decreased activity tolerance compared to baseline, decreased functional mobility tolerance compared to baseline, decreased safety awareness, fall risk  Pt currently at a fall risk 2* to impairments listed above  Pt will continue to benefit from skilled acute PT interventions to address stated impairments; to maximize functional mobility; for ongoing pt/ family training; and DME needs  At conclusion of PT session pt returned BTB and bed alarm engaged with phone and call bell within reach  Pt denies any further questions at this time  Recommend IP rehab upon hospital D/C  Barriers to Discharge Decreased caregiver support; Inaccessible home environment   Goals   Patient Goals to be able to walk on my own again   STG Expiration Date 08/01/21   Short Term Goal #1 In 14 days pt will be able to: 1   Demonstrate ability to perform all aspects of bed mobility independently to increase functional independence  2  Perform functional transfers with LRAD independently to facilitate safe return to previous living environment  3   Ambulate 150 ft with LRAD independently with stable vitals to improve safety with household distances and reduce fall risk  4  Improve LE strength grades by 1 to increase ease of functional mobility with transfers and gait  5  Pt will demonstrate improved balance by one grade in order to decrease risk of falls  6  Climb 3-5 steps with supervision and 1 HR to simulate entrance to home  PT Treatment Day 0   Plan   Treatment/Interventions Functional transfer training;LE strengthening/ROM; Elevations; Therapeutic exercise; Endurance training;Patient/family training;Equipment eval/education; Bed mobility;Gait training   PT Frequency Other (Comment)  (3-5x/week)   Recommendation   PT Discharge Recommendation Post acute rehabilitation services   PT - OK to Discharge Yes  (to IP rehab)   Gauri 8 in Bed Without Bedrails 3   Lying on Back to Sitting on Edge of Flat Bed 2   Moving Bed to Chair 3   Standing Up From Chair 3   Walk in Room 3   Climb 3-5 Stairs 2   Basic Mobility Inpatient Raw Score 16   Basic Mobility Standardized Score 38 32       Carry Dominique, PT, DPT

## 2021-07-18 NOTE — PLAN OF CARE
Problem: PHYSICAL THERAPY ADULT  Goal: Performs mobility at highest level of function for planned discharge setting  See evaluation for individualized goals  Description: Treatment/Interventions: Functional transfer training, LE strengthening/ROM, Elevations, Therapeutic exercise, Endurance training, Patient/family training, Equipment eval/education, Bed mobility, Gait training          See flowsheet documentation for full assessment, interventions and recommendations  Note: Prognosis: Fair  Problem List: Decreased strength, Decreased endurance, Impaired balance, Decreased mobility, Decreased safety awareness, Pain  Assessment: Pt is a 38 y o  male seen for PT evaluation s/p admit to [facility] on 7/16/2021  Pt was admitted with a primary dx of: Sepsis  PT now consulted for assessment of mobility and d/c needs  Pt with Up with assistance orders  Pts current comorbidities and personal factors effecting treatment include: HTN, Central Cord syndrome s/p fall on 3/6/2021, attending rehab since fall, limited social support  Pts current clinical presentation is Unstable/ Unpredictable (high complexity) due to Ongoing medical management for primary dx, Decreased activity tolerance compared to baseline, Fall risk, Increased assistance needed from caregiver at current time, Trending lab values  Prior to admission, pt was independent with ambulation at [facility]  Upon evaluation, pt currently is requiring modA for bed mobility; Elmo for transfers and Elmo for ambulation 40 ft w/ no AD with 2x LOB  Pt presents at PT eval functioning below baseline and currently w/ overall mobility deficits 2* to: BLE weakness, impaired balance, decreased endurance, gait deviations, pain, decreased activity tolerance compared to baseline, decreased functional mobility tolerance compared to baseline, decreased safety awareness, fall risk  Pt currently at a fall risk 2* to impairments listed above    Pt will continue to benefit from skilled acute PT interventions to address stated impairments; to maximize functional mobility; for ongoing pt/ family training; and DME needs  At conclusion of PT session pt returned BTB and bed alarm engaged with phone and call bell within reach  Pt denies any further questions at this time  Recommend IP rehab upon hospital D/C  Barriers to Discharge: Decreased caregiver support, Inaccessible home environment        PT Discharge Recommendation: Post acute rehabilitation services     PT - OK to Discharge: Yes (to IP rehab)    See flowsheet documentation for full assessment

## 2021-07-18 NOTE — ASSESSMENT & PLAN NOTE
· Noted on last admission but was discharged after passing a voiding trial without a jimenez   · Jimenez in place now- attempt voiding trial prior to discharge when cleared by urology   · C/w flomax

## 2021-07-18 NOTE — PLAN OF CARE
Problem: Potential for Falls  Goal: Patient will remain free of falls  Description: INTERVENTIONS:  - Educate patient/family on patient safety including physical limitations  - Instruct patient to call for assistance with activity   - Consult OT/PT to assist with strengthening/mobility   - Keep Call bell within reach  - Keep bed low and locked with side rails adjusted as appropriate  - Keep care items and personal belongings within reach  - Initiate and maintain comfort rounds  - Make Fall Risk Sign visible to staff  - Offer Toileting every 2 Hours, in advance of need  - Initiate/Maintain alarm  - Obtain necessary fall risk management equipment:   - Apply yellow socks and bracelet for high fall risk patients  - Consider moving patient to room near nurses station  Outcome: Progressing     Problem: Prexisting or High Potential for Compromised Skin Integrity  Goal: Skin integrity is maintained or improved  Description: INTERVENTIONS:  - Identify patients at risk for skin breakdown  - Assess and monitor skin integrity  - Assess and monitor nutrition and hydration status  - Monitor labs   - Assess for incontinence   - Turn and reposition patient  - Assist with mobility/ambulation  - Relieve pressure over bony prominences  - Avoid friction and shearing  - Provide appropriate hygiene as needed including keeping skin clean and dry  - Evaluate need for skin moisturizer/barrier cream  - Collaborate with interdisciplinary team   - Patient/family teaching  - Consider wound care consult   Outcome: Progressing     Problem: MOBILITY - ADULT  Goal: Maintain or return to baseline ADL function  Description: INTERVENTIONS:  -  Assess patient's ability to carry out ADLs; assess patient's baseline for ADL function and identify physical deficits which impact ability to perform ADLs (bathing, care of mouth/teeth, toileting, grooming, dressing, etc )  - Assess/evaluate cause of self-care deficits   - Assess range of motion  - Assess patient's mobility; develop plan if impaired  - Assess patient's need for assistive devices and provide as appropriate  - Encourage maximum independence but intervene and supervise when necessary  - Involve family in performance of ADLs  - Assess for home care needs following discharge   - Consider OT consult to assist with ADL evaluation and planning for discharge  - Provide patient education as appropriate  Outcome: Progressing  Goal: Maintains/Returns to pre admission functional level  Description: INTERVENTIONS:  - Perform BMAT or MOVE assessment daily    - Set and communicate daily mobility goal to care team and patient/family/caregiver  - Collaborate with rehabilitation services on mobility goals if consulted  - Perform Range of Motion 3 times a day  - Reposition patient every 2 hours    - Dangle patient 3 times a day  - Stand patient  times a day  - Ambulate patient times a day  - Out of bed to chair times a day   - Out of bed for meals times a day  - Out of bed for toileting  - Record patient progress and toleration of activity level   Outcome: Progressing

## 2021-07-18 NOTE — ASSESSMENT & PLAN NOTE
· Likely from urinary source possibly from not completely emptying his bladder (pt was noted to have urinary retention during his last admission in March but was discharged without a jimenez to rehab)  · Urology following  · Maintain jimenez for now- voiding trial prior to discharge  · urine culture (+) klebsiella - c/w cefepime today Day #2  · Pending scrotal US today   · Pain meds   · Follow serial exams

## 2021-07-18 NOTE — CASE MANAGEMENT
Not a bundle  Not a readmission  Met with pt & explained CM role  Pt states he was at [facility] [place] for rehab prior to admission & plan is to return when medically cleared  Was at [facility] rehab prior to [facility]    TC to [facility] & informed pt was ambulating independently with no dme  Needs minimal assist for bathing & dressing d/t hand limitations  Confirmed pt was there for rehab, not long term care  They were starting the process for pt to return home  PT/OT tbd  CM to follow  Asked pt for emergency contact & would not provide one  CM reviewed d/c planning process including the following: identifying help at home, patient preference for d/c planning needs, Discharge Lounge, [facility] Meds to Bed program, availability of treatment team to discuss questions or concerns patient and/or family may have regarding understanding medications and recognizing signs and symptoms once discharged  CM also encouraged patient to follow up with all recommended appointments after discharge  Patient advised of importance for patient and family to participate in managing patients medical well being

## 2021-07-19 ENCOUNTER — APPOINTMENT (INPATIENT)
Dept: RADIOLOGY | Facility: HOSPITAL | Age: 36
DRG: 483 | End: 2021-07-19
Payer: COMMERCIAL

## 2021-07-19 PROBLEM — E87.6 HYPOKALEMIA: Status: RESOLVED | Noted: 2021-07-17 | Resolved: 2021-07-19

## 2021-07-19 LAB
ANION GAP SERPL CALCULATED.3IONS-SCNC: 8 MMOL/L (ref 4–13)
BUN SERPL-MCNC: 6 MG/DL (ref 5–25)
CALCIUM SERPL-MCNC: 8.9 MG/DL (ref 8.3–10.1)
CHLORIDE SERPL-SCNC: 103 MMOL/L (ref 100–108)
CO2 SERPL-SCNC: 24 MMOL/L (ref 21–32)
CREAT SERPL-MCNC: 0.58 MG/DL (ref 0.6–1.3)
ERYTHROCYTE [DISTWIDTH] IN BLOOD BY AUTOMATED COUNT: 12.7 % (ref 11.6–15.1)
GFR SERPL CREATININE-BSD FRML MDRD: 151 ML/MIN/1.73SQ M
GLUCOSE SERPL-MCNC: 72 MG/DL (ref 65–140)
HCT VFR BLD AUTO: 31.1 % (ref 36.5–49.3)
HGB BLD-MCNC: 10.3 G/DL (ref 12–17)
MCH RBC QN AUTO: 30.8 PG (ref 26.8–34.3)
MCHC RBC AUTO-ENTMCNC: 33.1 G/DL (ref 31.4–37.4)
MCV RBC AUTO: 93 FL (ref 82–98)
PLATELET # BLD AUTO: 332 THOUSANDS/UL (ref 149–390)
PMV BLD AUTO: 10.6 FL (ref 8.9–12.7)
POTASSIUM SERPL-SCNC: 3.9 MMOL/L (ref 3.5–5.3)
PROCALCITONIN SERPL-MCNC: 2.31 NG/ML
RBC # BLD AUTO: 3.34 MILLION/UL (ref 3.88–5.62)
SODIUM SERPL-SCNC: 135 MMOL/L (ref 136–145)
WBC # BLD AUTO: 19.98 THOUSAND/UL (ref 4.31–10.16)

## 2021-07-19 PROCEDURE — 80048 BASIC METABOLIC PNL TOTAL CA: CPT | Performed by: NURSE PRACTITIONER

## 2021-07-19 PROCEDURE — 85027 COMPLETE CBC AUTOMATED: CPT | Performed by: NURSE PRACTITIONER

## 2021-07-19 PROCEDURE — 76870 US EXAM SCROTUM: CPT

## 2021-07-19 PROCEDURE — 84145 PROCALCITONIN (PCT): CPT | Performed by: NURSE PRACTITIONER

## 2021-07-19 PROCEDURE — 99232 SBSQ HOSP IP/OBS MODERATE 35: CPT | Performed by: UROLOGY

## 2021-07-19 PROCEDURE — 99232 SBSQ HOSP IP/OBS MODERATE 35: CPT | Performed by: NURSE PRACTITIONER

## 2021-07-19 RX ORDER — ACETAMINOPHEN 325 MG/1
975 TABLET ORAL 3 TIMES DAILY
Status: DISCONTINUED | OUTPATIENT
Start: 2021-07-19 | End: 2021-07-19

## 2021-07-19 RX ORDER — OXYCODONE HYDROCHLORIDE 5 MG/1
5 TABLET ORAL EVERY 4 HOURS PRN
Status: DISCONTINUED | OUTPATIENT
Start: 2021-07-19 | End: 2021-07-20

## 2021-07-19 RX ORDER — POLYETHYLENE GLYCOL 3350 17 G/17G
17 POWDER, FOR SOLUTION ORAL DAILY PRN
Status: DISCONTINUED | OUTPATIENT
Start: 2021-07-19 | End: 2021-07-23 | Stop reason: HOSPADM

## 2021-07-19 RX ORDER — ACETAMINOPHEN 325 MG/1
975 TABLET ORAL EVERY 6 HOURS PRN
Status: DISCONTINUED | OUTPATIENT
Start: 2021-07-19 | End: 2021-07-23 | Stop reason: HOSPADM

## 2021-07-19 RX ORDER — HYDROMORPHONE HYDROCHLORIDE 2 MG/1
2 TABLET ORAL
Status: DISCONTINUED | OUTPATIENT
Start: 2021-07-19 | End: 2021-07-23 | Stop reason: HOSPADM

## 2021-07-19 RX ADMIN — HYDROMORPHONE HYDROCHLORIDE 1 MG: 1 INJECTION, SOLUTION INTRAMUSCULAR; INTRAVENOUS; SUBCUTANEOUS at 17:16

## 2021-07-19 RX ADMIN — HYDROMORPHONE HYDROCHLORIDE 1 MG: 1 INJECTION, SOLUTION INTRAMUSCULAR; INTRAVENOUS; SUBCUTANEOUS at 08:09

## 2021-07-19 RX ADMIN — HYDROMORPHONE HYDROCHLORIDE 2 MG: 2 TABLET ORAL at 13:20

## 2021-07-19 RX ADMIN — LIDOCAINE 1 PATCH: 50 PATCH TOPICAL at 20:23

## 2021-07-19 RX ADMIN — CEFEPIME HYDROCHLORIDE 2000 MG: 2 INJECTION, POWDER, FOR SOLUTION INTRAVENOUS at 11:56

## 2021-07-19 RX ADMIN — CELECOXIB 100 MG: 100 CAPSULE ORAL at 08:09

## 2021-07-19 RX ADMIN — GABAPENTIN 600 MG: 300 CAPSULE ORAL at 08:08

## 2021-07-19 RX ADMIN — SODIUM CHLORIDE 125 ML/HR: 0.9 INJECTION, SOLUTION INTRAVENOUS at 01:57

## 2021-07-19 RX ADMIN — BACLOFEN 15 MG: 10 TABLET ORAL at 08:08

## 2021-07-19 RX ADMIN — HYDROMORPHONE HYDROCHLORIDE 1 MG: 1 INJECTION, SOLUTION INTRAMUSCULAR; INTRAVENOUS; SUBCUTANEOUS at 20:22

## 2021-07-19 RX ADMIN — SODIUM CHLORIDE 125 ML/HR: 0.9 INJECTION, SOLUTION INTRAVENOUS at 09:49

## 2021-07-19 RX ADMIN — SODIUM CHLORIDE 125 ML/HR: 0.9 INJECTION, SOLUTION INTRAVENOUS at 18:15

## 2021-07-19 RX ADMIN — GABAPENTIN 600 MG: 300 CAPSULE ORAL at 15:35

## 2021-07-19 RX ADMIN — HYDROMORPHONE HYDROCHLORIDE 2 MG: 2 TABLET ORAL at 15:35

## 2021-07-19 RX ADMIN — HYDROMORPHONE HYDROCHLORIDE 1 MG: 1 INJECTION, SOLUTION INTRAMUSCULAR; INTRAVENOUS; SUBCUTANEOUS at 04:42

## 2021-07-19 RX ADMIN — ENOXAPARIN SODIUM 40 MG: 40 INJECTION SUBCUTANEOUS at 08:10

## 2021-07-19 RX ADMIN — MELATONIN 3 MG: at 23:06

## 2021-07-19 RX ADMIN — BACLOFEN 15 MG: 10 TABLET ORAL at 15:35

## 2021-07-19 RX ADMIN — GABAPENTIN 600 MG: 300 CAPSULE ORAL at 20:23

## 2021-07-19 RX ADMIN — HYDROMORPHONE HYDROCHLORIDE 2 MG: 2 TABLET ORAL at 23:05

## 2021-07-19 RX ADMIN — HYDROMORPHONE HYDROCHLORIDE 2 MG: 2 TABLET ORAL at 06:06

## 2021-07-19 RX ADMIN — ACETAMINOPHEN 975 MG: 325 TABLET, FILM COATED ORAL at 15:34

## 2021-07-19 RX ADMIN — HYDROMORPHONE HYDROCHLORIDE 2 MG: 2 TABLET ORAL at 09:52

## 2021-07-19 RX ADMIN — HYDROMORPHONE HYDROCHLORIDE 2 MG: 2 TABLET ORAL at 01:55

## 2021-07-19 RX ADMIN — CEFEPIME HYDROCHLORIDE 2000 MG: 2 INJECTION, POWDER, FOR SOLUTION INTRAVENOUS at 00:27

## 2021-07-19 RX ADMIN — HYDROMORPHONE HYDROCHLORIDE 1 MG: 1 INJECTION, SOLUTION INTRAMUSCULAR; INTRAVENOUS; SUBCUTANEOUS at 11:29

## 2021-07-19 RX ADMIN — HYDROMORPHONE HYDROCHLORIDE 2 MG: 2 TABLET ORAL at 18:42

## 2021-07-19 RX ADMIN — NORTRIPTYLINE HYDROCHLORIDE 10 MG: 10 CAPSULE ORAL at 08:09

## 2021-07-19 NOTE — ASSESSMENT & PLAN NOTE
· POA secondary to UTI, orchitis, epididymitis   · Meets criteria for sepsis - tachycardia, leukocytosis, lactic acidosis (now resolved) at ER at outside hospital  · Follow cultures   · Blood cultures no growth at 72 h  · Urine culture > 100,000 cfu/ml klebsiella, susceptible to Cefazolin   · C/w cefepime   · Urology following- plan for repeat scrotal US today and possible OR tomorrow for exploratory surgery if no improvement or worsening imaging   · Might require an orchiectomy

## 2021-07-19 NOTE — ASSESSMENT & PLAN NOTE
· Likely from urinary source possibly from not completely emptying his bladder (pt was noted to have urinary retention during his last admission in March but was discharged without a jimenez to rehab)  · Urology following  · Maintain jimenez for now- voiding trial prior to discharge  · Urine culture (+) klebsiella - c/w cefepime today day #3  · Pending repeat scrotal US today   · Pain meds   · Follow serial exams

## 2021-07-19 NOTE — PROGRESS NOTES
UROLOGY PROGRESS NOTE   Patient Identifiers: Lei Freed (MRN [id number])  Date of Service: 7/19/2021        Assessment:     1  Complicated epididymitis   - trans scrotal ultrasound yesterday shows compressed gonad without parenchymal abscess    2  Reactive right hydrocele    3  Leukocytosis  - improved    4  Fever  -improved    Patient is looking well this morning  He feels his pain is somewhat improved  He has been afebrile since yesterday evening  His leukocytosis is improved  We discussed options including continued antibiotics versus surgical intervention  The pros and cons of each were discussed including differential recovery time and potential risk of testicular loss with surgical intervention  At the present time the patient has a healthy appearing gonad on yesterday's ultrasound with no focal intratesticular abscess  As things appear to be improving with conservative measures I recommended additional 24 hours of nonsurgical intervention with continued antibiotics and continued close monitoring  Patient is amenable to this plan      Plan:   -continue broad-spectrum antibiotics  -continue scrotal elevation  -ice packs ordered for scrotum for comfort  -I recommended Billingsley removal, patient prefers to maintain the Billingsley catheter for an additional day  -we will repeat a scrotal ultrasound today  -if the patient does not improve further by tomorrow, if his ultrasound shows worsening signs of infection, or if his fever curve were to worsen I would plan for scrotal exploration tomorrow  -patient has been made NPO past midnight for tomorrow and a regular diet has been ordered for today        Subjective:     24 HR EVENTS:   no significant events  Patient has  no complaints        Objective:     VITALS:    Vitals:    07/19/21 0801   BP: 136/82   Pulse: 103   Resp: 20   Temp: 99 2 °F (37 3 °C)   SpO2: 97%       INS & OUTS:  [unfilled]    LABS:  Lab Results   Component Value Date    HGB 10 3 (L) 07/19/2021    HCT 31 1 (L) 07/19/2021    WBC 19 98 (H) 07/19/2021     07/19/2021   ]    Lab Results   Component Value Date    K 3 9 07/19/2021     07/19/2021    CO2 24 07/19/2021    BUN 6 07/19/2021    CREATININE 0 58 (L) 07/19/2021    CALCIUM 8 9 07/19/2021    GLUCOSE 87 03/06/2021   ]    INPATIENT MEDS:    Current Facility-Administered Medications:     acetaminophen (TYLENOL) tablet 975 mg, 975 mg, Oral, Q8H PRN, JAYLYN Rodriguez, 975 mg at 07/18/21 1923    atorvastatin (LIPITOR) tablet 40 mg, 40 mg, Oral, Daily With JAYLYN Nagy, 40 mg at 07/18/21 1553    baclofen tablet 15 mg, 15 mg, Oral, TID, JAYLYN Rodriguez, 15 mg at 07/19/21 0808    cefepime (MAXIPIME) 2,000 mg in dextrose 5 % 50 mL IVPB, 2,000 mg, Intravenous, Q12H, JAYLYN Rodriguez, Last Rate: 100 mL/hr at 07/19/21 0027, 2,000 mg at 07/19/21 0027    celecoxib (CeleBREX) capsule 100 mg, 100 mg, Oral, BID, JAYLYN Rodriguez, 100 mg at 07/19/21 0809    enoxaparin (LOVENOX) subcutaneous injection 40 mg, 40 mg, Subcutaneous, Daily, Partha Torres MD, 40 mg at 07/19/21 0810    gabapentin (NEURONTIN) capsule 600 mg, 600 mg, Oral, TID, JAYLYN Rodriguez, 600 mg at 07/19/21 0808    HYDROmorphone (DILAUDID) injection 1 mg, 1 mg, Intravenous, Q3H PRN, JAYLYN Rodriguez, 1 mg at 07/19/21 0809    HYDROmorphone (DILAUDID) tablet 2 mg, 2 mg, Oral, Q3H PRN, Partha Torres MD, 2 mg at 07/19/21 0606    lidocaine (LIDODERM) 5 % patch 1 patch, 1 patch, Topical, Daily, Kailey Vasquez PA-C, 1 patch at 07/18/21 2139    melatonin tablet 3 mg, 3 mg, Oral, HS, JAYLYN Rodriguez, 3 mg at 07/18/21 2140    menthol-methyl salicylate (BENGAY) 65-82 % cream, , Apply externally, 4x Daily PRN, Kailey Vasquez PA-C    nortriptyline (PAMELOR) capsule 10 mg, 10 mg, Oral, Daily, JAYLYN Rodriguez, 10 mg at 07/19/21 0809    senna (SENOKOT) tablet 17 2 mg, 2 tablet, Oral, HS, JAYLYN Rodriguez, 17 2 mg at 07/17/21 2223    sodium chloride 0 9 % infusion, 125 mL/hr, Intravenous, Continuous, Khari Aceves MD, Last Rate: 125 mL/hr at 07/19/21 0157, 125 mL/hr at 07/19/21 0157    tamsulosin (FLOMAX) capsule 0 4 mg, 0 4 mg, Oral, Daily With Dinner, Ardisonali Yeung, CRNP, 0 4 mg at 07/18/21 1554      Physical Exam:   /82   Pulse 103   Temp 99 2 °F (37 3 °C)   Resp 20   Ht 5' 7" (1 702 m)   Wt 75 kg (165 lb 5 5 oz)   SpO2 97%   BMI 25 90 kg/m²   GEN: resting comfortably  RESP: breathing comfortably with no accessory muscle use  CV: no significant peripheral edema  ABD: soft and appropriately tender to palpation  INCISION: none  DRAINS: none  NUÑEZ: in place draining clear yellow urine    Genitourinary exam reveals a tense right hemiscrotum, no drainage or fluctuance, no cellulitic changes or tissue loss with within the scrotal skin  Contralateral left gonad is normal       RADIOLOGY:   FINDINGS:     TESTES:   Testes are symmetric and normal in size      RIGHT testis = 5 0 x 3 0 x 2 8 cm, volume 22 cc (prior 28 cc)  Parenchymal pattern is relatively decreased in echogenicity compared to the contralateral side and increasingly heterogeneous  No intratesticular mass lesion or calcifications      LEFT testis = 5 0 x 2 3 x 2 6 cm, volume 16 cc (18 cc)  Normal contour with homogeneous smooth echotexture  No intratesticular mass lesion or calcifications      Although some intratesticular flow remains, it is significantly reduced compared to the prior study and relative to the contralateral side  One minimal flow does remain within the right testicle is also abnormal with a very high resistance waveform      EPIDIDYMIDES:   Mildly enlarged and heterogeneous    Remains markedly hyperemic flow      HYDROCELE:  The right hemiscrotum shows a septated complex collection, increasing in overall volume compared to prior study and now rather than mostly fluid and low level echoes is considerably increased in fluid echogenicity suggesting increasing   complexity of the fluid content  Evolving abscess is suspected      VARICOCELE:  None present  Visible vessels appear thick-walled and inflamed  No definitive thrombosis is detected      SCROTUM: Increasing scrotal edema and thickening compared to the prior      IMPRESSION:     Although the testicular size is slightly smaller than on the prior, the degree of heterogeneity and decreased echogenicity is concerning  In addition, there is considerable decrease in relative blood flow within the testis on the right  While these   findings could be related to partial torsion, the significant diminished flow could also be related to the long-standing swelling/edema and worsening surrounding findings within the hemiscrotum  See above for further details  Also in the interim there   is increasing scrotal edema and thickening as well as increasing complexity and echogenicity of the fluid within the septated hydrocele raising suspicion for  evolving abscess which may also be increase in the compression on the testis and decreasing   flow  Recommend urology consultation      The study was marked in EPIC for immediate notification

## 2021-07-19 NOTE — OCCUPATIONAL THERAPY NOTE
OT Screen       07/19/21 1259   Note Type   Note type Screen     OT orders received and chart reviewed  Per case management/EMR, [facility]  [facility]  Pt will return to facility once medically appropriate  Pt requires min Ax1 with UB/LB ADLs  No acute OT services needed at this time  DC skilled OT services      Aarti Paulino MS, OTR/L

## 2021-07-19 NOTE — PROGRESS NOTES
[facility]  Progress Note Bobby Ferrer 1985, 38 y o  male MRN: [id number]  Unit/Bed#: [facility] 810-01 Encounter: [id number]  Primary Care Provider: Rodrigo Bardales MD   Date and time admitted to hospital: 7/16/2021  9:07 AM    * Sepsis ([address] )  Assessment & Plan  · POA secondary to UTI, orchitis, epididymitis   · Meets criteria for sepsis - tachycardia, leukocytosis, lactic acidosis (now resolved) at ER at outside hospital  · Follow cultures   · Blood cultures no growth at 72 h  · Urine culture > 100,000 cfu/ml klebsiella, susceptible to Cefazolin   · C/w cefepime   · Urology following- plan for repeat scrotal US today and possible OR tomorrow for exploratory surgery if no improvement or worsening imaging   · Might require an orchiectomy     Orchitis and epididymitis  Assessment & Plan  · Likely from urinary source possibly from not completely emptying his bladder (pt was noted to have urinary retention during his last admission in March but was discharged without a jimenez to rehab)  · Urology following  · Maintain jimenez for now- voiding trial prior to discharge  · Urine culture (+) klebsiella - c/w cefepime today day #3  · Pending repeat scrotal US today   · Pain meds   · Follow serial exams     Urinary retention  Assessment & Plan  · Noted on last admission but was discharged after passing a voiding trial without a jimenez   · Jimenez in place now- attempt voiding trial prior to discharge when cleared by urology   · C/w flomax    HLD (hyperlipidemia)  Assessment & Plan  · C/w statin    Benign essential HTN  Assessment & Plan  · Hold HCTZ in the setting of sepsis   · BP stable off meds     Acute low back pain  Assessment & Plan  · Pt reporting acute low back pain after a mechanical fall he experienced at [facility] prior to admission- improved today   · lumbar xray (-)    · Fall precautions   · PT/OT  · Restarted home medications     Central cord syndrome [facility])  Assessment & Plan  · Patient sustained a fall downstairs on 2021 suffering a severe spinal cord injury, S/p C3-5 laminectomy, C2-7 fusion on 3/7  · Currently residing at [facility]   · Pt was noted to have urinary retention during his last admission but was able to have jimenez removed at discharge- monitor for urinary retention prior to discharge    · Medication personally reviewed with RN at [facility] and ordered   · Gabapentin 600mg TID   · Baclofen 15mg TID   · Dilaudid 2mg PO q6hprn  · nortriptyline 10mg daily   · Celecoxib 100mg bid   · tyelenol 1000 mg TID - ordered PRN given sepsis   · flomax 0 4mg daily  · lipitor 40mg daily   · hctz 25mg daily- hold in the setting of sepsis     Hypokalemia-resolved as of 2021  Assessment & Plan  · Repleted  · K level 3 2 -> 3 9      VTE Pharmacologic Prophylaxis:   Pharmacologic: Enoxaparin (Lovenox)  Mechanical VTE Prophylaxis in Place: Yes    Patient Centered Rounds: I have performed bedside rounds with nursing staff today  Discussions with Specialists or Other Care Team Provider: nursing, CM, urology note appreciated     Education and Discussions with Family / Patient: I have answered all questions to the best of my ability  Time Spent for Care: 30 minutes  More than 50% of total time spent on counseling and coordination of care as described above  Current Length of Stay: 3 day(s)    Current Patient Status: Inpatient   Certification Statement: The patient will continue to require additional inpatient hospital stay due to complicated epididymitis     Discharge Plan: Patient is not medically stable for discharge today, likely in 48-72 hours pending progress  Code Status: Level 1 - Full Code      Subjective:   Patient found to be resting in bed  Tolerating jimenez catheter without pain or discomfort  Denies HA, dizziness, CP, or SOB  Last BM yesterday  Poor oral intake       Objective:     Vitals:   Temp (24hrs), Av 5 °F (37 5 °C), Min:98 3 °F (36 8 °C), Max:102 9 °F (39 4 °C)    Temp:  [98 3 °F (36 8 °C)-102 9 °F (39 4 °C)] 98 5 °F (36 9 °C)  HR:  [] 93  Resp:  [15-20] 16  BP: (109-139)/(68-91) 133/82  SpO2:  [91 %-98 %] 95 %  Body mass index is 25 9 kg/m²  Input and Output Summary (last 24 hours): Intake/Output Summary (Last 24 hours) at 7/19/2021 1504  Last data filed at 7/19/2021 1029  Gross per 24 hour   Intake 2897 91 ml   Output 4150 ml   Net -1252 09 ml       Physical Exam:     Physical Exam  Vitals and nursing note reviewed  Constitutional:       General: He is not in acute distress  Appearance: He is well-developed  He is not toxic-appearing or diaphoretic  Comments: Pleasant, talkative gentleman resting in bed on room air   HENT:      Head: Normocephalic  Cardiovascular:      Rate and Rhythm: Normal rate and regular rhythm  Pulmonary:      Effort: Pulmonary effort is normal  No respiratory distress  Breath sounds: Normal breath sounds  No wheezing, rhonchi or rales  Abdominal:      General: Bowel sounds are normal  There is no distension (mild, soft)  Palpations: Abdomen is soft  Tenderness: There is no abdominal tenderness  Genitourinary:     Comments: Indwelling jimenez catheter draining clear, yellow urine   Musculoskeletal:         General: Normal range of motion  Cervical back: Normal range of motion  Right lower leg: No edema  Left lower leg: No edema  Skin:     General: Skin is warm and dry  Capillary Refill: Capillary refill takes less than 2 seconds  Neurological:      Mental Status: He is alert and oriented to person, place, and time  Mental status is at baseline  Motor: Weakness (bilateral wrists) present  Deep Tendon Reflexes: Reflexes are normal and symmetric  Psychiatric:         Mood and Affect: Mood normal          Behavior: Behavior normal          Thought Content:  Thought content normal          Judgment: Judgment normal          Additional Data: Labs:    Results from last 7 days   Lab Units 07/19/21  0544 07/16/21  1039 07/16/21  0000   WBC Thousand/uL 19 98*   < > 14 92*   HEMOGLOBIN g/dL 10 3*   < > 12 9   HEMATOCRIT % 31 1*   < > 39 9   PLATELETS Thousands/uL 332  --  360   NEUTROS PCT %  --   --  83*   LYMPHS PCT %  --   --  8*   MONOS PCT %  --   --  8   EOS PCT %  --   --  0    < > = values in this interval not displayed  Results from last 7 days   Lab Units 07/19/21  0544 07/16/21  0000   POTASSIUM mmol/L 3 9 3 4*   CHLORIDE mmol/L 103 104   CO2 mmol/L 24 31   BUN mg/dL 6 11   CREATININE mg/dL 0 58* 1 20   CALCIUM mg/dL 8 9 9 2   ALK PHOS U/L  --  77   ALT U/L  --  20   AST U/L  --  17     Results from last 7 days   Lab Units 07/16/21  0000   INR  1 07       * I Have Reviewed All Lab Data Listed Above  * Additional Pertinent Lab Tests Reviewed: All Labs Within Last 24 Hours Reviewed    Imaging:    Imaging Reports Reviewed Today Include: Repeat scrotal US, xray lumbar spine, CXR, CT A/P  Imaging Personally Reviewed by Myself Includes:  None     Recent Cultures (last 7 days):     Results from last 7 days   Lab Units 07/16/21  0047 07/16/21  0000   BLOOD CULTURE   --  No Growth at 72 hrs  No Growth at 72 hrs     URINE CULTURE  >100,000 cfu/ml Klebsiella pneumoniae*  --        Last 24 Hours Medication List:   Current Facility-Administered Medications   Medication Dose Route Frequency Provider Last Rate    acetaminophen  975 mg Oral TID JAYLYN Bertrand      atorvastatin  40 mg Oral Daily With [employer], JAYLYN      baclofen  15 mg Oral TID Onita Jaden, JAYLYN      cefepime  2,000 mg Intravenous Q12H JAYLYN Rodriguez 2,000 mg (07/19/21 1156)    celecoxib  100 mg Oral BID Onita Jaden, JAYLYN      enoxaparin  40 mg Subcutaneous Daily Oral MD Franki      gabapentin  600 mg Oral TID Onita Jaden, JAYLYN      HYDROmorphone  1 mg Intravenous Q3H PRN Onita Jaden, JAYLYN      HYDROmorphone  2 mg Oral Q3H PRN JAYLYN Hartman      lidocaine  1 patch Topical Daily Kailey Vasquez PA-C      melatonin  3 mg Oral HS JAYLYN Armstrong      menthol-methyl salicylate   Apply externally 4x Daily PRN Kailey Vasquez PA-C      nortriptyline  10 mg Oral Daily JAYLYN Armstrong      oxyCODONE  5 mg Oral Q4H PRN JAYLYN Hartman      senna  2 tablet Oral HS JAYLYN Armstrong      sodium chloride  125 mL/hr Intravenous Continuous Nayeli Love  mL/hr (07/19/21 6547)    tamsulosin  0 4 mg Oral Daily With Dinner JAYLYN Armstrong          Today, Patient Was Seen By: JAYLYN Hartman    ** Please Note: Dictation voice to text software may have been used in the creation of this document   **

## 2021-07-19 NOTE — ASSESSMENT & PLAN NOTE
· Patient sustained a fall downstairs on March 6, 2021 suffering a severe spinal cord injury, S/p C3-5 laminectomy, C2-7 fusion on 3/7  · Currently residing at [facility]   · Pt was noted to have urinary retention during his last admission but was able to have jimenez removed at discharge- monitor for urinary retention prior to discharge    · Medication personally reviewed with RN at [facility] and ordered   · Gabapentin 600mg TID   · Baclofen 15mg TID   · Dilaudid 2mg PO q6hprn  · nortriptyline 10mg daily   · Celecoxib 100mg bid   · tyelenol 1000 mg TID - ordered PRN given sepsis   · flomax 0 4mg daily  · lipitor 40mg daily   · hctz 25mg daily- hold in the setting of sepsis

## 2021-07-19 NOTE — UTILIZATION REVIEW
Inpatient Admission Authorization Request   NOTIFICATION OF INPATIENT ADMISSION/INPATIENT AUTHORIZATION REQUEST   SERVICING FACILITY:   [facility]  Address: [address] [place] [address], [address] [place], [place] [id number]  Tax ID: [id number]  NPI: [id number]  Place of Service: Inpatient [address] [facility] Code: 24     ATTENDING PROVIDER:  Attending Name and NPI#: Mani Sepulveda Md [[id number]]  Address: [address] [place] [address], [address] [place] [address]  Phone: [phone]     UTILIZATION REVIEW CONTACT:  Abigail Alvarez Utilization   Network Utilization Review Department  Phone: [phone]  Fax: [phone]  Email: [email or web address]     PHYSICIAN ADVISORY SERVICES:  FOR OZWN-AB-HPFA REVIEW - MEDICAL NECESSITY DENIAL  Phone: [phone]  Fax: [phone]  Email: [email or web address]     TYPE OF REQUEST:  Inpatient Status     ADMISSION INFORMATION:  ADMISSION DATE/TIME: 7/16/21  9:42 AM  PATIENT DIAGNOSIS CODE/DESCRIPTION:  Testicular discomfort [N50 819]  Sepsis ([facility] [address] ) [A41 9]  DISCHARGE DATE/TIME: No discharge date for patient encounter  DISCHARGE DISPOSITION (IF DISCHARGED): [facility]     IMPORTANT INFORMATION:  Please contact the Abigail Alvarez directly with any questions or concerns regarding this request  Department voicemails are confidential     Send requests for admission clinical reviews, concurrent reviews, approvals, and administrative denials due to lack of clinical to fax [phone]

## 2021-07-19 NOTE — ASSESSMENT & PLAN NOTE
· Pt reporting acute low back pain after a mechanical fall he experienced at [facility] prior to admission- improved today   · lumbar xray (-)    · Fall precautions   · PT/OT  · Restarted home medications

## 2021-07-19 NOTE — PLAN OF CARE
Problem: Potential for Falls  Goal: Patient will remain free of falls  Description: INTERVENTIONS:  - Educate patient/family on patient safety including physical limitations  - Instruct patient to call for assistance with activity   - Consult OT/PT to assist with strengthening/mobility   - Keep Call bell within reach  - Keep bed low and locked with side rails adjusted as appropriate  - Keep care items and personal belongings within reach  - Initiate and maintain comfort rounds  - Make Fall Risk Sign visible to staff  - Offer Toileting every 2 Hours, in advance of need  - Initiate/Maintain alarm  - Obtain necessary fall risk management equipment:   - Apply yellow socks and bracelet for high fall risk patients  - Consider moving patient to room near nurses station  Outcome: Progressing     Problem: Prexisting or High Potential for Compromised Skin Integrity  Goal: Skin integrity is maintained or improved  Description: INTERVENTIONS:  - Identify patients at risk for skin breakdown  - Assess and monitor skin integrity  - Assess and monitor nutrition and hydration status  - Monitor labs   - Assess for incontinence   - Turn and reposition patient  - Assist with mobility/ambulation  - Relieve pressure over bony prominences  - Avoid friction and shearing  - Provide appropriate hygiene as needed including keeping skin clean and dry  - Evaluate need for skin moisturizer/barrier cream  - Collaborate with interdisciplinary team   - Patient/family teaching  - Consider wound care consult   Outcome: Progressing     Problem: MOBILITY - ADULT  Goal: Maintain or return to baseline ADL function  Description: INTERVENTIONS:  -  Assess patient's ability to carry out ADLs; assess patient's baseline for ADL function and identify physical deficits which impact ability to perform ADLs (bathing, care of mouth/teeth, toileting, grooming, dressing, etc )  - Assess/evaluate cause of self-care deficits   - Assess range of motion  - Assess patient's mobility; develop plan if impaired  - Assess patient's need for assistive devices and provide as appropriate  - Encourage maximum independence but intervene and supervise when necessary  - Involve family in performance of ADLs  - Assess for home care needs following discharge   - Consider OT consult to assist with ADL evaluation and planning for discharge  - Provide patient education as appropriate  Outcome: Progressing  Goal: Maintains/Returns to pre admission functional level  Description: INTERVENTIONS:  - Perform BMAT or MOVE assessment daily    - Set and communicate daily mobility goal to care team and patient/family/caregiver  - Collaborate with rehabilitation services on mobility goals if consulted  - Perform Range of Motion 3 times a day  - Reposition patient every 2 hours    - Dangle patient 3 times a day  - Stand patient 3 times a day  - Ambulate patient 3 times a day  - Out of bed to chair 3 times a day   - Out of bed for meals 3 times a day  - Out of bed for toileting  - Record patient progress and toleration of activity level   Outcome: Progressing     Problem: SAFETY ADULT  Goal: Patient will remain free of falls  Description: INTERVENTIONS:  - Educate patient/family on patient safety including physical limitations  - Instruct patient to call for assistance with activity   - Consult OT/PT to assist with strengthening/mobility   - Keep Call bell within reach  - Keep bed low and locked with side rails adjusted as appropriate  - Keep care items and personal belongings within reach  - Initiate and maintain comfort rounds  - Make Fall Risk Sign visible to staff  - Offer Toileting every 2 Hours, in advance of need  - Initiate/Maintain alarm  - Obtain necessary fall risk management equipment:   - Apply yellow socks and bracelet for high fall risk patients  - Consider moving patient to room near nurses station  Outcome: Progressing  Goal: Maintain or return to baseline ADL function  Description: INTERVENTIONS:  -  Assess patient's ability to carry out ADLs; assess patient's baseline for ADL function and identify physical deficits which impact ability to perform ADLs (bathing, care of mouth/teeth, toileting, grooming, dressing, etc )  - Assess/evaluate cause of self-care deficits   - Assess range of motion  - Assess patient's mobility; develop plan if impaired  - Assess patient's need for assistive devices and provide as appropriate  - Encourage maximum independence but intervene and supervise when necessary  - Involve family in performance of ADLs  - Assess for home care needs following discharge   - Consider OT consult to assist with ADL evaluation and planning for discharge  - Provide patient education as appropriate  Outcome: Progressing  Goal: Maintains/Returns to pre admission functional level  Description: INTERVENTIONS:  - Perform BMAT or MOVE assessment daily    - Set and communicate daily mobility goal to care team and patient/family/caregiver  - Collaborate with rehabilitation services on mobility goals if consulted  - Perform Range of Motion 3 times a day  - Reposition patient every 2 hours    - Dangle patient 3 times a day  - Stand patient 3 times a day  - Ambulate patient 3 times a day  - Out of bed to chair 3 times a day   - Out of bed for meals 3 times a day  - Out of bed for toileting  - Record patient progress and toleration of activity level   Outcome: Progressing

## 2021-07-20 ENCOUNTER — ANESTHESIA EVENT (INPATIENT)
Dept: PERIOP | Facility: HOSPITAL | Age: 36
DRG: 483 | End: 2021-07-20
Payer: COMMERCIAL

## 2021-07-20 ENCOUNTER — ANESTHESIA (INPATIENT)
Dept: PERIOP | Facility: HOSPITAL | Age: 36
DRG: 483 | End: 2021-07-20
Payer: COMMERCIAL

## 2021-07-20 LAB
ANION GAP SERPL CALCULATED.3IONS-SCNC: 12 MMOL/L (ref 4–13)
BASOPHILS # BLD AUTO: 0.04 THOUSANDS/ΜL (ref 0–0.1)
BASOPHILS NFR BLD AUTO: 0 % (ref 0–1)
BUN SERPL-MCNC: 6 MG/DL (ref 5–25)
CALCIUM SERPL-MCNC: 8.7 MG/DL (ref 8.3–10.1)
CHLORIDE SERPL-SCNC: 100 MMOL/L (ref 100–108)
CO2 SERPL-SCNC: 23 MMOL/L (ref 21–32)
CREAT SERPL-MCNC: 0.58 MG/DL (ref 0.6–1.3)
EOSINOPHIL # BLD AUTO: 0.05 THOUSAND/ΜL (ref 0–0.61)
EOSINOPHIL NFR BLD AUTO: 0 % (ref 0–6)
ERYTHROCYTE [DISTWIDTH] IN BLOOD BY AUTOMATED COUNT: 12.8 % (ref 11.6–15.1)
GFR SERPL CREATININE-BSD FRML MDRD: 151 ML/MIN/1.73SQ M
GLUCOSE SERPL-MCNC: 73 MG/DL (ref 65–140)
HCT VFR BLD AUTO: 29.6 % (ref 36.5–49.3)
HGB BLD-MCNC: 10 G/DL (ref 12–17)
IMM GRANULOCYTES # BLD AUTO: 0.15 THOUSAND/UL (ref 0–0.2)
IMM GRANULOCYTES NFR BLD AUTO: 1 % (ref 0–2)
LYMPHOCYTES # BLD AUTO: 1.63 THOUSANDS/ΜL (ref 0.6–4.47)
LYMPHOCYTES NFR BLD AUTO: 12 % (ref 14–44)
MAGNESIUM SERPL-MCNC: 2.1 MG/DL (ref 1.6–2.6)
MCH RBC QN AUTO: 30.9 PG (ref 26.8–34.3)
MCHC RBC AUTO-ENTMCNC: 33.8 G/DL (ref 31.4–37.4)
MCV RBC AUTO: 91 FL (ref 82–98)
MONOCYTES # BLD AUTO: 1.62 THOUSAND/ΜL (ref 0.17–1.22)
MONOCYTES NFR BLD AUTO: 12 % (ref 4–12)
NEUTROPHILS # BLD AUTO: 10.11 THOUSANDS/ΜL (ref 1.85–7.62)
NEUTS SEG NFR BLD AUTO: 75 % (ref 43–75)
NRBC BLD AUTO-RTO: 0 /100 WBCS
PHOSPHATE SERPL-MCNC: 3 MG/DL (ref 2.7–4.5)
PLATELET # BLD AUTO: 351 THOUSANDS/UL (ref 149–390)
PMV BLD AUTO: 10.3 FL (ref 8.9–12.7)
POTASSIUM SERPL-SCNC: 3.2 MMOL/L (ref 3.5–5.3)
PROCALCITONIN SERPL-MCNC: 1.27 NG/ML
RBC # BLD AUTO: 3.24 MILLION/UL (ref 3.88–5.62)
SODIUM SERPL-SCNC: 135 MMOL/L (ref 136–145)
WBC # BLD AUTO: 13.6 THOUSAND/UL (ref 4.31–10.16)

## 2021-07-20 PROCEDURE — 99232 SBSQ HOSP IP/OBS MODERATE 35: CPT | Performed by: NURSE PRACTITIONER

## 2021-07-20 PROCEDURE — 88305 TISSUE EXAM BY PATHOLOGIST: CPT | Performed by: PATHOLOGY

## 2021-07-20 PROCEDURE — 54700 I&D EPDIDYMS TSTIS&/SCROT SP: CPT | Performed by: UROLOGY

## 2021-07-20 PROCEDURE — 88341 IMHCHEM/IMCYTCHM EA ADD ANTB: CPT | Performed by: PATHOLOGY

## 2021-07-20 PROCEDURE — 84100 ASSAY OF PHOSPHORUS: CPT | Performed by: NURSE PRACTITIONER

## 2021-07-20 PROCEDURE — 84145 PROCALCITONIN (PCT): CPT | Performed by: NURSE PRACTITIONER

## 2021-07-20 PROCEDURE — 85025 COMPLETE CBC W/AUTO DIFF WBC: CPT | Performed by: NURSE PRACTITIONER

## 2021-07-20 PROCEDURE — 83735 ASSAY OF MAGNESIUM: CPT | Performed by: NURSE PRACTITIONER

## 2021-07-20 PROCEDURE — 87075 CULTR BACTERIA EXCEPT BLOOD: CPT | Performed by: UROLOGY

## 2021-07-20 PROCEDURE — 80048 BASIC METABOLIC PNL TOTAL CA: CPT | Performed by: NURSE PRACTITIONER

## 2021-07-20 PROCEDURE — 88342 IMHCHEM/IMCYTCHM 1ST ANTB: CPT | Performed by: PATHOLOGY

## 2021-07-20 PROCEDURE — 54520 REMOVAL OF TESTIS: CPT | Performed by: UROLOGY

## 2021-07-20 PROCEDURE — 87070 CULTURE OTHR SPECIMN AEROBIC: CPT | Performed by: UROLOGY

## 2021-07-20 PROCEDURE — 99024 POSTOP FOLLOW-UP VISIT: CPT | Performed by: UROLOGY

## 2021-07-20 PROCEDURE — 87205 SMEAR GRAM STAIN: CPT | Performed by: UROLOGY

## 2021-07-20 PROCEDURE — 99232 SBSQ HOSP IP/OBS MODERATE 35: CPT | Performed by: UROLOGY

## 2021-07-20 PROCEDURE — 0V950ZZ DRAINAGE OF SCROTUM, OPEN APPROACH: ICD-10-PCS | Performed by: UROLOGY

## 2021-07-20 PROCEDURE — 0VT90ZZ RESECTION OF RIGHT TESTIS, OPEN APPROACH: ICD-10-PCS | Performed by: UROLOGY

## 2021-07-20 RX ORDER — MIDAZOLAM HYDROCHLORIDE 2 MG/2ML
INJECTION, SOLUTION INTRAMUSCULAR; INTRAVENOUS AS NEEDED
Status: DISCONTINUED | OUTPATIENT
Start: 2021-07-20 | End: 2021-07-20

## 2021-07-20 RX ORDER — FENTANYL CITRATE/PF 50 MCG/ML
50 SYRINGE (ML) INJECTION
Status: DISCONTINUED | OUTPATIENT
Start: 2021-07-20 | End: 2021-07-20 | Stop reason: HOSPADM

## 2021-07-20 RX ORDER — PROPOFOL 10 MG/ML
INJECTION, EMULSION INTRAVENOUS AS NEEDED
Status: DISCONTINUED | OUTPATIENT
Start: 2021-07-20 | End: 2021-07-20

## 2021-07-20 RX ORDER — FENTANYL CITRATE 50 UG/ML
INJECTION, SOLUTION INTRAMUSCULAR; INTRAVENOUS AS NEEDED
Status: DISCONTINUED | OUTPATIENT
Start: 2021-07-20 | End: 2021-07-20

## 2021-07-20 RX ORDER — CEFAZOLIN SODIUM 2 G/50ML
SOLUTION INTRAVENOUS AS NEEDED
Status: DISCONTINUED | OUTPATIENT
Start: 2021-07-20 | End: 2021-07-20

## 2021-07-20 RX ORDER — DIPHENHYDRAMINE HYDROCHLORIDE 50 MG/ML
12.5 INJECTION INTRAMUSCULAR; INTRAVENOUS ONCE AS NEEDED
Status: DISCONTINUED | OUTPATIENT
Start: 2021-07-20 | End: 2021-07-20 | Stop reason: HOSPADM

## 2021-07-20 RX ORDER — HYDROMORPHONE HCL/PF 1 MG/ML
0.25 SYRINGE (ML) INJECTION
Status: DISCONTINUED | OUTPATIENT
Start: 2021-07-20 | End: 2021-07-20 | Stop reason: HOSPADM

## 2021-07-20 RX ORDER — HYDROMORPHONE HCL/PF 1 MG/ML
1 SYRINGE (ML) INJECTION EVERY 6 HOURS PRN
Status: DISCONTINUED | OUTPATIENT
Start: 2021-07-20 | End: 2021-07-23 | Stop reason: HOSPADM

## 2021-07-20 RX ORDER — BUPIVACAINE HYDROCHLORIDE 2.5 MG/ML
INJECTION, SOLUTION EPIDURAL; INFILTRATION; INTRACAUDAL AS NEEDED
Status: DISCONTINUED | OUTPATIENT
Start: 2021-07-20 | End: 2021-07-20 | Stop reason: HOSPADM

## 2021-07-20 RX ORDER — MAGNESIUM HYDROXIDE 1200 MG/15ML
LIQUID ORAL AS NEEDED
Status: DISCONTINUED | OUTPATIENT
Start: 2021-07-20 | End: 2021-07-20 | Stop reason: HOSPADM

## 2021-07-20 RX ORDER — SODIUM CHLORIDE, SODIUM LACTATE, POTASSIUM CHLORIDE, CALCIUM CHLORIDE 600; 310; 30; 20 MG/100ML; MG/100ML; MG/100ML; MG/100ML
INJECTION, SOLUTION INTRAVENOUS CONTINUOUS PRN
Status: DISCONTINUED | OUTPATIENT
Start: 2021-07-20 | End: 2021-07-20

## 2021-07-20 RX ORDER — KETAMINE HCL IN NACL, ISO-OSM 100MG/10ML
SYRINGE (ML) INJECTION AS NEEDED
Status: DISCONTINUED | OUTPATIENT
Start: 2021-07-20 | End: 2021-07-20

## 2021-07-20 RX ORDER — KETOROLAC TROMETHAMINE 30 MG/ML
INJECTION, SOLUTION INTRAMUSCULAR; INTRAVENOUS AS NEEDED
Status: DISCONTINUED | OUTPATIENT
Start: 2021-07-20 | End: 2021-07-20

## 2021-07-20 RX ORDER — DEXAMETHASONE SODIUM PHOSPHATE 10 MG/ML
INJECTION, SOLUTION INTRAMUSCULAR; INTRAVENOUS AS NEEDED
Status: DISCONTINUED | OUTPATIENT
Start: 2021-07-20 | End: 2021-07-20

## 2021-07-20 RX ORDER — ONDANSETRON 2 MG/ML
4 INJECTION INTRAMUSCULAR; INTRAVENOUS ONCE AS NEEDED
Status: DISCONTINUED | OUTPATIENT
Start: 2021-07-20 | End: 2021-07-20 | Stop reason: HOSPADM

## 2021-07-20 RX ORDER — LIDOCAINE HYDROCHLORIDE 10 MG/ML
INJECTION, SOLUTION EPIDURAL; INFILTRATION; INTRACAUDAL; PERINEURAL AS NEEDED
Status: DISCONTINUED | OUTPATIENT
Start: 2021-07-20 | End: 2021-07-20 | Stop reason: HOSPADM

## 2021-07-20 RX ORDER — SODIUM CHLORIDE, SODIUM LACTATE, POTASSIUM CHLORIDE, CALCIUM CHLORIDE 600; 310; 30; 20 MG/100ML; MG/100ML; MG/100ML; MG/100ML
75 INJECTION, SOLUTION INTRAVENOUS CONTINUOUS
Status: DISCONTINUED | OUTPATIENT
Start: 2021-07-20 | End: 2021-07-21

## 2021-07-20 RX ORDER — OXYCODONE HYDROCHLORIDE 5 MG/1
5 TABLET ORAL EVERY 6 HOURS PRN
Status: DISCONTINUED | OUTPATIENT
Start: 2021-07-20 | End: 2021-07-23 | Stop reason: HOSPADM

## 2021-07-20 RX ORDER — LIDOCAINE HYDROCHLORIDE 10 MG/ML
INJECTION, SOLUTION EPIDURAL; INFILTRATION; INTRACAUDAL; PERINEURAL AS NEEDED
Status: DISCONTINUED | OUTPATIENT
Start: 2021-07-20 | End: 2021-07-20

## 2021-07-20 RX ORDER — POTASSIUM CHLORIDE 20 MEQ/1
40 TABLET, EXTENDED RELEASE ORAL ONCE
Status: COMPLETED | OUTPATIENT
Start: 2021-07-20 | End: 2021-07-20

## 2021-07-20 RX ADMIN — LIDOCAINE 1 PATCH: 50 PATCH TOPICAL at 21:09

## 2021-07-20 RX ADMIN — SODIUM CHLORIDE, SODIUM LACTATE, POTASSIUM CHLORIDE, AND CALCIUM CHLORIDE: .6; .31; .03; .02 INJECTION, SOLUTION INTRAVENOUS at 14:23

## 2021-07-20 RX ADMIN — HYDROMORPHONE HYDROCHLORIDE 1 MG: 1 INJECTION, SOLUTION INTRAMUSCULAR; INTRAVENOUS; SUBCUTANEOUS at 06:44

## 2021-07-20 RX ADMIN — MIDAZOLAM 2 MG: 1 INJECTION INTRAMUSCULAR; INTRAVENOUS at 13:30

## 2021-07-20 RX ADMIN — MELATONIN 3 MG: at 21:09

## 2021-07-20 RX ADMIN — BACLOFEN 15 MG: 10 TABLET ORAL at 16:58

## 2021-07-20 RX ADMIN — Medication 20 MG: at 14:04

## 2021-07-20 RX ADMIN — HYDROMORPHONE HYDROCHLORIDE 2 MG: 2 TABLET ORAL at 16:58

## 2021-07-20 RX ADMIN — POTASSIUM CHLORIDE 40 MEQ: 1500 TABLET, EXTENDED RELEASE ORAL at 19:38

## 2021-07-20 RX ADMIN — FENTANYL CITRATE 100 MCG: 50 INJECTION INTRAMUSCULAR; INTRAVENOUS at 13:37

## 2021-07-20 RX ADMIN — KETOROLAC TROMETHAMINE 30 MG: 30 INJECTION, SOLUTION INTRAMUSCULAR at 14:25

## 2021-07-20 RX ADMIN — PROPOFOL 200 MG: 10 INJECTION, EMULSION INTRAVENOUS at 13:38

## 2021-07-20 RX ADMIN — GABAPENTIN 600 MG: 300 CAPSULE ORAL at 21:08

## 2021-07-20 RX ADMIN — HYDROMORPHONE HYDROCHLORIDE 1 MG: 1 INJECTION, SOLUTION INTRAMUSCULAR; INTRAVENOUS; SUBCUTANEOUS at 00:18

## 2021-07-20 RX ADMIN — HYDROMORPHONE HYDROCHLORIDE 0.25 MG: 1 INJECTION, SOLUTION INTRAMUSCULAR; INTRAVENOUS; SUBCUTANEOUS at 16:11

## 2021-07-20 RX ADMIN — Medication 50 MCG: at 15:44

## 2021-07-20 RX ADMIN — HYDROMORPHONE HYDROCHLORIDE 1 MG: 1 INJECTION, SOLUTION INTRAMUSCULAR; INTRAVENOUS; SUBCUTANEOUS at 19:38

## 2021-07-20 RX ADMIN — CEFEPIME HYDROCHLORIDE 2000 MG: 2 INJECTION, POWDER, FOR SOLUTION INTRAVENOUS at 00:18

## 2021-07-20 RX ADMIN — NORTRIPTYLINE HYDROCHLORIDE 10 MG: 10 CAPSULE ORAL at 09:28

## 2021-07-20 RX ADMIN — CEFEPIME HYDROCHLORIDE 2000 MG: 2 INJECTION, POWDER, FOR SOLUTION INTRAVENOUS at 12:37

## 2021-07-20 RX ADMIN — HYDROMORPHONE HYDROCHLORIDE 2 MG: 2 TABLET ORAL at 21:09

## 2021-07-20 RX ADMIN — CELECOXIB 100 MG: 100 CAPSULE ORAL at 09:27

## 2021-07-20 RX ADMIN — CEFAZOLIN SODIUM 2000 MG: 2 SOLUTION INTRAVENOUS at 13:40

## 2021-07-20 RX ADMIN — BACLOFEN 15 MG: 10 TABLET ORAL at 09:28

## 2021-07-20 RX ADMIN — Medication 30 MG: at 13:37

## 2021-07-20 RX ADMIN — LIDOCAINE HYDROCHLORIDE 50 MG: 10 INJECTION, SOLUTION EPIDURAL; INFILTRATION; INTRACAUDAL; PERINEURAL at 13:37

## 2021-07-20 RX ADMIN — FENTANYL CITRATE 50 MCG: 50 INJECTION INTRAMUSCULAR; INTRAVENOUS at 14:14

## 2021-07-20 RX ADMIN — GABAPENTIN 600 MG: 300 CAPSULE ORAL at 16:59

## 2021-07-20 RX ADMIN — GABAPENTIN 600 MG: 300 CAPSULE ORAL at 09:28

## 2021-07-20 RX ADMIN — DEXAMETHASONE SODIUM PHOSPHATE 5 MG: 10 INJECTION, SOLUTION INTRAMUSCULAR; INTRAVENOUS at 13:37

## 2021-07-20 RX ADMIN — HYDROMORPHONE HYDROCHLORIDE 2 MG: 2 TABLET ORAL at 04:16

## 2021-07-20 RX ADMIN — HYDROMORPHONE HYDROCHLORIDE 2 MG: 2 TABLET ORAL at 09:28

## 2021-07-20 RX ADMIN — Medication 50 MCG: at 15:59

## 2021-07-20 RX ADMIN — HYDROMORPHONE HYDROCHLORIDE 0.25 MG: 1 INJECTION, SOLUTION INTRAMUSCULAR; INTRAVENOUS; SUBCUTANEOUS at 16:16

## 2021-07-20 NOTE — PROGRESS NOTES
Patient was seen preoperatively  Informed consent was obtained  Of note, patient is concerned about his risk factors for wound infection, particularly if packing of the wound is required  As such he has inquired about complete removal of the testicle ideally to facilitate primary closure and potentially placement of a close suction drain  This is certainly very reasonable  I believe this would likely have a higher likelihood of successful management of his complicated infection without any additional surgical procedures and ideally with a lower rate of wound healing complications  We did discuss the potential implications for hypogonadism and sub fertility    He patient states he is appreciative of this discussion and understands these risks    Will proceed to the operating room as above

## 2021-07-20 NOTE — ASSESSMENT & PLAN NOTE
· Pt reporting acute low back pain after a mechanical fall he experienced at [facility] prior to admission- improved today   · lumbar xray (-)    · Fall precautions   · PT/OT  · Restarted home medications

## 2021-07-20 NOTE — ASSESSMENT & PLAN NOTE
· POA secondary to UTI, orchitis, epididymitis as evidenced by tachycardia, leukocytosis, lactic acidosis   · Blood cultures no growth to date   · Urine culture > 100,000 cfu/ml klebsiella, susceptible to Cefazolin   · Urology following  · POD #1 s/p right scrotal exploration with incision and drainage and right scrotal orchiectomy   · Continue with Cefepime  · Follow-up OR cultures   · Scrotal elevation, local wound care  · Pain control   · Drain & jimenez removed  · OP urology f/u in 1 week

## 2021-07-20 NOTE — PLAN OF CARE
Problem: Potential for Falls  Goal: Patient will remain free of falls  Description: INTERVENTIONS:  - Educate patient/family on patient safety including physical limitations  - Instruct patient to call for assistance with activity   - Consult OT/PT to assist with strengthening/mobility   - Keep Call bell within reach  - Keep bed low and locked with side rails adjusted as appropriate  - Keep care items and personal belongings within reach  - Initiate and maintain comfort rounds  - Make Fall Risk Sign visible to staff  Problem: Prexisting or High Potential for Compromised Skin Integrity  Goal: Skin integrity is maintained or improved  Description: INTERVENTIONS:  - Identify patients at risk for skin breakdown  - Assess and monitor skin integrity  - Assess and monitor nutrition and hydration status  - Monitor labs   - Assess for incontinence   - Turn and reposition patient  - Assist with mobility/ambulation  - Relieve pressure over bony prominences  - Avoid friction and shearing  - Provide appropriate hygiene as needed including keeping skin clean and dry  - Evaluate need for skin moisturizer/barrier cream  - Collaborate with interdisciplinary team   - Patient/family teaching  - Consider wound care consult   Outcome: Progressing     Problem: MOBILITY - ADULT  Goal: Maintain or return to baseline ADL function  Description: INTERVENTIONS:  -  Assess patient's ability to carry out ADLs; assess patient's baseline for ADL function and identify physical deficits which impact ability to perform ADLs (bathing, care of mouth/teeth, toileting, grooming, dressing, etc )  - Assess/evaluate cause of self-care deficits   - Assess range of motion  - Assess patient's mobility; develop plan if impaired  - Assess patient's need for assistive devices and provide as appropriate  - Encourage maximum independence but intervene and supervise when necessary  - Involve family in performance of ADLs  - Assess for home care needs following discharge   - Consider OT consult to assist with ADL evaluation and planning for discharge  - Provide patient education as appropriate  Outcome: Progressing  Goal: Maintains/Returns to pre admission functional level  Description: INTERVENTIONS:  - Perform BMAT or MOVE assessment daily    - Set and communicate daily mobility goal to care team and patient/family/caregiver     - Collaborate with rehabilitation services on mobility goals if consulted  Problem: PAIN - ADULT  Goal: Verbalizes/displays adequate comfort level or baseline comfort level  Description: Interventions:  - Encourage patient to monitor pain and request assistance  - Assess pain using appropriate pain scale  - Administer analgesics based on type and severity of pain and evaluate response  - Implement non-pharmacological measures as appropriate and evaluate response  - Consider cultural and social influences on pain and pain management  - Notify physician/advanced practitioner if interventions unsuccessful or patient reports new pain  Outcome: Progressing     Problem: INFECTION - ADULT  Goal: Absence or prevention of progression during hospitalization  Description: INTERVENTIONS:  - Assess and monitor for signs and symptoms of infection  - Monitor lab/diagnostic results  - Monitor all insertion sites, i e  indwelling lines, tubes, and drains  - Monitor endotracheal if appropriate and nasal secretions for changes in amount and color  - [place] appropriate cooling/warming therapies per order  - Administer medications as ordered  - Instruct and encourage patient and family to use good hand hygiene technique  - Identify and instruct in appropriate isolation precautions for identified infection/condition  Outcome: Progressing  Goal: Absence of fever/infection during neutropenic period  Description: INTERVENTIONS:  - Monitor WBC    Outcome: Progressing     Problem: SAFETY ADULT  Goal: Patient will remain free of falls  Description: INTERVENTIONS:  - Educate patient/family on patient safety including physical limitations  - Instruct patient to call for assistance with activity   - Consult OT/PT to assist with strengthening/mobility   - Keep Call bell within reach  - Keep bed low and locked with side rails adjusted as appropriate  - Keep care items and personal belongings within reach  - Initiate and maintain comfort rounds  - Make Fall Risk Sign visible to staff  Problem: DISCHARGE PLANNING  Goal: Discharge to home or other facility with appropriate resources  Description: INTERVENTIONS:  - Identify barriers to discharge w/patient and caregiver  - Arrange for needed discharge resources and transportation as appropriate  - Identify discharge learning needs (meds, wound care, etc )  - Arrange for interpretive services to assist at discharge as needed  - Refer to Case Management Department for coordinating discharge planning if the patient needs post-hospital services based on physician/advanced practitioner order or complex needs related to functional status, cognitive ability, or social support system  Outcome: Progressing     Problem: Knowledge Deficit  Goal: Patient/family/caregiver demonstrates understanding of disease process, treatment plan, medications, and discharge instructions  Description: Complete learning assessment and assess knowledge base    Interventions:  - Provide teaching at level of understanding  - Provide teaching via preferred learning methods  Outcome: Progressing     - Apply yellow socks and bracelet for high fall risk patients  - Consider moving patient to room near nurses station  Outcome: Progressing  Goal: Maintain or return to baseline ADL function  Description: INTERVENTIONS:  -  Assess patient's ability to carry out ADLs; assess patient's baseline for ADL function and identify physical deficits which impact ability to perform ADLs (bathing, care of mouth/teeth, toileting, grooming, dressing, etc )  - Assess/evaluate cause of self-care deficits   - Assess range of motion  - Assess patient's mobility; develop plan if impaired  - Assess patient's need for assistive devices and provide as appropriate  - Encourage maximum independence but intervene and supervise when necessary  - Involve family in performance of ADLs  - Assess for home care needs following discharge   - Consider OT consult to assist with ADL evaluation and planning for discharge  - Provide patient education as appropriate  Outcome: Progressing  Goal: Maintains/Returns to pre admission functional level  Description: INTERVENTIONS:  - Perform BMAT or MOVE assessment daily    - Set and communicate daily mobility goal to care team and patient/family/caregiver     - Collaborate with rehabilitation services on mobility goals if consulted  - Out of bed for toileting  - Record patient progress and toleration of activity level   Outcome: Progressing     - Out of bed for toileting  - Record patient progress and toleration of activity level   Outcome: Progressing     - Apply yellow socks and bracelet for high fall risk patients  - Consider moving patient to room near nurses station  Outcome: Progressing

## 2021-07-20 NOTE — ASSESSMENT & PLAN NOTE
· Likely from urinary source possibly from not completely emptying his bladder (pt was noted to have urinary retention during his last admission in March but was discharged without a jimenez to rehab)  · Urology following  · Maintain jimenez for now  · Urine culture (+) klebsiella - c/w cefepime today day #5  · POD #1 s/p right scrotal exploration with incision and drainage and right scrotal orchiectomy   · Follow-up OR cultures   · Scrotal elevation, local wound care

## 2021-07-20 NOTE — ASSESSMENT & PLAN NOTE
· Patient sustained a fall downstairs on March 6, 2021 suffering a severe spinal cord injury, S/p C3-5 laminectomy, C2-7 fusion on 3/7  · Currently residing at [facility]   · Pt was noted to have urinary retention during his last admission but was able to have jimenez removed at discharge- monitor for urinary retention prior to discharge    · Medication personally reviewed with RN at [facility] and ordered   · Gabapentin 600mg TID   · Baclofen 15mg TID   · Dilaudid 2mg PO q6hprn  · nortriptyline 10mg daily   · Celecoxib 100mg bid   · tyelenol 1000 mg TID - ordered PRN given sepsis   · flomax 0 4mg daily  · lipitor 40mg daily   · hctz 25mg daily- hold in the setting of sepsis

## 2021-07-20 NOTE — ASSESSMENT & PLAN NOTE
· Noted on last admission but was discharged after passing a voiding trial without a jimenez   · Jimenez in placed post op- removed on 7/21 - f/u   · C/w flomax

## 2021-07-20 NOTE — PROGRESS NOTES
UROLOGY PROGRESS NOTE   Patient Identifiers: Barrett Guzman (MRN [id number])  Date of Service: 7/20/2021        Assessment:     1  Complicated epididymitis   - trans scrotal ultrasound yesterday shows compressed gonad without parenchymal abscess  - repeat ultrasound shows diminished flow to right testes, again negative for intraparenchymal abscess  Hyperemia of the contralateral left testes    2  Reactive right hydrocele    3  Leukocytosis  - improved    4  Fever  -improved    Ozzy has had worsening imaging findings despite a trial of conservative management with IV antibiotics  At this point I have recommended proceeding to the operating room for surgery  We discussed the goals of surgery which include source control for his infection  We discussed the expected recovery which will likely involve placement of a Penrose drain, and possibly packing of his incision if primary closure is deemed unwise  Finally we discussed the possibility of requiring an orchiectomy  He understands he will require wound care postoperatively and we will coordinate this with his nursing facility  Plan:   - will proceed to the operating room today for scrotal exploration, incision and drainage, hydrocelectomy, possible orchiectomy  -patient remain to remain NPO      Subjective:     24 HR EVENTS:   no significant events        Patient reports worsening right testicular pain    Objective:     VITALS:    Vitals:    07/20/21 0708   BP: 117/76   Pulse: (!) 114   Resp: 18   Temp: 99 8 °F (37 7 °C)   SpO2: 96%       INS & OUTS:  [unfilled]    LABS:  Lab Results   Component Value Date    HGB 10 0 (L) 07/20/2021    HCT 29 6 (L) 07/20/2021    WBC 13 60 (H) 07/20/2021     07/20/2021   ]    Lab Results   Component Value Date    K 3 2 (L) 07/20/2021     07/20/2021    CO2 23 07/20/2021    BUN 6 07/20/2021    CREATININE 0 58 (L) 07/20/2021    CALCIUM 8 7 07/20/2021    GLUCOSE 87 03/06/2021   ]    INPATIENT MEDS:    Current Facility-Administered Medications:     acetaminophen (TYLENOL) tablet 975 mg, 975 mg, Oral, Q6H PRN, Abdi Hammond PA-C    atorvastatin (LIPITOR) tablet 40 mg, 40 mg, Oral, Daily With JAYLYN Ovalle, 40 mg at 07/18/21 1553    baclofen tablet 15 mg, 15 mg, Oral, TID, JAYLYN Rodriguez, 15 mg at 07/19/21 1535    cefepime (MAXIPIME) 2,000 mg in dextrose 5 % 50 mL IVPB, 2,000 mg, Intravenous, Q12H, JAYLYN Rodriguez, Last Rate: 100 mL/hr at 07/20/21 0018, 2,000 mg at 07/20/21 0018    celecoxib (CeleBREX) capsule 100 mg, 100 mg, Oral, BID, JAYLYN Rodriguez, 100 mg at 07/19/21 0809    enoxaparin (LOVENOX) subcutaneous injection 40 mg, 40 mg, Subcutaneous, Daily, Oral MD Franki, 40 mg at 07/19/21 0810    gabapentin (NEURONTIN) capsule 600 mg, 600 mg, Oral, TID, JAYLYN Rodriguez, 600 mg at 07/19/21 2023    HYDROmorphone (DILAUDID) injection 1 mg, 1 mg, Intravenous, Q3H PRN, JAYLYN Rodriguez, 1 mg at 07/20/21 0644    HYDROmorphone (DILAUDID) tablet 2 mg, 2 mg, Oral, Q3H PRN, JAYLYN Bertrand, 2 mg at 07/20/21 0416    lidocaine (LIDODERM) 5 % patch 1 patch, 1 patch, Topical, Daily, Kailey Vasquez PA-C, 1 patch at 07/19/21 2023    melatonin tablet 3 mg, 3 mg, Oral, HS, JAYLYN Rodriguez, 3 mg at 07/19/21 2306    menthol-methyl salicylate (BENGAY) 53-20 % cream, , Apply externally, 4x Daily PRN, Kailey Vasquez PA-C    nortriptyline (PAMELOR) capsule 10 mg, 10 mg, Oral, Daily, JAYLYN Rodriguez, 10 mg at 07/19/21 0809    oxyCODONE (ROXICODONE) IR tablet 5 mg, 5 mg, Oral, Q4H PRN, JAYLYN Bertrand    polyethylene glycol (MIRALAX) packet 17 g, 17 g, Oral, Daily PRN, JAYLYN Bertrand    [facility]) tablet 17 2 mg, 2 tablet, Oral, HS, JAYLYN Rodriguez, 17 2 mg at 07/17/21 2223    sodium chloride 0 9 % infusion, 125 mL/hr, Intravenous, Continuous, Oral MD Franki, Last Rate: 125 mL/hr at 07/19/21 1815, 125 mL/hr at 07/19/21 1815    tamsulosin (FLOMAX) capsule 0 4 mg, 0 4 mg, Oral, Daily With Dinner, JAYLYN Sal, 0 4 mg at 07/18/21 1554      Physical Exam:   /76   Pulse (!) 114   Temp 99 8 °F (37 7 °C)   Resp 18   Ht 5' 7" (1 702 m)   Wt 75 kg (165 lb 5 5 oz)   SpO2 96%   BMI 25 90 kg/m²   GEN: resting comfortably  RESP: breathing comfortably with no accessory muscle use  CV: no significant peripheral edema  ABD: soft and appropriately tender to palpation  INCISION: none  DRAINS: none  NUÑEZ: in place draining clear yellow urine    Genitourinary exam reveals a tense right hemiscrotum, no drainage or fluctuance, no cellulitic changes or tissue loss with within the scrotal skin  Contralateral left gonad is normal       RADIOLOGY:     FINDINGS:     TESTES:         RIGHT testis = 5 5 x 3 0 x 3 3 cm   Normal contour with homogeneous smooth echotexture  No intratesticular mass lesion or calcifications  Markedly decreased vascular flow is noted within the right testicle  This seems progressed from study of 7/18/2021      LEFT testis = 5 1 x 2 2 x 2 9 cm  Normal contour with homogeneous smooth echotexture  No intratesticular mass lesion or calcifications  Left testicle is hypervascular      EPIDIDYMIDES:   Right epididymis is enlarged and hypervascular  Left epididymis is enlarged and hypervascular      HYDROCELE:  No significant fluid present      VARICOCELE:  None present      SCROTUM: Extensive scrotal wall edema with increased vascularity is noted        IMPRESSION:     Markedly decreased flow to the right testicle suggesting underlying ischemia      Marked scrotal wall edema and extensive hypervascularity      Enlarged hypervascular epididymis is as well as left testicle consistent with epididymoorchitis

## 2021-07-20 NOTE — PLAN OF CARE
Problem: Potential for Falls  Goal: Patient will remain free of falls  Description: INTERVENTIONS:  - Educate patient/family on patient safety including physical limitations  - Instruct patient to call for assistance with activity   - Consult OT/PT to assist with strengthening/mobility   - Keep Call bell within reach  - Keep bed low and locked with side rails adjusted as appropriate  - Keep care items and personal belongings within reach  - Initiate and maintain comfort rounds  - Make Fall Risk Sign visible to staff  - Offer Toileting every Hours, in advance of need  - Initiate/Maintain   - Obtain necessary fall risk management equipment:   - Apply yellow socks and bracelet for high fall risk patients  - Consider moving patient to room near nurses station  Outcome: Progressing     Problem: Prexisting or High Potential for Compromised Skin Integrity  Goal: Skin integrity is maintained or improved  Description: INTERVENTIONS:  - Identify patients at risk for skin breakdown  - Assess and monitor skin integrity  - Assess and monitor nutrition and hydration status  - Monitor labs   - Assess for incontinence   - Turn and reposition patient  - Assist with mobility/ambulation  - Relieve pressure over bony prominences  - Avoid friction and shearing  - Provide appropriate hygiene as needed including keeping skin clean and dry  - Evaluate need for skin moisturizer/barrier cream  - Collaborate with interdisciplinary team   - Patient/family teaching  - Consider wound care consult   Outcome: Progressing     Problem: MOBILITY - ADULT  Goal: Maintain or return to baseline ADL function  Description: INTERVENTIONS:  -  Assess patient's ability to carry out ADLs; assess patient's baseline for ADL function and identify physical deficits which impact ability to perform ADLs (bathing, care of mouth/teeth, toileting, grooming, dressing, etc )  - Assess/evaluate cause of self-care deficits   - Assess range of motion  - Assess patient's mobility; develop plan if impaired  - Assess patient's need for assistive devices and provide as appropriate  - Encourage maximum independence but intervene and supervise when necessary  - Involve family in performance of ADLs  - Assess for home care needs following discharge   - Consider OT consult to assist with ADL evaluation and planning for discharge  - Provide patient education as appropriate  Outcome: Progressing  Goal: Maintains/Returns to pre admission functional level  Description: INTERVENTIONS:  - Perform BMAT or MOVE assessment daily    - Set and communicate daily mobility goal to care team and patient/family/caregiver  - Collaborate with rehabilitation services on mobility goals if consulted  - Perform Range of Motion 3 times a day  - Reposition patient every 2 hours    - Dangle patient 3 times a day  - Stand patient 3 times a day  - Ambulate patient 3 times a day  - Out of bed to chair 3 times a day   - Out of bed for meals 3 times a day  - Out of bed for toileting  - Record patient progress and toleration of activity level   Outcome: Progressing     Problem: PAIN - ADULT  Goal: Verbalizes/displays adequate comfort level or baseline comfort level  Description: Interventions:  - Encourage patient to monitor pain and request assistance  - Assess pain using appropriate pain scale  - Administer analgesics based on type and severity of pain and evaluate response  - Implement non-pharmacological measures as appropriate and evaluate response  - Consider cultural and social influences on pain and pain management  - Notify physician/advanced practitioner if interventions unsuccessful or patient reports new pain  Outcome: Progressing     Problem: INFECTION - ADULT  Goal: Absence or prevention of progression during hospitalization  Description: INTERVENTIONS:  - Assess and monitor for signs and symptoms of infection  - Monitor lab/diagnostic results  - Monitor all insertion sites, i e  indwelling lines, tubes, and drains  - Monitor endotracheal if appropriate and nasal secretions for changes in amount and color  - [place] appropriate cooling/warming therapies per order  - Administer medications as ordered  - Instruct and encourage patient and family to use good hand hygiene technique  - Identify and instruct in appropriate isolation precautions for identified infection/condition  Outcome: Progressing  Goal: Absence of fever/infection during neutropenic period  Description: INTERVENTIONS:  - Monitor WBC    Outcome: Progressing     Problem: SAFETY ADULT  Goal: Patient will remain free of falls  Description: INTERVENTIONS:  - Educate patient/family on patient safety including physical limitations  - Instruct patient to call for assistance with activity   - Consult OT/PT to assist with strengthening/mobility   - Keep Call bell within reach  - Keep bed low and locked with side rails adjusted as appropriate  - Keep care items and personal belongings within reach  - Initiate and maintain comfort rounds  - Make Fall Risk Sign visible to staff  - Offer Toileting every 2 Hours, in advance of need  - Initiate/Maintain alarm  - Obtain necessary fall risk management equipment:   - Apply yellow socks and bracelet for high fall risk patients  - Consider moving patient to room near nurses station  Outcome: Progressing  Goal: Maintain or return to baseline ADL function  Description: INTERVENTIONS:  -  Assess patient's ability to carry out ADLs; assess patient's baseline for ADL function and identify physical deficits which impact ability to perform ADLs (bathing, care of mouth/teeth, toileting, grooming, dressing, etc )  - Assess/evaluate cause of self-care deficits   - Assess range of motion  - Assess patient's mobility; develop plan if impaired  - Assess patient's need for assistive devices and provide as appropriate  - Encourage maximum independence but intervene and supervise when necessary  - Involve family in performance of ADLs  - Assess for home care needs following discharge   - Consider OT consult to assist with ADL evaluation and planning for discharge  - Provide patient education as appropriate  Outcome: Progressing  Goal: Maintains/Returns to pre admission functional level  Description: INTERVENTIONS:  - Perform BMAT or MOVE assessment daily    - Set and communicate daily mobility goal to care team and patient/family/caregiver  - Collaborate with rehabilitation services on mobility goals if consulted  - Perform Range of Motion 3 times a day  - Reposition patient every 2 hours  - Dangle patient 3 times a day  - Stand patient 3 times a day  - Ambulate patient 3 times a day  - Out of bed to chair 3 times a day   - Out of bed for meals 3 times a day  - Out of bed for toileting  - Record patient progress and toleration of activity level   Outcome: Progressing     Problem: DISCHARGE PLANNING  Goal: Discharge to home or other facility with appropriate resources  Description: INTERVENTIONS:  - Identify barriers to discharge w/patient and caregiver  - Arrange for needed discharge resources and transportation as appropriate  - Identify discharge learning needs (meds, wound care, etc )  - Arrange for interpretive services to assist at discharge as needed  - Refer to Case Management Department for coordinating discharge planning if the patient needs post-hospital services based on physician/advanced practitioner order or complex needs related to functional status, cognitive ability, or social support system  Outcome: Progressing     Problem: Knowledge Deficit  Goal: Patient/family/caregiver demonstrates understanding of disease process, treatment plan, medications, and discharge instructions  Description: Complete learning assessment and assess knowledge base    Interventions:  - Provide teaching at level of understanding  - Provide teaching via preferred learning methods  Outcome: Progressing

## 2021-07-20 NOTE — ANESTHESIA PREPROCEDURE EVALUATION
Procedure:  HYDROCELECTOMY, scrotal exploration, possible orchiectomy (Right Scrotum)    Relevant Problems   ANESTHESIA (within normal limits)      CARDIO   (+) Benign essential HTN   (+) HLD (hyperlipidemia)      ENDO (within normal limits)      GI/HEPATIC (within normal limits)      /RENAL (within normal limits)      HEMATOLOGY (within normal limits)      MUSCULOSKELETAL   (+) Acute low back pain      NEURO/PSYCH   (+) Central cord syndrome (HCC)   (+) Depression   (+) Spinal cord injury, C1-C7 (HCC)      PULMONARY (within normal limits)      Other   (+) Closed fracture of fifth cervical vertebra (HCC)   (+) Orchitis and epididymitis   (+) Sepsis (HCC)   (+) Urinary retention      Cefepime 2g q12 hr due 7/20/21 12:30    CXR 7/16/2021:  Cardiomediastinal silhouette normal      Lungs clear  No effusion or pneumothorax      Osseous structures normal for age  Cervical fusion      No acute cardiopulmonary disease  CTAP 7/16/2021:  Right-sided hydrocele      Otherwise unremarkable CT of the abdomen and pelvis with IV without oral contrast     EKG 7/16/2021:  Sinus tachycardia  Nonspecific T wave abnormality  Abnormal ECG  When compared with ECG of 15-JUL-2021 23:55, (unconfirmed)  No significant change was found      Lab Results   Component Value Date    WBC 13 60 (H) 07/20/2021    HGB 10 0 (L) 07/20/2021    HCT 29 6 (L) 07/20/2021    MCV 91 07/20/2021     07/20/2021     Lab Results   Component Value Date    SODIUM 135 (L) 07/20/2021    K 3 2 (L) 07/20/2021     07/20/2021    CO2 23 07/20/2021    BUN 6 07/20/2021    CREATININE 0 58 (L) 07/20/2021    GLUC 73 07/20/2021    CALCIUM 8 7 07/20/2021     Lab Results   Component Value Date    INR 1 07 07/16/2021    INR 1 08 03/06/2021    PROTIME 14 0 07/16/2021    PROTIME 14 0 03/06/2021     No results found for: HGBA1C       Physical Exam    Airway    Mallampati score:  I  TM Distance: >3 FB  Neck ROM: limited     Dental   No notable dental hx Cardiovascular  Cardiovascular exam normal    Pulmonary  Pulmonary exam normal     Other Findings        Anesthesia Plan  ASA Score- 3 Emergent    Anesthesia Type- general with ASA Monitors  Additional Monitors:   Airway Plan: LMA  Plan Factors-    Chart reviewed  EKG reviewed  Imaging results reviewed  Existing labs reviewed  Patient summary reviewed  Induction- intravenous  Postoperative Plan-     Informed Consent- Anesthetic plan and risks discussed with patient  I personally reviewed this patient with the CRNA  Discussed and agreed on the Anesthesia Plan with the CRNA  Diana Resendiz

## 2021-07-20 NOTE — PROGRESS NOTES
[address] [facility]  Progress Note Lyndon Rogers 1985, 38 y o  male MRN: [id number]  Unit/Bed#: [facility] 810-01 Encounter: [id number]  Primary Care Provider: Marin Castillo MD   Date and time admitted to hospital: 7/16/2021  9:07 AM    * Sepsis ([address] )  Assessment & Plan  · POA secondary to UTI, orchitis, epididymitis as evidenced by tachycardia, leukocytosis, lactic acidosis   · Blood cultures no growth to date   · Urine culture > 100,000 cfu/ml klebsiella, susceptible to Cefazolin   · Urology following  · POD #0 s/p right scrotal exploration with incision and drainage and right scrotal orchiectomy   · Continue with Cefepime  · Follow-up OR cultures   · Scrotal elevation, local wound care  · Pain control     Orchitis and epididymitis  Assessment & Plan  · Likely from urinary source possibly from not completely emptying his bladder (pt was noted to have urinary retention during his last admission in March but was discharged without a jimenez to rehab)  · Urology following  · Maintain jimenez for now  · Urine culture (+) klebsiella - c/w cefepime today day #4  · POD #0 s/p right scrotal exploration with incision and drainage and right scrotal orchiectomy   · Follow-up OR cultures   · Scrotal elevation, local wound care    Urinary retention  Assessment & Plan  · Noted on last admission but was discharged after passing a voiding trial without a jimenez   · Jimenez in place now- attempt voiding trial prior to discharge when cleared by urology   · C/w flomax    HLD (hyperlipidemia)  Assessment & Plan  · C/w statin    Benign essential HTN  Assessment & Plan  · Hold HCTZ in the setting of sepsis   · BP stable off meds     Acute low back pain  Assessment & Plan  · Pt reporting acute low back pain after a mechanical fall he experienced at [facility] prior to admission- improved today   · lumbar xray (-)    · Fall precautions   · PT/OT  · Restarted home medications     Central cord syndrome [facility])  Assessment & Plan  · Patient sustained a fall downstairs on March 6, 2021 suffering a severe spinal cord injury, S/p C3-5 laminectomy, C2-7 fusion on 3/7  · Currently residing at [facility]   · Pt was noted to have urinary retention during his last admission but was able to have jimenez removed at discharge- monitor for urinary retention prior to discharge    · Medication personally reviewed with RN at [facility] and ordered   · Gabapentin 600mg TID   · Baclofen 15mg TID   · Dilaudid 2mg PO q6hprn  · nortriptyline 10mg daily   · Celecoxib 100mg bid   · tyelenol 1000 mg TID - ordered PRN given sepsis   · flomax 0 4mg daily  · lipitor 40mg daily   · hctz 25mg daily- hold in the setting of sepsis     Lactic acidosis-resolved as of 7/18/2021  Assessment & Plan  In the setting of sepsis   Resolved with ivfs     Hypokalemia-resolved as of 7/19/2021  Assessment & Plan  · Repleted  · K level 3 2 -> 3 9      VTE Pharmacologic Prophylaxis:   Pharmacologic: Enoxaparin (Lovenox)  Mechanical VTE Prophylaxis in Place: Yes    Patient Centered Rounds: I have performed bedside rounds with nursing staff today  Discussions with Specialists or Other Care Team Provider: nursing, CM, urology note appreciated     Education and Discussions with Family / Patient: I have answered all questions to the best of my ability  Time Spent for Care: 30 minutes  More than 50% of total time spent on counseling and coordination of care as described above  Current Length of Stay: 4 day(s)    Current Patient Status: Inpatient   Certification Statement: The patient will continue to require additional inpatient hospital stay due to complicated epididymitis     Discharge Plan: Patient is not medically stable for discharge today, likely in 48-72 hours pending progress  Code Status: Level 1 - Full Code      Subjective:   Patient tolerated OR procedure today  Resting in bed with scrotum elevated   Patient feels longer relief with oral pain medications  Denies HA, dizziness, CP, or SOB  Objective:     Vitals:   Temp (24hrs), Av 5 °F (36 9 °C), Min:97 1 °F (36 2 °C), Max:99 8 °F (37 7 °C)    Temp:  [97 1 °F (36 2 °C)-99 8 °F (37 7 °C)] 97 4 °F (36 3 °C)  HR:  [] 75  Resp:  [15-18] 18  BP: (117-164)/(72-95) 164/90  SpO2:  [95 %-100 %] 95 %  Body mass index is 25 9 kg/m²  Input and Output Summary (last 24 hours): Intake/Output Summary (Last 24 hours) at 2021 1740  Last data filed at 2021 1615  Gross per 24 hour   Intake  17 ml   Output 2550 ml   Net -495 83 ml       Physical Exam:     Physical Exam  Vitals and nursing note reviewed  Constitutional:       General: He is not in acute distress  Appearance: He is well-developed  He is ill-appearing (appears deconditioned )  He is not toxic-appearing or diaphoretic  Comments: Pleasant, talkative gentleman resting in bed on room air    HENT:      Head: Normocephalic  Cardiovascular:      Rate and Rhythm: Normal rate and regular rhythm  Pulmonary:      Effort: Pulmonary effort is normal  No respiratory distress  Breath sounds: Normal breath sounds  No wheezing, rhonchi or rales  Abdominal:      General: Bowel sounds are normal  There is no distension  Palpations: Abdomen is soft  Tenderness: There is no abdominal tenderness  Genitourinary:     Comments: Indwelling jimenez catheter in place, scrotum elevated   Musculoskeletal:      Cervical back: Normal range of motion  Right lower leg: No edema  Left lower leg: No edema  Skin:     General: Skin is warm and dry  Capillary Refill: Capillary refill takes less than 2 seconds  Comments: Right scrotal incision with packing and serosanguinous drainage    Neurological:      Mental Status: He is alert and oriented to person, place, and time  Mental status is at baseline     Psychiatric:         Mood and Affect: Mood normal          Behavior: Behavior normal          Thought Content: Thought content normal          Judgment: Judgment normal          Additional Data:     Labs:    Results from last 7 days   Lab Units 07/20/21  0509   WBC Thousand/uL 13 60*   HEMOGLOBIN g/dL 10 0*   HEMATOCRIT % 29 6*   PLATELETS Thousands/uL 351   NEUTROS PCT % 75   LYMPHS PCT % 12*   MONOS PCT % 12   EOS PCT % 0     Results from last 7 days   Lab Units 07/20/21  0509 07/16/21  0000   POTASSIUM mmol/L 3 2* 3 4*   CHLORIDE mmol/L 100 104   CO2 mmol/L 23 31   BUN mg/dL 6 11   CREATININE mg/dL 0 58* 1 20   CALCIUM mg/dL 8 7 9 2   ALK PHOS U/L  --  77   ALT U/L  --  20   AST U/L  --  17     Results from last 7 days   Lab Units 07/16/21  0000   INR  1 07       * I Have Reviewed All Lab Data Listed Above  * Additional Pertinent Lab Tests Reviewed: All Labs Within Last 24 Hours Reviewed    Imaging:    Imaging Reports Reviewed Today Include: Repeat scrotal US, xray lumbar spine, CXR, CT A/P  Imaging Personally Reviewed by Myself Includes:  None     Recent Cultures (last 7 days):     Results from last 7 days   Lab Units 07/16/21  0047 07/16/21  0000   BLOOD CULTURE   --  No Growth After 4 Days  No Growth After 4 Days     URINE CULTURE  >100,000 cfu/ml Klebsiella pneumoniae*  --        Last 24 Hours Medication List:   Current Facility-Administered Medications   Medication Dose Route Frequency Provider Last Rate    acetaminophen  975 mg Oral Q6H PRN Laura Bower MD      atorvastatin  40 mg Oral Daily With Shen Lizarraga MD      bacitracin 100,000 units and neomycin-polymyxin B () 1 mL in sodium chloride 0 9% 1000 mL irrigation bottle   Irrigation Once Laura Bower MD      baclofen  15 mg Oral TID Laura Bower MD      cefepime  2,000 mg Intravenous Q12H Laura Bower MD 2,000 mg (07/20/21 1237)    celecoxib  100 mg Oral BID Laura Bower MD      enoxaparin  40 mg Subcutaneous Daily Laura Bower MD      gabapentin  600 mg Oral TID Cordell Fend Slava Atkinson MD      HYDROmorphone  1 mg Intravenous Q3H PRN Raine Pérez MD      HYDROmorphone  2 mg Oral Q3H PRN Raine Pérez MD      lactated ringers  75 mL/hr Intravenous Continuous Melissa Parody, CRNA 75 mL/hr (07/20/21 1558)    lidocaine  1 patch Topical Daily Raine Pérez MD      melatonin  3 mg Oral HS Raine Pérez MD      menthol-methyl salicylate   Apply externally 4x Daily PRN Raine Pérez MD      nortriptyline  10 mg Oral Daily Raine Pérez MD      oxyCODONE  5 mg Oral Q4H PRN Raine Pérez MD      polyethylene glycol  17 g Oral Daily PRN Raine Pérez MD      senna  2 tablet Oral HS Raine Pérez MD      tamsulosin  0 4 mg Oral Daily With Jennie Palomo MD          Today, Patient Was Seen By: JAYLYN Franco    ** Please Note: Dictation voice to text software may have been used in the creation of this document   **

## 2021-07-20 NOTE — ANESTHESIA POSTPROCEDURE EVALUATION
Post-Op Assessment Note    CV Status:  Stable    Pain management: adequate     Mental Status:  Alert, awake and sleepy   Hydration Status:  Euvolemic   PONV Controlled:  Controlled   Airway Patency:  Patent      Post Op Vitals Reviewed: Yes      Staff: Anesthesiologist, CRNA         No complications documented      BP   134/80   Temp     Pulse  88   Resp   18   SpO2   95

## 2021-07-20 NOTE — PHYSICAL THERAPY NOTE
Physical Therapy Cancellation Note    Patient's Name: Nando Almeida    Chart reviewed  Pt off floor to OR for I&D  Will follow for PT treatment as medically appropriate  Thank you       Kaylee Mix, PT, DPT

## 2021-07-20 NOTE — OP NOTE
Operative Note     PATIENT:  Luis A Gomez (MRN [id number])    DATE OF PROCEDURE:   7/20/2021    PRE-OP DIAGNOSIS:   1) complicated right epididymal orchitis  2) spinal cord injury    POST-OP DIAGNOSIS:   1) complicated right epididymal orchitis  2) spinal cord injury    PROCEDURES PERFORMED:  1) right scrotal exploration with incision and drainage  2  Right scrotal orchiectomy    SURGEON:  Irais Keys MD    ASSISTANTS:  NOTE:  There were no qualified teaching residents to assist with this case    Specimen(s):  ID Type Source Tests Collected by Time Destination   1 : infected right testicle Tissue Testis, Right TISSUE EXAM Irais Keys MD 7/20/2021 1418    A : Deep scrotal abscess Tissue Abscess ANAEROBIC CULTURE AND GRAM STAIN, CULTURE, TISSUE AND GRAM STAIN Irais Keys MD 7/20/2021 1402        ANESTHESIA: General     COMPLICATIONS:   None    ANTIBIOTICS:  Cefepime with Ancef re-dose    INTRAOPERATIVE THROMBOEMBOLISM PROPHYLAXIS:  Pneumatic compression stockings     INDICATIONS FOR PROCEDURE:  Luis A Gomez is an 38 y o  old male with a history of spinal cord injury and ambulatory dysfunction who presented initially on July 16th with severe epididymal orchitis and febrile urinary tract infection  Initial ultrasound demonstrated a loculated hydrocele and no evidence of intratesticular abscess  He has been hospitalized and initiated on IV antibiotics  He has had essentially a failure to improve however  He has been monitored with serial ultrasounds and physical exams  After discussing the options, the patient elected to undergo a scrotal exploration with incision and drainage and possible orchiectomy  We discussed the procedure in detail, the alternatives, and the risks, and they signed informed consent to proceed  PROCEDURE IN DETAIL:   The patient was identified and brought to the OR  Antibiotic prophylaxis and DVT prophylaxis were administered    They were placed in the comfortable supine position with care to pad all pressure points  The scrotal hair was clipped and they were prepped and draped in the usual sterile fashion using ChloraPrep  A surgical time out was performed with all in the room in agreement with the correct patient, procedure, indications, and laterality  Next a transverse incision was made on the Right hemiscrotum  This was carried down through the cremasteric fibers using electrocautery and down to the hydrocele sac  The spermatic cord and hydrocele sac was delivered through this incision  At this point I introduced a small puncture incision into the tunica vaginalis  Gross purulence was encountered, high volume however I was able to avoid any gross spillage into the incision  Cultures were collected  There was marked distortion of the tunica vaginalis due to the severe infection  I felt that testicular viability was questionable  I felt that options at this point included maintaining the testes, leaving the incision open with a requirement for prolonged packing versus performing a completion orchiectomy which would allow closure of the incision over a drain  Given the preoperative discussion with the patient and his concerns for prolonged wound healing and desire for definitive management even after orchiectomy is required, I did elect to perform this  The spermatic cord contents were skeletonized and maintained with a proximal clamp  The cord contents were  into 3 packets each of which were separately suture ligated using a 0 silk x2  Spermatic cord was then divided using electrocautery  I observed the spermatic cord stump off clamp and ensured that hemostasis excellent prior to delivering this into the upper groin  At this point the wound was copiously irrigated  Again there was no gross spillage of purulence into the incision    I placed a Penrose drain in the base of the scrotum through a separate counter stab incision, I closed the dead space using 3-0 Vicryl suture  The skin was ultimately closed using 2-0 chromic sutures in a series of interrupted horizontal mattress sutures  A sterile dressing and scrotal support was applied  All needle and instrument counts were correct  The patient was placed back supine, awakened from general anesthesia and brought to recovery room in stable condition  ESTIMATED BLOOD LOSS:  Minimal      DRAINS:   Open Drain Right Other (Comment) (Active)       Urethral Catheter Double-lumen 18 Fr   (Active)       SPECIMENS:   Order Name Source Comment Collection Info Order Time   ANAEROBIC CULTURE AND GRAM STAIN Abscess  Collected By: Ross Callaway MD 7/20/2021  2:02 PM     Release to patient through [address]   Immediate        CULTURE, TISSUE AND GRAM STAIN Abscess  Collected By: Ross Callaway MD 7/20/2021  2:02 PM     Release to patient through [address]   Immediate        TISSUE EXAM Testis, Right  Collected By: Ross Callaway MD 7/20/2021  2:19 PM     [facility]   Immediate             COMPLICATIONS: none    DISPOSITION: PACU

## 2021-07-21 ENCOUNTER — TELEPHONE (OUTPATIENT)
Dept: OTHER | Facility: HOSPITAL | Age: 36
End: 2021-07-21

## 2021-07-21 LAB
ANION GAP SERPL CALCULATED.3IONS-SCNC: 14 MMOL/L (ref 4–13)
BACTERIA BLD CULT: NORMAL
BACTERIA BLD CULT: NORMAL
BASOPHILS # BLD AUTO: 0.03 THOUSANDS/ΜL (ref 0–0.1)
BASOPHILS NFR BLD AUTO: 0 % (ref 0–1)
BUN SERPL-MCNC: 11 MG/DL (ref 5–25)
CALCIUM SERPL-MCNC: 8.8 MG/DL (ref 8.3–10.1)
CHLORIDE SERPL-SCNC: 102 MMOL/L (ref 100–108)
CO2 SERPL-SCNC: 16 MMOL/L (ref 21–32)
CREAT SERPL-MCNC: 0.58 MG/DL (ref 0.6–1.3)
EOSINOPHIL # BLD AUTO: 0.01 THOUSAND/ΜL (ref 0–0.61)
EOSINOPHIL NFR BLD AUTO: 0 % (ref 0–6)
ERYTHROCYTE [DISTWIDTH] IN BLOOD BY AUTOMATED COUNT: 13 % (ref 11.6–15.1)
GFR SERPL CREATININE-BSD FRML MDRD: 151 ML/MIN/1.73SQ M
GLUCOSE SERPL-MCNC: 107 MG/DL (ref 65–140)
HCT VFR BLD AUTO: 31.5 % (ref 36.5–49.3)
HGB BLD-MCNC: 10.5 G/DL (ref 12–17)
IMM GRANULOCYTES # BLD AUTO: 0.3 THOUSAND/UL (ref 0–0.2)
IMM GRANULOCYTES NFR BLD AUTO: 2 % (ref 0–2)
LYMPHOCYTES # BLD AUTO: 1.57 THOUSANDS/ΜL (ref 0.6–4.47)
LYMPHOCYTES NFR BLD AUTO: 12 % (ref 14–44)
MAGNESIUM SERPL-MCNC: 2.3 MG/DL (ref 1.6–2.6)
MCH RBC QN AUTO: 30.9 PG (ref 26.8–34.3)
MCHC RBC AUTO-ENTMCNC: 33.3 G/DL (ref 31.4–37.4)
MCV RBC AUTO: 93 FL (ref 82–98)
MONOCYTES # BLD AUTO: 1.44 THOUSAND/ΜL (ref 0.17–1.22)
MONOCYTES NFR BLD AUTO: 11 % (ref 4–12)
NEUTROPHILS # BLD AUTO: 9.31 THOUSANDS/ΜL (ref 1.85–7.62)
NEUTS SEG NFR BLD AUTO: 75 % (ref 43–75)
NRBC BLD AUTO-RTO: 0 /100 WBCS
PHOSPHATE SERPL-MCNC: 2.3 MG/DL (ref 2.7–4.5)
PLATELET # BLD AUTO: 425 THOUSANDS/UL (ref 149–390)
PMV BLD AUTO: 10.5 FL (ref 8.9–12.7)
POTASSIUM SERPL-SCNC: 4.2 MMOL/L (ref 3.5–5.3)
RBC # BLD AUTO: 3.4 MILLION/UL (ref 3.88–5.62)
SODIUM SERPL-SCNC: 132 MMOL/L (ref 136–145)
WBC # BLD AUTO: 12.66 THOUSAND/UL (ref 4.31–10.16)

## 2021-07-21 PROCEDURE — 85025 COMPLETE CBC W/AUTO DIFF WBC: CPT | Performed by: NURSE PRACTITIONER

## 2021-07-21 PROCEDURE — 83735 ASSAY OF MAGNESIUM: CPT | Performed by: NURSE PRACTITIONER

## 2021-07-21 PROCEDURE — 99024 POSTOP FOLLOW-UP VISIT: CPT | Performed by: PHYSICIAN ASSISTANT

## 2021-07-21 PROCEDURE — 99232 SBSQ HOSP IP/OBS MODERATE 35: CPT | Performed by: HOSPITALIST

## 2021-07-21 PROCEDURE — 84100 ASSAY OF PHOSPHORUS: CPT | Performed by: NURSE PRACTITIONER

## 2021-07-21 PROCEDURE — 80048 BASIC METABOLIC PNL TOTAL CA: CPT | Performed by: NURSE PRACTITIONER

## 2021-07-21 RX ADMIN — SODIUM CHLORIDE, SODIUM LACTATE, POTASSIUM CHLORIDE, AND CALCIUM CHLORIDE 75 ML/HR: .6; .31; .03; .02 INJECTION, SOLUTION INTRAVENOUS at 19:26

## 2021-07-21 RX ADMIN — HYDROMORPHONE HYDROCHLORIDE 2 MG: 2 TABLET ORAL at 09:31

## 2021-07-21 RX ADMIN — SODIUM CHLORIDE, SODIUM LACTATE, POTASSIUM CHLORIDE, AND CALCIUM CHLORIDE 75 ML/HR: .6; .31; .03; .02 INJECTION, SOLUTION INTRAVENOUS at 05:19

## 2021-07-21 RX ADMIN — HYDROMORPHONE HYDROCHLORIDE 2 MG: 2 TABLET ORAL at 15:19

## 2021-07-21 RX ADMIN — HYDROMORPHONE HYDROCHLORIDE 2 MG: 2 TABLET ORAL at 05:18

## 2021-07-21 RX ADMIN — CELECOXIB 100 MG: 100 CAPSULE ORAL at 09:30

## 2021-07-21 RX ADMIN — NORTRIPTYLINE HYDROCHLORIDE 10 MG: 10 CAPSULE ORAL at 09:30

## 2021-07-21 RX ADMIN — GABAPENTIN 600 MG: 300 CAPSULE ORAL at 16:55

## 2021-07-21 RX ADMIN — HYDROMORPHONE HYDROCHLORIDE 2 MG: 2 TABLET ORAL at 00:18

## 2021-07-21 RX ADMIN — MELATONIN 3 MG: at 22:05

## 2021-07-21 RX ADMIN — BACLOFEN 15 MG: 10 TABLET ORAL at 16:55

## 2021-07-21 RX ADMIN — CEFEPIME HYDROCHLORIDE 2000 MG: 2 INJECTION, POWDER, FOR SOLUTION INTRAVENOUS at 00:18

## 2021-07-21 RX ADMIN — BACLOFEN 15 MG: 10 TABLET ORAL at 22:05

## 2021-07-21 RX ADMIN — HYDROMORPHONE HYDROCHLORIDE 2 MG: 2 TABLET ORAL at 22:06

## 2021-07-21 RX ADMIN — ENOXAPARIN SODIUM 40 MG: 40 INJECTION SUBCUTANEOUS at 09:29

## 2021-07-21 RX ADMIN — HYDROMORPHONE HYDROCHLORIDE 1 MG: 1 INJECTION, SOLUTION INTRAMUSCULAR; INTRAVENOUS; SUBCUTANEOUS at 12:42

## 2021-07-21 RX ADMIN — ATORVASTATIN CALCIUM 40 MG: 40 TABLET, FILM COATED ORAL at 16:55

## 2021-07-21 RX ADMIN — GABAPENTIN 600 MG: 300 CAPSULE ORAL at 22:05

## 2021-07-21 RX ADMIN — BACLOFEN 15 MG: 10 TABLET ORAL at 09:31

## 2021-07-21 RX ADMIN — TAMSULOSIN HYDROCHLORIDE 0.4 MG: 0.4 CAPSULE ORAL at 16:55

## 2021-07-21 RX ADMIN — GABAPENTIN 600 MG: 300 CAPSULE ORAL at 09:31

## 2021-07-21 RX ADMIN — CEFEPIME HYDROCHLORIDE 2000 MG: 2 INJECTION, POWDER, FOR SOLUTION INTRAVENOUS at 12:43

## 2021-07-21 RX ADMIN — HYDROMORPHONE HYDROCHLORIDE 1 MG: 1 INJECTION, SOLUTION INTRAMUSCULAR; INTRAVENOUS; SUBCUTANEOUS at 19:35

## 2021-07-21 RX ADMIN — HYDROMORPHONE HYDROCHLORIDE 2 MG: 2 TABLET ORAL at 18:26

## 2021-07-21 NOTE — TELEPHONE ENCOUNTER
Patient is a 43-year-old male presented to the hospital with testicular pain discovered to have bilateral epididymoorchitis  Repeat ultrasound shows ischemia of the right testicle  He is status post scrotal exploration with Dr Yeimy Collins  He will require outpatient follow-up in approximately 1 week for wound check with an AP      Thank you

## 2021-07-21 NOTE — PLAN OF CARE
Problem: Potential for Falls  Goal: Patient will remain free of falls  Description: INTERVENTIONS:  - Educate patient/family on patient safety including physical limitations  - Instruct patient to call for assistance with activity   - Consult OT/PT to assist with strengthening/mobility   - Keep Call bell within reach  - Keep bed low and locked with side rails adjusted as appropriate  - Keep care items and personal belongings within reach  - Initiate and maintain comfort rounds  - Make Fall Risk Sign visible to staff  Problem: Prexisting or High Potential for Compromised Skin Integrity  Goal: Skin integrity is maintained or improved  Description: INTERVENTIONS:  - Identify patients at risk for skin breakdown  - Assess and monitor skin integrity  - Assess and monitor nutrition and hydration status  - Monitor labs   - Assess for incontinence   - Turn and reposition patient  - Assist with mobility/ambulation  - Relieve pressure over bony prominences  - Avoid friction and shearing  - Provide appropriate hygiene as needed including keeping skin clean and dry  - Evaluate need for skin moisturizer/barrier cream  - Collaborate with interdisciplinary team   - Patient/family teaching  - Consider wound care consult   Outcome: Progressing     Problem: MOBILITY - ADULT  Goal: Maintain or return to baseline ADL function  Description: INTERVENTIONS:  -  Assess patient's ability to carry out ADLs; assess patient's baseline for ADL function and identify physical deficits which impact ability to perform ADLs (bathing, care of mouth/teeth, toileting, grooming, dressing, etc )  - Assess/evaluate cause of self-care deficits   - Assess range of motion  - Assess patient's mobility; develop plan if impaired  - Assess patient's need for assistive devices and provide as appropriate  - Encourage maximum independence but intervene and supervise when necessary  - Involve family in performance of ADLs  - Assess for home care needs following discharge   - Consider OT consult to assist with ADL evaluation and planning for discharge  - Provide patient education as appropriate  Outcome: Progressing  Goal: Maintains/Returns to pre admission functional level  Description: INTERVENTIONS:  - Perform BMAT or MOVE assessment daily    - Set and communicate daily mobility goal to care team and patient/family/caregiver     - Collaborate with rehabilitation services on mobility goals if consulted  Problem: PAIN - ADULT  Goal: Verbalizes/displays adequate comfort level or baseline comfort level  Description: Interventions:  - Encourage patient to monitor pain and request assistance  - Assess pain using appropriate pain scale  - Administer analgesics based on type and severity of pain and evaluate response  - Implement non-pharmacological measures as appropriate and evaluate response  - Consider cultural and social influences on pain and pain management  - Notify physician/advanced practitioner if interventions unsuccessful or patient reports new pain  Outcome: Progressing     Problem: INFECTION - ADULT  Goal: Absence or prevention of progression during hospitalization  Description: INTERVENTIONS:  - Assess and monitor for signs and symptoms of infection  - Monitor lab/diagnostic results  - Monitor all insertion sites, i e  indwelling lines, tubes, and drains  - Monitor endotracheal if appropriate and nasal secretions for changes in amount and color  - [place] appropriate cooling/warming therapies per order  - Administer medications as ordered  - Instruct and encourage patient and family to use good hand hygiene technique  - Identify and instruct in appropriate isolation precautions for identified infection/condition  Outcome: Progressing  Goal: Absence of fever/infection during neutropenic period  Description: INTERVENTIONS:  - Monitor WBC    Outcome: Progressing     Problem: SAFETY ADULT  Goal: Patient will remain free of falls  Description: INTERVENTIONS:  - Educate patient/family on patient safety including physical limitations  - Instruct patient to call for assistance with activity   - Consult OT/PT to assist with strengthening/mobility   - Keep Call bell within reach  - Keep bed low and locked with side rails adjusted as appropriate  - Keep care items and personal belongings within reach  - Initiate and maintain comfort rounds  - Make Fall Risk Sign visible to staff  - Apply yellow socks and bracelet for high fall risk patients  - Consider moving patient to room near nurses station  Outcome: Progressing  Goal: Maintain or return to baseline ADL function  Description: INTERVENTIONS:  -  Assess patient's ability to carry out ADLs; assess patient's baseline for ADL function and identify physical deficits which impact ability to perform ADLs (bathing, care of mouth/teeth, toileting, grooming, dressing, etc )  - Assess/evaluate cause of self-care deficits   - Assess range of motion  - Assess patient's mobility; develop plan if impaired  - Assess patient's need for assistive devices and provide as appropriate  - Encourage maximum independence but intervene and supervise when necessary  - Involve family in performance of ADLs  - Assess for home care needs following discharge   - Consider OT consult to assist with ADL evaluation and planning for discharge  - Provide patient education as appropriate  Outcome: Progressing  Goal: Maintains/Returns to pre admission functional level  Description: INTERVENTIONS:  - Perform BMAT or MOVE assessment daily    - Set and communicate daily mobility goal to care team and patient/family/caregiver     - Collaborate with rehabilitation services on mobility goals if consulted  Problem: DISCHARGE PLANNING  Goal: Discharge to home or other facility with appropriate resources  Description: INTERVENTIONS:  - Identify barriers to discharge w/patient and caregiver  - Arrange for needed discharge resources and transportation as appropriate  - Identify discharge learning needs (meds, wound care, etc )  - Arrange for interpretive services to assist at discharge as needed  - Refer to Case Management Department for coordinating discharge planning if the patient needs post-hospital services based on physician/advanced practitioner order or complex needs related to functional status, cognitive ability, or social support system  Outcome: Progressing     Problem: Knowledge Deficit  Goal: Patient/family/caregiver demonstrates understanding of disease process, treatment plan, medications, and discharge instructions  Description: Complete learning assessment and assess knowledge base    Interventions:  - Provide teaching at level of understanding  - Provide teaching via preferred learning methods  Outcome: Progressing     - Out of bed for toileting  - Record patient progress and toleration of activity level   Outcome: Progressing     - Out of bed for toileting  - Record patient progress and toleration of activity level   Outcome: Progressing     - Apply yellow socks and bracelet for high fall risk patients  - Consider moving patient to room near nurses station  Outcome: Progressing

## 2021-07-21 NOTE — UTILIZATION REVIEW
Continued Stay Review    Date: 7/21/21                         Current Patient Class: IP  Current Level of Care: MS    HPI:35 y o  male initially admitted on 7/16/21 with Sepsis from UTI vs infected hydrocele  PMH SCI with bilat scrotal pain, leukocytosis and lactic acidosis  Assessment/Plan:   Pt had R scrotal Orchiectomy 7/20     ++++++++++++++++++++++++++++++  7/20 OPERATIVE NOTE   PRE-OP DIAGNOSIS:   1) complicated right epididymal orchitis  2) spinal cord injury     POST-OP DIAGNOSIS:   1) complicated right epididymal orchitis  2) spinal cord injury     PROCEDURES PERFORMED:  1) right scrotal exploration with incision and drainage  2  Right scrotal orchiectomy    Anesthesia: General   ++++++++++++++++++++++++++++++    Date: 7/21 POD #1 - pt is doing well POD #1 , penrose drain will be removed today as will jimenez  Will monitor for urinary retention  Continue IV fluids, IV antibiotics, analgesia  Leukocytosis is improving  Pt has good pain control        Vital Signs:   07/21/21 15:30:12  98 3 °F (36 8 °C)  92  16  119/82  94  98 %  --  --   07/21/21 0945  --  --  --  --  --  --  None (Room air)  --   07/21/21 07:24:14  98 4 °F (36 9 °C)  84  18  149/91  110  95 %  --  --   07/20/21 2020  --  --  --  146/94  --  --  --  --   07/20/21 20:10:13  98 °F (36 7 °C)  76  18  156/99  118  96 %  --  --   07/20/21 19:00:22  98 °F (36 7 °C)  82  18  140/85  103  96 %  --  --   07/20/21 18:11:52  98 1 °F (36 7 °C)  70  18  134/81  99  91 %  --  --   07/20/21 17:03:41  97 4 °F (36 3 °C)Abnormal   75  18  164/90  115  95 %  --  --   07/20/21 1630  --  82  15  130/82  --  --  --  --   07/20/21 1615  --  78  17  144/81  --  96 %  Nasal cannula  --   07/20/21 1600  --  74  15  154/88  --  98 %  None (Room air)  --   07/20/21 1545  97 4 °F (36 3 °C)Abnormal   76  15  148/85  --  99 %  None (Room air)  --   07/20/21 1530  --  78  17  148/87  --  98 %  None (Room air)  --   07/20/21 1515  --  90  15  164/95  --  99 %  None (Room air)  --   07/20/21 1501  97 1 °F (36 2 °C)Abnormal   82  16  120/72  --  99 %  Simple mask  WDL   07/20/21 07:08:07  99 8 °F (37 7 °C)  114Abnormal   18  117/76  90  96 %  --  --   07/20/21 06:58:03  99 6 °F (37 6 °C)  122Abnormal   16  120/78  92  95 %  --  --   07/19/21 21:39:05  99 8 °F (37 7 °C)  91  18  133/79  97  100 %  --  --   07/19/21 1530  --  --  --  --  --  --  None (Room air)  --   07/19/21 15:11:49  98 8 °F (37 1 °C)  87  20  141/98  112  100 %  --  --   07/19/21 11:21:13  98 5 °F (36 9 °C)  93  16  --  --  95 %  --  --   07/19/21 11:21:12  --  --  --  133/82  99  --  --  --   07/19/21 08:01:50  99 2 °F (37 3 °C)  103  20  136/82  100  97 %  --  --   07/19/21 0745  --  --  --  --  --  --  None (Room air)  --   07/19/21 07:39:33  99 °F (37 2 °C)  103  16  139/81  100  91 %  --  --   07/19/21 0042  --  --  --  --  --  --  None (Room air)  --     Pertinent Labs/Diagnostic Results:       Results from last 7 days   Lab Units 07/21/21  0604 07/20/21  0509 07/19/21  0544 07/18/21  0521 07/17/21  0504 07/16/21  0000   WBC Thousand/uL 12 66* 13 60* 19 98* 23 76* 26 89* 14 92*   HEMOGLOBIN g/dL 10 5* 10 0* 10 3* 9 8* 10 7* 12 9   HEMATOCRIT % 31 5* 29 6* 31 1* 29 6* 33 0* 39 9   PLATELETS Thousands/uL 425* 351 332 277 282 360   NEUTROS ABS Thousands/µL 9 31* 10 11*  --   --   --  12 37*         Results from last 7 days   Lab Units 07/21/21  0604 07/20/21  0509 07/19/21  0544 07/18/21  0521 07/17/21  0504   SODIUM mmol/L 132* 135* 135* 136 136   POTASSIUM mmol/L 4 2 3 2* 3 9 3 2* 3 0*   CHLORIDE mmol/L 102 100 103 106 105   CO2 mmol/L 16* 23 24 23 25   ANION GAP mmol/L 14* 12 8 7 6   BUN mg/dL 11 6 6 6 8   CREATININE mg/dL 0 58* 0 58* 0 58* 0 80 0 91   EGFR ml/min/1 73sq m 151 151 151 132 124   CALCIUM mg/dL 8 8 8 7 8 9 8 4 8 1*   MAGNESIUM mg/dL 2 3 2 1  --   --   --    PHOSPHORUS mg/dL 2 3* 3 0  --   --  2 1*     Results from last 7 days   Lab Units 07/17/21  0504 07/16/21  0000   AST U/L  --  17   ALT U/L  --  20   ALK PHOS U/L  --  77   TOTAL PROTEIN g/dL  --  8 1   ALBUMIN g/dL 2 4* 3 7   TOTAL BILIRUBIN mg/dL  --  0 48         Results from last 7 days   Lab Units 07/21/21  0604 07/20/21  0509 07/19/21  0544 07/18/21  0521 07/17/21  0504 07/16/21  0000   GLUCOSE RANDOM mg/dL 107 73 72 97 94 110     Results from last 7 days   Lab Units 07/16/21  0000   TROPONIN I ng/mL <0 02         Results from last 7 days   Lab Units 07/16/21  0000   PROTIME seconds 14 0   INR  1 07   PTT seconds 31         Results from last 7 days   Lab Units 07/20/21  0509 07/19/21  0544 07/18/21  0521 07/17/21  1015   PROCALCITONIN ng/ml 1 27* 2 31* 3 73* 4 82*     Results from last 7 days   Lab Units 07/16/21  0200 07/16/21  0000   LACTIC ACID mmol/L 0 9 2 1*     Results from last 7 days   Lab Units 07/16/21  0047   CLARITY UA  Slightly Cloudy   COLOR UA  Yellow   SPEC GRAV UA  1 025   PH UA  6 5   GLUCOSE UA mg/dl Negative   KETONES UA mg/dl Negative   BLOOD UA  Small*   PROTEIN UA mg/dl 30 (1+)*   NITRITE UA  Positive*   BILIRUBIN UA  Negative   UROBILINOGEN UA E U /dl 0 2   LEUKOCYTES UA  Trace*   WBC UA /hpf 10-20*   RBC UA /hpf 0-1   BACTERIA UA /hpf Innumerable*   EPITHELIAL CELLS WET PREP /hpf Occasional         Results from last 7 days   Lab Units 07/20/21  1402 07/16/21  0047 07/16/21  0000   BLOOD CULTURE   --   --  No Growth After 5 Days  No Growth After 5 Days     GRAM STAIN RESULT  No Polys or Bacteria seen  --   --    URINE CULTURE   --  >100,000 cfu/ml Klebsiella pneumoniae*  --      Medications:   Scheduled Medications:  atorvastatin, 40 mg, Oral, Daily With Dinner  bacitracin 100,000 units and neomycin-polymyxin B () 1 mL in sodium chloride 0 9% 1000 mL irrigation bottle, , Irrigation, Once  baclofen, 15 mg, Oral, TID  cefepime, 2,000 mg, Intravenous, Q12H  celecoxib, 100 mg, Oral, BID  enoxaparin, 40 mg, Subcutaneous, Daily  gabapentin, 600 mg, Oral, TID  lidocaine, 1 patch, Topical, Daily  melatonin, 3 mg, Oral, HS  nortriptyline, 10 mg, Oral, Daily  senna, 2 tablet, Oral, HS  tamsulosin, 0 4 mg, Oral, Daily With Dinner      Continuous IV Infusions:  lactated ringers, 75 mL/hr, Intravenous, Continuous      PRN Meds:  acetaminophen, 975 mg, Oral, Q6H PRN  HYDROmorphone, 1 mg, Intravenous, Q6H PRN - x 3on 7/20, x 1 7/21  HYDROmorphone, 2 mg, Oral, Q3H PRN - x 4 7/20, 7/21  menthol-methyl salicylate, , Apply externally, 4x Daily PRN  oxyCODONE, 5 mg, Oral, Q6H PRN  polyethylene glycol, 17 g, Oral, Daily PRN    Discharge Plan: home     Network Utilization Review Department  ATTENTION: Please call with any questions or concerns to [phone] and carefully listen to the prompts so that you are directed to the right person  All voicemails are confidential   Yeimy Buck all requests for admission clinical reviews, approved or denied determinations and any other requests to dedicated fax number below belonging to the campus where the patient is receiving treatment   List of dedicated fax numbers for the Facilities:  [address] [place] [address] DENIALS (Administrative/Medical Necessity) [phone]   [address] (Maternity/NICU/Pediatrics) [phone]   [address] [place] [address] [address] [address] [place] [address] [address] [facility] [phone]

## 2021-07-21 NOTE — PROGRESS NOTES
Progress Note - Urology  Pasquale Gutierrez 38 y o  male MRN: [id number]  Unit/Bed#: [facility] 810-01 Encounter: [id number]    Assessment & Plan:    Complicated epididymitis:  -postop day 1 for right scrotal exploration with incision and drainage and right scrotal orchiectomy, progressing well  -patient had a Penrose drain which was removed this a m  Along with urethral Billingsley catheter  -urinary retention protocol in place  Encouraged ambulation  -patient currently on opioids, with paired bowel regimen   -continue with IV antibiotics, IV fluids and medical optimization for ongoing epididymitis on the left  -cultures from purulent discharge during surgical intervention currently pending  Adjust antibiotics based off of culture   -Discussed with patient that he may shower regularly with soap and water  Discussed he should not be soaking his scrotum in a bath, go swimming in lakes, lotions or ponds for 6 weeks   -scrotal elevation  -hemoglobin stable  -leukocytosis of 12 66 and improving  Can expect a leukocytosis with surgical intervention which is reactive   -patient afebrile    no further  intervention required at this time  Urology will be signing off  Urology is always available for additional questions or concerns regarding this patient  Patient will follow-up outpatient with a wound check in 1 week  Follow-up requested      Subjective/Objective   Chief Complaint:  Scrotal tenderness    Subjective:   Patient currently reporting that his scrotum tender  Denies any fevers, chills, nausea or vomiting  Denies any significant drainage  Denies any additional complaints at this time  Objective:     Blood pressure 149/91, pulse 84, temperature 98 4 °F (36 9 °C), resp  rate 18, height 5' 7" (1 702 m), weight 75 kg (165 lb 5 5 oz), SpO2 95 %  ,Body mass index is 25 9 kg/m²        Intake/Output Summary (Last 24 hours) at 7/21/2021 1347  Last data filed at 7/21/2021 0948  Gross per 24 hour   Intake 2001 25 ml Output 1925 ml   Net 76 25 ml       Invasive Devices     Peripheral Intravenous Line            Peripheral IV 07/20/21 Right Forearm 1 day                Physical Exam  HENT:      Head: Normocephalic and atraumatic  Right Ear: External ear normal       Left Ear: External ear normal       Nose: Nose normal       Mouth/Throat:      Pharynx: Oropharynx is clear  Eyes:      General: No scleral icterus  Conjunctiva/sclera: Conjunctivae normal    Cardiovascular:      Rate and Rhythm: Normal rate and regular rhythm  Pulses: Normal pulses  Heart sounds: No murmur heard  No friction rub  No gallop  Pulmonary:      Effort: Pulmonary effort is normal  No respiratory distress  Breath sounds: No wheezing, rhonchi or rales  Abdominal:      General: Bowel sounds are normal    Genitourinary:     Comments: Surgical incision intact on the scrotum  A Penrose drain is visible attached to a safety pin  Penrose drain removed without any difficulty  No sign of infection or purulent discharge coming from the Penrose drain or after removal a Penrose drain  Tenderness to palpation/appropriate incisional tenderness, left scrotum tender  Musculoskeletal:      Cervical back: Normal range of motion  Skin:     General: Skin is warm and dry  Neurological:      General: No focal deficit present  Mental Status: He is oriented to person, place, and time  Psychiatric:         Mood and Affect: Mood normal          Behavior: Behavior normal          Thought Content: Thought content normal          Judgment: Judgment normal            Lab, Imaging and other studies:I have personally reviewed pertinent lab results        Lab Results   Component Value Date    WBC 12 66 (H) 07/21/2021    HGB 10 5 (L) 07/21/2021    HCT 31 5 (L) 07/21/2021    MCV 93 07/21/2021     (H) 07/21/2021     Lab Results   Component Value Date    SODIUM 132 (L) 07/21/2021    K 4 2 07/21/2021     07/21/2021    CO2 16 (L) 07/21/2021    BUN 11 07/21/2021    CREATININE 0 58 (L) 07/21/2021    GLUC 107 07/21/2021    CALCIUM 8 8 07/21/2021       VTE Pharmacologic Prophylaxis: Enoxaparin (Lovenox)  VTE Mechanical Prophylaxis: sequential compression device      Cali Elise PA-C

## 2021-07-21 NOTE — TELEPHONE ENCOUNTER
Patient is currently an inpatient at [facility]   Patient will need to be contacted upon discharge to schedule follow up appointment

## 2021-07-22 LAB
ANION GAP SERPL CALCULATED.3IONS-SCNC: 6 MMOL/L (ref 4–13)
BASOPHILS # BLD AUTO: 0.04 THOUSANDS/ΜL (ref 0–0.1)
BASOPHILS NFR BLD AUTO: 1 % (ref 0–1)
BUN SERPL-MCNC: 11 MG/DL (ref 5–25)
CALCIUM SERPL-MCNC: 8.7 MG/DL (ref 8.3–10.1)
CHLORIDE SERPL-SCNC: 104 MMOL/L (ref 100–108)
CO2 SERPL-SCNC: 26 MMOL/L (ref 21–32)
CREAT SERPL-MCNC: 0.71 MG/DL (ref 0.6–1.3)
EOSINOPHIL # BLD AUTO: 0.06 THOUSAND/ΜL (ref 0–0.61)
EOSINOPHIL NFR BLD AUTO: 1 % (ref 0–6)
ERYTHROCYTE [DISTWIDTH] IN BLOOD BY AUTOMATED COUNT: 13.2 % (ref 11.6–15.1)
GFR SERPL CREATININE-BSD FRML MDRD: 139 ML/MIN/1.73SQ M
GLUCOSE SERPL-MCNC: 90 MG/DL (ref 65–140)
HCT VFR BLD AUTO: 30.3 % (ref 36.5–49.3)
HGB BLD-MCNC: 9.8 G/DL (ref 12–17)
IMM GRANULOCYTES # BLD AUTO: 0.16 THOUSAND/UL (ref 0–0.2)
IMM GRANULOCYTES NFR BLD AUTO: 2 % (ref 0–2)
LYMPHOCYTES # BLD AUTO: 2.77 THOUSANDS/ΜL (ref 0.6–4.47)
LYMPHOCYTES NFR BLD AUTO: 32 % (ref 14–44)
MCH RBC QN AUTO: 30.4 PG (ref 26.8–34.3)
MCHC RBC AUTO-ENTMCNC: 32.3 G/DL (ref 31.4–37.4)
MCV RBC AUTO: 94 FL (ref 82–98)
MONOCYTES # BLD AUTO: 1.11 THOUSAND/ΜL (ref 0.17–1.22)
MONOCYTES NFR BLD AUTO: 13 % (ref 4–12)
NEUTROPHILS # BLD AUTO: 4.53 THOUSANDS/ΜL (ref 1.85–7.62)
NEUTS SEG NFR BLD AUTO: 51 % (ref 43–75)
NRBC BLD AUTO-RTO: 0 /100 WBCS
PLATELET # BLD AUTO: 492 THOUSANDS/UL (ref 149–390)
PMV BLD AUTO: 9.8 FL (ref 8.9–12.7)
POTASSIUM SERPL-SCNC: 3.2 MMOL/L (ref 3.5–5.3)
PROCALCITONIN SERPL-MCNC: 0.45 NG/ML
RBC # BLD AUTO: 3.22 MILLION/UL (ref 3.88–5.62)
SODIUM SERPL-SCNC: 136 MMOL/L (ref 136–145)
WBC # BLD AUTO: 8.67 THOUSAND/UL (ref 4.31–10.16)

## 2021-07-22 PROCEDURE — 97116 GAIT TRAINING THERAPY: CPT

## 2021-07-22 PROCEDURE — 99232 SBSQ HOSP IP/OBS MODERATE 35: CPT | Performed by: HOSPITALIST

## 2021-07-22 PROCEDURE — 84145 PROCALCITONIN (PCT): CPT | Performed by: HOSPITALIST

## 2021-07-22 PROCEDURE — 80048 BASIC METABOLIC PNL TOTAL CA: CPT | Performed by: HOSPITALIST

## 2021-07-22 PROCEDURE — 85025 COMPLETE CBC W/AUTO DIFF WBC: CPT | Performed by: HOSPITALIST

## 2021-07-22 PROCEDURE — 97530 THERAPEUTIC ACTIVITIES: CPT

## 2021-07-22 RX ORDER — POTASSIUM CHLORIDE 20 MEQ/1
40 TABLET, EXTENDED RELEASE ORAL ONCE
Status: COMPLETED | OUTPATIENT
Start: 2021-07-22 | End: 2021-07-22

## 2021-07-22 RX ORDER — CEFAZOLIN SODIUM 2 G/50ML
2000 SOLUTION INTRAVENOUS EVERY 8 HOURS
Status: DISCONTINUED | OUTPATIENT
Start: 2021-07-22 | End: 2021-07-23

## 2021-07-22 RX ADMIN — ENOXAPARIN SODIUM 40 MG: 40 INJECTION SUBCUTANEOUS at 08:30

## 2021-07-22 RX ADMIN — HYDROMORPHONE HYDROCHLORIDE 2 MG: 2 TABLET ORAL at 12:10

## 2021-07-22 RX ADMIN — HYDROMORPHONE HYDROCHLORIDE 2 MG: 2 TABLET ORAL at 16:24

## 2021-07-22 RX ADMIN — BACLOFEN 15 MG: 10 TABLET ORAL at 21:26

## 2021-07-22 RX ADMIN — CELECOXIB 100 MG: 100 CAPSULE ORAL at 17:21

## 2021-07-22 RX ADMIN — POTASSIUM CHLORIDE 40 MEQ: 1500 TABLET, EXTENDED RELEASE ORAL at 17:34

## 2021-07-22 RX ADMIN — ATORVASTATIN CALCIUM 40 MG: 40 TABLET, FILM COATED ORAL at 16:24

## 2021-07-22 RX ADMIN — HYDROMORPHONE HYDROCHLORIDE 2 MG: 2 TABLET ORAL at 08:31

## 2021-07-22 RX ADMIN — GABAPENTIN 600 MG: 300 CAPSULE ORAL at 08:30

## 2021-07-22 RX ADMIN — HYDROMORPHONE HYDROCHLORIDE 2 MG: 2 TABLET ORAL at 20:39

## 2021-07-22 RX ADMIN — GABAPENTIN 600 MG: 300 CAPSULE ORAL at 21:25

## 2021-07-22 RX ADMIN — TAMSULOSIN HYDROCHLORIDE 0.4 MG: 0.4 CAPSULE ORAL at 16:23

## 2021-07-22 RX ADMIN — GABAPENTIN 600 MG: 300 CAPSULE ORAL at 16:23

## 2021-07-22 RX ADMIN — CEFAZOLIN SODIUM 2000 MG: 2 SOLUTION INTRAVENOUS at 12:10

## 2021-07-22 RX ADMIN — CEFAZOLIN SODIUM 2000 MG: 2 SOLUTION INTRAVENOUS at 21:26

## 2021-07-22 RX ADMIN — NORTRIPTYLINE HYDROCHLORIDE 10 MG: 10 CAPSULE ORAL at 08:31

## 2021-07-22 RX ADMIN — MELATONIN 3 MG: at 21:26

## 2021-07-22 RX ADMIN — BACLOFEN 15 MG: 10 TABLET ORAL at 16:23

## 2021-07-22 RX ADMIN — HYDROMORPHONE HYDROCHLORIDE 2 MG: 2 TABLET ORAL at 05:30

## 2021-07-22 RX ADMIN — CEFEPIME HYDROCHLORIDE 2000 MG: 2 INJECTION, POWDER, FOR SOLUTION INTRAVENOUS at 00:39

## 2021-07-22 RX ADMIN — BACLOFEN 15 MG: 10 TABLET ORAL at 08:31

## 2021-07-22 RX ADMIN — HYDROMORPHONE HYDROCHLORIDE 2 MG: 2 TABLET ORAL at 01:27

## 2021-07-22 NOTE — ASSESSMENT & PLAN NOTE
· Likely from urinary source possibly from not completely emptying his bladder (pt was noted to have urinary retention during his last admission in March but was discharged without a jimenez to rehab)  · Urology following  · Maintain jimenez for now  · Urine culture (+) klebsiella - c/w abx, D-6  · POD #2 s/p right scrotal exploration with incision and drainage and right scrotal orchiectomy   · Follow-up OR cultures   · Scrotal elevation, local wound care

## 2021-07-22 NOTE — ASSESSMENT & PLAN NOTE
· Pt reporting acute low back pain after a mechanical fall he experienced at [facility] prior to admission- improved today   · lumbar xray (-)    · Fall precautions   · PT/OT  · Restarted home medications

## 2021-07-22 NOTE — TELEPHONE ENCOUNTER
Patient still currently admitted  Please confirm follow up appt 8/2/21 at 1115am with Ayo Flores on discharge

## 2021-07-22 NOTE — PHYSICAL THERAPY NOTE
Physical Therapy Progress Note     07/22/21 1030   PT Last Visit   PT Visit Date 07/22/21   Note Type   Note Type Treatment   Pain Assessment   Pain Assessment Tool 0-10   Pain Score 7   Pain Location/Orientation Location: [facility] Pain Intervention(s) Repositioned; Ambulation/increased activity; Emotional support   Restrictions/Precautions   Other Precautions Pain; Fall Risk;Bed Alarm; Chair Alarm   Subjective   Subjective The pt  states that he is eager to walk  He expressed concern that he has not received any occupational therapy and he feels that he is losing strength and function in his arms  Bed Mobility   Supine to Sit 4  Minimal assistance   Additional items Assist x 1; Increased time required   Transfers   Sit to Stand 4  Minimal assistance   Additional items Assist x 1   Stand to Sit 4  Minimal assistance   Additional items Assist x 1   Ambulation/Elevation   Gait pattern Excessively slow; Step to;Short stride; Inconsistent heidy; Shuffling;Decreased foot clearance   Gait Assistance 5  Supervision   Additional items Verbal cues   Assistive Device None   Distance 180 feet  Balance   Static Sitting Fair   Dynamic Sitting Fair -   Static Standing Fair -   Ambulatory Fair -   Activity Tolerance   Activity Tolerance Patient tolerated treatment well   Nurse [address] [place] [address] Avenue, RN  Assessment   Prognosis Fair   Problem List Decreased strength;Decreased endurance; Impaired balance;Decreased mobility; Decreased safety awareness;Pain   Assessment The pt  was able to ambulate community distance today, but he continues to have a shuffling and very slow gait  He was able to manuever around obstacles with increased time  He does continue to have difficulty performing turns, but he had no loss of balance  The pt  expressed concern about his UE's and being able to perform his ADLs  Communicated this to OT     Barriers to Discharge Inaccessible home environment;Decreased caregiver support   Goals   Patient Goals To regain his independence  STG Expiration Date 08/01/21   PT Treatment Day 1   Plan   Treatment/Interventions Functional transfer training;LE strengthening/ROM; Therapeutic exercise; Endurance training;Patient/family training;Bed mobility;Gait training;Elevations   Progress Progressing toward goals   PT Frequency   (3-5x a week )   Recommendation   PT Discharge Recommendation Post acute rehabilitation services   AM-PAC Basic Mobility Inpatient   Turning in Bed Without Bedrails 3   Lying on Back to Sitting on Edge of Flat Bed 3   Moving Bed to Chair 3   Standing Up From Chair 3   Walk in Room 3   Climb 3-5 Stairs 2   Basic Mobility Inpatient Raw Score 17   Basic Mobility Standardized Score 39 67     An AM-PAC Basic Mobility standardized score less than 42 9 suggests the patient may benefit from discharge to post-acute rehab services      Filippo To PTA

## 2021-07-22 NOTE — PROGRESS NOTES
[facility] [facility]  Progress Note Lyndon Rogers 1985, 38 y o  male MRN: [id number]  Unit/Bed#: [facility] 810-01 Encounter: [id number]  Primary Care Provider: Marin Castillo MD   Date and time admitted to hospital: 7/16/2021  9:07 AM    HLD (hyperlipidemia)  Assessment & Plan  · C/w statin    Benign essential HTN  Assessment & Plan  · Hold HCTZ in the setting of sepsis   · BP stable off meds     Acute low back pain  Assessment & Plan  · Pt reporting acute low back pain after a mechanical fall he experienced at [facility] prior to admission- improved today   · lumbar xray (-)    · Fall precautions   · PT/OT  · Restarted home medications     Orchitis and epididymitis  Assessment & Plan  · Likely from urinary source possibly from not completely emptying his bladder (pt was noted to have urinary retention during his last admission in March but was discharged without a jimenez to rehab)  · Urology following  · Maintain jimenez for now  · Urine culture (+) klebsiella - c/w abx, D-6  · POD #2 s/p right scrotal exploration with incision and drainage and right scrotal orchiectomy   · Follow-up OR cultures   · Scrotal elevation, local wound care    Urinary retention  Assessment & Plan  · Noted on last admission but was discharged after passing a voiding trial without a jimenez   · Jimenez in placed post op- removed on 7/21 - no voiding issues  · C/w flomax    Central cord syndrome [facility])  Assessment & Plan  · Patient sustained a fall downstairs on March 6, 2021 suffering a severe spinal cord injury, S/p C3-5 laminectomy, C2-7 fusion on 3/7  · [facility]   · Pt was noted to have urinary retention during his last admission but was able to have jimenez removed at discharge- monitor for urinary retention prior to discharge    · Medication personally reviewed with RN at [facility] and ordered   · Gabapentin 600mg TID   · Baclofen 15mg TID   · Dilaudid 2mg PO q6hprn  · nortriptyline 10mg daily   · Celecoxib 100mg bid   · tyelenol 1000 mg TID - ordered PRN given sepsis   · flomax 0 4mg daily  · lipitor 40mg daily   · hctz 25mg daily- hold in the setting of sepsis     * Sepsis ([facility] )  Assessment & Plan  · POA secondary to UTI, orchitis, epididymitis as evidenced by tachycardia, leukocytosis, lactic acidosis   · Blood cultures no growth to date   · Urine culture > 100,000 cfu/ml klebsiella, susceptible to Cefazolin   · Urology following  · POD #2 s/p right scrotal exploration with incision and drainage and right scrotal orchiectomy   · Continue with Cefepime - change to ancef, if cultures neg by tomorrow & tolerating ancef switch to PO, D - 6  · Follow-up OR cultures - neg so far  · Scrotal elevation, local wound care  · Pain control   · Drain & jimenez removed  · OP urology f/u in 1 week        [facility] Internal Medicine Progress Note  Patient: Oliver Kelly 38 y o  male   MRN: [id number]  PCP: Krish Ceja MD  Unit/Bed#: [facility] 810-01 Encounter: [id number]  Date Of Visit: 07/22/21    Assessment:    Principal Problem:    Sepsis ([facility] )  Active Problems:    [facility] cord syndrome ([facility] )    Urinary retention    Orchitis and epididymitis    Acute low back pain    Benign essential HTN    HLD (hyperlipidemia)      Plan:           VTE Pharmacologic Prophylaxis:   VTE Score: 5 Moderate Risk (Score 3-4) - Pharmacological DVT Prophylaxis Ordered: Enoxaparin (Lovenox)  Mechanical VTE Prophylaxis in Place: Yes    Patient Centered Rounds: I have performed bedside rounds with nursing staff today  Discussions with Specialists or Other Care Team Provider:     Education and Discussions with Family / Patient: Patient declined call to   Time Spent for Care: 30 minutes  More than 50% of total time spent on counseling and coordination of care as described above  Current Length of Stay: 6 day(s)  Current Patient Status: Inpatient     Certification Statement:  The patient will continue to require additional inpatient hospital stay due to await cultures, monitor with change in Abx    Discharge Plan / Estimated Discharge Date: Anticipate discharge tomorrow to rehab facility  Code Status: Level 1 - Full Code      Subjective:   Feels better , no trouble urinating    Objective:     Vitals:   Temp (24hrs), Av 6 °F (37 °C), Min:98 3 °F (36 8 °C), Max:98 8 °F (37 1 °C)    Temp:  [98 3 °F (36 8 °C)-98 8 °F (37 1 °C)] 98 8 °F (37 1 °C)  HR:  [82-99] 84  Resp:  [16-18] 18  BP: (106-117)/(64-74) 117/74  SpO2:  [94 %-98 %] 98 %  Body mass index is 25 9 kg/m²  Input and Output Summary (last 24 hours): Intake/Output Summary (Last 24 hours) at 2021 1656  Last data filed at 2021 1430  Gross per 24 hour   Intake 400 ml   Output 1400 ml   Net -1000 ml       Physical Exam:   Physical Exam  Vitals reviewed  HENT:      Head: Normocephalic and atraumatic  Mouth/Throat:      Mouth: Mucous membranes are moist    Cardiovascular:      Rate and Rhythm: Normal rate and regular rhythm  Heart sounds: Normal heart sounds  Pulmonary:      Effort: Pulmonary effort is normal  No respiratory distress  Breath sounds: Normal breath sounds  No wheezing  Abdominal:      General: There is no distension  Palpations: Abdomen is soft  Tenderness: There is no abdominal tenderness  Musculoskeletal:      Right lower leg: No edema  Left lower leg: No edema  Neurological:      Mental Status: He is alert and oriented to person, place, and time            Additional Data:     Labs:  Results from last 7 days   Lab Units 21  0521   WBC Thousand/uL 8 67   HEMOGLOBIN g/dL 9 8*   HEMATOCRIT % 30 3*   PLATELETS Thousands/uL 492*   NEUTROS PCT % 51   LYMPHS PCT % 32   MONOS PCT % 13*   EOS PCT % 1     Results from last 7 days   Lab Units 21  0521 21  0504 21  0000   SODIUM mmol/L 136 136 143   POTASSIUM mmol/L 3 2* 3 0* 3 4*   CHLORIDE mmol/L 104 105 104   CO2 mmol/L 26 25 31   BUN mg/dL 11 8 11   CREATININE mg/dL 0 71 0 91 1 20   ANION GAP mmol/L 6 6 8   CALCIUM mg/dL 8 7 8 1* 9 2   ALBUMIN g/dL  --  2 4* 3 7   TOTAL BILIRUBIN mg/dL  --   --  0 48   ALK PHOS U/L  --   --  77   ALT U/L  --   --  20   AST U/L  --   --  17   GLUCOSE RANDOM mg/dL 90 94 110     Results from last 7 days   Lab Units 07/16/21  0000   INR  1 07             Results from last 7 days   Lab Units 07/22/21  0521 07/20/21  0509 07/19/21  0544 07/18/21  0521 07/17/21  1015 07/16/21  0200 07/16/21  0000   LACTIC ACID mmol/L  --   --   --   --   --  0 9 2 1*   PROCALCITONIN ng/ml 0 45* 1 27* 2 31* 3 73* 4 82*  --   --        Imaging: No pertinent imaging reviewed  Recent Cultures (last 7 days):     Results from last 7 days   Lab Units 07/20/21  1402 07/16/21  0047 07/16/21  0000   BLOOD CULTURE   --   --  No Growth After 5 Days  No Growth After 5 Days     GRAM STAIN RESULT  No Polys or Bacteria seen  --   --    URINE CULTURE   --  >100,000 cfu/ml Klebsiella pneumoniae*  --        Lines/Drains:  Invasive Devices     Peripheral Intravenous Line            Peripheral IV 07/20/21 Right Forearm 2 days                Telemetry:        Last 24 Hours Medication List:   Current Facility-Administered Medications   Medication Dose Route Frequency Provider Last Rate    acetaminophen  975 mg Oral Q6H PRN Han Block MD      atorvastatin  40 mg Oral Daily With Sandy Solis MD      bacitracin 100,000 units and neomycin-polymyxin B () 1 mL in sodium chloride 0 9% 1000 mL irrigation bottle   Irrigation Once Han Block MD      baclofen  15 mg Oral TID Han Block MD      cefazolin  2,000 mg Intravenous Lucia Kennedy MD 2,000 mg (07/22/21 1210)    celecoxib  100 mg Oral BID Han Block MD      enoxaparin  40 mg Subcutaneous Daily Han Block MD      gabapentin  600 mg Oral TID Han Block MD      HYDROmorphone  1 mg Intravenous Q6H PRN JAYLYN Bertrand      HYDROmorphone  2 mg Oral Q3H PRN Sonia Fails, MD      lidocaine  1 patch Topical Daily Sonia Fails, MD      melatonin  3 mg Oral HS Sonia Fails, MD      menthol-methyl salicylate   Apply externally 4x Daily PRN Sonia Fails, MD      nortriptyline  10 mg Oral Daily Sonia Fails, MD      oxyCODONE  5 mg Oral Q6H PRN JAYLYN Bertrand      polyethylene glycol  17 g Oral Daily PRN Sonia Fails, MD      senna  2 tablet Oral HS Sonia Fails, MD      tamsulosin  0 4 mg Oral Daily With Puneet Cordova MD          Today, Patient Was Seen By: Lesly Santos MD    ** Please Note: This note has been constructed using a voice recognition system   **

## 2021-07-22 NOTE — ASSESSMENT & PLAN NOTE
· POA secondary to UTI, orchitis, epididymitis as evidenced by tachycardia, leukocytosis, lactic acidosis   · Blood cultures no growth to date   · Urine culture > 100,000 cfu/ml klebsiella, susceptible to Cefazolin   · Urology following  · POD #2 s/p right scrotal exploration with incision and drainage and right scrotal orchiectomy   · Continue with Cefepime - change to ancef, if cultures neg by tomorrow & tolerating ancef switch to PO, D - 6  · Follow-up OR cultures - neg so far  · Scrotal elevation, local wound care  · Pain control   · Drain & jimenez removed  · OP urology f/u in 1 week

## 2021-07-22 NOTE — PROGRESS NOTES
[address] [facility]  Progress Note Zamzam Bailey 1985, 38 y o  male MRN: [id number]  Unit/Bed#: [facility] 810-01 Encounter: [id number]  Primary Care Provider: Juana Field MD   Date and time admitted to hospital: 7/16/2021  9:07 AM    HLD (hyperlipidemia)  Assessment & Plan  · C/w statin    Benign essential HTN  Assessment & Plan  · Hold HCTZ in the setting of sepsis   · BP stable off meds     Acute low back pain  Assessment & Plan  · Pt reporting acute low back pain after a mechanical fall he experienced at [facility] prior to admission- improved today   · lumbar xray (-)    · Fall precautions   · PT/OT  · Restarted home medications     Orchitis and epididymitis  Assessment & Plan  · Likely from urinary source possibly from not completely emptying his bladder (pt was noted to have urinary retention during his last admission in March but was discharged without a jimenez to rehab)  · Urology following  · Maintain jimenez for now  · Urine culture (+) klebsiella - c/w cefepime today day #5  · POD #1 s/p right scrotal exploration with incision and drainage and right scrotal orchiectomy   · Follow-up OR cultures   · Scrotal elevation, local wound care    Urinary retention  Assessment & Plan  · Noted on last admission but was discharged after passing a voiding trial without a jimenez   · Jimenez in placed post op- removed on 7/21 - f/u   · C/w flomax    Central cord syndrome [facility])  Assessment & Plan  · Patient sustained a fall downstairs on March 6, 2021 suffering a severe spinal cord injury, S/p C3-5 laminectomy, C2-7 fusion on 3/7  · [facility]   · Pt was noted to have urinary retention during his last admission but was able to have jimenez removed at discharge- monitor for urinary retention prior to discharge    · Medication personally reviewed with RN at [facility] and ordered   · Gabapentin 600mg TID   · Baclofen 15mg TID   · Dilaudid 2mg PO q6hprn  · nortriptyline 10mg daily   · Celecoxib 100mg bid   · tyelenol 1000 mg TID - ordered PRN given sepsis   · flomax 0 4mg daily  · lipitor 40mg daily   · hctz 25mg daily- hold in the setting of sepsis     * Sepsis ([facility] )  Assessment & Plan  · POA secondary to UTI, orchitis, epididymitis as evidenced by tachycardia, leukocytosis, lactic acidosis   · Blood cultures no growth to date   · Urine culture > 100,000 cfu/ml klebsiella, susceptible to Cefazolin   · Urology following  · POD #1 s/p right scrotal exploration with incision and drainage and right scrotal orchiectomy   · Continue with Cefepime  · Follow-up OR cultures   · Scrotal elevation, local wound care  · Pain control   · Drain & jimenez removed  · OP urology f/u in 1 week    Lactic acidosis-resolved as of 7/18/2021  Assessment & Plan  In the setting of sepsis   Resolved with ivfs     Hypokalemia-resolved as of 7/19/2021  Assessment & Plan  · Repleted  · K level 3 2 -> 3 9        [facility] Internal Medicine Progress Note  Patient: Miguel Parekh 38 y o  male   MRN: [id number]  PCP: Marcell Rendon MD  Unit/Bed#: [facility] 810-01 Encounter: [id number]  Date Of Visit: 07/21/21    Assessment:    Principal Problem:    Sepsis ([facility] 75 )  Active Problems:    [facility] ([facility] )    Urinary retention    Orchitis and epididymitis    Acute low back pain    Benign essential HTN    HLD (hyperlipidemia)      Plan:         VTE Pharmacologic Prophylaxis:   VTE Score: 5 Moderate Risk (Score 3-4) - Pharmacological DVT Prophylaxis Ordered: Enoxaparin (Lovenox)  Mechanical VTE Prophylaxis in Place: Yes    Patient Centered Rounds: I have performed bedside rounds with nursing staff today  Discussions with Specialists or Other Care Team Provider:     Education and Discussions with Family / Patient: patient    Time Spent for Care: 30 minutes  More than 50% of total time spent on counseling and coordination of care as described above      Current Length of Stay: 5 day(s)  Current Patient Status: Inpatient     Certification Statement: The patient will continue to require additional inpatient hospital stay due to IV Abx, f/u urinary retention, f/u cultures    Discharge Plan / Estimated Discharge Date: 24-48 hrs    Code Status: Level 1 - Full Code      Subjective:   Feels ok, pain under control    Objective:     Vitals:   Temp (24hrs), Av 4 °F (36 9 °C), Min:98 3 °F (36 8 °C), Max:98 4 °F (36 9 °C)    Temp:  [98 3 °F (36 8 °C)-98 4 °F (36 9 °C)] 98 3 °F (36 8 °C)  HR:  [84-92] 92  Resp:  [16-18] 16  BP: (119-149)/(82-91) 119/82  SpO2:  [95 %-98 %] 98 %  Body mass index is 25 9 kg/m²  Input and Output Summary (last 24 hours): Intake/Output Summary (Last 24 hours) at 2021  Last data filed at 2021 1700  Gross per 24 hour   Intake 1221 25 ml   Output 1825 ml   Net -603 75 ml       Physical Exam:   Physical Exam  Vitals reviewed  HENT:      Head: Normocephalic and atraumatic  Mouth/Throat:      Mouth: Mucous membranes are moist    Cardiovascular:      Rate and Rhythm: Normal rate and regular rhythm  Heart sounds: Normal heart sounds  Pulmonary:      Effort: Pulmonary effort is normal  No respiratory distress  Breath sounds: Normal breath sounds  No wheezing  Abdominal:      General: There is no distension  Palpations: Abdomen is soft  Tenderness: There is no abdominal tenderness  Musculoskeletal:      Right lower leg: No edema  Left lower leg: No edema  Neurological:      Mental Status: He is alert            Additional Data:     Labs:  Results from last 7 days   Lab Units 21  0604   WBC Thousand/uL 12 66*   HEMOGLOBIN g/dL 10 5*   HEMATOCRIT % 31 5*   PLATELETS Thousands/uL 425*   NEUTROS PCT % 75   LYMPHS PCT % 12*   MONOS PCT % 11   EOS PCT % 0     Results from last 7 days   Lab Units 21  0604 21  0504 21  0000   SODIUM mmol/L 132* 136 143   POTASSIUM mmol/L 4 2 3 0* 3 4*   CHLORIDE mmol/L 102 105 104   CO2 mmol/L 16* 25 31   BUN mg/dL 11 8 11   CREATININE mg/dL 0 58* 0 91 1 20   ANION GAP mmol/L 14* 6 8   CALCIUM mg/dL 8 8 8 1* 9 2   ALBUMIN g/dL  --  2 4* 3 7   TOTAL BILIRUBIN mg/dL  --   --  0 48   ALK PHOS U/L  --   --  77   ALT U/L  --   --  20   AST U/L  --   --  17   GLUCOSE RANDOM mg/dL 107 94 110     Results from last 7 days   Lab Units 07/16/21  0000   INR  1 07             Results from last 7 days   Lab Units 07/20/21  0509 07/19/21  0544 07/18/21  0521 07/17/21  1015 07/16/21  0200 07/16/21  0000   LACTIC ACID mmol/L  --   --   --   --  0 9 2 1*   PROCALCITONIN ng/ml 1 27* 2 31* 3 73* 4 82*  --   --        Imaging: No pertinent imaging reviewed  Recent Cultures (last 7 days):     Results from last 7 days   Lab Units 07/20/21  1402 07/16/21  0047 07/16/21  0000   BLOOD CULTURE   --   --  No Growth After 5 Days  No Growth After 5 Days     GRAM STAIN RESULT  No Polys or Bacteria seen  --   --    URINE CULTURE   --  >100,000 cfu/ml Klebsiella pneumoniae*  --        Lines/Drains:  Invasive Devices     Peripheral Intravenous Line            Peripheral IV 07/20/21 Right Forearm 1 day                Telemetry:        Last 24 Hours Medication List:   Current Facility-Administered Medications   Medication Dose Route Frequency Provider Last Rate    acetaminophen  975 mg Oral Q6H PRN Lopez Gipson MD      atorvastatin  40 mg Oral Daily With Iver Fabry, MD      bacitracin 100,000 units and neomycin-polymyxin B () 1 mL in sodium chloride 0 9% 1000 mL irrigation bottle   Irrigation Once Lopez Gipson MD      baclofen  15 mg Oral TID Lopez Gipson MD      cefepime  2,000 mg Intravenous Q12H Lopez Gipson MD 2,000 mg (07/21/21 1243)    celecoxib  100 mg Oral BID Lopez Gipson MD      enoxaparin  40 mg Subcutaneous Daily Lopez Gipson MD      gabapentin  600 mg Oral TID Lopez Gipson MD      HYDROmorphone  1 mg Intravenous Q6H PRN Kenny Purple, CRNP      HYDROmorphone  2 mg Oral Q3H PRN Soledad Goodell, MD      lidocaine  1 patch Topical Daily Soledad Goodell, MD      melatonin  3 mg Oral HS Soledad Goodell, MD      menthol-methyl salicylate   Apply externally 4x Daily PRN Soledad Goodell, MD      nortriptyline  10 mg Oral Daily Soledad Goodell, MD      oxyCODONE  5 mg Oral Q6H PRN Kenny Purple, CRNP      polyethylene glycol  17 g Oral Daily PRN Soledad Goodell, MD      senna  2 tablet Oral HS Soledad Goodell, MD      tamsulosin  0 4 mg Oral Daily With Anny Ritchie MD          Today, Patient Was Seen By: Didi Yao MD    ** Please Note: This note has been constructed using a voice recognition system   **

## 2021-07-22 NOTE — PLAN OF CARE
Problem: Potential for Falls  Goal: Patient will remain free of falls  Description: INTERVENTIONS:  - Educate patient/family on patient safety including physical limitations  - Instruct patient to call for assistance with activity   - Consult OT/PT to assist with strengthening/mobility   - Keep Call bell within reach  - Keep bed low and locked with side rails adjusted as appropriate  - Keep care items and personal belongings within reach  - Initiate and maintain comfort rounds  - Make Fall Risk Sign visible to staff  Problem: Prexisting or High Potential for Compromised Skin Integrity  Goal: Skin integrity is maintained or improved  Description: INTERVENTIONS:  - Identify patients at risk for skin breakdown  - Assess and monitor skin integrity  - Assess and monitor nutrition and hydration status  - Monitor labs   - Assess for incontinence   - Turn and reposition patient  - Assist with mobility/ambulation  - Relieve pressure over bony prominences  - Avoid friction and shearing  - Provide appropriate hygiene as needed including keeping skin clean and dry  - Evaluate need for skin moisturizer/barrier cream  - Collaborate with interdisciplinary team   - Patient/family teaching  - Consider wound care consult   Outcome: Progressing     Problem: MOBILITY - ADULT  Goal: Maintain or return to baseline ADL function  Description: INTERVENTIONS:  -  Assess patient's ability to carry out ADLs; assess patient's baseline for ADL function and identify physical deficits which impact ability to perform ADLs (bathing, care of mouth/teeth, toileting, grooming, dressing, etc )  - Assess/evaluate cause of self-care deficits   - Assess range of motion  - Assess patient's mobility; develop plan if impaired  - Assess patient's need for assistive devices and provide as appropriate  - Encourage maximum independence but intervene and supervise when necessary  - Involve family in performance of ADLs  - Assess for home care needs following discharge   - Consider OT consult to assist with ADL evaluation and planning for discharge  - Provide patient education as appropriate  Outcome: Progressing  Goal: Maintains/Returns to pre admission functional level  Description: INTERVENTIONS:  - Perform BMAT or MOVE assessment daily    - Set and communicate daily mobility goal to care team and patient/family/caregiver     - Collaborate with rehabilitation services on mobility goals if consulted  Problem: PAIN - ADULT  Goal: Verbalizes/displays adequate comfort level or baseline comfort level  Description: Interventions:  - Encourage patient to monitor pain and request assistance  - Assess pain using appropriate pain scale  - Administer analgesics based on type and severity of pain and evaluate response  - Implement non-pharmacological measures as appropriate and evaluate response  - Consider cultural and social influences on pain and pain management  - Notify physician/advanced practitioner if interventions unsuccessful or patient reports new pain  Outcome: Progressing     Problem: INFECTION - ADULT  Goal: Absence or prevention of progression during hospitalization  Description: INTERVENTIONS:  - Assess and monitor for signs and symptoms of infection  - Monitor lab/diagnostic results  - Monitor all insertion sites, i e  indwelling lines, tubes, and drains  - Monitor endotracheal if appropriate and nasal secretions for changes in amount and color  - [place] appropriate cooling/warming therapies per order  - Administer medications as ordered  - Instruct and encourage patient and family to use good hand hygiene technique  - Identify and instruct in appropriate isolation precautions for identified infection/condition  Outcome: Progressing  Goal: Absence of fever/infection during neutropenic period  Description: INTERVENTIONS:  - Monitor WBC    Outcome: Progressing     Problem: SAFETY ADULT  Goal: Patient will remain free of falls  Description: INTERVENTIONS:  - Educate patient/family on patient safety including physical limitations  - Instruct patient to call for assistance with activity   - Consult OT/PT to assist with strengthening/mobility   - Keep Call bell within reach  - Keep bed low and locked with side rails adjusted as appropriate  - Keep care items and personal belongings within reach  - Initiate and maintain comfort rounds  - Make Fall Risk Sign visible to staff  Problem: Knowledge Deficit  Goal: Patient/family/caregiver demonstrates understanding of disease process, treatment plan, medications, and discharge instructions  Description: Complete learning assessment and assess knowledge base    Interventions:  - Provide teaching at level of understanding  - Provide teaching via preferred learning methods  Outcome: Progressing     - Apply yellow socks and bracelet for high fall risk patients  - Consider moving patient to room near nurses station  Outcome: Progressing  Goal: Maintain or return to baseline ADL function  Description: INTERVENTIONS:  -  Assess patient's ability to carry out ADLs; assess patient's baseline for ADL function and identify physical deficits which impact ability to perform ADLs (bathing, care of mouth/teeth, toileting, grooming, dressing, etc )  - Assess/evaluate cause of self-care deficits   - Assess range of motion  - Assess patient's mobility; develop plan if impaired  - Assess patient's need for assistive devices and provide as appropriate  - Encourage maximum independence but intervene and supervise when necessary  - Involve family in performance of ADLs  - Assess for home care needs following discharge   - Consider OT consult to assist with ADL evaluation and planning for discharge  - Provide patient education as appropriate  Outcome: Progressing  Goal: Maintains/Returns to pre admission functional level  Description: INTERVENTIONS:  - Perform BMAT or MOVE assessment daily    - Set and communicate daily mobility goal to care team and patient/family/caregiver     - Collaborate with rehabilitation services on mobility goals if consulted  Problem: DISCHARGE PLANNING  Goal: Discharge to home or other facility with appropriate resources  Description: INTERVENTIONS:  - Identify barriers to discharge w/patient and caregiver  - Arrange for needed discharge resources and transportation as appropriate  - Identify discharge learning needs (meds, wound care, etc )  - Arrange for interpretive services to assist at discharge as needed  - Refer to Case Management Department for coordinating discharge planning if the patient needs post-hospital services based on physician/advanced practitioner order or complex needs related to functional status, cognitive ability, or social support system  Outcome: Progressing     - Out of bed for toileting  - Record patient progress and toleration of activity level   Outcome: Progressing     - Out of bed for toileting  - Record patient progress and toleration of activity level   Outcome: Progressing     - Apply yellow socks and bracelet for high fall risk patients  - Consider moving patient to room near nurses station  Outcome: Progressing

## 2021-07-22 NOTE — PLAN OF CARE
Problem: PHYSICAL THERAPY ADULT  Goal: Performs mobility at highest level of function for planned discharge setting  See evaluation for individualized goals  Description: Treatment/Interventions: Functional transfer training, LE strengthening/ROM, Elevations, Therapeutic exercise, Endurance training, Patient/family training, Equipment eval/education, Bed mobility, Gait training          See flowsheet documentation for full assessment, interventions and recommendations  Outcome: Progressing  Note: Prognosis: Fair  Problem List: Decreased strength, Decreased endurance, Impaired balance, Decreased mobility, Decreased safety awareness, Pain  Assessment: The pt  was able to ambulate community distance today, but he continues to have a shuffling and very slow gait  He was able to manuever around obstacles with increased time  He does continue to have difficulty performing turns, but he had no loss of balance  The pt  expressed concern about his UE's and being able to perform his ADLs  Communicated this to OT  Barriers to Discharge: Inaccessible home environment, Decreased caregiver support        PT Discharge Recommendation: Post acute rehabilitation services     PT - OK to Discharge: Yes (to IP rehab)    See flowsheet documentation for full assessment

## 2021-07-22 NOTE — ASSESSMENT & PLAN NOTE
· Noted on last admission but was discharged after passing a voiding trial without a jimenez   · Jimenez in placed post op- removed on 7/21 - no voiding issues  · C/w flomax

## 2021-07-23 VITALS
RESPIRATION RATE: 18 BRPM | HEIGHT: 67 IN | OXYGEN SATURATION: 96 % | SYSTOLIC BLOOD PRESSURE: 117 MMHG | WEIGHT: 165.34 LBS | DIASTOLIC BLOOD PRESSURE: 73 MMHG | TEMPERATURE: 99.1 F | HEART RATE: 94 BPM | BODY MASS INDEX: 25.95 KG/M2

## 2021-07-23 LAB
BACTERIA SPEC ANAEROBE CULT: NO GROWTH
BACTERIA TISS AEROBE CULT: NO GROWTH
GRAM STN SPEC: NORMAL
PROCALCITONIN SERPL-MCNC: 0.21 NG/ML
SARS-COV-2 RNA RESP QL NAA+PROBE: NEGATIVE

## 2021-07-23 PROCEDURE — 99239 HOSP IP/OBS DSCHRG MGMT >30: CPT | Performed by: HOSPITALIST

## 2021-07-23 PROCEDURE — 97116 GAIT TRAINING THERAPY: CPT

## 2021-07-23 PROCEDURE — 84145 PROCALCITONIN (PCT): CPT | Performed by: HOSPITALIST

## 2021-07-23 PROCEDURE — 97167 OT EVAL HIGH COMPLEX 60 MIN: CPT

## 2021-07-23 PROCEDURE — U0005 INFEC AGEN DETEC AMPLI PROBE: HCPCS | Performed by: HOSPITALIST

## 2021-07-23 PROCEDURE — U0003 INFECTIOUS AGENT DETECTION BY NUCLEIC ACID (DNA OR RNA); SEVERE ACUTE RESPIRATORY SYNDROME CORONAVIRUS 2 (SARS-COV-2) (CORONAVIRUS DISEASE [COVID-19]), AMPLIFIED PROBE TECHNIQUE, MAKING USE OF HIGH THROUGHPUT TECHNOLOGIES AS DESCRIBED BY CMS-2020-01-R: HCPCS | Performed by: HOSPITALIST

## 2021-07-23 RX ORDER — ATORVASTATIN CALCIUM 40 MG/1
40 TABLET, FILM COATED ORAL
Refills: 0
Start: 2021-07-23

## 2021-07-23 RX ORDER — CEPHALEXIN 500 MG/1
500 CAPSULE ORAL EVERY 6 HOURS SCHEDULED
Qty: 28 CAPSULE | Refills: 0
Start: 2021-07-23 | End: 2021-07-30

## 2021-07-23 RX ORDER — BISACODYL 10 MG
10 SUPPOSITORY, RECTAL RECTAL DAILY PRN
Qty: 12 SUPPOSITORY | Refills: 0
Start: 2021-07-23

## 2021-07-23 RX ORDER — HYDROMORPHONE HYDROCHLORIDE 2 MG/1
2 TABLET ORAL
Qty: 16 TABLET | Refills: 0 | Status: SHIPPED | OUTPATIENT
Start: 2021-07-23 | End: 2021-07-25

## 2021-07-23 RX ORDER — CEPHALEXIN 500 MG/1
500 CAPSULE ORAL EVERY 6 HOURS SCHEDULED
Status: DISCONTINUED | OUTPATIENT
Start: 2021-07-23 | End: 2021-07-23 | Stop reason: HOSPADM

## 2021-07-23 RX ADMIN — GABAPENTIN 600 MG: 300 CAPSULE ORAL at 09:26

## 2021-07-23 RX ADMIN — CEPHALEXIN 500 MG: 500 CAPSULE ORAL at 11:48

## 2021-07-23 RX ADMIN — BACLOFEN 15 MG: 10 TABLET ORAL at 09:26

## 2021-07-23 RX ADMIN — CEFAZOLIN SODIUM 2000 MG: 2 SOLUTION INTRAVENOUS at 05:26

## 2021-07-23 RX ADMIN — ENOXAPARIN SODIUM 40 MG: 40 INJECTION SUBCUTANEOUS at 09:26

## 2021-07-23 RX ADMIN — CELECOXIB 100 MG: 100 CAPSULE ORAL at 09:27

## 2021-07-23 RX ADMIN — HYDROMORPHONE HYDROCHLORIDE 1 MG: 1 INJECTION, SOLUTION INTRAMUSCULAR; INTRAVENOUS; SUBCUTANEOUS at 11:53

## 2021-07-23 RX ADMIN — HYDROMORPHONE HYDROCHLORIDE 2 MG: 2 TABLET ORAL at 05:28

## 2021-07-23 RX ADMIN — NORTRIPTYLINE HYDROCHLORIDE 10 MG: 10 CAPSULE ORAL at 09:27

## 2021-07-23 RX ADMIN — HYDROMORPHONE HYDROCHLORIDE 2 MG: 2 TABLET ORAL at 09:26

## 2021-07-23 NOTE — DISCHARGE SUMMARY
[facility]  Discharge- Rufina Elders 1985, 38 y o  male MRN: [id number]  Unit/Bed#: [facility] 810-01 Encounter: [id number]  Primary Care Provider: Subha Moeller MD   Date and time admitted to hospital: 7/16/2021  9:07 AM    * Sepsis ([address] )  Assessment & Plan  · POA secondary to UTI, orchitis, epididymitis as evidenced by tachycardia, leukocytosis, lactic acidosis   · Blood cultures no growth to date   · Urine culture > 100,000 cfu/ml klebsiella, susceptible to Cefazolin   · Urology following - underwent US scrotum twice signs s/o right testicular ischemia - underwent surgery  · POD #3 s/p right scrotal exploration with incision and drainage and right scrotal orchiectomy   · Continue with Cefepime - change to ancef, switched to PO keflex D - 7 for total 14 days of Abx  · Final OR cultures - negative  · Scrotal elevation, local wound care  · Pain control   · Drain & jimenez removed  · OP urology f/u on 8/3    HLD (hyperlipidemia)  Assessment & Plan  · C/w statin    Benign essential HTN  Assessment & Plan  · BP stable off meds     Acute low back pain  Assessment & Plan  · Pt reporting acute low back pain after a mechanical fall he experienced at [facility] prior to admission- improved today   · lumbar xray (-)    · Fall precautions   · PT/OT  · Restarted home medications     Orchitis and epididymitis  Assessment & Plan  · Likely from urinary source possibly from not completely emptying his bladder (pt was noted to have urinary retention during his last admission in March but was discharged without a jimneez to rehab)  · Urology following  · Urine culture (+) klebsiella - c/w abx  · POD #3 s/p right scrotal exploration with incision and drainage and right scrotal orchiectomy   · Follow-up OR cultures - negative  · Scrotal elevation, local wound care    Urinary retention  Assessment & Plan  · Noted on last admission but was discharged after passing a voiding trial without a jimenez · Jimenez in placed post op- removed on 7/21 - no voiding issues  · C/w flomax    Central cord syndrome [facility])  Assessment & Plan  · Patient sustained a fall downstairs on March 6, 2021 suffering a severe spinal cord injury, S/p C3-5 laminectomy, C2-7 fusion on 3/7  · Currently residing at [facility]   · Pt was noted to have urinary retention during his last admission but was able to have jimenez removed at discharge- monitor for urinary retention prior to discharge    · Medication personally reviewed with RN at [facility] and ordered   · Gabapentin 600mg TID   · Baclofen 15mg TID   · Dilaudid 2mg PO q6hprn  · nortriptyline 10mg daily   · Celecoxib 100mg bid   · tyelenol 1000 mg TID - ordered PRN given sepsis   · flomax 0 4mg daily  · lipitor 40mg daily   · hctz 25mg daily- hold in the setting of sepsis       Transition of Care Discharge Summary - [facility]  Internal Medicine    Patient Information: Javier Zuñiga 38 y o  male MRN: [id number]  Unit/Bed#: [facility] 810-01 Encounter: [id number]    Discharging Physician / Practitioner: Kannan Hernández MD  PCP: Gavi Danielson MD  Admission Date: 7/16/2021  Discharge Date: 07/23/21    Disposition:      Short Term Rehab or SNF at a [facility] (see below)    For Discharges to [facility]:   · Critical access hospital  AND [facility] - not available on TT    Reason for Admission:  testicular pain -bilateral    Discharge Diagnoses:     Principal Problem:    Sepsis ([address] )  Active Problems:    Central cord syndrome ([address] )    Urinary retention    Orchitis and epididymitis    Acute low back pain    Benign essential HTN    HLD (hyperlipidemia)  Resolved Problems:    Hypokalemia    Lactic acidosis      Consultations During Hospital Stay:  · urology    Procedures Performed:     right scrotal exploration with incision and drainage and right scrotal orchiectomy on 7/20/21    Medication Adjustments and Discharge Medications:  · Summary of Medication Adjustments made as a result of this hospitalization: added antibiotic  · Medication Dosing Tapers - Please refer to Discharge Medication List for details on any medication dosing tapers (if applicable to patient)  · Medications being temporarily held (include recommended restart time):   · Discharge Medication List: See after visit summary for reconciled discharge medications  Wound Care Recommendations:  When applicable, please see wound care section of After Visit Summary  Diet Recommendations at Discharge:  Diet -        Diet Orders   (From admission, onward)             Start     Ordered    07/20/21 1649  Diet Regular; Regular House  Diet effective now     Question Answer Comment   Diet Type Regular    Regular Regular House    RD to adjust diet per protocol? Yes        07/20/21 1648              Fluid Restriction - No Fluid Restriction at Discharge  Instructions for any Catheters / Lines Present at Discharge (including removal date, if applicable): none    Significant Findings / Test Results:     No Chest XR results available for this patient  Hospital Course:     Oliver Kelly is a 38 y o  male patient who originally presented to the hospital on 7/16/2021 with a PMH of spinal cord injury after a fall in March 2021 who presented to [address] [place] [address] ER with c/o bilateral scrotal pain  Pain is severe, 8/10 continuous, worse with any touch or movement  Denies any urinary symptoms  He does c/o fever and chills  No chest pain or dyspnea  In ER at [address] [place] [address], his labs showed leukocytosis, lactic acidosis  USG of testes showed Mild asymmetric right epididymal hyperemia   Infectious/inflammatory etiology not excluded and loculated complex right hydrocele    CT abdomen showed right hydrocele but no other acute findings  He was sent here for urology evaluation    Condition at Discharge: stable     Discharge Day Visit / Exam:     Subjective:  Feels well, no complains    Vitals: Blood Pressure: 117/73 (07/23/21 0742)  Pulse: 94 (07/23/21 0742)  Temperature: 99 1 °F (37 3 °C) (07/23/21 0742)  Temp Source: Oral (07/17/21 0042)  Respirations: 18 (07/23/21 0742)  Height: 5' 7" (170 2 cm) (07/16/21 1538)  Weight - Scale: 75 kg (165 lb 5 5 oz) (07/16/21 1538)  SpO2: 96 % (07/23/21 0742)  Exam:   Physical Exam  Vitals reviewed  HENT:      Head: Normocephalic and atraumatic  Cardiovascular:      Rate and Rhythm: Normal rate and regular rhythm  Heart sounds: Normal heart sounds  Pulmonary:      Effort: Pulmonary effort is normal  No respiratory distress  Breath sounds: Normal breath sounds  No wheezing  Abdominal:      General: There is no distension  Palpations: Abdomen is soft  Tenderness: There is no abdominal tenderness  Musculoskeletal:      Right lower leg: No edema  Left lower leg: No edema  Neurological:      Mental Status: He is alert and oriented to person, place, and time  ·     Discharge instructions/Information to patient and family:   See after visit summary section titled Discharge Instructions for information provided to patient and family  Planned Readmission: no      Discharge Statement:  I spent 40 minutes discharging the patient  This time was spent on the day of discharge  I had direct contact with the patient on the day of discharge  Greater than 50% of the total time was spent examining patient, answering all patient questions, arranging and discussing plan of care with patient as well as directly providing post-discharge instructions  Additional time then spent on discharge activities      ** Please Note: This note has been constructed using a voice recognition system **

## 2021-07-23 NOTE — CASE MANAGEMENT
CM met with Pt and informed him of DC planned for today at 1:15 pm for a pickup via [address]  CM will continue to follow the Pt until DC

## 2021-07-23 NOTE — OCCUPATIONAL THERAPY NOTE
Occupational Therapy Evaluation     Patient Name: Edgar Rosario  OHZRX'F Date: 7/23/2021  Problem List  Principal Problem:    Sepsis ([facility] )  Active Problems:    Central cord syndrome ([facility] )    Urinary retention    Orchitis and epididymitis    Acute low back pain    Benign essential HTN    HLD (hyperlipidemia)    Past Medical History  Past Medical History:   Diagnosis Date    Chronic depression     PTSD (post-traumatic stress disorder)      Past Surgical History  Past Surgical History:   Procedure Laterality Date    CERVICAL FUSION N/A 3/6/2021    Procedure: POSTERIOR C3-5 laminectomy, C2-7 fixation fusion, possible additional levels;  Surgeon: Rodell Boxer, MD;  [facility];  Service: Neurosurgery    IR PICC PLACEMENT DOUBLE LUMEN  3/8/2021    SCROTAL SURGERY Right 7/20/2021    Procedure: Right scrotal exploration, Irrigation and debridement Right orchiectomy;  Surgeon: Bethany Mello MD;  [facility];  Service: Urology             07/23/21 0810   OT Last Visit   OT Visit Date 07/23/21   Note Type   Note type Evaluation   Restrictions/Precautions   Weight Bearing Precautions Per Order No   Braces or Orthoses Splint  (B/L UE wrist splints)   Other Precautions Pain; Fall Risk;Multiple lines   Pain Assessment   Pain Score 9   Pain Location/Orientation Location: Groin   Home Living   Type of [address] [place] [address] Two level   Bathroom Shower/Tub Tub/shower unit   Bathroom Toilet Standard   Bathroom Equipment   (Denies)   Home Equipment   (Denies)   Additional Comments Pt lives with friend in [facility]  Pt with bath on 1st floor and bed on 2nd  Pt with no prior DME and tub/shower  Prior to most recent hospital admission- Pt has been at rehab since fall down steps in March resulting in SCI      Prior Function   Level of [place] Independent with ADLs and functional mobility   Lives With Friend(s)   ADL Assistance Independent   IADLs Independent   Falls in the last 6 months 1 to 4  (1)   Vocational Full time employment   Comments Prior to fall in march, Pt I with ADLs/IADLs, and worked full time  Since march, Pt was I with ambulation with no AD and working with OT due to B/L UE weakness/muscle tone  Lifestyle   Autonomy Pt reports being I with ambulation with no AD at rehab and able to perform LB dressing with Elmo  Reciprocal Relationships Pt with friend  Service to Others Pt worked full time in a [employer]  Intrinsic Gratification Pt enjoys playing on his phone  Psychosocial   Psychosocial (WDL) X   Patient Behaviors/Mood Withdrawn   ADL   Where Assessed Chair   Eating Assistance 5  Supervision/Setup   Grooming Assistance 4  Minimal Assistance   UB Bathing Assistance 2  Maximal Assistance   LB Bathing Assistance 2  Maximal [address] 2  Maximal Assistance   LB Dressing Assistance 4  Minimal Assistance   LB Dressing Deficit Increased time to complete; Don/doff R sock; Don/doff L sock; Use of adaptive equipment; Thread LLE into pants; Thread RLE into pants  (Discussion of sock supa/loose LB clothing)   Toileting Assistance  3  Moderate Assistance   Functional Assistance 2  Maximal Assistance   Functional Deficit Setup;Steadying;Verbal cueing;Supervision/safety; Increased time to complete   Additional Comments Pt with limited participation in ADLs 2* to weakness in B/L UE and decreased  strength  Bed Mobility   Supine to Sit Unable to assess   Sit to Supine 4  Minimal assistance   Additional items LE management   Additional Comments Pt greeted in bedside chair for OT eval and left OOB in recliner chair  Transfers   Sit to Stand 4  Minimal assistance   Additional items Assist x 1; Increased time required;Verbal cues   Stand to Sit 4  Minimal assistance   Additional items Assist x 1; Increased time required;Verbal cues   Functional Mobility   Functional Mobility 4  Minimal assistance   Additional Comments Pt completed functional mobility at [facility] with HHAx1 prn  Pt with slow shuffled gait, however, able to go functional distances  Balance   Static Sitting Fair   Dynamic Sitting Fair -   Static Standing Fair -   Dynamic Standing Poor +   Ambulatory Fair -   Activity Tolerance   Activity Tolerance Patient tolerated treatment well   Medical Staff Made Aware CM   Nurse Made Aware RN cleared   RUE Assessment   RUE Assessment X   RUE Overall AROM   R Mass Grasp Pt with decreased [address] and limited ability to squeeze- 3/5   R Shoulder Flexion 3-/5 with increased time    RUE Strength   RUE Overall Strength Deficits   LUE Assessment   LUE Assessment X   LUE Overall AROM   L Shoulder Flexion 3-/5 with increased time    L Mass Grasp Pt with decreased [address] [place] and limited ability to squeeze- 3/5   LUE Strength   LUE Overall Strength Deficits   Hand Function   Gross Motor Coordination Impaired   Fine Motor Coordination Impaired   Sensation   Light Touch No apparent deficits   Additional Comments Discussion of need for potential resting hand splint in PM to prevent further flexor tightening and extensor weakness  Pt educated on AROM UE exercises laying supine  Shoulder flexion, elbow flexion and AAROM/PROM with HHA to stretch fingers and range wrist      Cognition   Overall Cognitive Status [facility]   Arousal/Participation Alert; Cooperative   Attention Within functional limits   Orientation Level Oriented X4   Memory Within functional limits   Following Commands Follows multistep commands with increased time or repetition   Comments Pt cooperative throughout OT evaluation  Pt with concerns about ADLs: dressing/bathing  Assessment   Limitation Decreased ADL status; Decreased UE ROM; Decreased UE strength;Decreased fine motor control;Decreased self-care trans;Decreased high-level ADLs   Prognosis Fair   Assessment Pt is a 37 yo Male who [facility] with testicular discomfort  Pt with diagnosis of hydrocele, sepsis, and central cord syndrome   Pt with "right scrotal exploration with incision and drainage and Right scrotal orchiectomy" procedure on 7/20/2021  Pt  has a past medical history of Chronic depression and PTSD (post-traumatic stress disorder)  Pt greeted up in recliner chair for OT evaluation on 7/23/2021  Pt lives with friend in [facility]  Pt with bath on 1st floor and bed on 2nd  Pt with no prior DME and tub/shower  Prior to most recent hospital admission- Pt has been at rehab since fall down steps in March resulting in SCI  Prior to fall in march, Pt I with ADLs/IADLs, and worked full time  Since march, Pt was I with ambulation with no AD and working with OT due to B/L UE weakness/muscle tone  Pt completes UB ADLs with max A and LB ADLs with min-max A with increased time required  Pt completed functional transfers with min A and bed mobility with min A  Pt completes slow functional ambulation with no AD at [facility]  Limitations that impact functional performance include decreased ADL status, decreased UE ROM, decreased UE strength, decreased endurance, decreased fine motor coordination, decreased self care transfers and decreased high level ADLs  Occupational performance areas to address ADL retraining, functional transfer training, UE strengthening/ROM, endurance training, Pt/caregiver education, equipment evaluation/education, fine motor coordination activities, compensatory technique education, continued education, energy conservation and activity engagement   Pt would benefit from continued skilled OT services while in hospital to maximize independence with ADLs  Will continue to follow Pt's progress  Pt would benefit from post acute rehabilitation services upon DC to maximize safety and independence with ADLs and functional tasks of choice  Goals   Patient Goals To be able to dressing his upper body  Plan   Treatment Interventions ADL retraining;Functional transfer training;UE strengthening/ROM; Endurance training;Patient/family training;Equipment evaluation/education; Neuromuscular reeducation; Fine motor coordination activities; Compensatory technique education; Energy conservation; Activityengagement   Goal Expiration Date 08/06/21   OT Frequency 3-5x/wk   Recommendation   OT Discharge Recommendation Post acute rehabilitation services   OT - OK to Discharge Yes   Additional Comments  The patient's raw score on the AM-PAC Daily Activity inpatient short form is 14, standardized score is 33 39, less than 39 4  Patients at this level are likely to benefit from discharge to post-acute rehabilitation services  Please refer to the recommendation of the Occupational Therapist for safe discharge planning  AM-PAC Daily Activity Inpatient   Lower Body Dressing 2   Bathing 2   Toileting 2   Upper Body Dressing 2   Grooming 3   Eating 3   Daily Activity Raw Score 14   Daily Activity Standardized Score (Calc for Raw Score >=11) 33 39   AM-PAC Applied Cognition Inpatient   Following a Speech/Presentation 4   Understanding Ordinary Conversation 4   Taking Medications 4   Remembering Where Things Are Placed or Put Away 4   Remembering List of 4-5 Errands 4   Taking Care of Complicated Tasks 3   Applied Cognition Raw Score 23   Applied Cognition Standardized Score 53 08   Modified [place] Scale   Modified [place] Scale 4      Goals:   1  Pt will complete UB ADLs with S in order to maximize participation with ADLs  2  Pt will complete LB ADLs with S in order to maximize safety with ADLs  3  Pt will complete toileting routine (transfer, hygiene, and clothing management) with S in order to return to prior level of function  4  Pt will complete bed mobility with I in order to maximize participation with ADLs  5  Pt will complete functional transfers at I level in order to increase participation with ADLs  6  Pt will increase dynamic standing balance to F+ in order to increase safety with ADLs    7  Pt will increase standing tolerance x5 min in order to increase participation with ADLs  8  Pt will complete functional mobility with AD PRN for item retrieval task at Jaime level in order to increase participation with ADLs  9  Pt will be attentive 100% of the time for ongoing functional/formal cognitive assessment to assist with safe dc planning PRN        Timmy Angeles MS, OTR/L

## 2021-07-23 NOTE — PLAN OF CARE
Problem: PHYSICAL THERAPY ADULT  Goal: Performs mobility at highest level of function for planned discharge setting  See evaluation for individualized goals  Description: Treatment/Interventions: Functional transfer training, LE strengthening/ROM, Elevations, Therapeutic exercise, Endurance training, Patient/family training, Equipment eval/education, Bed mobility, Gait training          See flowsheet documentation for full assessment, interventions and recommendations  Outcome: Progressing  Note: Prognosis: Good  Problem List: Decreased strength, Decreased endurance, Impaired balance, Decreased mobility, Decreased safety awareness, Pain  Assessment: The pt  was able to progress to stairs today as well as increase his ambulatory distance  He continues to have an exceptionally slow gait with continued foot drag bilaterally  He demonstrated awareness of his safety with the stairs as he repositioned with each step in order to fully place his foot on the step  He did demonstrate fatigue on the stairs, and he required a standing rest afterwards  The pt  does continue to remain limited from his baseline, and he will benefit from continued therapy in order to maximize his independence  Barriers to Discharge: Inaccessible home environment, Decreased caregiver support        PT Discharge Recommendation: Post acute rehabilitation services     PT - OK to Discharge: Yes (to IP rehab)    See flowsheet documentation for full assessment

## 2021-07-23 NOTE — ASSESSMENT & PLAN NOTE
· Likely from urinary source possibly from not completely emptying his bladder (pt was noted to have urinary retention during his last admission in March but was discharged without a jimenez to rehab)  · Urology following  · Urine culture (+) klebsiella - c/w abx  · POD #3 s/p right scrotal exploration with incision and drainage and right scrotal orchiectomy   · Follow-up OR cultures - negative  · Scrotal elevation, local wound care

## 2021-07-23 NOTE — CASE MANAGEMENT
CM confirmed with Aiden Marsh MD that Pt is medically stable for DC back to [facility] today  CM was able to schedule a W/C van pickup through [facility] for 1:15 pm  CM will continue to follow the Pt until DC later this afternoon  CM is following Pt closely

## 2021-07-23 NOTE — ASSESSMENT & PLAN NOTE
· Patient sustained a fall downstairs on March 6, 2021 suffering a severe spinal cord injury, S/p C3-5 laminectomy, C2-7 fusion on 3/7  · Currently residing at [facility]   · Pt was noted to have urinary retention during his last admission but was able to have jimenez removed at discharge- monitor for urinary retention prior to discharge    · Medication personally reviewed with RN at [facility] and ordered   · Gabapentin 600mg TID   · Baclofen 15mg TID   · Dilaudid 2mg PO q6hprn  · nortriptyline 10mg daily   · Celecoxib 100mg bid   · tyelenol 1000 mg TID - ordered PRN given sepsis   · flomax 0 4mg daily  · lipitor 40mg daily   · hctz 25mg daily- hold in the setting of sepsis

## 2021-07-23 NOTE — PLAN OF CARE
Problem: OCCUPATIONAL THERAPY ADULT  Goal: Performs self-care activities at highest level of function for planned discharge setting  See evaluation for individualized goals  Description: Treatment Interventions: ADL retraining, Functional transfer training, UE strengthening/ROM, Endurance training, Patient/family training, Equipment evaluation/education, Neuromuscular reeducation, Fine motor coordination activities, Compensatory technique education, Energy conservation, Activityengagement          See flowsheet documentation for full assessment, interventions and recommendations  Note: Limitation: Decreased ADL status, Decreased UE ROM, Decreased UE strength, Decreased fine motor control, Decreased self-care trans, Decreased high-level ADLs  Prognosis: Fair  Assessment: (P) Pt is a 39 yo Male who presented to [facility] on 7/23/2021 with testicular discomfort  Pt with diagnosis of hydrocele, sepsis, and central cord syndrome  Pt with "right scrotal exploration with incision and drainage and Right scrotal orchiectomy" procedure on 7/20/2021  Pt  has a past medical history of Chronic depression and PTSD (post-traumatic stress disorder)  Pt greeted up in recliner chair for OT evaluation on 7/23/2021  Pt lives with friend in [facility]  Pt with bath on 1st floor and bed on 2nd  Pt with no prior DME and tub/shower  Prior to most recent hospital admission- Pt has been at rehab since fall down steps in March resulting in SCI  Prior to fall in march, Pt I with ADLs/IADLs, and worked full time  Since march, Pt was I with ambulation with no AD and working with OT due to B/L UE weakness/muscle tone  Pt completes UB ADLs with max A and LB ADLs with min-max A with increased time required  Pt completed functional transfers with min A and bed mobility with min A  Pt completes slow functional ambulation with no AD at [facility]   Limitations that impact functional performance include decreased ADL status, decreased UE ROM, decreased UE strength, decreased endurance, decreased fine motor coordination, decreased self care transfers and decreased high level ADLs  Occupational performance areas to address ADL retraining, functional transfer training, UE strengthening/ROM, endurance training, Pt/caregiver education, equipment evaluation/education, fine motor coordination activities, compensatory technique education, continued education, energy conservation and activity engagement   Pt would benefit from continued skilled OT services while in hospital to maximize independence with ADLs  Will continue to follow Pt's progress  Pt would benefit from post acute rehabilitation services upon DC to maximize safety and independence with ADLs and functional tasks of choice  OT Discharge Recommendation: Post acute rehabilitation services  OT - OK to Discharge:  Yes

## 2021-07-23 NOTE — NURSING NOTE
Attempted to call report to [facility] x2 at [phone] with no response

## 2021-07-23 NOTE — ASSESSMENT & PLAN NOTE
· Pt reporting acute low back pain after a mechanical fall he experienced at [facility] prior to admission- improved today   · lumbar xray (-)    · Fall precautions   · PT/OT  · Restarted home medications

## 2021-07-23 NOTE — PHYSICAL THERAPY NOTE
Physical Therapy Progress Note     07/23/21 0855   PT Last Visit   PT Visit Date 07/23/21   Note Type   Note Type Treatment   Pain Assessment   Pain Assessment Tool 0-10   Pain Score 7   Pain Location/Orientation Location: [facility] Pain Intervention(s) Repositioned; Ambulation/increased activity; Emotional support   Restrictions/Precautions   Other Precautions Pain; Fall Risk   Subjective   Subjective The pt  states that he is eager to regain his independence, and to return to rehab  Bed Mobility   Supine to Sit 5  Supervision   Additional items Increased time required   Transfers   Sit to Stand 4  Minimal assistance   Additional items Assist x 1   Stand to Sit 4  Minimal assistance   Additional items Assist x 1   Ambulation/Elevation   Gait pattern Excessively slow; Step to;Short stride; Inconsistent heidy; Shuffling;Decreased foot clearance;Narrow ANNELISE   Gait Assistance 5  Supervision   Additional items Verbal cues   Assistive Device None   Distance 360 feet  Stair Management Assistance 4  Minimal assist   Additional items Assist x 1; Increased time required;Verbal cues   Stair Management Technique No rails; Step to pattern; Foreward   Number of Stairs 6   Balance   Static Sitting Fair +   Dynamic Sitting Fair   Static Standing Fair   Ambulatory Fair -   Activity Tolerance   Activity Tolerance Patient tolerated treatment well   Nurse [address] , RN  Assessment   Prognosis Good   Problem List Decreased strength;Decreased endurance; Impaired balance;Decreased mobility; Decreased safety awareness;Pain   Assessment The pt  was able to progress to stairs today as well as increase his ambulatory distance  He continues to have an exceptionally slow gait with continued foot drag bilaterally  He demonstrated awareness of his safety with the stairs as he repositioned with each step in order to fully place his foot on the step   He did demonstrate fatigue on the stairs, and he required a standing rest afterwards  The pt  does continue to remain limited from his baseline, and he will benefit from continued therapy in order to maximize his independence  Barriers to Discharge Inaccessible home environment;Decreased caregiver support   Goals   Patient Goals To regain his independence  STG Expiration Date 08/01/21   PT Treatment Day 2   Plan   Treatment/Interventions Functional transfer training;LE strengthening/ROM; Elevations; Therapeutic exercise; Endurance training;Patient/family training;Gait training;Bed mobility   Progress Progressing toward goals   PT Frequency   (3-5x a week )   Recommendation   PT Discharge Recommendation Post acute rehabilitation services   AM-PAC Basic Mobility Inpatient   Turning in Bed Without Bedrails 3   Lying on Back to Sitting on Edge of Flat Bed 3   Moving Bed to Chair 3   Standing Up From Chair 3   Walk in Room 3   Climb 3-5 Stairs 3   Basic Mobility Inpatient Raw Score 18   Basic Mobility Standardized Score 41 05     An AM-PAC Basic Mobility standardized score less than 42 9 suggests the patient may benefit from discharge to post-acute rehab services      Gerardobibi Zarate, PTA

## 2021-07-23 NOTE — ASSESSMENT & PLAN NOTE
· POA secondary to UTI, orchitis, epididymitis as evidenced by tachycardia, leukocytosis, lactic acidosis   · Blood cultures no growth to date   · Urine culture > 100,000 cfu/ml klebsiella, susceptible to Cefazolin   · Urology following - underwent US scrotum twice signs s/o right testicular ischemia - underwent surgery  · POD #3 s/p right scrotal exploration with incision and drainage and right scrotal orchiectomy   · Continue with Cefepime - change to ancef, switched to PO keflex D - 7 for total 14 days of Abx  · Final OR cultures - negative  · Scrotal elevation, local wound care  · Pain control   · Drain & jimenez removed  · OP urology f/u on 8/3

## 2021-07-26 NOTE — TELEPHONE ENCOUNTER
Attempted to call pt but voicemail was full and was not able to leave a message    If patient calls back please inform him of hospital follow up apt

## 2021-07-27 NOTE — UTILIZATION REVIEW
Notification of Discharge   This is a Notification of Discharge from our facility [address]  Please be advised that this patient has been discharge from our facility  Below you will find the admission and discharge date and time including the patients disposition  UTILIZATION REVIEW CONTACT:  Chris Marinelli  Utilization   Network Utilization Review Department  Phone: [phone] x carefully listen to the prompts  All voicemails are confidential   Email: [email or web address]     PHYSICIAN ADVISORY SERVICES:  FOR RZIV-[id number] REVIEW - MEDICAL NECESSITY DENIAL  Phone: [phone]  Fax: [phone]  Email: [email or web address]     PRESENTATION DATE: 7/16/2021  9:07 AM  OBERVATION ADMISSION DATE:   INPATIENT ADMISSION DATE: 7/16/21  9:42 AM   DISCHARGE DATE: 7/23/2021  1:10 PM  DISPOSITION: Non [facility] SN[facility]/SNU Non [facility] SNF/TCU/SNU      IMPORTANT INFORMATION:  Send all requests for admission clinical reviews, approved or denied determinations and any other requests to dedicated fax number below belonging to the campus where the patient is receiving treatment   List of dedicated fax numbers:  [address] DENIALS (Administrative/Medical Necessity) [phone]   [address] (Maternity/NICU/Pediatrics) [phone]   Jason Carl [phone]   Bevin Angelucci [phone]   [facility] [place] [phone]   Brianna Mccrary [address] [phone]   [facility]  [phone]   [address] [place] [address], [address]  [address] [phone]

## 2021-07-28 NOTE — TELEPHONE ENCOUNTER
TRIED TO REACH THE PT FOR THE 3RD TIME AND MAILBOX IS STILL FULL  THERE IS NO EMERGENCY CONTACT AND ALSO NOBODY LISTED ON THE COMMUNICATION CONSENT

## 2021-08-02 ENCOUNTER — OFFICE VISIT (OUTPATIENT)
Dept: UROLOGY | Facility: CLINIC | Age: 36
End: 2021-08-02

## 2021-08-02 VITALS — DIASTOLIC BLOOD PRESSURE: 70 MMHG | HEIGHT: 67 IN | BODY MASS INDEX: 25.9 KG/M2 | SYSTOLIC BLOOD PRESSURE: 120 MMHG

## 2021-08-02 DIAGNOSIS — N50.819 PAIN IN TESTICLE, UNSPECIFIED LATERALITY: Primary | ICD-10-CM

## 2021-08-02 DIAGNOSIS — Z90.79 S/P ORCHIECTOMY: ICD-10-CM

## 2021-08-02 PROCEDURE — 99024 POSTOP FOLLOW-UP VISIT: CPT | Performed by: PHYSICIAN ASSISTANT

## 2021-08-02 RX ORDER — BACLOFEN 5 MG/1
TABLET ORAL
COMMUNITY
Start: 2021-07-23 | End: 2022-05-31

## 2021-08-02 RX ORDER — MULTIVITAMIN
1 CAPSULE ORAL DAILY
COMMUNITY
End: 2022-07-21 | Stop reason: CLARIF

## 2021-08-02 NOTE — ASSESSMENT & PLAN NOTE
Patient is a 49-year-old male who presented to the hospital for bilateral acute epididymal orchitis  Repeat ultrasound revealed ischemic right scrotum  He is status post scrotal exploration with right orchiectomy of Dr Sarah Mike on 07/20/2021,  Progressing well  Physical exam revealsThe right testicle is absent, incisions are healing appropriately  Sutures have dissolved  Mild separation of tissues however bottom tissue shows granular growth  No sign of overlying infection or discharge  No tenderness to palpation  Left testicle nontender to palpation, minimal edema  Otherwise,  Healing well  Discussed with patient that he can discontinue scrotal elevation  As there is minimal edema  Discussed that he should continue to wash scrotum with soap and water  He may continue to put back trace and ointment over incisions for an additional week  Discussed that he can put gauze within his underwear to help with prevention of back trace an appointment or any discharge from wounds on to underwear  Discussed with patient that we will follow-up in 3 months for   Re-evaluation as it can take up to 3 months for scrotal surgeries to heal completely  Patient currently agreeable to plan at this time

## 2021-08-02 NOTE — PROGRESS NOTES
Assessment/Plan:  S/P orchiectomy    Patient is a 17-year-old male who presented to the hospital for bilateral acute epididymal orchitis  Repeat ultrasound revealed ischemic right scrotum  He is status post scrotal exploration with right orchiectomy of Dr Slava Atkinson on 07/20/2021,  Progressing well  Physical exam revealsThe right testicle is absent, incisions are healing appropriately  Sutures have dissolved  Mild separation of tissues however bottom tissue shows granular growth  No sign of overlying infection or discharge  No tenderness to palpation  Left testicle nontender to palpation, minimal edema  Otherwise,  Healing well  Discussed with patient that he can discontinue scrotal elevation  As there is minimal edema  Discussed that he should continue to wash scrotum with soap and water  He may continue to put back trace and ointment over incisions for an additional week  Discussed that he can put gauze within his underwear to help with prevention of back trace an appointment or any discharge from wounds on to underwear  Discussed with patient that we will follow-up in 3 months for   Re-evaluation as it can take up to 3 months for scrotal surgeries to heal completely  Patient currently agreeable to plan at this time  Problem List Items Addressed This Visit        Other    S/P orchiectomy       Patient is a 17-year-old male who presented to the hospital for bilateral acute epididymal orchitis  Repeat ultrasound revealed ischemic right scrotum  He is status post scrotal exploration with right orchiectomy of Dr Slava Atkinson on 07/20/2021,  Progressing well  Physical exam revealsThe right testicle is absent, incisions are healing appropriately  Sutures have dissolved  Mild separation of tissues however bottom tissue shows granular growth  No sign of overlying infection or discharge  No tenderness to palpation  Left testicle nontender to palpation, minimal edema  Otherwise,  Healing well  Discussed with patient that he can discontinue scrotal elevation  As there is minimal edema  Discussed that he should continue to wash scrotum with soap and water  He may continue to put back trace and ointment over incisions for an additional week  Discussed that he can put gauze within his underwear to help with prevention of back trace an appointment or any discharge from wounds on to underwear  Discussed with patient that we will follow-up in 3 months for   Re-evaluation as it can take up to 3 months for scrotal surgeries to heal completely  Patient currently agreeable to plan at this time  Other Visit Diagnoses     Pain in testicle, unspecified laterality    -  Primary            Subjective:      Patient ID: Luis A Gomez is a 38 y o  male  HPI     Patient is a 38-year-old male who presented to the hospital testicular pain discovered to have bilateral acute epididymo-orchitis  He had a repeat ultrasound after medical management which revealed ischemia of the right testicle  He then proceeded to undergo scrotal exploration with right orchiectomy with Dr Candace Pisano  He is presenting today for wound check at this time  Patient currently reporting that he is doing well  He denies any pain, denies any testicular swelling, any discharge from surgical incision  Otherwise he reports that he is progressing well  Denies any additional complaints at this time  The following portions of the patient's history were reviewed and updated as appropriate:   He  has a past medical history of Chronic depression and PTSD (post-traumatic stress disorder)    He   Patient Active Problem List    Diagnosis Date Noted    S/P orchiectomy 08/02/2021    Acute low back pain 07/17/2021    Benign essential HTN 07/17/2021    HLD (hyperlipidemia) 07/17/2021    Testicular discomfort 07/16/2021    Sepsis ([facility] [address] ) 07/16/2021    Orchitis and epididymitis 07/16/2021    Intractable hiccups 03/16/2021    Depression 03/16/2021    Urinary retention 03/16/2021    Closed fracture of fifth cervical vertebra ([facility] [address] ) 03/10/2021    Spinal cord injury, C1-C7 ([facility] [address] ) 03/10/2021    Fall 03/10/2021    Pain 03/10/2021    Central cord syndrome ([facility] [address] ) 03/07/2021    Pulmonary insufficiency 03/07/2021     He  has a past surgical history that includes Cervical fusion (N/A, 3/6/2021); IR PICC placement double lumen (3/8/2021); and Scrotal surgery (Right, 7/20/2021)  His family history includes No Known Problems in his father and mother  He  reports that he has quit smoking  His smoking use included cigarettes  He has a 2 50 pack-year smoking history  He has never used smokeless tobacco  He reports previous alcohol use  He reports that he does not use drugs    Current Outpatient Medications   Medication Sig Dispense Refill    acetaminophen (TYLENOL) 500 mg tablet Take 1,000 mg by mouth every 6 (six) hours as needed for mild pain      atorvastatin (LIPITOR) 40 mg tablet Take 1 tablet (40 mg total) by mouth daily with dinner  0    baclofen 10 mg tablet Take 5 mg by mouth 3 (three) times a day      Baclofen 5 MG TABS       bisacodyl (DULCOLAX) 10 mg suppository Insert 1 suppository (10 mg total) into the rectum daily as needed for constipation 12 suppository 0    Calcium Carbonate Antacid (CALCIUM CARBONATE PO) Take by mouth      celecoxib (CeleBREX) 100 mg capsule Take 100 mg by mouth 2 (two) times a day      gabapentin (NEURONTIN) 300 mg capsule Take 2 capsules (600 mg total) by mouth 3 (three) times a day 30 capsule 0    melatonin 3 mg Take 1 tablet (3 mg total) by mouth daily at bedtime 10 tablet 0    Multiple Vitamin (multivitamin) capsule Take 1 capsule by mouth daily      nortriptyline (PAMELOR) 10 mg capsule Take 10 mg by mouth daily at bedtime      senna-docusate sodium (SENOKOT S) 8 6-50 mg per tablet Take 1 tablet by mouth 2 (two) times a day 10 tablet 0    tamsulosin (FLOMAX) 0 4 mg Take 1 capsule (0 4 mg total) by mouth daily with dinner 10 capsule 0     No current facility-administered medications for this visit  He has No Known Allergies       Review of Systems   Constitutional: Negative  Negative for chills and fever  HENT: Negative  Eyes: Negative  Respiratory: Negative  Cardiovascular: Negative  Gastrointestinal: Negative  Negative for abdominal pain, diarrhea, nausea and vomiting  Endocrine: Negative  Genitourinary: Negative for difficulty urinating, discharge, dysuria, flank pain, frequency, hematuria, penile pain, penile swelling, scrotal swelling, testicular pain and urgency  Musculoskeletal: Negative  Allergic/Immunologic: Negative  Neurological: Negative  Hematological: Negative  Psychiatric/Behavioral: Negative  Objective:      /70 (BP Location: Left arm, Patient Position: Sitting, Cuff Size: Adult)   Ht 5' 7" (1 702 m)   BMI 25 90 kg/m²          Physical Exam  Constitutional:       General: He is not in acute distress  Appearance: He is normal weight  He is not ill-appearing, toxic-appearing or diaphoretic  HENT:      Head: Normocephalic and atraumatic  Right Ear: External ear normal       Left Ear: External ear normal       Nose: Nose normal    Eyes:      General: No scleral icterus  Conjunctiva/sclera: Conjunctivae normal    Cardiovascular:      Rate and Rhythm: Normal rate  Pulses: Normal pulses  Pulmonary:      Effort: Pulmonary effort is normal  No respiratory distress  Genitourinary:     Comments: The right testicle is absent, incisions are healing appropriately  Sutures have dissolved  Mild separation of tissues however bottom tissue shows granular growth  No sign of overlying infection or discharge  No tenderness to palpation  Left testicle nontender to palpation, minimal edema  Otherwise progressing well  Musculoskeletal:         General: Normal range of motion        Cervical back: Normal range of motion  Comments: Patient and a wheelchair able to stand with limited mobility  Due to chronic neurologic conditions  Neurological:      General: No focal deficit present  Mental Status: He is alert and oriented to person, place, and time  Psychiatric:         Mood and Affect: Mood normal          Behavior: Behavior normal          Thought Content:  Thought content normal          Judgment: Judgment normal            John Mcneal PA-C

## 2021-10-04 ENCOUNTER — OFFICE VISIT (OUTPATIENT)
Dept: NEUROSURGERY | Facility: CLINIC | Age: 36
End: 2021-10-04
Payer: COMMERCIAL

## 2021-10-04 VITALS
SYSTOLIC BLOOD PRESSURE: 118 MMHG | HEART RATE: 74 BPM | RESPIRATION RATE: 18 BRPM | DIASTOLIC BLOOD PRESSURE: 78 MMHG | HEIGHT: 67 IN | TEMPERATURE: 98.3 F | BODY MASS INDEX: 25.9 KG/M2

## 2021-10-04 DIAGNOSIS — S14.109D INJURY OF CERVICAL SPINAL CORD, SUBSEQUENT ENCOUNTER (HCC): ICD-10-CM

## 2021-10-04 DIAGNOSIS — S14.129D CENTRAL CORD SYNDROME, SUBSEQUENT ENCOUNTER (HCC): Primary | ICD-10-CM

## 2021-10-04 PROCEDURE — 99213 OFFICE O/P EST LOW 20 MIN: CPT | Performed by: NEUROLOGICAL SURGERY

## 2021-10-29 NOTE — TELEPHONE ENCOUNTER
Received a call from [facility] reporting that he does not feel he is getting adequate PT/OT at his facility  He current resides at [facility]  Explained that unfortunately we can not dictate the PT/OT/etc available at a facility  Suggested if he was not satisfied with what is offered to consider transfer from that facility to one that provides more rigorous treatment  Explained that this will have to be arranged by the  at the facility, also suggested he may want to reach out to the facility he wishes to transfer to, to see if they can provide additional assistance  Apologized for the inconvenience  Encouraged a call back with other questions

## 2021-11-30 ENCOUNTER — OFFICE VISIT (OUTPATIENT)
Dept: UROLOGY | Facility: CLINIC | Age: 36
End: 2021-11-30
Payer: COMMERCIAL

## 2021-11-30 VITALS — DIASTOLIC BLOOD PRESSURE: 72 MMHG | SYSTOLIC BLOOD PRESSURE: 118 MMHG

## 2021-11-30 DIAGNOSIS — N45.3 ORCHITIS AND EPIDIDYMITIS: Primary | ICD-10-CM

## 2021-11-30 PROCEDURE — 99213 OFFICE O/P EST LOW 20 MIN: CPT | Performed by: PHYSICIAN ASSISTANT

## 2021-12-04 ENCOUNTER — NURSE TRIAGE (OUTPATIENT)
Dept: OTHER | Facility: OTHER | Age: 36
End: 2021-12-04

## 2021-12-04 ENCOUNTER — TELEPHONE (OUTPATIENT)
Dept: OTHER | Facility: OTHER | Age: 36
End: 2021-12-04

## 2021-12-04 DIAGNOSIS — R39.9 UTI SYMPTOMS: Primary | ICD-10-CM

## 2022-02-24 ENCOUNTER — OFFICE VISIT (OUTPATIENT)
Dept: UROLOGY | Facility: CLINIC | Age: 37
End: 2022-02-24
Payer: COMMERCIAL

## 2022-02-24 VITALS — DIASTOLIC BLOOD PRESSURE: 88 MMHG | SYSTOLIC BLOOD PRESSURE: 128 MMHG

## 2022-02-24 DIAGNOSIS — R39.9 LOWER URINARY TRACT SYMPTOMS (LUTS): Primary | ICD-10-CM

## 2022-02-24 DIAGNOSIS — S14.109A INJURY OF CERVICAL SPINAL CORD, INITIAL ENCOUNTER (HCC): ICD-10-CM

## 2022-02-24 PROBLEM — N45.3 ORCHITIS AND EPIDIDYMITIS: Status: RESOLVED | Noted: 2021-07-16 | Resolved: 2022-02-24

## 2022-02-24 PROBLEM — A41.9 SEPSIS (HCC): Status: RESOLVED | Noted: 2021-07-16 | Resolved: 2022-02-24

## 2022-02-24 LAB
BACTERIA UR QL AUTO: NORMAL /HPF
BILIRUB UR QL STRIP: NEGATIVE
CLARITY UR: CLEAR
COLOR UR: YELLOW
GLUCOSE UR STRIP-MCNC: NEGATIVE MG/DL
HGB UR QL STRIP.AUTO: NEGATIVE
HYALINE CASTS #/AREA URNS LPF: NORMAL /LPF
KETONES UR STRIP-MCNC: NEGATIVE MG/DL
LEUKOCYTE ESTERASE UR QL STRIP: NEGATIVE
NITRITE UR QL STRIP: NEGATIVE
NON-SQ EPI CELLS URNS QL MICRO: NORMAL /HPF
PH UR STRIP.AUTO: 7 [PH]
POST-VOID RESIDUAL VOLUME, ML POC: 17 ML
PROT UR STRIP-MCNC: NEGATIVE MG/DL
RBC #/AREA URNS AUTO: NORMAL /HPF
SP GR UR STRIP.AUTO: 1.01 (ref 1–1.03)
UROBILINOGEN UR QL STRIP.AUTO: 0.2 E.U./DL
WBC #/AREA URNS AUTO: NORMAL /HPF

## 2022-02-24 PROCEDURE — 51798 US URINE CAPACITY MEASURE: CPT | Performed by: PHYSICIAN ASSISTANT

## 2022-02-24 PROCEDURE — 99214 OFFICE O/P EST MOD 30 MIN: CPT | Performed by: PHYSICIAN ASSISTANT

## 2022-02-24 PROCEDURE — 81001 URINALYSIS AUTO W/SCOPE: CPT | Performed by: PHYSICIAN ASSISTANT

## 2022-02-24 PROCEDURE — 87086 URINE CULTURE/COLONY COUNT: CPT | Performed by: PHYSICIAN ASSISTANT

## 2022-02-24 RX ORDER — TAMSULOSIN HYDROCHLORIDE 0.4 MG/1
0.4 CAPSULE ORAL
Qty: 30 CAPSULE | Refills: 1 | Status: SHIPPED | OUTPATIENT
Start: 2022-02-24

## 2022-02-24 RX ORDER — MELOXICAM 15 MG/1
TABLET ORAL
COMMUNITY
Start: 2022-02-02 | End: 2022-07-21 | Stop reason: CLARIF

## 2022-02-24 RX ORDER — AMOXICILLIN AND CLAVULANATE POTASSIUM 500; 125 MG/1; MG/1
1 TABLET, FILM COATED ORAL EVERY 12 HOURS SCHEDULED
Qty: 14 TABLET | Refills: 0 | Status: SHIPPED | OUTPATIENT
Start: 2022-02-24 | End: 2022-03-03

## 2022-02-24 NOTE — PROGRESS NOTES
1  Lower urinary tract symptoms (LUTS)  POCT Measure PVR    tamsulosin (FLOMAX) 0 4 mg    amoxicillin-clavulanate (Augmentin) 500-125 mg per tablet    Urinalysis with microscopic    Urine culture   2  Injury of cervical spinal cord, initial encounter ([facility])  tamsulosin (FLOMAX) 0 4 mg     Assessment and plan:     1  Lower urinary tract symptoms  - patient's straight cathed in the office today for specimen  This will be submitted for microscopy and culture  Will start on Augmentin in the meantime given his symptoms  -start tamsulosin  If having persistent symptoms may require cystoscopy and potential urodynamics    3  Resolved orchitis, s/p right orchiectomy 7/2021  -   no residual issues    Regina Barroso PA-C      Chief Complaint     LUTS    History of Present Illness     Aida Bond is a 43 y o  Male patient with a history of severe epididymal orchitis in July 2021 requiring scrotal incision and drainage as well as right orchiectomy by Dr Mercedes Morrow  history of spinal cord injury and ambulatory dysfunction who presented initially on July 16th with severe epididymal orchitis and febrile urinary tract infection  Initial ultrasound demonstrated a loculated hydrocele and no evidence of intratesticular abscess  He has been hospitalized and initiated on IV antibiotics  He has had essentially a failure to improve however  He has been monitored with serial ultrasounds and physical exams     patient ultimately  Underwent scrotal exploration, incision and drainage, and right orchiectomy 07/20/2021 with Dr Mercedes Morrow  Patient presents today secondary to increasing urinary symptoms over the past few months  He will have some dysuria however denies any gross hematuria he will have some suprapubic pressure in and feeling like he has to void without inability to generate stream   Other times he is able to void without difficulty  He reports that he feels as if something is blocking his urine  Denies any flank pain, nausea, vomiting, fevers, or chills  Previously had been on tamsulosin however this was reportedly not on this anymore  Patient had just voided prior to his visit  Postvoid residual 17 mL at this time  Laboratory     Lab Results   Component Value Date    CREATININE 0 71 07/22/2021         Review of Systems     Review of Systems   Genitourinary: Negative for decreased urine volume, difficulty urinating, dysuria, flank pain, frequency, penile discharge, penile pain, penile swelling, scrotal swelling, testicular pain and urgency  Allergies     No Known Allergies    Physical Exam     Physical Exam  Constitutional:       General: He is not in acute distress  Appearance: Normal appearance  He is normal weight  He is not ill-appearing, toxic-appearing or diaphoretic  HENT:      Head: Normocephalic and atraumatic  Eyes:      General:         Right eye: No discharge  Left eye: No discharge  Conjunctiva/sclera: Conjunctivae normal    Pulmonary:      Effort: Pulmonary effort is normal  No respiratory distress  Genitourinary:     Comments: Surgically absent right testicle  Scrotum clean, dry, no abnormalities present  Uncircumcised phallus  Easily able to retract  Mild urethral meatal stenosis  Able to catheterize with a 16 [place] straight tip catheter  Drainage of clear yellow urine  Approximately 200 cc drained  Musculoskeletal:      Comments: Ambulates with wheelchair assistance  Was able to ambulate between his wheelchair and the chair with assistance   Skin:     General: Skin is warm and dry  Coloration: Skin is not jaundiced or pale  Neurological:      General: No focal deficit present  Mental Status: He is alert and oriented to person, place, and time  Psychiatric:         Mood and Affect: Mood normal          Behavior: Behavior normal          Thought Content:  Thought content normal        Vital Signs     Vitals:    02/24/22 0940 BP: 128/88   BP Location: Left arm   Patient Position: Sitting   Cuff Size: Adult         Current Medications       Current Outpatient Medications:     acetaminophen (TYLENOL) 500 mg tablet, Take 1,000 mg by mouth every 6 (six) hours as needed for mild pain, Disp: , Rfl:     atorvastatin (LIPITOR) 40 mg tablet, Take 1 tablet (40 mg total) by mouth daily with dinner, Disp: , Rfl: 0    baclofen 10 mg tablet, Take 5 mg by mouth 3 (three) times a day, Disp: , Rfl:     Baclofen 5 MG TABS, , Disp: , Rfl:     bisacodyl (DULCOLAX) 10 mg suppository, Insert 1 suppository (10 mg total) into the rectum daily as needed for constipation, Disp: 12 suppository, Rfl: 0    Calcium Carbonate Antacid (CALCIUM CARBONATE PO), Take by mouth, Disp: , Rfl:     celecoxib (CeleBREX) 100 mg capsule, Take 100 mg by mouth 2 (two) times a day, Disp: , Rfl:     gabapentin (NEURONTIN) 300 mg capsule, Take 2 capsules (600 mg total) by mouth 3 (three) times a day, Disp: 30 capsule, Rfl: 0    melatonin 3 mg, Take 1 tablet (3 mg total) by mouth daily at bedtime, Disp: 10 tablet, Rfl: 0    Multiple Vitamin (multivitamin) capsule, Take 1 capsule by mouth daily, Disp: , Rfl:     nortriptyline (PAMELOR) 10 mg capsule, Take 10 mg by mouth daily at bedtime, Disp: , Rfl:     senna-docusate sodium (SENOKOT S) 8 6-50 mg per tablet, Take 1 tablet by mouth 2 (two) times a day, Disp: 10 tablet, Rfl: 0    amoxicillin-clavulanate (Augmentin) 500-125 mg per tablet, Take 1 tablet by mouth every 12 (twelve) hours for 7 days, Disp: 14 tablet, Rfl: 0    meloxicam (MOBIC) 15 mg tablet, , Disp: , Rfl:     tamsulosin (FLOMAX) 0 4 mg, Take 1 capsule (0 4 mg total) by mouth daily with dinner, Disp: 30 capsule, Rfl: 1      Active Problems     Patient Active Problem List   Diagnosis    Central cord syndrome ([facility] [address] )    Pulmonary insufficiency    Closed fracture of fifth cervical vertebra ([facility] [address] )    Spinal cord injury, C1-C7 ([facility] [address] )    Fall    Pain    Intractable hiccups    Depression    Urinary retention    Testicular discomfort    Acute low back pain    Benign essential HTN    HLD (hyperlipidemia)    S/P orchiectomy         Past Medical History     Past Medical History:   Diagnosis Date    Chronic depression     PTSD (post-traumatic stress disorder)          Surgical History     Past Surgical History:   Procedure Laterality Date    CERVICAL FUSION N/A 3/6/2021    Procedure: POSTERIOR C3-5 laminectomy, C2-7 fixation fusion, possible additional levels;  Surgeon: Chelo Dickinson MD;  [facility]: [facility];  Service: Neurosurgery    IR PICC PLACEMENT DOUBLE LUMEN  3/8/2021    SCROTAL SURGERY Right 7/20/2021    Procedure: Right scrotal exploration, Irrigation and debridement Right orchiectomy;  Surgeon: Oma Weeks MD;  Location: [facility];  Service: Urology         Family History     Family History   Problem Relation Age of Onset    No Known Problems Mother     No Known Problems Father          Social History     Social History       Radiology

## 2022-02-25 LAB — BACTERIA UR CULT: NORMAL

## 2022-03-02 ENCOUNTER — TELEPHONE (OUTPATIENT)
Dept: OTHER | Facility: OTHER | Age: 37
End: 2022-03-02

## 2022-03-02 NOTE — TELEPHONE ENCOUNTER
Agree with discontinuing antibiotic given negative specimen  Ultimately should proceed with cystoscopy as discussed at office visit given his ongoing symptoms as well as continue tamsulosin

## 2022-03-02 NOTE — TELEPHONE ENCOUNTER
Jackelyn called in stating pt was placed on amoxicillin-clavulanate (Augmentin) 500-125 mg per tablet [[id number]] for UTI  Pt no longer has UTI, but doesn't want to discontinue the medication without permission from the office  Jackelyn will fax over the paperwork today

## 2022-03-03 NOTE — TELEPHONE ENCOUNTER
Called [facility] and spoke with reception and relayed to staff that they could stop the abx    Advised to call office with any further questions or concerns

## 2022-03-08 NOTE — TELEPHONE ENCOUNTER
Patient was advised to contact our office if his symptoms returns  [facility] from [facility] care call patient reports to staff  like he is not emptying the bladder  No fevers,chill or pain  Patient does not tell staff at facility if he is having pain  Patient drinks a lot of soda  The facility does not provide per staff member the family brings in for patient  Advised RN that due to scheduling availability we would call them back to set up the procedure  [facility] will be off for the next few days   Was instructed you can contact the Unit Shaquille Tan can schedule appt for patient

## 2022-03-08 NOTE — TELEPHONE ENCOUNTER
CHINTAN SCHEDULED FOR 5/10/22 AT 2:30 IN THE [address] [place] [address] OFFICE  TRIED TO CALL THE NURSING HOME TO  LET THEM KNOW BUT THE PHONE KEPT RINGING  WILL TRY AGAIN TOMORROW

## 2022-03-09 NOTE — TELEPHONE ENCOUNTER
[address] [facility] [address]  at [phone] phone just rang  No one picked up and unable to leave a message

## 2022-04-04 ENCOUNTER — OFFICE VISIT (OUTPATIENT)
Dept: NEUROSURGERY | Facility: CLINIC | Age: 37
End: 2022-04-04
Payer: COMMERCIAL

## 2022-04-04 ENCOUNTER — HOSPITAL ENCOUNTER (OUTPATIENT)
Dept: RADIOLOGY | Facility: HOSPITAL | Age: 37
Discharge: HOME | End: 2022-04-04
Payer: COMMERCIAL

## 2022-04-04 VITALS
DIASTOLIC BLOOD PRESSURE: 78 MMHG | SYSTOLIC BLOOD PRESSURE: 107 MMHG | RESPIRATION RATE: 18 BRPM | HEIGHT: 67 IN | TEMPERATURE: 98.3 F | HEART RATE: 88 BPM | BODY MASS INDEX: 25.9 KG/M2

## 2022-04-04 DIAGNOSIS — S14.129D CENTRAL CORD SYNDROME, SUBSEQUENT ENCOUNTER (HCC): ICD-10-CM

## 2022-04-04 DIAGNOSIS — S14.109D INJURY OF CERVICAL SPINAL CORD, SUBSEQUENT ENCOUNTER (HCC): ICD-10-CM

## 2022-04-04 DIAGNOSIS — S14.129A CENTRAL CORD SYNDROME, INITIAL ENCOUNTER (HCC): Primary | ICD-10-CM

## 2022-04-04 PROCEDURE — 99214 OFFICE O/P EST MOD 30 MIN: CPT | Performed by: PHYSICIAN ASSISTANT

## 2022-04-04 PROCEDURE — 72052 X-RAY EXAM NECK SPINE 6/>VWS: CPT

## 2022-04-04 RX ORDER — BUSPIRONE HYDROCHLORIDE 5 MG/1
5 TABLET ORAL 2 TIMES DAILY
COMMUNITY
Start: 2022-04-03

## 2022-04-04 NOTE — PATIENT INSTRUCTIONS
Patient seen today for follow-up after severe central cord syndrome/spinal cord injury March 2021 status post C2-C7 posterior decompression fixation and fusion  Patient sent for cervical x-rays which were not completed prior to his visit  Final report pending  On personal review there is widening of C6-7 with flexion concern hardware failure  In neutral, lateral imaging show good alignment with straightening of normal lordosis likely consistent with spasm  On exam patient with significant muscle tightness throughout his trapezius bilaterally  Recommendations:  · Monitor neurological status  · Start Robaxin 750 mg every 6 hours as needed for muscle spasms  · Imaging reviewed personally and with attending, given increased kyphosis at C6/7 with flexion, ordered CT cervical and MRI cervical w/wo for further evaluation  · Patient to have joint appointment once completed with PA and Dr Veronica Eugene  · Will contact both patient and facility with further recommendations

## 2022-04-04 NOTE — ASSESSMENT & PLAN NOTE
Patient presents for 1 year follow-up s/p severe central spinal cord injury, Jania B spinal cord injury March 2021 after falling down stairs on presentation  · Status post posterior cervical decompression fixation and fusion C2-C7 3/7/2021 Dr BRIGHT [facility]  · Currently residing at [facility]  · Patient concerned he has declined and c/o increased neck pain    Imaging:   · XR cervical spine flexion 4/5/22:  Final report pending  On personal review there appears to be increased kyphosis at C6-7 with widening of the posterior disc space on flexion    Plan:  · Continue monitor neurological status and monitor for any new or progressive symptoms  · Imaging reviewed with patient and compared to prior x-rays  · Given patient's complain of worsening neck pain as well as radicular symptoms with neck movement, ordered CT cervical spine as well as MRI cervical spine with without contrast   · CT is to evaluate hardware placement integrity while MRI imaging will allow for visualization of spinal cord and nerves and rule out any new stenosis or signal abnormalities  · Continue routine exercises  · Continue pain medications as ordered  · Patient may benefit from additional as needed muscle relaxer such as Robaxin 750 mg every 6 hours  · Will plan outpatient follow-up is a joint appoint with Dr BRIGHT [facility] after completion of CT and MRI imaging  · Pending results to discuss possible need for additional surgery if there is hardware failure or new stenosis  · If imaging demonstrate good hardware failure and no additional intervention is required, would recommend patient to reinitiate therapy to assist in neurological progress and prevent neurological decline  ·  Encouraged to call with further questions or concerns

## 2022-04-04 NOTE — PROGRESS NOTES
Neurosurgery Office Note  Maicol Bonner 43 y o  male MRN: [id number]      Assessment/Plan     Spinal cord injury, C1-C7 [facility])  Patient presents for 1 year follow-up s/p severe central spinal cord injury, Jania B spinal cord injury March 2021 after falling down stairs on presentation  · Status post posterior cervical decompression fixation and fusion C2-C7 3/7/2021 Dr Michael Adame  · Currently residing at [facility]  · Patient concerned he has declined and c/o increased neck pain    Imaging:   · XR cervical spine flexion 4/5/22:  Final report pending  On personal review there appears to be increased kyphosis at C6-7 with widening of the posterior disc space on flexion    Plan:  · Continue monitor neurological status and monitor for any new or progressive symptoms  · Imaging reviewed with patient and compared to prior x-rays  · Given patient's complain of worsening neck pain as well as radicular symptoms with neck movement, ordered CT cervical spine as well as MRI cervical spine with without contrast   · CT is to evaluate hardware placement integrity while MRI imaging will allow for visualization of spinal cord and nerves and rule out any new stenosis or signal abnormalities  · Continue routine exercises  · Continue pain medications as ordered  · Patient may benefit from additional as needed muscle relaxer such as Robaxin 750 mg every 6 hours  · Will plan outpatient follow-up is a joint appoint with Dr Michael Adame after completion of CT and MRI imaging  · Pending results to discuss possible need for additional surgery if there is hardware failure or new stenosis  · If imaging demonstrate good hardware failure and no additional intervention is required, would recommend patient to reinitiate therapy to assist in neurological progress and prevent neurological decline  ·  Encouraged to call with further questions or concerns          Diagnoses and all orders for this visit:    Central cord syndrome, initial encounter [facility])    Injury of cervical spinal cord, subsequent encounter ([facility] [address] )  -     CT cervical spine without contrast; Future  -     MRI cervical spine with and without contrast; Future    Other orders  -     busPIRone (BUSPAR) 5 mg tablet            CHIEF COMPLAINT    Chief Complaint   Patient presents with    Follow-up    Neck Pain       HISTORY    History of Present Illness       This is a 39 y/o male without significant past medical history who presents today for one year follow-up with cervical Xrays  Patient sustained a fall down stairs on March 6, 2021 suffering a severe spinal cord injury, Jania B requiring urgent posterior cervical decompression fixation and fusion C2-C7 on March 7, 2021 by Dr Matilde Upton  Patient was ultimately discharged to [facility] on March 16, 2021  Patient states after approximately four weeks of rehab he was transferred to [facility]  [facility] secondary to insurance coverage  Patient admitted to good improvement while receiving aggressive rehab  Today, patient is complaining of increased neck pain but does admit to discontinuation of his pain medications  He states a cracking sound when he turns his neck at times which causes increased neck pain along with associated pain down his shoulders, arms and hands since about September  He states therapy was stopped in August and he feels he can regressed since his admission in July when he underwent right testicular exploration  He continues with full body spasms for which is on baclofen  He complains of daily uncontrolled leg shaking and numbness/tinlging in his arms/hands  Admits to voiding spontaneously with intermitted flow but difficulty voiding and emptying bladder  States BM about weekly and currently on Senna  REVIEW OF SYSTEMS    Review of Systems   Constitutional: Negative  HENT: Negative  Eyes: Negative  Respiratory: Negative  Cardiovascular: Negative  Gastrointestinal: Negative  Endocrine: Negative  Genitourinary: Negative  Musculoskeletal: Positive for myalgias (all over his body), neck pain (radates to b/l shoulders, arms and hands since 8/2021  ) and neck stiffness (Decrease ROM, turning head to the right is worse)  S/p PCDF  C2-C7 3/6/2021-- HDM   PT and Occupational  Therapy done  8/2021   Skin: Negative  Allergic/Immunologic: Negative  Neurological: Positive for weakness (b/l arms and hands, difficulty grasping thng and feeding himself) and numbness (and tingling on b/l arms and hands)  Hematological: Negative  Psychiatric/Behavioral: Negative  All other systems reviewed and are negative      ROS obtained by MA    Meds/Allergies     Current Outpatient Medications   Medication Sig Dispense Refill    atorvastatin (LIPITOR) 40 mg tablet Take 1 tablet (40 mg total) by mouth daily with dinner  0    baclofen 10 mg tablet Take 5 mg by mouth 3 (three) times a day      Baclofen 5 MG TABS       bisacodyl (DULCOLAX) 10 mg suppository Insert 1 suppository (10 mg total) into the rectum daily as needed for constipation 12 suppository 0    busPIRone (BUSPAR) 5 mg tablet       Calcium Carbonate Antacid (CALCIUM CARBONATE PO) Take by mouth      gabapentin (NEURONTIN) 300 mg capsule Take 2 capsules (600 mg total) by mouth 3 (three) times a day 30 capsule 0    melatonin 3 mg Take 1 tablet (3 mg total) by mouth daily at bedtime 10 tablet 0    meloxicam (MOBIC) 15 mg tablet        Multiple Vitamin (multivitamin) capsule Take 1 capsule by mouth daily      nortriptyline (PAMELOR) 10 mg capsule Take 10 mg by mouth daily at bedtime      senna-docusate sodium (SENOKOT S) 8 6-50 mg per tablet Take 1 tablet by mouth 2 (two) times a day 10 tablet 0    tamsulosin (FLOMAX) 0 4 mg Take 1 capsule (0 4 mg total) by mouth daily with dinner 30 capsule 1    acetaminophen (TYLENOL) 500 mg tablet Take 1,000 mg by mouth every 6 (six) hours as needed for mild pain      celecoxib (CeleBREX) 100 mg capsule Take 100 mg by mouth 2 (two) times a day (Patient not taking: Reported on 4/4/2022 )       No current facility-administered medications for this visit  No Known Allergies    PAST HISTORY    Past Medical History:   Diagnosis Date    Chronic depression     PTSD (post-traumatic stress disorder)        Past Surgical History:   Procedure Laterality Date    CERVICAL FUSION N/A 3/6/2021    Procedure: POSTERIOR C3-5 laminectomy, C2-7 fixation fusion, possible additional levels;  Surgeon: Florin Stack MD;  [facility]: [facility];  Service: Neurosurgery    IR PICC PLACEMENT DOUBLE LUMEN  3/8/2021    SCROTAL SURGERY Right 7/20/2021    Procedure: Right scrotal exploration, Irrigation and debridement Right orchiectomy;  Surgeon: Lorena Mendoza MD;  Location: [facility];  Service: Urology       Social History     Tobacco Use    Smoking status: Former Smoker     Packs/day: 0 25     Years: 10 00     Pack years: 2 50     Types: Cigarettes    Smokeless tobacco: Never Used   Vaping Use    Vaping Use: Never used   Substance Use Topics    Alcohol use: Not Currently     Comment: none    Drug use: Never     Comment: none       Family History   Problem Relation Age of Onset    No Known Problems Mother     No Known Problems Father          Above history personally reviewed  EXAM    Vitals:Blood pressure 107/78, pulse 88, temperature 98 3 °F (36 8 °C), temperature source Temporal, resp  rate 18, height 5' 7" (1 702 m)  ,Body mass index is 25 9 kg/m²  Physical Exam  Constitutional:       General: He is not in acute distress  Appearance: Normal appearance  He is well-developed  He is not ill-appearing  HENT:      Head: Normocephalic and atraumatic  Right Ear: External ear normal       Left Ear: External ear normal    Eyes:      General: No scleral icterus  Right eye: No discharge  Left eye: No discharge        Conjunctiva/sclera: Conjunctivae normal  Cardiovascular:      Rate and Rhythm: Normal rate  Pulmonary:      Effort: Pulmonary effort is normal  No respiratory distress  Abdominal:      General: There is no distension  Musculoskeletal:      Cervical back: Tenderness (C5-T1 region) present  Skin:     General: Skin is warm and dry  Neurological:      Mental Status: He is alert  Deep Tendon Reflexes:      Reflex Scores:       Patellar reflexes are 3+ on the right side and 3+ on the left side  Psychiatric:         Speech: Speech normal          Behavior: Behavior normal          Thought Content: Thought content normal          Judgment: Judgment normal          Neurologic Exam     Mental Status   Follows 2 step commands  Attention: normal  Concentration: normal    Speech: speech is normal   Level of consciousness: alert  Knowledge: good  Normal comprehension  Cranial Nerves     CN III, IV, VI   Right pupil: Shape: regular  Left pupil: Shape: regular  Conjugate gaze: present    CN VII   Facial expression full, symmetric       CN VIII   Hearing: intact    CN IX, X   Palate: symmetric    CN XI   Right trapezius strength: normal  Left trapezius strength: normal    Motor Exam   Muscle bulk: normal  Overall muscle tone: normal  Right arm tone: normal  Left arm tone: normal  Right leg tone: normal  Left leg tone: normalDelt, bicep, tricep, HF 4/5  WF 2/5, finger ext 2/5,  3/5  WE 4-L, 4R  KE 4-L, 4+R  PF/DF 4-L, 4+R       Sensory Exam   Tingling to LT BUE distal>proximal     Gait, Coordination, and Reflexes     Reflexes   Right patellar: 3+  Left patellar: 3+  Right Gomez: present  Left Gomez: present  Right ankle clonus: present  Left ankle clonus: present        MEDICAL DECISION MAKING    Imaging Studies:     Cervical flexion/ext Xrays    I have personally reviewed pertinent films in PACS

## 2022-04-05 ENCOUNTER — TELEPHONE (OUTPATIENT)
Dept: NEUROSURGERY | Facility: CLINIC | Age: 37
End: 2022-04-05

## 2022-04-05 NOTE — TELEPHONE ENCOUNTER
4/5/22  DENITA Bailey [facility], [place]; Bill Reich; RYAN Frias  Patient will need both CT cervical spine wo and MRI cervical spine w/wo  Plan for [facility] with [place] after completion of imaging  I called patient and informed as such   Please schedule imaging and appt with facility and inform patient via cell phone regarding scheduled times  TY! FF CALLED [facility] [phone], SPOKE TO [facility] ON [address]  FAXED SCRIPTS -[phone]  NOTED ON FAX TO CALL OFFICE TO SCHEDULE F/U

## 2022-05-09 PROBLEM — R39.9 LOWER URINARY TRACT SYMPTOMS (LUTS): Status: ACTIVE | Noted: 2022-05-09

## 2022-05-09 PROBLEM — Z71.2 ENCOUNTER TO DISCUSS TEST RESULTS: Status: ACTIVE | Noted: 2022-05-09

## 2022-05-11 ENCOUNTER — HOSPITAL ENCOUNTER (OUTPATIENT)
Dept: RADIOLOGY | Facility: HOSPITAL | Age: 37
Discharge: HOME | DRG: 321 | End: 2022-05-11
Payer: COMMERCIAL

## 2022-05-11 ENCOUNTER — HOSPITAL ENCOUNTER (INPATIENT)
Facility: HOSPITAL | Age: 37
LOS: 20 days | DRG: 321 | End: 2022-05-31
Attending: NEUROLOGICAL SURGERY | Admitting: NEUROLOGICAL SURGERY
Payer: COMMERCIAL

## 2022-05-11 ENCOUNTER — TRANSCRIBE ORDERS (OUTPATIENT)
Dept: NEUROSURGERY | Facility: CLINIC | Age: 37
End: 2022-05-11

## 2022-05-11 ENCOUNTER — ANESTHESIA EVENT (INPATIENT)
Dept: PERIOP | Facility: HOSPITAL | Age: 37
DRG: 321 | End: 2022-05-11
Payer: COMMERCIAL

## 2022-05-11 DIAGNOSIS — S14.109D INJURY OF CERVICAL SPINAL CORD, SUBSEQUENT ENCOUNTER (HCC): ICD-10-CM

## 2022-05-11 DIAGNOSIS — S12.491D OTHER CLOSED NONDISPLACED FRACTURE OF FIFTH CERVICAL VERTEBRA WITH ROUTINE HEALING, SUBSEQUENT ENCOUNTER: ICD-10-CM

## 2022-05-11 DIAGNOSIS — E78.5 HYPERLIPIDEMIA, UNSPECIFIED HYPERLIPIDEMIA TYPE: ICD-10-CM

## 2022-05-11 DIAGNOSIS — T84.498A LOOSENING OF HARDWARE IN SPINE (HCC): ICD-10-CM

## 2022-05-11 DIAGNOSIS — G95.20 UNSPECIFIED CORD COMPRESSION (HCC): Primary | ICD-10-CM

## 2022-05-11 DIAGNOSIS — M50.20 HERNIATED CERVICAL DISC: ICD-10-CM

## 2022-05-11 DIAGNOSIS — R39.9 LOWER URINARY TRACT SYMPTOMS (LUTS): ICD-10-CM

## 2022-05-11 DIAGNOSIS — G95.20 CORD COMPRESSION MYELOPATHY (HCC): ICD-10-CM

## 2022-05-11 DIAGNOSIS — I10 BENIGN ESSENTIAL HTN: ICD-10-CM

## 2022-05-11 DIAGNOSIS — G95.20 SPINAL CORD COMPRESSION (HCC): Primary | ICD-10-CM

## 2022-05-11 LAB
ABO GROUP BLD: NORMAL
ALBUMIN SERPL BCP-MCNC: 3.8 G/DL (ref 3.5–5)
ALP SERPL-CCNC: 79 U/L (ref 46–116)
ALT SERPL W P-5'-P-CCNC: 38 U/L (ref 12–78)
ANION GAP SERPL CALCULATED.3IONS-SCNC: 3 MMOL/L (ref 4–13)
APTT PPP: 31 SECONDS (ref 23–37)
AST SERPL W P-5'-P-CCNC: 23 U/L (ref 5–45)
ATRIAL RATE: 76 BPM
BACTERIA UR QL AUTO: ABNORMAL /HPF
BILIRUB SERPL-MCNC: 0.37 MG/DL (ref 0.2–1)
BILIRUB UR QL STRIP: NEGATIVE
BLD GP AB SCN SERPL QL: NEGATIVE
BUN SERPL-MCNC: 15 MG/DL (ref 5–25)
CALCIUM SERPL-MCNC: 9.4 MG/DL (ref 8.3–10.1)
CHLORIDE SERPL-SCNC: 109 MMOL/L (ref 100–108)
CLARITY UR: CLEAR
CO2 SERPL-SCNC: 28 MMOL/L (ref 21–32)
COLOR UR: YELLOW
CREAT SERPL-MCNC: 0.97 MG/DL (ref 0.6–1.3)
ERYTHROCYTE [DISTWIDTH] IN BLOOD BY AUTOMATED COUNT: 12.3 % (ref 11.6–15.1)
GFR SERPL CREATININE-BSD FRML MDRD: 98 ML/MIN/1.73SQ M
GLUCOSE SERPL-MCNC: 100 MG/DL (ref 65–140)
GLUCOSE UR STRIP-MCNC: NEGATIVE MG/DL
HCT VFR BLD AUTO: 44.2 % (ref 36.5–49.3)
HGB BLD-MCNC: 14.3 G/DL (ref 12–17)
HGB UR QL STRIP.AUTO: NEGATIVE
INR PPP: 0.97 (ref 0.84–1.19)
KETONES UR STRIP-MCNC: NEGATIVE MG/DL
LEUKOCYTE ESTERASE UR QL STRIP: NEGATIVE
MCH RBC QN AUTO: 29.5 PG (ref 26.8–34.3)
MCHC RBC AUTO-ENTMCNC: 32.4 G/DL (ref 31.4–37.4)
MCV RBC AUTO: 91 FL (ref 82–98)
MUCOUS THREADS UR QL AUTO: ABNORMAL
NITRITE UR QL STRIP: NEGATIVE
NON-SQ EPI CELLS URNS QL MICRO: ABNORMAL /HPF
P AXIS: 28 DEGREES
PH UR STRIP.AUTO: 6.5 [PH]
PLATELET # BLD AUTO: 356 THOUSANDS/UL (ref 149–390)
PMV BLD AUTO: 9.7 FL (ref 8.9–12.7)
POTASSIUM SERPL-SCNC: 4.2 MMOL/L (ref 3.5–5.3)
PR INTERVAL: 148 MS
PROT SERPL-MCNC: 7.7 G/DL (ref 6.4–8.2)
PROT UR STRIP-MCNC: ABNORMAL MG/DL
PROTHROMBIN TIME: 12.5 SECONDS (ref 11.6–14.5)
QRS AXIS: 37 DEGREES
QRSD INTERVAL: 82 MS
QT INTERVAL: 374 MS
QTC INTERVAL: 420 MS
RBC # BLD AUTO: 4.85 MILLION/UL (ref 3.88–5.62)
RBC #/AREA URNS AUTO: ABNORMAL /HPF
RH BLD: POSITIVE
SODIUM SERPL-SCNC: 140 MMOL/L (ref 136–145)
SP GR UR STRIP.AUTO: 1.03 (ref 1–1.03)
SPECIMEN EXPIRATION DATE: NORMAL
T WAVE AXIS: 14 DEGREES
UROBILINOGEN UR STRIP-ACNC: <2 MG/DL
VENTRICULAR RATE: 76 BPM
WBC # BLD AUTO: 9.32 THOUSAND/UL (ref 4.31–10.16)
WBC #/AREA URNS AUTO: ABNORMAL /HPF

## 2022-05-11 PROCEDURE — 80053 COMPREHEN METABOLIC PANEL: CPT | Performed by: PHYSICIAN ASSISTANT

## 2022-05-11 PROCEDURE — 86850 RBC ANTIBODY SCREEN: CPT | Performed by: PHYSICIAN ASSISTANT

## 2022-05-11 PROCEDURE — 85027 COMPLETE CBC AUTOMATED: CPT | Performed by: PHYSICIAN ASSISTANT

## 2022-05-11 PROCEDURE — 72156 MRI NECK SPINE W/O & W/DYE: CPT

## 2022-05-11 PROCEDURE — 99284 EMERGENCY DEPT VISIT MOD MDM: CPT

## 2022-05-11 PROCEDURE — A9585 GADOBUTROL INJECTION: HCPCS | Performed by: PHYSICIAN ASSISTANT

## 2022-05-11 PROCEDURE — 36415 COLL VENOUS BLD VENIPUNCTURE: CPT | Performed by: PHYSICIAN ASSISTANT

## 2022-05-11 PROCEDURE — 72125 CT NECK SPINE W/O DYE: CPT

## 2022-05-11 PROCEDURE — G1004 CDSM NDSC: HCPCS

## 2022-05-11 PROCEDURE — 99255 IP/OBS CONSLTJ NEW/EST HI 80: CPT | Performed by: NEUROLOGICAL SURGERY

## 2022-05-11 PROCEDURE — 93005 ELECTROCARDIOGRAM TRACING: CPT

## 2022-05-11 PROCEDURE — 93010 ELECTROCARDIOGRAM REPORT: CPT | Performed by: INTERNAL MEDICINE

## 2022-05-11 PROCEDURE — 81001 URINALYSIS AUTO W/SCOPE: CPT | Performed by: PHYSICIAN ASSISTANT

## 2022-05-11 PROCEDURE — 85610 PROTHROMBIN TIME: CPT | Performed by: PHYSICIAN ASSISTANT

## 2022-05-11 PROCEDURE — 86900 BLOOD TYPING SEROLOGIC ABO: CPT | Performed by: PHYSICIAN ASSISTANT

## 2022-05-11 PROCEDURE — 86901 BLOOD TYPING SEROLOGIC RH(D): CPT | Performed by: PHYSICIAN ASSISTANT

## 2022-05-11 PROCEDURE — 85730 THROMBOPLASTIN TIME PARTIAL: CPT | Performed by: PHYSICIAN ASSISTANT

## 2022-05-11 RX ORDER — OXYCODONE HYDROCHLORIDE 5 MG/1
5 TABLET ORAL EVERY 4 HOURS PRN
Status: DISCONTINUED | OUTPATIENT
Start: 2022-05-11 | End: 2022-05-13

## 2022-05-11 RX ORDER — ATORVASTATIN CALCIUM 40 MG/1
40 TABLET, FILM COATED ORAL
Status: DISCONTINUED | OUTPATIENT
Start: 2022-05-11 | End: 2022-05-31 | Stop reason: HOSPADM

## 2022-05-11 RX ORDER — ACETAMINOPHEN 325 MG/1
1000 TABLET ORAL EVERY 6 HOURS PRN
Status: DISCONTINUED | OUTPATIENT
Start: 2022-05-11 | End: 2022-05-13

## 2022-05-11 RX ORDER — NORTRIPTYLINE HYDROCHLORIDE 10 MG/1
10 CAPSULE ORAL
Status: DISCONTINUED | OUTPATIENT
Start: 2022-05-11 | End: 2022-05-31 | Stop reason: HOSPADM

## 2022-05-11 RX ORDER — BACLOFEN 10 MG/1
5 TABLET ORAL 3 TIMES DAILY
Status: DISCONTINUED | OUTPATIENT
Start: 2022-05-11 | End: 2022-05-19

## 2022-05-11 RX ORDER — BUSPIRONE HYDROCHLORIDE 5 MG/1
5 TABLET ORAL 2 TIMES DAILY
Status: DISCONTINUED | OUTPATIENT
Start: 2022-05-11 | End: 2022-05-31 | Stop reason: HOSPADM

## 2022-05-11 RX ORDER — LANOLIN ALCOHOL/MO/W.PET/CERES
3 CREAM (GRAM) TOPICAL
Status: DISCONTINUED | OUTPATIENT
Start: 2022-05-11 | End: 2022-05-31 | Stop reason: HOSPADM

## 2022-05-11 RX ORDER — SODIUM CHLORIDE 9 MG/ML
125 INJECTION, SOLUTION INTRAVENOUS CONTINUOUS
Status: DISCONTINUED | OUTPATIENT
Start: 2022-05-11 | End: 2022-05-22

## 2022-05-11 RX ORDER — AMOXICILLIN 250 MG
1 CAPSULE ORAL 2 TIMES DAILY
Status: DISCONTINUED | OUTPATIENT
Start: 2022-05-11 | End: 2022-05-14

## 2022-05-11 RX ORDER — TAMSULOSIN HYDROCHLORIDE 0.4 MG/1
0.4 CAPSULE ORAL
Status: DISCONTINUED | OUTPATIENT
Start: 2022-05-11 | End: 2022-05-31 | Stop reason: HOSPADM

## 2022-05-11 RX ORDER — BISACODYL 10 MG
10 SUPPOSITORY, RECTAL RECTAL DAILY PRN
Status: DISCONTINUED | OUTPATIENT
Start: 2022-05-11 | End: 2022-05-31 | Stop reason: HOSPADM

## 2022-05-11 RX ORDER — GABAPENTIN 300 MG/1
600 CAPSULE ORAL 3 TIMES DAILY
Status: DISCONTINUED | OUTPATIENT
Start: 2022-05-11 | End: 2022-05-13

## 2022-05-11 RX ADMIN — GABAPENTIN 600 MG: 300 CAPSULE ORAL at 21:16

## 2022-05-11 RX ADMIN — GADOBUTROL 8 ML: 604.72 INJECTION INTRAVENOUS at 08:09

## 2022-05-11 RX ADMIN — NORTRIPTYLINE HYDROCHLORIDE 10 MG: 10 CAPSULE ORAL at 21:16

## 2022-05-11 RX ADMIN — BACLOFEN 5 MG: 10 TABLET ORAL at 21:17

## 2022-05-11 RX ADMIN — OXYCODONE HYDROCHLORIDE 5 MG: 5 TABLET ORAL at 21:16

## 2022-05-11 RX ADMIN — SODIUM CHLORIDE 125 ML/HR: 0.9 INJECTION, SOLUTION INTRAVENOUS at 23:53

## 2022-05-11 RX ADMIN — TAMSULOSIN HYDROCHLORIDE 0.4 MG: 0.4 CAPSULE ORAL at 18:38

## 2022-05-11 RX ADMIN — BUSPIRONE HYDROCHLORIDE 5 MG: 5 TABLET ORAL at 21:16

## 2022-05-11 RX ADMIN — ATORVASTATIN CALCIUM 40 MG: 40 TABLET, FILM COATED ORAL at 18:38

## 2022-05-11 RX ADMIN — BACLOFEN 5 MG: 10 TABLET ORAL at 18:38

## 2022-05-11 RX ADMIN — ACETAMINOPHEN 975 MG: 325 TABLET, FILM COATED ORAL at 23:52

## 2022-05-11 RX ADMIN — GABAPENTIN 600 MG: 300 CAPSULE ORAL at 18:38

## 2022-05-11 RX ADMIN — Medication 3 MG: at 21:17

## 2022-05-11 RX ADMIN — OXYCODONE HYDROCHLORIDE 5 MG: 5 TABLET ORAL at 16:36

## 2022-05-11 NOTE — ED NOTES
RN [facility] out to Neurosurg - [facility]  05/11/22 [id number]

## 2022-05-11 NOTE — PROGRESS NOTES
Pt refusing ordered urinary catheter at this time  Uriel Darron with Neurosurgery notified  Will continue to monitor pt and treat within ordered plan of care  Pt agreeable to bladder scans to monitor

## 2022-05-11 NOTE — ASSESSMENT & PLAN NOTE
Patient presents as direct admission after outpatient MRI demonstrate cord compression requiring surgical intervention  · s/p severe central spinal cord injury, Jania B spinal cord injury March 2021 after falling down stairs Status post posterior cervical decompression fixation and fusion C2-C7 3/7/2021 Dr Rodney [place]  · Currently residing at [facility]  · Patient complains of decline in function, new urinary difficulties and c/o increased neck pain, with intermittent radicular symptoms and clicking noise with head movement    Imaging:   · XR cervical spine flexion 4/5/22: On personal review there appears to be increased kyphosis at C6-7 with widening of the posterior disc space on flexion   · MRI cervical spine with without 5/11/22:  New focus of cord compression at C6-7 secondary to new disc herniation with cord signal abnormality at this level worrisome for new cord edema along the caudal margin the spinal construct  Interval development of cystic myelomalacia and cord atrophy at the C4-5 level in the region of prior cord atrophy status post decompressive laminectomy C5-7  Posterior fusion hardware C2-C7  · CT cervical spine without 5/11/22: Final report pending  Alignment appears stable  Loosening noted around bilateral C7 screws  Right C2 screw appears fractured  Plan:  · Continue monitor neurological status and monitor for any new or progressive symptoms  · Imaging results reviewed with patient  · Given new cord compression with signal abnormality at C6-7, further surgical intervention is recommended  · Plan for direct admission to the hospital for preoperative testing and tentative plan for surgical intervention tomorrow  · Patient agreeable with plan  Case Management contacted and will assist in getting patient's belongings from his facility     ·

## 2022-05-11 NOTE — PLAN OF CARE
Problem: MOBILITY - ADULT  Goal: Maintain or return to baseline ADL function  Description: INTERVENTIONS:  -  Assess patient's ability to carry out ADLs; assess patient's baseline for ADL function and identify physical deficits which impact ability to perform ADLs (bathing, care of mouth/teeth, toileting, grooming, dressing, etc )  - Assess/evaluate cause of self-care deficits   - Assess range of motion  - Assess patient's mobility; develop plan if impaired  - Assess patient's need for assistive devices and provide as appropriate  - Encourage maximum independence but intervene and supervise when necessary  - Involve family in performance of ADLs  - Assess for home care needs following discharge   - Consider OT consult to assist with ADL evaluation and planning for discharge  - Provide patient education as appropriate  Outcome: Progressing  Goal: Maintains/Returns to pre admission functional level  Description: INTERVENTIONS:  - Perform BMAT or MOVE assessment daily    - Set and communicate daily mobility goal to care team and patient/family/caregiver  - Collaborate with rehabilitation services on mobility goals if consulted  - Perform Range of Motion  times a day  - Reposition patient every  hours    - Dangle patient  times a day  - Stand patient  times a day  - Ambulate patient  times a day  - Out of bed to chair  times a day   - Out of bed for meals  times a day  - Out of bed for toileting  - Record patient progress and toleration of activity level   Outcome: Progressing     Problem: PAIN - ADULT  Goal: Verbalizes/displays adequate comfort level or baseline comfort level  Description: Interventions:  - Encourage patient to monitor pain and request assistance  - Assess pain using appropriate pain scale  - Administer analgesics based on type and severity of pain and evaluate response  - Implement non-pharmacological measures as appropriate and evaluate response  - Consider cultural and social influences on pain and pain management  - Notify physician/advanced practitioner if interventions unsuccessful or patient reports new pain  Outcome: Progressing     Problem: SAFETY ADULT  Goal: Maintain or return to baseline ADL function  Description: INTERVENTIONS:  -  Assess patient's ability to carry out ADLs; assess patient's baseline for ADL function and identify physical deficits which impact ability to perform ADLs (bathing, care of mouth/teeth, toileting, grooming, dressing, etc )  - Assess/evaluate cause of self-care deficits   - Assess range of motion  - Assess patient's mobility; develop plan if impaired  - Assess patient's need for assistive devices and provide as appropriate  - Encourage maximum independence but intervene and supervise when necessary  - Involve family in performance of ADLs  - Assess for home care needs following discharge   - Consider OT consult to assist with ADL evaluation and planning for discharge  - Provide patient education as appropriate  Outcome: Progressing  Goal: Maintains/Returns to pre admission functional level  Description: INTERVENTIONS:  - Perform BMAT or MOVE assessment daily    - Set and communicate daily mobility goal to care team and patient/family/caregiver  - Collaborate with rehabilitation services on mobility goals if consulted  - Perform Range of Motion  times a day  - Reposition patient every  hours    - Dangle patient  times a day  - Stand patient  times a day  - Ambulate patient  times a day  - Out of bed to chair  times a day   - Out of bed for meals  times a day  - Out of bed for toileting  - Record patient progress and toleration of activity level   Outcome: Progressing  Goal: Patient will remain free of falls  Description: INTERVENTIONS:  - Educate patient/family on patient safety including physical limitations  - Instruct patient to call for assistance with activity   - Consult OT/PT to assist with strengthening/mobility   - Keep Call bell within reach  - Keep bed low and locked with side rails adjusted as appropriate  - Keep care items and personal belongings within reach  - Initiate and maintain comfort rounds  - Make Fall Risk Sign visible to staff  - Offer Toileting every  Hours, in advance of need  - Initiate/Maintain alarm  - Obtain necessary fall risk management equipment:   - Apply yellow socks and bracelet for high fall risk patients  - Consider moving patient to room near nurses station  Outcome: Progressing     Problem: DISCHARGE PLANNING  Goal: Discharge to home or other facility with appropriate resources  Description: INTERVENTIONS:  - Identify barriers to discharge w/patient and caregiver  - Arrange for needed discharge resources and transportation as appropriate  - Identify discharge learning needs (meds, wound care, etc )  - Arrange for interpretive services to assist at discharge as needed  - Refer to Case Management Department for coordinating discharge planning if the patient needs post-hospital services based on physician/advanced practitioner order or complex needs related to functional status, cognitive ability, or social support system  Outcome: Progressing     Problem: Knowledge Deficit  Goal: Patient/family/caregiver demonstrates understanding of disease process, treatment plan, medications, and discharge instructions  Description: Complete learning assessment and assess knowledge base    Interventions:  - Provide teaching at level of understanding  - Provide teaching via preferred learning methods  Outcome: Progressing

## 2022-05-11 NOTE — H&P
[address] [facility]  H&P- Jey Alcaraz 1985, 43 y o  male MRN: [id number]  Unit/Bed#: ED 17 Encounter: [id number]  Primary Care Provider: Shay Kurtz MD   Date and time admitted to hospital: 5/11/2022  9:16 AM    Cord compression myelopathy [facility])  Assessment & Plan  Patient presents as direct admission after outpatient MRI demonstrate cord compression requiring surgical intervention  · s/p severe central spinal cord injury, Jania B spinal cord injury March 2021 after falling down stairs Status post posterior cervical decompression fixation and fusion C2-C7 3/7/2021 Dr Jayce Kidd  · Currently residing at [facility]  · Patient complains of decline in function, new urinary difficulties and c/o increased neck pain, with intermittent radicular symptoms and clicking noise with head movement    Imaging:   · XR cervical spine flexion 4/5/22: On personal review there appears to be increased kyphosis at C6-7 with widening of the posterior disc space on flexion   · MRI cervical spine with without 5/11/22:  New focus of cord compression at C6-7 secondary to new disc herniation with cord signal abnormality at this level worrisome for new cord edema along the caudal margin the spinal construct  Interval development of cystic myelomalacia and cord atrophy at the C4-5 level in the region of prior cord atrophy status post decompressive laminectomy C5-7  Posterior fusion hardware C2-C7  · CT cervical spine without 5/11/22: Final report pending  Alignment appears stable  Loosening noted around bilateral C7 screws  Right C2 screw appears fractured  Plan:  · Continue monitor neurological status and monitor for any new or progressive symptoms  · Imaging results reviewed with patient  · Given new cord compression with signal abnormality at C6-7, further surgical intervention is recommended    · Plan for direct admission to the hospital for preoperative testing and tentative plan for surgical intervention tomorrow  · Patient agreeable with plan  Case Management contacted and will assist in getting patient's belongings from his facility  ·     Benign essential HTN  Assessment & Plan  Continue to monitor    Urinary retention  Assessment & Plan  Monitor voiding  PVR ordered      History of Present Illness     HPI: Roseanna Cooper is a 43 y o  male with PMH including chronic depression, PTSD, hyperlipidemia who presents following outpatient MRI cervical spine  Patient sustained a fall down stairs on March 6, 2021 suffering a severe spinal cord injury, Jania B requiring urgent posterior cervical decompression fixation and fusion C2-C7 on March 7, 2021 by Dr Zahra Chino  He was ultimately discharged to [facility] on March 16, 2021  After proximally four weeks, he was transferred to [facility]  [facility] secondary to insurance coverage  He admitted to good improvement while receiving aggressive rehab  Patient was seen April 4th for a one year follow-up for which he had completed cervical spine flexion-extension x-rays  On review there was noted to have widening of the C6-7 disc space and concerns for hardware failure  For this reason, patient was sent for outpatient CT and MRI imaging which were completed today  Imaging demonstrated new C6-7 barbara cord compression with signal abnormality  Patient continues to complain of increasing neck pain from the inferior aspect of his incision to the intrascapular region midline and bilateral paraspinal region  He states with certain head movements he develops a sharp shooting pain down his arms and into his hands since September  He continues with difficulty with use of his arms and with ambulation  He feels this had decline since approximately July when he was admitted for right testicular exploration    He states therapy has been stopped since August   He complains of intermittent full body spasm and muscle tightness despite use of baclofen  He complains of daily uncontrolled leg shaking as well as numbness and tingling in his arms and legs  He admits to voiding spontaneously but continues with interrupted flow and difficulty emptying his bladder  He was to be seen by Urology but is appoint was moved as patient presented late for the appointment  Review of Systems   Constitutional: Negative for activity change, fatigue and fever  HENT: Negative for trouble swallowing and voice change  Eyes: Negative for photophobia and visual disturbance  Respiratory: Negative for cough and shortness of breath  Cardiovascular: Negative for chest pain and leg swelling  Gastrointestinal: Negative for abdominal pain, nausea and vomiting  Genitourinary: Positive for difficulty urinating  Negative for decreased urine volume  Musculoskeletal: Positive for gait problem and neck pain  Negative for back pain  Skin: Negative for pallor, rash and wound  Neurological: Positive for weakness and numbness  Negative for dizziness, tremors, seizures, speech difficulty, light-headedness and headaches  Spasms   Psychiatric/Behavioral: Negative for agitation and confusion         Historical Information   Past Medical History:   Diagnosis Date    Chronic depression     PTSD (post-traumatic stress disorder)      Past Surgical History:   Procedure Laterality Date    CERVICAL FUSION N/A 3/6/2021    Procedure: POSTERIOR C3-5 laminectomy, C2-7 fixation fusion, possible additional levels;  Surgeon: Angelica Kim MD;  [facility]: [facility];  Service: Neurosurgery    IR PICC PLACEMENT DOUBLE LUMEN  3/8/2021    SCROTAL SURGERY Right 7/20/2021    Procedure: Right scrotal exploration, Irrigation and debridement Right orchiectomy;  Surgeon: Pascale Holt MD;  Location: [facility];  Service: Urology     Social History     Substance and Sexual Activity   Alcohol Use Not Currently    Comment: none     Social History     Substance and Sexual Activity Drug Use Never    Comment: none     Social History     Tobacco Use   Smoking Status Former Smoker    Packs/day: 0 25    Years: 10 00    Pack years: 2 50    Types: Cigarettes   Smokeless Tobacco Never Used     Family History   Problem Relation Age of Onset    No Known Problems Mother     No Known Problems Father        Meds/Allergies   all current active meds have been reviewed, current meds:   No current facility-administered medications for this encounter  and PTA meds:   Prior to Admission Medications   Prescriptions Last Dose Informant Patient Reported? Taking?    Baclofen 5 MG TABS 5/10/2022 at Unknown time Outside Facility (Specify) Yes Yes   Calcium Carbonate Antacid (CALCIUM CARBONATE PO) 5/10/2022 at Unknown time Outside Facility ([address] [place] [address]) Yes Yes   Sig: Take by mouth   Multiple Vitamin (multivitamin) capsule 5/10/2022 at Unknown time Outside Facility ([address] [place] [address]) Yes Yes   Sig: Take 1 capsule by mouth daily   acetaminophen (TYLENOL) 500 mg tablet Past Month at Unknown time Outside Facility (Specify) Yes Yes   Sig: Take 1,000 mg by mouth every 6 (six) hours as needed for mild pain   atorvastatin (LIPITOR) 40 mg tablet 5/10/2022 at Unknown time Outside Facility (Specify) No Yes   Sig: Take 1 tablet (40 mg total) by mouth daily with dinner   baclofen 10 mg tablet 5/10/2022 at Unknown time Outside Facility (Specify) Yes Yes   Sig: Take 5 mg by mouth 3 (three) times a day   bisacodyl (DULCOLAX) 10 mg suppository Past Week at Unknown time Outside Facility (Specify) No Yes   Sig: Insert 1 suppository (10 mg total) into the rectum daily as needed for constipation   busPIRone (BUSPAR) 5 mg tablet 5/10/2022 at Unknown time  Yes Yes   celecoxib (CeleBREX) 100 mg capsule Unknown at Unknown time Outside Facility (Specify) Yes No   Sig: Take 100 mg by mouth 2 (two) times a day   Patient not taking: No sig reported   gabapentin (NEURONTIN) 300 mg capsule 5/10/2022 at Unknown time Outside Facility (Specify) No Yes Sig: Take 2 capsules (600 mg total) by mouth 3 (three) times a day   melatonin 3 mg 5/10/2022 at Unknown time Outside Facility (Specify) No Yes   Sig: Take 1 tablet (3 mg total) by mouth daily at bedtime   meloxicam (MOBIC) 15 mg tablet 5/10/2022 at Unknown time  Yes Yes   Sig:     nortriptyline (PAMELOR) 10 mg capsule 5/10/2022 at Unknown time Outside Facility (Specify) Yes Yes   Sig: Take 10 mg by mouth daily at bedtime   senna-docusate sodium (SENOKOT S) 8 6-50 mg per tablet Not Taking at Unknown time Outside Facility (Specify) No No   Sig: Take 1 tablet by mouth 2 (two) times a day   Patient not taking: Reported on 5/11/2022   tamsulosin (FLOMAX) 0 4 mg Unknown at Unknown time  No No   Sig: Take 1 capsule (0 4 mg total) by mouth daily with dinner      Facility-Administered Medications: None     No Known Allergies    Objective   I/O     None          Physical Exam  Constitutional:       General: He is not in acute distress  Appearance: Normal appearance  He is well-developed  HENT:      Head: Normocephalic and atraumatic  Right Ear: External ear normal       Left Ear: External ear normal    Eyes:      General: No scleral icterus  Right eye: No discharge  Left eye: No discharge  Extraocular Movements: Extraocular movements intact and EOM normal       Conjunctiva/sclera: Conjunctivae normal    Cardiovascular:      Rate and Rhythm: Normal rate  Pulmonary:      Effort: Pulmonary effort is normal  No respiratory distress  Abdominal:      General: There is no distension  Musculoskeletal:      Cervical back: Neck supple  Tenderness (C6-T2 region) present  Skin:     General: Skin is warm and dry  Neurological:      Mental Status: He is alert  Deep Tendon Reflexes:      Reflex Scores:       Bicep reflexes are 3+ on the right side and 3+ on the left side    Psychiatric:         Mood and Affect: Mood normal          Speech: Speech normal          Behavior: Behavior normal  Thought Content: Thought content normal          Judgment: Judgment normal        Neurologic Exam     Mental Status   Follows 2 step commands  Attention: normal  Concentration: normal    Speech: speech is normal   Level of consciousness: alert  Knowledge: good  Normal comprehension  Cranial Nerves     CN III, IV, VI   Extraocular motions are normal    Nystagmus: none   Conjugate gaze: present    CN VIII   Hearing: intact    CN XI   Right trapezius strength: normal  Left trapezius strength: normal    Motor Exam   Muscle bulk: normal  Overall muscle tone: increasedRUE 4/5 except  3/5, finger ext 2/5  LUE 4-/5,  3/5, finger ext 2/5  BLE 4-/5 with 3-/5 KF     Sensory Exam   abnl throughout BUE/BLE     Gait, Coordination, and Reflexes     Tremor   Resting tremor: absent  Intention tremor: absent  Action tremor: absent    Reflexes   Right biceps: 3+  Left biceps: 3+  Right Gomez: present  Left Gomez: present      Vitals:Blood pressure 136/92, pulse 98, temperature 98 4 °F (36 9 °C), temperature source Oral, resp  rate 18, SpO2 99 %  ,There is no height or weight on file to calculate BMI  Lab Results:         Invalid input(s):  EOSPCT        Invalid input(s): LABALBU              No results found for: TROPONINT  ABG:  Lab Results   Component Value Date    PHART 7 441 03/07/2021    FWG3HEF 36 7 03/07/2021    PO2ART 162 7 (H) 03/07/2021    TMV6SQE 24 4 03/07/2021    BEART 0 6 03/07/2021    SOURCE Line, Arterial 03/07/2021       Imaging Studies: I have personally reviewed pertinent reports  and I have personally reviewed pertinent films in PACS    MRI cervical spine with and without contrast    Result Date: 5/11/2022  Impression: 1  New focus of cord compression at C6-C7 secondary to a new disc herniation with cord signal abnormality at this level, worrisome for new cord edema, along the caudal margin of the spinal construct  Spine surgical assessment advised   2   Interval development of cystic myelomalacia and cord atrophy at the C4-5 level in the region of prior cord atrophy which has been ameliorated by decompressive laminectomies C3-C5  3   Posterior fusion hardware C2-C7 noted  I personally discussed this study with Candace Torres on 5/11/2022 at 8:15 AM  Workstation performed: [id number]       EKG, Pathology, and Other Studies: none    VTE Prophylaxis: Sequential compression device Jayla Wright)     Code Status: Prior  Advance Directive and Living Will:      Power of :    POLST:      Counseling / Coordination of Care  I spent 20 minutes with the patient

## 2022-05-12 ENCOUNTER — APPOINTMENT (INPATIENT)
Dept: RADIOLOGY | Facility: HOSPITAL | Age: 37
DRG: 321 | End: 2022-05-12
Payer: COMMERCIAL

## 2022-05-12 ENCOUNTER — ANESTHESIA (INPATIENT)
Dept: PERIOP | Facility: HOSPITAL | Age: 37
DRG: 321 | End: 2022-05-12
Payer: COMMERCIAL

## 2022-05-12 LAB
EST. AVERAGE GLUCOSE BLD GHB EST-MCNC: 97 MG/DL
GLUCOSE SERPL-MCNC: 153 MG/DL (ref 65–140)
HBA1C MFR BLD: 5 %

## 2022-05-12 PROCEDURE — 83036 HEMOGLOBIN GLYCOSYLATED A1C: CPT | Performed by: PHYSICIAN ASSISTANT

## 2022-05-12 PROCEDURE — 0RG4071 FUSION OF CERVICOTHORACIC VERTEBRAL JOINT WITH AUTOLOGOUS TISSUE SUBSTITUTE, POSTERIOR APPROACH, POSTERIOR COLUMN, OPEN APPROACH: ICD-10-PCS | Performed by: NEUROLOGICAL SURGERY

## 2022-05-12 PROCEDURE — 22843 INSERT SPINE FIXATION DEVICE: CPT | Performed by: NEUROLOGICAL SURGERY

## 2022-05-12 PROCEDURE — C1713 ANCHOR/SCREW BN/BN,TIS/BN: HCPCS | Performed by: NEUROLOGICAL SURGERY

## 2022-05-12 PROCEDURE — 72040 X-RAY EXAM NECK SPINE 2-3 VW: CPT

## 2022-05-12 PROCEDURE — 22843 INSERT SPINE FIXATION DEVICE: CPT | Performed by: PHYSICIAN ASSISTANT

## 2022-05-12 PROCEDURE — 0PP304Z REMOVAL OF INTERNAL FIXATION DEVICE FROM CERVICAL VERTEBRA, OPEN APPROACH: ICD-10-PCS | Performed by: NEUROLOGICAL SURGERY

## 2022-05-12 PROCEDURE — 63001 REMOVE SPINE LAMINA 1/2 CRVL: CPT | Performed by: PHYSICIAN ASSISTANT

## 2022-05-12 PROCEDURE — 22853 INSJ BIOMECHANICAL DEVICE: CPT | Performed by: PHYSICIAN ASSISTANT

## 2022-05-12 PROCEDURE — 20936 SP BONE AGRFT LOCAL ADD-ON: CPT | Performed by: PHYSICIAN ASSISTANT

## 2022-05-12 PROCEDURE — 22614 ARTHRD PST TQ 1NTRSPC EA ADD: CPT | Performed by: PHYSICIAN ASSISTANT

## 2022-05-12 PROCEDURE — 22614 ARTHRD PST TQ 1NTRSPC EA ADD: CPT | Performed by: NEUROLOGICAL SURGERY

## 2022-05-12 PROCEDURE — 22853 INSJ BIOMECHANICAL DEVICE: CPT | Performed by: NEUROLOGICAL SURGERY

## 2022-05-12 PROCEDURE — 20930 SP BONE ALGRFT MORSEL ADD-ON: CPT | Performed by: NEUROLOGICAL SURGERY

## 2022-05-12 PROCEDURE — 0RG10A0 FUSION OF CERVICAL VERTEBRAL JOINT WITH INTERBODY FUSION DEVICE, ANTERIOR APPROACH, ANTERIOR COLUMN, OPEN APPROACH: ICD-10-PCS | Performed by: NEUROLOGICAL SURGERY

## 2022-05-12 PROCEDURE — 22845 INSERT SPINE FIXATION DEVICE: CPT | Performed by: NEUROLOGICAL SURGERY

## 2022-05-12 PROCEDURE — 22551 ARTHRD ANT NTRBDY CERVICAL: CPT | Performed by: NEUROLOGICAL SURGERY

## 2022-05-12 PROCEDURE — C1781 MESH (IMPLANTABLE): HCPCS | Performed by: NEUROLOGICAL SURGERY

## 2022-05-12 PROCEDURE — 20936 SP BONE AGRFT LOCAL ADD-ON: CPT | Performed by: NEUROLOGICAL SURGERY

## 2022-05-12 PROCEDURE — 82948 REAGENT STRIP/BLOOD GLUCOSE: CPT

## 2022-05-12 PROCEDURE — 20930 SP BONE ALGRFT MORSEL ADD-ON: CPT | Performed by: PHYSICIAN ASSISTANT

## 2022-05-12 PROCEDURE — 0RB30ZZ EXCISION OF CERVICAL VERTEBRAL DISC, OPEN APPROACH: ICD-10-PCS | Performed by: NEUROLOGICAL SURGERY

## 2022-05-12 PROCEDURE — 0RG2071 FUSION OF 2 OR MORE CERVICAL VERTEBRAL JOINTS WITH AUTOLOGOUS TISSUE SUBSTITUTE, POSTERIOR APPROACH, POSTERIOR COLUMN, OPEN APPROACH: ICD-10-PCS | Performed by: NEUROLOGICAL SURGERY

## 2022-05-12 PROCEDURE — 63001 REMOVE SPINE LAMINA 1/2 CRVL: CPT | Performed by: NEUROLOGICAL SURGERY

## 2022-05-12 PROCEDURE — 22600 ARTHRD PST TQ 1NTRSPC CRV: CPT | Performed by: NEUROLOGICAL SURGERY

## 2022-05-12 PROCEDURE — 22551 ARTHRD ANT NTRBDY CERVICAL: CPT | Performed by: PHYSICIAN ASSISTANT

## 2022-05-12 PROCEDURE — 22600 ARTHRD PST TQ 1NTRSPC CRV: CPT | Performed by: PHYSICIAN ASSISTANT

## 2022-05-12 PROCEDURE — 22845 INSERT SPINE FIXATION DEVICE: CPT | Performed by: PHYSICIAN ASSISTANT

## 2022-05-12 DEVICE — PLATE 7200025 ATL VISION ELITE 25MM
Type: IMPLANTABLE DEVICE | Site: SPINE CERVICAL | Status: FUNCTIONAL
Brand: ATLANTIS® ANTERIOR CERVICAL PLATE SYSTEM

## 2022-05-12 DEVICE — TRANSITION MAS G3605525 5.5 X 25MM
Type: IMPLANTABLE DEVICE | Site: POSTERIOR CERVICAL | Status: FUNCTIONAL
Brand: INFINITY™ OCCIPITOCERVICAL UPPER THORACIC SYSTEM

## 2022-05-12 DEVICE — GRAFTON DBM DBF 1ML: Type: IMPLANTABLE DEVICE | Site: SPINE CERVICAL | Status: FUNCTIONAL

## 2022-05-12 DEVICE — IMPLANT 6240741 ANATOMIC 14X11X7MM
Type: IMPLANTABLE DEVICE | Site: SPINE CERVICAL | Status: FUNCTIONAL
Brand: VERTE-STACK® SPINAL SYSTEM

## 2022-05-12 RX ORDER — CEFAZOLIN SODIUM 2 G/50ML
2000 SOLUTION INTRAVENOUS EVERY 8 HOURS
Status: DISPENSED | OUTPATIENT
Start: 2022-05-12 | End: 2022-05-13

## 2022-05-12 RX ORDER — SODIUM CHLORIDE 9 MG/ML
INJECTION, SOLUTION INTRAVENOUS CONTINUOUS PRN
Status: DISCONTINUED | OUTPATIENT
Start: 2022-05-12 | End: 2022-05-12

## 2022-05-12 RX ORDER — VANCOMYCIN HYDROCHLORIDE 1 G/20ML
INJECTION, POWDER, LYOPHILIZED, FOR SOLUTION INTRAVENOUS AS NEEDED
Status: DISCONTINUED | OUTPATIENT
Start: 2022-05-12 | End: 2022-05-12 | Stop reason: HOSPADM

## 2022-05-12 RX ORDER — HEPARIN SODIUM 5000 [USP'U]/ML
5000 INJECTION, SOLUTION INTRAVENOUS; SUBCUTANEOUS EVERY 8 HOURS SCHEDULED
Status: DISCONTINUED | OUTPATIENT
Start: 2022-05-13 | End: 2022-05-31 | Stop reason: HOSPADM

## 2022-05-12 RX ORDER — MIDAZOLAM HYDROCHLORIDE 2 MG/2ML
INJECTION, SOLUTION INTRAMUSCULAR; INTRAVENOUS AS NEEDED
Status: DISCONTINUED | OUTPATIENT
Start: 2022-05-12 | End: 2022-05-12

## 2022-05-12 RX ORDER — ROCURONIUM BROMIDE 10 MG/ML
INJECTION, SOLUTION INTRAVENOUS AS NEEDED
Status: DISCONTINUED | OUTPATIENT
Start: 2022-05-12 | End: 2022-05-12

## 2022-05-12 RX ORDER — GABAPENTIN 300 MG/1
300 CAPSULE ORAL ONCE
Status: COMPLETED | OUTPATIENT
Start: 2022-05-12 | End: 2022-05-12

## 2022-05-12 RX ORDER — DOCUSATE SODIUM 100 MG/1
100 CAPSULE, LIQUID FILLED ORAL 2 TIMES DAILY
Status: DISCONTINUED | OUTPATIENT
Start: 2022-05-12 | End: 2022-05-31 | Stop reason: HOSPADM

## 2022-05-12 RX ORDER — OXYCODONE HYDROCHLORIDE 5 MG/1
5 TABLET ORAL EVERY 4 HOURS PRN
Status: CANCELLED | OUTPATIENT
Start: 2022-05-12

## 2022-05-12 RX ORDER — BUPIVACAINE HYDROCHLORIDE AND EPINEPHRINE 2.5; 5 MG/ML; UG/ML
INJECTION, SOLUTION EPIDURAL; INFILTRATION; INTRACAUDAL; PERINEURAL AS NEEDED
Status: DISCONTINUED | OUTPATIENT
Start: 2022-05-12 | End: 2022-05-12 | Stop reason: HOSPADM

## 2022-05-12 RX ORDER — GABAPENTIN 300 MG/1
300 CAPSULE ORAL
Status: CANCELLED | OUTPATIENT
Start: 2022-05-12

## 2022-05-12 RX ORDER — DEXAMETHASONE SODIUM PHOSPHATE 10 MG/ML
INJECTION, SOLUTION INTRAMUSCULAR; INTRAVENOUS AS NEEDED
Status: DISCONTINUED | OUTPATIENT
Start: 2022-05-12 | End: 2022-05-12

## 2022-05-12 RX ORDER — METHOCARBAMOL 750 MG/1
750 TABLET, FILM COATED ORAL 4 TIMES DAILY PRN
Status: DISCONTINUED | OUTPATIENT
Start: 2022-05-12 | End: 2022-05-13

## 2022-05-12 RX ORDER — GLYCOPYRROLATE 0.2 MG/ML
INJECTION INTRAMUSCULAR; INTRAVENOUS AS NEEDED
Status: DISCONTINUED | OUTPATIENT
Start: 2022-05-12 | End: 2022-05-12

## 2022-05-12 RX ORDER — SODIUM CHLORIDE, SODIUM LACTATE, POTASSIUM CHLORIDE, CALCIUM CHLORIDE 600; 310; 30; 20 MG/100ML; MG/100ML; MG/100ML; MG/100ML
100 INJECTION, SOLUTION INTRAVENOUS CONTINUOUS
Status: DISCONTINUED | OUTPATIENT
Start: 2022-05-12 | End: 2022-05-13

## 2022-05-12 RX ORDER — SODIUM CHLORIDE 9 MG/ML
125 INJECTION, SOLUTION INTRAVENOUS CONTINUOUS
Status: DISCONTINUED | OUTPATIENT
Start: 2022-05-12 | End: 2022-05-13

## 2022-05-12 RX ORDER — FENTANYL CITRATE 50 UG/ML
INJECTION, SOLUTION INTRAMUSCULAR; INTRAVENOUS AS NEEDED
Status: DISCONTINUED | OUTPATIENT
Start: 2022-05-12 | End: 2022-05-12

## 2022-05-12 RX ORDER — ONDANSETRON 2 MG/ML
4 INJECTION INTRAMUSCULAR; INTRAVENOUS EVERY 4 HOURS PRN
Status: DISCONTINUED | OUTPATIENT
Start: 2022-05-12 | End: 2022-05-31 | Stop reason: HOSPADM

## 2022-05-12 RX ORDER — ONDANSETRON 2 MG/ML
4 INJECTION INTRAMUSCULAR; INTRAVENOUS ONCE AS NEEDED
Status: DISCONTINUED | OUTPATIENT
Start: 2022-05-12 | End: 2022-05-12 | Stop reason: HOSPADM

## 2022-05-12 RX ORDER — ACETAMINOPHEN 325 MG/1
975 TABLET ORAL ONCE
Status: COMPLETED | OUTPATIENT
Start: 2022-05-12 | End: 2022-05-12

## 2022-05-12 RX ORDER — SENNOSIDES 8.6 MG
1 TABLET ORAL DAILY
Status: DISCONTINUED | OUTPATIENT
Start: 2022-05-12 | End: 2022-05-31 | Stop reason: HOSPADM

## 2022-05-12 RX ORDER — ACETAMINOPHEN 325 MG/1
975 TABLET ORAL EVERY 8 HOURS SCHEDULED
Status: DISCONTINUED | OUTPATIENT
Start: 2022-05-12 | End: 2022-05-31 | Stop reason: HOSPADM

## 2022-05-12 RX ORDER — PROPOFOL 10 MG/ML
INJECTION, EMULSION INTRAVENOUS AS NEEDED
Status: DISCONTINUED | OUTPATIENT
Start: 2022-05-12 | End: 2022-05-12

## 2022-05-12 RX ORDER — ESMOLOL HYDROCHLORIDE 10 MG/ML
INJECTION INTRAVENOUS AS NEEDED
Status: DISCONTINUED | OUTPATIENT
Start: 2022-05-12 | End: 2022-05-12

## 2022-05-12 RX ORDER — FENTANYL CITRATE/PF 50 MCG/ML
50 SYRINGE (ML) INJECTION
Status: DISCONTINUED | OUTPATIENT
Start: 2022-05-12 | End: 2022-05-12 | Stop reason: HOSPADM

## 2022-05-12 RX ORDER — HYDROMORPHONE HCL/PF 1 MG/ML
SYRINGE (ML) INJECTION AS NEEDED
Status: DISCONTINUED | OUTPATIENT
Start: 2022-05-12 | End: 2022-05-12

## 2022-05-12 RX ORDER — CEFAZOLIN SODIUM 1 G/3ML
INJECTION, POWDER, FOR SOLUTION INTRAMUSCULAR; INTRAVENOUS AS NEEDED
Status: DISCONTINUED | OUTPATIENT
Start: 2022-05-12 | End: 2022-05-12

## 2022-05-12 RX ORDER — CHLORHEXIDINE GLUCONATE 0.12 MG/ML
15 RINSE ORAL ONCE
Status: COMPLETED | OUTPATIENT
Start: 2022-05-12 | End: 2022-05-12

## 2022-05-12 RX ORDER — OXYCODONE HYDROCHLORIDE 10 MG/1
10 TABLET ORAL EVERY 4 HOURS PRN
Status: DISCONTINUED | OUTPATIENT
Start: 2022-05-12 | End: 2022-05-13

## 2022-05-12 RX ORDER — SODIUM CHLORIDE, SODIUM LACTATE, POTASSIUM CHLORIDE, CALCIUM CHLORIDE 600; 310; 30; 20 MG/100ML; MG/100ML; MG/100ML; MG/100ML
INJECTION, SOLUTION INTRAVENOUS CONTINUOUS PRN
Status: DISCONTINUED | OUTPATIENT
Start: 2022-05-12 | End: 2022-05-12

## 2022-05-12 RX ORDER — LIDOCAINE HYDROCHLORIDE 10 MG/ML
INJECTION, SOLUTION EPIDURAL; INFILTRATION; INTRACAUDAL; PERINEURAL AS NEEDED
Status: DISCONTINUED | OUTPATIENT
Start: 2022-05-12 | End: 2022-05-12

## 2022-05-12 RX ORDER — CEFAZOLIN SODIUM 2 G/50ML
2000 SOLUTION INTRAVENOUS ONCE
Status: COMPLETED | OUTPATIENT
Start: 2022-05-12 | End: 2022-05-12

## 2022-05-12 RX ORDER — PROPOFOL 10 MG/ML
INJECTION, EMULSION INTRAVENOUS CONTINUOUS PRN
Status: DISCONTINUED | OUTPATIENT
Start: 2022-05-12 | End: 2022-05-12

## 2022-05-12 RX ORDER — NEOSTIGMINE METHYLSULFATE 1 MG/ML
INJECTION INTRAVENOUS AS NEEDED
Status: DISCONTINUED | OUTPATIENT
Start: 2022-05-12 | End: 2022-05-12

## 2022-05-12 RX ORDER — LIDOCAINE HYDROCHLORIDE AND EPINEPHRINE 10; 10 MG/ML; UG/ML
INJECTION, SOLUTION INFILTRATION; PERINEURAL AS NEEDED
Status: DISCONTINUED | OUTPATIENT
Start: 2022-05-12 | End: 2022-05-12 | Stop reason: HOSPADM

## 2022-05-12 RX ORDER — HYDROMORPHONE HCL/PF 1 MG/ML
0.5 SYRINGE (ML) INJECTION
Status: DISCONTINUED | OUTPATIENT
Start: 2022-05-12 | End: 2022-05-18

## 2022-05-12 RX ORDER — HYDROMORPHONE HCL/PF 1 MG/ML
0.5 SYRINGE (ML) INJECTION
Status: DISCONTINUED | OUTPATIENT
Start: 2022-05-12 | End: 2022-05-12 | Stop reason: HOSPADM

## 2022-05-12 RX ADMIN — GABAPENTIN 600 MG: 300 CAPSULE ORAL at 20:06

## 2022-05-12 RX ADMIN — HYDROMORPHONE HYDROCHLORIDE 0.5 MG: 1 INJECTION, SOLUTION INTRAMUSCULAR; INTRAVENOUS; SUBCUTANEOUS at 09:22

## 2022-05-12 RX ADMIN — SODIUM CHLORIDE 125 ML/HR: 0.9 INJECTION, SOLUTION INTRAVENOUS at 23:47

## 2022-05-12 RX ADMIN — BUSPIRONE HYDROCHLORIDE 5 MG: 5 TABLET ORAL at 20:05

## 2022-05-12 RX ADMIN — REMIFENTANIL HYDROCHLORIDE 0.2 MCG/KG/MIN: 1 INJECTION, POWDER, LYOPHILIZED, FOR SOLUTION INTRAVENOUS at 08:07

## 2022-05-12 RX ADMIN — ACETAMINOPHEN 975 MG: 325 TABLET, FILM COATED ORAL at 21:09

## 2022-05-12 RX ADMIN — SODIUM CHLORIDE, SODIUM LACTATE, POTASSIUM CHLORIDE, AND CALCIUM CHLORIDE: .6; .31; .03; .02 INJECTION, SOLUTION INTRAVENOUS at 09:34

## 2022-05-12 RX ADMIN — OXYCODONE HYDROCHLORIDE 5 MG: 5 TABLET ORAL at 06:29

## 2022-05-12 RX ADMIN — NEOSTIGMINE METHYLSULFATE 2 MG: 1 INJECTION INTRAVENOUS at 14:24

## 2022-05-12 RX ADMIN — HYDROMORPHONE HYDROCHLORIDE 0.5 MG: 1 INJECTION, SOLUTION INTRAMUSCULAR; INTRAVENOUS; SUBCUTANEOUS at 16:49

## 2022-05-12 RX ADMIN — SODIUM CHLORIDE 125 ML/HR: 0.9 INJECTION, SOLUTION INTRAVENOUS at 15:51

## 2022-05-12 RX ADMIN — OXYCODONE HYDROCHLORIDE 5 MG: 5 TABLET ORAL at 01:03

## 2022-05-12 RX ADMIN — OXYCODONE HYDROCHLORIDE 10 MG: 10 TABLET ORAL at 21:10

## 2022-05-12 RX ADMIN — HYDROMORPHONE HYDROCHLORIDE 0.4 MG: 1 INJECTION, SOLUTION INTRAMUSCULAR; INTRAVENOUS; SUBCUTANEOUS at 13:18

## 2022-05-12 RX ADMIN — Medication 20 MG: at 15:08

## 2022-05-12 RX ADMIN — CEFAZOLIN SODIUM 2000 MG: 2 SOLUTION INTRAVENOUS at 08:20

## 2022-05-12 RX ADMIN — SODIUM CHLORIDE 125 ML/HR: 0.9 INJECTION, SOLUTION INTRAVENOUS at 06:31

## 2022-05-12 RX ADMIN — HYDROMORPHONE HYDROCHLORIDE 0.2 MG: 1 INJECTION, SOLUTION INTRAMUSCULAR; INTRAVENOUS; SUBCUTANEOUS at 15:01

## 2022-05-12 RX ADMIN — FENTANYL CITRATE 100 MCG: 50 INJECTION INTRAMUSCULAR; INTRAVENOUS at 07:43

## 2022-05-12 RX ADMIN — HYDROMORPHONE HYDROCHLORIDE 0.5 MG: 1 INJECTION, SOLUTION INTRAMUSCULAR; INTRAVENOUS; SUBCUTANEOUS at 23:48

## 2022-05-12 RX ADMIN — GABAPENTIN 300 MG: 300 CAPSULE ORAL at 07:30

## 2022-05-12 RX ADMIN — HYDROMORPHONE HYDROCHLORIDE 0.5 MG: 1 INJECTION, SOLUTION INTRAMUSCULAR; INTRAVENOUS; SUBCUTANEOUS at 15:24

## 2022-05-12 RX ADMIN — HYDROMORPHONE HYDROCHLORIDE 0.5 MG: 1 INJECTION, SOLUTION INTRAMUSCULAR; INTRAVENOUS; SUBCUTANEOUS at 20:04

## 2022-05-12 RX ADMIN — ROCURONIUM BROMIDE 20 MG: 50 INJECTION INTRAVENOUS at 11:45

## 2022-05-12 RX ADMIN — ACETAMINOPHEN 975 MG: 325 TABLET, FILM COATED ORAL at 16:55

## 2022-05-12 RX ADMIN — PROPOFOL 200 MG: 10 INJECTION, EMULSION INTRAVENOUS at 07:43

## 2022-05-12 RX ADMIN — ESMOLOL HYDROCHLORIDE 30 MG: 100 INJECTION, SOLUTION INTRAVENOUS at 15:00

## 2022-05-12 RX ADMIN — GABAPENTIN 600 MG: 300 CAPSULE ORAL at 16:51

## 2022-05-12 RX ADMIN — DEXAMETHASONE SODIUM PHOSPHATE 10 MG: 10 INJECTION, SOLUTION INTRAMUSCULAR; INTRAVENOUS at 07:43

## 2022-05-12 RX ADMIN — ONDANSETRON 4 MG: 2 INJECTION INTRAMUSCULAR; INTRAVENOUS at 14:33

## 2022-05-12 RX ADMIN — TAMSULOSIN HYDROCHLORIDE 0.4 MG: 0.4 CAPSULE ORAL at 17:04

## 2022-05-12 RX ADMIN — HYDROMORPHONE HYDROCHLORIDE 0.4 MG: 1 INJECTION, SOLUTION INTRAMUSCULAR; INTRAVENOUS; SUBCUTANEOUS at 14:56

## 2022-05-12 RX ADMIN — Medication 30 MG: at 07:43

## 2022-05-12 RX ADMIN — SODIUM CHLORIDE, SODIUM LACTATE, POTASSIUM CHLORIDE, AND CALCIUM CHLORIDE: .6; .31; .03; .02 INJECTION, SOLUTION INTRAVENOUS at 07:19

## 2022-05-12 RX ADMIN — Medication 3 MG: at 20:06

## 2022-05-12 RX ADMIN — CEFAZOLIN SODIUM 2000 MG: 1 INJECTION, POWDER, FOR SOLUTION INTRAMUSCULAR; INTRAVENOUS at 12:21

## 2022-05-12 RX ADMIN — SODIUM CHLORIDE: 0.9 INJECTION, SOLUTION INTRAVENOUS at 07:50

## 2022-05-12 RX ADMIN — GLYCOPYRROLATE 0.2 MG: 0.2 INJECTION, SOLUTION INTRAMUSCULAR; INTRAVENOUS at 14:24

## 2022-05-12 RX ADMIN — ACETAMINOPHEN 975 MG: 325 TABLET ORAL at 07:30

## 2022-05-12 RX ADMIN — NORTRIPTYLINE HYDROCHLORIDE 10 MG: 10 CAPSULE ORAL at 23:47

## 2022-05-12 RX ADMIN — ATORVASTATIN CALCIUM 40 MG: 40 TABLET, FILM COATED ORAL at 17:06

## 2022-05-12 RX ADMIN — LIDOCAINE HYDROCHLORIDE 50 MG: 10 INJECTION, SOLUTION EPIDURAL; INFILTRATION; INTRACAUDAL; PERINEURAL at 07:43

## 2022-05-12 RX ADMIN — CEFAZOLIN SODIUM 2000 MG: 2 SOLUTION INTRAVENOUS at 20:00

## 2022-05-12 RX ADMIN — CHLORHEXIDINE GLUCONATE 15 ML: 1.2 SOLUTION ORAL at 06:37

## 2022-05-12 RX ADMIN — BACLOFEN 5 MG: 10 TABLET ORAL at 16:51

## 2022-05-12 RX ADMIN — PROPOFOL 100 MCG/KG/MIN: 10 INJECTION, EMULSION INTRAVENOUS at 08:07

## 2022-05-12 RX ADMIN — MIDAZOLAM 2 MG: 1 INJECTION INTRAMUSCULAR; INTRAVENOUS at 07:30

## 2022-05-12 RX ADMIN — PHENYLEPHRINE HYDROCHLORIDE 20 MCG/MIN: 10 INJECTION INTRAVENOUS at 08:48

## 2022-05-12 RX ADMIN — PROPOFOL 50 MG: 10 INJECTION, EMULSION INTRAVENOUS at 10:57

## 2022-05-12 RX ADMIN — HYDROMORPHONE HYDROCHLORIDE 0.5 MG: 1 INJECTION, SOLUTION INTRAMUSCULAR; INTRAVENOUS; SUBCUTANEOUS at 07:46

## 2022-05-12 RX ADMIN — GLYCOPYRROLATE 0.2 MG: 0.2 INJECTION, SOLUTION INTRAMUSCULAR; INTRAVENOUS at 07:42

## 2022-05-12 RX ADMIN — BACLOFEN 5 MG: 10 TABLET ORAL at 20:05

## 2022-05-12 NOTE — OP NOTE
Operative Note     Pre-op Diagnosis:   Spinal cord compression (HCC) [G95 20]  Herniated cervical disc [M50 20]  Loosening of hardware in spine ([facility] [address] ) [T84 498A]    Post-op Dignosis:     Post-Op Diagnosis Codes:     * Spinal cord compression (HCC) [G95 20]     * Herniated cervical disc [M50 20]     * Loosening of hardware in spine [facility]) [A51 708B]    Procedure:  Procedure(s):  ACDF C6-7;   Revision PCDF: revise/replace right C2 screw with laminar screw,   revise left C2 screw (cap),   remove bilateral C7 lateral mass screws,   place navigated C7, T1 pedicle screws,   C6-7 laminectomy,   arthrodesis C2-3 and C6-T1    Surgeon: Surgeon(s) and Role:     * Jose David Hammond MD - Primary     * Fatmata Flores PA-C - Assisting     Anesthesia: General    Staff:   Circulator: Chelsea Wong RN; Gonzella Ormond, RN  Radiology Technologist: Dolly Morel  Relief Circulator: Jhoan Munoz RN; Val Calderon RN  Relief Scrub: Tricia Jimenez CST  Scrub Person: Rachel Arboleda CST  No qualified Resident was available  Estimated Blood Loss: 50 mL    Specimens:                * No orders in the log *    Drains:   Closed/Suction Drain Left;Posterior Neck Bulb 7 Fr  (Active)       Urethral Catheter Latex 16 Fr  (Active)                Findings: As below  Complications:  None    Anesthesia: General Endotracheal Anesthesia/Total IV Anesthetic     DETAILS OF PROCEDURE    The patient was brought to the operating room and transferred onto the OR table  Care was taken to insure that all pressure points were padded and the neck was in a neutral position  AP and lateral fluoroscopy were used to check cervical alignment and to plan an appropriate trajectory to the appropriate cervical site  Based on lateral fluoroscopy, an transverse incision was planned to the left of midline  The skin was then prepped and draped in usual sterile fashion  A timeout was performed   The patient received preoperative antibiotics per protocol  10 mg of Decadron were also administered  The planned incision was infiltrated 1% lidocaine with 100,000 of epinephrine  MAPs were kept above 85 mm Hg for the duration of the procedure  A skin scalpel was used to incise the skin  Monopolar cautery was used to dissect down to the platysma, which was then undermined with Metzenbaum scissors and divided  A combination of bipolar cautery, Metzenbaum scissors and blunt dissection was used to further expose down to the prevertebral fascia, confirming that the carotid artery was lateral  The intended disc space was identified, and a bent spinal needle placed and  confirmed on lateral fluoroscopy and with a neuroradiologist service  The disc was permanently marked with the Bovie  The longus coli were undermined, and the Shadow line retraction system was then applied  [place] distraction pins were placed at C6, C7  I began discectomy at C6-7, using a scalpel, pituitary rongeurs, curettes, Kerrison rongeurs, etc  Cornerstone burs were used to remove further disc material and prepare the endplates  A matchstick bur was used to remove posterior osteophytes  The posterior longitudinal ligament was undermined with the nerve hook and curettes, and removed with Kerrison punches  Following decompression, the dura was pulsating nicely and nerve hook could be passed along the ventral surface of the thecal sac into both foramina without any difficulty  Electrical physiological monitoring stable  FloSseal/thrombin soaked gelfoam was used hemostasis  A trial was placed under lateral fluoroscopy and found to be a good fit  An anatomic peek interbody device 7 mm tall with a 16 mm x 14 mm footprint was filled with bone substitute (Carnation DBF) and was placed under lateral fluoroscopy  Electro-physiological monitoring stable  The introducer was then removed  The [place] distraction pins were removed and bone wax was used for hemostasis      An anterior cervical plate was sized under lateral fluoroscopy - Medtronic Toulon Elite 25 mm  16 millimeter screws were placed bilaterally at C6 & C7  Final AP and lateral fluoroscopy demonstrated the alignment of all instrumentation and of the cervical spine  The final locking mechanism was engaged on the plate  Electrophysiological monitoring remained stable  The surgical site was irrigated copiously with antibiotic impregnated irrigation  Vicryl suture was used to approximate the platysma and subcutaneous tissue  The skin was closed with a subcuticular 4-0 Monocryl  This incision was dressed with steri strips and a gauze and Tegaderm dressing  All instrument counts, sponge counts, and needle counts were correct prior to closure of the skin  The drapes were withdrawn and the area cleansed  The patient was placed in Oneal pins, and rotated prone onto the Terry table, and the Oneal attached  The head was positioned using fluoroscopy to ensure good alignment  The patient's prior incision was marked - midline posterior cervical  Hair was clipped  The area was prepped and draped in the usual fashion  A time out was again performed  Antibiotics were redosed at time  The incision was infiltrated with lidocaine with epinephrine  Incision was made with a skin scalpel and the Bovie used to dissect down through the scar  The prior C2-7 instrumentation was exposed  The Vertex caps were removed  The bilateral rods were removed  The right C2 tulip was removed - it had fracture on the screw shaft  The bilateral C7 lateral mass screws were removed  A spinous process clamp and patient tracker was affixed to C7  The  Patient was draped and an O-arm spin performed  Starting on the right at C2, navigated drill was used to create an entry point in the C2 spinous process, place a right to left C2 laminar screw  The laminar space was drilled, and then tapped    Channel was sounded, there were no breaches, and a 4 0 mm x 28 mm right to left C2 laminar screw placed  I then moved to the right on C7, using the navigated drill to create an entry point over the C7 pedicle  After cannulating the pedicle with the drill, tap was used, then a 4 5 mm x 22 mm screw placed on the right C7  The same steps were undertaken on the right at T1, 5 5 mm x 25 mm  All screws were infinity from Buyt.In  The from the same steps on the left side at C7 and T1, same size screws:  4 5 x 22, and 5 5 x 25  The patient was again draped, and an O arm spin performed  All hardware appeared to be in appropriate position  Template were used to determine the length of rods needed, 85 mm was selected  On the left all the screws aligned well enough that no offsets were needed  The chandra was bent slightly lordotic, and secured to the 5 vertex and 2 infinity screws on the left side  On the right, an offset was required to connect to the right C2 screw, as well as the right C7 screw  A 10 mm offset was used at C7, close, and a 19 mm angled opened offset used at C2  All set screws were final tightened  The C6 lamina was dissected free  Bilateral troughs were drilled in the lamina  The spinous process was grasped and the ligamentum flavum etcetera was resected circumferentially, to perform the laminectomy at C6, up to the prior decompression (C5)  The superior aspect of C7 was also drilled off, performing a laminectomy of the superior half of C7  Epidural hemostasis was achieved  Neuro monitoring actually improved at this point, with a left hand and bilateral feet motor evoked potentials now being more reproducible  The incision was copiously irrigated with antibiotic containing saline  I decorticated between C2-3, and C6-T1 after fully stripping of all soft tissue  On examining C2-C6, the fusion appeared to be solid, consistent with preop imaging   8 x 1 x 10 graft on strips were mixed with morselized bone harvested from laminectomy, and applied C2-3, and C6-T1 over the decorticating bone to generate arthrodesis  1 g of vancomycin powder was applied over the hardware to in with infection risk  A 7 mm flat Demetri-De La Torre drain was placed epidural, and tunneled out through a separate stab incision inferiorly on the left  The deep musculature and then fascial layer was closed with interrupted 0 Vicryl Plus sutures  The deep dermis tissues were closed with interrupted 2 0 Vicryl Plus sutures  The skin was closed staples  The drain was secured with drain stitch  The incision was blocked with 30 cc Marcaine with epinephrine  Incision was cleaned dressed with Acticoat, 4x4s, Tegaderm  All instrument counts, sponge counts, and needle counts were correct prior to closure the skin  The drapes were withdrawn  The patient was placed in a Glenvil collar  The Oneal was detached from the STYLIGHTe [place] table, and the patient was rotated supine onto his hospital bed  He was  awoken from general endotracheal anesthesia, extubated, and taken to the postoperative anesthesia unit in cardio vascularly stable condition  No qualified resident was available  Irasema Lima PA-C, was present for the entire procedure, and provided essential assistance with with proper exposure, retraction, hemostasis, and wound closure, which was necessary secondary to the complex nature of this case           SIGNATURE: Emi Leach MD, PhD  DATE: 5/12/2022   TIME: 2:28 PM

## 2022-05-12 NOTE — ANESTHESIA PROCEDURE NOTES
Arterial Line Insertion    Date/Time: 5/12/2022 8:00 AM  Performed by: Jerman Holland CRNA  Authorized by: Kyle Pulido DO   Consent: Verbal consent obtained  Written consent obtained  Risks and benefits: risks, benefits and alternatives were discussed  Consent given by: patient  Patient understanding: patient states understanding of the procedure being performed  Patient consent: the patient's understanding of the procedure matches consent given  Procedure consent: procedure consent matches procedure scheduled  Relevant documents: relevant documents present and verified  Test results: test results available and properly labeled  Patient identity confirmed: verbally with patient and arm band  Preparation: Patient was prepped and draped in the usual sterile fashion  Indications: hemodynamic monitoring  Orientation:  Right  Location: radial artery  Sedation:  Patient sedated: GETA      Procedure Details:  Needle gauge: 20  Seldinger technique: Seldinger technique used  Number of attempts: 3    Post-procedure:  Post-procedure: dressing applied  Waveform: good waveform and poor waveform  Post-procedure CNS: normal and unchanged  Patient tolerance: Patient tolerated the procedure well with no immediate complications  Comments: Initial attempts x2 by Deidre Buckhead  3rd attempt by ARA

## 2022-05-12 NOTE — ANESTHESIA PREPROCEDURE EVALUATION
Procedure:  ACDF C6-7; Revision PCDF, revise/replace right C2 screw with laminar screw, revise left C2 screw (cap), remove bilateral C7 lateral mass screws, place navigated C7, T1 pedicle screws, C6-7 laminectomy, additional levels as necessary (N/A Spine Cervical)    Relevant Problems   CARDIO   (+) Benign essential HTN   (+) HLD (hyperlipidemia)      MUSCULOSKELETAL   (+) Acute low back pain      NEURO/PSYCH   (+) Central cord syndrome (HCC)   (+) Cord compression myelopathy (HCC)   (+) Depression   (+) Spinal cord injury, C1-C7 (HCC)        EKGPhysical Exam    Airway    Mallampati score: III  TM Distance: >3 FB  Neck ROM: limited     Dental       Cardiovascular  Cardiovascular exam normal    Pulmonary  Pulmonary exam normal     Other Findings    Normal sinus rhythm  Normal ECG    Anesthesia Plan  ASA Score- 3     Anesthesia Type- general with ASA Monitors  Additional Monitors:   Airway Plan: ETT  Plan Factors-    Chart reviewed  EKG reviewed  Existing labs reviewed  Patient summary reviewed  Patient is not a current smoker  Patient did not smoke on day of surgery  Induction- intravenous, rapid sequence induction and inhalational     Postoperative Plan- Plan for postoperative opioid use  Planned trial extubation    Informed Consent- Anesthetic plan and risks discussed with patient  I personally reviewed this patient with the CRNA  Discussed and agreed on the Anesthesia Plan with the CRNA  Rafat Car is a 43 y o  with pertinent history of cervical fusion, cervical myleopathy presenting today for  ACDF and PCDF    NPO since last night  Took following medications pre op pain medications (as noted in chart)  Discussed risk and benefits of general which include and are not limited to: MI, stroke, sore throat, PONV, post- op pain  No new symptoms of  CP, SOB, F, chills, N/V, dizziness, numbness or tingling, cough, and other symptoms except as noted  AQA  Consented   Agrees to blood and blood products  History of anesthesia:  no personal issues/family issues

## 2022-05-12 NOTE — ANESTHESIA POSTPROCEDURE EVALUATION
Post-Op Assessment Note    CV Status:  Stable  Pain Score: 5    Pain management: adequate     Mental Status:  Alert and awake   Hydration Status:  Euvolemic and stable   PONV Controlled:  Controlled   Airway Patency:  Patent      Post Op Vitals Reviewed: Yes      Staff: CRNA, Anesthesiologist   Comments: Report given to recovering RN, VSS        No complications documented      /77 (05/12/22 1507)    Temp (!) 97 3 °F (36 3 °C) (05/12/22 1507)    Pulse 102 (05/12/22 1507)   Resp 18 (05/12/22 1507)    SpO2 100 % (05/12/22 1507)

## 2022-05-13 ENCOUNTER — APPOINTMENT (INPATIENT)
Dept: RADIOLOGY | Facility: HOSPITAL | Age: 37
DRG: 321 | End: 2022-05-13
Payer: COMMERCIAL

## 2022-05-13 PROBLEM — D64.9 ANEMIA: Status: ACTIVE | Noted: 2022-05-13

## 2022-05-13 LAB
ANION GAP SERPL CALCULATED.3IONS-SCNC: 5 MMOL/L (ref 4–13)
BUN SERPL-MCNC: 8 MG/DL (ref 5–25)
CALCIUM SERPL-MCNC: 7.3 MG/DL (ref 8.3–10.1)
CHLORIDE SERPL-SCNC: 115 MMOL/L (ref 100–108)
CO2 SERPL-SCNC: 23 MMOL/L (ref 21–32)
CREAT SERPL-MCNC: 0.88 MG/DL (ref 0.6–1.3)
ERYTHROCYTE [DISTWIDTH] IN BLOOD BY AUTOMATED COUNT: 12.4 % (ref 11.6–15.1)
GFR SERPL CREATININE-BSD FRML MDRD: 108 ML/MIN/1.73SQ M
GLUCOSE SERPL-MCNC: 96 MG/DL (ref 65–140)
HCT VFR BLD AUTO: 33.3 % (ref 36.5–49.3)
HGB BLD-MCNC: 10.5 G/DL (ref 12–17)
MCH RBC QN AUTO: 30 PG (ref 26.8–34.3)
MCHC RBC AUTO-ENTMCNC: 31.5 G/DL (ref 31.4–37.4)
MCV RBC AUTO: 95 FL (ref 82–98)
PLATELET # BLD AUTO: 279 THOUSANDS/UL (ref 149–390)
PMV BLD AUTO: 9.9 FL (ref 8.9–12.7)
POTASSIUM SERPL-SCNC: 3.4 MMOL/L (ref 3.5–5.3)
RBC # BLD AUTO: 3.5 MILLION/UL (ref 3.88–5.62)
SODIUM SERPL-SCNC: 143 MMOL/L (ref 136–145)
WBC # BLD AUTO: 13.07 THOUSAND/UL (ref 4.31–10.16)

## 2022-05-13 PROCEDURE — 97167 OT EVAL HIGH COMPLEX 60 MIN: CPT

## 2022-05-13 PROCEDURE — 85027 COMPLETE CBC AUTOMATED: CPT | Performed by: PHYSICIAN ASSISTANT

## 2022-05-13 PROCEDURE — 80048 BASIC METABOLIC PNL TOTAL CA: CPT | Performed by: PHYSICIAN ASSISTANT

## 2022-05-13 PROCEDURE — 99254 IP/OBS CNSLTJ NEW/EST MOD 60: CPT | Performed by: STUDENT IN AN ORGANIZED HEALTH CARE EDUCATION/TRAINING PROGRAM

## 2022-05-13 PROCEDURE — 97163 PT EVAL HIGH COMPLEX 45 MIN: CPT

## 2022-05-13 PROCEDURE — 99024 POSTOP FOLLOW-UP VISIT: CPT | Performed by: NEUROLOGICAL SURGERY

## 2022-05-13 RX ORDER — HALOPERIDOL 5 MG/ML
2 INJECTION INTRAMUSCULAR
Status: DISCONTINUED | OUTPATIENT
Start: 2022-05-13 | End: 2022-05-23

## 2022-05-13 RX ORDER — GLYCOPYRROLATE 0.2 MG/ML
0.2 INJECTION INTRAMUSCULAR; INTRAVENOUS EVERY 4 HOURS PRN
Status: DISCONTINUED | OUTPATIENT
Start: 2022-05-13 | End: 2022-05-23

## 2022-05-13 RX ORDER — METHOCARBAMOL 750 MG/1
750 TABLET, FILM COATED ORAL EVERY 6 HOURS SCHEDULED
Status: DISCONTINUED | OUTPATIENT
Start: 2022-05-13 | End: 2022-05-13

## 2022-05-13 RX ORDER — LORAZEPAM 2 MG/ML
0.5 INJECTION INTRAMUSCULAR EVERY 4 HOURS PRN
Status: DISCONTINUED | OUTPATIENT
Start: 2022-05-13 | End: 2022-05-31 | Stop reason: HOSPADM

## 2022-05-13 RX ORDER — POTASSIUM CHLORIDE 20 MEQ/1
40 TABLET, EXTENDED RELEASE ORAL ONCE
Status: COMPLETED | OUTPATIENT
Start: 2022-05-13 | End: 2022-05-13

## 2022-05-13 RX ORDER — OXYCODONE HYDROCHLORIDE 10 MG/1
10 TABLET ORAL
Status: DISCONTINUED | OUTPATIENT
Start: 2022-05-13 | End: 2022-05-16

## 2022-05-13 RX ORDER — GABAPENTIN 400 MG/1
800 CAPSULE ORAL 3 TIMES DAILY
Status: DISCONTINUED | OUTPATIENT
Start: 2022-05-13 | End: 2022-05-31 | Stop reason: HOSPADM

## 2022-05-13 RX ADMIN — GABAPENTIN 800 MG: 400 CAPSULE ORAL at 17:20

## 2022-05-13 RX ADMIN — HEPARIN SODIUM 5000 UNITS: 5000 INJECTION INTRAVENOUS; SUBCUTANEOUS at 21:28

## 2022-05-13 RX ADMIN — BACLOFEN 5 MG: 10 TABLET ORAL at 17:24

## 2022-05-13 RX ADMIN — HYDROMORPHONE HYDROCHLORIDE 0.5 MG: 1 INJECTION, SOLUTION INTRAMUSCULAR; INTRAVENOUS; SUBCUTANEOUS at 10:52

## 2022-05-13 RX ADMIN — METHOCARBAMOL TABLETS 750 MG: 750 TABLET, COATED ORAL at 11:00

## 2022-05-13 RX ADMIN — HEPARIN SODIUM 5000 UNITS: 5000 INJECTION INTRAVENOUS; SUBCUTANEOUS at 14:15

## 2022-05-13 RX ADMIN — TAMSULOSIN HYDROCHLORIDE 0.4 MG: 0.4 CAPSULE ORAL at 17:20

## 2022-05-13 RX ADMIN — HYDROMORPHONE HYDROCHLORIDE 0.5 MG: 1 INJECTION, SOLUTION INTRAMUSCULAR; INTRAVENOUS; SUBCUTANEOUS at 07:34

## 2022-05-13 RX ADMIN — BUSPIRONE HYDROCHLORIDE 5 MG: 5 TABLET ORAL at 08:52

## 2022-05-13 RX ADMIN — ACETAMINOPHEN 975 MG: 325 TABLET, FILM COATED ORAL at 14:15

## 2022-05-13 RX ADMIN — BUSPIRONE HYDROCHLORIDE 5 MG: 5 TABLET ORAL at 21:27

## 2022-05-13 RX ADMIN — HYDROMORPHONE HYDROCHLORIDE 0.5 MG: 1 INJECTION, SOLUTION INTRAMUSCULAR; INTRAVENOUS; SUBCUTANEOUS at 03:54

## 2022-05-13 RX ADMIN — ATORVASTATIN CALCIUM 40 MG: 40 TABLET, FILM COATED ORAL at 17:20

## 2022-05-13 RX ADMIN — POTASSIUM CHLORIDE 40 MEQ: 1500 TABLET, EXTENDED RELEASE ORAL at 10:52

## 2022-05-13 RX ADMIN — OXYCODONE HYDROCHLORIDE 10 MG: 10 TABLET ORAL at 06:04

## 2022-05-13 RX ADMIN — BACLOFEN 5 MG: 10 TABLET ORAL at 08:52

## 2022-05-13 RX ADMIN — Medication 3 MG: at 21:35

## 2022-05-13 RX ADMIN — BACLOFEN 5 MG: 10 TABLET ORAL at 21:28

## 2022-05-13 RX ADMIN — CEFAZOLIN SODIUM 2000 MG: 2 SOLUTION INTRAVENOUS at 03:54

## 2022-05-13 RX ADMIN — OXYCODONE HYDROCHLORIDE 15 MG: 10 TABLET ORAL at 14:14

## 2022-05-13 RX ADMIN — GABAPENTIN 600 MG: 300 CAPSULE ORAL at 08:52

## 2022-05-13 RX ADMIN — OXYCODONE HYDROCHLORIDE 15 MG: 10 TABLET ORAL at 21:27

## 2022-05-13 RX ADMIN — Medication 0.1 MG/KG/HR: at 17:27

## 2022-05-13 RX ADMIN — GABAPENTIN 800 MG: 400 CAPSULE ORAL at 21:27

## 2022-05-13 RX ADMIN — OXYCODONE HYDROCHLORIDE 10 MG: 10 TABLET ORAL at 01:08

## 2022-05-13 RX ADMIN — OXYCODONE HYDROCHLORIDE 15 MG: 10 TABLET ORAL at 17:20

## 2022-05-13 RX ADMIN — ACETAMINOPHEN 975 MG: 325 TABLET, FILM COATED ORAL at 21:27

## 2022-05-13 RX ADMIN — ACETAMINOPHEN 975 MG: 325 TABLET, FILM COATED ORAL at 06:04

## 2022-05-13 RX ADMIN — NORTRIPTYLINE HYDROCHLORIDE 10 MG: 10 CAPSULE ORAL at 21:33

## 2022-05-13 NOTE — PLAN OF CARE
Problem: MOBILITY - ADULT  Goal: Maintain or return to baseline ADL function  Description: INTERVENTIONS:  -  Assess patient's ability to carry out ADLs; assess patient's baseline for ADL function and identify physical deficits which impact ability to perform ADLs (bathing, care of mouth/teeth, toileting, grooming, dressing, etc )  - Assess/evaluate cause of self-care deficits   - Assess range of motion  - Assess patient's mobility; develop plan if impaired  - Assess patient's need for assistive devices and provide as appropriate  - Encourage maximum independence but intervene and supervise when necessary  - Involve family in performance of ADLs  - Assess for home care needs following discharge   - Consider OT consult to assist with ADL evaluation and planning for discharge  - Provide patient education as appropriate  Outcome: Progressing  Goal: Maintains/Returns to pre admission functional level  Description: INTERVENTIONS:  - Perform BMAT or MOVE assessment daily    - Set and communicate daily mobility goal to care team and patient/family/caregiver  - Collaborate with rehabilitation services on mobility goals if consulted  - Perform Range of Motion  times a day  - Reposition patient every  hours    - Dangle patient  times a day  - Stand patient  times a day  - Ambulate patient  times a day  - Out of bed to chair  times a day   - Out of bed for meals  times a day  - Out of bed for toileting  - Record patient progress and toleration of activity level   Outcome: Progressing     Problem: PAIN - ADULT  Goal: Verbalizes/displays adequate comfort level or baseline comfort level  Description: Interventions:  - Encourage patient to monitor pain and request assistance  - Assess pain using appropriate pain scale  - Administer analgesics based on type and severity of pain and evaluate response  - Implement non-pharmacological measures as appropriate and evaluate response  - Consider cultural and social influences on pain and pain management  - Notify physician/advanced practitioner if interventions unsuccessful or patient reports new pain  Outcome: Progressing     Problem: SAFETY ADULT  Goal: Maintain or return to baseline ADL function  Description: INTERVENTIONS:  -  Assess patient's ability to carry out ADLs; assess patient's baseline for ADL function and identify physical deficits which impact ability to perform ADLs (bathing, care of mouth/teeth, toileting, grooming, dressing, etc )  - Assess/evaluate cause of self-care deficits   - Assess range of motion  - Assess patient's mobility; develop plan if impaired  - Assess patient's need for assistive devices and provide as appropriate  - Encourage maximum independence but intervene and supervise when necessary  - Involve family in performance of ADLs  - Assess for home care needs following discharge   - Consider OT consult to assist with ADL evaluation and planning for discharge  - Provide patient education as appropriate  Outcome: Progressing  Goal: Maintains/Returns to pre admission functional level  Description: INTERVENTIONS:  - Perform BMAT or MOVE assessment daily    - Set and communicate daily mobility goal to care team and patient/family/caregiver  - Collaborate with rehabilitation services on mobility goals if consulted  - Perform Range of Motion  times a day  - Reposition patient every  hours    - Dangle patient  times a day  - Stand patient  times a day  - Ambulate patient  times a day  - Out of bed to chair  times a day   - Out of bed for meals  times a day  - Out of bed for toileting  - Record patient progress and toleration of activity level   Outcome: Progressing  Goal: Patient will remain free of falls  Description: INTERVENTIONS:  - Educate patient/family on patient safety including physical limitations  - Instruct patient to call for assistance with activity   - Consult OT/PT to assist with strengthening/mobility   - Keep Call bell within reach  - Keep bed low and locked with side rails adjusted as appropriate  - Keep care items and personal belongings within reach  - Initiate and maintain comfort rounds  - Make Fall Risk Sign visible to staff  - Offer Toileting every  Hours, in advance of need  - Initiate/Maintain alarm  - Obtain necessary fall risk management equipment  - Apply yellow socks and bracelet for high fall risk patients  - Consider moving patient to room near nurses station  Outcome: Progressing     Problem: DISCHARGE PLANNING  Goal: Discharge to home or other facility with appropriate resources  Description: INTERVENTIONS:  - Identify barriers to discharge w/patient and caregiver  - Arrange for needed discharge resources and transportation as appropriate  - Identify discharge learning needs (meds, wound care, etc )  - Arrange for interpretive services to assist at discharge as needed  - Refer to Case Management Department for coordinating discharge planning if the patient needs post-hospital services based on physician/advanced practitioner order or complex needs related to functional status, cognitive ability, or social support system  Outcome: Progressing

## 2022-05-13 NOTE — PLAN OF CARE
Problem: OCCUPATIONAL THERAPY ADULT  Goal: Performs self-care activities at highest level of function for planned discharge setting  See evaluation for individualized goals  Description: Treatment Interventions: ADL retraining, Functional transfer training, UE strengthening/ROM, Endurance training, Cognitive reorientation, Patient/family training, Equipment evaluation/education, Compensatory technique education, Energy conservation, Activityengagement, Fine motor coordination activities          See flowsheet documentation for full assessment, interventions and recommendations  Note: Limitation: Decreased ADL status, Decreased UE ROM, Decreased UE strength, Decreased Safe judgement during ADL, Decreased cognition, Decreased endurance, Decreased sensation, Decreased fine motor control, Decreased self-care trans, Decreased high-level ADLs  Prognosis: Good, Fair  Assessment: 42 YO Male SEEN FOR INITIAL OCCUPATIONAL THERAPY EVALUATION S/P ACDF C6-7; Revision PCDF, revise/replace right C2 screw with laminar screw, revise left C2 screw (cap), remove bilateral C7 lateral mass screws, place navigated C7, T1 pedicle screws, C6-7 laminectomy, arthrodesis C2-3 and C6-T1 ON 5/12/22 2' NEW C6-7 PRATIK CORD COMPRESSION WITH SIGNAL ABNORMALITY  PT CURRENTLY HAS THE FOLLOWING RESTRICTIONS;SPINAL PRECAUTIONS and C-COLLAR   PT WITH SIGNIFICANT PMH INCLUDING FALL DOWN STEPS IN 5/21RESELUTING IN [place] [facility] REQUIRING REHAB AT [facility] D/C TO [facility] FOR ADDITIONAL THERAPY SERVICES  PT REPORTS HE CONTS TO RESIDE AT [facility] HOWEVER IS NOT LONGER RECEIVING THERAPY  PT REPORTS HAVING ASSIST WITH MOST ADLS/IADLS  PT ABLE TO COMPLETE FEEDING/GROOMING WITHOUT ASSIST  PT ABLE TO AMBULATE SHORT DISTANCES WITHOUT ASSISTANCES/AD  PT REPORTS HE IS ABLE TO SELF-PROPELL W/C WITH BUE/BLE "ON GOOD DAYS"   PT CURRENTLY REQUIRES OVERALL MOD A WITH UB ADLS, MAX A WITH LB ADLS, AND MOD A X2 WITH TRANSFERS /LIMITED FUNCTIONAL MOBILITY WITH HHA  PT IS LIMITED 2' PAIN, FATIGUE, IMPAIRED BALANCE, FALL RISK , SPINAL PRECAUTIONS, LIMITED BUE SHOULDER/ELBOW/WRIST ROM, IMPAIRED FMS/GMS, SENSORY DEFICITS, OVERALL WEAKNESS/DECONDITIONING and OVERALL LIMITED ACTIVITY TOLERANCE  PT EDUCATED ON SPINAL PRECAUTIONS, DEEP BREATHING TECHNIQUES T/O ACTIVITY and CONTINUE PARTICIPATION IN SELF-CARE/MOBILITY WITH STAFF WHILE IN THE HOSPITAL   The patient's raw score on the AM-PAC Daily Activity inpatient short form is 14, standardized score is 33 39, less than 39 4  Patients at this level are likely to benefit from discharge to post-acute rehabilitation services  Please refer to the recommendation of the Occupational Therapist for safe discharge planning  FROM AN OCCUPATIONAL THERAPY PERSPECTIVE, PT WOULD BENEFIT FROM ADDITIONAL OT SERVICES IN AN INPT REHAB SETTING UPON D/C  WILL CONT TO FOLLOW TO ADDRESS THE BELOW DESCRIBED GOALS  OT Discharge Recommendation: Post acute rehabilitation services  OT - OK to Discharge:  Yes

## 2022-05-13 NOTE — CASE MANAGEMENT
Case Management Assessment & Discharge Planning Note    Patient name Danielle Donohue  Location [address] 6[address]/PPHP 316-37 MRN [id number]  : 1985 Date 2022       Current Admission Date: 2022  Current Admission Diagnosis:Cord compression myelopathy [facility])   Patient Active Problem List    Diagnosis Date Noted    Anemia 2022    Cord compression myelopathy ([facility] ) 2022    Lower urinary tract symptoms (LUTS) 2022    Encounter to discuss test results 2022    S/P orchiectomy 2021    Acute low back pain 2021    Hypokalemia 2021    Benign essential HTN 2021    HLD (hyperlipidemia) 2021    Testicular discomfort 2021    Intractable hiccups 2021    Depression 2021    Urinary retention 2021    Closed fracture of fifth cervical vertebra ([facility] ) 03/10/2021    Spinal cord injury, C1-C7 ([facility] ) 03/10/2021    Fall 03/10/2021    Pain 03/10/2021    Central cord syndrome ([facility] ) 2021    Pulmonary insufficiency 2021      LOS (days): 2  Geometric Mean LOS (GMLOS) (days):   Days to GMLOS:     OBJECTIVE:    Risk of Unplanned Readmission Score: 8 67         Current admission status: Inpatient       Preferred Pharmacy:   [address] [place] [address]  Phone: [phone] Fax: 626 [address] [address] [address] [address]  Phone: [phone] Fax: [phone]    Primary Care Provider: Ford Lopez MD    Primary Insurance: [address] [place] [address]  Secondary Insurance:     ASSESSMENT:  Jamie Nam Proxies    There are no active Health Care Proxies on file         Advance Directives  Does patient have a Health Care POA?: No  Was patient offered paperwork?: Yes (declined)  Does patient have Advance Directives?: No  Was patient offered paperwork?: Yes (declined)         Readmission Root Cause  30 Day Readmission: No    Patient Information  Admitted from[de-identified] Facility ([facility])  Mental Status: Alert  During Assessment patient was accompanied by: Not accompanied during assessment  Assessment information provided by[de-identified] Patient  Primary Caregiver: Other (Comment)  Caregiver's Name[de-identified] SNF staff at [facility] Relationship to Patient[de-identified] Other (Specify) (staff)  Support Systems: [address] entry access options  Select all that apply : Ramp  Type of Current Residence: Facility ([facility])  Upon entering residence, is there a bedroom on the main floor (no further steps)?: Yes  Upon entering residence, is there a bathroom on the main floor (no further steps)?: Yes  Living Arrangements: Other (Comment) (SNF)  Is patient a ?: No    Activities of Daily Living Prior to Admission  Completes ADLs independently?: No  Level of ADL dependence: Total Dependent  Ambulates independently?: No  Level of ambulatory dependence: Total Dependent  Does patient use assisted devices?: Yes  Assisted Devices (DME) used: Wheelchair  Does the patient have a history of Short-Term Rehab?: Yes ([facility])         Patient Information Continued  Income Source: SSI/SSD  Does patient have prescription coverage?: Yes  Does patient receive dialysis treatments?: No  Does patient have a history of substance abuse?: No  Does patient have a history of Mental Health Diagnosis?: Yes (PTSD, depression)  Is patient receiving treatment for mental health?: Yes  Has patient received inpatient treatment related to mental health in the last 2 years?: No                  DISCHARGE DETAILS:    Discharge planning discussed with[de-identified] Patient  Freedom of Choice: Yes  Comments - Freedom of Choice: Patient would like rehab referral to [facility] as he would like to stay in [facility]  Patient then stated he plans to go to LTC but does not want to return to [facility] as he is not happy there  Were Treatment Team discharge recommendations reviewed with patient/caregiver?: Yes  Did patient/caregiver verbalize understanding of patient care needs?: Yes  Were patient/caregiver advised of the risks associated with not following Treatment Team discharge recommendations?: Yes                 Patient is from [facility]  Patient is agreeable to spinal rehab upon discharge, would prefer to go to [address] [place] [address] - referral placed  Patient was previously at [facility] but would prefer to not go there due to the distance  Patient plans to return to LTC after rehab, but does not want to return to any [facility] as he is unhappy there  Agreeable for CM to print list of SNFs in the [facility]  Patient stated he has a  who is working on application to get him an apartment in [address] [place] [address] at the [place], still under construction for another month or so  Patient is unvaccinated for Covid-19  Patient has no family contacts but stated his emergency contact is his children's mother Leti Gupta @ [phone]    CM reviewed d/c planning process including the following: identifying help at home, patient preference for d/c planning needs, Discharge Lounge, [facility] Meds to Bed program, availability of treatment team to discuss questions or concerns patient and/or family may have regarding understanding medications and recognizing signs and symptoms once discharged  CM also encouraged patient to follow up with all recommended appointments after discharge  Patient advised of importance for patient and family to participate in managing patients medical well being

## 2022-05-13 NOTE — ASSESSMENT & PLAN NOTE
POD1 ACDF C6-7; Revision PCDF, revise/replace right C2 screw with laminar screw, revise left C2 screw (cap), remove bilateral C7 lateral mass screws, place navigated C7, T1 pedicle screws, C6-7 laminectomy, arthrodesis C2-3 and C6-T1  · Patient presents as direct admission after outpatient MRI demonstrate cord compression requiring surgical intervention  · s/p severe central spinal cord injury, Jania B spinal cord injury March 2021 after falling down stairs Status post posterior cervical decompression fixation and fusion C2-C7 3/7/2021 Dr Toshia Kan  · Patient complains of decline in function, new urinary difficulties and c/o increased neck pain, with intermittent radicular symptoms and clicking noise with head movement    Imaging:   · XR cervical spine flexion 4/5/22: On personal review there appears to be increased kyphosis at C6-7 with widening of the posterior disc space on flexion   · MRI cervical spine with without 5/11/22:  New focus of cord compression at C6-7 secondary to new disc herniation with cord signal abnormality at this level worrisome for new cord edema along the caudal margin the spinal construct  Interval development of cystic myelomalacia and cord atrophy at the C4-5 level in the region of prior cord atrophy status post decompressive laminectomy C5-7  Posterior fusion hardware C2-C7  · CT cervical spine without 5/11/22: Final report pending  Alignment appears stable  Loosening noted around bilateral C7 screws  Right C2 screw appears fractured  Plan:  · Continue monitor neurological status and monitor for any new or progressive symptoms  · Patient c/o increased paresthesia and weakness - auto MAP >85  Will defer steroids at this time  · SARANYA management - 150ml since surgery  Maintain  · Baseline upright Xrays ordered and pending  · Pain control - APS consulted  Consider PO dilaudid and increase in gabapentin  · Mobilize PT/OT - eval pending  · PM&R consult placed  · DVT PPX: SCDs  HSQ  · Dispo - d/w CM - recommend acute rehab -pending PT/OT eval  Request referral be placed today  Neurosurgery will continue to follow as primary  Call with questions/concerns

## 2022-05-13 NOTE — RESTORATIVE TECHNICIAN NOTE
Restorative Technician Note      Patient Name: Tolu Hall     Restorative Tech Visit Date: 5/13/2022  Note Type: Bracing, Initial consult  Brace Applied: [address] Set  Nurse Communication: Nurse aware of consult, application of brace      Pt fit in the OR for his [employer] collar  Kaylah collar and cervical handout given to the patient  Placed in a patient's belongings bag along with his replacement pads  All questions answered  Please call Mobility Coordinator at ext  [phone] or on [place] text " [facility]-PT-Restorative Tech" role in regards to bracing instruction and/or adjustment      [employer],

## 2022-05-13 NOTE — CONSULTS
Consult Note- Acute Pain Service   Fallon Pepe 43 y o  male MRN: [id number]  Unit/Bed#: [facility] 623-01 Encounter: [id number]               Assessment/Plan     Assessment:   Patient Active Problem List   Diagnosis    Central cord syndrome [facility])    Pulmonary insufficiency    Closed fracture of fifth cervical vertebra (HCC)    Spinal cord injury, C1-C7 ([address] )    Fall    Pain    Intractable hiccups    Depression    Urinary retention    Testicular discomfort    Acute low back pain    Hypokalemia    Benign essential HTN    HLD (hyperlipidemia)    S/P orchiectomy    Lower urinary tract symptoms (LUTS)    Encounter to discuss test results    [facility] compression myelopathy ([facility] )    Anemia      Fallon Pepe is a 43 y o  male who is postop day 1 ACDF C6-7, revision PCDF, revised/replaced right C2 screw with laminal screw, revised left C2 screw, removal bilateral C7 lateral mass screws, place navigated C7, T1 pedicle screws, C6-7 laminectomy, arthrodesis C2-C3 and C6-T1  Plan:   · Tylenol 975 mg every 8 hours scheduled  · Baclofen 5 mg 3 times a day scheduled  · Discontinue Robaxin as patient is on baclofen  · If patient is having ongoing muscle spasms would recommend increase in baclofen  · Increase gabapentin to 800 mg 3 times a day  · Estimated Creatinine Clearance: 106 4 mL/min (by C-G formula based on SCr of 0 88 mg/dL)     · Nortriptyline 10 mg at bedtime, home medication  · Increase oxycodone to 10 mg every 3 hours as needed for moderate pain  · Increase oxycodone 15 mg every 3 hours as needed for severe pain  · If increased dose of oxycodone is not effective in pain reduction can consider opioid rotation to oral Dilaudid in the next 24 hours, would recommend starting dose of oral Dilaudid 2 and 4 mg for moderate and severe pain  · Dilaudid 0 5 mg IV every 3 hours as needed for breakthrough pain  · Start low-dose ketamine infusion at 0 1 mg/kg/hour for opioid sparing pain control    Bowel management  · Colace 100 mg twice a day  · Senokot daily    Treatment recommendations and plan of care discussed with bedside nursing staff and Primary Care Service  APS will continue to follow  Please contact Acute Pain Service - [facility] via [employer] from [phone] with additional questions or concerns  See Deyanira or Rafael for additional contacts and after hours information  History of Present Illness    Admit Date:  5/11/2022  Hospital Day:  2 days  Primary Service:  Neurosurgery  Attending Provider:  Alisa Zarate MD  Reason for Consult / Principal Problem: post-op pain  HPI: Genet Callejas is a 43 y o  male  who is postop day 1 ACDF C6-7, revision PCDF, revised/replaced right C2 screw with laminal screw, revised left C2 screw, removal bilateral C7 lateral mass screws, place navigated C7, T1 pedicle screws, C6-7 laminectomy, arthrodesis C2-C3 and C6-T1  Current pain location(s): posterior neck and shoulders   Pain Scale:   6-10  Quality: throbbing, sharp, spasms  Current Analgesic regimen: In the past 24 hours patient received 2 5 mg IV Dilaudid and oxycodone 30 mg  At present time patient is reporting moderate to severe pain in the posterior aspect of his neck and shoulder area with associated upper extremity weakness and numbness and tingling in his arms bilaterally  P r n  Oxycodone and IV Dilaudid provide mild improvement in pain  Reports the analgesia effect from both oxycodone and IV Dilaudid last about 1 hour  Patient reports IV Dilaudid is more effective compared oxycodone  Pain History:  Patient has a history of chronic pain secondary to previous injury and surgery, has been on oral Dilaudid products in the past with good relief, currently takes gabapentin 600 mg 3 times a day prior to admission  Patient states he has been on this medication for about the past year and has not noticed any significant analgesic benefit from this medication      I have reviewed the patient's controlled substance dispensing history in the Prescription Drug Monitoring Program in compliance with the [facility] regulations before prescribing any controlled substances  Inpatient consult to Acute Pain Service  Consult performed by: JAYLYN Bermudez  Consult ordered by: Chip Rodriguez PA-C          Review of Systems   Respiratory: Negative for shortness of breath  Cardiovascular: Negative for chest pain  Gastrointestinal: Negative for nausea and vomiting  Genitourinary: Negative for difficulty urinating  Musculoskeletal: Positive for neck pain  Neurological: Positive for weakness and numbness  Negative for dizziness, seizures and headaches  All other systems reviewed and are negative        Historical Information   Past Medical History:   Diagnosis Date    Chronic depression     PTSD (post-traumatic stress disorder)      Past Surgical History:   Procedure Laterality Date    CERVICAL FUSION N/A 3/6/2021    Procedure: POSTERIOR C3-5 laminectomy, C2-7 fixation fusion, possible additional levels;  Surgeon: Kalpana Parada MD;  [facility];  Service: Neurosurgery    IR PICC PLACEMENT DOUBLE LUMEN  3/8/2021    MD ARTHRODESIS ANT INTERBODY MIN DISCECTOMY, CERVICAL BELOW C2 N/A 5/12/2022    Procedure: ACDF C6-7; Revision PCDF, revise/replace right C2 screw with laminar screw, revise left C2 screw (cap), remove bilateral C7 lateral mass screws, place navigated C7, T1 pedicle screws, C6-7 laminectomy, arthrodesis C2-3 and C6-T1;  Surgeon: Hector Quijano MD;  [facility];  Service: Neurosurgery    SCROTAL SURGERY Right 7/20/2021    Procedure: Right scrotal exploration, Irrigation and debridement Right orchiectomy;  Surgeon: Masoud Jurado MD;  [facility];  Service: Urology     Social History   Social History     Substance and Sexual Activity   Alcohol Use Not Currently    Comment: none     Social History     Substance and Sexual Activity   Drug Use Never Comment: none     Social History     Tobacco Use   Smoking Status Former Smoker    Packs/day: 0 25    Years: 10 00    Pack years: 2 50    Types: Cigarettes   Smokeless Tobacco Never Used     Family History: non-contributory    Meds/Allergies   all current active meds have been reviewed, current meds:   Current Facility-Administered Medications   Medication Dose Route Frequency    acetaminophen (TYLENOL) tablet 975 mg  975 mg Oral Q8H [address]    atorvastatin (LIPITOR) tablet 40 mg  40 mg Oral After Dinner    baclofen tablet 5 mg  5 mg Oral TID    bisacodyl (DULCOLAX) rectal suppository 10 mg  10 mg Rectal Daily PRN    busPIRone (BUSPAR) tablet 5 mg  5 mg Oral BID    docusate sodium (COLACE) capsule 100 mg  100 mg Oral BID    gabapentin (NEURONTIN) capsule 800 mg  800 mg Oral TID    glycopyrrolate (ROBINUL) injection 0 2 mg  0 2 mg Intravenous Q4H PRN    haloperidol lactate (HALDOL) injection 2 mg  2 mg Intramuscular Q30 Min PRN    heparin (porcine) subcutaneous injection 5,000 Units  5,000 Units Subcutaneous Q8H [address]    HYDROmorphone (DILAUDID) injection 0 5 mg  0 5 mg Intravenous Q3H PRN    ketamine 250 mg (STANDARD CONCENTRATION) IV in sodium chloride 0 9% 250 mL  0 1 mg/kg/hr Intravenous Continuous    LORazepam (ATIVAN) injection 0 5 mg  0 5 mg Intravenous Q4H PRN    melatonin tablet 3 mg  3 mg Oral HS    naloxone (NARCAN) 0 04 mg/mL syringe 0 04 mg  0 04 mg Intravenous Q1MIN PRN    nortriptyline (PAMELOR) capsule 10 mg  10 mg Oral HS    ondansetron (ZOFRAN) injection 4 mg  4 mg Intravenous Q4H PRN    oxyCODONE (ROXICODONE) immediate release tablet 10 mg  10 mg Oral Q3H PRN    oxyCODONE (ROXICODONE) IR tablet 15 mg  15 mg Oral Q3H PRN    senna (SENOKOT) tablet 8 6 mg  1 tablet Oral Daily    senna-docusate sodium (SENOKOT S) 8 6-50 mg per tablet 1 tablet  1 tablet Oral BID    sodium chloride 0 9 % infusion  125 mL/hr Intravenous Continuous    tamsulosin (FLOMAX) capsule 0 4 mg  0 4 mg Oral After Dinner    and PTA meds:   Prior to Admission Medications   Prescriptions Last Dose Informant Patient Reported? Taking?    Baclofen 5 MG TABS 5/10/2022 at Unknown time Outside Facility (Specify) Yes Yes   Calcium Carbonate Antacid (CALCIUM CARBONATE PO) 5/10/2022 at Unknown time Outside Facility ([address] [place] [address]) Yes Yes   Sig: Take by mouth   Multiple Vitamin (multivitamin) capsule 5/10/2022 at Unknown time Outside Facility ([address] [place] [address]) Yes Yes   Sig: Take 1 capsule by mouth daily   acetaminophen (TYLENOL) 500 mg tablet Past Month at Unknown time Outside Facility ([address] [place] [address]) Yes Yes   Sig: Take 1,000 mg by mouth every 6 (six) hours as needed for mild pain   atorvastatin (LIPITOR) 40 mg tablet 5/10/2022 at Unknown time Outside Facility (Specify) No Yes   Sig: Take 1 tablet (40 mg total) by mouth daily with dinner   baclofen 10 mg tablet 5/10/2022 at Unknown time Outside Facility ([address] [place] [address]) Yes Yes   Sig: Take 5 mg by mouth 3 (three) times a day   bisacodyl (DULCOLAX) 10 mg suppository Past Week at Unknown time Outside Facility (Specify) No Yes   Sig: Insert 1 suppository (10 mg total) into the rectum daily as needed for constipation   busPIRone (BUSPAR) 5 mg tablet 5/10/2022 at Unknown time  Yes Yes   celecoxib (CeleBREX) 100 mg capsule Unknown at Unknown time Outside Facility (Specify) Yes No   Sig: Take 100 mg by mouth 2 (two) times a day   Patient not taking: No sig reported   gabapentin (NEURONTIN) 300 mg capsule 5/10/2022 at Unknown time Outside Facility (Specify) No Yes   Sig: Take 2 capsules (600 mg total) by mouth 3 (three) times a day   melatonin 3 mg 5/10/2022 at Unknown time Outside Facility (Specify) No Yes   Sig: Take 1 tablet (3 mg total) by mouth daily at bedtime   meloxicam (MOBIC) 15 mg tablet 5/10/2022 at Unknown time  Yes Yes   Sig:     nortriptyline (PAMELOR) 10 mg capsule 5/10/2022 at Unknown time Outside Facility (Specify) Yes Yes   Sig: Take 10 mg by mouth daily at bedtime   senna-docusate sodium (SENOKOT S) 8 6-50 mg per tablet Not Taking at Unknown time Outside Facility (Specify) No No   Sig: Take 1 tablet by mouth 2 (two) times a day   Patient not taking: Reported on 5/11/2022   tamsulosin (FLOMAX) 0 4 mg Unknown at Unknown time  No No   Sig: Take 1 capsule (0 4 mg total) by mouth daily with dinner      Facility-Administered Medications: None       No Known Allergies    Objective   Temp:  [97 3 °F (36 3 °C)-99 2 °F (37 3 °C)] 99 2 °F (37 3 °C)  HR:  [] 90  Resp:  [13-18] 18  BP: (120-141)/(69-98) 123/75    Intake/Output Summary (Last 24 hours) at 5/13/2022 1353  Last data filed at 5/13/2022 1317  Gross per 24 hour   Intake 3015 ml   Output 1050 ml   Net 1965 ml       Physical Exam  Vitals reviewed  Constitutional:       General: He is awake  He is not in acute distress  Appearance: He is not ill-appearing, toxic-appearing or diaphoretic  HENT:      Head: Normocephalic  Nose: Nose normal  No rhinorrhea  Mouth/Throat:      Mouth: Mucous membranes are moist    Eyes:      Extraocular Movements: Extraocular movements intact  Neck:      Comments: Cervical collar  Cardiovascular:      Rate and Rhythm: Normal rate and regular rhythm  Heart sounds: Normal heart sounds  Heart sounds not distant  Pulmonary:      Effort: Pulmonary effort is normal  No tachypnea, bradypnea or respiratory distress  Breath sounds: Normal breath sounds  No wheezing, rhonchi or rales  Abdominal:      General: Bowel sounds are normal  There is no distension  Palpations: Abdomen is soft  Tenderness: There is no abdominal tenderness  Skin:     General: Skin is warm and dry  Coloration: Skin is not pale  Findings: No rash  Neurological:      Mental Status: He is alert and oriented to person, place, and time  Mental status is at baseline  Motor: Weakness present     Psychiatric:         Attention and Perception: Attention normal          Mood and Affect: Mood normal  Mood is not anxious  Speech: Speech normal          Behavior: Behavior normal  Behavior is cooperative  Lab Results:   I have personally reviewed pertinent labs  , CBC:   Lab Results   Component Value Date    WBC 13 07 (H) 05/13/2022    HGB 10 5 (L) 05/13/2022    HCT 33 3 (L) 05/13/2022    MCV 95 05/13/2022     05/13/2022    MCH 30 0 05/13/2022    MCHC 31 5 05/13/2022    RDW 12 4 05/13/2022    MPV 9 9 05/13/2022   , CMP:   Lab Results   Component Value Date    SODIUM 143 05/13/2022    K 3 4 (L) 05/13/2022     (H) 05/13/2022    CO2 23 05/13/2022    BUN 8 05/13/2022    CREATININE 0 88 05/13/2022    CALCIUM 7 3 (L) 05/13/2022    EGFR 108 05/13/2022   , BMP:  Lab Results   Component Value Date    SODIUM 143 05/13/2022    K 3 4 (L) 05/13/2022     (H) 05/13/2022    CO2 23 05/13/2022    BUN 8 05/13/2022    CREATININE 0 88 05/13/2022    GLUC 96 05/13/2022    CALCIUM 7 3 (L) 05/13/2022    AGAP 5 05/13/2022    EGFR 108 05/13/2022   , PT/PTT:No results found for: PT, PTT    Imaging Studies: I have personally reviewed pertinent reports  Please note that the APS provides consultative services regarding pain management only  With the exception of ketamine and epidural infusions and except when indicated, final decisions regarding starting or changing doses of analgesic medications are at the discretion of the consulting service  Off hours consultation and/or medication management is generally not available      JAYLYN Martinez  Acute Pain Service

## 2022-05-13 NOTE — CONSULTS
PHYSICAL MEDICINE AND REHABILITATION CONSULT NOTE  Gretchen Rivera 43 y o  male MRN: [id number]  Unit/Bed#: [facility] 623-01 Encounter: [id number]    Requested by (Physician/Service): Estefania Morocho MD  Reason for Consultation:  Assessment of rehabilitation needs    Assessment:  Rehabilitation Diagnosis:    Spinal cord compression with myelopathy s/p revision PCDF, revise/replace right C2 screw with laminar screw, revise left C2 screw, remove bilateral C7 lateral mass screws, place navigated C7, T1 pedicle screws, C6-C7 laminectomy, arthrodesis C2-C3 and C6-T1   History of spinal cord injury s/p PCDF C2-C7 3/7/2021   Impaired mobility and self care    Recommendations:  Rehabilitation Plan:   Continue PT/OT (SLP) while on acute care   Recommend PM&R outpatient follow up for spinal cord injury and spasticity management   Given new extensive spinal cord surgery the patient is a good candidate to return to a dedicated spinal cord injury program with the goal to facilitate discharge to a community setting   Covid-19 Testing: [facility] inpatient rehabilitation units require testing within 48 hours of all potential admissions at this time  *Re-testing is NOT required for patients recovering from COVID-19 infection if isolation has been discontinued per CDC criteria  Medical Co-morbidities Plan:  · Acute post-operative pain   · Acute blood loss anemia   · Hypertension   · Urinary retention with jimenez in place  · Hypokalemia   · Hx of UTI   · Hx of orchitis s/p right orchiectomy   · DVT ppx: heparin SQ and SCD    Thank you for this consultation  Do not hesitate to contact service with further questions  JAYLYN Howard  PM&R    History of Present Illness:  Gretchen Rivera is a 43 y o  male with a PMH of depression, PTSD, hyperlipidemia who presented to the [facility] on 5/11/22 following outpatient neurosurgery follow up and MRI of the cervical spine   He has a history of a fall resulting in a severe spinal cord injury in March of 2021  He underwent posterior cervical decompression fixation and fusion C2-C7 on 3/7/2021  He was discharged to acute rehabilitation at [facility] and from there to [facility]  AND [facility]  He was seen by neurosurgery for his one year follow up for which he had x-rays done that showed widening of C6-C7 disc space and concerns for hardware failure  He also reported increased neck pain with radiation down his arms with certain movements since September 2021  MRI was ordered and showed new C6-C7 barbara cord compression with signal abnormality  He was admitted and on 5/12/22 underwent anterior cervical decompression and fusion C6-C7, revision of posterior cervical compression fusion, replacement of right C2 screw and revise left C2 screw  Bilateral C7 lateral mass screws were removed and navigated C7, T1 pedicle screws were placed  C6-C7 laminectomy, arthrodesis C2-3 and C6-T1  PM&R are consulted for rehabilitation recommendations  The patient was seen face to face in his room  He denies changes in sensation to touch however does have increased pins/needles sensation with light though in his arms  He does have spasticity bilateral arms/hands and has taken Baclofen which he reports was not helpful  He has never tried Botox injections  Review of Systems: 10 point ROS negative except for what is noted in HPI    Function:  Prior level of function and living situation:  Per documentation July of 2021 admission prior to his fall he was independent and working  He lived with a friend in a two story home with 4 [address]  He was discharged in March 2021 to [facility] rehabilitation and then to [facility] rehabilitation after about 4 weeks  Per patient he received minimal therapy there up until about August   Since that time he has not been receiving any therapy    He reports an outpatient  of a program he is working with to secure an apartment and assistance in the community  Current level of function:  Physical Therapy:  Pending   Occupational Therapy:  Pending   Speech Therapy: Pending       Physical Exam:  /82   Pulse 90   Temp 98 8 °F (37 1 °C)   Resp 18   SpO2 96%        Intake/Output Summary (Last 24 hours) at 2022 1014  Last data filed at 2022 0858  Gross per 24 hour   Intake 2790 ml   Output 2000 ml   Net 790 ml       There is no height or weight on file to calculate BMI  Physical Exam   Gen: No acute distress, well-nourished, pleasant, cooperative  HEENT:  Normocephalic/Atraumatic, cervical collar in place, EOMI, moist mucus membranes  Extremities: No edema  Pulmonary: Non-labored breathing  GI: Soft, non-tender, non-distended  MSK: Distal > proximal weakness, spasticity bilateral UE  Integumentary: Skin is warm, dry  No rashes or ulcers  Neuro: AAOx3, speech is fluent  Patient is following commands  Pin and needle sensation bilateral arms/hands  Motor:   RUE: 4/5 proximally, 1/5 distally/hand intrinsics   LUE:  4/5 proximally, 1/5 distally/hand intrinsics   RLE: 3/5 proximally, 1/5 distally   LLE:  3/5 proximally, 1/5 distally  Psych: Normal mood and affect         Social History:    Social History     Socioeconomic History    Marital status: Single     Spouse name: None    Number of children: None    Years of education: 15    Highest education level: None   Occupational History    Occupation:     Tobacco Use    Smoking status: Former Smoker     Packs/day: 0 25     Years: 10 00     Pack years: 2 50     Types: Cigarettes    Smokeless tobacco: Never Used   Vaping Use    Vaping Use: Never used   Substance and Sexual Activity    Alcohol use: Not Currently     Comment: none    Drug use: Never     Comment: none    Sexual activity: None   Other Topics Concern    None   Social History Narrative    ** Merged History Encounter **         Most recent tobacco use screenin2020  Do you currently or have you served in the [employer]: No  Were you activated, into active duty, as a member of the [employer] or as a Reservist: No  Occupation:   Education: 12  Marital stat    us: Single  Exercise level: Occasional  Diet: Regular  General stress level: Medium  Alcohol intake: None  Caffeine intake:  Moderate  Chewing tobacco: none  Illicit drugs: none  Seat belts used routinely: Yes  Sunscreen used routinely: No  Smoke alarm in     home: Yes  Advance directive: No  Salt Intake: minimal     Social Determinants of Health     Financial Resource Strain: Not on file   Food Insecurity: Not on file   Transportation Needs: Not on file   Physical Activity: Not on file   Stress: Not on file   Social Connections: Not on file   Intimate Partner Violence: Not on file   Housing Stability: Not on file        Family History:    Family History   Problem Relation Age of Onset    No Known Problems Mother     No Known Problems Father          Medications:     Current Facility-Administered Medications:     acetaminophen (TYLENOL) tablet 975 mg, 975 mg, Oral, Q8H [facility], Gina Leahy PA-C, 975 mg at 05/13/22 0604    atorvastatin (LIPITOR) tablet 40 mg, 40 mg, Oral, After Dinner, Janice Sincere PA-C, 40 mg at 05/12/22 1706    baclofen tablet 5 mg, 5 mg, Oral, TID, Janiceraman Post PA-C, 5 mg at 05/13/22 8127    bisacodyl (DULCOLAX) rectal suppository 10 mg, 10 mg, Rectal, Daily PRN, MICHAEL Hay-BETH    busPIRone (BUSPAR) tablet 5 mg, 5 mg, Oral, BID, Janice Bibruno PA-C, 5 mg at 05/13/22 0852    ceFAZolin (ANCEF) IVPB (premix in dextrose) 2,000 mg 50 mL, 2,000 mg, Intravenous, Q8H, Gina Leahy PA-C, Last Rate: 100 mL/hr at 05/13/22 0354, 2,000 mg at 05/13/22 0354    docusate sodium (COLACE) capsule 100 mg, 100 mg, Oral, BID, Gina Leahy PA-C    gabapentin (NEURONTIN) capsule 600 mg, 600 mg, Oral, TID, Janice Post PA-C, 600 mg at 05/13/22 0852    heparin (porcine) subcutaneous injection 5,000 Units, 5,000 Units, Subcutaneous, Q8H [address], Gina Waggoner PA-C    HYDROmorphone (DILAUDID) injection 0 5 mg, 0 5 mg, Intravenous, Q3H PRN, Laquita Pérez PA-C, 0 5 mg at 05/13/22 0734    melatonin tablet 3 mg, 3 mg, Oral, HS, MICHAEL Hay-C, 3 mg at 05/12/22 2006    methocarbamol (ROBAXIN) tablet 750 mg, 750 mg, Oral, Q6H [place]IL[place], Janice Post PA-C    naloxone (NARCAN) 0 04 mg/mL syringe 0 04 mg, 0 04 mg, Intravenous, Q1MIN PRN, Laquita Pérez PA-C    nortriptyline (PAMELOR) capsule 10 mg, 10 mg, Oral, HS, Janicebritney Post, PA-C, 10 mg at 05/12/22 2347    ondansetron (ZOFRAN) injection 4 mg, 4 mg, Intravenous, Q4H PRN, Laquita Pérez PA-C, 4 mg at 05/12/22 1433    oxyCODONE (ROXICODONE) immediate release tablet 10 mg, 10 mg, Oral, Q4H PRN, Laquita Pérez PA-C, 10 mg at 05/13/22 0604    [MAR Hold] oxyCODONE (ROXICODONE) IR tablet 5 mg, 5 mg, Oral, Q4H PRN, Porsha Delgadillo PA-C, 5 mg at 05/12/22 7838    senna (SENOKOT) tablet 8 6 mg, 1 tablet, Oral, Daily, Gina Waggoner PA-C    senna-docusate sodium (SENOKOT S) 8 6-50 mg per tablet 1 tablet, 1 tablet, Oral, BID, Janice Post PA-C    sodium chloride 0 9 % infusion, 125 mL/hr, Intravenous, Continuous, Janice Post PA-C, Stopped at 05/12/22 0933    tamsulosin (FLOMAX) capsule 0 4 mg, 0 4 mg, Oral, After Dinner, Zoraida Correia PA-C, 0 4 mg at 05/12/22 1704    Past Medical History:     Past Medical History:   Diagnosis Date    Chronic depression     PTSD (post-traumatic stress disorder)         Past Surgical History:     Past Surgical History:   Procedure Laterality Date    CERVICAL FUSION N/A 3/6/2021    Procedure: POSTERIOR C3-5 laminectomy, C2-7 fixation fusion, possible additional levels;  Surgeon: Emi Leach MD;  [facility];  Service: Neurosurgery    IR PICC PLACEMENT DOUBLE LUMEN  3/8/2021    SCROTAL SURGERY Right 7/20/2021 Procedure: Right scrotal exploration, Irrigation and debridement Right orchiectomy;  Surgeon: Danis Johnson MD;  [facility];  Service: Urology         Allergies:     No Known Allergies        LABORATORY RESULTS:      Lab Results   Component Value Date    HGB 10 5 (L) 05/13/2022    HCT 33 3 (L) 05/13/2022    WBC 13 07 (H) 05/13/2022     Lab Results   Component Value Date    BUN 8 05/13/2022    K 3 4 (L) 05/13/2022     (H) 05/13/2022    GLUCOSE 87 03/06/2021    CREATININE 0 88 05/13/2022     Lab Results   Component Value Date    PROTIME 12 5 05/11/2022    INR 0 97 05/11/2022        DIAGNOSTIC STUDIES: Reviewed  XR spine cervical 2 or 3 vw injury    Result Date: 5/12/2022  Impression: Fluoroscopic guidance provided for intraoperative localization during cervical fusion procedure  Please refer to the separate procedure notes for additional details  Workstation performed: [id number]     CT cervical spine without contrast    Result Date: 5/11/2022  Impression: Loosening of the bilateral C7 screws noted There is a fracture of the right lateral mass C2 screw Findings concur with the preliminary report by the referring clinician already in PACS and/or our electronic record EPIC  Workstation performed: [id number]     MRI cervical spine with and without contrast    Result Date: 5/11/2022  Impression: 1  New focus of cord compression at C6-C7 secondary to a new disc herniation with cord signal abnormality at this level, worrisome for new cord edema, along the caudal margin of the spinal construct  Spine surgical assessment advised  2   Interval development of cystic myelomalacia and cord atrophy at the C4-5 level in the region of prior cord atrophy which has been ameliorated by decompressive laminectomies C3-C5  3   Posterior fusion hardware C2-C7 noted   I personally discussed this study with Michael Beltran on 5/11/2022 at 8:15 AM  Workstation performed: [id number]

## 2022-05-13 NOTE — PHYSICAL THERAPY NOTE
PHYSICAL THERAPY EVALUATION  NAME:  Ronald Romero  DATE: 05/13/22    AGE:   43 y o  Mrn:   [id number]  ADMIT DX:  Spinal cord compression (HCC) [G95 20]  Herniated cervical disc [M50 20]  Benign essential HTN [I10]  Unspecified cord compression [G95 20]  Loosening of hardware in spine ([address] ) [T84 498A]  Hyperlipidemia, unspecified hyperlipidemia type [E78 5]    Past Medical History:   Diagnosis Date    Chronic depression     PTSD (post-traumatic stress disorder)        Past Surgical History:   Procedure Laterality Date    CERVICAL FUSION N/A 3/6/2021    Procedure: POSTERIOR C3-5 laminectomy, C2-7 fixation fusion, possible additional levels;  Surgeon: Natasha Carmichael MD;  [facility];  Service: Neurosurgery    IR PICC PLACEMENT DOUBLE LUMEN  3/8/2021    SCROTAL SURGERY Right 7/20/2021    Procedure: Right scrotal exploration, Irrigation and debridement Right orchiectomy;  Surgeon: Danis Johnson MD;  [facility];  Service: Urology       Length Of Stay: 2    PHYSICAL THERAPY EVALUATION:        05/13/22 1030   Note Type   Note type Evaluation   Pain Assessment   Pain Assessment Tool 0-10   Pain Score 10 - Worst Possible Pain   Pain Location/Orientation Location: Neck   Pain Onset/Description Onset: Ongoing;Frequency: Constant/Continuous; Descriptor: Aching   Effect of Pain on Daily Activities Increased pain with activity   Patient's Stated Pain Goal No pain   Hospital Pain Intervention(s) Ambulation/increased activity;Repositioned   Restrictions/Precautions   Weight Bearing Precautions Per Order No   Braces or Orthoses (S)  C/S Collar   Other Precautions Chair Alarm; Bed Alarm;Multiple lines; Fall Risk;Pain;Spinal precautions  (SARANYA drain)   Home Living   Type of Home SNF  ([facility])   Home Layout One level   [address]\""   Additional Comments Patient reports residing at [facility]   Pt states he was originally receiving PT/OT however was no longer getting therapy at [facility]   Prior Function   Level of [place] Needs assistance with ADLs and functional mobility   Lives With Facility staff   Receives Help From Family;Personal care attendant   ADL Assistance Needs assistance   Falls in the last 6 months 1 to 4  (4 as per pt)   Comments Patient reports the use of a tilt in space wheelchair for mobility prior to admission  Patient states he was able to ambulate short distances without the use of an assistive device  "on good days"  General   Family/Caregiver Present No   Cognition   Overall Cognitive Status WFL   Arousal/Participation Alert   Orientation Level Oriented X4   Memory Within functional limits   Following Commands Follows all commands and directions without difficulty   RUE Assessment   RUE Assessment X   LUE Assessment   LUE Assessment X   RLE Assessment   RLE Assessment WFL   Strength RLE   R Hip Flexion 3+/5   R Knee Extension 3+/5   R Ankle Dorsiflexion 3+/5   R Ankle Plantar Flexion 3+/5   LLE Assessment   LLE Assessment WFL   Strength LLE   LLE Overall Strength 3+/5   L Knee Extension 3+/5   L Ankle Dorsiflexion 3/5   L Ankle Plantar Flexion 3+/5   Bed Mobility   Supine to Sit 3  Moderate assistance   Additional items Assist x 1; Increased time required;Verbal cues   Transfers   Sit to Stand 3  Moderate assistance   Additional items Assist x 2; Increased time required;Verbal cues   Stand to Sit 3  Moderate assistance   Additional items Assist x 2; Increased time required;Verbal cues   Additional Comments b/l HHA utilized for transfers   Ambulation/Elevation   Gait pattern Excessively slow; Short stride; Foward flexed; Inconsistent heidy;R Knee Tl;L Knee Tl   Gait Assistance 3  Moderate assist   Additional items Assist x 2   Assistive Device Other (Comment)  (b/l HHA)   Distance 6ft   Balance   Static Sitting Poor +   Static Standing Poor   Ambulatory Poor -   Endurance Deficit   Endurance Deficit Yes   Endurance Deficit Description fatigue, pain Activity Tolerance   Activity Tolerance Patient limited by fatigue;Patient limited by pain   Medical Staff Made Aware Kalen, OT; Swati, SPT; OT present for co evaluation due to pts current medical presentation and decreased activity tolerance which all impact pts overall physical performance   Nurse Made Aware Patient appropriate to be seen and mobilized per nursing   Assessment   Prognosis Good   Problem List Decreased strength;Decreased range of motion;Decreased endurance; Impaired balance;Decreased mobility;Pain;Orthopedic restrictions   Assessment Pt is 43 y o  male seen for PT evaluation s/p admit to [facility] on 5/11/2022  Two pt identifiers were used to confirm  Pt presented w/ increased neck pain, intermittent spasms and muscle tightness  Outpatient MRI revealed "new C6-7 barbara cord compression with signal abnormality"  Pt had originally sustained a fall down stairs in March of 2021 resulting in severe spinal cord injury  Patient underwent posterior cervical decompression fixation and fusion C2-C7 on 3/7/2022  Patient was ultimately discharged to [facility] rehab from [address] [place] [address] and then transitioned to [facility]  Pt erports he has not received PT/OT since August    Pt was admitted with a primary dx of:  Cord compression myelopathy  Patient underwent ACDF C6-7; Revision PCDF, revise/replace right C2 screw with laminar screw, revise left C2 screw (cap), remove bilateral C7 lateral mass screws, place navigated C7, T1 pedicle screws, C6-7 laminectomy, arthrodesis C2-3 and C6-T1 which was performed on 5/12/2022  PT now consulted for assessment of mobility and d/c needs  Pts current co morbidities affecting treatment include:  Chronic depression, PTSD, history of SCI   Pts current clinical presentation is Unstable/ Unpredictable (high complexity) due to Ongoing medical management for primary dx, Decreased activity tolerance compared to baseline, Fall risk, Increased assistance needed from caregiver at current time, Spinal precautions at current time, Continuous pulse oximetry monitoring , s/p surgical intervention    Upon evaluation, pt currently is requiring Mod Ax1 for bed mobility; Mod Ax2 for transfers and Mod Ax2 for ambulation w/ b/l HHA  Pt presents at PT eval functioning below baseline and currently w/ overall mobility deficits 2* to: BLE weakness, decreased ROM, impaired balance, decreased endurance, gait deviations, pain, decreased activity tolerance compared to baseline, fall risk, spinal precautions  Pt currently at a fall risk 2* to impairments listed above  Based on the aforementioned PT evaluation, pt will continue to benefit from skilled Acute PT interventions to address stated impairments; to maximize functional mobility; for ongoing pt/ family training; and DME needs  At conclusion of PT session pt returned back in chair with phone and call bell within reach  PT is currently recommending Rehab  PT will continue to follow during hospital stay  Goals   Patient Goals " to have less pain"   STG Expiration Date 05/23/22   Short Term Goal #1 In 10 days pt will complete: 1) Bed mobility skills with min Ax1 to increase safety and independence as well as decrease caregiver burden  2) Functional transfers with min Ax1 to promote increased independence, safety, and QOL  3) Ambulate 48' using least restrictive AD with min Ax1 without LOB and stable vitals so that pt can negotiate previous living environment safely and promote independence with functional mobility and return to PLOF  4) Improve balance grades by 1/2 grade to increase safety with all mobility and decrease fall risk  5) Improve BLE strength by 1/2 grade to help increase overall functional mobility and decrease fall risk  Plan   Treatment/Interventions Functional transfer training;LE strengthening/ROM; Therapeutic exercise; Endurance training;Patient/family training;Equipment eval/education; Bed mobility;Gait training;Spoke to nursing;OT PT Frequency Other (Comment)  (3-6x a week)   Recommendation   PT Discharge Recommendation Post acute rehabilitation services   Equipment Recommended Wheelchair   AM-PAC Basic Mobility Inpatient   Turning in Bed Without Bedrails 2   Lying on Back to Sitting on Edge of Flat Bed 2   Moving Bed to Chair 1   Standing Up From Chair 1   Walk in Room 1   Climb 3-5 Stairs 1   Basic Mobility Inpatient Raw Score 8   Turning Head Towards Sound 4   Follow Simple Instructions 4   Low Function Basic Mobility Raw Score 16   Low Function Basic Mobility Standardized Score 25 72   Highest Level Of Mobility   -[facility] Goal 3: Sit at edge of bed   -HL Achieved 5: Stand (1 or more minutes)   Modified Patricia Scale   Modified [place] Scale 4   Barthel Index   Feeding 5   Bathing 0   Grooming Score 0   Dressing Score 0   Bladder Score 10   Bowels Score 10   Toilet Use Score 5   Transfers (Bed/Chair) Score 5   Mobility (Level Surface) Score 0   Stairs Score 0   Barthel Index Score 35   Portions of the documentation may have been created using voice recognition software  Occasional wrong word or sound alike substitutions may have occurred due to the inherent limitations of the voice recognition software  Read the chart carefully and recognize, using context, where substitutions have occurred      Shannon Siegel, PT, DPT

## 2022-05-13 NOTE — PLAN OF CARE
Problem: MOBILITY - ADULT  Goal: Maintain or return to baseline ADL function  Description: INTERVENTIONS:  -  Assess patient's ability to carry out ADLs; assess patient's baseline for ADL function and identify physical deficits which impact ability to perform ADLs (bathing, care of mouth/teeth, toileting, grooming, dressing, etc )  - Assess/evaluate cause of self-care deficits   - Assess range of motion  - Assess patient's mobility; develop plan if impaired  - Assess patient's need for assistive devices and provide as appropriate  - Encourage maximum independence but intervene and supervise when necessary  - Involve family in performance of ADLs  - Assess for home care needs following discharge   - Consider OT consult to assist with ADL evaluation and planning for discharge  - Provide patient education as appropriate  Outcome: Progressing     Problem: PAIN - ADULT  Goal: Verbalizes/displays adequate comfort level or baseline comfort level  Description: Interventions:  - Encourage patient to monitor pain and request assistance  - Assess pain using appropriate pain scale  - Administer analgesics based on type and severity of pain and evaluate response  - Implement non-pharmacological measures as appropriate and evaluate response  - Consider cultural and social influences on pain and pain management  - Notify physician/advanced practitioner if interventions unsuccessful or patient reports new pain  Outcome: Progressing     Problem: Potential for Falls  Goal: Patient will remain free of falls  Description: INTERVENTIONS:  - Educate patient/family on patient safety including physical limitations  - Instruct patient to call for assistance with activity   - Consult OT/PT to assist with strengthening/mobility   - Keep Call bell within reach  - Keep bed low and locked with side rails adjusted as appropriate  - Keep care items and personal belongings within reach  - Initiate and maintain comfort rounds  - Make Fall Risk Sign visible to staff  - Apply yellow socks and bracelet for high fall risk patients  - Consider moving patient to room near nurses station  Outcome: Progressing

## 2022-05-13 NOTE — PROGRESS NOTES
[address] [facility]  Progress Note Alpa Luna 1985, 43 y o  male MRN: [id number]  Unit/Bed#: [facility] 623-01 Encounter: [id number]  Primary Care Provider: Daryl Anderson MD   Date and time admitted to hospital: 5/11/2022  9:16 AM    * Cord compression myelopathy [facility])  Assessment & Plan  POD1 ACDF C6-7; Revision PCDF, revise/replace right C2 screw with laminar screw, revise left C2 screw (cap), remove bilateral C7 lateral mass screws, place navigated C7, T1 pedicle screws, C6-7 laminectomy, arthrodesis C2-3 and C6-T1  · Patient presents as direct admission after outpatient MRI demonstrate cord compression requiring surgical intervention  · s/p severe central spinal cord injury, Jania B spinal cord injury March 2021 after falling down stairs Status post posterior cervical decompression fixation and fusion C2-C7 3/7/2021 Dr Morales Koehler  · Patient complains of decline in function, new urinary difficulties and c/o increased neck pain, with intermittent radicular symptoms and clicking noise with head movement    Imaging:   · XR cervical spine flexion 4/5/22: On personal review there appears to be increased kyphosis at C6-7 with widening of the posterior disc space on flexion   · MRI cervical spine with without 5/11/22:  New focus of cord compression at C6-7 secondary to new disc herniation with cord signal abnormality at this level worrisome for new cord edema along the caudal margin the spinal construct  Interval development of cystic myelomalacia and cord atrophy at the C4-5 level in the region of prior cord atrophy status post decompressive laminectomy C5-7  Posterior fusion hardware C2-C7  · CT cervical spine without 5/11/22: Final report pending  Alignment appears stable  Loosening noted around bilateral C7 screws  Right C2 screw appears fractured      Plan:  · Continue monitor neurological status and monitor for any new or progressive symptoms  · Patient c/o increased paresthesia and weakness - auto MAP >85  Will defer steroids at this time  · SARANYA management - 150ml since surgery  Maintain  · Baseline upright Xrays ordered and pending  · Pain control - APS consulted  Consider PO dilaudid and increase in gabapentin  · Mobilize PT/OT - eval pending  · PM&R consult placed  · DVT PPX: SCDs  HSQ  · Dispo - d/w CM - recommend acute rehab -pending PT/OT eval  Request referral be placed today  Neurosurgery will continue to follow as primary  Call with questions/concerns  Anemia  Assessment & Plan  Multifactorial   Repeat in am    Benign essential HTN  Assessment & Plan  Continue to monitor  Maintaining MAP>85    Hypokalemia  Assessment & Plan  Replete  Labs in am    Urinary retention  Assessment & Plan  Maintain jimenez today - plan voiding trial tomorrow    Subjective/Objective   Chief Complaint: " My pain in bad"    Subjective: Patient c/o increase paresthesia and and weakness in hands and unable to move right toes  Patient admits to prior weakness in hands and right foot but noted increase immediately after surgery  C/o posterior neck pain rated severe with limited improvement with PO Oxycodone  States prior better relief with PO dilaudid 2mg which he was on up until several months  Jimenez remains in pain  Tolerating PO  No CP/SOB/N/V  Limited swallowing complaints this am which improved  Objective: sitting up in bed  NAD  I/O       05/11 0701 05/12 0700 05/12 0701 05/13 0700 05/13 0701  05/14 0700    P  O   240     I V  827 1 2550 1000    Total Intake 827 1 2790 1000    Urine 500 2450     Drains  150     Blood  50     Total Output 500 2650     Net +327 1 +140 +1000                 Invasive Devices  Report    Peripheral Intravenous Line  Duration           Peripheral IV 05/11/22 Left Forearm 2 days    Peripheral IV 05/12/22 Left Hand 1 day    Peripheral IV 05/12/22 Right Hand 1 day          Drain  Duration           Urethral Catheter Latex 16 Fr  1 day Closed/Suction Drain Left;Posterior Neck Bulb 7 Fr  <1 day                Physical Exam:  Vitals: Blood pressure 129/82, pulse 90, temperature 98 8 °F (37 1 °C), resp  rate 18, SpO2 96 %  ,There is no height or weight on file to calculate BMI  General appearance: alert, appears stated age, cooperative and no distress  Head: Normocephalic, without obvious abnormality  Eyes: conjugatet gaze, tracks appropriately in room  Neck: VISTA in place   Anterior and posterior dressing intact dry  Lungs: non labored breathing  Heart: regular heart rate  Neurologic:   Mental status: Alert, oriented, thought content appropriate  Cranial nerves: grossly intact   Sensory: paresthesia to LT BUE L sighlty >R and LLE>RLE but right foot>left foot  Motor: moving all extremities   Tone was increased in hands    Motor Findings  Strength: Right  Strength: Left    Deltoid  4/5  4/5    Biceps  4/5  4/5    Triceps  4/5  4-/5    Wrist Extensors  3/5  2/5    Hand Intrinsics  2/5  2/5    Iliospoas  4+/5  4+/5    Quadriceps  4+/5  4+/5    Tibialis anterior  0/5  4/5    Gastroc soleus  4/5  4/5      Reflexes: b/l carson    Lab Results:  Results from last 7 days   Lab Units 05/13/22  0625 05/11/22  1005   WBC Thousand/uL 13 07* 9 32   HEMOGLOBIN g/dL 10 5* 14 3   HEMATOCRIT % 33 3* 44 2   PLATELETS Thousands/uL 279 356     Results from last 7 days   Lab Units 05/13/22  0625 05/11/22  1005   POTASSIUM mmol/L 3 4* 4 2   CHLORIDE mmol/L 115* 109*   CO2 mmol/L 23 28   BUN mg/dL 8 15   CREATININE mg/dL 0 88 0 97   CALCIUM mg/dL 7 3* 9 4   ALK PHOS U/L  --  79   ALT U/L  --  38   AST U/L  --  23             Results from last 7 days   Lab Units 05/11/22  1005   INR  0 97   PTT seconds 31     No results found for: TROPONINT  ABG:  Lab Results   Component Value Date    PHART 7 441 03/07/2021    ELG0EXP 36 7 03/07/2021    PO2ART 162 7 (H) 03/07/2021    WLC5QXL 24 4 03/07/2021    BEART 0 6 03/07/2021    SOURCE Line, Arterial 03/07/2021       Imaging Studies: I have personally reviewed pertinent reports  and I have personally reviewed pertinent films in PACS    XR spine cervical 2 or 3 vw injury    Result Date: 5/12/2022  Impression: Fluoroscopic guidance provided for intraoperative localization during cervical fusion procedure  Please refer to the separate procedure notes for additional details  Workstation performed: [id number]     CT cervical spine without contrast    Result Date: 5/11/2022  Impression: Loosening of the bilateral C7 screws noted There is a fracture of the right lateral mass C2 screw Findings concur with the preliminary report by the referring clinician already in PACS and/or our electronic record EPIC  Workstation performed: [id number]     MRI cervical spine with and without contrast    Result Date: 5/11/2022  Impression: 1  New focus of cord compression at C6-C7 secondary to a new disc herniation with cord signal abnormality at this level, worrisome for new cord edema, along the caudal margin of the spinal construct  Spine surgical assessment advised  2   Interval development of cystic myelomalacia and cord atrophy at the C4-5 level in the region of prior cord atrophy which has been ameliorated by decompressive laminectomies C3-C5  3   Posterior fusion hardware C2-C7 noted   I personally discussed this study with Edil Hill on 5/11/2022 at 8:15 AM  Workstation performed: [id number]       EKG, Pathology, and Other Studies: none    VTE Pharmacologic Prophylaxis: Heparin    VTE Mechanical Prophylaxis: sequential compression device

## 2022-05-13 NOTE — PLAN OF CARE
Problem: PHYSICAL THERAPY ADULT  Goal: Performs mobility at highest level of function for planned discharge setting  See evaluation for individualized goals  Description: Treatment/Interventions: Functional transfer training, LE strengthening/ROM, Therapeutic exercise, Endurance training, Patient/family training, Equipment eval/education, Bed mobility, Gait training, Spoke to nursing, OT  Equipment Recommended: Wheelchair       See flowsheet documentation for full assessment, interventions and recommendations  Note: Prognosis: Good  Problem List: Decreased strength, Decreased range of motion, Decreased endurance, Impaired balance, Decreased mobility, Pain, Orthopedic restrictions  Assessment: Pt is 43 y o  male seen for PT evaluation s/p admit to [facility] on 5/11/2022  Two pt identifiers were used to confirm  Pt presented w/ increased neck pain, intermittent spasms and muscle tightness  Outpatient MRI revealed "new C6-7 barbara cord compression with signal abnormality"  Pt had originally sustained a fall down stairs in March of 2021 resulting in severe spinal cord injury  Patient underwent posterior cervical decompression fixation and fusion C2-C7 on 3/7/2022  Patient was ultimately discharged to [facility] rehab from [facility] and then transitioned to [facility]  Pt erports he has not received PT/OT since August    Pt was admitted with a primary dx of:  Cord compression myelopathy  Patient underwent ACDF C6-7; Revision PCDF, revise/replace right C2 screw with laminar screw, revise left C2 screw (cap), remove bilateral C7 lateral mass screws, place navigated C7, T1 pedicle screws, C6-7 laminectomy, arthrodesis C2-3 and C6-T1 which was performed on 5/12/2022  PT now consulted for assessment of mobility and d/c needs  Pts current co morbidities affecting treatment include:  Chronic depression, PTSD, history of SCI   Pts current clinical presentation is Unstable/ Unpredictable (high complexity) due to Ongoing medical management for primary dx, Decreased activity tolerance compared to baseline, Fall risk, Increased assistance needed from caregiver at current time, Spinal precautions at current time, Continuous pulse oximetry monitoring , s/p surgical intervention    Upon evaluation, pt currently is requiring Mod Ax1 for bed mobility; Mod Ax2 for transfers and Mod Ax2 for ambulation w/ b/l HHA  Pt presents at PT eval functioning below baseline and currently w/ overall mobility deficits 2* to: BLE weakness, decreased ROM, impaired balance, decreased endurance, gait deviations, pain, decreased activity tolerance compared to baseline, fall risk, spinal precautions  Pt currently at a fall risk 2* to impairments listed above  Based on the aforementioned PT evaluation, pt will continue to benefit from skilled Acute PT interventions to address stated impairments; to maximize functional mobility; for ongoing pt/ family training; and DME needs  At conclusion of PT session pt returned back in chair with phone and call bell within reach  PT is currently recommending Rehab  PT will continue to follow during hospital stay  PT Discharge Recommendation: Post acute rehabilitation services          See flowsheet documentation for full assessment

## 2022-05-13 NOTE — UTILIZATION REVIEW
Initial Clinical Review    Admission: Date/Time/Statement:   Admission Orders (From admission, onward)     Ordered        05/11/22 1000  Inpatient Admission  Once                      Orders Placed This Encounter   Procedures    Inpatient Admission     Standing Status:   Standing     Number of Occurrences:   1     Order Specific Question:   Level of Care     Answer:   Med Surg [16]     Order Specific Question:   Estimated length of stay     Answer:   More than 2 Midnights     Order Specific Question:   Certification     Answer:   I certify that inpatient services are medically necessary for this patient for a duration of greater than two midnights  See H&P and MD Progress Notes for additional information about the patient's course of treatment  ED Arrival Information     Expected   5/11/2022     Arrival   5/11/2022 09:10    Acuity   Emergent            Means of arrival   Walk-In    Escorted by   Unknown    Service   Neurosurgery    Admission type   Emergency            Arrival complaint   Unspecified cord compression [facility])           Chief Complaint   Patient presents with    Evaluation of Abnormal Diagnostic Test     Pt sent from MRI for neurosurg d/t cord compression     Initial Presentation: 43 y o  male to ED presents after outpatient MRI cervical spine  S/p Fall on March 6th, 2021 suffering a severe spinal cord injury, Jania B requiring urgent posterior cervical decompression fixation and fusion C2-C7 on March 7, 2021  Discharge from [address] [place] [address] on March 16th, 2021  After 4 wks, transferred to [place] [address] good improvement  Seen 4th for a1 yr f/u for cervical spine flexion-extension xrays  Noted to have widening of the C6-7 disc space and concerns for hardware failure  Sent for outpatient CT and MRI imaging which were completed today  Imaging demonstrated new C6-7 barbara cord compression with signal abnormality   He continues with c/o  increasing neck pain from the inferior aspect of his incision to the intrascapular region midline and bilateral paraspinal region  Certain head movements results in sharp shooting pain down his arms and into his hands since September  Difficulty with use of arm and ambulation  Also daily uncontrolled leg shaking with numbness and tingling to arms and legs  He has declined since July when he was admitted for right testicular exploration  Reports  voiding spontaneously but continues with interrupted flow and difficulty emptying his bladder  PMH for Chronic depression, PTSD, hyperlipidemia and HTN  Admit Inpatient level of care for Cord compression myelopathy and Urinary retention  Given new cord compression with signal abnormality at C6-7, further surgical intervention is recommended  Tentative plan for surgical intervention tomorrow 5/12  MRI cervical spine with without 5/11/22:  New focus of cord compression at C6-7 secondary to new disc herniation with cord signal abnormality at this level worrisome for new cord edema along the caudal margin the spinal construct  Interval development of cystic myelomalacia and cord atrophy at the C4-5 level in the region of prior cord atrophy status post decompressive laminectomy C5-7  Posterior fusion hardware C2-C7    Date: 5/12   Day 2:   Continues on Iv antibiotics and IVFs    5/12 OR - S/p ACDF C6-7;   Revision PCDF: revise/replace right C2 screw with laminar screw,   revise left C2 screw (cap),   remove bilateral C7 lateral mass screws,   place navigated C7, T1 pedicle screws,   C6-7 laminectomy,   arthrodesis C2-3 and C6-T1    5/13  Progress notes; SARANYA management - 150ml since surgery and maintain  Pt c/o increased paresthesia and weakness - auto MAP >85  Will defer steroids at this time  Baseline upright xrays pending   Pain control         ED Triage Vitals   Temperature Pulse Respirations Blood Pressure SpO2   05/11/22 0922 05/11/22 0922 05/11/22 0922 05/11/22 0922 05/11/22 0922   98 4 °F (36 9 °C) 98 18 136/92 99 % Temp Source Heart Rate Source Patient Position - Orthostatic VS BP Location FiO2 (%)   05/11/22 0922 05/11/22 0922 05/11/22 0922 05/11/22 0922 --   Oral Monitor Lying Right arm       Pain Score       05/11/22 1107       9          Wt Readings from Last 1 Encounters:   07/16/21 75 kg (165 lb 5 5 oz)     Additional Vital Signs:   05/12/22 1507 97 3 °F (36 3 °C) Abnormal  102 18 120/77 88 100 % -- 6 L/min -- Simple mask --   05/12/22 0713 96 °F (35 6 °C) Abnormal  -- -- -- -- -- -- -- -- -- --   05/12/22 0252 -- 74 -- -- -- 96 % -- -- -- -- --   05/11/22 2212 -- 96 -- 121/84 96 98 % -- -- -- -- --   05/11/22 1500 -- -- -- 123/80 94 -- -- -- -- -- --   05/11/22 1400 -- 64 18 123/73 92 99 % -- -- -- -- --   05/11/22 1200 -- 66 18 120/74 93 100 % -- -- -- None (Room air) --   05/11/22 1107 -- 89 16 117/80 96 98 % -- -- -- None (Room air) Lying   05/11/22 0934 -- -- -- -- -- -- -- -- -- None (Room air)      Pertinent Labs/Diagnostic Test Results:   XR spine cervical 2 or 3 vw injury   Final Result by Tiffany Croft MD (05/12 8484)      Fluoroscopic guidance provided for intraoperative localization during cervical fusion procedure  Please refer to the separate procedure notes for additional details  5/11  CT Cervical Spine - Loosening of the bilateral C7 screws noted  There is a fracture of the right lateral mass C2 screw    MRI Cervical Spine - 1  New focus of cord compression at C6-C7 secondary to a new disc herniation with cord signal abnormality at this level, worrisome for new cord edema, along the caudal margin of the spinal construct  Spine surgical assessment advised  2   Interval development of cystic myelomalacia and cord atrophy at the C4-5 level in the region of prior cord atrophy which has been ameliorated by decompressive laminectomies C3-C5  3   Posterior fusion hardware C2-C7 noted        Results from last 7 days   Lab Units 05/13/22  0625 05/11/22  1005   WBC Thousand/uL 13 07* 9 32 HEMOGLOBIN g/dL 10 5* 14 3   HEMATOCRIT % 33 3* 44 2   PLATELETS Thousands/uL 279 356         Results from last 7 days   Lab Units 05/13/22  0625 05/11/22  1005   SODIUM mmol/L 143 140   POTASSIUM mmol/L 3 4* 4 2   CHLORIDE mmol/L 115* 109*   CO2 mmol/L 23 28   ANION GAP mmol/L 5 3*   BUN mg/dL 8 15   CREATININE mg/dL 0 88 0 97   EGFR ml/min/1 73sq m 108 98   CALCIUM mg/dL 7 3* 9 4     Results from last 7 days   Lab Units 05/11/22  1005   AST U/L 23   ALT U/L 38   ALK PHOS U/L 79   TOTAL PROTEIN g/dL 7 7   ALBUMIN g/dL 3 8   TOTAL BILIRUBIN mg/dL 0 37     Results from last 7 days   Lab Units 05/12/22  1510   POC GLUCOSE mg/dl 153*     Results from last 7 days   Lab Units 05/13/22  0625 05/11/22  1005   GLUCOSE RANDOM mg/dL 96 100         Results from last 7 days   Lab Units 05/12/22  0440   HEMOGLOBIN A1C % 5 0   EAG mg/dl 97       Results from last 7 days   Lab Units 05/11/22  1005   PROTIME seconds 12 5   INR  0 97   PTT seconds 31       Results from last 7 days   Lab Units 05/11/22  1817   CLARITY UA  Clear   COLOR UA  Yellow   SPEC GRAV UA  1 029   PH UA  6 5   GLUCOSE UA mg/dl Negative   KETONES UA mg/dl Negative   BLOOD UA  Negative   PROTEIN UA mg/dl Trace*   NITRITE UA  Negative   BILIRUBIN UA  Negative   UROBILINOGEN UA (BE) mg/dl <2 0   LEUKOCYTES UA  Negative   WBC UA /hpf None Seen   RBC UA /hpf 1-2   BACTERIA UA /hpf Occasional   EPITHELIAL CELLS WET PREP /hpf None Seen   MUCUS THREADS  Moderate*     ED Treatment:   Medication Administration from 05/11/2022 0906 to 05/11/2022 1533     None        Past Medical History:   Diagnosis Date    Chronic depression     PTSD (post-traumatic stress disorder)      Present on Admission:   Urinary retention   Benign essential HTN      Admitting Diagnosis: Spinal cord compression (HCC) [G95 20]  Herniated cervical disc [M50 20]  Benign essential HTN [I10]  Unspecified cord compression [G95 20]  Loosening of hardware in spine ([address] ) [T84 498A]  Hyperlipidemia, unspecified hyperlipidemia type [E78 5]  Age/Sex: 43 y o  male     Admission Orders:  Scheduled Medications:  acetaminophen, 975 mg, Oral, Q8H [address]  atorvastatin, 40 mg, Oral, After Dinner  baclofen, 5 mg, Oral, TID  busPIRone, 5 mg, Oral, BID  cefazolin, 2,000 mg, Intravenous, Q8H  docusate sodium, 100 mg, Oral, BID  gabapentin, 600 mg, Oral, TID  heparin (porcine), 5,000 Units, Subcutaneous, Q8H [address]  melatonin, 3 mg, Oral, HS  methocarbamol, 750 mg, Oral, Q6H GILMAR  nortriptyline, 10 mg, Oral, HS  senna, 1 tablet, Oral, Daily  senna-docusate sodium, 1 tablet, Oral, BID  tamsulosin, 0 4 mg, Oral, After Dinner      Continuous IV Infusions:  sodium chloride, 125 mL/hr, Intravenous, Continuous      PRN Meds:  bisacodyl, 10 mg, Rectal, Daily PRN  HYDROmorphone, 0 5 mg, Intravenous, Q3H PRN 5/12 x3  naloxone, 0 04 mg, Intravenous, Q1MIN PRN  ondansetron, 4 mg, Intravenous, Q4H PRN  oxyCODONE, 10 mg, Oral, Q4H PRN 5/12 x1  oxyCODONE, 5 mg, Oral, Q4H PRN 5/11 x2, 5/12 x2      Drain to full suction  Neuro checks q4h  IP CONSULT TO NUTRITION SERVICES  IP CONSULT TO PHYSICAL MEDICINE REHAB    Network Utilization Review Department  ATTENTION: Please call with any questions or concerns to [phone] and carefully listen to the prompts so that you are directed to the right person  All voicemails are confidential   Dionne Cummins all requests for admission clinical reviews, approved or denied determinations and any other requests to dedicated fax number below belonging to the campus where the patient is receiving treatment   List of dedicated fax numbers for the Facilities:  [address] [place] [address] DENIALS (Administrative/Medical Necessity) [phone]   [address] (Maternity/NICU/Pediatrics) [phone] [address] [place] [address] [place] [address] [place] [address] [phone] [facility] [address] [id number] [place] [address] [address] [place] [address] [phone]

## 2022-05-13 NOTE — OCCUPATIONAL THERAPY NOTE
Occupational Therapy Evaluation     Patient Name: Roland Zacarias  [id number] Date: 5/13/2022  Problem List  Principal Problem:    Cord compression myelopathy ([address] )  Active Problems:    Urinary retention    Hypokalemia    Benign essential HTN    Anemia    Past Medical History  Past Medical History:   Diagnosis Date    Chronic depression     PTSD (post-traumatic stress disorder)      Past Surgical History  Past Surgical History:   Procedure Laterality Date    CERVICAL FUSION N/A 3/6/2021    Procedure: POSTERIOR C3-5 laminectomy, C2-7 fixation fusion, possible additional levels;  Surgeon: Brock Daniels MD;  [facility];  Service: Neurosurgery    IR PICC PLACEMENT DOUBLE LUMEN  3/8/2021    AR ARTHRODESIS ANT INTERBODY MIN DISCECTOMY, CERVICAL BELOW C2 N/A 5/12/2022    Procedure: ACDF C6-7; Revision PCDF, revise/replace right C2 screw with laminar screw, revise left C2 screw (cap), remove bilateral C7 lateral mass screws, place navigated C7, T1 pedicle screws, C6-7 laminectomy, arthrodesis C2-3 and C6-T1;  Surgeon: Curry Perez MD;  [facility];  Service: Neurosurgery    SCROTAL SURGERY Right 7/20/2021    Procedure: Right scrotal exploration, Irrigation and debridement Right orchiectomy;  Surgeon: Agnes Simon MD;  [facility];  Service: Urology         05/13/22 1031   OT Last Visit   OT Visit Date 05/13/22   Note Type   Note type Evaluation   Restrictions/Precautions   Weight Bearing Precautions Per Order No   Braces or Orthoses (S)  C/S Collar   Other Precautions Cognitive; Chair Alarm; Bed Alarm;Multiple lines; Fall Risk;Pain  (SARANYA DRAIN)   Pain Assessment   Pain Assessment Tool 0-10   Pain Score 10 - Worst Possible Pain   Pain Location/Orientation Location: Neck   Patient's Stated Pain Goal No pain   Hospital Pain Intervention(s) Repositioned; Ambulation/increased activity; Emotional support   Home Living   Type of Home SNF  [facility])   Home Layout One level   Home Equipment Wheelchair-manual  (TILT IN SPACE W/C)   Additional Comments PT WITH H/O [facility] IN 5/21 WITH D/C TO [facility] THEN D/C TO [facility] LEVEL REHAB  PT REPORTS RECENTLY NO LONGER RECEIVING THERAPY SERVICES  Prior Function   Level of [place] Needs assistance with IADLs; Needs assistance with ADLs and functional mobility   Lives With Facility staff   Receives Help From Personal care attendant   ADL Assistance Needs assistance   IADLs Needs assistance   Falls in the last 6 months (S)  1 to 4  (4)   Lifestyle   Autonomy PT REPORTS HAVING ASSIST WITH MOST ADLS/IADLS  PT ABLE TO COMPLETE FEEDING/GROOMING WITHOUT ASSIST  PT ABLE TO AMBULATE SHORT DISTANCES WITHOUT ASSISTANCES/AD  PT REPORTS HE IS ABLE TO SELF-PROPELL W/C WITH BUE/BLE "ON GOOD DAYS"   Reciprocal Relationships FROM [facility]   Intrinsic Gratification ENJOYS SPENDING TIME WITH HIS FAMILY   Psychosocial   Psychosocial (WDL) WDL   ADL   Eating Assistance 4  Minimal Assistance   Grooming Assistance 4  Minimal Assistance   UB Bathing Assistance 3  Moderate Assistance   LB Bathing Assistance 2  Maximal Assistance   UB Dressing Assistance 2  Maximal Assistance   LB Dressing Assistance 2  Maximal Assistance   Toileting Assistance  2  Maximal Assistance   Functional Assistance 2  Maximal Assistance   Bed Mobility   Supine to Sit 3  Moderate assistance   Additional items Assist x 1; Increased time required;Verbal cues;LE management   Transfers   Sit to Stand 3  Moderate assistance   Additional items Assist x 2; Increased time required;Verbal cues   Stand to Sit 3  Moderate assistance   Additional items Assist x 2; Increased time required;Verbal cues   Functional Mobility   Functional Mobility 3  Moderate assistance   Additional Comments AX2 FOR LIMITED FUNCTIONAL MOBILITY WITH CHAIR FOLLOW FOR SAFETY   Balance   Static Sitting Poor +   Static Standing Poor   Ambulatory Poor -   Activity Tolerance   Activity Tolerance Patient limited by fatigue;Patient limited by pain   Medical Staff Made Aware PT SEEN FOR CO-SESSION WITH SKILLED PHYSICAL THERAPIST 2' CLINICALLY UNSTABLE PRESENTATION, NEW PRECAUTIONS/LIMITATIONS, LIMITED ACTIVITY TOLERANCE AND PRESENT IMPAIRMENTS WHICH ARE A REGRESSION FROM THE PT'S BASELINE AND IMPACTING OVERALL OCCUPATIONAL PERFORMANCE  Nurse Made Aware APPROPRIATE TO SEE   RUE Assessment   RUE Assessment X  (~100-125' SHOULDER ROM)   LUE Assessment   LUE Assessment X  (~90' SHOUDLER ROM)   Hand Function   Gross Motor Coordination Impaired   Fine Motor Coordination Impaired   Sensation   Light Touch Partial deficits in the RLE;Partial deficits in the LLE  (DISTALLY)   Cognition   Overall Cognitive Status WFL   Arousal/Participation Alert; Cooperative   Attention Within functional limits   Orientation Level Oriented X4   Memory Within functional limits   Following Commands Follows all commands and directions without difficulty   Comments PT IS PLEASANT AND COOPERATIVE   Assessment   Limitation Decreased ADL status; Decreased UE ROM; Decreased UE strength;Decreased Safe judgement during ADL;Decreased cognition;Decreased endurance;Decreased sensation;Decreased fine motor control;Decreased self-care trans;Decreased high-level ADLs   Prognosis Good;Fair   Assessment 42 YO Male SEEN FOR INITIAL OCCUPATIONAL THERAPY EVALUATION S/P ACDF C6-7; Revision PCDF, revise/replace right C2 screw with laminar screw, revise left C2 screw (cap), remove bilateral C7 lateral mass screws, place navigated C7, T1 pedicle screws, C6-7 laminectomy, arthrodesis C2-3 and C6-T1 ON 5/12/22 2' NEW C6-7 PRATIK CORD COMPRESSION WITH SIGNAL ABNORMALITY  PT CURRENTLY HAS THE FOLLOWING RESTRICTIONS;SPINAL PRECAUTIONS and C-COLLAR   PT WITH SIGNIFICANT PMH INCLUDING FALL DOWN STEPS IN 5/21RESELUTING IN [facility]QU[facility] AT [facility] D/C TO [facility] FOR ADDITIONAL THERAPY SERVICES   PT REPORTS HE CONTS TO RESIDE AT [facility] HOWEVER IS NOT LONGER RECEIVING THERAPY  PT REPORTS HAVING ASSIST WITH MOST ADLS/IADLS  PT ABLE TO COMPLETE FEEDING/GROOMING WITHOUT ASSIST  PT ABLE TO AMBULATE SHORT DISTANCES WITHOUT ASSISTANCES/AD  PT REPORTS HE IS ABLE TO SELF-PROPELL W/C WITH BUE/BLE "ON GOOD DAYS"  PT CURRENTLY REQUIRES OVERALL MOD A WITH UB ADLS, MAX A WITH LB ADLS, AND MOD A X2 WITH TRANSFERS /LIMITED FUNCTIONAL MOBILITY WITH HHA  PT IS LIMITED 2' PAIN, FATIGUE, IMPAIRED BALANCE, FALL RISK , SPINAL PRECAUTIONS, LIMITED BUE SHOULDER/ELBOW/WRIST ROM, IMPAIRED FMS/GMS, SENSORY DEFICITS, OVERALL WEAKNESS/DECONDITIONING and OVERALL LIMITED ACTIVITY TOLERANCE  PT EDUCATED ON SPINAL PRECAUTIONS, DEEP BREATHING TECHNIQUES T/O ACTIVITY and CONTINUE PARTICIPATION IN SELF-CARE/MOBILITY WITH STAFF WHILE IN THE HOSPITAL   The patient's raw score on the AM-PAC Daily Activity inpatient short form is 14, standardized score is 33 39, less than 39 4  Patients at this level are likely to benefit from discharge to post-acute rehabilitation services  Please refer to the recommendation of the Occupational Therapist for safe discharge planning  FROM AN OCCUPATIONAL THERAPY PERSPECTIVE, PT WOULD BENEFIT FROM ADDITIONAL OT SERVICES IN AN INPT REHAB SETTING UPON D/C  WILL CONT TO FOLLOW TO ADDRESS THE BELOW DESCRIBED GOALS  Goals   Patient Goals TO GET BETTER   LTG Time Frame 10-14   Long Term Goal #1 SEE BELOW   Plan   Treatment Interventions ADL retraining;Functional transfer training;UE strengthening/ROM; Endurance training;Cognitive reorientation;Patient/family training;Equipment evaluation/education; Compensatory technique education; Energy conservation; Activityengagement; Fine motor coordination activities   Goal Expiration Date 05/27/22   OT Frequency 3-5x/wk   Recommendation   OT Discharge Recommendation Post acute rehabilitation services   OT - OK to Discharge Yes   AM-PAC Daily Activity Inpatient   Lower Body Dressing 2   Bathing 2   Toileting 2   Upper Body Dressing 2   Grooming 3   Eating 3   Daily Activity Raw Score 14   Daily Activity Standardized Score (Calc for Raw Score >=11) 33 39   AM-PAC Applied Cognition Inpatient   Following a Speech/Presentation 4   Understanding Ordinary Conversation 4   Taking Medications 4   Remembering Where Things Are Placed or Put Away 4   Remembering List of 4-5 Errands 4   Taking Care of Complicated Tasks 4   Applied Cognition Raw Score 24   Applied Cognition Standardized Score 62 21   Modified [place] Scale   Modified [place] Scale 4       OCCUPATIONAL THERAPY GOALS TO BE MET WITHIN 14 DAYS:    -Pt will increase bed mobility to MIN A to participate in functional activities   -Pt will improve functional mobility and transfers to MIN A on/off all surfaces including toileting   -Pt will participate in lt grooming/feeding task at S LEVEL after set-up sitting EOB for ~20 minutes  -Pt will tolerate sitting EOB for ~20 minutes in order to increase participation in self-care tasks  -Pt will improve activity tolerance to G for 30 min txment sessions w/ G carry over of learned energy conservation techniques   -Pt will demonstrate G carryover of learned spinal precautions, safety techniques and proper body mechanics in functional and leisure activities with use of DME   -Pt will be able to self-propel w/c at S LEVEL for household distances    -Pt will participate in fine motor coordination/strenthening/deterity exercises in order to increase participation in functional activities  -Pt will improve B/L UE ROM to [facility] via AROM/AAROM/PROM in all planes as tolerated in order to participate in functional activities  -Pt will complete additional cognitive assessment with 100% attention to task in order to assist with safe d/c plan       Documentation completed by Shiva Moore, [address] [facility], OTR/L  [facility] Certified ID# [id number]

## 2022-05-14 ENCOUNTER — APPOINTMENT (INPATIENT)
Dept: RADIOLOGY | Facility: HOSPITAL | Age: 37
DRG: 321 | End: 2022-05-14
Payer: COMMERCIAL

## 2022-05-14 LAB
ANION GAP SERPL CALCULATED.3IONS-SCNC: 2 MMOL/L (ref 4–13)
BUN SERPL-MCNC: 11 MG/DL (ref 5–25)
CALCIUM SERPL-MCNC: 8.8 MG/DL (ref 8.3–10.1)
CHLORIDE SERPL-SCNC: 111 MMOL/L (ref 100–108)
CO2 SERPL-SCNC: 27 MMOL/L (ref 21–32)
CREAT SERPL-MCNC: 0.97 MG/DL (ref 0.6–1.3)
ERYTHROCYTE [DISTWIDTH] IN BLOOD BY AUTOMATED COUNT: 12.4 % (ref 11.6–15.1)
GFR SERPL CREATININE-BSD FRML MDRD: 98 ML/MIN/1.73SQ M
GLUCOSE SERPL-MCNC: 104 MG/DL (ref 65–140)
HCT VFR BLD AUTO: 34.7 % (ref 36.5–49.3)
HGB BLD-MCNC: 11 G/DL (ref 12–17)
MCH RBC QN AUTO: 29.8 PG (ref 26.8–34.3)
MCHC RBC AUTO-ENTMCNC: 31.7 G/DL (ref 31.4–37.4)
MCV RBC AUTO: 94 FL (ref 82–98)
PLATELET # BLD AUTO: 286 THOUSANDS/UL (ref 149–390)
PMV BLD AUTO: 9.7 FL (ref 8.9–12.7)
POTASSIUM SERPL-SCNC: 3.5 MMOL/L (ref 3.5–5.3)
RBC # BLD AUTO: 3.69 MILLION/UL (ref 3.88–5.62)
SODIUM SERPL-SCNC: 140 MMOL/L (ref 136–145)
WBC # BLD AUTO: 11.94 THOUSAND/UL (ref 4.31–10.16)

## 2022-05-14 PROCEDURE — 80048 BASIC METABOLIC PNL TOTAL CA: CPT | Performed by: PHYSICIAN ASSISTANT

## 2022-05-14 PROCEDURE — 99024 POSTOP FOLLOW-UP VISIT: CPT | Performed by: PHYSICIAN ASSISTANT

## 2022-05-14 PROCEDURE — 72040 X-RAY EXAM NECK SPINE 2-3 VW: CPT

## 2022-05-14 PROCEDURE — 85027 COMPLETE CBC AUTOMATED: CPT | Performed by: PHYSICIAN ASSISTANT

## 2022-05-14 RX ADMIN — GABAPENTIN 800 MG: 400 CAPSULE ORAL at 21:07

## 2022-05-14 RX ADMIN — Medication 0.2 MG/KG/HR: at 20:50

## 2022-05-14 RX ADMIN — ACETAMINOPHEN 975 MG: 325 TABLET, FILM COATED ORAL at 13:15

## 2022-05-14 RX ADMIN — OXYCODONE HYDROCHLORIDE 15 MG: 10 TABLET ORAL at 02:04

## 2022-05-14 RX ADMIN — ATORVASTATIN CALCIUM 40 MG: 40 TABLET, FILM COATED ORAL at 16:54

## 2022-05-14 RX ADMIN — GABAPENTIN 800 MG: 400 CAPSULE ORAL at 16:54

## 2022-05-14 RX ADMIN — OXYCODONE HYDROCHLORIDE 15 MG: 10 TABLET ORAL at 05:37

## 2022-05-14 RX ADMIN — BACLOFEN 5 MG: 10 TABLET ORAL at 16:54

## 2022-05-14 RX ADMIN — HEPARIN SODIUM 5000 UNITS: 5000 INJECTION INTRAVENOUS; SUBCUTANEOUS at 21:08

## 2022-05-14 RX ADMIN — HEPARIN SODIUM 5000 UNITS: 5000 INJECTION INTRAVENOUS; SUBCUTANEOUS at 13:28

## 2022-05-14 RX ADMIN — TAMSULOSIN HYDROCHLORIDE 0.4 MG: 0.4 CAPSULE ORAL at 16:54

## 2022-05-14 RX ADMIN — OXYCODONE HYDROCHLORIDE 15 MG: 10 TABLET ORAL at 09:02

## 2022-05-14 RX ADMIN — ACETAMINOPHEN 975 MG: 325 TABLET, FILM COATED ORAL at 21:06

## 2022-05-14 RX ADMIN — Medication 3 MG: at 21:10

## 2022-05-14 RX ADMIN — NORTRIPTYLINE HYDROCHLORIDE 10 MG: 10 CAPSULE ORAL at 21:09

## 2022-05-14 RX ADMIN — OXYCODONE HYDROCHLORIDE 15 MG: 10 TABLET ORAL at 13:15

## 2022-05-14 RX ADMIN — GABAPENTIN 800 MG: 400 CAPSULE ORAL at 08:59

## 2022-05-14 RX ADMIN — OXYCODONE HYDROCHLORIDE 15 MG: 10 TABLET ORAL at 21:06

## 2022-05-14 RX ADMIN — BACLOFEN 5 MG: 10 TABLET ORAL at 09:00

## 2022-05-14 RX ADMIN — HEPARIN SODIUM 5000 UNITS: 5000 INJECTION INTRAVENOUS; SUBCUTANEOUS at 05:37

## 2022-05-14 RX ADMIN — BUSPIRONE HYDROCHLORIDE 5 MG: 5 TABLET ORAL at 09:00

## 2022-05-14 RX ADMIN — OXYCODONE HYDROCHLORIDE 15 MG: 10 TABLET ORAL at 16:54

## 2022-05-14 RX ADMIN — BUSPIRONE HYDROCHLORIDE 5 MG: 5 TABLET ORAL at 21:06

## 2022-05-14 RX ADMIN — ACETAMINOPHEN 975 MG: 325 TABLET, FILM COATED ORAL at 05:37

## 2022-05-14 RX ADMIN — BACLOFEN 5 MG: 10 TABLET ORAL at 21:07

## 2022-05-14 NOTE — ASSESSMENT & PLAN NOTE
POD2 ACDF C6-7; Revision PCDF, revise/replace right C2 screw with laminar screw, revise left C2 screw (cap), remove bilateral C7 lateral mass screws, place navigated C7, T1 pedicle screws, C6-7 laminectomy, arthrodesis C2-3 and C6-T1  · Patient presents as direct admission after outpatient MRI demonstrate cord compression requiring surgical intervention  · s/p severe central spinal cord injury, Jania B spinal cord injury March 2021 after falling down stairs Status post posterior cervical decompression fixation and fusion C2-C7 3/7/2021 Dr Bernard Cao  · Patient complains of decline in function, new urinary difficulties and c/o increased neck pain, with intermittent radicular symptoms and clicking noise with head movement    Imaging:   · XR cervical spine flexion 4/5/22: On personal review there appears to be increased kyphosis at C6-7 with widening of the posterior disc space on flexion   · MRI cervical spine with without 5/11/22:  New focus of cord compression at C6-7 secondary to new disc herniation with cord signal abnormality at this level worrisome for new cord edema along the caudal margin the spinal construct  Interval development of cystic myelomalacia and cord atrophy at the C4-5 level in the region of prior cord atrophy status post decompressive laminectomy C5-7  Posterior fusion hardware C2-C7  · CT cervical spine without 5/11/22: Final report pending  Alignment appears stable  Loosening noted around bilateral C7 screws  Right C2 screw appears fractured  Plan:  · Continue monitor neurological status and monitor for any new or progressive symptoms  · Patient c/o increased paresthesia and weakness - auto MAP >85  Will defer steroids at this time  · SARANYA management - 120ml sq24h  Maintain today and consider removal tomorrow  · Baseline upright Xrays ordered and pending  · Pain control - APS consulted  Consider PO dilaudid if pain not improved today  Ketamine gtt started   Medications adjusted per their recommendations  · Mobilize PT/OT   · PM&R consult placed, recommend SCI rehab  · DVT PPX: SCDs  HSQ  · Dispo - d/w CM - recommend acute SCI rehab  Referral to [facility] placed  Neurosurgery will continue to follow as primary  Call with questions/concerns

## 2022-05-14 NOTE — PLAN OF CARE
Problem: MOBILITY - ADULT  Goal: Maintain or return to baseline ADL function  Description: INTERVENTIONS:  -  Assess patient's ability to carry out ADLs; assess patient's baseline for ADL function and identify physical deficits which impact ability to perform ADLs (bathing, care of mouth/teeth, toileting, grooming, dressing, etc )  - Assess/evaluate cause of self-care deficits   - Assess range of motion  - Assess patient's mobility; develop plan if impaired  - Assess patient's need for assistive devices and provide as appropriate  - Encourage maximum independence but intervene and supervise when necessary  - Involve family in performance of ADLs  - Assess for home care needs following discharge   - Consider OT consult to assist with ADL evaluation and planning for discharge  - Provide patient education as appropriate  Outcome: Progressing  Goal: Maintains/Returns to pre admission functional level  Description: INTERVENTIONS:  - Perform BMAT or MOVE assessment daily    - Set and communicate daily mobility goal to care team and patient/family/caregiver  - Collaborate with rehabilitation services on mobility goals if consulted  - Perform Range of Motion 3 times a day  - Reposition patient every 2 hours    - Dangle patient 3 times a day  - Stand patient 3 times a day  - Ambulate patient 3 times a day  - Out of bed to chair 3 times a day   - Out of bed for meals 3 times a day  - Out of bed for toileting  - Record patient progress and toleration of activity level   Outcome: Progressing     Problem: PAIN - ADULT  Goal: Verbalizes/displays adequate comfort level or baseline comfort level  Description: Interventions:  - Encourage patient to monitor pain and request assistance  - Assess pain using appropriate pain scale  - Administer analgesics based on type and severity of pain and evaluate response  - Implement non-pharmacological measures as appropriate and evaluate response  - Consider cultural and social influences on pain and pain management  - Notify physician/advanced practitioner if interventions unsuccessful or patient reports new pain  Outcome: Progressing     Problem: SAFETY ADULT  Goal: Maintain or return to baseline ADL function  Description: INTERVENTIONS:  -  Assess patient's ability to carry out ADLs; assess patient's baseline for ADL function and identify physical deficits which impact ability to perform ADLs (bathing, care of mouth/teeth, toileting, grooming, dressing, etc )  - Assess/evaluate cause of self-care deficits   - Assess range of motion  - Assess patient's mobility; develop plan if impaired  - Assess patient's need for assistive devices and provide as appropriate  - Encourage maximum independence but intervene and supervise when necessary  - Involve family in performance of ADLs  - Assess for home care needs following discharge   - Consider OT consult to assist with ADL evaluation and planning for discharge  - Provide patient education as appropriate  Outcome: Progressing  Goal: Maintains/Returns to pre admission functional level  Description: INTERVENTIONS:  - Perform BMAT or MOVE assessment daily    - Set and communicate daily mobility goal to care team and patient/family/caregiver  - Collaborate with rehabilitation services on mobility goals if consulted  - Perform Range of Motion 3 times a day  - Reposition patient every 2 hours    - Dangle patient 3 times a day  - Stand patient 3 times a day  - Ambulate patient 3 times a day  - Out of bed to chair 3 times a day   - Out of bed for meals 3 times a day  - Out of bed for toileting  - Record patient progress and toleration of activity level   Outcome: Progressing  Goal: Patient will remain free of falls  Description: INTERVENTIONS:  - Educate patient/family on patient safety including physical limitations  - Instruct patient to call for assistance with activity   - Consult OT/PT to assist with strengthening/mobility   - Keep Call bell within reach  - Keep bed low and locked with side rails adjusted as appropriate  - Keep care items and personal belongings within reach  - Initiate and maintain comfort rounds  - Make Fall Risk Sign visible to staff  - Offer Toileting every 2 Hours, in advance of need  - Initiate/Maintain alarm  - Obtain necessary fall risk management equipment:   - Apply yellow socks and bracelet for high fall risk patients  - Consider moving patient to room near nurses station  Outcome: Progressing     Problem: DISCHARGE PLANNING  Goal: Discharge to home or other facility with appropriate resources  Description: INTERVENTIONS:  - Identify barriers to discharge w/patient and caregiver  - Arrange for needed discharge resources and transportation as appropriate  - Identify discharge learning needs (meds, wound care, etc )  - Arrange for interpretive services to assist at discharge as needed  - Refer to Case Management Department for coordinating discharge planning if the patient needs post-hospital services based on physician/advanced practitioner order or complex needs related to functional status, cognitive ability, or social support system  Outcome: Progressing     Problem: Knowledge Deficit  Goal: Patient/family/caregiver demonstrates understanding of disease process, treatment plan, medications, and discharge instructions  Description: Complete learning assessment and assess knowledge base    Interventions:  - Provide teaching at level of understanding  - Provide teaching via preferred learning methods  Outcome: Progressing     Problem: Potential for Falls  Goal: Patient will remain free of falls  Description: INTERVENTIONS:  - Educate patient/family on patient safety including physical limitations  - Instruct patient to call for assistance with activity   - Consult OT/PT to assist with strengthening/mobility   - Keep Call bell within reach  - Keep bed low and locked with side rails adjusted as appropriate  - Keep care items and personal belongings within reach  - Initiate and maintain comfort rounds  - Make Fall Risk Sign visible to staff  - Offer Toileting every  Hours, in advance of need  - Initiate/Maintain alarm  - Obtain necessary fall risk management equipment:   - Apply yellow socks and bracelet for high fall risk patients  - Consider moving patient to room near nurses station  Outcome: Progressing     Problem: Nutrition/Hydration-ADULT  Goal: Nutrient/Hydration intake appropriate for improving, restoring or maintaining nutritional needs  Description: Monitor and assess patient's nutrition/hydration status for malnutrition  Collaborate with interdisciplinary team and initiate plan and interventions as ordered  Monitor patient's weight and dietary intake as ordered or per policy  Utilize nutrition screening tool and intervene as necessary  Determine patient's food preferences and provide high-protein, high-caloric foods as appropriate       INTERVENTIONS:  - Monitor oral intake, urinary output, labs, and treatment plans  - Assess nutrition and hydration status and recommend course of action  - Evaluate amount of meals eaten  - Assist patient with eating if necessary   - Allow adequate time for meals  - Recommend/ encourage appropriate diets, oral nutritional supplements, and vitamin/mineral supplements  - Order, calculate, and assess calorie counts as needed  - Recommend, monitor, and adjust tube feedings and TPN/PPN based on assessed needs  - Assess need for intravenous fluids  - Provide specific nutrition/hydration education as appropriate  - Include patient/family/caregiver in decisions related to nutrition  Outcome: Progressing     Problem: Prexisting or High Potential for Compromised Skin Integrity  Goal: Skin integrity is maintained or improved  Description: INTERVENTIONS:  - Identify patients at risk for skin breakdown  - Assess and monitor skin integrity  - Assess and monitor nutrition and hydration status  - Monitor labs   - Assess for incontinence   - Turn and reposition patient  - Assist with mobility/ambulation  - Relieve pressure over bony prominences  - Avoid friction and shearing  - Provide appropriate hygiene as needed including keeping skin clean and dry  - Evaluate need for skin moisturizer/barrier cream  - Collaborate with interdisciplinary team   - Patient/family teaching  - Consider wound care consult   Outcome: Progressing

## 2022-05-14 NOTE — PROGRESS NOTES
[address] [facility]  Progress Note Candida Look 1985, 43 y o  male MRN: [id number]  Unit/Bed#: [facility] 623-01 Encounter: [id number]  Primary Care Provider: Kimberli Ortiz MD   Date and time admitted to hospital: 5/11/2022  9:16 AM    * Cord compression myelopathy [facility])  Assessment & Plan  POD2 ACDF C6-7; Revision PCDF, revise/replace right C2 screw with laminar screw, revise left C2 screw (cap), remove bilateral C7 lateral mass screws, place navigated C7, T1 pedicle screws, C6-7 laminectomy, arthrodesis C2-3 and C6-T1  · Patient presents as direct admission after outpatient MRI demonstrate cord compression requiring surgical intervention  · s/p severe central spinal cord injury, Jania B spinal cord injury March 2021 after falling down stairs Status post posterior cervical decompression fixation and fusion C2-C7 3/7/2021 Dr Toshia Kan  · Patient complains of decline in function, new urinary difficulties and c/o increased neck pain, with intermittent radicular symptoms and clicking noise with head movement    Imaging:   · XR cervical spine flexion 4/5/22: On personal review there appears to be increased kyphosis at C6-7 with widening of the posterior disc space on flexion   · MRI cervical spine with without 5/11/22:  New focus of cord compression at C6-7 secondary to new disc herniation with cord signal abnormality at this level worrisome for new cord edema along the caudal margin the spinal construct  Interval development of cystic myelomalacia and cord atrophy at the C4-5 level in the region of prior cord atrophy status post decompressive laminectomy C5-7  Posterior fusion hardware C2-C7  · CT cervical spine without 5/11/22: Final report pending  Alignment appears stable  Loosening noted around bilateral C7 screws  Right C2 screw appears fractured      Plan:  · Continue monitor neurological status and monitor for any new or progressive symptoms  · Patient c/o increased paresthesia and weakness - auto MAP >85  Will defer steroids at this time  · SARANYA management - 120ml sq24h  Maintain today and consider removal tomorrow  · Baseline upright Xrays ordered and pending  · Pain control - APS consulted  Consider PO dilaudid if pain not improved today  Ketamine gtt started  Medications adjusted per their recommendations  · Mobilize PT/OT   · PM&R consult placed, recommend SCI rehab  · DVT PPX: SCDs  HSQ  · Dispo - d/w CM - recommend acute SCI rehab  Referral to [facility] placed  Neurosurgery will continue to follow as primary  Call with questions/concerns  Anemia  Assessment & Plan  Multifactorial   Improved today (Hgb 11 0 <- 10 5)    Benign essential HTN  Assessment & Plan  Continue to monitor  Maintaining MAP>85    Hypokalemia  Assessment & Plan  Improved today after repletion 5/13      Urinary retention  Assessment & Plan  Plan voiding trial today              Subjective/Objective     Subjective: Patient with NAEO  His pain is controlled on new regimen but still remains 9-10/10 routinely throughout the day/night  Ketamine started which helps  He would like to hold off on switching to oral dilaudid for now  He is frustrated that his motor function is worse post op  [place] remains in place at this time  Otherwise, no new complaints today  Objective: Patient laying in bed in NAD  VISTA collar  SARANYA drain to FS with SS drainage  Invasive Devices  Report    Peripheral Intravenous Line  Duration           Peripheral IV 05/11/22 Left Forearm 2 days    Peripheral IV 05/12/22 Left Hand 2 days          Drain  Duration           Urethral Catheter Latex 16 Fr  2 days    Closed/Suction Drain Left;Posterior Neck Bulb 7 Fr  1 day                Vitals: Blood pressure 120/81, pulse (!) 106, temperature 98 8 °F (37 1 °C), resp  rate 17, height 5' 7" (1 702 m), weight 76 2 kg (167 lb 15 9 oz), SpO2 94 %  ,Body mass index is 26 31 kg/m²      General appearance: alert, appears stated age, cooperative and no distress  Head: Normocephalic, without obvious abnormality, atraumatic  Eyes: EOMI, PERRL, conjugate gaze  Neck: VISTA collar, SARANYA drain to FS  Dressings CDI  Lungs: non labored breathing  Heart: regular heart rate  Neurologic:   Mental status: Alert, oriented x4, thought content appropriate, speech clear and fluent  Cranial nerves: grossly intact (Cranial nerves II-XII)  Sensory: paresthesias BUE, bilateral feet  Motor: moving all extremities, 4/5 throughout except 2/5 bilateral hands and DF/PF/EHL    Lab Results: I have personally reviewed pertinent results  Results from last 7 days   Lab Units 05/14/22  0507 05/13/22  0625 05/11/22  1005   WBC Thousand/uL 11 94* 13 07* 9 32   HEMOGLOBIN g/dL 11 0* 10 5* 14 3   HEMATOCRIT % 34 7* 33 3* 44 2   PLATELETS Thousands/uL 286 279 356     Results from last 7 days   Lab Units 05/14/22  0507 05/13/22  0625 05/11/22  1005   POTASSIUM mmol/L 3 5 3 4* 4 2   CHLORIDE mmol/L 111* 115* 109*   CO2 mmol/L 27 23 28   BUN mg/dL 11 8 15   CREATININE mg/dL 0 97 0 88 0 97   CALCIUM mg/dL 8 8 7 3* 9 4   ALK PHOS U/L  --   --  79   ALT U/L  --   --  38   AST U/L  --   --  23             Results from last 7 days   Lab Units 05/11/22  1005   INR  0 97   PTT seconds 31     No results found for: TROPONINT  ABG:  Lab Results   Component Value Date    PHART 7 441 03/07/2021    JGD7NZJ 36 7 03/07/2021    PO2ART 162 7 (H) 03/07/2021    YTB4TXS 24 4 03/07/2021    BEART 0 6 03/07/2021    SOURCE Line, Arterial 03/07/2021       Imaging Studies: I have personally reviewed pertinent reports  and I have personally reviewed pertinent films in PACS     XR spine cervical 2 or 3 vw injury    Result Date: 5/12/2022  Narrative: O-ARM - cervical spine INDICATION: Spinal cord compression   Procedure guidance  COMPARISON:  April 4, 2022  TECHNIQUE: FLUOROSCOPY TIME:   1 MINUTE 50 7 SECONDS 3-D DOSE: Radiation dose length product (DLP) for this visit:  85 07 mGy     TOTAL IMAGES: FINDINGS: Fluoroscopic guidance provided for intraoperative localization during cervical fusion procedure  Osseous and soft tissue detail limited by technique  Impression: Fluoroscopic guidance provided for intraoperative localization during cervical fusion procedure  Please refer to the separate procedure notes for additional details  Workstation performed: [id number]     CT cervical spine without contrast    Result Date: 5/11/2022  Narrative: CT CERVICAL SPINE - WITHOUT CONTRAST INDICATION:   S14 109D: Unspecified injury at unspecified level of cervical spinal cord, subsequent encounter  COMPARISON:  None  TECHNIQUE:  CT examination of the cervical spine was performed without intravenous contrast   Contiguous axial images were obtained  Sagittal and coronal reconstructions were performed  Radiation dose length product (DLP) for this visit:  551 28 mGy-cm   This examination, like all CT scans performed in the [facility], was performed utilizing techniques to minimize radiation dose exposure, including the use of iterative  reconstruction and automated exposure control  IMAGE QUALITY:  Diagnostic  FINDINGS: ALIGNMENT:  Normal alignment of the cervical spine  No subluxation  VERTEBRAL BODIES:  No acute compression collapse of the vertebra seen Posterior cervical fusion seen extending from C2 through C7 vertebra Lateral masses screw are noted Lucency seen along the screw bone interface layering about 2 mm compatible with loosening Fracture of the right C2 screw seen DEGENERATIVE CHANGES: C2-3 level appear unremarkable C3-4 demonstrates degenerative disc disease with the mild central canal narrowing   C4-5 versus a ridging with no significant central canal narrowing C5-6 appear unremarkable C6-7 demonstrates a central protrusion with indentation thecal sac and indentation of the cord as seen on the previous recent MRI C7-T1 level appear unremarkable  PREVERTEBRAL AND PARASPINAL SOFT TISSUES:  Unremarkable  THORACIC INLET:  Normal      Impression: Loosening of the bilateral C7 screws noted There is a fracture of the right lateral mass C2 screw Findings concur with the preliminary report by the referring clinician already in PACS and/or our electronic record EPIC  Workstation performed: [id number]     MRI cervical spine with and without contrast    Result Date: 5/11/2022  Narrative: MRI CERVICAL SPINE WITH AND WITHOUT CONTRAST INDICATION: S14 109D: Unspecified injury at unspecified level of cervical spinal cord, subsequent encounter  History of spinal cord injury COMPARISON:  3/6/2021; 4/4/2022 TECHNIQUE:  Sagittal T1, sagittal T2, sagittal inversion recovery, axial 2D merge and axial T2  Sagittal T1 and axial T1 postcontrast   IV Contrast: IMAGE QUALITY:  Diagnostic  FINDINGS: ALIGNMENT:  There is nonspecific straightening of the cervical lordosis without subluxation  MARROW SIGNAL:  Posterior fusion hardware C2-C7 again demonstrated  Decompressive laminectomies C3-C5 CERVICAL AND VISUALIZED UPPER THORACIC CORD:  Cystic myelomalacia and mild cord atrophy at the C4-5 level in the region of previous cord compression has developed  There is new cord compression at the C6-C7 level secondary to a new disc herniation  There is mild high signal in the cord at this level worrisome for new cord edema  PREVERTEBRAL AND PARASPINAL SOFT TISSUES:  Normal  VISUALIZED POSTERIOR FOSSA:  The visualized posterior fossa demonstrates no abnormal signal  CERVICAL DISC SPACES: C2-C3:  There is a diffuse disk bulge  No significant central canal or neural foraminal narrowing  C3-C4:  Surgically capacious C4-C5:  Surgically capacious C5-C6:  Central canal surgically capacious  C6-C7:  There is a new large central/left paracentral disc herniation, protrusion type  The spinal cord is slightly displaced posteriorly and to the right by the left-sided disc herniation    There is significant cord compression with cord edema  Severe  central canal narrowing exemplified on image 8, series 3 correlating finding on image 29, series 6  Severe left neural foraminal narrowing  Mild right neural foraminal narrowing  C7-T1:  Normal  UPPER THORACIC DISC SPACES:  Normal  POSTCONTRAST IMAGING:  There is curvilinear peripheral enhancement around the new disc herniation at the C6-C7 level image 8, series 9 likely reactive  Impression: 1  New focus of cord compression at C6-C7 secondary to a new disc herniation with cord signal abnormality at this level, worrisome for new cord edema, along the caudal margin of the spinal construct  Spine surgical assessment advised  2   Interval development of cystic myelomalacia and cord atrophy at the C4-5 level in the region of prior cord atrophy which has been ameliorated by decompressive laminectomies C3-C5  3   Posterior fusion hardware C2-C7 noted  I personally discussed this study with Farheen Kumar on 5/11/2022 at 8:15 AM  Workstation performed: [id number]     EKG, Pathology, and Other Studies: I have personally reviewed pertinent reports        VTE Pharmacologic Prophylaxis: Heparin    VTE Mechanical Prophylaxis: sequential compression device

## 2022-05-14 NOTE — PROGRESS NOTES
Progress Note - Acute Pain Service    Tolu Hall 43 y o  male MRN: [id number]  Unit/Bed#: [facility] 623-01 Encounter: [id number]      Assessment:   Principal Problem:    Cord compression myelopathy ([address] )  Active Problems:    Urinary retention    Hypokalemia    Benign essential HTN    Anemia    Tolu Hall is a 43 y o  male  who is POD2 s/p ACDF C6-7, revision PCDF, revised/replaced right C2 screw with laminal screw, revised left C2 screw, removal bilateral C7 lateral mass screws, place navigated C7, T1 pedicle screws, C6-7 laminectomy, arthrodesis C2-C3 and C6-T1  APS consulted for post-operative pain management  Since POD0, a ketamine infusion was started on 5/13 for post-operative analgesia  Of note, patient has history of chronic pain secondary to previous injury and surgery and took PO dilaudid products with good relief in the past but was only on PO gabapentin 600mg TID prior to admission for about a year but with limited analgesic benefit  Upon bedside evaluation today, Kris was resting in bed watching TV  He noted persistent neck pain but does get some relief with PO oxycodone and especially after initiation of ketamine infusion yesterday  Denies psychomimetic issues with ketamine  Denies opioid-induced side effects including nausea/vomiting/itching/constipation  Plan:   - Increase Ketamine infusion to 0 2mg/kg/hr (5/13-) today  - Continue other MMA: PO tylenol 975mg q8 + PO oxycodone 10/15mg q3hr PRN for moderate-severe pain + IV dilaudid 0 5mg q3hr PRN for breakthrough for post-surgical pain  - Consider opioid rotation to PO dilaudid 2/4mg q4hr PRN if oxycodone is not effective tomorrow (patient amenable to do this)  - Continue PO baclofen 5mg TID for paraspinal muscle spasms  - Continue PO gabapentin 800mg TID and nortriptyline 10mg qpm to tackle neuropathic pain component  - Continue bowel regimen with colace and senna as prescribed    APS will continue to follow   Please contact Acute Pain Service - [facility] via [facility] from [phone] with additional questions or concerns  See [employer] or Rafael for additional contacts and after hours information  Pain History  Current pain location(s): Posterior neck into shoulders  Pain Scale:   9-10/10  Quality: Throbbing, spasms  24 hour history: See above    Opioid requirement previous 24 hours: IV dilaudid 1mg, PO oxycodone 85mg    Meds/Allergies   all current active meds have been reviewed    No Known Allergies    Objective     Temp:  [98 7 °F (37 1 °C)-99 4 °F (37 4 °C)] 98 8 °F (37 1 °C)  HR:  [] 117  Resp:  [16-19] 17  BP: (116-132)/(76-89) 116/79    Physical Exam  Vitals reviewed  HENT:      Head: Normocephalic  Mouth/Throat:      Mouth: Mucous membranes are dry  Eyes:      Extraocular Movements: Extraocular movements intact  Neck:      Comments: In C-collar  Cardiovascular:      Rate and Rhythm: Normal rate  Pulses: Normal pulses  Pulmonary:      Effort: Pulmonary effort is normal    Abdominal:      General: Abdomen is flat  Musculoskeletal:         General: Normal range of motion  Cervical back: Normal range of motion  Skin:     General: Skin is dry  Neurological:      General: No focal deficit present  Mental Status: He is alert  Psychiatric:         Mood and Affect: Mood normal          Lab Results:   Results from last 7 days   Lab Units 05/14/22  0507   WBC Thousand/uL 11 94*   HEMOGLOBIN g/dL 11 0*   HEMATOCRIT % 34 7*   PLATELETS Thousands/uL 286      Results from last 7 days   Lab Units 05/14/22  0507 05/13/22  0625 05/11/22  1005   POTASSIUM mmol/L 3 5   < > 4 2   CHLORIDE mmol/L 111*   < > 109*   CO2 mmol/L 27   < > 28   BUN mg/dL 11   < > 15   CREATININE mg/dL 0 97   < > 0 97   CALCIUM mg/dL 8 8   < > 9 4   ALK PHOS U/L  --   --  79   ALT U/L  --   --  38   AST U/L  --   --  23    < > = values in this interval not displayed  Imaging Studies: I have personally reviewed pertinent reports      EKG, Pathology, and Other Studies: I have personally reviewed pertinent reports  Counseling / Coordination of Care  Total floor / unit time spent today 20 minutes  Greater than 50% of total time was spent with the patient and / or family counseling and / or coordination of care  Please note that the APS provides consultative services regarding pain management only  With the exception of ketamine and epidural infusions and except when indicated, final decisions regarding starting or changing doses of analgesic medications are at the discretion of the consulting service  Off hours consultation and/or medication management is generally not available      Bharat Franks MD  Acute Pain Service

## 2022-05-15 LAB
ANION GAP SERPL CALCULATED.3IONS-SCNC: 7 MMOL/L (ref 4–13)
BUN SERPL-MCNC: 9 MG/DL (ref 5–25)
CALCIUM SERPL-MCNC: 9.1 MG/DL (ref 8.3–10.1)
CHLORIDE SERPL-SCNC: 105 MMOL/L (ref 100–108)
CO2 SERPL-SCNC: 27 MMOL/L (ref 21–32)
CREAT SERPL-MCNC: 0.91 MG/DL (ref 0.6–1.3)
ERYTHROCYTE [DISTWIDTH] IN BLOOD BY AUTOMATED COUNT: 12.5 % (ref 11.6–15.1)
GFR SERPL CREATININE-BSD FRML MDRD: 106 ML/MIN/1.73SQ M
GLUCOSE SERPL-MCNC: 91 MG/DL (ref 65–140)
HCT VFR BLD AUTO: 36.5 % (ref 36.5–49.3)
HGB BLD-MCNC: 11.6 G/DL (ref 12–17)
MCH RBC QN AUTO: 29.7 PG (ref 26.8–34.3)
MCHC RBC AUTO-ENTMCNC: 31.8 G/DL (ref 31.4–37.4)
MCV RBC AUTO: 94 FL (ref 82–98)
PLATELET # BLD AUTO: 293 THOUSANDS/UL (ref 149–390)
PMV BLD AUTO: 10 FL (ref 8.9–12.7)
POTASSIUM SERPL-SCNC: 3.5 MMOL/L (ref 3.5–5.3)
RBC # BLD AUTO: 3.9 MILLION/UL (ref 3.88–5.62)
SODIUM SERPL-SCNC: 139 MMOL/L (ref 136–145)
WBC # BLD AUTO: 10.17 THOUSAND/UL (ref 4.31–10.16)

## 2022-05-15 PROCEDURE — 85027 COMPLETE CBC AUTOMATED: CPT | Performed by: PHYSICIAN ASSISTANT

## 2022-05-15 PROCEDURE — 99024 POSTOP FOLLOW-UP VISIT: CPT | Performed by: PHYSICIAN ASSISTANT

## 2022-05-15 PROCEDURE — 80048 BASIC METABOLIC PNL TOTAL CA: CPT | Performed by: PHYSICIAN ASSISTANT

## 2022-05-15 RX ADMIN — HEPARIN SODIUM 5000 UNITS: 5000 INJECTION INTRAVENOUS; SUBCUTANEOUS at 21:40

## 2022-05-15 RX ADMIN — ACETAMINOPHEN 975 MG: 325 TABLET, FILM COATED ORAL at 05:33

## 2022-05-15 RX ADMIN — OXYCODONE HYDROCHLORIDE 15 MG: 10 TABLET ORAL at 16:03

## 2022-05-15 RX ADMIN — OXYCODONE HYDROCHLORIDE 15 MG: 10 TABLET ORAL at 08:57

## 2022-05-15 RX ADMIN — ATORVASTATIN CALCIUM 40 MG: 40 TABLET, FILM COATED ORAL at 17:05

## 2022-05-15 RX ADMIN — BACLOFEN 5 MG: 10 TABLET ORAL at 17:05

## 2022-05-15 RX ADMIN — HEPARIN SODIUM 5000 UNITS: 5000 INJECTION INTRAVENOUS; SUBCUTANEOUS at 12:55

## 2022-05-15 RX ADMIN — Medication 0.2 MG/KG/HR: at 14:11

## 2022-05-15 RX ADMIN — NORTRIPTYLINE HYDROCHLORIDE 10 MG: 10 CAPSULE ORAL at 21:43

## 2022-05-15 RX ADMIN — BACLOFEN 5 MG: 10 TABLET ORAL at 08:58

## 2022-05-15 RX ADMIN — BUSPIRONE HYDROCHLORIDE 5 MG: 5 TABLET ORAL at 21:40

## 2022-05-15 RX ADMIN — GABAPENTIN 800 MG: 400 CAPSULE ORAL at 21:39

## 2022-05-15 RX ADMIN — OXYCODONE HYDROCHLORIDE 15 MG: 10 TABLET ORAL at 05:33

## 2022-05-15 RX ADMIN — HEPARIN SODIUM 5000 UNITS: 5000 INJECTION INTRAVENOUS; SUBCUTANEOUS at 05:33

## 2022-05-15 RX ADMIN — TAMSULOSIN HYDROCHLORIDE 0.4 MG: 0.4 CAPSULE ORAL at 17:05

## 2022-05-15 RX ADMIN — HYDROMORPHONE HYDROCHLORIDE 0.5 MG: 1 INJECTION, SOLUTION INTRAMUSCULAR; INTRAVENOUS; SUBCUTANEOUS at 17:08

## 2022-05-15 RX ADMIN — ACETAMINOPHEN 975 MG: 325 TABLET, FILM COATED ORAL at 21:39

## 2022-05-15 RX ADMIN — ACETAMINOPHEN 975 MG: 325 TABLET, FILM COATED ORAL at 12:55

## 2022-05-15 RX ADMIN — OXYCODONE HYDROCHLORIDE 15 MG: 10 TABLET ORAL at 00:49

## 2022-05-15 RX ADMIN — HYDROMORPHONE HYDROCHLORIDE 0.5 MG: 1 INJECTION, SOLUTION INTRAMUSCULAR; INTRAVENOUS; SUBCUTANEOUS at 21:52

## 2022-05-15 RX ADMIN — GABAPENTIN 800 MG: 400 CAPSULE ORAL at 08:58

## 2022-05-15 RX ADMIN — GABAPENTIN 800 MG: 400 CAPSULE ORAL at 17:05

## 2022-05-15 RX ADMIN — BACLOFEN 5 MG: 10 TABLET ORAL at 21:40

## 2022-05-15 RX ADMIN — OXYCODONE HYDROCHLORIDE 15 MG: 10 TABLET ORAL at 12:08

## 2022-05-15 RX ADMIN — OXYCODONE HYDROCHLORIDE 15 MG: 10 TABLET ORAL at 20:39

## 2022-05-15 RX ADMIN — Medication 3 MG: at 21:40

## 2022-05-15 RX ADMIN — BUSPIRONE HYDROCHLORIDE 5 MG: 5 TABLET ORAL at 08:57

## 2022-05-15 NOTE — PROGRESS NOTES
Progress Note - Acute Pain Service    Jw Evangelista 43 y o  male MRN: [id number]  Unit/Bed#: [facility] 623-01 Encounter: [id number]      Assessment:   Principal Problem:    Cord compression myelopathy ([address] )  Active Problems:    Urinary retention    Hypokalemia    Benign essential HTN    Anemia    Jw Evangelista is a 43 y o  male  who is POD3 s/p ACDF C6-7, revision PCDF, revised/replaced right C2 screw with laminal screw, revised left C2 screw, removal bilateral C7 lateral mass screws, place navigated C7, T1 pedicle screws, C6-7 laminectomy, arthrodesis C2-C3 and C6-T1  APS consulted for post-operative pain management  Since POD0, a ketamine infusion was started on 5/13 for post-operative analgesia  Of note, patient has history of chronic pain secondary to previous injury and surgery and took PO dilaudid products with good relief in the past but was only on PO gabapentin 600mg TID prior to admission for about a year but with limited analgesic benefit  Patient resting comfortably in bed this AM  States he is doing pretty well today  He stoically reports a 7/10 pain level, which is tolerable for him  Has been eating/drinking OK  Denies psychomimetic s/e of ketamine  We discussed opioid rotating to PO dilaudid, but he states the ketamine and oxycodone combination is working well for his pain and would not like to change at this time  Plan:     - Cont ketamine infusion at 0 2mg/kg/hr (started 5/13, increased to 0 2 on 5/14)  Wean off this week  - Continue other MMA: PO tylenol 975mg q8 + PO oxycodone 10/15mg q3hr PRN for moderate-severe pain + IV dilaudid 0 5mg q3hr PRN for breakthrough for post-surgical pain  - Continue PO baclofen 5mg TID for paraspinal muscle spasms  - Continue PO gabapentin 800mg TID and nortriptyline 10mg qpm to tackle neuropathic pain component  - Continue bowel regimen with colace and senna as prescribed    APS will continue to follow   Please contact Acute Pain Service - [facility] via [employer] from [phone] with additional questions or concerns  See [employer] or Rafael for additional contacts and after hours information  Pain History  Current pain location(s): neck, shoulders  Pain Scale:   7/10  Quality: throb, spaasm  24 hour history: ketamine increased 5/14    Opioid requirement previous 24 hours: 15mg oxycodone x4 doses    Meds/Allergies   all current active meds have been reviewed    No Known Allergies    Objective     Temp:  [99 5 °F (37 5 °C)-100 °F (37 8 °C)] 99 5 °F (37 5 °C)  HR:  [111-119] 119  Resp:  [16-17] 16  BP: (116-127)/(79-85) 126/85    Physical Exam  Constitutional:       General: He is not in acute distress  Appearance: Normal appearance  Eyes:      Extraocular Movements: Extraocular movements intact  Conjunctiva/sclera: Conjunctivae normal    Neck:      Comments: Neck brace in place  Cardiovascular:      Rate and Rhythm: Normal rate and regular rhythm  Pulmonary:      Effort: Pulmonary effort is normal  No respiratory distress  Abdominal:      General: There is no distension  Palpations: Abdomen is soft  Musculoskeletal:         General: Normal range of motion  Comments: Neck brace in place   Skin:     General: Skin is warm and dry  Neurological:      General: No focal deficit present  Mental Status: He is oriented to person, place, and time     Psychiatric:         Mood and Affect: Mood normal          Behavior: Behavior normal          Lab Results:   Results from last 7 days   Lab Units 05/15/22  0532   WBC Thousand/uL 10 17*   HEMOGLOBIN g/dL 11 6*   HEMATOCRIT % 36 5   PLATELETS Thousands/uL 293      Results from last 7 days   Lab Units 05/15/22  0532 05/13/22  0625 05/11/22  1005   POTASSIUM mmol/L 3 5   < > 4 2   CHLORIDE mmol/L 105   < > 109*   CO2 mmol/L 27   < > 28   BUN mg/dL 9   < > 15   CREATININE mg/dL 0 91   < > 0 97   CALCIUM mg/dL 9 1   < > 9 4   ALK PHOS U/L  --   --  79   ALT U/L  --   --  38   AST U/L  --   --  23    < > = values in this interval not displayed  Please note that the APS provides consultative services regarding pain management only  With the exception of ketamine and epidural infusions and except when indicated, final decisions regarding starting or changing doses of analgesic medications are at the discretion of the consulting service  Off hours consultation and/or medication management is generally not available      Luzmaria Galeana MD  Acute Pain Service

## 2022-05-15 NOTE — PROGRESS NOTES
[address] [facility]  Progress Note Lalo Bourne 1985, 43 y o  male MRN: [id number]  Unit/Bed#: [facility] 623-01 Encounter: [id number]  Primary Care Provider: Leah Lancaster MD   Date and time admitted to hospital: 5/11/2022  9:16 AM    * Cord compression myelopathy [facility])  Assessment & Plan  POD3 ACDF C6-7; Revision PCDF, revise/replace right C2 screw with laminar screw, revise left C2 screw (cap), remove bilateral C7 lateral mass screws, place navigated C7, T1 pedicle screws, C6-7 laminectomy, arthrodesis C2-3 and C6-T1  · Patient presents as direct admission after outpatient MRI demonstrate cord compression requiring surgical intervention  · s/p severe central spinal cord injury, Jania B spinal cord injury March 2021 after falling down stairs Status post posterior cervical decompression fixation and fusion C2-C7 3/7/2021 Dr Doug Ballesteros  · Patient complains of decline in function, new urinary difficulties and c/o increased neck pain, with intermittent radicular symptoms and clicking noise with head movement    Imaging:   · Xray cervical spine, 5/14/22: Postsurgical changes of anterior cervical discectomy and fusion at C6-C7 and revision posterior fusion spanning C2-T1  Plan:  · Continue monitor neurological status and monitor for any new or progressive symptoms  · Patient c/o increased paresthesia and weakness - auto MAP >85  Will defer steroids at this time  · SARANYA management - 70ml sq24h  Maintain today and consider removal tomorrow  · Baseline upright Xrays reviewed; hardware in good position  · Pain control - APS consulted  Ketamine gtt  Medications adjusted per their recommendations  · Mobilize PT/OT   · PM&R consult placed, recommend SCI rehab  · DVT PPX: SCDs  HSQ  · Dispo - d/w CM - recommend acute SCI rehab  Referral to [facility] placed  Neurosurgery will continue to follow as primary  Call with questions/concerns       Anemia  Assessment & Plan  Multifactorial Improved today (Hgb 11 6)    Benign essential HTN  Assessment & Plan  Continue to monitor  Maintaining MAP>85    Hypokalemia  Assessment & Plan  Improved after repletion 5/13  Potassium 3 5 today      Urinary retention  Assessment & Plan  Billingsley removed yesterday  Intermittent straight cath              Subjective/Objective   Chief Complaint: "My tongue is numb"    Subjective:  Patient with no acute events overnight  He is having a hard time telling if his strength is improved postop or not  He does seem slightly stronger in his hands today, particularly his left hand  His right side remains weaker than the left side  He states his current pain regimen is working with oxycodone and ketamine  He has not been out of bed recently  He is complaining of a numb tip of his tongue since sometime after surgery as well as some throat swelling  However, he is not having difficulty swallowing food, pills, or liquid  Objective:  Patient is sitting in bed no acute distress  Vista collar in place  Vital signs stable  Invasive Devices  Report    Peripheral Intravenous Line  Duration           Peripheral IV 05/11/22 Left Forearm 3 days    Peripheral IV 05/12/22 Left Hand 3 days          Drain  Duration           Closed/Suction Drain Left;Posterior Neck Bulb 7 Fr  2 days                Vitals: Blood pressure 122/82, pulse (!) 115, temperature 99 2 °F (37 3 °C), resp  rate 19, height 5' 7" (1 702 m), weight 76 2 kg (167 lb 15 9 oz), SpO2 92 %  ,Body mass index is 26 31 kg/m²  General appearance: alert, appears stated age, cooperative and no distress  Head: Normocephalic, without obvious abnormality, atraumatic  Eyes: EOMI, PERRL  Neck: VISTA collar  SARANYA drain to FS with SS drainage   Ant/post incisions CDI, covered with gauze  Lungs: non labored breathing  Heart: regular heart rate  Neurologic:   Mental status: Alert, oriented x3, thought content appropriate  Cranial nerves: grossly intact (Cranial nerves II-XII)  Sensory: Paresthesias BUE and bilateral feet  Motor: 4/5 throughout, L>R  2/5 right hand and foot, 3/5 left hand and foot  Lab Results: I have personally reviewed pertinent results  Results from last 7 days   Lab Units 05/15/22  0532 05/14/22  0507 05/13/22  0625   WBC Thousand/uL 10 17* 11 94* 13 07*   HEMOGLOBIN g/dL 11 6* 11 0* 10 5*   HEMATOCRIT % 36 5 34 7* 33 3*   PLATELETS Thousands/uL 293 286 279     Results from last 7 days   Lab Units 05/15/22  0532 05/14/22  0507 05/13/22  0625 05/11/22  1005   POTASSIUM mmol/L 3 5 3 5 3 4* 4 2   CHLORIDE mmol/L 105 111* 115* 109*   CO2 mmol/L 27 27 23 28   BUN mg/dL 9 11 8 15   CREATININE mg/dL 0 91 0 97 0 88 0 97   CALCIUM mg/dL 9 1 8 8 7 3* 9 4   ALK PHOS U/L  --   --   --  79   ALT U/L  --   --   --  38   AST U/L  --   --   --  23             Results from last 7 days   Lab Units 05/11/22  1005   INR  0 97   PTT seconds 31     No results found for: TROPONINT  ABG:  Lab Results   Component Value Date    PHART 7 441 03/07/2021    UFP7TSS 36 7 03/07/2021    PO2ART 162 7 (H) 03/07/2021    KHV5KDE 24 4 03/07/2021    BEART 0 6 03/07/2021    SOURCE Line, Arterial 03/07/2021       Imaging Studies: I have personally reviewed pertinent reports  and I have personally reviewed pertinent films in PACS     XR cervical spine 2 or 3 views    Result Date: 5/15/2022  Narrative: CERVICAL SPINE INDICATION:   post surgery  COMPARISON:  CT cervical spine 5/11/2022 VIEWS:  XR SPINE CERVICAL 2 OR 3 VW INJURY FINDINGS: Interval anterior cervical discectomy and fusion at C6-C7 and placement of a right laminar screw at C2 and bilateral pedicle screws at C7 and T1  Redemonstrated posterior decompression and fusion with bilateral rods, lateral mass screws, and pedicle screws spanning C2-T1  Fractured C2 right abdominal mass screw again noted  Surgical drain and skin staples in the posterior soft tissues  Unchanged in alignment   Intervertebral disc heights are otherwise preserved  Clear lung apices  Impression: Postsurgical changes of anterior cervical discectomy and fusion at C6-C7 and revision posterior fusion spanning C2-T1  Workstation performed: [id number]     XR spine cervical 2 or 3 vw injury    Result Date: 5/12/2022  Narrative: O-ARM - cervical spine INDICATION: Spinal cord compression   Procedure guidance  COMPARISON:  April 4, 2022  TECHNIQUE: FLUOROSCOPY TIME:   1 MINUTE 50 7 SECONDS 3-D DOSE: Radiation dose length product (DLP) for this visit:  85 07 mGy   TOTAL IMAGES: FINDINGS: Fluoroscopic guidance provided for intraoperative localization during cervical fusion procedure  Osseous and soft tissue detail limited by technique  Impression: Fluoroscopic guidance provided for intraoperative localization during cervical fusion procedure  Please refer to the separate procedure notes for additional details  Workstation performed: [id number]     CT cervical spine without contrast    Result Date: 5/11/2022  Narrative: CT CERVICAL SPINE - WITHOUT CONTRAST INDICATION:   S14 109D: Unspecified injury at unspecified level of cervical spinal cord, subsequent encounter  COMPARISON:  None  TECHNIQUE:  CT examination of the cervical spine was performed without intravenous contrast   Contiguous axial images were obtained  Sagittal and coronal reconstructions were performed  Radiation dose length product (DLP) for this visit:  551 28 mGy-cm   This examination, like all CT scans performed in the [facility], was performed utilizing techniques to minimize radiation dose exposure, including the use of iterative  reconstruction and automated exposure control  IMAGE QUALITY:  Diagnostic  FINDINGS: ALIGNMENT:  Normal alignment of the cervical spine  No subluxation   VERTEBRAL BODIES:  No acute compression collapse of the vertebra seen Posterior cervical fusion seen extending from C2 through C7 vertebra Lateral masses screw are noted Lucency seen along the screw bone interface layering about 2 mm compatible with loosening Fracture of the right C2 screw seen DEGENERATIVE CHANGES: C2-3 level appear unremarkable C3-4 demonstrates degenerative disc disease with the mild central canal narrowing  C4-5 versus a ridging with no significant central canal narrowing C5-6 appear unremarkable C6-7 demonstrates a central protrusion with indentation thecal sac and indentation of the cord as seen on the previous recent MRI C7-T1 level appear unremarkable  PREVERTEBRAL AND PARASPINAL SOFT TISSUES:  Unremarkable  THORACIC INLET:  Normal      Impression: Loosening of the bilateral C7 screws noted There is a fracture of the right lateral mass C2 screw Findings concur with the preliminary report by the referring clinician already in PACS and/or our electronic record EPIC  Workstation performed: [id number]     MRI cervical spine with and without contrast    Result Date: 5/11/2022  Narrative: MRI CERVICAL SPINE WITH AND WITHOUT CONTRAST INDICATION: S14 109D: Unspecified injury at unspecified level of cervical spinal cord, subsequent encounter  History of spinal cord injury COMPARISON:  3/6/2021; 4/4/2022 TECHNIQUE:  Sagittal T1, sagittal T2, sagittal inversion recovery, axial 2D merge and axial T2  Sagittal T1 and axial T1 postcontrast   IV Contrast: IMAGE QUALITY:  Diagnostic  FINDINGS: ALIGNMENT:  There is nonspecific straightening of the cervical lordosis without subluxation  MARROW SIGNAL:  Posterior fusion hardware C2-C7 again demonstrated  Decompressive laminectomies C3-C5 CERVICAL AND VISUALIZED UPPER THORACIC CORD:  Cystic myelomalacia and mild cord atrophy at the C4-5 level in the region of previous cord compression has developed  There is new cord compression at the C6-C7 level secondary to a new disc herniation  There is mild high signal in the cord at this level worrisome for new cord edema   PREVERTEBRAL AND PARASPINAL SOFT TISSUES:  Normal  VISUALIZED POSTERIOR FOSSA:  The visualized posterior fossa demonstrates no abnormal signal  CERVICAL DISC SPACES: C2-C3:  There is a diffuse disk bulge  No significant central canal or neural foraminal narrowing  C3-C4:  Surgically capacious C4-C5:  Surgically capacious C5-C6:  Central canal surgically capacious  C6-C7:  There is a new large central/left paracentral disc herniation, protrusion type  The spinal cord is slightly displaced posteriorly and to the right by the left-sided disc herniation  There is significant cord compression with cord edema  Severe  central canal narrowing exemplified on image 8, series 3 correlating finding on image 29, series 6  Severe left neural foraminal narrowing  Mild right neural foraminal narrowing  C7-T1:  Normal  UPPER THORACIC DISC SPACES:  Normal  POSTCONTRAST IMAGING:  There is curvilinear peripheral enhancement around the new disc herniation at the C6-C7 level image 8, series 9 likely reactive  Impression: 1  New focus of cord compression at C6-C7 secondary to a new disc herniation with cord signal abnormality at this level, worrisome for new cord edema, along the caudal margin of the spinal construct  Spine surgical assessment advised  2   Interval development of cystic myelomalacia and cord atrophy at the C4-5 level in the region of prior cord atrophy which has been ameliorated by decompressive laminectomies C3-C5  3   Posterior fusion hardware C2-C7 noted  I personally discussed this study with Patricia Abebe on 5/11/2022 at 8:15 AM  Workstation performed: [id number]     EKG, Pathology, and Other Studies: I have personally reviewed pertinent reports        VTE Pharmacologic Prophylaxis: Heparin    VTE Mechanical Prophylaxis: sequential compression device

## 2022-05-15 NOTE — PLAN OF CARE
Problem: MOBILITY - ADULT  Goal: Maintain or return to baseline ADL function  Description: INTERVENTIONS:  -  Assess patient's ability to carry out ADLs; assess patient's baseline for ADL function and identify physical deficits which impact ability to perform ADLs (bathing, care of mouth/teeth, toileting, grooming, dressing, etc )  - Assess/evaluate cause of self-care deficits   - Assess range of motion  - Assess patient's mobility; develop plan if impaired  - Assess patient's need for assistive devices and provide as appropriate  - Encourage maximum independence but intervene and supervise when necessary  - Involve family in performance of ADLs  - Assess for home care needs following discharge   - Consider OT consult to assist with ADL evaluation and planning for discharge  - Provide patient education as appropriate  Outcome: Progressing  Goal: Maintains/Returns to pre admission functional level  Description: INTERVENTIONS:  - Perform BMAT or MOVE assessment daily    - Set and communicate daily mobility goal to care team and patient/family/caregiver  - Collaborate with rehabilitation services on mobility goals if consulted  - Perform Range of Motion  times a day  - Reposition patient every  hours    - Dangle patient  times a day  - Stand patient  times a day  - Ambulate patient  times a day  - Out of bed to chair  times a day   - Out of bed for meals  times a day  - Out of bed for toileting  - Record patient progress and toleration of activity level   Outcome: Progressing     Problem: PAIN - ADULT  Goal: Verbalizes/displays adequate comfort level or baseline comfort level  Description: Interventions:  - Encourage patient to monitor pain and request assistance  - Assess pain using appropriate pain scale  - Administer analgesics based on type and severity of pain and evaluate response  - Implement non-pharmacological measures as appropriate and evaluate response  - Consider cultural and social influences on pain and pain management  - Notify physician/advanced practitioner if interventions unsuccessful or patient reports new pain  Outcome: Progressing     Problem: SAFETY ADULT  Goal: Maintain or return to baseline ADL function  Description: INTERVENTIONS:  -  Assess patient's ability to carry out ADLs; assess patient's baseline for ADL function and identify physical deficits which impact ability to perform ADLs (bathing, care of mouth/teeth, toileting, grooming, dressing, etc )  - Assess/evaluate cause of self-care deficits   - Assess range of motion  - Assess patient's mobility; develop plan if impaired  - Assess patient's need for assistive devices and provide as appropriate  - Encourage maximum independence but intervene and supervise when necessary  - Involve family in performance of ADLs  - Assess for home care needs following discharge   - Consider OT consult to assist with ADL evaluation and planning for discharge  - Provide patient education as appropriate  Outcome: Progressing  Goal: Maintains/Returns to pre admission functional level  Description: INTERVENTIONS:  - Perform BMAT or MOVE assessment daily    - Set and communicate daily mobility goal to care team and patient/family/caregiver  - Collaborate with rehabilitation services on mobility goals if consulted  - Perform Range of Motion  times a day  - Reposition patient every  hours    - Dangle patient  times a day  - Stand patient  times a day  - Ambulate patient  times a day  - Out of bed to chair  times a day   - Out of bed for meals  times a day  - Out of bed for toileting  - Record patient progress and toleration of activity level   Outcome: Progressing  Goal: Patient will remain free of falls  Description: INTERVENTIONS:  - Educate patient/family on patient safety including physical limitations  - Instruct patient to call for assistance with activity   - Consult OT/PT to assist with strengthening/mobility   - Keep Call bell within reach  - Keep bed low and locked with side rails adjusted as appropriate  - Keep care items and personal belongings within reach  - Initiate and maintain comfort rounds  - Make Fall Risk Sign visible to staff  - Offer Toileting every  Hours, in advance of need  - Initiate/Maintain alarm  - Obtain necessary fall risk management equipment:   - Apply yellow socks and bracelet for high fall risk patients  - Consider moving patient to room near nurses station  Outcome: Progressing     Problem: DISCHARGE PLANNING  Goal: Discharge to home or other facility with appropriate resources  Description: INTERVENTIONS:  - Identify barriers to discharge w/patient and caregiver  - Arrange for needed discharge resources and transportation as appropriate  - Identify discharge learning needs (meds, wound care, etc )  - Arrange for interpretive services to assist at discharge as needed  - Refer to Case Management Department for coordinating discharge planning if the patient needs post-hospital services based on physician/advanced practitioner order or complex needs related to functional status, cognitive ability, or social support system  Outcome: Progressing     Problem: Knowledge Deficit  Goal: Patient/family/caregiver demonstrates understanding of disease process, treatment plan, medications, and discharge instructions  Description: Complete learning assessment and assess knowledge base    Interventions:  - Provide teaching at level of understanding  - Provide teaching via preferred learning methods  Outcome: Progressing     Problem: Potential for Falls  Goal: Patient will remain free of falls  Description: INTERVENTIONS:  - Educate patient/family on patient safety including physical limitations  - Instruct patient to call for assistance with activity   - Consult OT/PT to assist with strengthening/mobility   - Keep Call bell within reach  - Keep bed low and locked with side rails adjusted as appropriate  - Keep care items and personal belongings within reach  - Initiate and maintain comfort rounds  - Make Fall Risk Sign visible to staff  - Offer Toileting every  Hours, in advance of need  - Initiate/Maintain alarm  - Obtain necessary fall risk management equipment:   - Apply yellow socks and bracelet for high fall risk patients  - Consider moving patient to room near nurses station  Outcome: Progressing     Problem: Nutrition/Hydration-ADULT  Goal: Nutrient/Hydration intake appropriate for improving, restoring or maintaining nutritional needs  Description: Monitor and assess patient's nutrition/hydration status for malnutrition  Collaborate with interdisciplinary team and initiate plan and interventions as ordered  Monitor patient's weight and dietary intake as ordered or per policy  Utilize nutrition screening tool and intervene as necessary  Determine patient's food preferences and provide high-protein, high-caloric foods as appropriate       INTERVENTIONS:  - Monitor oral intake, urinary output, labs, and treatment plans  - Assess nutrition and hydration status and recommend course of action  - Evaluate amount of meals eaten  - Assist patient with eating if necessary   - Allow adequate time for meals  - Recommend/ encourage appropriate diets, oral nutritional supplements, and vitamin/mineral supplements  - Order, calculate, and assess calorie counts as needed  - Recommend, monitor, and adjust tube feedings and TPN/PPN based on assessed needs  - Assess need for intravenous fluids  - Provide specific nutrition/hydration education as appropriate  - Include patient/family/caregiver in decisions related to nutrition  Outcome: Progressing     Problem: Prexisting or High Potential for Compromised Skin Integrity  Goal: Skin integrity is maintained or improved  Description: INTERVENTIONS:  - Identify patients at risk for skin breakdown  - Assess and monitor skin integrity  - Assess and monitor nutrition and hydration status  - Monitor labs   - Assess for incontinence - Turn and reposition patient  - Assist with mobility/ambulation  - Relieve pressure over bony prominences  - Avoid friction and shearing  - Provide appropriate hygiene as needed including keeping skin clean and dry  - Evaluate need for skin moisturizer/barrier cream  - Collaborate with interdisciplinary team   - Patient/family teaching  - Consider wound care consult   Outcome: Progressing

## 2022-05-15 NOTE — ASSESSMENT & PLAN NOTE
POD3 ACDF C6-7; Revision PCDF, revise/replace right C2 screw with laminar screw, revise left C2 screw (cap), remove bilateral C7 lateral mass screws, place navigated C7, T1 pedicle screws, C6-7 laminectomy, arthrodesis C2-3 and C6-T1  · Patient presents as direct admission after outpatient MRI demonstrate cord compression requiring surgical intervention  · s/p severe central spinal cord injury, Jania B spinal cord injury March 2021 after falling down stairs Status post posterior cervical decompression fixation and fusion C2-C7 3/7/2021 Dr Omega Spatz  · Patient complains of decline in function, new urinary difficulties and c/o increased neck pain, with intermittent radicular symptoms and clicking noise with head movement    Imaging:   · Xray cervical spine, 5/14/22: Postsurgical changes of anterior cervical discectomy and fusion at C6-C7 and revision posterior fusion spanning C2-T1  Plan:  · Continue monitor neurological status and monitor for any new or progressive symptoms  · Patient c/o increased paresthesia and weakness - auto MAP >85  Will defer steroids at this time  · SARANYA management - 70ml sq24h  Maintain today and consider removal tomorrow  · Baseline upright Xrays reviewed; hardware in good position  · Pain control - APS consulted  Ketamine gtt  Medications adjusted per their recommendations  · Mobilize PT/OT   · PM&R consult placed, recommend SCI rehab  · DVT PPX: SCDs  HSQ  · Dispo - d/w CM - recommend acute SCI rehab  Referral to [facility] placed  Neurosurgery will continue to follow as primary  Call with questions/concerns

## 2022-05-16 PROCEDURE — 97535 SELF CARE MNGMENT TRAINING: CPT

## 2022-05-16 PROCEDURE — 97530 THERAPEUTIC ACTIVITIES: CPT

## 2022-05-16 PROCEDURE — 97112 NEUROMUSCULAR REEDUCATION: CPT

## 2022-05-16 PROCEDURE — 99024 POSTOP FOLLOW-UP VISIT: CPT | Performed by: PHYSICIAN ASSISTANT

## 2022-05-16 RX ORDER — HYDROMORPHONE HYDROCHLORIDE 2 MG/1
4 TABLET ORAL EVERY 4 HOURS PRN
Status: DISCONTINUED | OUTPATIENT
Start: 2022-05-16 | End: 2022-05-17

## 2022-05-16 RX ORDER — HYDROMORPHONE HYDROCHLORIDE 2 MG/1
6 TABLET ORAL EVERY 4 HOURS PRN
Status: DISCONTINUED | OUTPATIENT
Start: 2022-05-16 | End: 2022-05-17

## 2022-05-16 RX ADMIN — TAMSULOSIN HYDROCHLORIDE 0.4 MG: 0.4 CAPSULE ORAL at 16:36

## 2022-05-16 RX ADMIN — HYDROMORPHONE HYDROCHLORIDE 6 MG: 2 TABLET ORAL at 21:31

## 2022-05-16 RX ADMIN — Medication 0.2 MG/KG/HR: at 23:59

## 2022-05-16 RX ADMIN — OXYCODONE HYDROCHLORIDE 15 MG: 10 TABLET ORAL at 09:23

## 2022-05-16 RX ADMIN — HEPARIN SODIUM 5000 UNITS: 5000 INJECTION INTRAVENOUS; SUBCUTANEOUS at 13:47

## 2022-05-16 RX ADMIN — HYDROMORPHONE HYDROCHLORIDE 0.5 MG: 1 INJECTION, SOLUTION INTRAMUSCULAR; INTRAVENOUS; SUBCUTANEOUS at 13:51

## 2022-05-16 RX ADMIN — NORTRIPTYLINE HYDROCHLORIDE 10 MG: 10 CAPSULE ORAL at 21:30

## 2022-05-16 RX ADMIN — BUSPIRONE HYDROCHLORIDE 5 MG: 5 TABLET ORAL at 08:05

## 2022-05-16 RX ADMIN — ACETAMINOPHEN 975 MG: 325 TABLET, FILM COATED ORAL at 21:28

## 2022-05-16 RX ADMIN — BACLOFEN 5 MG: 10 TABLET ORAL at 16:33

## 2022-05-16 RX ADMIN — OXYCODONE HYDROCHLORIDE 15 MG: 10 TABLET ORAL at 06:01

## 2022-05-16 RX ADMIN — HYDROMORPHONE HYDROCHLORIDE 0.5 MG: 1 INJECTION, SOLUTION INTRAMUSCULAR; INTRAVENOUS; SUBCUTANEOUS at 08:05

## 2022-05-16 RX ADMIN — HYDROMORPHONE HYDROCHLORIDE 0.5 MG: 1 INJECTION, SOLUTION INTRAMUSCULAR; INTRAVENOUS; SUBCUTANEOUS at 23:41

## 2022-05-16 RX ADMIN — HYDROMORPHONE HYDROCHLORIDE 0.5 MG: 1 INJECTION, SOLUTION INTRAMUSCULAR; INTRAVENOUS; SUBCUTANEOUS at 02:34

## 2022-05-16 RX ADMIN — HEPARIN SODIUM 5000 UNITS: 5000 INJECTION INTRAVENOUS; SUBCUTANEOUS at 21:30

## 2022-05-16 RX ADMIN — HYDROMORPHONE HYDROCHLORIDE 0.5 MG: 1 INJECTION, SOLUTION INTRAMUSCULAR; INTRAVENOUS; SUBCUTANEOUS at 20:08

## 2022-05-16 RX ADMIN — Medication 3 MG: at 21:30

## 2022-05-16 RX ADMIN — BACLOFEN 5 MG: 10 TABLET ORAL at 21:31

## 2022-05-16 RX ADMIN — HYDROMORPHONE HYDROCHLORIDE 6 MG: 2 TABLET ORAL at 12:15

## 2022-05-16 RX ADMIN — BUSPIRONE HYDROCHLORIDE 5 MG: 5 TABLET ORAL at 21:30

## 2022-05-16 RX ADMIN — GABAPENTIN 800 MG: 400 CAPSULE ORAL at 16:33

## 2022-05-16 RX ADMIN — OXYCODONE HYDROCHLORIDE 15 MG: 10 TABLET ORAL at 00:59

## 2022-05-16 RX ADMIN — GABAPENTIN 800 MG: 400 CAPSULE ORAL at 21:31

## 2022-05-16 RX ADMIN — ATORVASTATIN CALCIUM 40 MG: 40 TABLET, FILM COATED ORAL at 16:34

## 2022-05-16 RX ADMIN — HEPARIN SODIUM 5000 UNITS: 5000 INJECTION INTRAVENOUS; SUBCUTANEOUS at 06:01

## 2022-05-16 RX ADMIN — Medication 0.2 MG/KG/HR: at 08:06

## 2022-05-16 RX ADMIN — BACLOFEN 5 MG: 10 TABLET ORAL at 08:05

## 2022-05-16 RX ADMIN — ACETAMINOPHEN 975 MG: 325 TABLET, FILM COATED ORAL at 06:01

## 2022-05-16 RX ADMIN — HYDROMORPHONE HYDROCHLORIDE 6 MG: 2 TABLET ORAL at 16:48

## 2022-05-16 RX ADMIN — GABAPENTIN 800 MG: 400 CAPSULE ORAL at 08:05

## 2022-05-16 RX ADMIN — ACETAMINOPHEN 975 MG: 325 TABLET, FILM COATED ORAL at 13:47

## 2022-05-16 NOTE — OCCUPATIONAL THERAPY NOTE
Occupational Therapy Treatment Note:      05/16/22 1200   OT Last Visit   OT Visit Date 05/16/22   Note Type   Note Type Treatment   Restrictions/Precautions   Braces or Orthoses C/S Collar   Other Precautions Fall Risk;Pain;Spinal precautions   Pain Assessment   Pain Assessment Tool 0-10   Pain Score 8   ADL   Where Assessed Edge of bed  (pt limited by lack of strength in ue's)   Grooming Assistance 2  Maximal Assistance   Grooming Comments pt requried asst to wash hands and face  pt was able to push arms through sleeves of shirt held at chest heigth  UB Bathing Assistance 2  Maximal Assistance   UB Bathing Comments able to wash stomach when cloth placed into r hand  LB Bathing Assistance 2  Maximal Assistance   UB Dressing Assistance 2  Maximal Assistance   LB Dressing Assistance 2  Maximal Assistance   Toileting Assistance  2  Maximal Assistance   Functional Standing Tolerance   Time pt standing balance with spt   Bed Mobility   Supine to Sit 3  Moderate assistance   Transfers   Sit to Stand 3  Moderate assistance   Additional items Assist x 2   Stand to Sit 3  Moderate assistance   Additional items Assist x 2   Cognition   Overall Cognitive Status WFL   Activity Tolerance   Activity Tolerance Patient tolerated treatment well   Assessment   Assessment pt participated in am ot session and was seen focusing on adls seated eob  pt with stiffness and decrased arom in b hands / fingers and was unable to hold cloth and bring to face/ wash hands without max asst  pt was however able to use cloth when placed into r hand to swipe off stomach needing asst for thuroughness pt maintained sitting balance with close sba to min asst intermittently for 20 min this session  he was able to speak on phone using r ue to hold phone to ear for over 5 minutes s droppage  pt did require hand over hand support for r ue to hold large cup inorder to drink from straw  pt is cooperative motivated remained oob post session     Plan Treatment Interventions ADL retraining;Functional transfer training; Endurance training;UE strengthening/ROM; Patient/family training;Equipment evaluation/education; Activityengagement   Goal Expiration Date 05/27/22   OT Treatment Day 1   OT Frequency 3-5x/wk   Recommendation   OT Discharge Recommendation Post acute rehabilitation services   OT - OK to Discharge Yes   AM-PAC Daily Activity Inpatient   Lower Body Dressing 2   Bathing 2   Toileting 2   Upper Body Dressing 2   Grooming 2   Eating 2   Daily Activity Raw Score 12   Daily Activity Standardized Score (Calc for Raw Score >=11) 30 6   AM-PAC Applied Cognition Inpatient   Following a Speech/Presentation 4   Understanding Ordinary Conversation 4   Taking Medications 4   Remembering Where Things Are Placed or Put Away 4   Remembering List of 4-5 Errands 4   Taking Care of Complicated Tasks 4   Applied Cognition Raw Score 24   Applied Cognition Standardized Score 62 21

## 2022-05-16 NOTE — PHYSICAL THERAPY NOTE
Physical Therapy Progress Note       05/16/22 1201   Note Type   Note Type Treatment   Pain Assessment   Pain Assessment Tool 0-10   Pain Score 8   Pain Location/Orientation Location: Neck   Hospital Pain Intervention(s) Repositioned; Ambulation/increased activity; Emotional support   Restrictions/Precautions   Braces or Orthoses C/S Collar   Other Precautions Pain; Fall Risk;Spinal precautions; Chair Alarm   Subjective   Subjective The pt  states that he gets dizzy with positional changes  He is eager to get moving again  Bed Mobility   Supine to Sit 3  Moderate assistance   Additional items Assist x 1; Increased time required;LE management;Verbal cues   Transfers   Sit to Stand 3  Moderate assistance   Additional items Assist x 2; Increased time required;Verbal cues   Stand to Sit 3  Moderate assistance   Additional items Assist x 2; Increased time required;Verbal cues   Ambulation/Elevation   Gait pattern Excessively slow; Inconsistent heidy;Decreased foot clearance; Improper Weight shift   Gait Assistance 3  Moderate assist   Additional items Assist x 2;Verbal cues; Tactile cues   Assistive Device Other (Comment)  (Arm-in-arm )   Distance 4 feet  Balance   Static Sitting Fair +   Dynamic Sitting Fair -   Static Standing Poor +   Dynamic Standing Poor   Ambulatory Poor   Activity Tolerance   Activity Tolerance Patient tolerated treatment well   Medical Staff Made RAMEZ Wilcox  Nurse Made Yuriy Kauffman RN  Assessment   Prognosis Good   Problem List Decreased strength;Decreased range of motion;Decreased endurance; Impaired balance;Decreased mobility;Pain;Orthopedic restrictions   Assessment The pt  continues to remain limited with deficits in strength, balance, motor, and sensation  He does demonstrate notable improvement in balance and activity tolerance today  He was able to maintain sitting at the side of the bed for over thirty minutes as well as another 15 minutes seated in the chair   He does demonstrate minor improvement in motor in his RLE, but he continues to remain limited  He noted dizziness with positional changes, and he did become nauseous after transferring to the chair  He did, however, have an extended session as neurosurgery changed his dressing and removed his drain followed by a bedside ADL and then finally mobilizing to the chair  He was very eager to work with therapy as he wants to regain his independence  His collar pads were exchanged and cleaned as well  He was in the chair post session and provided with a squeeze-trigger call bell as he is unable to utilize a standard call bell at this time  He will benefit significantly from continued therapies in order to maximize his functional independence  Barriers to Discharge Inaccessible home environment;Decreased caregiver support   Goals   Patient Goals To regain his function  STG Expiration Date 05/23/22   PT Treatment Day 1   Plan   Treatment/Interventions Functional transfer training;LE strengthening/ROM; Therapeutic exercise; Endurance training;Patient/family training;Bed mobility;Gait training   Progress Progressing toward goals   PT Frequency   (3-6x a week )   Recommendation   PT Discharge Recommendation Post acute rehabilitation services   AM-PAC Basic Mobility Inpatient   Turning in Bed Without Bedrails 2   Lying on Back to Sitting on Edge of Flat Bed 2   Moving Bed to Chair 2   Standing Up From Chair 2   Walk in Room 2   Climb 3-5 Stairs 1   Basic Mobility Inpatient Raw Score 11   Basic Mobility Standardized Score 30 25   Highest Level Of Mobility   JH-HLM Goal 4: Move to chair/commode   JH-HLM Achieved 5: Stand (1 or more minutes)     An AM-PAC Basic Mobility standardized score less than 40 78 suggests the patient may benefit from discharge to post-acute rehab services      Napoleon Alonzo, PTA

## 2022-05-16 NOTE — ASSESSMENT & PLAN NOTE
· Status post repeat cervical surgery  · Continues to have significant neck pain with bilateral upper extremity paresthesias  · Pain and paresthesias have improved somewhat since surgery  · Patient feels as though oral oxycodone has been less effective over the last 24-48 hours  · Patient was offered opioid rotation to Dilaudid yesterday, however declined  · Today, patient amenable to opioid rotation  · Continue Tylenol 975 mg p o  q 8 hours scheduled  · Continue baclofen 5 mg p o  t i d  scheduled  · Continue gabapentin 800 mg p o  t i d  scheduled  · Continue nortriptyline 10 mg p o  HS scheduled  · Discontinue oral oxycodone  · Start Dilaudid 4 mg p o  q 4 hours p r n  moderate pain  · Start Dilaudid 6 mg p o  q 4 hours p r n  severe pain  · Continue Dilaudid 0 5 mg IV q 3 hours p r n  breakthrough pain  · Continue ketamine infusion at 0 2 mg/kg/HR  · Continue p r n  Haldol, glycopyrrolate, Ativan to mitigate possible side effects from ketamine infusion  · Continue bowel regimen to avoid opioid induced constipation while on opioid pain medication

## 2022-05-16 NOTE — PROGRESS NOTES
[address] [facility]  Progress Note Mable Vargas 1985, 43 y o  male MRN: [id number]  Unit/Bed#: [facility] 623-01 Encounter: [id number]  Primary Care Provider: Walter Lacy MD   Date and time admitted to hospital: 5/11/2022  9:16 AM    * Cord compression myelopathy [facility])  Assessment & Plan  · Status post repeat cervical surgery  · Continues to have significant neck pain with bilateral upper extremity paresthesias  · Pain and paresthesias have improved somewhat since surgery  · Patient feels as though oral oxycodone has been less effective over the last 24-48 hours  · Patient was offered opioid rotation to Dilaudid yesterday, however declined  · Today, patient amenable to opioid rotation  · Continue Tylenol 975 mg p o  q 8 hours scheduled  · Continue baclofen 5 mg p o  t i d  scheduled  · Continue gabapentin 800 mg p o  t i d  scheduled  · Continue nortriptyline 10 mg p o  HS scheduled  · Discontinue oral oxycodone  · Start Dilaudid 4 mg p o  q 4 hours p r n  moderate pain  · Start Dilaudid 6 mg p o  q 4 hours p r n  severe pain  · Continue Dilaudid 0 5 mg IV q 3 hours p r n  breakthrough pain  · Continue ketamine infusion at 0 2 mg/kg/HR  · Continue p r n  Haldol, glycopyrrolate, Ativan to mitigate possible side effects from ketamine infusion  · Continue bowel regimen to avoid opioid induced constipation while on opioid pain medication  Acute pain due to cervical surgery  APS will continue to follow  Please contact Acute Pain Service - B via [email or web address] from [phone] with additional questions or concerns  See Deyanira or Rafael for additional contacts and after hours information  Pain History  Current pain location(s):  Posterior neck, bilateral upper extremities  Pain Scale:   9/10  Quality:  Sharp, burning  24 hour history:  Patient with somewhat increasing pain in his neck in the past 24 hours or so    States that yesterday the oxycodone and ketamine combination was working well for him, however now feels the oxycodone is less effective  Was offered opioid rotation oral Dilaudid yesterday but declined but is amenable today  Patient with no new complaints and no side effects from ketamine infusion  Opioid requirement previous 24 hours:  Oxycodone 90 mg p o , Dilaudid 2 mg IV       Meds/Allergies   all current active meds have been reviewed, current meds:   Current Facility-Administered Medications   Medication Dose Route Frequency    acetaminophen (TYLENOL) tablet 975 mg  975 mg Oral Q8H [address]    atorvastatin (LIPITOR) tablet 40 mg  40 mg Oral After Dinner    baclofen tablet 5 mg  5 mg Oral TID    bisacodyl (DULCOLAX) rectal suppository 10 mg  10 mg Rectal Daily PRN    busPIRone (BUSPAR) tablet 5 mg  5 mg Oral BID    docusate sodium (COLACE) capsule 100 mg  100 mg Oral BID    gabapentin (NEURONTIN) capsule 800 mg  800 mg Oral TID    glycopyrrolate (ROBINUL) injection 0 2 mg  0 2 mg Intravenous Q4H PRN    haloperidol lactate (HALDOL) injection 2 mg  2 mg Intramuscular Q30 Min PRN    heparin (porcine) subcutaneous injection 5,000 Units  5,000 Units Subcutaneous Q8H [address]    HYDROmorphone (DILAUDID) injection 0 5 mg  0 5 mg Intravenous Q3H PRN    HYDROmorphone (DILAUDID) tablet 4 mg  4 mg Oral Q4H PRN    Or    HYDROmorphone (DILAUDID) tablet 6 mg  6 mg Oral Q4H PRN    ketamine 250 mg (STANDARD CONCENTRATION) IV in sodium chloride 0 9% 250 mL  0 2 mg/kg/hr Intravenous Continuous    LORazepam (ATIVAN) injection 0 5 mg  0 5 mg Intravenous Q4H PRN    melatonin tablet 3 mg  3 mg Oral HS    naloxone (NARCAN) 0 04 mg/mL syringe 0 04 mg  0 04 mg Intravenous Q1MIN PRN    nortriptyline (PAMELOR) capsule 10 mg  10 mg Oral HS    ondansetron (ZOFRAN) injection 4 mg  4 mg Intravenous Q4H PRN    senna (SENOKOT) tablet 8 6 mg  1 tablet Oral Daily    sodium chloride 0 9 % infusion  125 mL/hr Intravenous Continuous    tamsulosin (FLOMAX) capsule 0 4 mg 0 4 mg Oral After Dinner    and PTA meds:   Prior to Admission Medications   Prescriptions Last Dose Informant Patient Reported? Taking?    Baclofen 5 MG TABS 5/10/2022 at Unknown time Outside Facility (Specify) Yes Yes   Calcium Carbonate Antacid (CALCIUM CARBONATE PO) 5/10/2022 at Unknown time Outside Facility ([address] [place] [address]) Yes Yes   Sig: Take by mouth   Multiple Vitamin (multivitamin) capsule 5/10/2022 at Unknown time Outside Facility ([address] [place] [address]) Yes Yes   Sig: Take 1 capsule by mouth daily   acetaminophen (TYLENOL) 500 mg tablet Past Month at Unknown time Outside Facility ([address] [place] [address]) Yes Yes   Sig: Take 1,000 mg by mouth every 6 (six) hours as needed for mild pain   atorvastatin (LIPITOR) 40 mg tablet 5/10/2022 at Unknown time Outside Facility (Specify) No Yes   Sig: Take 1 tablet (40 mg total) by mouth daily with dinner   baclofen 10 mg tablet 5/10/2022 at Unknown time Outside Facility ([address] [place] [address]) Yes Yes   Sig: Take 5 mg by mouth 3 (three) times a day   bisacodyl (DULCOLAX) 10 mg suppository Past Week at Unknown time Outside Facility (Specify) No Yes   Sig: Insert 1 suppository (10 mg total) into the rectum daily as needed for constipation   busPIRone (BUSPAR) 5 mg tablet 5/10/2022 at Unknown time  Yes Yes   celecoxib (CeleBREX) 100 mg capsule Unknown at Unknown time Outside Facility (Specify) Yes No   Sig: Take 100 mg by mouth 2 (two) times a day   Patient not taking: No sig reported   gabapentin (NEURONTIN) 300 mg capsule 5/10/2022 at Unknown time Outside Facility (Specify) No Yes   Sig: Take 2 capsules (600 mg total) by mouth 3 (three) times a day   melatonin 3 mg 5/10/2022 at Unknown time Outside Facility (Specify) No Yes   Sig: Take 1 tablet (3 mg total) by mouth daily at bedtime   meloxicam (MOBIC) 15 mg tablet 5/10/2022 at Unknown time  Yes Yes   Sig:     nortriptyline (PAMELOR) 10 mg capsule 5/10/2022 at Unknown time Outside Facility (Specify) Yes Yes   Sig: Take 10 mg by mouth daily at bedtime   senna-docusate sodium (SENOKOT S) 8 6-50 mg per tablet Not Taking at Unknown time Outside Facility (Specify) No No   Sig: Take 1 tablet by mouth 2 (two) times a day   Patient not taking: Reported on 5/11/2022   tamsulosin (FLOMAX) 0 4 mg Unknown at Unknown time  No No   Sig: Take 1 capsule (0 4 mg total) by mouth daily with dinner      Facility-Administered Medications: None       No Known Allergies    Objective     Temp:  [98 9 °F (37 2 °C)-99 4 °F (37 4 °C)] 98 9 °F (37 2 °C)  HR:  [114-119] 114  Resp:  [16-21] 20  BP: (114-128)/(80-87) 114/80    Physical Exam  Gen: Awake and alert, NAD, Does not appear ill  Vital signs reviewed  Nursing notes reviewed  Eyes: PERRL, sclera/conjunctiva normal   ENT: No JVD, trachea midline, moist mucus membranes  CV: Heart normal rhythm and normal rate  No extra systoles  Skin: Warm, dry  Cap refill <2 seconds  No diaphoresis  Neuro: A&O x 3, GCS 15 (E4, V5, M6)  No obvious focal motor deficit  Psych: Normal speech, normal attention, normal behavior  Lab Results:   Results from last 7 days   Lab Units 05/15/22  0532   WBC Thousand/uL 10 17*   HEMOGLOBIN g/dL 11 6*   HEMATOCRIT % 36 5   PLATELETS Thousands/uL 293      Results from last 7 days   Lab Units 05/15/22  0532 05/13/22  0625 05/11/22  1005   POTASSIUM mmol/L 3 5   < > 4 2   CHLORIDE mmol/L 105   < > 109*   CO2 mmol/L 27   < > 28   BUN mg/dL 9   < > 15   CREATININE mg/dL 0 91   < > 0 97   CALCIUM mg/dL 9 1   < > 9 4   ALK PHOS U/L  --   --  79   ALT U/L  --   --  38   AST U/L  --   --  23    < > = values in this interval not displayed  Estimated Creatinine Clearance: 102 9 mL/min (by C-G formula based on SCr of 0 91 mg/dL)  Imaging Studies: I have personally reviewed pertinent reports  Counseling / Coordination of Care  Greater than 50% of total time was spent with the patient and / or family counseling and / or coordination of care   A description of the counseling / coordination of care:  Patient interview, physical examination, review of medical record, review of imaging and laboratory data, development of pain management plan, discussion of pain management plan with patient and primary service  Explanation of risks and benefits of suggested pain medications  Please note that the APS provides consultative services regarding pain management only  With the exception of ketamine and epidural infusions and except when indicated, final decisions regarding starting or changing doses of analgesic medications are at the discretion of the consulting service  Off hours consultation and/or medication management is generally not available  Portions of the record may have been created with voice recognition software  Occasional wrong-word or 'sound-a-like' substitutions may have occurred due to the inherent limitations of voice recognition software       Hue Forde PA-C  Acute Pain Service

## 2022-05-16 NOTE — PLAN OF CARE
Problem: MOBILITY - ADULT  Goal: Maintain or return to baseline ADL function  Description: INTERVENTIONS:  -  Assess patient's ability to carry out ADLs; assess patient's baseline for ADL function and identify physical deficits which impact ability to perform ADLs (bathing, care of mouth/teeth, toileting, grooming, dressing, etc )  - Assess/evaluate cause of self-care deficits   - Assess range of motion  - Assess patient's mobility; develop plan if impaired  - Assess patient's need for assistive devices and provide as appropriate  - Encourage maximum independence but intervene and supervise when necessary  - Involve family in performance of ADLs  - Assess for home care needs following discharge   - Consider OT consult to assist with ADL evaluation and planning for discharge  - Provide patient education as appropriate  Outcome: Progressing  Goal: Maintains/Returns to pre admission functional level  [place]  Problem: PAIN - ADULT  Goal: Verbalizes/displays adequate comfort level or baseline comfort level  Description: Interventions:  - Encourage patient to monitor pain and request assistance  - Assess pain using appropriate pain scale  - Administer analgesics based on type and severity of pain and evaluate response  - Implement non-pharmacological measures as appropriate and evaluate response  - Consider cultural and social influences on pain and pain management  - Notify physician/advanced practitioner if interventions unsuccessful or patient reports new pain  Outcome: Progressing

## 2022-05-16 NOTE — CASE MANAGEMENT
Case Management Discharge Planning Note    Patient name Sindy Guan  [facility] [id number]  : 1985 Date 2022       Current Admission Date: 2022  Current Admission Diagnosis:Cord compression myelopathy [facility])   Patient Active Problem List    Diagnosis Date Noted    Anemia 2022    Cord compression myelopathy ([facility] ) 2022    Lower urinary tract symptoms (LUTS) 2022    Encounter to discuss test results 2022    S/P orchiectomy 2021    Acute low back pain 2021    Hypokalemia 2021    Benign essential HTN 2021    HLD (hyperlipidemia) 2021    Testicular discomfort 2021    Intractable hiccups 2021    Depression 2021    Urinary retention 2021    Closed fracture of fifth cervical vertebra ([facility] ) 03/10/2021    Spinal cord injury, C1-C7 ([facility] ) 03/10/2021    Fall 03/10/2021    Pain 03/10/2021    Central cord syndrome ([facility] ) 2021    Pulmonary insufficiency 2021      LOS (days): 5  Geometric Mean LOS (GMLOS) (days): 3 20  Days to GMLOS:-1 9     OBJECTIVE:  Risk of Unplanned Readmission Score: 12 77         Current admission status: Inpatient   Preferred Pharmacy:   [address] [place] [id number]  Phone: [phone] Fax: 503 [phone] [facility]  [address] [facility]  [address] [facility] [address]  Phone: [phone] Fax: [phone]    Primary Care Provider: Romy Barahona MD    Primary Insurance: [address] [place] [address]  Secondary Insurance:     DISCHARGE DETAILS:    Other Referral/Resources/Interventions Provided:  Referral Comments: TC to Caren Marquez, informing her that patient is not yet medically stable for DC, remains on ketamine drip

## 2022-05-16 NOTE — PROGRESS NOTES
[facility]  Progress Note Olive Mendez 1985, 43 y o  male MRN: [id number]  Unit/Bed#: [facility] 623-01 Encounter: [id number]  Primary Care Provider: Renetta Garcia MD   Date and time admitted to hospital: 5/11/2022  9:16 AM    * Cord compression myelopathy [facility])  Assessment & Plan  POD4 ACDF C6-7; Revision PCDF, revise/replace right C2 screw with laminar screw, revise left C2 screw (cap), remove bilateral C7 lateral mass screws, place navigated C7, T1 pedicle screws, C6-7 laminectomy, arthrodesis C2-3 and C6-T1 (5/12/22 [place])  · Patient presents as direct admission after outpatient MRI demonstrate cord compression requiring surgical intervention  · s/p severe central spinal cord injury, Jania B spinal cord injury March 2021 after falling down stairs Status post posterior cervical decompression fixation and fusion C2-C7 3/7/2021 Dr Evie Pascual  · Patient complains of decline in function, new urinary difficulties and c/o increased neck pain, with intermittent radicular symptoms and clicking noise with head movement    Imaging:   · Xray cervical spine, 5/14/22: Postsurgical changes of anterior cervical discectomy and fusion at C6-C7 and revision posterior fusion spanning C2-T1  Plan:  · Continue monitor neurological status and monitor for any new or progressive symptoms   · SARANYA management - 20ml/24h  Discontinued  Suture tied  Patient tolerated well  · Pain control - APS consulted  Ketamine gtt  Medications adjusted per their recommendations  · Change to p o  Dilaudid  · Mobilize PT/OT - recommending acute rehab  · PM&R consult placed, recommend SCI rehab  · Urinary retention  We will continue straight catheterization to assist with bladder training as discussed with physiatry as long as patient can not tolerate  · DVT PPX: SCDs  HSQ  · Dispo - d/w CM - recommend acute SCI rehab  Referral to [address]  Neurosurgery will continue to follow as primary  Call with questions/concerns  Anemia  Assessment & Plan  Multifactorial   Improved (Hgb 11 6)    Benign essential HTN  Assessment & Plan  Continue to monitor  Maintaining MAP>85    Urinary retention  Assessment & Plan  Billingsley removed   Intermittent straight cath - continue  Defer Billingsley replacement      Subjective/Objective   Chief Complaint:  I am doing    Subjective:  Patient states he is overall stable  Does note some improvement of the paresthesias in his hands but states it has increased in his feet  Motor function stated to be stable  Tongue numbness improving  Left posterior parietal numbness  Ongoing difficulty voiding requiring straight catheterization  Tolerating p o  Denies any chest pain today though states he had some chest pain yesterday  Denies shortness of breath, nausea or vomiting  Objective:  Sitting up in bed  NAD  I/O       05/14 0701  05/15 0700 05/15 0701  05/16 0700 05/16 0701  05/17 0700    P  O    240    I V  (mL/kg)   864 4 (11 3)    Total Intake(mL/kg)   1104 4 (14 5)    Urine (mL/kg/hr) 450 (0 2) 833 (0 5) 600 (0 8)    Drains 70 20     Total Output 520 853 600    Net -520 -853 +504 4                 Invasive Devices  Report    Peripheral Intravenous Line  Duration           Peripheral IV 05/16/22 Right Forearm <1 day                Physical Exam:  Vitals: Blood pressure 113/80, pulse (!) 112, temperature 99 °F (37 2 °C), resp  rate 20, height 5' 7" (1 702 m), weight 76 2 kg (167 lb 15 9 oz), SpO2 93 %  ,Body mass index is 26 31 kg/m²  General appearance: alert, appears stated age, cooperative and no distress  Head: Normocephalic, without obvious abnormality  Eyes:  Conjugate gaze and tracks appropriately in room  Neck:  Incision CDI with staples  Collar in place    Lungs: non labored breathing  Heart: regular heart rate  Neurologic:   Mental status: Alert, oriented , thought content appropriate  Cranial nerves: grossly intact (Cranial nerves II-XII)      Motor: Motor Findings  Strength: Right  Strength: Left    Deltoid  4/5  4/5    Biceps  4/5  3/5    Triceps  4/5  3/5    Wrist Extensors  2/5  2/5    Hand Intrinsics  2/5  2/5    Iliospoas  4+/5  4+/5    Quadriceps  4+/5  4+/5    Tibialis anterior  4-/5  1/5    Gastroc soleus  4/5  4/5      Sensory:    LT:  Abnormal forearm and hands bilaterally but normal shoulder and upper arm  Significantly abnormal in his feet with pins and needles medial legs and minimal sensation to light touch lateral lower legs      Lab Results:  Results from last 7 days   Lab Units 05/15/22  0532 05/14/22  0507 05/13/22  0625   WBC Thousand/uL 10 17* 11 94* 13 07*   HEMOGLOBIN g/dL 11 6* 11 0* 10 5*   HEMATOCRIT % 36 5 34 7* 33 3*   PLATELETS Thousands/uL 293 286 279     Results from last 7 days   Lab Units 05/15/22  0532 05/14/22  0507 05/13/22  0625 05/11/22  1005   POTASSIUM mmol/L 3 5 3 5 3 4* 4 2   CHLORIDE mmol/L 105 111* 115* 109*   CO2 mmol/L 27 27 23 28   BUN mg/dL 9 11 8 15   CREATININE mg/dL 0 91 0 97 0 88 0 97   CALCIUM mg/dL 9 1 8 8 7 3* 9 4   ALK PHOS U/L  --   --   --  79   ALT U/L  --   --   --  38   AST U/L  --   --   --  23             Results from last 7 days   Lab Units 05/11/22  1005   INR  0 97   PTT seconds 31     No results found for: TROPONINT  ABG:  Lab Results   Component Value Date    PHART 7 441 03/07/2021    BYO6BNI 36 7 03/07/2021    PO2ART 162 7 (H) 03/07/2021    NKY4QCQ 24 4 03/07/2021    BEART 0 6 03/07/2021    SOURCE Line, Arterial 03/07/2021       Imaging Studies: I have personally reviewed pertinent reports  and I have personally reviewed pertinent films in PACS    XR cervical spine 2 or 3 views    Result Date: 5/15/2022  Impression: Postsurgical changes of anterior cervical discectomy and fusion at C6-C7 and revision posterior fusion spanning C2-T1   Workstation performed: [id number]     XR spine cervical 2 or 3 vw injury    Result Date: 5/12/2022  Impression: Fluoroscopic guidance provided for intraoperative localization during cervical fusion procedure  Please refer to the separate procedure notes for additional details  Workstation performed: [id number]     CT cervical spine without contrast    Result Date: 5/11/2022  Impression: Loosening of the bilateral C7 screws noted There is a fracture of the right lateral mass C2 screw Findings concur with the preliminary report by the referring clinician already in PACS and/or our electronic record EPIC  Workstation performed: [id number]     MRI cervical spine with and without contrast    Result Date: 5/11/2022  Impression: 1  New focus of cord compression at C6-C7 secondary to a new disc herniation with cord signal abnormality at this level, worrisome for new cord edema, along the caudal margin of the spinal construct  Spine surgical assessment advised  2   Interval development of cystic myelomalacia and cord atrophy at the C4-5 level in the region of prior cord atrophy which has been ameliorated by decompressive laminectomies C3-C5  3   Posterior fusion hardware C2-C7 noted   I personally discussed this study with Mia Houston on 5/11/2022 at 8:15 AM  Workstation performed: [id number]       EKG, Pathology, and Other Studies: none    VTE Pharmacologic Prophylaxis: Heparin    VTE Mechanical Prophylaxis: sequential compression device

## 2022-05-16 NOTE — CASE MANAGEMENT
Case Management Discharge Planning Note    Patient name Thomas Magana  [facility] [id number]  : 1985 Date 2022       Current Admission Date: 2022  Current Admission Diagnosis:Cord compression myelopathy [facility])   Patient Active Problem List    Diagnosis Date Noted    Anemia 2022    Cord compression myelopathy ([facility] [address] ) 2022    Lower urinary tract symptoms (LUTS) 2022    Encounter to discuss test results 2022    S/P orchiectomy 2021    Acute low back pain 2021    Hypokalemia 2021    Benign essential HTN 2021    HLD (hyperlipidemia) 2021    Testicular discomfort 2021    Intractable hiccups 2021    Depression 2021    Urinary retention 2021    Closed fracture of fifth cervical vertebra ([facility] [address] ) 03/10/2021    Spinal cord injury, C1-C7 ([facility] [address] ) 03/10/2021    Fall 03/10/2021    Pain 03/10/2021    Central cord syndrome ([facility] [address] [facility] ) 2021    Pulmonary insufficiency 2021      LOS (days): 5  Geometric Mean LOS (GMLOS) (days): 3 20  Days to GMLOS:-2     OBJECTIVE:  Risk of Unplanned Readmission Score: 12 8         Current admission status: Inpatient   Preferred Pharmacy:   [address] [place] [address] [place]  Tamika RODRIGUEZ [address]  Phone: [phone] Fax: 299 [phone] [facility]  [address] [facility]  [address] [address]  Phone: [phone] Fax: [phone]    Primary Care Provider: Darryle Aris, MD    Primary Insurance: [address] [place] [address]  Secondary Insurance:     DISCHARGE DETAILS:                 Other Referral/Resources/Interventions Provided:  Referral Comments: Patient's CM trying to get patient an apartment is Ahmet Latham - [phone]

## 2022-05-16 NOTE — UTILIZATION REVIEW
Continued Stay Review    Date: 5/16                         Current Patient Class: Inpatient  Current Level of Care: Med Surg    HPI:36 y o  male initially admitted on 5/11    Assessment/Plan:   POD4 ACDF C6-7; Revision PCDF, revise/replace right C2 screw with laminar screw, revise left C2 screw (cap), remove bilateral C7 lateral mass screws, place navigated C7, T1 pedicle screws, C6-7 laminectomy, arthrodesis C2-3 and C6-T1   Continues on Iv Ketamine drip and IVFs  Pain control  APS following  Start Iv Dilaudid prn  SARANYA drain today  Urinary retention; continue continue straight catheterization to assist with bladder training as discussed with physiatry as long as patient can not tolerate       Vital Signs:   05/16/22 14:30:36 99 °F (37 2 °C) 112 Abnormal  -- 113/80 91 93 % -- --   05/16/22 12:19:54 98 9 °F (37 2 °C) 114 Abnormal  20 114/80 91 94 %         Pertinent Labs/Diagnostic Results:       Results from last 7 days   Lab Units 05/15/22  0532 05/14/22  0507 05/13/22  0625 05/11/22  1005   WBC Thousand/uL 10 17* 11 94* 13 07* 9 32   HEMOGLOBIN g/dL 11 6* 11 0* 10 5* 14 3   HEMATOCRIT % 36 5 34 7* 33 3* 44 2   PLATELETS Thousands/uL 293 286 279 356         Results from last 7 days   Lab Units 05/15/22  0532 05/14/22  0507 05/13/22  0625 05/11/22  1005   SODIUM mmol/L 139 140 143 140   POTASSIUM mmol/L 3 5 3 5 3 4* 4 2   CHLORIDE mmol/L 105 111* 115* 109*   CO2 mmol/L 27 27 23 28   ANION GAP mmol/L 7 2* 5 3*   BUN mg/dL 9 11 8 15   CREATININE mg/dL 0 91 0 97 0 88 0 97   EGFR ml/min/1 73sq m 106 98 108 98   CALCIUM mg/dL 9 1 8 8 7 3* 9 4     Results from last 7 days   Lab Units 05/11/22  1005   AST U/L 23   ALT U/L 38   ALK PHOS U/L 79   TOTAL PROTEIN g/dL 7 7   ALBUMIN g/dL 3 8   TOTAL BILIRUBIN mg/dL 0 37     Results from last 7 days   Lab Units 05/12/22  1510   POC GLUCOSE mg/dl 153*     Results from last 7 days   Lab Units 05/15/22  0532 05/14/22  0507 05/13/22  0625 05/11/22  1005   GLUCOSE RANDOM mg/dL 91 104 96 100         Results from last 7 days   Lab Units 05/12/22  0440   HEMOGLOBIN A1C % 5 0   EAG mg/dl 97       Results from last 7 days   Lab Units 05/11/22  1005   PROTIME seconds 12 5   INR  0 97   PTT seconds 31         Results from last 7 days   Lab Units 05/11/22  1817   CLARITY UA  Clear   COLOR UA  Yellow   SPEC GRAV UA  1 029   PH UA  6 5   GLUCOSE UA mg/dl Negative   KETONES UA mg/dl Negative   BLOOD UA  Negative   PROTEIN UA mg/dl Trace*   NITRITE UA  Negative   BILIRUBIN UA  Negative   UROBILINOGEN UA (BE) mg/dl <2 0   LEUKOCYTES UA  Negative   WBC UA /hpf None Seen   RBC UA /hpf 1-2   BACTERIA UA /hpf Occasional   EPITHELIAL CELLS WET PREP /hpf None Seen   MUCUS THREADS  Moderate*       Medications:   Scheduled Medications:  acetaminophen, 975 mg, Oral, Q8H GILMAR  atorvastatin, 40 mg, Oral, After Dinner  baclofen, 5 mg, Oral, TID  busPIRone, 5 mg, Oral, BID  docusate sodium, 100 mg, Oral, BID  gabapentin, 800 mg, Oral, TID  heparin (porcine), 5,000 Units, Subcutaneous, Q8H [address]  melatonin, 3 mg, Oral, HS  nortriptyline, 10 mg, Oral, HS  senna, 1 tablet, Oral, Daily  tamsulosin, 0 4 mg, Oral, After Dinner      Continuous IV Infusions:  ketamine, 0 2 mg/kg/hr, Intravenous, Continuous  sodium chloride, 125 mL/hr, Intravenous, Continuous      PRN Meds:  bisacodyl, 10 mg, Rectal, Daily PRN  glycopyrrolate, 0 2 mg, Intravenous, Q4H PRN  haloperidol lactate, 2 mg, Intramuscular, Q30 Min PRN  HYDROmorphone, 0 5 mg, Intravenous, Q3H PRN 5/16 x3  HYDROmorphone, 4 mg, Oral, Q4H PRN   Or  HYDROmorphone, 6 mg, Oral, Q4H PRN 5/16 x2  LORazepam, 0 5 mg, Intravenous, Q4H PRN  naloxone, 0 04 mg, Intravenous, Q1MIN PRN  ondansetron, 4 mg, Intravenous, Q4H PRN    oxyCODONE (ROXICODONE) IR tablet 15 mg  Dose: 15 mg  Freq: Every 3 hours PRN Route: PO 5/16 x3  PRN Reason: severe pain  Start: 05/13/22 1400 End: 05/16/22 1152      Discharge Plan: Referral to [address] Utilization Review Department  ATTENTION: Please call with any questions or concerns to [phone] and carefully listen to the prompts so that you are directed to the right person  All voicemails are confidential   Ahmet Estrada all requests for admission clinical reviews, approved or denied determinations and any other requests to dedicated fax number below belonging to the campus where the patient is receiving treatment   List of dedicated fax numbers for the Facilities:  [address] DENIALS (Administrative/Medical Necessity) [phone]   [address] (Maternity/NICU/Pediatrics) [phone]   [address]  [address] [facility] [address] [address] [address] [place] [address] [phone]

## 2022-05-16 NOTE — ASSESSMENT & PLAN NOTE
POD4 ACDF C6-7; Revision PCDF, revise/replace right C2 screw with laminar screw, revise left C2 screw (cap), remove bilateral C7 lateral mass screws, place navigated C7, T1 pedicle screws, C6-7 laminectomy, arthrodesis C2-3 and C6-T1 (5/12/22 [place])  · Patient presents as direct admission after outpatient MRI demonstrate cord compression requiring surgical intervention  · s/p severe central spinal cord injury, Jania B spinal cord injury March 2021 after falling down stairs Status post posterior cervical decompression fixation and fusion C2-C7 3/7/2021 Dr Garrison Saliva  · Patient complains of decline in function, new urinary difficulties and c/o increased neck pain, with intermittent radicular symptoms and clicking noise with head movement    Imaging:   · Xray cervical spine, 5/14/22: Postsurgical changes of anterior cervical discectomy and fusion at C6-C7 and revision posterior fusion spanning C2-T1  Plan:  · Continue monitor neurological status and monitor for any new or progressive symptoms   · SARANYA management - 20ml/24h  Discontinued  Suture tied  Patient tolerated well  · Pain control - APS consulted  Ketamine gtt  Medications adjusted per their recommendations  · Change to p o  Dilaudid  · Mobilize PT/OT - recommending acute rehab  · PM&R consult placed, recommend SCI rehab  · Urinary retention  We will continue straight catheterization to assist with bladder training as discussed with physiatry as long as patient can not tolerate  · DVT PPX: SCDs  HSQ  · Dispo - d/w CM - recommend acute SCI rehab  Referral to [facility] placed  Neurosurgery will continue to follow as primary  Call with questions/concerns

## 2022-05-16 NOTE — PLAN OF CARE
Problem: PHYSICAL THERAPY ADULT  Goal: Performs mobility at highest level of function for planned discharge setting  See evaluation for individualized goals  Description: Treatment/Interventions: Functional transfer training, LE strengthening/ROM, Therapeutic exercise, Endurance training, Patient/family training, Equipment eval/education, Bed mobility, Gait training, Spoke to nursing, OT  Equipment Recommended: Wheelchair       See flowsheet documentation for full assessment, interventions and recommendations  Outcome: Progressing  Note: Prognosis: Good  Problem List: Decreased strength, Decreased range of motion, Decreased endurance, Impaired balance, Decreased mobility, Pain, Orthopedic restrictions  Assessment: The pt  continues to remain limited with deficits in strength, balance, motor, and sensation  He does demonstrate notable improvement in balance and activity tolerance today  He was able to maintain sitting at the side of the bed for over thirty minutes as well as another 15 minutes seated in the chair  He does demonstrate minor improvement in motor in his RLE, but he continues to remain limited  He noted dizziness with positional changes, and he did become nauseous after transferring to the chair  He did, however, have an extended session as neurosurgery changed his dressing and removed his drain followed by a bedside ADL and then finally mobilizing to the chair  He was very eager to work with therapy as he wants to regain his independence  His collar pads were exchanged and cleaned as well  He was in the chair post session and provided with a squeeze-trigger call bell as he is unable to utilize a standard call bell at this time  He will benefit significantly from continued therapies in order to maximize his functional independence    Barriers to Discharge: Inaccessible home environment, Decreased caregiver support        PT Discharge Recommendation: Post acute rehabilitation services          See flowsheet documentation for full assessment

## 2022-05-17 PROCEDURE — 97116 GAIT TRAINING THERAPY: CPT

## 2022-05-17 PROCEDURE — 97535 SELF CARE MNGMENT TRAINING: CPT

## 2022-05-17 PROCEDURE — 99024 POSTOP FOLLOW-UP VISIT: CPT | Performed by: PHYSICIAN ASSISTANT

## 2022-05-17 PROCEDURE — 97530 THERAPEUTIC ACTIVITIES: CPT

## 2022-05-17 PROCEDURE — 99232 SBSQ HOSP IP/OBS MODERATE 35: CPT | Performed by: STUDENT IN AN ORGANIZED HEALTH CARE EDUCATION/TRAINING PROGRAM

## 2022-05-17 RX ORDER — HYDROMORPHONE HYDROCHLORIDE 2 MG/1
6 TABLET ORAL
Status: DISCONTINUED | OUTPATIENT
Start: 2022-05-17 | End: 2022-05-20

## 2022-05-17 RX ORDER — HYDROMORPHONE HYDROCHLORIDE 2 MG/1
4 TABLET ORAL
Status: DISCONTINUED | OUTPATIENT
Start: 2022-05-17 | End: 2022-05-20

## 2022-05-17 RX ADMIN — BUSPIRONE HYDROCHLORIDE 5 MG: 5 TABLET ORAL at 21:19

## 2022-05-17 RX ADMIN — BACLOFEN 5 MG: 10 TABLET ORAL at 21:18

## 2022-05-17 RX ADMIN — HYDROMORPHONE HYDROCHLORIDE 6 MG: 2 TABLET ORAL at 15:02

## 2022-05-17 RX ADMIN — ACETAMINOPHEN 975 MG: 325 TABLET, FILM COATED ORAL at 21:17

## 2022-05-17 RX ADMIN — GABAPENTIN 800 MG: 400 CAPSULE ORAL at 16:41

## 2022-05-17 RX ADMIN — BACLOFEN 5 MG: 10 TABLET ORAL at 16:41

## 2022-05-17 RX ADMIN — NORTRIPTYLINE HYDROCHLORIDE 10 MG: 10 CAPSULE ORAL at 21:20

## 2022-05-17 RX ADMIN — HYDROMORPHONE HYDROCHLORIDE 6 MG: 2 TABLET ORAL at 21:18

## 2022-05-17 RX ADMIN — GABAPENTIN 800 MG: 400 CAPSULE ORAL at 21:19

## 2022-05-17 RX ADMIN — HYDROMORPHONE HYDROCHLORIDE 6 MG: 2 TABLET ORAL at 10:37

## 2022-05-17 RX ADMIN — HYDROMORPHONE HYDROCHLORIDE 0.5 MG: 1 INJECTION, SOLUTION INTRAMUSCULAR; INTRAVENOUS; SUBCUTANEOUS at 16:42

## 2022-05-17 RX ADMIN — HEPARIN SODIUM 5000 UNITS: 5000 INJECTION INTRAVENOUS; SUBCUTANEOUS at 05:54

## 2022-05-17 RX ADMIN — HYDROMORPHONE HYDROCHLORIDE 6 MG: 2 TABLET ORAL at 05:54

## 2022-05-17 RX ADMIN — HYDROMORPHONE HYDROCHLORIDE 0.5 MG: 1 INJECTION, SOLUTION INTRAMUSCULAR; INTRAVENOUS; SUBCUTANEOUS at 23:30

## 2022-05-17 RX ADMIN — HYDROMORPHONE HYDROCHLORIDE 0.5 MG: 1 INJECTION, SOLUTION INTRAMUSCULAR; INTRAVENOUS; SUBCUTANEOUS at 13:28

## 2022-05-17 RX ADMIN — TAMSULOSIN HYDROCHLORIDE 0.4 MG: 0.4 CAPSULE ORAL at 16:41

## 2022-05-17 RX ADMIN — BUSPIRONE HYDROCHLORIDE 5 MG: 5 TABLET ORAL at 09:21

## 2022-05-17 RX ADMIN — HEPARIN SODIUM 5000 UNITS: 5000 INJECTION INTRAVENOUS; SUBCUTANEOUS at 21:23

## 2022-05-17 RX ADMIN — GABAPENTIN 800 MG: 400 CAPSULE ORAL at 09:21

## 2022-05-17 RX ADMIN — Medication 3 MG: at 21:22

## 2022-05-17 RX ADMIN — BACLOFEN 5 MG: 10 TABLET ORAL at 09:21

## 2022-05-17 RX ADMIN — HYDROMORPHONE HYDROCHLORIDE 6 MG: 2 TABLET ORAL at 01:34

## 2022-05-17 RX ADMIN — HEPARIN SODIUM 5000 UNITS: 5000 INJECTION INTRAVENOUS; SUBCUTANEOUS at 13:27

## 2022-05-17 RX ADMIN — ATORVASTATIN CALCIUM 40 MG: 40 TABLET, FILM COATED ORAL at 16:41

## 2022-05-17 RX ADMIN — ACETAMINOPHEN 975 MG: 325 TABLET, FILM COATED ORAL at 05:54

## 2022-05-17 RX ADMIN — HYDROMORPHONE HYDROCHLORIDE 6 MG: 2 TABLET ORAL at 19:05

## 2022-05-17 RX ADMIN — HYDROMORPHONE HYDROCHLORIDE 0.5 MG: 1 INJECTION, SOLUTION INTRAMUSCULAR; INTRAVENOUS; SUBCUTANEOUS at 09:22

## 2022-05-17 RX ADMIN — ACETAMINOPHEN 975 MG: 325 TABLET, FILM COATED ORAL at 13:27

## 2022-05-17 RX ADMIN — Medication 0.2 MG/KG/HR: at 16:41

## 2022-05-17 NOTE — ASSESSMENT & PLAN NOTE
POD5 ACDF C6-7; Revision PCDF, revise/replace right C2 screw with laminar screw, revise left C2 screw (cap), remove bilateral C7 lateral mass screws, place navigated C7, T1 pedicle screws, C6-7 laminectomy, arthrodesis C2-3 and C6-T1 (5/12/22 [place])  · Patient presents as direct admission after outpatient MRI demonstrate cord compression requiring surgical intervention  · s/p severe central spinal cord injury, Jania B spinal cord injury March 2021 after falling down stairs Status post posterior cervical decompression fixation and fusion C2-C7 3/7/2021 Dr Stefanie Rubio  · Patient complains of decline in function, new urinary difficulties and c/o increased neck pain, with intermittent radicular symptoms and clicking noise with head movement    Imaging:   · Xray cervical spine, 5/14/22: Postsurgical changes of anterior cervical discectomy and fusion at C6-C7 and revision posterior fusion spanning C2-T1  Plan:  · Continue monitor neurological status and monitor for any new or progressive symptoms   · SARANYA removed 5/16/22  · Pain control - APS Input appreciated  Ketamine gtt  Medications adjusted per their recommendations  · Change to p o  Dilaudid every 3 hours  ·  plan to wean off ketamine tomorrow and will need to wean off IV medications to plan for rehab  · Mobilize PT/OT - recommending acute rehab  · PM&R consult placed, recommend SCI rehab  · Urinary retention  We will continue straight catheterization to assist with bladder training as discussed with physiatry as long as patient can not tolerate  · DVT PPX: SCDs  HSQ  · Dispo - d/w CM - recommend acute SCI rehab  [employer] rehab following  Patient medically stable but will need to be weaned off of IV pain medications prior to dispo  Neurosurgery will continue to follow as primary  Call with questions/concerns

## 2022-05-17 NOTE — PROGRESS NOTES
Unable to waste Ketamine drip in [address]  Walter Bryan, KAYLA witnessed 15cc's discarded in sink

## 2022-05-17 NOTE — PLAN OF CARE
Problem: MOBILITY - ADULT  Goal: Maintain or return to baseline ADL function  Description: INTERVENTIONS:  -  Assess patient's ability to carry out ADLs; assess patient's baseline for ADL function and identify physical deficits which impact ability to perform ADLs (bathing, care of mouth/teeth, toileting, grooming, dressing, etc )  - Assess/evaluate cause of self-care deficits   - Assess range of motion  - Assess patient's mobility; develop plan if impaired  - Assess patient's need for assistive devices and provide as appropriate  - Encourage maximum independence but intervene and supervise when necessary  - Involve family in performance of ADLs  - Assess for home care needs following discharge   - Consider OT consult to assist with ADL evaluation and planning for discharge  - Provide patient education as appropriate  Outcome: Progressing  Goal: Maintains/Returns to pre admission functional level  Description: INTERVENTIONS:  - Perform BMAT or MOVE assessment daily    - Set and communicate daily mobility goal to care team and patient/family/caregiver  - Collaborate with rehabilitation services on mobility goals if consulted  - Perform Range of Motion *3** times a day  - Reposition patient every *2** hours    - Dangle patient *3** times a day  - Stand patient *3** times a day  - Ambulate patient **3* times a day  - Out of bed to chair **3* times a day   - Out of bed for meals *3** times a day  - Out of bed for toileting  - Record patient progress and toleration of activity level   Outcome: Progressing     Problem: PAIN - ADULT  Goal: Verbalizes/displays adequate comfort level or baseline comfort level  Description: Interventions:  - Encourage patient to monitor pain and request assistance  - Assess pain using appropriate pain scale  - Administer analgesics based on type and severity of pain and evaluate response  - Implement non-pharmacological measures as appropriate and evaluate response  - Consider cultural and social influences on pain and pain management  - Notify physician/advanced practitioner if interventions unsuccessful or patient reports new pain  Outcome: Progressing     Problem: SAFETY ADULT  Goal: Maintain or return to baseline ADL function  Description: INTERVENTIONS:  -  Assess patient's ability to carry out ADLs; assess patient's baseline for ADL function and identify physical deficits which impact ability to perform ADLs (bathing, care of mouth/teeth, toileting, grooming, dressing, etc )  - Assess/evaluate cause of self-care deficits   - Assess range of motion  - Assess patient's mobility; develop plan if impaired  - Assess patient's need for assistive devices and provide as appropriate  - Encourage maximum independence but intervene and supervise when necessary  - Involve family in performance of ADLs  - Assess for home care needs following discharge   - Consider OT consult to assist with ADL evaluation and planning for discharge  - Provide patient education as appropriate  Outcome: Progressing  Goal: Maintains/Returns to pre admission functional level  Description: INTERVENTIONS:  - Perform BMAT or MOVE assessment daily    - Set and communicate daily mobility goal to care team and patient/family/caregiver  - Collaborate with rehabilitation services on mobility goals if consulted  - Perform Range of Motion *3** times a day  - Reposition patient every *2** hours    - Dangle patient **3* times a day  - Stand patient **3* times a day  - Ambulate patient **3* times a day  - Out of bed to chair 3** times a day   - Out of bed for meals **3* times a day  - Out of bed for toileting  - Record patient progress and toleration of activity level   Outcome: Progressing  Goal: Patient will remain free of falls  Description: INTERVENTIONS:  - Educate patient/family on patient safety including physical limitations  - Instruct patient to call for assistance with activity   - Consult OT/PT to assist with strengthening/mobility   - Keep Call bell within reach  - Keep bed low and locked with side rails adjusted as appropriate  - Keep care items and personal belongings within reach  - Initiate and maintain comfort rounds  - Make Fall Risk Sign visible to staff  - Offer Toileting every *2** Hours, in advance of need  - Initiate/Maintain *bed/chair**alarm  - Obtain necessary fall risk management equipment:   - Apply yellow socks and bracelet for high fall risk patients  - Consider moving patient to room near nurses station  Outcome: Progressing     Problem: DISCHARGE PLANNING  Goal: Discharge to home or other facility with appropriate resources  Description: INTERVENTIONS:  - Identify barriers to discharge w/patient and caregiver  - Arrange for needed discharge resources and transportation as appropriate  - Identify discharge learning needs (meds, wound care, etc )  - Arrange for interpretive services to assist at discharge as needed  - Refer to Case Management Department for coordinating discharge planning if the patient needs post-hospital services based on physician/advanced practitioner order or complex needs related to functional status, cognitive ability, or social support system  Outcome: Progressing     Problem: Knowledge Deficit  Goal: Patient/family/caregiver demonstrates understanding of disease process, treatment plan, medications, and discharge instructions  Description: Complete learning assessment and assess knowledge base    Interventions:  - Provide teaching at level of understanding  - Provide teaching via preferred learning methods  Outcome: Progressing     Problem: Potential for Falls  Goal: Patient will remain free of falls  Description: INTERVENTIONS:  - Educate patient/family on patient safety including physical limitations  - Instruct patient to call for assistance with activity   - Consult OT/PT to assist with strengthening/mobility   - Keep Call bell within reach  - Keep bed low and locked with side rails adjusted as appropriate  - Keep care items and personal belongings within reach  - Initiate and maintain comfort rounds  - Make Fall Risk Sign visible to staff  - Offer Toileting every *2** Hours, in advance of need  - Initiate/Maintain *bed/chair**alarm  - Obtain necessary fall risk management equipment:   - Apply yellow socks and bracelet for high fall risk patients  - Consider moving patient to room near nurses station  Outcome: Progressing     Problem: Nutrition/Hydration-ADULT  Goal: Nutrient/Hydration intake appropriate for improving, restoring or maintaining nutritional needs  Description: Monitor and assess patient's nutrition/hydration status for malnutrition  Collaborate with interdisciplinary team and initiate plan and interventions as ordered  Monitor patient's weight and dietary intake as ordered or per policy  Utilize nutrition screening tool and intervene as necessary  Determine patient's food preferences and provide high-protein, high-caloric foods as appropriate       INTERVENTIONS:  - Monitor oral intake, urinary output, labs, and treatment plans  - Assess nutrition and hydration status and recommend course of action  - Evaluate amount of meals eaten  - Assist patient with eating if necessary   - Allow adequate time for meals  - Recommend/ encourage appropriate diets, oral nutritional supplements, and vitamin/mineral supplements  - Order, calculate, and assess calorie counts as needed  - Recommend, monitor, and adjust tube feedings and TPN/PPN based on assessed needs  - Assess need for intravenous fluids  - Provide specific nutrition/hydration education as appropriate  - Include patient/family/caregiver in decisions related to nutrition  Outcome: Progressing     Problem: Prexisting or High Potential for Compromised Skin Integrity  Goal: Skin integrity is maintained or improved  Description: INTERVENTIONS:  - Identify patients at risk for skin breakdown  - Assess and monitor skin integrity  - Assess and monitor nutrition and hydration status  - Monitor labs   - Assess for incontinence   - Turn and reposition patient  - Assist with mobility/ambulation  - Relieve pressure over bony prominences  - Avoid friction and shearing  - Provide appropriate hygiene as needed including keeping skin clean and dry  - Evaluate need for skin moisturizer/barrier cream  - Collaborate with interdisciplinary team   - Patient/family teaching  - Consider wound care consult   Outcome: Progressing

## 2022-05-17 NOTE — ASSESSMENT & PLAN NOTE
· Status post repeat cervical surgery  · Continues to have significant neck pain with bilateral upper extremity paresthesias  · Pain and paresthesias have improved somewhat since surgery  · Patient opioid rotated to oral Dilaudid yesterday  · States he feels as though the pain is slightly better controlled, however gets 1-2 hours of relief with each dose  · Continue Tylenol 975 mg p o  q 8 hours scheduled  · Continue baclofen 5 mg p o  t i d  scheduled  · Continue gabapentin 800 mg p o  t i d  scheduled  · Continue nortriptyline 10 mg p o  HS scheduled  · Suggest change Dilaudid to 4 mg p o  q 3 hours p r n  moderate pain  · Suggest change Dilaudid to 6 mg p o  q 3 hours p r n  severe pain  · Continue Dilaudid 0 5 mg IV q 3 hours p r n  breakthrough pain  · Continue ketamine infusion at 0 2 mg/kg/HR  Will plan to wean this tomorrow  · Continue p r n  Haldol, glycopyrrolate, Ativan to mitigate possible side effects from ketamine infusion  · Continue bowel regimen to avoid opioid induced constipation while on opioid pain medication

## 2022-05-17 NOTE — PHYSICAL THERAPY NOTE
Physical Therapy Progress Note     05/17/22 1530   PT Last Visit   PT Visit Date 05/17/22   Note Type   Note Type Treatment   Pain Assessment   Pain Assessment Tool 0-10   Pain Score 10 - Worst Possible Pain   Pain Location/Orientation Location: Neck; Location: Shoulder   Restrictions/Precautions   Braces or Orthoses C/S Collar   Other Precautions Pain; Fall Risk;Spinal precautions   Subjective   Subjective Pt encountered supine in bed, pleasant and agreeable to treatment  Reports pain as noted above, requesting pain meds  Reports transient dizziness upon sitting, but no worsening with other activity  Reports LE weakness as primary reason for seated rests s/p each gait trial    Bed Mobility   Supine to Sit 3  Moderate assistance   Additional items Assist x 1   Transfers   Sit to Stand 3  Moderate assistance   Additional items Assist x 2   Stand to Sit 3  Moderate assistance   Additional items Assist x 2   Ambulation/Elevation   Gait pattern Excessively slow; Short stride; Foward flexed; Inconsistent heidy;Decreased foot clearance;Narrow ANNELISE   Gait Assistance 3  Moderate assist   Additional items Assist x 2  (+ chair follow)   Assistive Device Rolling walker   Distance 3' to pivot to recliner, then 5', 2' in straight line   Balance   Static Sitting Fair +   Dynamic Sitting Poor +   Static Standing Poor +   Ambulatory Poor   Activity Tolerance   Activity Tolerance Patient tolerated treatment well;Patient limited by fatigue;Patient limited by pain   Nurse [address] [place], RN   Assessment   Prognosis Good   Problem List Decreased strength;Decreased range of motion;Decreased endurance; Impaired balance;Decreased mobility;Pain;Orthopedic restrictions   Assessment Pt demonstrated improved functional endurance today, performing repeated sit to stand & short distance ambulation tasks during session without LOB    Requires Ax2 to perform tasks for safety, but no LE buckling noted despite excessvely slow, pruposeful steps taken with increased time taken for weight shifting prior to advancing each LE  Pt may be appropriate for RW use depending on UE  strength to safely manage RW  PT will continue to follow at this time working to further improve proficiency with transfers & increase ambulatory endurance to reduce burden of care at this time  Rehab remains appropriate to progress to PLOF  Goals   Patient Goals to have less pain   STG Expiration Date 05/23/22   PT Treatment Day 2   Plan   Treatment/Interventions Functional transfer training;LE strengthening/ROM; Endurance training;Patient/family training;Equipment eval/education; Bed mobility;Gait training; Therapeutic exercise   Progress Progressing toward goals   PT Frequency Other (Comment)  (3-6x/week)   Recommendation   PT Discharge Recommendation Post acute rehabilitation services   Equipment Recommended Wheelchair;Walker   Walker Package Recommended Wheeled walker   AM-PAC Basic Mobility Inpatient   Turning in Bed Without Bedrails 2   Lying on Back to Sitting on Edge of Flat Bed 2   Moving Bed to Chair 1   Standing Up From Chair 1   Walk in Room 1   Climb 3-5 Stairs 1   Basic Mobility Inpatient Raw Score 8   Turning Head Towards Sound 4   Follow Simple Instructions 4   Low Function Basic Mobility Raw Score 16   Low Function Basic Mobility Standardized Score 25 72   Highest Level Of Mobility   JH-HLM Goal 3: Sit at edge of bed   JH-HLM Achieved 6: Walk 10 steps or more     Edilberto Mcnamara PTA    An [facility] Basic Mobility Standardized Score of 40 78 suggests pt would benefit from post acute rehab

## 2022-05-17 NOTE — ASSESSMENT & PLAN NOTE
Jimenez removed   Intermittent straight cath - continue  Defer Jimenez replacement    Utilize PM&R SCI Bladder Protocol  - Once jimenez catheter removed, please institute following protocol at designated time interval or PRN bladder fullness:  Q6hr   - Allow patient to attempt to void  If patient voids, check PVR  Catheterize for >300cc  - If patient unable to void, bladder scan  If >400cc straight cath  - If patient has volumes >650-800 consistently (x3), increase frequency of straight cath until volumes <400cc consistently  - If you reach Q3hr straight caths, with continued high volumes, institute fluid restriction of 4571-1122 mL if hemodynamically stable/appropriate  - If patient is voiding and PVR x3 <150cc can discontinue protocol

## 2022-05-17 NOTE — PROGRESS NOTES
[address] [facility]  Progress Note Kadie Perez 1985, 43 y o  male MRN: [id number]  Unit/Bed#: [facility] 623-01 Encounter: [id number]  Primary Care Provider: Karel Chavez MD   Date and time admitted to hospital: 5/11/2022  9:16 AM    * Cord compression myelopathy [facility])  Assessment & Plan  · Status post repeat cervical surgery  · Continues to have significant neck pain with bilateral upper extremity paresthesias  · Pain and paresthesias have improved somewhat since surgery  · Patient opioid rotated to oral Dilaudid yesterday  · States he feels as though the pain is slightly better controlled, however gets 1-2 hours of relief with each dose  · Continue Tylenol 975 mg p o  q 8 hours scheduled  · Continue baclofen 5 mg p o  t i d  scheduled  · Continue gabapentin 800 mg p o  t i d  scheduled  · Continue nortriptyline 10 mg p o  HS scheduled  · Suggest change Dilaudid to 4 mg p o  q 3 hours p r n  moderate pain  · Suggest change Dilaudid to 6 mg p o  q 3 hours p r n  severe pain  · Continue Dilaudid 0 5 mg IV q 3 hours p r n  breakthrough pain  · Continue ketamine infusion at 0 2 mg/kg/HR  Will plan to wean this tomorrow  · Continue p r n  Haldol, glycopyrrolate, Ativan to mitigate possible side effects from ketamine infusion  · Continue bowel regimen to avoid opioid induced constipation while on opioid pain medication  Acute pain due to cervical spinal surgery  APS will continue to follow  Please contact Acute Pain Service - [facility] via [employer] from [phone] with additional questions or concerns  See Deyanira or Rafael for additional contacts and after hours information  Pain History  Current pain location(s):  Neck  Pain Scale:   9 to 10/10  Quality:  Sharp, aching  24 hour history:  Patient continues to complain of pain in his posterior neck, perhaps slightly improved after change to oral Dilaudid yesterday    States the pain relief from oral pain medications last approximately 1-2 hours  Opioid requirement previous 24 hours:  Dilaudid 36 mg p o , Dilaudid 2 mg IV  Meds/Allergies   all current active meds have been reviewed, current meds:   Current Facility-Administered Medications   Medication Dose Route Frequency    acetaminophen (TYLENOL) tablet 975 mg  975 mg Oral Q8H [facility]    atorvastatin (LIPITOR) tablet 40 mg  40 mg Oral After Dinner    baclofen tablet 5 mg  5 mg Oral TID    bisacodyl (DULCOLAX) rectal suppository 10 mg  10 mg Rectal Daily PRN    busPIRone (BUSPAR) tablet 5 mg  5 mg Oral BID    docusate sodium (COLACE) capsule 100 mg  100 mg Oral BID    gabapentin (NEURONTIN) capsule 800 mg  800 mg Oral TID    glycopyrrolate (ROBINUL) injection 0 2 mg  0 2 mg Intravenous Q4H PRN    haloperidol lactate (HALDOL) injection 2 mg  2 mg Intramuscular Q30 Min PRN    heparin (porcine) subcutaneous injection 5,000 Units  5,000 Units Subcutaneous Q8H [facility]    HYDROmorphone (DILAUDID) injection 0 5 mg  0 5 mg Intravenous Q3H PRN    HYDROmorphone (DILAUDID) tablet 4 mg  4 mg Oral Q4H PRN    Or    HYDROmorphone (DILAUDID) tablet 6 mg  6 mg Oral Q4H PRN    ketamine 250 mg (STANDARD CONCENTRATION) IV in sodium chloride 0 9% 250 mL  0 2 mg/kg/hr Intravenous Continuous    LORazepam (ATIVAN) injection 0 5 mg  0 5 mg Intravenous Q4H PRN    melatonin tablet 3 mg  3 mg Oral HS    naloxone (NARCAN) 0 04 mg/mL syringe 0 04 mg  0 04 mg Intravenous Q1MIN PRN    nortriptyline (PAMELOR) capsule 10 mg  10 mg Oral HS    ondansetron (ZOFRAN) injection 4 mg  4 mg Intravenous Q4H PRN    senna (SENOKOT) tablet 8 6 mg  1 tablet Oral Daily    sodium chloride 0 9 % infusion  125 mL/hr Intravenous Continuous    tamsulosin (FLOMAX) capsule 0 4 mg  0 4 mg Oral After Dinner    and PTA meds:   Prior to Admission Medications   Prescriptions Last Dose Informant Patient Reported? Taking?    Baclofen 5 MG TABS 5/10/2022 at Unknown time Outside Facility ([facility]) Yes Yes Calcium Carbonate Antacid (CALCIUM CARBONATE PO) 5/10/2022 at Unknown time Outside Facility ([address] [place] [address]) Yes Yes   Sig: Take by mouth   Multiple Vitamin (multivitamin) capsule 5/10/2022 at Unknown time Outside Facility ([address] [place] [address]) Yes Yes   Sig: Take 1 capsule by mouth daily   acetaminophen (TYLENOL) 500 mg tablet Past Month at Unknown time Outside Facility ([address] [place] [address]) Yes Yes   Sig: Take 1,000 mg by mouth every 6 (six) hours as needed for mild pain   atorvastatin (LIPITOR) 40 mg tablet 5/10/2022 at Unknown time Outside Facility (Specify) No Yes   Sig: Take 1 tablet (40 mg total) by mouth daily with dinner   baclofen 10 mg tablet 5/10/2022 at Unknown time Outside Facility ([address] [place] [address]) Yes Yes   Sig: Take 5 mg by mouth 3 (three) times a day   bisacodyl (DULCOLAX) 10 mg suppository Past Week at Unknown time Outside Facility (Specify) No Yes   Sig: Insert 1 suppository (10 mg total) into the rectum daily as needed for constipation   busPIRone (BUSPAR) 5 mg tablet 5/10/2022 at Unknown time  Yes Yes   celecoxib (CeleBREX) 100 mg capsule Unknown at Unknown time Outside Facility (Specify) Yes No   Sig: Take 100 mg by mouth 2 (two) times a day   Patient not taking: No sig reported   gabapentin (NEURONTIN) 300 mg capsule 5/10/2022 at Unknown time Outside Facility (Specify) No Yes   Sig: Take 2 capsules (600 mg total) by mouth 3 (three) times a day   melatonin 3 mg 5/10/2022 at Unknown time Outside Facility (Specify) No Yes   Sig: Take 1 tablet (3 mg total) by mouth daily at bedtime   meloxicam (MOBIC) 15 mg tablet 5/10/2022 at Unknown time  Yes Yes   Sig:     nortriptyline (PAMELOR) 10 mg capsule 5/10/2022 at Unknown time Outside Facility (Specify) Yes Yes   Sig: Take 10 mg by mouth daily at bedtime   senna-docusate sodium (SENOKOT S) 8 6-50 mg per tablet Not Taking at Unknown time Outside Facility (Specify) No No   Sig: Take 1 tablet by mouth 2 (two) times a day   Patient not taking: Reported on 5/11/2022   tamsulosin (FLOMAX) 0 4 mg Unknown at Unknown time  No No   Sig: Take 1 capsule (0 4 mg total) by mouth daily with dinner      Facility-Administered Medications: None       No Known Allergies    Objective     Temp:  [98 6 °F (37 °C)-99 8 °F (37 7 °C)] 98 6 °F (37 °C)  HR:  [104-120] 107  Resp:  [17-21] 18  BP: (113-159)/() 117/80    Physical Exam  Gen: Awake and alert, NAD, Does not appear ill  Vital signs reviewed  Nursing notes reviewed  Eyes: PERRL, sclera/conjunctiva normal   ENT: No JVD, trachea midline, moist mucus membranes  CV: Heart normal rhythm and normal rate  No extra systoles  Skin: Warm, dry  Cap refill <2 seconds  No diaphoresis  Neuro: A&O x 3, GCS 15 (E4, V5, M6)  No obvious focal motor deficit  Psych: Normal speech, normal attention, normal behavior  Lab Results:   Results from last 7 days   Lab Units 05/15/22  0532   WBC Thousand/uL 10 17*   HEMOGLOBIN g/dL 11 6*   HEMATOCRIT % 36 5   PLATELETS Thousands/uL 293      Results from last 7 days   Lab Units 05/15/22  0532 05/13/22  0625 05/11/22  1005   POTASSIUM mmol/L 3 5   < > 4 2   CHLORIDE mmol/L 105   < > 109*   CO2 mmol/L 27   < > 28   BUN mg/dL 9   < > 15   CREATININE mg/dL 0 91   < > 0 97   CALCIUM mg/dL 9 1   < > 9 4   ALK PHOS U/L  --   --  79   ALT U/L  --   --  38   AST U/L  --   --  23    < > = values in this interval not displayed  Estimated Creatinine Clearance: 102 9 mL/min (by C-G formula based on SCr of 0 91 mg/dL)  Imaging Studies: I have personally reviewed pertinent reports  Counseling / Coordination of Care  Greater than 50% of total time was spent with the patient and / or family counseling and / or coordination of care  A description of the counseling / coordination of care:  Patient interview, physical examination, review of medical record, review of imaging and laboratory data, development of pain management plan, discussion of pain management plan with patient and primary service   Explanation of risks and benefits of suggested pain medications  Please note that the APS provides consultative services regarding pain management only  With the exception of ketamine and epidural infusions and except when indicated, final decisions regarding starting or changing doses of analgesic medications are at the discretion of the consulting service  Off hours consultation and/or medication management is generally not available  Portions of the record may have been created with voice recognition software  Occasional wrong-word or 'sound-a-like' substitutions may have occurred due to the inherent limitations of voice recognition software       Chuyita Jenkins PA-C  Acute Pain Service

## 2022-05-17 NOTE — PROGRESS NOTES
PHYSICAL MEDICINE AND REHABILITATION CONSULT NOTE  Beltran Corbin 43 y o  male MRN: [id number]  Unit/Bed#: [facility] 623-01 Encounter: [id number]    Requested by (Physician/Service): Darleen Funes MD  Reason for Consultation:  Assessment of rehabilitation needs    Assessment:  Rehabilitation Diagnosis:   · Spinal cord compression with myelopathy s/p revision PCDF, revise/replace right C2 screw with laminar screw, revise left C2 screw, remove bilateral C7 lateral mass screws, place navigated C7, T1 pedicle screws, C6-C7 laminectomy, arthrodesis C2-C3 and C6-T1  · History of spinal cord injury s/p PCDF C2-C7 3/7/2021   Impaired mobility and self care       Recommendations:  Rehabilitation Plan:   Continue PT/OT (SLP) while on acute care   Urinary output is acceptable, well below 2 5L daily  Would continue straight cathing, reduced risk for infection over indwelling catheter, However hand dexterity not consistent with being able to self-cath at home and will likely need a long term indwelling jimenez if limited improvement   Would benefit greatly from dedicated SCI rehabilitation program    Covid-19 Testing: [facility] inpatient rehabilitation units require testing within 48 hours of all potential admissions at this time  *Re-testing is NOT required for patients recovering from COVID-19 infection if isolation has been discontinued per CDC criteria  Medical Co-morbidities Plan:  · Acute post-operative pain   · Acute blood loss anemia   · Hypertension   · Urinary retention with jimenez in place  · Hypokalemia   · Hx of UTI   · Hx of orchitis s/p right orchiectomy   · DVT ppx: heparin SQ and SCD      History of Present Illness:  Beltran Corbin is a 43 y o  male with a PMH of depression, PTSD, hyperlipidemia who presented to the [facility] on 5/11/22 following outpatient neurosurgery follow up and MRI of the cervical spine   He has a history of a fall resulting in a severe spinal cord injury in March of 2021  He underwent posterior cervical decompression fixation and fusion C2-C7 on 3/7/2021  He was discharged to acute rehabilitation at [facility] and from there to [facility]  AND [place]  He was seen by neurosurgery for his one year follow up for which he had x-rays done that showed widening of C6-C7 disc space and concerns for hardware failure  He also reported increased neck pain with radiation down his arms with certain movements since September 2021  MRI was ordered and showed new C6-C7 barbara cord compression with signal abnormality  He was admitted and on 5/12/22 underwent anterior cervical decompression and fusion C6-C7, revision of posterior cervical compression fusion, replacement of right C2 screw and revise left C2 screw  Bilateral C7 lateral mass screws were removed and navigated C7, T1 pedicle screws were placed  C6-C7 laminectomy, arthrodesis C2-3 and C6-T1  PM&R are consulted for rehabilitation recommendations  He was seen in consult on 5/13 and we are returning for follow up  He still endorses some pain and remains of a ketamine gtt at this time  He denies fever, chills, nausea, emesis, cough, shortness of breath, endorses difficulty urinating and having BMs  Review of Systems: 10 point ROS negative except for what is noted in HPI    Function:  Prior level of function and living situation:  Per documentation July of 2021 admission prior to his fall he was independent and working  He lived with a friend in a two story home with 4 DAR[address]  He was discharged in March 2021 to [facility] rehabilitation and then to [facility] rehabilitation after about 4 weeks  Per patient he received minimal therapy there up until about August   Since that time he has not been receiving any therapy  He reports an outpatient  of a program he is working with to secure an apartment and assistance in the community        Current level of function:  Physical Therapy: bed mobility Mod A x1, Transfers Mod A x2, ambulation RW 3' Mod A  Occupational Therapy: Max A for UB and LB ADLs      Physical Exam:  /80   Pulse (!) 107   Temp 98 6 °F (37 °C)   Resp 18   Ht 5' 7" (1 702 m) Comment: per patient  Wt 77 1 kg (169 lb 15 6 oz)   SpO2 94%   BMI 26 62 kg/m²        Intake/Output Summary (Last 24 hours) at 5/17/2022 1347  Last data filed at 5/17/2022 1333  Gross per 24 hour   Intake 367 75 ml   Output 1250 ml   Net -882 25 ml       Body mass index is 26 62 kg/m²  Physical Exam  Constitutional:       General: He is not in acute distress  HENT:      Head: Normocephalic and atraumatic  Right Ear: External ear normal       Left Ear: External ear normal       Nose: Nose normal  No rhinorrhea  Mouth/Throat:      Mouth: Mucous membranes are moist       Pharynx: Oropharynx is clear  Eyes:      General: No scleral icterus  Neck:      Comments: C-collar  Cardiovascular:      Rate and Rhythm: Normal rate  Pulses: Normal pulses  Pulmonary:      Effort: Pulmonary effort is normal  No respiratory distress  Abdominal:      General: There is no distension  Musculoskeletal:      Right lower leg: No edema  Left lower leg: No edema  Skin:     General: Skin is warm  Neurological:      Mental Status: He is alert and oriented to person, place, and time  Motor: Weakness present        Comments: Weakness similar to prior exam    Psychiatric:         Mood and Affect: Mood normal          Behavior: Behavior normal             Social History:    Social History     Socioeconomic History    Marital status: Single     Spouse name: None    Number of children: None    Years of education: 15    Highest education level: None   Occupational History    Occupation:     Tobacco Use    Smoking status: Former Smoker     Packs/day: 0 25     Years: 10 00     Pack years: 2 50     Types: Cigarettes    Smokeless tobacco: Never Used   Vaping Use    Vaping Use: Never used   Substance and Sexual Activity    Alcohol use: Not Currently     Comment: none    Drug use: Never     Comment: none    Sexual activity: None   Other Topics Concern    None   Social History Narrative    ** Merged History Encounter **         Most recent tobacco use screenin2020  Do you currently or have you served in the [employer]: No  Were you activated, into active duty, as a member of the [employer] or as a Reservist: No  Occupation:   Education: 12  Marital stat    us: Single  Exercise level: Occasional  Diet: Regular  General stress level: Medium  Alcohol intake: None  Caffeine intake:  Moderate  Chewing tobacco: none  Illicit drugs: none  Seat belts used routinely: Yes  Sunscreen used routinely: No  Smoke alarm in     home: Yes  Advance directive: No  Salt Intake: minimal     Social Determinants of Health     Financial Resource Strain: Not on file   Food Insecurity: Not on file   Transportation Needs: Not on file   Physical Activity: Not on file   Stress: Not on file   Social Connections: Not on file   Intimate Partner Violence: Not on file   Housing Stability: Not on file        Family History:    Family History   Problem Relation Age of Onset    No Known Problems Mother     No Known Problems Father          Medications:     Current Facility-Administered Medications:     acetaminophen (TYLENOL) tablet 975 mg, 975 mg, Oral, Q8H [facility], Gina Smith PA-C, 975 mg at 22 1327    atorvastatin (LIPITOR) tablet 40 mg, 40 mg, Oral, After Dinner, Janice Biundo, PA-C, 40 mg at 22 1634    baclofen tablet 5 mg, 5 mg, Oral, TID, Janice Biundo, PA-C, 5 mg at 22 1605    bisacodyl (DULCOLAX) rectal suppository 10 mg, 10 mg, Rectal, Daily PRN, Janice Sincere, PA-C    busPIRone (BUSPAR) tablet 5 mg, 5 mg, Oral, BID, Janice Biundo, PA-C, 5 mg at 22 5945    docusate sodium (COLACE) capsule 100 mg, 100 mg, Oral, BID, Gina Smith PA-C    gabapentin (NEURONTIN) capsule 800 mg, 800 mg, Oral, TID, JAYLYN Powell, 800 mg at 05/17/22 9882    glycopyrrolate (ROBINUL) injection 0 2 mg, 0 2 mg, Intravenous, Q4H PRN, JAYLYN Champion    haloperidol lactate (HALDOL) injection 2 mg, 2 mg, Intramuscular, Q30 Min PRN, JAYLYN Champion    heparin (porcine) subcutaneous injection 5,000 Units, 5,000 Units, Subcutaneous, Q8H [address], Gina Rodriguez PA-C, 5,000 Units at 05/17/22 1327    HYDROmorphone (DILAUDID) injection 0 5 mg, 0 5 mg, Intravenous, Q3H PRN, Khushi Carcamo PA-C, 0 5 mg at 05/17/22 1328    HYDROmorphone (DILAUDID) tablet 4 mg, 4 mg, Oral, Q4H PRN **OR** HYDROmorphone (DILAUDID) tablet 6 mg, 6 mg, Oral, Q4H PRN, Alejandra Ferrer PA-C, 6 mg at 05/17/22 1037    ketamine 250 mg (STANDARD CONCENTRATION) IV in sodium chloride 0 9% 250 mL, 0 2 mg/kg/hr, Intravenous, Continuous, Bright Carmelina Goddard MD, Last Rate: 15 mL/hr at 05/17/22 0911, 0 2 mg/kg/hr at 05/17/22 0911    LORazepam (ATIVAN) injection 0 5 mg, 0 5 mg, Intravenous, Q4H PRN, JAYLYN Champion    melatonin tablet 3 mg, 3 mg, Oral, HS, Janice MICHAEL Post-BETH, 3 mg at 05/16/22 2130    naloxone (NARCAN) 0 04 mg/mL syringe 0 04 mg, 0 04 mg, Intravenous, Q1MIN PRN, Khushi Carcamo PA-C    nortriptyline (PAMELOR) capsule 10 mg, 10 mg, Oral, HS, Janice MICHAEL oPst-C, 10 mg at 05/16/22 2130    ondansetron (ZOFRAN) injection 4 mg, 4 mg, Intravenous, Q4H PRN, Khushi Carcamo PA-C, 4 mg at 05/12/22 1433    senna (SENOKOT) tablet 8 6 mg, 1 tablet, Oral, Daily, Gina Rodriguez PA-C    sodium chloride 0 9 % infusion, 125 mL/hr, Intravenous, Continuous, Janice Post PA-C, Stopped at 05/12/22 0933    tamsulosin (FLOMAX) capsule 0 4 mg, 0 4 mg, Oral, After Dinner, Mike Vergara PA-C, 0 4 mg at 05/16/22 1636    Past Medical History:     Past Medical History:   Diagnosis Date    Chronic depression     PTSD (post-traumatic stress disorder) Past Surgical History:     Past Surgical History:   Procedure Laterality Date    CERVICAL FUSION N/A 3/6/2021    Procedure: POSTERIOR C3-5 laminectomy, C2-7 fixation fusion, possible additional levels;  Surgeon: Yareli Alcala MD;  [facility];  Service: Neurosurgery    IR PICC PLACEMENT DOUBLE LUMEN  3/8/2021    MD ARTHRODESIS ANT INTERBODY MIN DISCECTOMY, CERVICAL BELOW C2 N/A 5/12/2022    Procedure: ACDF C6-7; Revision PCDF, revise/replace right C2 screw with laminar screw, revise left C2 screw (cap), remove bilateral C7 lateral mass screws, place navigated C7, T1 pedicle screws, C6-7 laminectomy, arthrodesis C2-3 and C6-T1;  Surgeon: Kiley Mcclain MD;  Location[facility];  Service: Neurosurgery    SCROTAL SURGERY Right 7/20/2021    Procedure: Right scrotal exploration, Irrigation and debridement Right orchiectomy;  Surgeon: Jorge A Rosenberg MD;  Location[facility];  Service: Urology         Allergies:     No Known Allergies        LABORATORY RESULTS:      Lab Results   Component Value Date    HGB 11 6 (L) 05/15/2022    HCT 36 5 05/15/2022    WBC 10 17 (H) 05/15/2022     Lab Results   Component Value Date    BUN 9 05/15/2022    K 3 5 05/15/2022     05/15/2022    GLUCOSE 87 03/06/2021    CREATININE 0 91 05/15/2022     Lab Results   Component Value Date    PROTIME 12 5 05/11/2022    INR 0 97 05/11/2022        DIAGNOSTIC STUDIES: Reviewed  XR cervical spine 2 or 3 views    Result Date: 5/15/2022  Impression: Postsurgical changes of anterior cervical discectomy and fusion at C6-C7 and revision posterior fusion spanning C2-T1  Workstation performed: [id number]     XR spine cervical 2 or 3 vw injury    Result Date: 5/12/2022  Impression: Fluoroscopic guidance provided for intraoperative localization during cervical fusion procedure  Please refer to the separate procedure notes for additional details   Workstation performed: [id number]     CT cervical spine without contrast    Result Date: 5/11/2022  Impression: Loosening of the bilateral C7 screws noted There is a fracture of the right lateral mass C2 screw Findings concur with the preliminary report by the referring clinician already in PACS and/or our electronic record EPIC  Workstation performed: [id number]     MRI cervical spine with and without contrast    Result Date: 5/11/2022  Impression: 1  New focus of cord compression at C6-C7 secondary to a new disc herniation with cord signal abnormality at this level, worrisome for new cord edema, along the caudal margin of the spinal construct  Spine surgical assessment advised  2   Interval development of cystic myelomalacia and cord atrophy at the C4-5 level in the region of prior cord atrophy which has been ameliorated by decompressive laminectomies C3-C5  3   Posterior fusion hardware C2-C7 noted   I personally discussed this study with Farheen Kumar on 5/11/2022 at 8:15 AM  Workstation performed: [id number]     Maricarmen Virgen DO  Physical Medicine and [place]

## 2022-05-17 NOTE — PLAN OF CARE
Problem: PHYSICAL THERAPY ADULT  Goal: Performs mobility at highest level of function for planned discharge setting  See evaluation for individualized goals  Description: Treatment/Interventions: Functional transfer training, LE strengthening/ROM, Therapeutic exercise, Endurance training, Patient/family training, Equipment eval/education, Bed mobility, Gait training, Spoke to nursing, OT  Equipment Recommended: Wheelchair       See flowsheet documentation for full assessment, interventions and recommendations  Outcome: Progressing  Note: Prognosis: Good  Problem List: Decreased strength, Decreased range of motion, Decreased endurance, Impaired balance, Decreased mobility, Pain, Orthopedic restrictions  Assessment: Pt demonstrated improved functional endurance today, performing repeated sit to stand & short distance ambulation tasks during session without LOB  Requires Ax2 to perform tasks for safety, but no LE buckling noted despite excessvely slow, pruposeful steps taken with increased time taken for weight shifting prior to advancing each LE  Pt may be appropriate for RW use depending on UE  strength to safely manage RW  PT will continue to follow at this time working to further improve proficiency with transfers & increase ambulatory endurance to reduce burden of care at this time  Rehab remains appropriate to progress to PLOF  Barriers to Discharge: Inaccessible home environment, Decreased caregiver support        PT Discharge Recommendation: Post acute rehabilitation services          See flowsheet documentation for full assessment

## 2022-05-17 NOTE — PROGRESS NOTES
[address] [facility]  Progress Note Kadie Perez 1985, 43 y o  male MRN: [id number]  Unit/Bed#: [facility] 623-01 Encounter: [id number]  Primary Care Provider: Karel Chavez MD   Date and time admitted to hospital: 5/11/2022  9:16 AM    * Cord compression myelopathy [facility])  Assessment & Plan  POD5 ACDF C6-7; Revision PCDF, revise/replace right C2 screw with laminar screw, revise left C2 screw (cap), remove bilateral C7 lateral mass screws, place navigated C7, T1 pedicle screws, C6-7 laminectomy, arthrodesis C2-3 and C6-T1 (5/12/22 [place])  · Patient presents as direct admission after outpatient MRI demonstrate cord compression requiring surgical intervention  · s/p severe central spinal cord injury, Jania B spinal cord injury March 2021 after falling down stairs Status post posterior cervical decompression fixation and fusion C2-C7 3/7/2021 Dr Julio Cesar Muniz  · Patient complains of decline in function, new urinary difficulties and c/o increased neck pain, with intermittent radicular symptoms and clicking noise with head movement    Imaging:   · Xray cervical spine, 5/14/22: Postsurgical changes of anterior cervical discectomy and fusion at C6-C7 and revision posterior fusion spanning C2-T1  Plan:  · Continue monitor neurological status and monitor for any new or progressive symptoms   · SARANYA removed 5/16/22  · Pain control - APS Input appreciated  Ketamine gtt  Medications adjusted per their recommendations  · Change to p o  Dilaudid every 3 hours  ·  plan to wean off ketamine tomorrow and will need to wean off IV medications to plan for rehab  · Mobilize PT/OT - recommending acute rehab  · PM&R consult placed, recommend SCI rehab  · Urinary retention  We will continue straight catheterization to assist with bladder training as discussed with physiatry as long as patient can not tolerate  · DVT PPX: SCDs  HSQ  · Dispo - d/w CM - recommend acute SCI rehab    [employer] rehab following  Patient medically stable but will need to be weaned off of IV pain medications prior to dispo  Neurosurgery will continue to follow as primary  Call with questions/concerns  Anemia  Assessment & Plan  Multifactorial   Improved (Hgb 11 6)    Benign essential HTN  Assessment & Plan  Continue to monitor  Maintaining MAP>85    Urinary retention  Assessment & Plan  Jimenez removed   Intermittent straight cath - continue  Defer Jimenez replacement    Utilize PM&R SCI Bladder Protocol  - Once jimenez catheter removed, please institute following protocol at designated time interval or PRN bladder fullness:  Q6hr   - Allow patient to attempt to void  If patient voids, check PVR  Catheterize for >300cc  - If patient unable to void, bladder scan  If >400cc straight cath  - If patient has volumes >650-800 consistently (x3), increase frequency of straight cath until volumes <400cc consistently  - If you reach Q3hr straight caths, with continued high volumes, institute fluid restriction of 3583-5584 mL if hemodynamically stable/appropriate  - If patient is voiding and PVR x3 <150cc can discontinue protocol  Subjective/Objective   Chief Complaint:  I have pain    Subjective:   Patient continues with pain in the back his neck radiation into his shoulders  No pain radiating into his arms  Improving continues in his hands but remains significant his feet  Denies any speech or swallowing difficulties  Denies any chest pain shortness of breath  Denies any nausea or abdominal pain  Continues with urinary retention  States last bowel movement two days ago  Continue bowel regimen  States eating well though did not have lunch today secondary to no  appetite  Objective:  Sitting up in bed  NAD  I/O       05/15 0701  05/16 0700 05/16 0701  05/17 0700 05/17 0701  05/18 0700    P  O   240     I V  (mL/kg)  864 4 (11 2) 367 8 (4 8)    Total Intake(mL/kg)  1104 4 (14 3) 367 8 (4 8)    Urine (mL/kg/hr) 833 (0 5) 1350 (0 7) 500 (0 7)    Drains 20      Total Output 853 1350 500    Net -406 -706 6 -132 3                 Invasive Devices  Report    Peripheral Intravenous Line  Duration           Peripheral IV 05/16/22 Right Forearm 1 day                Physical Exam:  Vitals: Blood pressure 120/84, pulse (!) 111, temperature 98 8 °F (37 1 °C), resp  rate 18, height 5' 7" (1 702 m), weight 77 1 kg (169 lb 15 6 oz), SpO2 91 %  ,Body mass index is 26 62 kg/m²  General appearance: alert, appears stated age, cooperative and no distress  Head: Normocephalic, without obvious abnormality,  Eyes: conjugate gaze and tracks appropriately in room  Neck:  [place] collar in place  Dressings intact  Lungs: non labored breathing  Heart:  Mild tachycardia  Neurologic:   Mental status: Alert, oriented, thought content appropriate  Cranial nerves: grossly intact (Cranial nerves II-XII)  Sensory: abnl diffuse arms, limited pins and needles prox and more intense distal  Pins and needles to LT BLE most intense in feet  Motor: moving all extremities  prox arms 3-4  Hands 2-3   LE 4-4+ with exception right foot 2/5 DF    Lab Results:  Results from last 7 days   Lab Units 05/15/22  0532 05/14/22  0507 05/13/22  0625   WBC Thousand/uL 10 17* 11 94* 13 07*   HEMOGLOBIN g/dL 11 6* 11 0* 10 5*   HEMATOCRIT % 36 5 34 7* 33 3*   PLATELETS Thousands/uL 293 286 279     Results from last 7 days   Lab Units 05/15/22  0532 05/14/22  0507 05/13/22  0625 05/11/22  1005   POTASSIUM mmol/L 3 5 3 5 3 4* 4 2   CHLORIDE mmol/L 105 111* 115* 109*   CO2 mmol/L 27 27 23 28   BUN mg/dL 9 11 8 15   CREATININE mg/dL 0 91 0 97 0 88 0 97   CALCIUM mg/dL 9 1 8 8 7 3* 9 4   ALK PHOS U/L  --   --   --  79   ALT U/L  --   --   --  38   AST U/L  --   --   --  23             Results from last 7 days   Lab Units 05/11/22  1005   INR  0 97   PTT seconds 31     No results found for: TROPONINT  ABG:  Lab Results   Component Value Date    PHART 7 441 03/07/2021 OPK3GGY 36 7 03/07/2021    PO2ART 162 7 (H) 03/07/2021    XCW2VIN 24 4 03/07/2021    BEART 0 6 03/07/2021    SOURCE Line, Arterial 03/07/2021       Imaging Studies: I have personally reviewed pertinent reports  and I have personally reviewed pertinent films in PACS    XR cervical spine 2 or 3 views    Result Date: 5/15/2022  Impression: Postsurgical changes of anterior cervical discectomy and fusion at C6-C7 and revision posterior fusion spanning C2-T1  Workstation performed: [id number]     XR spine cervical 2 or 3 vw injury    Result Date: 5/12/2022  Impression: Fluoroscopic guidance provided for intraoperative localization during cervical fusion procedure  Please refer to the separate procedure notes for additional details  Workstation performed: [id number]     CT cervical spine without contrast    Result Date: 5/11/2022  Impression: Loosening of the bilateral C7 screws noted There is a fracture of the right lateral mass C2 screw Findings concur with the preliminary report by the referring clinician already in PACS and/or our electronic record EPIC  Workstation performed: [id number]     MRI cervical spine with and without contrast    Result Date: 5/11/2022  Impression: 1  New focus of cord compression at C6-C7 secondary to a new disc herniation with cord signal abnormality at this level, worrisome for new cord edema, along the caudal margin of the spinal construct  Spine surgical assessment advised  2   Interval development of cystic myelomalacia and cord atrophy at the C4-5 level in the region of prior cord atrophy which has been ameliorated by decompressive laminectomies C3-C5  3   Posterior fusion hardware C2-C7 noted   I personally discussed this study with Ama Erazo on 5/11/2022 at 8:15 AM  Workstation performed: [id number]       EKG, Pathology, and Other Studies: none    VTE Pharmacologic Prophylaxis: Heparin    VTE Mechanical Prophylaxis: sequential compression device

## 2022-05-17 NOTE — OCCUPATIONAL THERAPY NOTE
Occupational Therapy Treatment Note:      05/17/22 1502   OT Last Visit   OT Visit Date 05/17/22   Note Type   Note Type Treatment   Restrictions/Precautions   Braces or Orthoses C/S Collar   Pain Assessment   Pain Assessment Tool 0-10   Pain Score 10 - Worst Possible Pain   Pain Location/Orientation Orientation: Bilateral;Location: Shoulder; Location: Neck   Hospital Pain Intervention(s) Medication (See MAR)   ADL   Where Assessed Edge of bed   UB Dressing Assistance 2  Maximal Assistance   UB Dressing Comments x 2   LB Dressing Assistance 2  Maximal Assistance   Toileting Assistance  2  Maximal Assistance   Functional Standing Tolerance   Time p+ balance during functional mobility   Bed Mobility   Supine to Sit 3  Moderate assistance   Transfers   Sit to Stand 3  Moderate assistance   Additional items Assist x 1;Assist x 2   Stand pivot 3  Moderate assistance   Additional items   (ax2)   Cognition   Overall Cognitive Status WFL   Activity Tolerance   Activity Tolerance Patient tolerated treatment well   Assessment   Assessment pt participated in pm ot session and was seen focusing on dressing b ue functoinal use / rom and positioning  pt was able to walk with asst x 2 and a close chair follow through bedroom  Plan   Treatment Interventions ADL retraining;Functional transfer training; Endurance training;Patient/family training;Equipment evaluation/education; Activityengagement   Goal Expiration Date 05/27/22   OT Treatment Day 2   OT Frequency 3-5x/wk   Recommendation   OT Discharge Recommendation Post acute rehabilitation services   OT - OK to Discharge Yes   REED Alonso

## 2022-05-18 PROCEDURE — 99024 POSTOP FOLLOW-UP VISIT: CPT | Performed by: PHYSICIAN ASSISTANT

## 2022-05-18 PROCEDURE — 97112 NEUROMUSCULAR REEDUCATION: CPT

## 2022-05-18 PROCEDURE — 97530 THERAPEUTIC ACTIVITIES: CPT

## 2022-05-18 RX ORDER — HYDROMORPHONE HCL/PF 1 MG/ML
0.5 SYRINGE (ML) INJECTION EVERY 6 HOURS PRN
Status: DISCONTINUED | OUTPATIENT
Start: 2022-05-18 | End: 2022-05-19

## 2022-05-18 RX ADMIN — HYDROMORPHONE HYDROCHLORIDE 0.5 MG: 1 INJECTION, SOLUTION INTRAMUSCULAR; INTRAVENOUS; SUBCUTANEOUS at 16:33

## 2022-05-18 RX ADMIN — NORTRIPTYLINE HYDROCHLORIDE 10 MG: 10 CAPSULE ORAL at 22:19

## 2022-05-18 RX ADMIN — GABAPENTIN 800 MG: 400 CAPSULE ORAL at 15:34

## 2022-05-18 RX ADMIN — HYDROMORPHONE HYDROCHLORIDE 6 MG: 2 TABLET ORAL at 15:33

## 2022-05-18 RX ADMIN — HEPARIN SODIUM 5000 UNITS: 5000 INJECTION INTRAVENOUS; SUBCUTANEOUS at 12:29

## 2022-05-18 RX ADMIN — ACETAMINOPHEN 975 MG: 325 TABLET, FILM COATED ORAL at 05:21

## 2022-05-18 RX ADMIN — Medication 0.1 MG/KG/HR: at 12:29

## 2022-05-18 RX ADMIN — TAMSULOSIN HYDROCHLORIDE 0.4 MG: 0.4 CAPSULE ORAL at 16:33

## 2022-05-18 RX ADMIN — GABAPENTIN 800 MG: 400 CAPSULE ORAL at 09:09

## 2022-05-18 RX ADMIN — HYDROMORPHONE HYDROCHLORIDE 6 MG: 2 TABLET ORAL at 22:19

## 2022-05-18 RX ADMIN — HYDROMORPHONE HYDROCHLORIDE 6 MG: 2 TABLET ORAL at 01:36

## 2022-05-18 RX ADMIN — HYDROMORPHONE HYDROCHLORIDE 6 MG: 2 TABLET ORAL at 12:29

## 2022-05-18 RX ADMIN — ACETAMINOPHEN 975 MG: 325 TABLET, FILM COATED ORAL at 22:18

## 2022-05-18 RX ADMIN — ATORVASTATIN CALCIUM 40 MG: 40 TABLET, FILM COATED ORAL at 16:32

## 2022-05-18 RX ADMIN — HYDROMORPHONE HYDROCHLORIDE 6 MG: 2 TABLET ORAL at 19:03

## 2022-05-18 RX ADMIN — ACETAMINOPHEN 975 MG: 325 TABLET, FILM COATED ORAL at 12:29

## 2022-05-18 RX ADMIN — HYDROMORPHONE HYDROCHLORIDE 6 MG: 2 TABLET ORAL at 05:25

## 2022-05-18 RX ADMIN — BUSPIRONE HYDROCHLORIDE 5 MG: 5 TABLET ORAL at 22:19

## 2022-05-18 RX ADMIN — HEPARIN SODIUM 5000 UNITS: 5000 INJECTION INTRAVENOUS; SUBCUTANEOUS at 05:21

## 2022-05-18 RX ADMIN — Medication 3 MG: at 22:19

## 2022-05-18 RX ADMIN — HEPARIN SODIUM 5000 UNITS: 5000 INJECTION INTRAVENOUS; SUBCUTANEOUS at 22:19

## 2022-05-18 RX ADMIN — GABAPENTIN 800 MG: 400 CAPSULE ORAL at 22:19

## 2022-05-18 RX ADMIN — BUSPIRONE HYDROCHLORIDE 5 MG: 5 TABLET ORAL at 09:09

## 2022-05-18 RX ADMIN — HYDROMORPHONE HYDROCHLORIDE 6 MG: 2 TABLET ORAL at 09:09

## 2022-05-18 RX ADMIN — BACLOFEN 5 MG: 10 TABLET ORAL at 15:33

## 2022-05-18 RX ADMIN — HYDROMORPHONE HYDROCHLORIDE 0.5 MG: 1 INJECTION, SOLUTION INTRAMUSCULAR; INTRAVENOUS; SUBCUTANEOUS at 23:35

## 2022-05-18 RX ADMIN — HYDROMORPHONE HYDROCHLORIDE 0.5 MG: 1 INJECTION, SOLUTION INTRAMUSCULAR; INTRAVENOUS; SUBCUTANEOUS at 10:26

## 2022-05-18 RX ADMIN — BACLOFEN 5 MG: 10 TABLET ORAL at 22:19

## 2022-05-18 RX ADMIN — BACLOFEN 5 MG: 10 TABLET ORAL at 09:10

## 2022-05-18 NOTE — PHYSICAL THERAPY NOTE
Physical Therapy Progress Note     05/18/22 1440   PT Last Visit   PT Visit Date 05/18/22   Note Type   Note Type Treatment   Pain Assessment   Pain Assessment Tool 0-10   Pain Score 10 - Worst Possible Pain   Pain Location/Orientation Location: Neck   Subjective   Subjective Pt encountered supine in bed, agreeable to treatment  Continues to report elevated pain, but does not appear to be in significant discomfort at rest   Pt more expressive about situation during this session, stating he wished he was able to get more/better rehab after initial injury, but was able to make progress outside of PT on his own  Reports some dizziness with positional change, which resided with rests prior to initiating next task  Bed Mobility   Supine to Sit 3  Moderate assistance   Additional items Assist x 1;HOB elevated   Transfers   Sit to Stand 3  Moderate assistance   Additional items Assist x 1;Assist x 2  (Ax1 by end of session at platform RW)   Stand to Sit 3  Moderate assistance   Additional items Assist x 2;Assist x 1   Stand pivot 3  Moderate assistance   Additional items Assist x 2   Balance   Static Sitting Fair +   Dynamic Sitting Fair -   Static Standing Poor +   Dynamic Standing Poor   Activity Tolerance   Activity Tolerance Patient tolerated treatment well;Patient limited by fatigue;Patient limited by pain   Nurse Made Aware KAYLA Lubin   Assessment   Prognosis Good   Problem List Decreased strength;Decreased range of motion;Decreased endurance; Impaired balance;Decreased mobility;Pain;Orthopedic restrictions   Assessment Pt continues to require Ax1-2 for all transfers & mobilty tasks this session, but was able to maintain balance over LEs without significant instability & no buckling  Introduced platform RW after pt reported he was using similar device initially at PT after initial injury last year    Pt trialed standing weight shifting at AD today, again doing so without LOB with only Ax1 as opposed to transfer without AD, which required x2 persons  Pt instructed to perform LAQ, glute sets & marching outside of PT to maximize LE strength & stability ahead of increased activity in upcoming sessions  Discussed POC, d/c plan, and current functional status with pt during session  Plan to trial ambulation with platform RW next session & increase standing/ambulatory endurance  Pt remains appropriate for rehab at d/c to address strength, balance & endurance deficits to return to highest level of functional independence  Barriers to Discharge Decreased caregiver support; Inaccessible home environment   Goals   Patient Goals to be independent again   STG Expiration Date 05/23/22   PT Treatment Day 3   Plan   Treatment/Interventions Functional transfer training;LE strengthening/ROM; Therapeutic exercise; Endurance training;Patient/family training;Equipment eval/education; Bed mobility;Gait training   Progress Progressing toward goals   PT Frequency Other (Comment)  (3-6x/week)   Recommendation   PT Discharge Recommendation Post acute rehabilitation services   Equipment Recommended Walker   Change/add to Clipboard? Yes, Add Accessories   Walker Accessories Platform attachment - right arm;Platform attachment - left arm   AM-PAC Basic Mobility Inpatient   Turning in Bed Without Bedrails 2   Lying on Back to Sitting on Edge of Flat Bed 2   Moving Bed to Chair 1   Standing Up From Chair 2   Walk in Room 1   Climb 3-5 Stairs 1   Basic Mobility Inpatient Raw Score 9   Turning Head Towards Sound 4   Follow Simple Instructions 4   Low Function Basic Mobility Raw Score 17   Low Function Basic Mobility Standardized Score 27 46   Highest Level Of Mobility   JH-HLM Goal 3: Sit at edge of bed   JH-HLM Achieved 5: Stand (1 or more minutes)     Mayelin Courtney PTA    An [facility] Basic Mobility Standardized Score of 40 78 suggests pt would benefit from post acute rehab

## 2022-05-18 NOTE — PROGRESS NOTES
[address] [facility]  Progress Note Leandra Garcia 1985, 43 y o  male MRN: [id number]  Unit/Bed#: [facility] 623-01 Encounter: [id number]  Primary Care Provider: Romy Barahona MD   Date and time admitted to hospital: 5/11/2022  9:16 AM    * Cord compression myelopathy [facility])  Assessment & Plan  · Status post repeat cervical surgery  · Continues to have significant neck pain with bilateral upper extremity paresthesias  · Pain and paresthesias have improved somewhat since surgery  · Patient opioid rotated to oral Dilaudid  · States he feels as though the pain is slightly better controlled, however gets 1-2 hours of relief with each dose  · Continue Tylenol 975 mg p o  q 8 hours scheduled  · Continue baclofen 5 mg p o  t i d  scheduled  · Continue gabapentin 800 mg p o  t i d  scheduled  · Continue nortriptyline 10 mg p o  HS scheduled  · Continue Dilaudid 4 mg p o  q 3 hours p r n  moderate pain  · Continue Dilaudid 6 mg p o  q 3 hours p r n  severe pain  · Decrease Dilaudid to 0 5 mg IV q 6 hours p r n  breakthrough pain and discontinue tomorrow morning  · Ketamine infusion decreased to 0 1 mg/kg/HR continuous IV this morning  Will have infusion discontinue  · Continue p r n  Haldol, glycopyrrolate, Ativan to mitigate possible side effects from ketamine infusion  · Continue bowel regimen to avoid opioid induced constipation while on opioid pain medication  Early this evening  · Patient has had multiple adjustments to his pain regimen with no subjective improvement in his pain  Discussed with patient that non medication pain modalities may work better for him than medications and that a sooner rather than later transferred to rehabilitation right these modalities can be employed would be in his best interest   In order to facilitate this, the patient will need to be off IV opioids  Patient understands this and agrees to wean to off by tomorrow          Acute pain due to cervical spine surgery  APS will continue to follow  Please contact Acute Pain Service - [facility] via [employer] from [phone] with additional questions or concerns  See Deyanira or Rafael for additional contacts and after hours information  Pain History  Current pain location(s):  Neck  Pain Scale:   9 to 10/10  Quality:  Sharp, aching  24 hour history:  Continues to complain of significant neck pain only minimally improved with current pain regimen  Does admit to some improvement with change to q 3 hour dosing of oral Dilaudid  Opioid requirement previous 24 hours:  Dilaudid 42 mg p o , Dilaudid 2 mg IV       Meds/Allergies   all current active meds have been reviewed, current meds:   Current Facility-Administered Medications   Medication Dose Route Frequency    acetaminophen (TYLENOL) tablet 975 mg  975 mg Oral Q8H [facility]    atorvastatin (LIPITOR) tablet 40 mg  40 mg Oral After Dinner    baclofen tablet 5 mg  5 mg Oral TID    bisacodyl (DULCOLAX) rectal suppository 10 mg  10 mg Rectal Daily PRN    busPIRone (BUSPAR) tablet 5 mg  5 mg Oral BID    docusate sodium (COLACE) capsule 100 mg  100 mg Oral BID    gabapentin (NEURONTIN) capsule 800 mg  800 mg Oral TID    glycopyrrolate (ROBINUL) injection 0 2 mg  0 2 mg Intravenous Q4H PRN    haloperidol lactate (HALDOL) injection 2 mg  2 mg Intramuscular Q30 Min PRN    heparin (porcine) subcutaneous injection 5,000 Units  5,000 Units Subcutaneous Q8H [facility]    HYDROmorphone (DILAUDID) injection 0 5 mg  0 5 mg Intravenous Q6H PRN    HYDROmorphone (DILAUDID) tablet 4 mg  4 mg Oral Q3H PRN    Or    HYDROmorphone (DILAUDID) tablet 6 mg  6 mg Oral Q3H PRN    ketamine 250 mg (STANDARD CONCENTRATION) IV in sodium chloride 0 9% 250 mL  0 1 mg/kg/hr Intravenous Continuous    LORazepam (ATIVAN) injection 0 5 mg  0 5 mg Intravenous Q4H PRN    melatonin tablet 3 mg  3 mg Oral HS    naloxone (NARCAN) 0 04 mg/mL syringe 0 04 mg  0 04 mg Intravenous Q1MIN PRN    nortriptyline (PAMELOR) capsule 10 mg  10 mg Oral HS    ondansetron (ZOFRAN) injection 4 mg  4 mg Intravenous Q4H PRN    senna (SENOKOT) tablet 8 6 mg  1 tablet Oral Daily    sodium chloride 0 9 % infusion  125 mL/hr Intravenous Continuous    tamsulosin (FLOMAX) capsule 0 4 mg  0 4 mg Oral After Dinner    and PTA meds:   Prior to Admission Medications   Prescriptions Last Dose Informant Patient Reported? Taking?    Baclofen 5 MG TABS 5/10/2022 at Unknown time Outside Facility (Specify) Yes Yes   Calcium Carbonate Antacid (CALCIUM CARBONATE PO) 5/10/2022 at Unknown time Outside Facility ([address] [place] [address]) Yes Yes   Sig: Take by mouth   Multiple Vitamin (multivitamin) capsule 5/10/2022 at Unknown time Outside Facility ([address] [place] [address]) Yes Yes   Sig: Take 1 capsule by mouth daily   acetaminophen (TYLENOL) 500 mg tablet Past Month at Unknown time Outside Facility ([address] [place] [address]) Yes Yes   Sig: Take 1,000 mg by mouth every 6 (six) hours as needed for mild pain   atorvastatin (LIPITOR) 40 mg tablet 5/10/2022 at Unknown time Outside Facility (Specify) No Yes   Sig: Take 1 tablet (40 mg total) by mouth daily with dinner   baclofen 10 mg tablet 5/10/2022 at Unknown time Outside Facility (Specify) Yes Yes   Sig: Take 5 mg by mouth 3 (three) times a day   bisacodyl (DULCOLAX) 10 mg suppository Past Week at Unknown time Outside Facility (Specify) No Yes   Sig: Insert 1 suppository (10 mg total) into the rectum daily as needed for constipation   busPIRone (BUSPAR) 5 mg tablet 5/10/2022 at Unknown time  Yes Yes   celecoxib (CeleBREX) 100 mg capsule Unknown at Unknown time Outside Facility (Specify) Yes No   Sig: Take 100 mg by mouth 2 (two) times a day   Patient not taking: No sig reported   gabapentin (NEURONTIN) 300 mg capsule 5/10/2022 at Unknown time Outside Facility (Specify) No Yes   Sig: Take 2 capsules (600 mg total) by mouth 3 (three) times a day   melatonin 3 mg 5/10/2022 at Unknown time Outside Facility (Specify) No Yes   Sig: Take 1 tablet (3 mg total) by mouth daily at bedtime   meloxicam (MOBIC) 15 mg tablet 5/10/2022 at Unknown time  Yes Yes   Sig:     nortriptyline (PAMELOR) 10 mg capsule 5/10/2022 at Unknown time Outside Facility ([address] [place] [address]) Yes Yes   Sig: Take 10 mg by mouth daily at bedtime   senna-docusate sodium (SENOKOT S) 8 6-50 mg per tablet Not Taking at Unknown time Outside Facility (Specify) No No   Sig: Take 1 tablet by mouth 2 (two) times a day   Patient not taking: Reported on 5/11/2022   tamsulosin (FLOMAX) 0 4 mg Unknown at Unknown time  No No   Sig: Take 1 capsule (0 4 mg total) by mouth daily with dinner      Facility-Administered Medications: None       No Known Allergies    Objective     Temp:  [98 7 °F (37 1 °C)-100 1 °F (37 8 °C)] 98 8 °F (37 1 °C)  HR:  [101-113] 109  Resp:  [16-20] 20  BP: (120-125)/(84-85) 122/84    Physical Exam  Gen: Awake and alert, NAD, Does not appear ill  Vital signs reviewed  Nursing notes reviewed  Eyes: PERRL, sclera/conjunctiva normal   ENT: No JVD, trachea midline, moist mucus membranes  CV: Heart normal rhythm and normal rate  No extra systoles  Skin: Warm, dry  Cap refill <2 seconds  No diaphoresis  Neuro: A&O x 3, GCS 15 (E4, V5, M6)  No obvious focal motor deficit  Psych: Normal speech, normal attention, normal behavior  Lab Results:   Results from last 7 days   Lab Units 05/15/22  0532   WBC Thousand/uL 10 17*   HEMOGLOBIN g/dL 11 6*   HEMATOCRIT % 36 5   PLATELETS Thousands/uL 293      Results from last 7 days   Lab Units 05/15/22  0532   POTASSIUM mmol/L 3 5   CHLORIDE mmol/L 105   CO2 mmol/L 27   BUN mg/dL 9   CREATININE mg/dL 0 91   CALCIUM mg/dL 9 1    Estimated Creatinine Clearance: 102 9 mL/min (by C-G formula based on SCr of 0 91 mg/dL)  Imaging Studies: I have personally reviewed pertinent reports  Counseling / Coordination of Care  Greater than 50% of total time was spent with the patient and / or family counseling and / or coordination of care   A description of the counseling / coordination of care:  Patient interview, physical examination, review of medical record, review of imaging and laboratory data, development of pain management plan, discussion of pain management plan with patient and primary service  Explanation of risks and benefits of suggested pain medications  Please note that the APS provides consultative services regarding pain management only  With the exception of ketamine and epidural infusions and except when indicated, final decisions regarding starting or changing doses of analgesic medications are at the discretion of the consulting service  Off hours consultation and/or medication management is generally not available  Portions of the record may have been created with voice recognition software  Occasional wrong-word or 'sound-a-like' substitutions may have occurred due to the inherent limitations of voice recognition software       Glennda Duane, PA-C  Acute Pain Service

## 2022-05-18 NOTE — ASSESSMENT & PLAN NOTE
POD6 ACDF C6-7; Revision PCDF, revise/replace right C2 screw with laminar screw, revise left C2 screw (cap), remove bilateral C7 lateral mass screws, place navigated C7, T1 pedicle screws, C6-7 laminectomy, arthrodesis C2-3 and C6-T1 (5/12/22 [place])  · Patient presents as direct admission after outpatient MRI demonstrate cord compression requiring surgical intervention  · s/p severe central spinal cord injury, Jania B spinal cord injury March 2021 after falling down stairs Status post posterior cervical decompression fixation and fusion C2-C7 3/7/2021 Dr Reji Hawk  · Patient complains of decline in function, new urinary difficulties and c/o increased neck pain, with intermittent radicular symptoms and clicking noise with head movement which is related to a new C6-7 disc herniation causing cord compression and edema as well as hardware failure  Imaging:   · Xray cervical spine, 5/14/22: Postsurgical changes of anterior cervical discectomy and fusion at C6-C7 and revision posterior fusion spanning C2-T1  Plan:  · Continue monitor neurological status and monitor for any new or progressive symptoms   · SARANYA removed 5/16/22  · Pain control - APS Input appreciated  Ketamine gtt being weaned  Medications adjusted per their recommendations  · Continue p o  Dilaudid every 3 hours  · Wean IV Dilaudid to every [facility]  · Mobilize PT/OT - recommending acute rehab  · PM&R consult placed, recommend SCI rehab  · Urinary retention  We will continue straight catheterization to assist with bladder training as discussed with physiatry as long as patient cannot tolerate  · DVT PPX: SCDs  HSQ  · Dispo - d/w CM - recommend acute SCI rehab  [facility] rehab following  Patient medically stable but will need to be weaned off of IV pain medications prior to dispo  Goal to dispo by the end of the week    · Spoke with CM - [facility] willing to accept but looking into plan for after 2wk completion of acute rehab    Neurosurgery will continue to follow as primary  Call with questions/concerns

## 2022-05-18 NOTE — PLAN OF CARE
Problem: MOBILITY - ADULT  Goal: Maintain or return to baseline ADL function  Description: INTERVENTIONS:  -  Assess patient's ability to carry out ADLs; assess patient's baseline for ADL function and identify physical deficits which impact ability to perform ADLs (bathing, care of mouth/teeth, toileting, grooming, dressing, etc )  - Assess/evaluate cause of self-care deficits   - Assess range of motion  - Assess patient's mobility; develop plan if impaired  - Assess patient's need for assistive devices and provide as appropriate  - Encourage maximum independence but intervene and supervise when necessary  - Involve family in performance of ADLs  - Assess for home care needs following discharge   - Consider OT consult to assist with ADL evaluation and planning for discharge  - Provide patient education as appropriate  Outcome: Progressing  Goal: Maintains/Returns to pre admission functional level  Description: INTERVENTIONS:  - Perform BMAT or MOVE assessment daily    - Set and communicate daily mobility goal to care team and patient/family/caregiver  - Collaborate with rehabilitation services on mobility goals if consulted  - Perform Range of Motion  times a day  - Reposition patient every  hours    - Dangle patient  times a day  - Stand patient  times a day  - Ambulate patient  times a day  - Out of bed to chair  times a day   - Out of bed for meals  times a day  - Out of bed for toileting  - Record patient progress and toleration of activity level   Outcome: Progressing     Problem: PAIN - ADULT  Goal: Verbalizes/displays adequate comfort level or baseline comfort level  Description: Interventions:  - Encourage patient to monitor pain and request assistance  - Assess pain using appropriate pain scale  - Administer analgesics based on type and severity of pain and evaluate response  - Implement non-pharmacological measures as appropriate and evaluate response  - Consider cultural and social influences on pain and pain management  - Notify physician/advanced practitioner if interventions unsuccessful or patient reports new pain  Outcome: Progressing     Problem: SAFETY ADULT  Goal: Maintain or return to baseline ADL function  Description: INTERVENTIONS:  -  Assess patient's ability to carry out ADLs; assess patient's baseline for ADL function and identify physical deficits which impact ability to perform ADLs (bathing, care of mouth/teeth, toileting, grooming, dressing, etc )  - Assess/evaluate cause of self-care deficits   - Assess range of motion  - Assess patient's mobility; develop plan if impaired  - Assess patient's need for assistive devices and provide as appropriate  - Encourage maximum independence but intervene and supervise when necessary  - Involve family in performance of ADLs  - Assess for home care needs following discharge   - Consider OT consult to assist with ADL evaluation and planning for discharge  - Provide patient education as appropriate  Outcome: Progressing  Goal: Maintains/Returns to pre admission functional level  Description: INTERVENTIONS:  - Perform BMAT or MOVE assessment daily    - Set and communicate daily mobility goal to care team and patient/family/caregiver  - Collaborate with rehabilitation services on mobility goals if consulted  - Perform Range of Motion  times a day  - Reposition patient every  hours    - Dangle patient  times a day  - Stand patient  times a day  - Ambulate patient  times a day  - Out of bed to chair  times a day   - Out of bed for meals times a day  - Out of bed for toileting  - Record patient progress and toleration of activity level   Outcome: Progressing  Goal: Patient will remain free of falls  Description: INTERVENTIONS:  - Educate patient/family on patient safety including physical limitations  - Instruct patient to call for assistance with activity   - Consult OT/PT to assist with strengthening/mobility   - Keep Call bell within reach  - Keep bed low and locked with side rails adjusted as appropriate  - Keep care items and personal belongings within reach  - Initiate and maintain comfort rounds  - Make Fall Risk Sign visible to staff  - Offer Toileting every  Hours, in advance of need  - Initiate/Maintain alarm  - Obtain necessary fall risk management equipment:   - Apply yellow socks and bracelet for high fall risk patients  - Consider moving patient to room near nurses station  Outcome: Progressing     Problem: DISCHARGE PLANNING  Goal: Discharge to home or other facility with appropriate resources  Description: INTERVENTIONS:  - Identify barriers to discharge w/patient and caregiver  - Arrange for needed discharge resources and transportation as appropriate  - Identify discharge learning needs (meds, wound care, etc )  - Arrange for interpretive services to assist at discharge as needed  - Refer to Case Management Department for coordinating discharge planning if the patient needs post-hospital services based on physician/advanced practitioner order or complex needs related to functional status, cognitive ability, or social support system  Outcome: Progressing     Problem: Knowledge Deficit  Goal: Patient/family/caregiver demonstrates understanding of disease process, treatment plan, medications, and discharge instructions  Description: Complete learning assessment and assess knowledge base    Interventions:  - Provide teaching at level of understanding  - Provide teaching via preferred learning methods  Outcome: Progressing     Problem: Potential for Falls  Goal: Patient will remain free of falls  Description: INTERVENTIONS:  - Educate patient/family on patient safety including physical limitations  - Instruct patient to call for assistance with activity   - Consult OT/PT to assist with strengthening/mobility   - Keep Call bell within reach  - Keep bed low and locked with side rails adjusted as appropriate  - Keep care items and personal belongings within reach  - Initiate and maintain comfort rounds  - Make Fall Risk Sign visible to staff  - Offer Toileting every  Hours, in advance of need  - Initiate/Maintain alarm  - Obtain necessary fall risk management equipment  - Apply yellow socks and bracelet for high fall risk patients  - Consider moving patient to room near nurses station  Outcome: Progressing     Problem: Nutrition/Hydration-ADULT  Goal: Nutrient/Hydration intake appropriate for improving, restoring or maintaining nutritional needs  Description: Monitor and assess patient's nutrition/hydration status for malnutrition  Collaborate with interdisciplinary team and initiate plan and interventions as ordered  Monitor patient's weight and dietary intake as ordered or per policy  Utilize nutrition screening tool and intervene as necessary  Determine patient's food preferences and provide high-protein, high-caloric foods as appropriate       INTERVENTIONS:  - Monitor oral intake, urinary output, labs, and treatment plans  - Assess nutrition and hydration status and recommend course of action  - Evaluate amount of meals eaten  - Assist patient with eating if necessary   - Allow adequate time for meals  - Recommend/ encourage appropriate diets, oral nutritional supplements, and vitamin/mineral supplements  - Order, calculate, and assess calorie counts as needed  - Recommend, monitor, and adjust tube feedings and TPN/PPN based on assessed needs  - Assess need for intravenous fluids  - Provide specific nutrition/hydration education as appropriate  - Include patient/family/caregiver in decisions related to nutrition  Outcome: Progressing     Problem: Prexisting or High Potential for Compromised Skin Integrity  Goal: Skin integrity is maintained or improved  Description: INTERVENTIONS:  - Identify patients at risk for skin breakdown  - Assess and monitor skin integrity  - Assess and monitor nutrition and hydration status  - Monitor labs   - Assess for incontinence   - Turn and reposition patient  - Assist with mobility/ambulation  - Relieve pressure over bony prominences  - Avoid friction and shearing  - Provide appropriate hygiene as needed including keeping skin clean and dry  - Evaluate need for skin moisturizer/barrier cream  - Collaborate with interdisciplinary team   - Patient/family teaching  - Consider wound care consult   Outcome: Progressing

## 2022-05-18 NOTE — CASE MANAGEMENT
Case Management Progress Note    Patient name Ronald Romero  Location [address] 623/PPHP 753-67 MRN [id number]  : 1985 Date 2022       LOS (days): 7  Geometric Mean LOS (GMLOS) (days): 3 20  Days to GMLOS:-4        OBJECTIVE:        Current admission status: Inpatient  Preferred Pharmacy:   [address]  Phone: [phone] Fax: 215 [facility], [phone] [facility]  [address] [facility] [address]  Phone: [phone] Fax: [phone]    Primary Care Provider: Catherine Eaton MD    Primary Insurance: [address] [place] [address]  Secondary Insurance:     PROGRESS NOTE:        FELIPA spoke to patient's transitional CM [place] @ [phone] who stated patient is enrolled with waiver, will need verification of disability form complete for an apartment at [facility] in [place]  [place] stated the apartments are still under construction with anticipated move in the end of  or July  FELIPA spoke with Layo Shane at [facility] who stated that she saw patient today who is waiting to make a decision after he receives a list of SNFs from his insurance  Patient provided his  this CM's email to obtain list and print for patient  Awaiting list to be emailed at this time  Patient continues to refuse any [facility] facility

## 2022-05-18 NOTE — CASE MANAGEMENT
Case Management Discharge Planning Note    Patient name Thomas Magana  Location [address] [place] [address] [facility] [id number]  : 1985 Date 2022       Current Admission Date: 2022  Current Admission Diagnosis:Cord compression myelopathy [facility])   Patient Active Problem List    Diagnosis Date Noted    Anemia 2022    Cord compression myelopathy ([facility] [address] ) 2022    Lower urinary tract symptoms (LUTS) 2022    Encounter to discuss test results 2022    S/P orchiectomy 2021    Acute low back pain 2021    Hypokalemia 2021    Benign essential HTN 2021    HLD (hyperlipidemia) 2021    Testicular discomfort 2021    Intractable hiccups 2021    Depression 2021    Urinary retention 2021    Closed fracture of fifth cervical vertebra ([facility] [address] [facility] ) 03/10/2021    Spinal cord injury, C1-C7 ([facility] ) 03/10/2021    Fall 03/10/2021    Pain 03/10/2021    Central cord syndrome ([facility] ) 2021    Pulmonary insufficiency 2021      LOS (days): 7  Geometric Mean LOS (GMLOS) (days): 3 20  Days to GMLOS:-3 8     OBJECTIVE:  Risk of Unplanned Readmission Score: 11 97         Current admission status: Inpatient   Preferred Pharmacy:   [address] [place] [address] [place]  Tamika RODRIGUEZ [address]  Phone: [phone] Fax: [address] [address] [facility]  [address] [facility]  [address] [address]  Phone: [phone] Fax: [phone]    Primary Care Provider: Darryle Aris, MD    Primary Insurance: [address] [place] [address]  Secondary Insurance:     DISCHARGE DETAILS:                                          Other Referral/Resources/Interventions Provided:  Referral Comments: Patient spoke with Tank Richardson at [facility] who stated that due to patient's insurance, patient will need LTC plan for after rehab   Tank Richardson stated that patient must be agreeable to SNF bed at any facility they can get for patient for after discharge  Silvia Umanzor stated she talked to patient about this and plans to call patient later to see what his decision is  CM spoke with patient at bedside who stated he refuses to return to [facility] and needs to think about if he will be willing to go to other [address] [place] [address]  CM will follow up regarding patient's decision as this will affect acute rehab plans  Silvia Umanzor, liaison from [facility] updated regarding CM conversation with patient  Vega Erna plans to call patient later today regarding decision

## 2022-05-18 NOTE — PLAN OF CARE
Problem: PHYSICAL THERAPY ADULT  Goal: Performs mobility at highest level of function for planned discharge setting  See evaluation for individualized goals  Description: Treatment/Interventions: Functional transfer training, LE strengthening/ROM, Therapeutic exercise, Endurance training, Patient/family training, Equipment eval/education, Bed mobility, Gait training, Spoke to nursing, OT  Equipment Recommended: Wheelchair       See flowsheet documentation for full assessment, interventions and recommendations  Outcome: Progressing  Note: Prognosis: Good  Problem List: Decreased strength, Decreased range of motion, Decreased endurance, Impaired balance, Decreased mobility, Pain, Orthopedic restrictions  Assessment: Pt continues to require Ax1-2 for all transfers & mobilty tasks this session, but was able to maintain balance over LEs without significant instability & no buckling  Introduced platform RW after pt reported he was using similar device initially at PT after initial injury last year  Pt trialed standing weight shifting at AD today, again doing so without LOB with only Ax1 as opposed to transfer without AD, which required x2 persons  Pt instructed to perform LAQ, glute sets & marching outside of PT to maximize LE strength & stability ahead of increased activity in upcoming sessions  Discussed POC, d/c plan, and current functional status with pt during session  Plan to trial ambulation with platform RW next session & increase standing/ambulatory endurance  Pt remains appropriate for rehab at d/c to address strength, balance & endurance deficits to return to highest level of functional independence  Barriers to Discharge: Decreased caregiver support, Inaccessible home environment        PT Discharge Recommendation: Post acute rehabilitation services          See flowsheet documentation for full assessment

## 2022-05-18 NOTE — PROGRESS NOTES
[address] [facility]  Progress Note Samir Nielsen 1985, 43 y o  male MRN: [id number]  Unit/Bed#: [facility] 623-01 Encounter: [id number]  Primary Care Provider: Denzel Betancur MD   Date and time admitted to hospital: 5/11/2022  9:16 AM    * Cord compression myelopathy [facility])  Assessment & Plan  POD6 ACDF C6-7; Revision PCDF, revise/replace right C2 screw with laminar screw, revise left C2 screw (cap), remove bilateral C7 lateral mass screws, place navigated C7, T1 pedicle screws, C6-7 laminectomy, arthrodesis C2-3 and C6-T1 (5/12/22 [facility])  · Patient presents as direct admission after outpatient MRI demonstrate cord compression requiring surgical intervention  · s/p severe central spinal cord injury, Jania B spinal cord injury March 2021 after falling down stairs Status post posterior cervical decompression fixation and fusion C2-C7 3/7/2021 Dr Veronica Eugene  · Patient complains of decline in function, new urinary difficulties and c/o increased neck pain, with intermittent radicular symptoms and clicking noise with head movement which is related to a new C6-7 disc herniation causing cord compression and edema as well as hardware failure  Imaging:   · Xray cervical spine, 5/14/22: Postsurgical changes of anterior cervical discectomy and fusion at C6-C7 and revision posterior fusion spanning C2-T1  Plan:  · Continue monitor neurological status and monitor for any new or progressive symptoms   · SARANYA removed 5/16/22  · Pain control - APS Input appreciated  Ketamine gtt being weaned  Medications adjusted per their recommendations  · Continue p o  Dilaudid every 3 hours  · Wean IV Dilaudid to every [facility]  · Mobilize PT/OT - recommending acute rehab  · PM&R consult placed, recommend SCI rehab  · Urinary retention  We will continue straight catheterization to assist with bladder training as discussed with physiatry as long as patient cannot tolerate  · DVT PPX: SCDs   HSQ  · Dispo - d/w CM - recommend acute SCI rehab  [facility] following  Patient medically stable but will need to be weaned off of IV pain medications prior to dispo  Goal to dispo by the end of the week  · Spoke with CM - [facility] willing to accept but looking into plan for after 2wk completion of acute rehab    Neurosurgery will continue to follow as primary  Call with questions/concerns  Urinary retention  Assessment & Plan  Jimenez removed   Intermittent straight cath - continue  Defer Jimenez replacement    Utilize PM&R SCI Bladder Protocol  - Once jimenez catheter removed, please institute following protocol at designated time interval or PRN bladder fullness:  Q6hr   - Allow patient to attempt to void  If patient voids, check PVR  Catheterize for >300cc  - If patient unable to void, bladder scan  If >400cc straight cath  - If patient has volumes >650-800 consistently (x3), increase frequency of straight cath until volumes <400cc consistently  - If you reach Q3hr straight caths, with continued high volumes, institute fluid restriction of 4992-5991 mL if hemodynamically stable/appropriate  - If patient is voiding and PVR x3 <150cc can discontinue protocol  Subjective/Objective   Chief Complaint:  Postoperative follow-up    Subjective: This is a 78-year-old male sustained a spinal cord injury March 2021 requiring posterior cervical decompression fixation fusion  Patient was making good recovery and then plateaued  He feels as though he started decline  One year postop x-ray showed concern for hardware failure  Patient developed increasing neck pain and given concern for declining neurological status underwent CT and MRI imaging  Imaging demonstrated hardware failure of C2 as well as C7 screws with a new disc herniation and new spinal cord compression at C6-7  For these regions patient underwent additional surgical intervention    Postoperatively he developed worsening paresthesias in his hands and feet as well as slightly worse weakness in his bilateral hands  He has required multiple adjustments to his pain regimen and is weaning off of IV meds  No significant events overnight  Objective:  Sitting up in bed  NAD  I/O       05/16 0701 05/17 0700 05/17 0701 05/18 0700 05/18 0701 05/19 0700    P  O  240      I V  (mL/kg) 864 4 (11 2) 367 8 (4 8)     Total Intake(mL/kg) 1104 4 (14 3) 367 8 (4 8)     Urine (mL/kg/hr) 1350 (0 7) 2571 (1 4)     Drains       Total Output 1350 2571     Net -245 6 -2203 3                  Invasive Devices  Report    Peripheral Intravenous Line  Duration           Peripheral IV 05/16/22 Right Forearm 2 days                Physical Exam:  Vitals: Blood pressure 122/84, pulse (!) 109, temperature 98 8 °F (37 1 °C), resp  rate 20, height 5' 7" (1 702 m), weight 77 1 kg (169 lb 15 6 oz), SpO2 92 %  ,Body mass index is 26 62 kg/m²  General appearance: alert, appears stated age, cooperative and no distress  Head: Normocephalic, without obvious abnormality  Eyes:  Conjugate gaze and tracks appropriately in room  Neck:  Anterior incision covered with Steri-Strips CDI  Posterior incision CDI with staples  Drain site was suture CDI  Lungs: non labored breathing  Heart: regular heart rate  Neurologic:   Mental status: Alert, oriented, thought content appropriate  Cranial nerves: grossly intact (Cranial nerves II-XII)  Sensory:  Diffuse pins and needles bilateral upper extremities most extensive in hands  Bilateral pins and needles in legs most noted in feet and medial lower leg  Numbness to lateral lower leg    Motor: moving all extremities  I/O wrist 2-3/5  Bicep/tricep 4-4-/5  BLE 4/5 except right foot EHL1, DF2, PF3  Reflexes: 2+ and symmetric  Coordination: finger to nose normal bilaterally, no drift bilaterally    Lab Results:  Results from last 7 days   Lab Units 05/15/22  0532 05/14/22  0507 05/13/22  0625   WBC Thousand/uL 10 17* 11 94* 13 07*   HEMOGLOBIN g/dL 11 6* 11 0* 10 5* HEMATOCRIT % 36 5 34 7* 33 3*   PLATELETS Thousands/uL 293 286 279     Results from last 7 days   Lab Units 05/15/22  0532 05/14/22  0507 05/13/22  0625   POTASSIUM mmol/L 3 5 3 5 3 4*   CHLORIDE mmol/L 105 111* 115*   CO2 mmol/L 27 27 23   BUN mg/dL 9 11 8   CREATININE mg/dL 0 91 0 97 0 88   CALCIUM mg/dL 9 1 8 8 7 3*                 No results found for: TROPONINT  ABG:  Lab Results   Component Value Date    PHART 7 441 03/07/2021    LKG6UDP 36 7 03/07/2021    PO2ART 162 7 (H) 03/07/2021    FCF3AWF 24 4 03/07/2021    BEART 0 6 03/07/2021    SOURCE Line, Arterial 03/07/2021       Imaging Studies: I have personally reviewed pertinent reports  and I have personally reviewed pertinent films in PACS    XR cervical spine 2 or 3 views    Result Date: 5/15/2022  Impression: Postsurgical changes of anterior cervical discectomy and fusion at C6-C7 and revision posterior fusion spanning C2-T1  Workstation performed: [id number]     XR spine cervical 2 or 3 vw injury    Result Date: 5/12/2022  Impression: Fluoroscopic guidance provided for intraoperative localization during cervical fusion procedure  Please refer to the separate procedure notes for additional details  Workstation performed: [id number]     CT cervical spine without contrast    Result Date: 5/11/2022  Impression: Loosening of the bilateral C7 screws noted There is a fracture of the right lateral mass C2 screw Findings concur with the preliminary report by the referring clinician already in PACS and/or our electronic record EPIC  Workstation performed: [id number]     MRI cervical spine with and without contrast    Result Date: 5/11/2022  Impression: 1  New focus of cord compression at C6-C7 secondary to a new disc herniation with cord signal abnormality at this level, worrisome for new cord edema, along the caudal margin of the spinal construct  Spine surgical assessment advised   2   Interval development of cystic myelomalacia and cord atrophy at the C4-5 level in the region of prior cord atrophy which has been ameliorated by decompressive laminectomies C3-C5  3   Posterior fusion hardware C2-C7 noted   I personally discussed this study with Monster Dunlap on 5/11/2022 at 8:15 AM  Workstation performed: [id number]       EKG, Pathology, and Other Studies: none    VTE Pharmacologic Prophylaxis: Heparin    VTE Mechanical Prophylaxis: sequential compression device

## 2022-05-18 NOTE — ASSESSMENT & PLAN NOTE
Jimenez removed   Intermittent straight cath - continue  Defer Jimenez replacement    Utilize PM&R SCI Bladder Protocol  - Once jimenez catheter removed, please institute following protocol at designated time interval or PRN bladder fullness:  Q6hr   - Allow patient to attempt to void  If patient voids, check PVR  Catheterize for >300cc  - If patient unable to void, bladder scan  If >400cc straight cath  - If patient has volumes >650-800 consistently (x3), increase frequency of straight cath until volumes <400cc consistently  - If you reach Q3hr straight caths, with continued high volumes, institute fluid restriction of 5298-4922 mL if hemodynamically stable/appropriate  - If patient is voiding and PVR x3 <150cc can discontinue protocol

## 2022-05-18 NOTE — ASSESSMENT & PLAN NOTE
· Status post repeat cervical surgery  · Continues to have significant neck pain with bilateral upper extremity paresthesias  · Pain and paresthesias have improved somewhat since surgery  · Patient opioid rotated to oral Dilaudid  · States he feels as though the pain is slightly better controlled, however gets 1-2 hours of relief with each dose  · Continue Tylenol 975 mg p o  q 8 hours scheduled  · Continue baclofen 5 mg p o  t i d  scheduled  · Continue gabapentin 800 mg p o  t i d  scheduled  · Continue nortriptyline 10 mg p o  HS scheduled  · Continue Dilaudid 4 mg p o  q 3 hours p r n  moderate pain  · Continue Dilaudid 6 mg p o  q 3 hours p r n  severe pain  · Decrease Dilaudid to 0 5 mg IV q 6 hours p r n  breakthrough pain and discontinue tomorrow morning  · Ketamine infusion decreased to 0 1 mg/kg/HR continuous IV this morning  Will have infusion discontinue  · Continue p r n  Haldol, glycopyrrolate, Ativan to mitigate possible side effects from ketamine infusion  · Continue bowel regimen to avoid opioid induced constipation while on opioid pain medication  Early this evening  · Patient has had multiple adjustments to his pain regimen with no subjective improvement in his pain  Discussed with patient that non medication pain modalities may work better for him than medications and that a sooner rather than later transferred to rehabilitation right these modalities can be employed would be in his best interest   In order to facilitate this, the patient will need to be off IV opioids  Patient understands this and agrees to wean to off by tomorrow

## 2022-05-19 PROCEDURE — 99024 POSTOP FOLLOW-UP VISIT: CPT | Performed by: PHYSICIAN ASSISTANT

## 2022-05-19 RX ORDER — METHYLPREDNISOLONE 4 MG/1
8 TABLET ORAL DAILY
Status: COMPLETED | OUTPATIENT
Start: 2022-05-23 | End: 2022-05-23

## 2022-05-19 RX ORDER — KETOROLAC TROMETHAMINE 30 MG/ML
15 INJECTION, SOLUTION INTRAMUSCULAR; INTRAVENOUS ONCE
Status: COMPLETED | OUTPATIENT
Start: 2022-05-19 | End: 2022-05-19

## 2022-05-19 RX ORDER — BACLOFEN 10 MG/1
10 TABLET ORAL 3 TIMES DAILY
Status: DISCONTINUED | OUTPATIENT
Start: 2022-05-19 | End: 2022-05-26

## 2022-05-19 RX ORDER — LIDOCAINE 50 MG/G
2 PATCH TOPICAL DAILY
Status: DISCONTINUED | OUTPATIENT
Start: 2022-05-19 | End: 2022-05-31 | Stop reason: HOSPADM

## 2022-05-19 RX ORDER — METHYLPREDNISOLONE 4 MG/1
12 TABLET ORAL DAILY
Status: COMPLETED | OUTPATIENT
Start: 2022-05-22 | End: 2022-05-22

## 2022-05-19 RX ORDER — METHYLPREDNISOLONE 4 MG/1
4 TABLET ORAL DAILY
Status: COMPLETED | OUTPATIENT
Start: 2022-05-24 | End: 2022-05-24

## 2022-05-19 RX ORDER — METHYLPREDNISOLONE 4 MG/1
16 TABLET ORAL DAILY
Status: COMPLETED | OUTPATIENT
Start: 2022-05-21 | End: 2022-05-21

## 2022-05-19 RX ADMIN — ACETAMINOPHEN 975 MG: 325 TABLET, FILM COATED ORAL at 13:10

## 2022-05-19 RX ADMIN — ACETAMINOPHEN 975 MG: 325 TABLET, FILM COATED ORAL at 21:30

## 2022-05-19 RX ADMIN — BACLOFEN 10 MG: 10 TABLET ORAL at 17:05

## 2022-05-19 RX ADMIN — HEPARIN SODIUM 5000 UNITS: 5000 INJECTION INTRAVENOUS; SUBCUTANEOUS at 13:12

## 2022-05-19 RX ADMIN — BACLOFEN 10 MG: 10 TABLET ORAL at 21:31

## 2022-05-19 RX ADMIN — BUSPIRONE HYDROCHLORIDE 5 MG: 5 TABLET ORAL at 08:16

## 2022-05-19 RX ADMIN — ATORVASTATIN CALCIUM 40 MG: 40 TABLET, FILM COATED ORAL at 17:04

## 2022-05-19 RX ADMIN — NORTRIPTYLINE HYDROCHLORIDE 10 MG: 10 CAPSULE ORAL at 21:31

## 2022-05-19 RX ADMIN — KETOROLAC TROMETHAMINE 15 MG: 30 INJECTION, SOLUTION INTRAMUSCULAR at 11:26

## 2022-05-19 RX ADMIN — HYDROMORPHONE HYDROCHLORIDE 6 MG: 2 TABLET ORAL at 01:52

## 2022-05-19 RX ADMIN — BACLOFEN 5 MG: 10 TABLET ORAL at 08:16

## 2022-05-19 RX ADMIN — TAMSULOSIN HYDROCHLORIDE 0.4 MG: 0.4 CAPSULE ORAL at 17:05

## 2022-05-19 RX ADMIN — HEPARIN SODIUM 5000 UNITS: 5000 INJECTION INTRAVENOUS; SUBCUTANEOUS at 06:13

## 2022-05-19 RX ADMIN — Medication 3 MG: at 21:30

## 2022-05-19 RX ADMIN — GABAPENTIN 800 MG: 400 CAPSULE ORAL at 08:16

## 2022-05-19 RX ADMIN — GABAPENTIN 800 MG: 400 CAPSULE ORAL at 17:05

## 2022-05-19 RX ADMIN — HYDROMORPHONE HYDROCHLORIDE 6 MG: 2 TABLET ORAL at 09:16

## 2022-05-19 RX ADMIN — ACETAMINOPHEN 975 MG: 325 TABLET, FILM COATED ORAL at 06:13

## 2022-05-19 RX ADMIN — HYDROMORPHONE HYDROCHLORIDE 6 MG: 2 TABLET ORAL at 06:18

## 2022-05-19 RX ADMIN — BUSPIRONE HYDROCHLORIDE 5 MG: 5 TABLET ORAL at 21:31

## 2022-05-19 RX ADMIN — HYDROMORPHONE HYDROCHLORIDE 6 MG: 2 TABLET ORAL at 17:05

## 2022-05-19 RX ADMIN — HYDROMORPHONE HYDROCHLORIDE 6 MG: 2 TABLET ORAL at 13:11

## 2022-05-19 RX ADMIN — HYDROMORPHONE HYDROCHLORIDE 6 MG: 2 TABLET ORAL at 20:37

## 2022-05-19 RX ADMIN — GABAPENTIN 800 MG: 400 CAPSULE ORAL at 21:30

## 2022-05-19 RX ADMIN — HEPARIN SODIUM 5000 UNITS: 5000 INJECTION INTRAVENOUS; SUBCUTANEOUS at 21:31

## 2022-05-19 RX ADMIN — METHYLPREDNISOLONE 24 MG: 16 TABLET ORAL at 13:11

## 2022-05-19 NOTE — ASSESSMENT & PLAN NOTE
· Status post repeat cervical surgery  · Continues to have significant neck pain with bilateral upper extremity paresthesias  · Patient opioid rotated to oral Dilaudid several days ago  · States his pain is worse now than it was yesterday after discontinuation of ketamine infusion and now discontinuation of IV Dilaudid  States IV Dilaudid is the only medication which has been effective  · Continue Tylenol 975 mg p o  q 8 hours scheduled  · Increased baclofen to 10 mg p o  t i d  scheduled  · Continue gabapentin 800 mg p o  t i d  scheduled  · Continue nortriptyline 10 mg p o  HS scheduled  · Continue Dilaudid 4 mg p o  q 3 hours p r n  moderate pain  · Continue Dilaudid 6 mg p o  q 3 hours p r n  severe pain  · Ketamine infusion discontinued yesterday  · IV Dilaudid discontinued this morning  · Continue p r n  Haldol, glycopyrrolate, Ativan to mitigate possible side effects from ketamine infusion  · Continue bowel regimen to avoid opioid induced constipation while on opioid pain medication  Early this evening  · Patient has had multiple adjustments to his pain regimen with no subjective improvement in his pain  Discussed with patient that non medication pain modalities may work better for him than medications and that a sooner rather than later transferred to rehabilitation right these modalities can be employed would be in his best interest   In order to facilitate this, the patient will need to be off IV opioids  · Patient is currently on scheduled Tylenol, muscle relaxer, high-dose Gabapentinoid, TCA and lidocaine patches  NSAIDs are being avoided in light of recent surgery  Patient has been on several different opioid medications with little effect  Discussed with neurosurgery possibility of starting steroids    This is being considered by either team   Discussed possibility of starting oral morphine with patient, however I believe that this will cause more side effects without improving patient's pain  Also discussed increasing doses of opioids and that I feel this would only increase the risk of side effects without significantly impacting the patient's pain

## 2022-05-19 NOTE — ASSESSMENT & PLAN NOTE
POD8 ACDF C6-7; Revision PCDF, revise/replace right C2 screw with laminar screw, revise left C2 screw (cap), remove bilateral C7 lateral mass screws, place navigated C7, T1 pedicle screws, C6-7 laminectomy, arthrodesis C2-3 and C6-T1 (5/12/22 [facility])  · Patient presents as direct admission after outpatient MRI demonstrate cord compression requiring surgical intervention  · s/p severe central spinal cord injury, Jania B spinal cord injury March 2021 after falling down stairs Status post posterior cervical decompression fixation and fusion C2-C7 3/7/2021 Dr Dimitri Tang  · Patient complains of decline in function, new urinary difficulties and c/o increased neck pain, with intermittent radicular symptoms and clicking noise with head movement which is related to a new C6-7 disc herniation causing cord compression and edema as well as hardware failure  Imaging:   · Xray cervical spine, 5/14/22: Postsurgical changes of anterior cervical discectomy and fusion at C6-C7 and revision posterior fusion spanning C2-T1  Plan:  · Continue monitor neurological status and monitor for any new or progressive symptoms   · SARANYA removed 5/16/22  · Pain control - APS Input appreciated  Ketamine gtt discontinued  Medications adjusted per their recommendations  · Oral dilaudid changed to tapentadol 50/75mg PRN  · IV dilaudid discontinued  · Baclofen increased to 10mg TID  · Patient received a one time dose of toradol IV on 5/19 with good pain control afterwards  Can consider second dose on Sunday if pain is not improving  · Continue medrol dose pack  · Mobilize PT/OT - recommending acute rehab  Patient refusing to work with therapy today due to pain  · PM&R consult placed, recommend SCI rehab  · Urinary retention  We will continue straight catheterization to assist with bladder training as discussed with physiatry as long as patient cannot tolerate  · DVT PPX: SCDs  HSQ  · Dispo - d/w CM - recommend acute SCI rehab    [facility] following  Patient medically stable but will need to be weaned off of IV pain medications prior to dispo; all IV medications have been discontinued and we will monitor pain levels  · Spoke with CM - [facility] willing to accept but looking into plan for after 2wk completion of acute rehab    Neurosurgery will continue to follow as primary  Patient is medically stable for discharge at this time  Call with questions/concerns

## 2022-05-19 NOTE — PROGRESS NOTES
[facility]  Progress Note Kadie Perez 1985, 43 y o  male MRN: [id number]  Unit/Bed#: [facility] 623-01 Encounter: [id number]  Primary Care Provider: Karel Chavez MD   Date and time admitted to hospital: 5/11/2022  9:16 AM    * Cord compression myelopathy [facility])  Assessment & Plan  POD7 ACDF C6-7; Revision PCDF, revise/replace right C2 screw with laminar screw, revise left C2 screw (cap), remove bilateral C7 lateral mass screws, place navigated C7, T1 pedicle screws, C6-7 laminectomy, arthrodesis C2-3 and C6-T1 (5/12/22 [place])  · Patient presents as direct admission after outpatient MRI demonstrate cord compression requiring surgical intervention  · s/p severe central spinal cord injury, Jania B spinal cord injury March 2021 after falling down stairs Status post posterior cervical decompression fixation and fusion C2-C7 3/7/2021 Dr Julio Cesar Muniz  · Patient complains of decline in function, new urinary difficulties and c/o increased neck pain, with intermittent radicular symptoms and clicking noise with head movement which is related to a new C6-7 disc herniation causing cord compression and edema as well as hardware failure  Imaging:   · Xray cervical spine, 5/14/22: Postsurgical changes of anterior cervical discectomy and fusion at C6-C7 and revision posterior fusion spanning C2-T1  Plan:  · Continue monitor neurological status and monitor for any new or progressive symptoms   · SARANYA removed 5/16/22  · Pain control - APS Input appreciated  Ketamine gtt discontinued  Medications adjusted per their recommendations  · Continue p o  Dilaudid every 3 hours  · IV dilaudid discontinued  · Discussed with APS and Dr Julio Cesar Muniz, will trial one time dose of toradol and medrol dose pack  · Mobilize PT/OT - recommending acute rehab  · PM&R consult placed, recommend SCI rehab  · Urinary retention    We will continue straight catheterization to assist with bladder training as discussed with physiatry as long as patient cannot tolerate  · DVT PPX: SCDs  HSQ  · Dispo - d/w CM - recommend acute SCI rehab  [employer] rehab following  Patient medically stable but will need to be weaned off of IV pain medications prior to dispo; all IV medications have been discontinued and we will monitor pain levels  Goal to dispo by the end of the week  · Spoke with CM - [facility] willing to accept but looking into plan for after 2wk completion of acute rehab    Neurosurgery will continue to follow as primary  Patient is medically stable for discharge at this time  Call with questions/concerns  Urinary retention  Assessment & Plan  Jimenez removed   Intermittent straight cath - continue  Defer Jimenez replacement    Utilize PM&R SCI Bladder Protocol  - Once jimenez catheter removed, please institute following protocol at designated time interval or PRN bladder fullness:  Q6hr   - Allow patient to attempt to void  If patient voids, check PVR  Catheterize for >300cc  - If patient unable to void, bladder scan  If >400cc straight cath  - If patient has volumes >650-800 consistently (x3), increase frequency of straight cath until volumes <400cc consistently  - If you reach Q3hr straight caths, with continued high volumes, institute fluid restriction of 1534-6072 mL if hemodynamically stable/appropriate  - If patient is voiding and PVR x3 <150cc can discontinue protocol  Subjective/Objective   Chief Complaint: "I'm not doing ok"    Subjective:  Patient with worsening pain overnight after ketamine infusion and IV Dilaudid was stopped  He states the pain is much worse in his neck and shoulders  He has continued paresthesias in his arms and legs  He is in a poor state of mind today  He does not think he can work in rehab in this state  Room is again dark, blinds drawn, curtain drawn   We again discussed that rehab would be the best place for him, he needs to try some non pharmaceutical methods of pain control  Apart from pain, he is unchanged neurologically  Objective:  Patient is sitting in bed in no acute distress  Vital signs stable  Complaining of worsening pain  Intake/Output                 05/19/22 0701 - 05/20/22 0700     [id number] 0475-5982 Total              Intake    Total Intake -- -- --       Output    Urine  600  -- 600    Intermittent/Straight Cath (mL) 600 -- 600    Total Output 600 -- 600       Net I/O     -600 -- -600          Invasive Devices  Report    Peripheral Intravenous Line  Duration           Peripheral IV 05/16/22 Right Forearm 3 days                Vitals: Blood pressure 126/89, pulse 96, temperature 98 4 °F (36 9 °C), resp  rate 18, height 5' 7" (1 702 m), weight 77 1 kg (169 lb 15 6 oz), SpO2 96 %  ,Body mass index is 26 62 kg/m²  General appearance: alert, appears stated age, cooperative and no distress  Head: Normocephalic, without obvious abnormality, atraumatic  Eyes: EOMI, PERRL  Conjugate gaze  Neck: VISTA collar in place  Anterior incision CDI, covered with steri strips  Posterior incision CDI  Lungs: non labored breathing  Heart: regular heart rate  Neurologic:   Mental status: Alert, oriented, thought content appropriate, speech clear and fluent  Appears depressed  Cranial nerves: grossly intact (Cranial nerves II-XII)  Sensory: paresthesias in all extremities  Motor: increased weakness today, limited by worsening pain  0/5 right DF/PF/EHL  2-3/5 in all other BLE muscle distributions  2/5 bilateral  (left worse than right), 4-/5 bilateral deltoids  Lab Results: I have personally reviewed pertinent results        Results from last 7 days   Lab Units 05/15/22  0532 05/14/22  0507 05/13/22  0625   WBC Thousand/uL 10 17* 11 94* 13 07*   HEMOGLOBIN g/dL 11 6* 11 0* 10 5*   HEMATOCRIT % 36 5 34 7* 33 3*   PLATELETS Thousands/uL 293 286 279     Results from last 7 days   Lab Units 05/15/22  0532 05/14/22  0507 05/13/22  0625   POTASSIUM mmol/L 3 5 3 5 3 4*   CHLORIDE mmol/L 105 111* 115*   CO2 mmol/L 27 27 23   BUN mg/dL 9 11 8   CREATININE mg/dL 0 91 0 97 0 88   CALCIUM mg/dL 9 1 8 8 7 3*                 No results found for: TROPONINT  ABG:  Lab Results   Component Value Date    PHART 7 441 03/07/2021    SYG8PPX 36 7 03/07/2021    PO2ART 162 7 (H) 03/07/2021    IXG5MVC 24 4 03/07/2021    BEART 0 6 03/07/2021    SOURCE Line, Arterial 03/07/2021       Imaging Studies: I have personally reviewed pertinent reports  and I have personally reviewed pertinent films in PACS     XR cervical spine 2 or 3 views    Result Date: 5/15/2022  Narrative: CERVICAL SPINE INDICATION:   post surgery  COMPARISON:  CT cervical spine 5/11/2022 VIEWS:  XR SPINE CERVICAL 2 OR 3 VW INJURY FINDINGS: Interval anterior cervical discectomy and fusion at C6-C7 and placement of a right laminar screw at C2 and bilateral pedicle screws at C7 and T1  Redemonstrated posterior decompression and fusion with bilateral rods, lateral mass screws, and pedicle screws spanning C2-T1  Fractured C2 right abdominal mass screw again noted  Surgical drain and skin staples in the posterior soft tissues  Unchanged in alignment  Intervertebral disc heights are otherwise preserved  Clear lung apices  Impression: Postsurgical changes of anterior cervical discectomy and fusion at C6-C7 and revision posterior fusion spanning C2-T1  Workstation performed: [id number]     XR spine cervical 2 or 3 vw injury    Result Date: 5/12/2022  Narrative: O-ARM - cervical spine INDICATION: Spinal cord compression   Procedure guidance  COMPARISON:  April 4, 2022  TECHNIQUE: FLUOROSCOPY TIME:   1 MINUTE 50 7 SECONDS 3-D DOSE: Radiation dose length product (DLP) for this visit:  85 07 mGy   TOTAL IMAGES: FINDINGS: Fluoroscopic guidance provided for intraoperative localization during cervical fusion procedure  Osseous and soft tissue detail limited by technique       Impression: Fluoroscopic guidance provided for intraoperative localization during cervical fusion procedure  Please refer to the separate procedure notes for additional details  Workstation performed: [id number]     CT cervical spine without contrast    Result Date: 5/11/2022  Narrative: CT CERVICAL SPINE - WITHOUT CONTRAST INDICATION:   S14 109D: Unspecified injury at unspecified level of cervical spinal cord, subsequent encounter  COMPARISON:  None  TECHNIQUE:  CT examination of the cervical spine was performed without intravenous contrast   Contiguous axial images were obtained  Sagittal and coronal reconstructions were performed  Radiation dose length product (DLP) for this visit:  551 28 mGy-cm   This examination, like all CT scans performed in the [facility], was performed utilizing techniques to minimize radiation dose exposure, including the use of iterative  reconstruction and automated exposure control  IMAGE QUALITY:  Diagnostic  FINDINGS: ALIGNMENT:  Normal alignment of the cervical spine  No subluxation  VERTEBRAL BODIES:  No acute compression collapse of the vertebra seen Posterior cervical fusion seen extending from C2 through C7 vertebra Lateral masses screw are noted Lucency seen along the screw bone interface layering about 2 mm compatible with loosening Fracture of the right C2 screw seen DEGENERATIVE CHANGES: C2-3 level appear unremarkable C3-4 demonstrates degenerative disc disease with the mild central canal narrowing  C4-5 versus a ridging with no significant central canal narrowing C5-6 appear unremarkable C6-7 demonstrates a central protrusion with indentation thecal sac and indentation of the cord as seen on the previous recent MRI C7-T1 level appear unremarkable  PREVERTEBRAL AND PARASPINAL SOFT TISSUES:  Unremarkable   THORACIC INLET:  Normal      Impression: Loosening of the bilateral C7 screws noted There is a fracture of the right lateral mass C2 screw Findings concur with the preliminary report by the referring clinician already in PACS and/or our electronic record EPIC  Workstation performed: [id number]     MRI cervical spine with and without contrast    Result Date: 5/11/2022  Narrative: MRI CERVICAL SPINE WITH AND WITHOUT CONTRAST INDICATION: S14 109D: Unspecified injury at unspecified level of cervical spinal cord, subsequent encounter  History of spinal cord injury COMPARISON:  3/6/2021; 4/4/2022 TECHNIQUE:  Sagittal T1, sagittal T2, sagittal inversion recovery, axial 2D merge and axial T2  Sagittal T1 and axial T1 postcontrast   IV Contrast: IMAGE QUALITY:  Diagnostic  FINDINGS: ALIGNMENT:  There is nonspecific straightening of the cervical lordosis without subluxation  MARROW SIGNAL:  Posterior fusion hardware C2-C7 again demonstrated  Decompressive laminectomies C3-C5 CERVICAL AND VISUALIZED UPPER THORACIC CORD:  Cystic myelomalacia and mild cord atrophy at the C4-5 level in the region of previous cord compression has developed  There is new cord compression at the C6-C7 level secondary to a new disc herniation  There is mild high signal in the cord at this level worrisome for new cord edema  PREVERTEBRAL AND PARASPINAL SOFT TISSUES:  Normal  VISUALIZED POSTERIOR FOSSA:  The visualized posterior fossa demonstrates no abnormal signal  CERVICAL DISC SPACES: C2-C3:  There is a diffuse disk bulge  No significant central canal or neural foraminal narrowing  C3-C4:  Surgically capacious C4-C5:  Surgically capacious C5-C6:  Central canal surgically capacious  C6-C7:  There is a new large central/left paracentral disc herniation, protrusion type  The spinal cord is slightly displaced posteriorly and to the right by the left-sided disc herniation  There is significant cord compression with cord edema  Severe  central canal narrowing exemplified on image 8, series 3 correlating finding on image 29, series 6  Severe left neural foraminal narrowing  Mild right neural foraminal narrowing   C7-T1:  Normal  UPPER THORACIC DISC SPACES:  Normal  POSTCONTRAST IMAGING:  There is curvilinear peripheral enhancement around the new disc herniation at the C6-C7 level image 8, series 9 likely reactive  Impression: 1  New focus of cord compression at C6-C7 secondary to a new disc herniation with cord signal abnormality at this level, worrisome for new cord edema, along the caudal margin of the spinal construct  Spine surgical assessment advised  2   Interval development of cystic myelomalacia and cord atrophy at the C4-5 level in the region of prior cord atrophy which has been ameliorated by decompressive laminectomies C3-C5  3   Posterior fusion hardware C2-C7 noted  I personally discussed this study with Trisha Jordan on 5/11/2022 at 8:15 AM  Workstation performed: [id number]     EKG, Pathology, and Other Studies: I have personally reviewed pertinent reports        VTE Pharmacologic Prophylaxis: Heparin    VTE Mechanical Prophylaxis: sequential compression device

## 2022-05-19 NOTE — ASSESSMENT & PLAN NOTE
POD7 ACDF C6-7; Revision PCDF, revise/replace right C2 screw with laminar screw, revise left C2 screw (cap), remove bilateral C7 lateral mass screws, place navigated C7, T1 pedicle screws, C6-7 laminectomy, arthrodesis C2-3 and C6-T1 (5/12/22 [place])  · Patient presents as direct admission after outpatient MRI demonstrate cord compression requiring surgical intervention  · s/p severe central spinal cord injury, Jania B spinal cord injury March 2021 after falling down stairs Status post posterior cervical decompression fixation and fusion C2-C7 3/7/2021 Dr Yasmany Wild  · Patient complains of decline in function, new urinary difficulties and c/o increased neck pain, with intermittent radicular symptoms and clicking noise with head movement which is related to a new C6-7 disc herniation causing cord compression and edema as well as hardware failure  Imaging:   · Xray cervical spine, 5/14/22: Postsurgical changes of anterior cervical discectomy and fusion at C6-C7 and revision posterior fusion spanning C2-T1  Plan:  · Continue monitor neurological status and monitor for any new or progressive symptoms   · SARANYA removed 5/16/22  · Pain control - APS Input appreciated  Ketamine gtt discontinued  Medications adjusted per their recommendations  · Continue p o  Dilaudid every 3 hours  · IV dilaudid discontinued  · Mobilize PT/OT - recommending acute rehab  · PM&R consult placed, recommend SCI rehab  · Urinary retention  We will continue straight catheterization to assist with bladder training as discussed with physiatry as long as patient cannot tolerate  · DVT PPX: SCDs  HSQ  · Dispo - d/w CM - recommend acute SCI rehab  [facility] following  Patient medically stable but will need to be weaned off of IV pain medications prior to dispo; all IV medications have been discontinued and we will monitor pain levels  Goal to dispo by the end of the week    · Spoke with CM - [facility] willing to accept but looking into plan for after 2wk completion of acute rehab    Neurosurgery will continue to follow as primary  Patient is medically stable for discharge at this time  Call with questions/concerns

## 2022-05-19 NOTE — CASE MANAGEMENT
Case Management Discharge Planning Note    Patient name Roland Zacarias  Location [address] [place] [address] [facility] [id number]  : 1985 Date 2022       Current Admission Date: 2022  Current Admission Diagnosis:Cord compression myelopathy [facility])   Patient Active Problem List    Diagnosis Date Noted    Anemia 2022    Cord compression myelopathy ([facility] [address] ) 2022    Lower urinary tract symptoms (LUTS) 2022    Encounter to discuss test results 2022    S/P orchiectomy 2021    Acute low back pain 2021    Hypokalemia 2021    Benign essential HTN 2021    HLD (hyperlipidemia) 2021    Testicular discomfort 2021    Intractable hiccups 2021    Depression 2021    Urinary retention 2021    Closed fracture of fifth cervical vertebra ([facility] [address] ) 03/10/2021    Spinal cord injury, C1-C7 ([facility] [address] ) 03/10/2021    Fall 03/10/2021    Pain 03/10/2021    Central cord syndrome ([facility] [address] ) 2021    Pulmonary insufficiency 2021      LOS (days): 8  Geometric Mean LOS (GMLOS) (days): 3 20  Days to GMLOS:-4 9     OBJECTIVE:  Risk of Unplanned Readmission Score: 13 79         Current admission status: Inpatient   Preferred Pharmacy:   [address] [place] [address] MICHAEL RODRIGUEZ [address]  Phone: [phone] Fax: [phone] [address] [facility]  [address] [facility] [address]  Phone: [phone] Fax: [phone]    Primary Care Provider: Walter Lacy MD    Primary Insurance: [address] [place] [address]  Secondary Insurance:     DISCHARGE DETAILS:                                          Other Referral/Resources/Interventions Provided:  Referral Comments: SNF list emailed to CM from insurance, CM provided copy to patient to review

## 2022-05-19 NOTE — ASSESSMENT & PLAN NOTE
Jimenez removed   Intermittent straight cath - continue  Defer Jimenez replacement    Utilize PM&R SCI Bladder Protocol  - Once jimenez catheter removed, please institute following protocol at designated time interval or PRN bladder fullness:  Q6hr   - Allow patient to attempt to void  If patient voids, check PVR  Catheterize for >300cc  - If patient unable to void, bladder scan  If >400cc straight cath  - If patient has volumes >650-800 consistently (x3), increase frequency of straight cath until volumes <400cc consistently  - If you reach Q3hr straight caths, with continued high volumes, institute fluid restriction of 1421-0420 mL if hemodynamically stable/appropriate  - If patient is voiding and PVR x3 <150cc can discontinue protocol

## 2022-05-19 NOTE — ASSESSMENT & PLAN NOTE
Jimenez removed   Intermittent straight cath - continue  Defer Jimenez replacement    Utilize PM&R SCI Bladder Protocol  - Once jimenez catheter removed, please institute following protocol at designated time interval or PRN bladder fullness:  Q6hr   - Allow patient to attempt to void  If patient voids, check PVR  Catheterize for >300cc  - If patient unable to void, bladder scan  If >400cc straight cath  - If patient has volumes >650-800 consistently (x3), increase frequency of straight cath until volumes <400cc consistently  - If you reach Q3hr straight caths, with continued high volumes, institute fluid restriction of 2884-0919 mL if hemodynamically stable/appropriate  - If patient is voiding and PVR x3 <150cc can discontinue protocol

## 2022-05-19 NOTE — PROGRESS NOTES
[address] [facility]  Progress Note Ronnell López 1985, 43 y o  male MRN: [id number]  Unit/Bed#: [facility] 623-01 Encounter: [id number]  Primary Care Provider: Lucia Wright MD   Date and time admitted to hospital: 5/11/2022  9:16 AM    * Cord compression myelopathy [facility])  Assessment & Plan  · Status post repeat cervical surgery  · Continues to have significant neck pain with bilateral upper extremity paresthesias  · Patient opioid rotated to oral Dilaudid several days ago  · States his pain is worse now than it was yesterday after discontinuation of ketamine infusion and now discontinuation of IV Dilaudid  States IV Dilaudid is the only medication which has been effective  · Continue Tylenol 975 mg p o  q 8 hours scheduled  · Increased baclofen to 10 mg p o  t i d  scheduled  · Continue gabapentin 800 mg p o  t i d  scheduled  · Continue nortriptyline 10 mg p o  HS scheduled  · Continue Dilaudid 4 mg p o  q 3 hours p r n  moderate pain  · Continue Dilaudid 6 mg p o  q 3 hours p r n  severe pain  · Ketamine infusion discontinued yesterday  · IV Dilaudid discontinued this morning  · Continue p r n  Haldol, glycopyrrolate, Ativan to mitigate possible side effects from ketamine infusion  · Continue bowel regimen to avoid opioid induced constipation while on opioid pain medication  Early this evening  · Patient has had multiple adjustments to his pain regimen with no subjective improvement in his pain  Discussed with patient that non medication pain modalities may work better for him than medications and that a sooner rather than later transferred to rehabilitation right these modalities can be employed would be in his best interest   In order to facilitate this, the patient will need to be off IV opioids  · Patient is currently on scheduled Tylenol, muscle relaxer, high-dose Gabapentinoid, TCA and lidocaine patches    NSAIDs are being avoided in light of recent surgery  Patient has been on several different opioid medications with little effect  Discussed with neurosurgery possibility of starting steroids  This is being considered by either team   Discussed possibility of starting oral morphine with patient, however I believe that this will cause more side effects without improving patient's pain  Also discussed increasing doses of opioids and that I feel this would only increase the risk of side effects without significantly impacting the patient's pain  Acute pain due to cervical surgery  APS will continue to follow  Please contact Acute Pain Service [facility] via [employer] from [phone] with additional questions or concerns  See Lizabethexmonico or Rafael for additional contacts and after hours information  Pain History  Current pain location(s):  Neck, bilateral shoulders  Pain Scale:   10/10  Quality:  Sharp, aching  24 hour history:  Patient continues to complain of severe neck and shoulder pain  States he is unable to participate with physical therapy due to the pain  States that IV Dilaudid only dulled the pain and that it is now discontinued  States that the oral Dilaudid has minimal effect and lasts less than an hour  Opioid requirement previous 24 hours:  Dilaudid 42 mg p o , Dilaudid 1 5 mg IV       Meds/Allergies   all current active meds have been reviewed, current meds:   Current Facility-Administered Medications   Medication Dose Route Frequency    acetaminophen (TYLENOL) tablet 975 mg  975 mg Oral Q8H [address]    atorvastatin (LIPITOR) tablet 40 mg  40 mg Oral After Dinner    baclofen tablet 10 mg  10 mg Oral TID    bisacodyl (DULCOLAX) rectal suppository 10 mg  10 mg Rectal Daily PRN    busPIRone (BUSPAR) tablet 5 mg  5 mg Oral BID    docusate sodium (COLACE) capsule 100 mg  100 mg Oral BID    gabapentin (NEURONTIN) capsule 800 mg  800 mg Oral TID    glycopyrrolate (ROBINUL) injection 0 2 mg  0 2 mg Intravenous Q4H PRN    haloperidol lactate (HALDOL) injection 2 mg  2 mg Intramuscular Q30 Min PRN    heparin (porcine) subcutaneous injection 5,000 Units  5,000 Units Subcutaneous Q8H [address]    HYDROmorphone (DILAUDID) tablet 4 mg  4 mg Oral Q3H PRN    Or    HYDROmorphone (DILAUDID) tablet 6 mg  6 mg Oral Q3H PRN    lidocaine (LIDODERM) 5 % patch 2 patch  2 patch Topical Daily    LORazepam (ATIVAN) injection 0 5 mg  0 5 mg Intravenous Q4H PRN    melatonin tablet 3 mg  3 mg Oral HS    methylprednisolone (MEDROL) tablet 24 mg  24 mg Oral Daily    Followed by   Mauro Primes ON 5/20/2022] methylprednisolone (MEDROL) tablet 20 mg  20 mg Oral Daily    Followed by   Mauro Primes ON 5/21/2022] methylprednisolone (MEDROL) tablet 16 mg  16 mg Oral Daily    Followed by   Mauro Primes ON 5/22/2022] methylprednisolone (MEDROL) tablet 12 mg  12 mg Oral Daily    Followed by   Mauro Primes ON 5/23/2022] methylprednisolone (MEDROL) tablet 8 mg  8 mg Oral Daily    Followed by   Mauro Primes ON 5/24/2022] methylprednisolone (MEDROL) tablet 4 mg  4 mg Oral Daily    naloxone (NARCAN) 0 04 mg/mL syringe 0 04 mg  0 04 mg Intravenous Q1MIN PRN    nortriptyline (PAMELOR) capsule 10 mg  10 mg Oral HS    ondansetron (ZOFRAN) injection 4 mg  4 mg Intravenous Q4H PRN    senna (SENOKOT) tablet 8 6 mg  1 tablet Oral Daily    sodium chloride 0 9 % infusion  125 mL/hr Intravenous Continuous    tamsulosin (FLOMAX) capsule 0 4 mg  0 4 mg Oral After Dinner    and PTA meds:   Prior to Admission Medications   Prescriptions Last Dose Informant Patient Reported? Taking?    Baclofen 5 MG TABS 5/10/2022 at Unknown time Outside Facility (Specify) Yes Yes   Calcium Carbonate Antacid (CALCIUM CARBONATE PO) 5/10/2022 at Unknown time Outside Facility ([facility] [address]) Yes Yes   Sig: Take by mouth   Multiple Vitamin (multivitamin) capsule 5/10/2022 at Unknown time Outside Facility ([facility]) Yes Yes   Sig: Take 1 capsule by mouth daily   acetaminophen (TYLENOL) 500 mg tablet Past Month at Unknown time Outside Facility (Specify) Yes Yes   Sig: Take 1,000 mg by mouth every 6 (six) hours as needed for mild pain   atorvastatin (LIPITOR) 40 mg tablet 5/10/2022 at Unknown time Outside Facility (Specify) No Yes   Sig: Take 1 tablet (40 mg total) by mouth daily with dinner   baclofen 10 mg tablet 5/10/2022 at Unknown time Outside Facility ([facility]) Yes Yes   Sig: Take 5 mg by mouth 3 (three) times a day   bisacodyl (DULCOLAX) 10 mg suppository Past Week at Unknown time Outside Facility (Specify) No Yes   Sig: Insert 1 suppository (10 mg total) into the rectum daily as needed for constipation   busPIRone (BUSPAR) 5 mg tablet 5/10/2022 at Unknown time  Yes Yes   celecoxib (CeleBREX) 100 mg capsule Unknown at Unknown time Outside Facility (Specify) Yes No   Sig: Take 100 mg by mouth 2 (two) times a day   Patient not taking: No sig reported   gabapentin (NEURONTIN) 300 mg capsule 5/10/2022 at Unknown time Outside Facility (Specify) No Yes   Sig: Take 2 capsules (600 mg total) by mouth 3 (three) times a day   melatonin 3 mg 5/10/2022 at Unknown time Outside Facility (Specify) No Yes   Sig: Take 1 tablet (3 mg total) by mouth daily at bedtime   meloxicam (MOBIC) 15 mg tablet 5/10/2022 at Unknown time  Yes Yes   Sig:     nortriptyline (PAMELOR) 10 mg capsule 5/10/2022 at Unknown time Outside Facility (Specify) Yes Yes   Sig: Take 10 mg by mouth daily at bedtime   senna-docusate sodium (SENOKOT S) 8 6-50 mg per tablet Not Taking at Unknown time Outside Facility (Specify) No No   Sig: Take 1 tablet by mouth 2 (two) times a day   Patient not taking: Reported on 5/11/2022   tamsulosin (FLOMAX) 0 4 mg Unknown at Unknown time  No No   Sig: Take 1 capsule (0 4 mg total) by mouth daily with dinner      Facility-Administered Medications: None       No Known Allergies    Objective     Temp:  [98 4 °F (36 9 °C)-99 6 °F (37 6 °C)] 98 4 °F (36 9 °C)  HR:  [] 96  Resp:  [18-20] 18  BP: (121-128)/(82-89) 126/89    Physical Exam  Gen: Awake and alert, NAD, Does not appear ill  Vital signs reviewed  Nursing notes reviewed  Eyes: PERRL, sclera/conjunctiva normal   ENT: No JVD, trachea midline, moist mucus membranes  CV: Heart normal rhythm and normal rate  No extra systoles  Skin: Warm, dry  Cap refill <2 seconds  No diaphoresis  Neuro: A&O x 3, GCS 15 (E4, V5, M6)  No obvious focal motor deficit  Psych: Normal speech, normal attention, normal behavior  Lab Results:   Results from last 7 days   Lab Units 05/15/22  0532   WBC Thousand/uL 10 17*   HEMOGLOBIN g/dL 11 6*   HEMATOCRIT % 36 5   PLATELETS Thousands/uL 293      Results from last 7 days   Lab Units 05/15/22  0532   POTASSIUM mmol/L 3 5   CHLORIDE mmol/L 105   CO2 mmol/L 27   BUN mg/dL 9   CREATININE mg/dL 0 91   CALCIUM mg/dL 9 1    Estimated Creatinine Clearance: 102 9 mL/min (by C-G formula based on SCr of 0 91 mg/dL)  Imaging Studies: I have personally reviewed pertinent reports  Counseling / Coordination of Care  Greater than 50% of total time was spent with the patient and / or family counseling and / or coordination of care  A description of the counseling / coordination of care:  Patient interview, physical examination, review of medical record, review of imaging and laboratory data, development of pain management plan, discussion of pain management plan with patient and primary service  Explanation of risks and benefits of suggested pain medications  Please note that the APS provides consultative services regarding pain management only  With the exception of ketamine and epidural infusions and except when indicated, final decisions regarding starting or changing doses of analgesic medications are at the discretion of the consulting service  Off hours consultation and/or medication management is generally not available  Portions of the record may have been created with voice recognition software   Occasional wrong-word or 'sound-a-like' substitutions may have occurred due to the inherent limitations of voice recognition software       Carol Hughes PA-C  Acute Pain Service

## 2022-05-20 ENCOUNTER — APPOINTMENT (INPATIENT)
Dept: RADIOLOGY | Facility: HOSPITAL | Age: 37
DRG: 321 | End: 2022-05-20
Payer: COMMERCIAL

## 2022-05-20 PROCEDURE — 72125 CT NECK SPINE W/O DYE: CPT

## 2022-05-20 PROCEDURE — 99024 POSTOP FOLLOW-UP VISIT: CPT | Performed by: PHYSICIAN ASSISTANT

## 2022-05-20 PROCEDURE — G1004 CDSM NDSC: HCPCS

## 2022-05-20 RX ADMIN — HYDROMORPHONE HYDROCHLORIDE 6 MG: 2 TABLET ORAL at 03:32

## 2022-05-20 RX ADMIN — HYDROMORPHONE HYDROCHLORIDE 6 MG: 2 TABLET ORAL at 00:12

## 2022-05-20 RX ADMIN — TAMSULOSIN HYDROCHLORIDE 0.4 MG: 0.4 CAPSULE ORAL at 17:26

## 2022-05-20 RX ADMIN — BUSPIRONE HYDROCHLORIDE 5 MG: 5 TABLET ORAL at 09:47

## 2022-05-20 RX ADMIN — ACETAMINOPHEN 975 MG: 325 TABLET, FILM COATED ORAL at 21:36

## 2022-05-20 RX ADMIN — HEPARIN SODIUM 5000 UNITS: 5000 INJECTION INTRAVENOUS; SUBCUTANEOUS at 13:33

## 2022-05-20 RX ADMIN — BUSPIRONE HYDROCHLORIDE 5 MG: 5 TABLET ORAL at 21:36

## 2022-05-20 RX ADMIN — HEPARIN SODIUM 5000 UNITS: 5000 INJECTION INTRAVENOUS; SUBCUTANEOUS at 21:36

## 2022-05-20 RX ADMIN — HYDROMORPHONE HYDROCHLORIDE 6 MG: 2 TABLET ORAL at 07:20

## 2022-05-20 RX ADMIN — Medication 3 MG: at 21:36

## 2022-05-20 RX ADMIN — METHYLPREDNISOLONE 20 MG: 16 TABLET ORAL at 09:48

## 2022-05-20 RX ADMIN — GABAPENTIN 800 MG: 400 CAPSULE ORAL at 09:47

## 2022-05-20 RX ADMIN — BACLOFEN 10 MG: 10 TABLET ORAL at 17:27

## 2022-05-20 RX ADMIN — NORTRIPTYLINE HYDROCHLORIDE 10 MG: 10 CAPSULE ORAL at 21:38

## 2022-05-20 RX ADMIN — ATORVASTATIN CALCIUM 40 MG: 40 TABLET, FILM COATED ORAL at 17:27

## 2022-05-20 RX ADMIN — TAPENTADOL HYDROCHLORIDE 50 MG: 50 TABLET, FILM COATED ORAL at 12:02

## 2022-05-20 RX ADMIN — HEPARIN SODIUM 5000 UNITS: 5000 INJECTION INTRAVENOUS; SUBCUTANEOUS at 05:25

## 2022-05-20 RX ADMIN — BACLOFEN 10 MG: 10 TABLET ORAL at 09:47

## 2022-05-20 RX ADMIN — ACETAMINOPHEN 975 MG: 325 TABLET, FILM COATED ORAL at 13:33

## 2022-05-20 RX ADMIN — GABAPENTIN 800 MG: 400 CAPSULE ORAL at 17:26

## 2022-05-20 RX ADMIN — TAPENTADOL HYDROCHLORIDE 75 MG: 75 TABLET, FILM COATED ORAL at 18:03

## 2022-05-20 RX ADMIN — ACETAMINOPHEN 975 MG: 325 TABLET, FILM COATED ORAL at 05:25

## 2022-05-20 RX ADMIN — GABAPENTIN 800 MG: 400 CAPSULE ORAL at 21:36

## 2022-05-20 RX ADMIN — BACLOFEN 10 MG: 10 TABLET ORAL at 21:36

## 2022-05-20 RX ADMIN — HYDROMORPHONE HYDROCHLORIDE 6 MG: 2 TABLET ORAL at 10:31

## 2022-05-20 NOTE — ASSESSMENT & PLAN NOTE
· Status post repeat cervical surgery  · Continues to have significant neck pain with bilateral upper extremity paresthesias  · Patient opioid rotated to oral Dilaudid several days ago  · States his pain is worse now than it was yesterday after discontinuation of ketamine infusion and now discontinuation of IV Dilaudid  States IV Dilaudid is the only medication which has been effective  · Continue Tylenol 975 mg p o  q 8 hours scheduled  · Increased baclofen to 10 mg p o  t i d  scheduled  · Continue gabapentin 800 mg p o  t i d  scheduled  · Continue nortriptyline 10 mg p o  HS scheduled  · Discontinue oral Dilaudid  · Start Nucynta 50 mg p o  q 6 hours p r n  moderate pain  · Start Nucynta 75 mg p o  q 6 hours p r n  severe pain  · Steroids initiated by Neurosurgery yesterday  · Continue bowel regimen to avoid opioid induced constipation while on opioid pain medication  Early this evening  · Patient has had multiple adjustments to his pain regimen with no subjective improvement in his pain  Discussed with patient that non medication pain modalities may work better for him than medications and that a sooner rather than later transferred to rehabilitation right these modalities can be employed would be in his best interest    · Patient is currently on scheduled Tylenol, muscle relaxer, high-dose Gabapentinoid, TCA and lidocaine patches  NSAIDs are being avoided in light of recent surgery  Patient has been on several different opioid medications with little effect  Discussed with neurosurgery possibility of starting steroids yesterday  Discussed possibility of starting oral morphine with patient, however I believe that this will cause more side effects without improving patient's pain  Also discussed increasing doses of opioids and that I feel this would only increase the risk of side effects without significantly impacting the patient's pain

## 2022-05-20 NOTE — PROGRESS NOTES
[address] [facility]  Progress Note Lisandra Trammell 1985, 43 y o  male MRN: [id number]  Unit/Bed#: [facility] 623-01 Encounter: [id number]  Primary Care Provider: Darryle Aris, MD   Date and time admitted to hospital: 5/11/2022  9:16 AM    * Cord compression myelopathy [facility])  Assessment & Plan  · Status post repeat cervical surgery  · Continues to have significant neck pain with bilateral upper extremity paresthesias  · Patient opioid rotated to oral Dilaudid several days ago  · States his pain is worse now than it was yesterday after discontinuation of ketamine infusion and now discontinuation of IV Dilaudid  States IV Dilaudid is the only medication which has been effective  · Continue Tylenol 975 mg p o  q 8 hours scheduled  · Increased baclofen to 10 mg p o  t i d  scheduled  · Continue gabapentin 800 mg p o  t i d  scheduled  · Continue nortriptyline 10 mg p o  HS scheduled  · Discontinue oral Dilaudid  · Start Nucynta 50 mg p o  q 6 hours p r n  moderate pain  · Start Nucynta 75 mg p o  q 6 hours p r n  severe pain  · Steroids initiated by Neurosurgery yesterday  · Continue bowel regimen to avoid opioid induced constipation while on opioid pain medication  Early this evening  · Patient has had multiple adjustments to his pain regimen with no subjective improvement in his pain  Discussed with patient that non medication pain modalities may work better for him than medications and that a sooner rather than later transferred to rehabilitation right these modalities can be employed would be in his best interest    · Patient is currently on scheduled Tylenol, muscle relaxer, high-dose Gabapentinoid, TCA and lidocaine patches  NSAIDs are being avoided in light of recent surgery  Patient has been on several different opioid medications with little effect  Discussed with neurosurgery possibility of starting steroids yesterday    Discussed possibility of starting oral morphine with patient, however I believe that this will cause more side effects without improving patient's pain  Also discussed increasing doses of opioids and that I feel this would only increase the risk of side effects without significantly impacting the patient's pain  Acute pain due to cervical spinal surgery  APS will continue to follow  Please contact Acute Pain Service [facility] via [employer] from [phone] with additional questions or concerns  See [employer] or Rafael for additional contacts and after hours information  Pain History  Current pain location(s):  Posterior neck, bilateral shoulders  Pain Scale:   9 to 10/10  Quality:  Sharp, aching  24 hour history:  Patient states his pain has worsened since yesterday despite multiple adjustments in pain medication regimen  Discussed this with Neurosurgery who or considering reimaging to rule out any acute issues  Opioid requirement previous 24 hours:  Dilaudid 42 mg p o      Meds/Allergies   all current active meds have been reviewed, current meds:   Current Facility-Administered Medications   Medication Dose Route Frequency    acetaminophen (TYLENOL) tablet 975 mg  975 mg Oral Q8H [facility]    atorvastatin (LIPITOR) tablet 40 mg  40 mg Oral After Dinner    baclofen tablet 10 mg  10 mg Oral TID    bisacodyl (DULCOLAX) rectal suppository 10 mg  10 mg Rectal Daily PRN    busPIRone (BUSPAR) tablet 5 mg  5 mg Oral BID    docusate sodium (COLACE) capsule 100 mg  100 mg Oral BID    gabapentin (NEURONTIN) capsule 800 mg  800 mg Oral TID    glycopyrrolate (ROBINUL) injection 0 2 mg  0 2 mg Intravenous Q4H PRN    haloperidol lactate (HALDOL) injection 2 mg  2 mg Intramuscular Q30 Min PRN    heparin (porcine) subcutaneous injection 5,000 Units  5,000 Units Subcutaneous Q8H [facility]    lidocaine (LIDODERM) 5 % patch 2 patch  2 patch Topical Daily    LORazepam (ATIVAN) injection 0 5 mg  0 5 mg Intravenous Q4H PRN    melatonin tablet 3 mg  3 mg Oral HS    [START ON 5/21/2022] methylprednisolone (MEDROL) tablet 16 mg  16 mg Oral Daily    Followed by   Jerral Kocher ON 5/22/2022] methylprednisolone (MEDROL) tablet 12 mg  12 mg Oral Daily    Followed by   Jerral Kocher ON 5/23/2022] methylprednisolone (MEDROL) tablet 8 mg  8 mg Oral Daily    Followed by   Jerral Kocher ON 5/24/2022] methylprednisolone (MEDROL) tablet 4 mg  4 mg Oral Daily    naloxone (NARCAN) 0 04 mg/mL syringe 0 04 mg  0 04 mg Intravenous Q1MIN PRN    nortriptyline (PAMELOR) capsule 10 mg  10 mg Oral HS    ondansetron (ZOFRAN) injection 4 mg  4 mg Intravenous Q4H PRN    senna (SENOKOT) tablet 8 6 mg  1 tablet Oral Daily    sodium chloride 0 9 % infusion  125 mL/hr Intravenous Continuous    tamsulosin (FLOMAX) capsule 0 4 mg  0 4 mg Oral After Dinner    tapentadol (NUCYNTA) tablet 50 mg  50 mg Oral Q6H PRN    Or    tapentadol (NUCYNTA) tablet 75 mg  75 mg Oral Q6H PRN    and PTA meds:   Prior to Admission Medications   Prescriptions Last Dose Informant Patient Reported? Taking?    Baclofen 5 MG TABS 5/10/2022 at Unknown time Outside Facility (Specify) Yes Yes   Calcium Carbonate Antacid (CALCIUM CARBONATE PO) 5/10/2022 at Unknown time Outside Facility ([address] [place] [address]) Yes Yes   Sig: Take by mouth   Multiple Vitamin (multivitamin) capsule 5/10/2022 at Unknown time Outside Facility ([address] [place] [address]) Yes Yes   Sig: Take 1 capsule by mouth daily   acetaminophen (TYLENOL) 500 mg tablet Past Month at Unknown time Outside Facility (Specify) Yes Yes   Sig: Take 1,000 mg by mouth every 6 (six) hours as needed for mild pain   atorvastatin (LIPITOR) 40 mg tablet 5/10/2022 at Unknown time Outside Facility (Specify) No Yes   Sig: Take 1 tablet (40 mg total) by mouth daily with dinner   baclofen 10 mg tablet 5/10/2022 at Unknown time Outside Facility (Specify) Yes Yes   Sig: Take 5 mg by mouth 3 (three) times a day   bisacodyl (DULCOLAX) 10 mg suppository Past Week at Unknown time Outside Facility (Specify) No Yes   Sig: Insert 1 suppository (10 mg total) into the rectum daily as needed for constipation   busPIRone (BUSPAR) 5 mg tablet 5/10/2022 at Unknown time  Yes Yes   celecoxib (CeleBREX) 100 mg capsule Unknown at Unknown time Outside Facility (Specify) Yes No   Sig: Take 100 mg by mouth 2 (two) times a day   Patient not taking: No sig reported   gabapentin (NEURONTIN) 300 mg capsule 5/10/2022 at Unknown time Outside Facility (Specify) No Yes   Sig: Take 2 capsules (600 mg total) by mouth 3 (three) times a day   melatonin 3 mg 5/10/2022 at Unknown time Outside Facility (Specify) No Yes   Sig: Take 1 tablet (3 mg total) by mouth daily at bedtime   meloxicam (MOBIC) 15 mg tablet 5/10/2022 at Unknown time  Yes Yes   Sig:     nortriptyline (PAMELOR) 10 mg capsule 5/10/2022 at Unknown time Outside Facility (Specify) Yes Yes   Sig: Take 10 mg by mouth daily at bedtime   senna-docusate sodium (SENOKOT S) 8 6-50 mg per tablet Not Taking at Unknown time Outside Facility (Specify) No No   Sig: Take 1 tablet by mouth 2 (two) times a day   Patient not taking: Reported on 5/11/2022   tamsulosin (FLOMAX) 0 4 mg Unknown at Unknown time  No No   Sig: Take 1 capsule (0 4 mg total) by mouth daily with dinner      Facility-Administered Medications: None       No Known Allergies    Objective     Temp:  [98 6 °F (37 °C)-98 7 °F (37 1 °C)] 98 6 °F (37 °C)  HR:  [90-98] 97  Resp:  [18-19] 19  BP: (122-125)/(85-86) 125/86    Physical Exam  Gen: Awake and alert, NAD, Does not appear ill  Vital signs reviewed  Nursing notes reviewed  Eyes: PERRL, sclera/conjunctiva normal   ENT: No JVD, trachea midline, moist mucus membranes  CV: Heart normal rhythm and normal rate  No extra systoles  Skin: Warm, dry  Cap refill <2 seconds  No diaphoresis  Neuro: A&O x 3, GCS 15 (E4, V5, M6)  No obvious focal motor deficit  Psych: Normal speech, normal attention, normal behavior        Lab Results:   Results from last 7 days   Lab Units 05/15/22  0532   WBC Thousand/uL 10 17* HEMOGLOBIN g/dL 11 6*   HEMATOCRIT % 36 5   PLATELETS Thousands/uL 293      Results from last 7 days   Lab Units 05/15/22  0532   POTASSIUM mmol/L 3 5   CHLORIDE mmol/L 105   CO2 mmol/L 27   BUN mg/dL 9   CREATININE mg/dL 0 91   CALCIUM mg/dL 9 1    Estimated Creatinine Clearance: 102 9 mL/min (by C-G formula based on SCr of 0 91 mg/dL)  Imaging Studies: I have personally reviewed pertinent reports  Counseling / Coordination of Care  Greater than 50% of total time was spent with the patient and / or family counseling and / or coordination of care  A description of the counseling / coordination of care:  Patient interview, physical examination, review of medical record, review of imaging and laboratory data, development of pain management plan, discussion of pain management plan with patient and primary service  Explanation of risks and benefits of suggested pain medications  Please note that the APS provides consultative services regarding pain management only  With the exception of ketamine and epidural infusions and except when indicated, final decisions regarding starting or changing doses of analgesic medications are at the discretion of the consulting service  Off hours consultation and/or medication management is generally not available  Portions of the record may have been created with voice recognition software  Occasional wrong-word or 'sound-a-like' substitutions may have occurred due to the inherent limitations of voice recognition software       Florencia Higgins PA-C  Acute Pain Service

## 2022-05-20 NOTE — CASE MANAGEMENT
Case Management Discharge Planning Note    Patient name Keshav Edwards  Location [address] [place] [address] [facility] [id number]  : 1985 Date 2022       Current Admission Date: 2022  Current Admission Diagnosis:Cord compression myelopathy [facility])   Patient Active Problem List    Diagnosis Date Noted    Anemia 2022    Cord compression myelopathy ([facility] [address] ) 2022    Lower urinary tract symptoms (LUTS) 2022    Encounter to discuss test results 2022    S/P orchiectomy 2021    Acute low back pain 2021    Hypokalemia 2021    Benign essential HTN 2021    HLD (hyperlipidemia) 2021    Testicular discomfort 2021    Intractable hiccups 2021    Depression 2021    Urinary retention 2021    Closed fracture of fifth cervical vertebra ([facility] [address] ) 03/10/2021    Spinal cord injury, C1-C7 ([facility] [address] ) 03/10/2021    Fall 03/10/2021    Pain 03/10/2021    Central cord syndrome ([facility] [address] ) 2021    Pulmonary insufficiency 2021      LOS (days): 9  Geometric Mean LOS (GMLOS) (days): 3 20  Days to GMLOS:-5 8     OBJECTIVE:  Risk of Unplanned Readmission Score: 13 81         Current admission status: Inpatient   Preferred Pharmacy:   [address] [place] [address] [place]  Tamika RODRIGUEZ [id number]  Phone: [phone] Fax: [phone] [facility]  [address] [facility] [place] [address] [facility] [place] [address]  Phone: [phone] Fax: [phone]    Primary Care Provider: Belkys Mays MD    Primary Insurance: [address] [place] [address]  Secondary Insurance:     DISCHARGE DETAILS:                                          Other Referral/Resources/Interventions Provided:  Referral Comments: CM spoke with patient at bedside  Patient stated he hasn't looked at [facility] list provided by CM   CM discussed with patient that [facility] may not be able to accept, patient agreeable for CM to send out blanket referral to other acute rehabs

## 2022-05-20 NOTE — PLAN OF CARE
Problem: MOBILITY - ADULT  Goal: Maintain or return to baseline ADL function  Description: INTERVENTIONS:  -  Assess patient's ability to carry out ADLs; assess patient's baseline for ADL function and identify physical deficits which impact ability to perform ADLs (bathing, care of mouth/teeth, toileting, grooming, dressing, etc )  - Assess/evaluate cause of self-care deficits   - Assess range of motion  - Assess patient's mobility; develop plan if impaired  - Assess patient's need for assistive devices and provide as appropriate  - Encourage maximum independence but intervene and supervise when necessary  - Involve family in performance of ADLs  - Assess for home care needs following discharge   - Consider OT consult to assist with ADL evaluation and planning for discharge  - Provide patient education as appropriate  Outcome: Progressing  Goal: Maintains/Returns to pre admission functional level  Description: INTERVENTIONS:  - Perform BMAT or MOVE assessment daily    - Set and communicate daily mobility goal to care team and patient/family/caregiver     - Collaborate with rehabilitation services on mobility goals if consulted  - Out of bed for toileting  - Record patient progress and toleration of activity level   Outcome: Progressing     Problem: PAIN - ADULT  Goal: Verbalizes/displays adequate comfort level or baseline comfort level  Description: Interventions:  - Encourage patient to monitor pain and request assistance  - Assess pain using appropriate pain scale  - Administer analgesics based on type and severity of pain and evaluate response  - Implement non-pharmacological measures as appropriate and evaluate response  - Consider cultural and social influences on pain and pain management  - Notify physician/advanced practitioner if interventions unsuccessful or patient reports new pain  Outcome: Progressing     Problem: SAFETY ADULT  Goal: Maintain or return to baseline ADL function  Description: INTERVENTIONS:  -  Assess patient's ability to carry out ADLs; assess patient's baseline for ADL function and identify physical deficits which impact ability to perform ADLs (bathing, care of mouth/teeth, toileting, grooming, dressing, etc )  - Assess/evaluate cause of self-care deficits   - Assess range of motion  - Assess patient's mobility; develop plan if impaired  - Assess patient's need for assistive devices and provide as appropriate  - Encourage maximum independence but intervene and supervise when necessary  - Involve family in performance of ADLs  - Assess for home care needs following discharge   - Consider OT consult to assist with ADL evaluation and planning for discharge  - Provide patient education as appropriate  Outcome: Progressing  Goal: Maintains/Returns to pre admission functional level  Description: INTERVENTIONS:  - Perform BMAT or MOVE assessment daily    - Set and communicate daily mobility goal to care team and patient/family/caregiver     - Collaborate with rehabilitation services on mobility goals if consulted  - Out of bed for toileting  - Record patient progress and toleration of activity level   Outcome: Progressing  Goal: Patient will remain free of falls  Description: INTERVENTIONS:  - Educate patient/family on patient safety including physical limitations  - Instruct patient to call for assistance with activity   - Consult OT/PT to assist with strengthening/mobility   - Keep Call bell within reach  - Keep bed low and locked with side rails adjusted as appropriate  - Keep care items and personal belongings within reach  - Initiate and maintain comfort rounds  - Make Fall Risk Sign visible to staff  - Offer [address] necessary fall risk management equipment  - Apply yellow socks and bracelet for high fall risk patients  - Consider moving patient to room near nurses station  Outcome: Progressing     Problem: DISCHARGE PLANNING  Goal: Discharge to home or other facility with appropriate resources  Description: INTERVENTIONS:  - Identify barriers to discharge w/patient and caregiver  - Arrange for needed discharge resources and transportation as appropriate  - Identify discharge learning needs (meds, wound care, etc )  - Arrange for interpretive services to assist at discharge as needed  - Refer to Case Management Department for coordinating discharge planning if the patient needs post-hospital services based on physician/advanced practitioner order or complex needs related to functional status, cognitive ability, or social support system  Outcome: Progressing     Problem: Knowledge Deficit  Goal: Patient/family/caregiver demonstrates understanding of disease process, treatment plan, medications, and discharge instructions  Description: Complete learning assessment and assess knowledge base  Interventions:  - Provide teaching at level of understanding  - Provide teaching via preferred learning methods  Outcome: Progressing     Problem: Potential for Falls  Goal: Patient will remain free of falls  Description: INTERVENTIONS:  - Educate patient/family on patient safety including physical limitations  - Instruct patient to call for assistance with activity   - Consult OT/PT to assist with strengthening/mobility   - Keep Call bell within reach  - Keep bed low and locked with side rails adjusted as appropriate  - Keep care items and personal belongings within reach  - Initiate and maintain comfort rounds  - Make Fall Risk Sign visible to staff  - Offer [address] necessary fall risk management equipment  - Apply yellow socks and bracelet for high fall risk patients  - Consider moving patient to room near nurses station  Outcome: Progressing     Problem: Nutrition/Hydration-ADULT  Goal: Nutrient/Hydration intake appropriate for improving, restoring or maintaining nutritional needs  Description: Monitor and assess patient's nutrition/hydration status for malnutrition   Collaborate with interdisciplinary team and initiate plan and interventions as ordered  Monitor patient's weight and dietary intake as ordered or per policy  Utilize nutrition screening tool and intervene as necessary  Determine patient's food preferences and provide high-protein, high-caloric foods as appropriate       INTERVENTIONS:  - Monitor oral intake, urinary output, labs, and treatment plans  - Assess nutrition and hydration status and recommend course of action  - Evaluate amount of meals eaten  - Assist patient with eating if necessary   - Allow adequate time for meals  - Recommend/ encourage appropriate diets, oral nutritional supplements, and vitamin/mineral supplements  - Order, calculate, and assess calorie counts as needed  - Recommend, monitor, and adjust tube feedings and TPN/PPN based on assessed needs  - Assess need for intravenous fluids  - Provide specific nutrition/hydration education as appropriate  - Include patient/family/caregiver in decisions related to nutrition  Outcome: Progressing     Problem: Prexisting or High Potential for Compromised Skin Integrity  Goal: Skin integrity is maintained or improved  Description: INTERVENTIONS:  - Identify patients at risk for skin breakdown  - Assess and monitor skin integrity  - Assess and monitor nutrition and hydration status  - Monitor labs   - Assess for incontinence   - Turn and reposition patient  - Assist with mobility/ambulation  - Relieve pressure over bony prominences  - Avoid friction and shearing  - Provide appropriate hygiene as needed including keeping skin clean and dry  - Evaluate need for skin moisturizer/barrier cream  - Collaborate with interdisciplinary team   - Patient/family teaching  - Consider wound care consult   Outcome: Progressing

## 2022-05-20 NOTE — PROGRESS NOTES
[address] [facility]  Progress Note Dania Aranda 1985, 43 y o  male MRN: [id number]  Unit/Bed#: [facility] 623-01 Encounter: [id number]  Primary Care Provider: Lakesha Majano MD   Date and time admitted to hospital: 5/11/2022  9:16 AM    * Cord compression myelopathy [facility])  Assessment & Plan  POD8 ACDF C6-7; Revision PCDF, revise/replace right C2 screw with laminar screw, revise left C2 screw (cap), remove bilateral C7 lateral mass screws, place navigated C7, T1 pedicle screws, C6-7 laminectomy, arthrodesis C2-3 and C6-T1 (5/12/22 [place])  · Patient presents as direct admission after outpatient MRI demonstrate cord compression requiring surgical intervention  · s/p severe central spinal cord injury, Jania B spinal cord injury March 2021 after falling down stairs Status post posterior cervical decompression fixation and fusion C2-C7 3/7/2021 Dr Rosina Palmer  · Patient complains of decline in function, new urinary difficulties and c/o increased neck pain, with intermittent radicular symptoms and clicking noise with head movement which is related to a new C6-7 disc herniation causing cord compression and edema as well as hardware failure  Imaging:   · Xray cervical spine, 5/14/22: Postsurgical changes of anterior cervical discectomy and fusion at C6-C7 and revision posterior fusion spanning C2-T1  Plan:  · Continue monitor neurological status and monitor for any new or progressive symptoms   · SARANYA removed 5/16/22  · Pain control - APS Input appreciated  Ketamine gtt discontinued  Medications adjusted per their recommendations  · Oral dilaudid changed to tapentadol 50/75mg PRN  · IV dilaudid discontinued  · Baclofen increased to 10mg TID  · Patient received a one time dose of toradol IV on 5/19 with good pain control afterwards  Can consider second dose on Sunday if pain is not improving  · Continue medrol dose pack  · Mobilize PT/OT - recommending acute rehab   Patient refusing to work with therapy today due to pain  · PM&R consult placed, recommend SCI rehab  · Urinary retention  We will continue straight catheterization to assist with bladder training as discussed with physiatry as long as patient cannot tolerate  · DVT PPX: SCDs  HSQ  · Dispo - d/w CM - recommend acute SCI rehab  [facility] following  Patient medically stable but will need to be weaned off of IV pain medications prior to dispo; all IV medications have been discontinued and we will monitor pain levels  · Spoke with CM - [facility] willing to accept but looking into plan for after 2wk completion of acute rehab    Neurosurgery will continue to follow as primary  Patient is medically stable for discharge at this time  Call with questions/concerns  Urinary retention  Assessment & Plan  Jimenez removed   Intermittent straight cath - continue  Defer Jimenez replacement    Utilize PM&R SCI Bladder Protocol  - Once jimenez catheter removed, please institute following protocol at designated time interval or PRN bladder fullness:  Q6hr   - Allow patient to attempt to void  If patient voids, check PVR  Catheterize for >300cc  - If patient unable to void, bladder scan  If >400cc straight cath  - If patient has volumes >650-800 consistently (x3), increase frequency of straight cath until volumes <400cc consistently  - If you reach Q3hr straight caths, with continued high volumes, institute fluid restriction of 6231-5923 mL if hemodynamically stable/appropriate  - If patient is voiding and PVR x3 <150cc can discontinue protocol  Subjective/Objective   Chief Complaint: none    Subjective:  Patient with no acute events overnight  He continues to state his pain is not controlled  APS adjusted his medications today  Oral Dilaudid was switched to Nucynta  Baclofen was increased  The patient is off all IV pain medications and is complaining of worsening pain    He did receive a 1 time dose of IV Toradol yesterday which he states helped his pain  He was started on a Medrol Dosepak with no improvement yet  He states that he is not eating due to the pain  He is not working with therapy due to the pain  He has not been out of bed in several days  He appears to be depressed  The room was dark, curtains are drawn, blinds are down  I did discuss with him today that he would do better in rehab where they have other forms of pain control other than medications since we have exhausted most types of pain medication at this time  I reiterated that he needs to try to work with therapy today  He also states he is not eating and I encouraged him to eat a little bit more today as he is going to impede wound healing  Objective: Patient laying in bed in NAD  VSS  Intake/Output                 05/20/22 0701 - 05/21/22 0700     0337-0105 [id number] Total              Intake    Total Intake -- -- --       Output    Urine  461  -- 461    Urine 450 -- 450    Post Void Cath Residual (mL) 11 -- 11    Total Output 461 -- 461       Net I/O     -461 -- -461          Invasive Devices  Report    Peripheral Intravenous Line  Duration           Peripheral IV 05/16/22 Right Forearm 4 days                Vitals: Blood pressure 125/86, pulse 97, temperature 98 6 °F (37 °C), temperature source Oral, resp  rate 19, height 5' 7" (1 702 m), weight 78 2 kg (172 lb 6 4 oz), SpO2 95 %  ,Body mass index is 27 kg/m²  General appearance: alert, appears stated age, cooperative and no distress  Head: Normocephalic, without obvious abnormality, atraumatic  Eyes: EOMI, PERRL, conjugate gaze  Neck: VISTA collar  Lungs: non labored breathing  Heart: regular heart rate  Neurologic:   Mental status: Alert, oriented, thought content appropriate, depressed appearing  Cranial nerves: grossly intact (Cranial nerves II-XII)  Sensory: paresthesias x4  Motor: 3 to 4-/5 BUE   improved at 4- today   4-/5 bilateral KF and left DF/PF  0/5 right DF/PF/EHL    Lab Results: I have personally reviewed pertinent results  Results from last 7 days   Lab Units 05/15/22  0532 05/14/22  0507   WBC Thousand/uL 10 17* 11 94*   HEMOGLOBIN g/dL 11 6* 11 0*   HEMATOCRIT % 36 5 34 7*   PLATELETS Thousands/uL 293 286     Results from last 7 days   Lab Units 05/15/22  0532 05/14/22  0507   POTASSIUM mmol/L 3 5 3 5   CHLORIDE mmol/L 105 111*   CO2 mmol/L 27 27   BUN mg/dL 9 11   CREATININE mg/dL 0 91 0 97   CALCIUM mg/dL 9 1 8 8                 No results found for: TROPONINT  ABG:  Lab Results   Component Value Date    PHART 7 441 03/07/2021    MKR4FGK 36 7 03/07/2021    PO2ART 162 7 (H) 03/07/2021    ZDT2AWD 24 4 03/07/2021    BEART 0 6 03/07/2021    SOURCE Line, Arterial 03/07/2021       Imaging Studies: I have personally reviewed pertinent reports  and I have personally reviewed pertinent films in PACS     XR cervical spine 2 or 3 views    Result Date: 5/15/2022  Narrative: CERVICAL SPINE INDICATION:   post surgery  COMPARISON:  CT cervical spine 5/11/2022 VIEWS:  XR SPINE CERVICAL 2 OR 3 VW INJURY FINDINGS: Interval anterior cervical discectomy and fusion at C6-C7 and placement of a right laminar screw at C2 and bilateral pedicle screws at C7 and T1  Redemonstrated posterior decompression and fusion with bilateral rods, lateral mass screws, and pedicle screws spanning C2-T1  Fractured C2 right abdominal mass screw again noted  Surgical drain and skin staples in the posterior soft tissues  Unchanged in alignment  Intervertebral disc heights are otherwise preserved  Clear lung apices  Impression: Postsurgical changes of anterior cervical discectomy and fusion at C6-C7 and revision posterior fusion spanning C2-T1  Workstation performed: [id number]     XR spine cervical 2 or 3 vw injury    Result Date: 5/12/2022  Narrative: O-ARM - cervical spine INDICATION: Spinal cord compression   Procedure guidance  COMPARISON:  April 4, 2022   TECHNIQUE: FLUOROSCOPY TIME: 1 MINUTE 50 7 SECONDS 3-D DOSE: Radiation dose length product (DLP) for this visit:  85 07 mGy   TOTAL IMAGES: FINDINGS: Fluoroscopic guidance provided for intraoperative localization during cervical fusion procedure  Osseous and soft tissue detail limited by technique  Impression: Fluoroscopic guidance provided for intraoperative localization during cervical fusion procedure  Please refer to the separate procedure notes for additional details  Workstation performed: [id number]     CT cervical spine without contrast    Result Date: 5/11/2022  Narrative: CT CERVICAL SPINE - WITHOUT CONTRAST INDICATION:   S14 109D: Unspecified injury at unspecified level of cervical spinal cord, subsequent encounter  COMPARISON:  None  TECHNIQUE:  CT examination of the cervical spine was performed without intravenous contrast   Contiguous axial images were obtained  Sagittal and coronal reconstructions were performed  Radiation dose length product (DLP) for this visit:  551 28 mGy-cm   This examination, like all CT scans performed in the [facility], was performed utilizing techniques to minimize radiation dose exposure, including the use of iterative  reconstruction and automated exposure control  IMAGE QUALITY:  Diagnostic  FINDINGS: ALIGNMENT:  Normal alignment of the cervical spine  No subluxation  VERTEBRAL BODIES:  No acute compression collapse of the vertebra seen Posterior cervical fusion seen extending from C2 through C7 vertebra Lateral masses screw are noted Lucency seen along the screw bone interface layering about 2 mm compatible with loosening Fracture of the right C2 screw seen DEGENERATIVE CHANGES: C2-3 level appear unremarkable C3-4 demonstrates degenerative disc disease with the mild central canal narrowing   C4-5 versus a ridging with no significant central canal narrowing C5-6 appear unremarkable C6-7 demonstrates a central protrusion with indentation thecal sac and indentation of the cord as seen on the previous recent MRI C7-T1 level appear unremarkable  PREVERTEBRAL AND PARASPINAL SOFT TISSUES:  Unremarkable  THORACIC INLET:  Normal      Impression: Loosening of the bilateral C7 screws noted There is a fracture of the right lateral mass C2 screw Findings concur with the preliminary report by the referring clinician already in PACS and/or our electronic record EPIC  Workstation performed: [id number]     MRI cervical spine with and without contrast    Result Date: 5/11/2022  Narrative: MRI CERVICAL SPINE WITH AND WITHOUT CONTRAST INDICATION: S14 109D: Unspecified injury at unspecified level of cervical spinal cord, subsequent encounter  History of spinal cord injury COMPARISON:  3/6/2021; 4/4/2022 TECHNIQUE:  Sagittal T1, sagittal T2, sagittal inversion recovery, axial 2D merge and axial T2  Sagittal T1 and axial T1 postcontrast   IV Contrast: IMAGE QUALITY:  Diagnostic  FINDINGS: ALIGNMENT:  There is nonspecific straightening of the cervical lordosis without subluxation  MARROW SIGNAL:  Posterior fusion hardware C2-C7 again demonstrated  Decompressive laminectomies C3-C5 CERVICAL AND VISUALIZED UPPER THORACIC CORD:  Cystic myelomalacia and mild cord atrophy at the C4-5 level in the region of previous cord compression has developed  There is new cord compression at the C6-C7 level secondary to a new disc herniation  There is mild high signal in the cord at this level worrisome for new cord edema  PREVERTEBRAL AND PARASPINAL SOFT TISSUES:  Normal  VISUALIZED POSTERIOR FOSSA:  The visualized posterior fossa demonstrates no abnormal signal  CERVICAL DISC SPACES: C2-C3:  There is a diffuse disk bulge  No significant central canal or neural foraminal narrowing  C3-C4:  Surgically capacious C4-C5:  Surgically capacious C5-C6:  Central canal surgically capacious  C6-C7:  There is a new large central/left paracentral disc herniation, protrusion type    The spinal cord is slightly displaced posteriorly and to the right by the left-sided disc herniation  There is significant cord compression with cord edema  Severe  central canal narrowing exemplified on image 8, series 3 correlating finding on image 29, series 6  Severe left neural foraminal narrowing  Mild right neural foraminal narrowing  C7-T1:  Normal  UPPER THORACIC DISC SPACES:  Normal  POSTCONTRAST IMAGING:  There is curvilinear peripheral enhancement around the new disc herniation at the C6-C7 level image 8, series 9 likely reactive  Impression: 1  New focus of cord compression at C6-C7 secondary to a new disc herniation with cord signal abnormality at this level, worrisome for new cord edema, along the caudal margin of the spinal construct  Spine surgical assessment advised  2   Interval development of cystic myelomalacia and cord atrophy at the C4-5 level in the region of prior cord atrophy which has been ameliorated by decompressive laminectomies C3-C5  3   Posterior fusion hardware C2-C7 noted  I personally discussed this study with Sky Cardoza on 5/11/2022 at 8:15 AM  Workstation performed: [id number]     EKG, Pathology, and Other Studies: I have personally reviewed pertinent reports        VTE Pharmacologic Prophylaxis: Heparin    VTE Mechanical Prophylaxis: sequential compression device

## 2022-05-20 NOTE — OCCUPATIONAL THERAPY NOTE
Occupational Therapy Cancel  Note        Patient Name: Ronald Romero  QBIYL'K Date: 5/20/2022 05/20/22 1030   OT Last Visit   OT Visit Date 05/20/22   Note Type   Note type Cancelled Session   Additional Comments ATTEMPTED TO SEE PT MULTIPLE TIMES THIS AM FOR THERAPY SESSION HOWEVER PT REFUSING 2' "10/10 PAIN"  PT WISHING TO GET PAIN BETTER CONTROLLED PRIOR TO THERAPY PARTICIPATION  PT EDUCATED ON ACUTE PAIN SERVICE FOLLOWING PT AS WELL AS CHANGE OF POSITION TO ASSIST WITH PAIN MANAGEMENT HOWEVER PT CONT TO REFUSE  WILL CONT TO FOLLOW AS APPROPRIATE/AGREEABLE       Dina Diacik, MOT, OTR/L

## 2022-05-20 NOTE — PLAN OF CARE
Problem: MOBILITY - ADULT  Goal: Maintain or return to baseline ADL function  Description: INTERVENTIONS:  -  Assess patient's ability to carry out ADLs; assess patient's baseline for ADL function and identify physical deficits which impact ability to perform ADLs (bathing, care of mouth/teeth, toileting, grooming, dressing, etc )  - Assess/evaluate cause of self-care deficits   - Assess range of motion  - Assess patient's mobility; develop plan if impaired  - Assess patient's need for assistive devices and provide as appropriate  - Encourage maximum independence but intervene and supervise when necessary  - Involve family in performance of ADLs  - Assess for home care needs following discharge   - Consider OT consult to assist with ADL evaluation and planning for discharge  - Provide patient education as appropriate  Outcome: Progressing  Goal: Maintains/Returns to pre admission functional level  Description: INTERVENTIONS:  - Perform BMAT or MOVE assessment daily    - Set and communicate daily mobility goal to care team and patient/family/caregiver  - Collaborate with rehabilitation services on mobility goals if consulted  - Perform Range of Motion *3** times a day  - Reposition patient every *2** hours    - Dangle patient *3** times a day  - Stand patient *3** times a day  - Ambulate patient *3** times a day  - Out of bed to chair *3** times a day   - Out of bed for meals *3** times a day  - Out of bed for toileting  - Record patient progress and toleration of activity level   Outcome: Progressing     Problem: PAIN - ADULT  Goal: Verbalizes/displays adequate comfort level or baseline comfort level  Description: Interventions:  - Encourage patient to monitor pain and request assistance  - Assess pain using appropriate pain scale  - Administer analgesics based on type and severity of pain and evaluate response  - Implement non-pharmacological measures as appropriate and evaluate response  - Consider cultural and social influences on pain and pain management  - Notify physician/advanced practitioner if interventions unsuccessful or patient reports new pain  Outcome: Progressing     Problem: SAFETY ADULT  Goal: Maintain or return to baseline ADL function  Description: INTERVENTIONS:  -  Assess patient's ability to carry out ADLs; assess patient's baseline for ADL function and identify physical deficits which impact ability to perform ADLs (bathing, care of mouth/teeth, toileting, grooming, dressing, etc )  - Assess/evaluate cause of self-care deficits   - Assess range of motion  - Assess patient's mobility; develop plan if impaired  - Assess patient's need for assistive devices and provide as appropriate  - Encourage maximum independence but intervene and supervise when necessary  - Involve family in performance of ADLs  - Assess for home care needs following discharge   - Consider OT consult to assist with ADL evaluation and planning for discharge  - Provide patient education as appropriate  Outcome: Progressing  Goal: Maintains/Returns to pre admission functional level  Description: INTERVENTIONS:  - Perform BMAT or MOVE assessment daily    - Set and communicate daily mobility goal to care team and patient/family/caregiver  - Collaborate with rehabilitation services on mobility goals if consulted  - Perform Range of Motion *3** times a day  - Reposition patient every **2* hours    - Dangle patient **3* times a day  - Stand patient *3** times a day  - Ambulate patient *3** times a day  - Out of bed to chair *3** times a day   - Out of bed for meals **3* times a day  - Out of bed for toileting  - Record patient progress and toleration of activity level   Outcome: Progressing  Goal: Patient will remain free of falls  Description: INTERVENTIONS:  - Educate patient/family on patient safety including physical limitations  - Instruct patient to call for assistance with activity   - Consult OT/PT to assist with strengthening/mobility   - Keep Call bell within reach  - Keep bed low and locked with side rails adjusted as appropriate  - Keep care items and personal belongings within reach  - Initiate and maintain comfort rounds  - Make Fall Risk Sign visible to staff  - Offer Toileting every *2** Hours, in advance of need  - Initiate/Maintain *bed/chair**alarm  - Obtain necessary fall risk management equipment:   - Apply yellow socks and bracelet for high fall risk patients  - Consider moving patient to room near nurses station  Outcome: Progressing     Problem: DISCHARGE PLANNING  Goal: Discharge to home or other facility with appropriate resources  Description: INTERVENTIONS:  - Identify barriers to discharge w/patient and caregiver  - Arrange for needed discharge resources and transportation as appropriate  - Identify discharge learning needs (meds, wound care, etc )  - Arrange for interpretive services to assist at discharge as needed  - Refer to Case Management Department for coordinating discharge planning if the patient needs post-hospital services based on physician/advanced practitioner order or complex needs related to functional status, cognitive ability, or social support system  Outcome: Progressing     Problem: Knowledge Deficit  Goal: Patient/family/caregiver demonstrates understanding of disease process, treatment plan, medications, and discharge instructions  Description: Complete learning assessment and assess knowledge base    Interventions:  - Provide teaching at level of understanding  - Provide teaching via preferred learning methods  Outcome: Progressing     Problem: Potential for Falls  Goal: Patient will remain free of falls  Description: INTERVENTIONS:  - Educate patient/family on patient safety including physical limitations  - Instruct patient to call for assistance with activity   - Consult OT/PT to assist with strengthening/mobility   - Keep Call bell within reach  - Keep bed low and locked with side rails adjusted as appropriate  - Keep care items and personal belongings within reach  - Initiate and maintain comfort rounds  - Make Fall Risk Sign visible to staff  - Offer Toileting every 2 Hours, in advance of need  - Initiate/Maintain **bed/chair*alarm  - Obtain necessary fall risk management equipment:   - Apply yellow socks and bracelet for high fall risk patients  - Consider moving patient to room near nurses station  Outcome: Progressing     Problem: Nutrition/Hydration-ADULT  Goal: Nutrient/Hydration intake appropriate for improving, restoring or maintaining nutritional needs  Description: Monitor and assess patient's nutrition/hydration status for malnutrition  Collaborate with interdisciplinary team and initiate plan and interventions as ordered  Monitor patient's weight and dietary intake as ordered or per policy  Utilize nutrition screening tool and intervene as necessary  Determine patient's food preferences and provide high-protein, high-caloric foods as appropriate       INTERVENTIONS:  - Monitor oral intake, urinary output, labs, and treatment plans  - Assess nutrition and hydration status and recommend course of action  - Evaluate amount of meals eaten  - Assist patient with eating if necessary   - Allow adequate time for meals  - Recommend/ encourage appropriate diets, oral nutritional supplements, and vitamin/mineral supplements  - Order, calculate, and assess calorie counts as needed  - Recommend, monitor, and adjust tube feedings and TPN/PPN based on assessed needs  - Assess need for intravenous fluids  - Provide specific nutrition/hydration education as appropriate  - Include patient/family/caregiver in decisions related to nutrition  Outcome: Progressing     Problem: Prexisting or High Potential for Compromised Skin Integrity  Goal: Skin integrity is maintained or improved  Description: INTERVENTIONS:  - Identify patients at risk for skin breakdown  - Assess and monitor skin integrity  - Assess and monitor nutrition and hydration status  - Monitor labs   - Assess for incontinence   - Turn and reposition patient  - Assist with mobility/ambulation  - Relieve pressure over bony prominences  - Avoid friction and shearing  - Provide appropriate hygiene as needed including keeping skin clean and dry  - Evaluate need for skin moisturizer/barrier cream  - Collaborate with interdisciplinary team   - Patient/family teaching  - Consider wound care consult   Outcome: Progressing

## 2022-05-20 NOTE — CASE MANAGEMENT
Case Management Discharge Planning Note    Patient name Antonia Gomes  Location [address] [place] [address] [facility] [id number]  : 1985 Date 2022       Current Admission Date: 2022  Current Admission Diagnosis:Cord compression myelopathy [facility])   Patient Active Problem List    Diagnosis Date Noted    Anemia 2022    Cord compression myelopathy ([facility] [address] ) 2022    Lower urinary tract symptoms (LUTS) 2022    Encounter to discuss test results 2022    S/P orchiectomy 2021    Acute low back pain 2021    Hypokalemia 2021    Benign essential HTN 2021    HLD (hyperlipidemia) 2021    Testicular discomfort 2021    Intractable hiccups 2021    Depression 2021    Urinary retention 2021    Closed fracture of fifth cervical vertebra ([facility] [address] ) 03/10/2021    Spinal cord injury, C1-C7 ([facility] [address] ) 03/10/2021    Fall 03/10/2021    Pain 03/10/2021    Central cord syndrome ([facility] [address] ) 2021    Pulmonary insufficiency 2021      LOS (days): 9  Geometric Mean LOS (GMLOS) (days): 3 20  Days to GMLOS:-6     OBJECTIVE:  Risk of Unplanned Readmission Score: 13 7         Current admission status: Inpatient   Preferred Pharmacy:   [address] [place] [address] [place]  Tamika RODRIGUEZ [id number]  Phone: [phone] Fax: [phone] [facility]  [address] [facility]  [address] [address]  Phone: [phone] Fax: [phone]    Primary Care Provider: Kimbreli Ortiz MD    Primary Insurance: Pj Arana  Secondary Insurance:     DISCHARGE DETAILS:                                          Other Referral/Resources/Interventions Provided:  Referral Comments: CM returned to patient's bedside to follow up with previous conversation  Patient stated he now wants to go to [facility] rehab, referral placed and unable to accept   CM had lengthy discussion with patient regarding bed levels (acute vs short term vs SNF) and placement after acute rehab  CM informed patient that his insurance will need to approve wherever he goes  CM explained that [facility] rehab unable to accept, as well  Referral pending for [facility]  CM stressed importance of need for acute rehab and waiting for other referral decisions may result in losing a bed elsewhere, in which he may not find any spinal rehab bed  Patient stated he is only agreeable to go to [facility] out of 15+ options on list provided by his insurance as he continues to refuse any [facility] facility, any facility outside of [address] [place] [address] at [place], [place], [facility], [facility], [facility] at this time  Patient requesting more time to review options and think things over

## 2022-05-20 NOTE — PHYSICAL THERAPY NOTE
Physical Therapy Cancellation Note       05/20/22 1513   Note Type   Note Type Cancelled Session   Cancel Reasons Patient off floor/test  (CT scan at this time, worsening pain this AM)     Andi Sams, PTA

## 2022-05-21 PROCEDURE — 99024 POSTOP FOLLOW-UP VISIT: CPT | Performed by: NURSE PRACTITIONER

## 2022-05-21 RX ADMIN — ACETAMINOPHEN 975 MG: 325 TABLET, FILM COATED ORAL at 21:12

## 2022-05-21 RX ADMIN — ACETAMINOPHEN 975 MG: 325 TABLET, FILM COATED ORAL at 13:30

## 2022-05-21 RX ADMIN — BUSPIRONE HYDROCHLORIDE 5 MG: 5 TABLET ORAL at 21:13

## 2022-05-21 RX ADMIN — HEPARIN SODIUM 5000 UNITS: 5000 INJECTION INTRAVENOUS; SUBCUTANEOUS at 06:21

## 2022-05-21 RX ADMIN — BACLOFEN 10 MG: 10 TABLET ORAL at 21:13

## 2022-05-21 RX ADMIN — Medication 3 MG: at 21:13

## 2022-05-21 RX ADMIN — NORTRIPTYLINE HYDROCHLORIDE 10 MG: 10 CAPSULE ORAL at 21:15

## 2022-05-21 RX ADMIN — GABAPENTIN 800 MG: 400 CAPSULE ORAL at 08:40

## 2022-05-21 RX ADMIN — BACLOFEN 10 MG: 10 TABLET ORAL at 08:34

## 2022-05-21 RX ADMIN — GABAPENTIN 800 MG: 400 CAPSULE ORAL at 21:13

## 2022-05-21 RX ADMIN — TAPENTADOL HYDROCHLORIDE 75 MG: 75 TABLET, FILM COATED ORAL at 03:00

## 2022-05-21 RX ADMIN — GABAPENTIN 800 MG: 400 CAPSULE ORAL at 17:02

## 2022-05-21 RX ADMIN — TAMSULOSIN HYDROCHLORIDE 0.4 MG: 0.4 CAPSULE ORAL at 17:02

## 2022-05-21 RX ADMIN — HEPARIN SODIUM 5000 UNITS: 5000 INJECTION INTRAVENOUS; SUBCUTANEOUS at 13:30

## 2022-05-21 RX ADMIN — TAPENTADOL HYDROCHLORIDE 75 MG: 75 TABLET, FILM COATED ORAL at 17:02

## 2022-05-21 RX ADMIN — METHYLPREDNISOLONE 16 MG: 4 TABLET ORAL at 08:33

## 2022-05-21 RX ADMIN — BACLOFEN 10 MG: 10 TABLET ORAL at 17:02

## 2022-05-21 RX ADMIN — TAPENTADOL HYDROCHLORIDE 75 MG: 75 TABLET, FILM COATED ORAL at 23:30

## 2022-05-21 RX ADMIN — BUSPIRONE HYDROCHLORIDE 5 MG: 5 TABLET ORAL at 08:34

## 2022-05-21 RX ADMIN — HEPARIN SODIUM 5000 UNITS: 5000 INJECTION INTRAVENOUS; SUBCUTANEOUS at 21:13

## 2022-05-21 RX ADMIN — TAPENTADOL HYDROCHLORIDE 75 MG: 75 TABLET, FILM COATED ORAL at 08:41

## 2022-05-21 RX ADMIN — ATORVASTATIN CALCIUM 40 MG: 40 TABLET, FILM COATED ORAL at 17:02

## 2022-05-21 NOTE — PLAN OF CARE
Problem: MOBILITY - ADULT  Goal: Maintain or return to baseline ADL function  Description: INTERVENTIONS:  -  Assess patient's ability to carry out ADLs; assess patient's baseline for ADL function and identify physical deficits which impact ability to perform ADLs (bathing, care of mouth/teeth, toileting, grooming, dressing, etc )  - Assess/evaluate cause of self-care deficits   - Assess range of motion  - Assess patient's mobility; develop plan if impaired  - Assess patient's need for assistive devices and provide as appropriate  - Encourage maximum independence but intervene and supervise when necessary  - Involve family in performance of ADLs  - Assess for home care needs following discharge   - Consider OT consult to assist with ADL evaluation and planning for discharge  - Provide patient education as appropriate  Outcome: Progressing  Goal: Maintains/Returns to pre admission functional level  Description: INTERVENTIONS:  - Perform BMAT or MOVE assessment daily    - Set and communicate daily mobility goal to care team and patient/family/caregiver  - Collaborate with rehabilitation services on mobility goals if consulted  - Perform Range of Motion    times a day  - Reposition patient every    hours    - Dangle patient    times a day  - Stand patient    times a day  - Ambulate patient    times a day  - Out of bed to chair    times a day   - Out of bed for meals    times a day  - Out of bed for toileting  - Record patient progress and toleration of activity level   Outcome: Progressing     Problem: PAIN - ADULT  Goal: Verbalizes/displays adequate comfort level or baseline comfort level  Description: Interventions:  - Encourage patient to monitor pain and request assistance  - Assess pain using appropriate pain scale  - Administer analgesics based on type and severity of pain and evaluate response  - Implement non-pharmacological measures as appropriate and evaluate response  - Consider cultural and social influences on pain and pain management  - Notify physician/advanced practitioner if interventions unsuccessful or patient reports new pain  Outcome: Progressing     Problem: SAFETY ADULT  Goal: Maintain or return to baseline ADL function  Description: INTERVENTIONS:  -  Assess patient's ability to carry out ADLs; assess patient's baseline for ADL function and identify physical deficits which impact ability to perform ADLs (bathing, care of mouth/teeth, toileting, grooming, dressing, etc )  - Assess/evaluate cause of self-care deficits   - Assess range of motion  - Assess patient's mobility; develop plan if impaired  - Assess patient's need for assistive devices and provide as appropriate  - Encourage maximum independence but intervene and supervise when necessary  - Involve family in performance of ADLs  - Assess for home care needs following discharge   - Consider OT consult to assist with ADL evaluation and planning for discharge  - Provide patient education as appropriate  Outcome: Progressing  Goal: Maintains/Returns to pre admission functional level  Description: INTERVENTIONS:  - Perform BMAT or MOVE assessment daily    - Set and communicate daily mobility goal to care team and patient/family/caregiver  - Collaborate with rehabilitation services on mobility goals if consulted  - Perform Range of Motion    times a day  - Reposition patient every    hours    - Dangle patient    times a day  - Stand patient    times a day  - Ambulate patient      times a day  - Out of bed to chair    times a day   - Out of bed for meals      times a day  - Out of bed for toileting  - Record patient progress and toleration of activity level   Outcome: Progressing  Goal: Patient will remain free of falls  Description: INTERVENTIONS:  - Educate patient/family on patient safety including physical limitations  - Instruct patient to call for assistance with activity   - Consult OT/PT to assist with strengthening/mobility   - Keep Call bell within reach  - Keep bed low and locked with side rails adjusted as appropriate  - Keep care items and personal belongings within reach  - Initiate and maintain comfort rounds  - Make Fall Risk Sign visible to staff  - Offer Toileting every    Hours, in advance of need  - Initiate/Maintain   alarm  - Obtain necessary fall risk management equipment:     - Apply yellow socks and bracelet for high fall risk patients  - Consider moving patient to room near nurses station  Outcome: Progressing     Problem: DISCHARGE PLANNING  Goal: Discharge to home or other facility with appropriate resources  Description: INTERVENTIONS:  - Identify barriers to discharge w/patient and caregiver  - Arrange for needed discharge resources and transportation as appropriate  - Identify discharge learning needs (meds, wound care, etc )  - Arrange for interpretive services to assist at discharge as needed  - Refer to Case Management Department for coordinating discharge planning if the patient needs post-hospital services based on physician/advanced practitioner order or complex needs related to functional status, cognitive ability, or social support system  Outcome: Progressing     Problem: Potential for Falls  Goal: Patient will remain free of falls  Description: INTERVENTIONS:  - Educate patient/family on patient safety including physical limitations  - Instruct patient to call for assistance with activity   - Consult OT/PT to assist with strengthening/mobility   - Keep Call bell within reach  - Keep bed low and locked with side rails adjusted as appropriate  - Keep care items and personal belongings within reach  - Initiate and maintain comfort rounds  - Make Fall Risk Sign visible to staff  - Offer Toileting every    Hours, in advance of need  - Initiate/Maintain   alarm  - Obtain necessary fall risk management equipment:   - Apply yellow socks and bracelet for high fall risk patients  - Consider moving patient to room near nurses station  Outcome: Progressing     Problem: Nutrition/Hydration-ADULT  Goal: Nutrient/Hydration intake appropriate for improving, restoring or maintaining nutritional needs  Description: Monitor and assess patient's nutrition/hydration status for malnutrition  Collaborate with interdisciplinary team and initiate plan and interventions as ordered  Monitor patient's weight and dietary intake as ordered or per policy  Utilize nutrition screening tool and intervene as necessary  Determine patient's food preferences and provide high-protein, high-caloric foods as appropriate       INTERVENTIONS:  - Monitor oral intake, urinary output, labs, and treatment plans  - Assess nutrition and hydration status and recommend course of action  - Evaluate amount of meals eaten  - Assist patient with eating if necessary   - Allow adequate time for meals  - Recommend/ encourage appropriate diets, oral nutritional supplements, and vitamin/mineral supplements  - Order, calculate, and assess calorie counts as needed  - Recommend, monitor, and adjust tube feedings and TPN/PPN based on assessed needs  - Assess need for intravenous fluids  - Provide specific nutrition/hydration education as appropriate  - Include patient/family/caregiver in decisions related to nutrition  Outcome: Progressing     Problem: Prexisting or High Potential for Compromised Skin Integrity  Goal: Skin integrity is maintained or improved  Description: INTERVENTIONS:  - Identify patients at risk for skin breakdown  - Assess and monitor skin integrity  - Assess and monitor nutrition and hydration status  - Monitor labs   - Assess for incontinence   - Turn and reposition patient  - Assist with mobility/ambulation  - Relieve pressure over bony prominences  - Avoid friction and shearing  - Provide appropriate hygiene as needed including keeping skin clean and dry  - Evaluate need for skin moisturizer/barrier cream  - Collaborate with interdisciplinary team   - Patient/family teaching  - Consider wound care consult   Outcome: Progressing

## 2022-05-21 NOTE — PROGRESS NOTES
[address] [facility]  Progress Note Candida Look 1985, 43 y o  male MRN: [id number]  Unit/Bed#: [facility] 623-01 Encounter: [id number]  Primary Care Provider: Kimberli Ortiz MD   Date and time admitted to hospital: 5/11/2022  9:16 AM    Anemia  Assessment & Plan  Multifactorial   Improved (Hgb 11 6)    Benign essential HTN  Assessment & Plan  Continue to monitor  Maintaining MAP>85    Hypokalemia  Assessment & Plan  Improved after repletion 5/13  Potassium 3 5 today      Urinary retention  Assessment & Plan  Jimenez removed   Intermittent straight cath - continue  Defer Jimenez replacement    Utilize PM&R SCI Bladder Protocol  - Once jimenez catheter removed, please institute following protocol at designated time interval or PRN bladder fullness:  Q6hr   - Allow patient to attempt to void  If patient voids, check PVR  Catheterize for >300cc  - If patient unable to void, bladder scan  If >400cc straight cath  - If patient has volumes >650-800 consistently (x3), increase frequency of straight cath until volumes <400cc consistently  - If you reach Q3hr straight caths, with continued high volumes, institute fluid restriction of 4991-2907 mL if hemodynamically stable/appropriate  - If patient is voiding and PVR x3 <150cc can discontinue protocol      * Cord compression myelopathy [facility])  Assessment & Plan  POD9 ACDF C6-7; Revision PCDF, revise/replace right C2 screw with laminar screw, revise left C2 screw (cap), remove bilateral C7 lateral mass screws, place navigated C7, T1 pedicle screws, C6-7 laminectomy, arthrodesis C2-3 and C6-T1 (5/12/22 [facility])  · Patient presents as direct admission after outpatient MRI demonstrate cord compression requiring surgical intervention  · s/p severe central spinal cord injury, Jania B spinal cord injury March 2021 after falling down stairs Status post posterior cervical decompression fixation and fusion C2-C7 3/7/2021 Dr Toshia Kan  · Patient complains of decline in function, new urinary difficulties and c/o increased neck pain, with intermittent radicular symptoms and clicking noise with head movement which is related to a new C6-7 disc herniation causing cord compression and edema as well as hardware failure  Imaging:   · Xray cervical spine, 5/14/22: Postsurgical changes of anterior cervical discectomy and fusion at C6-C7 and revision posterior fusion spanning C2-T1  Plan:  · Continue monitor neurological status and monitor for any new or progressive symptoms   · SARANYA removed 5/16/22  · Pain control - APS Input appreciated  Ketamine gtt discontinued  Medications adjusted per their recommendations  · Oral dilaudid changed to tapentadol 50/75mg PRN  · IV dilaudid discontinued  · Baclofen increased to 10mg TID  · Patient received a one time dose of toradol IV on 5/19 with good pain control afterwards  Can consider second dose on Sunday if pain is not improving  · Continue medrol dose pack  · Mobilize PT/OT - recommending acute rehab  Patient refusing to work with therapy today due to pain  · PM&R consult placed, recommend SCI rehab  · Urinary retention  We will continue straight catheterization to assist with bladder training as discussed with physiatry as long as patient cannot tolerate  · DVT PPX: SCDs  HSQ  · Dispo - d/w CM - recommend acute SCI rehab  [facility] rehab following  Patient medically stable but will need to be weaned off of IV pain medications prior to dispo; all IV medications have been discontinued and we will monitor pain levels  · Spoke with CM - [facility] willing to accept but looking into plan for after 2wk completion of acute rehab    Plan of care discussed with KAYLA Kunz  Neurosurgery will continue to follow as primary  Patient is medically stable for discharge at this time  Call with questions/concerns  Subjective/Objective   Chief Complaint:  A m  In so much pain        Subjective:  Continues to endorse significant posterior neck pain  Complains of shoulder pain  States patent is not well controlled without IV medications  States he cannot work with therapy secondary to pain  Discussed that we will attempt to give him his pain medication prior to working with therapy so that he can tolerate some movement  Discussed importance of mobilization  Objective:  NAD  Anterior and posterior cervical incisions clean dry and intact  Irvine Ferguson collar in place  I/O       05/19 0701  05/20 0700 05/20 0701  05/21 0700 05/21 0701 05/22 0700    P  O  0      Total Intake(mL/kg) 0 (0)      Urine (mL/kg/hr) 1068 (0 6) 1008 (0 5)     Total Output 1068 1008     Net -1068 -1008            Unmeasured Urine Occurrence 1 x            Invasive Devices  Report    Peripheral Intravenous Line  Duration           Peripheral IV 05/16/22 Right Forearm 5 days                Physical Exam:  Vitals: Blood pressure 131/82, pulse (!) 124, temperature 100 4 °F (38 °C), resp  rate 18, height 5' 7" (1 702 m), weight 78 2 kg (172 lb 6 4 oz), SpO2 95 %  ,Body mass index is 27 kg/m²        General appearance: alert, appears stated age, cooperative and no distress  Head: Normocephalic, without obvious abnormality, atraumatic  Eyes: EOMI, PERRL  Neck:  Anterior and posterior cervical incisions clean dry and intact, Irvine Ferguson brace in place  Back: no kyphosis present, no tenderness to percussion or palpation  Lungs: non labored breathing  Heart: regular heart rate  Neurologic:   Mental status: Alert, oriented x3 thought content appropriate  Cranial nerves: grossly intact (Cranial nerves II-XII)  Sensory:  Decreased to lower extremities  Motor:  Jania B spinal cord injury, bilateral upper extremities 3/5,  3/5, bilateral knee flexion 4/5, distal strength 0/5      Lab Results:  Results from last 7 days   Lab Units 05/15/22  0532   WBC Thousand/uL 10 17*   HEMOGLOBIN g/dL 11 6*   HEMATOCRIT % 36 5   PLATELETS Thousands/uL 293     Results from last 7 days   Lab Units 05/15/22  0532   POTASSIUM mmol/L 3 5   CHLORIDE mmol/L 105   CO2 mmol/L 27   BUN mg/dL 9   CREATININE mg/dL 0 91   CALCIUM mg/dL 9 1                 No results found for: TROPONINT  ABG:  Lab Results   Component Value Date    PHART 7 441 03/07/2021    BZF5QTI 36 7 03/07/2021    PO2ART 162 7 (H) 03/07/2021    KOM2RGB 24 4 03/07/2021    BEART 0 6 03/07/2021    SOURCE Line, Arterial 03/07/2021       Imaging Studies: I have personally reviewed pertinent reports  and I have personally reviewed pertinent films in PACS    XR cervical spine 2 or 3 views    Result Date: 5/15/2022  Impression: Postsurgical changes of anterior cervical discectomy and fusion at C6-C7 and revision posterior fusion spanning C2-T1  Workstation performed: [id number]     XR spine cervical 2 or 3 vw injury    Result Date: 5/12/2022  Impression: Fluoroscopic guidance provided for intraoperative localization during cervical fusion procedure  Please refer to the separate procedure notes for additional details  Workstation performed: [id number]     CT cervical spine without contrast    Result Date: 5/11/2022  Impression: Loosening of the bilateral C7 screws noted There is a fracture of the right lateral mass C2 screw Findings concur with the preliminary report by the referring clinician already in PACS and/or our electronic record EPIC  Workstation performed: [id number]     MRI cervical spine with and without contrast    Result Date: 5/11/2022  Impression: 1  New focus of cord compression at C6-C7 secondary to a new disc herniation with cord signal abnormality at this level, worrisome for new cord edema, along the caudal margin of the spinal construct  Spine surgical assessment advised  2   Interval development of cystic myelomalacia and cord atrophy at the C4-5 level in the region of prior cord atrophy which has been ameliorated by decompressive laminectomies C3-C5  3   Posterior fusion hardware C2-C7 noted   I personally discussed this study with Prince Perla on 5/11/2022 at 8:15 AM  Workstation performed: [id number]       EKG, Pathology, and Other Studies: I have personally reviewed pertinent reports        VTE Pharmacologic Prophylaxis: Sequential compression device (Venodyne)  and Heparin    VTE Mechanical Prophylaxis: sequential compression device

## 2022-05-21 NOTE — ASSESSMENT & PLAN NOTE
POD9 ACDF C6-7; Revision PCDF, revise/replace right C2 screw with laminar screw, revise left C2 screw (cap), remove bilateral C7 lateral mass screws, place navigated C7, T1 pedicle screws, C6-7 laminectomy, arthrodesis C2-3 and C6-T1 (5/12/22 [facility])  · Patient presents as direct admission after outpatient MRI demonstrate cord compression requiring surgical intervention  · s/p severe central spinal cord injury, Jania B spinal cord injury March 2021 after falling down stairs Status post posterior cervical decompression fixation and fusion C2-C7 3/7/2021 Dr Veronica Eugene  · Patient complains of decline in function, new urinary difficulties and c/o increased neck pain, with intermittent radicular symptoms and clicking noise with head movement which is related to a new C6-7 disc herniation causing cord compression and edema as well as hardware failure  Imaging:   · Xray cervical spine, 5/14/22: Postsurgical changes of anterior cervical discectomy and fusion at C6-C7 and revision posterior fusion spanning C2-T1  Plan:  · Continue monitor neurological status and monitor for any new or progressive symptoms   · SARANYA removed 5/16/22  · Pain control - APS Input appreciated  Ketamine gtt discontinued  Medications adjusted per their recommendations  · Oral dilaudid changed to tapentadol 50/75mg PRN  · IV dilaudid discontinued  · Baclofen increased to 10mg TID  · Patient received a one time dose of toradol IV on 5/19 with good pain control afterwards  Can consider second dose on Sunday if pain is not improving  · Continue medrol dose pack  · Mobilize PT/OT - recommending acute rehab  Patient refusing to work with therapy today due to pain  · PM&R consult placed, recommend SCI rehab  · Urinary retention  We will continue straight catheterization to assist with bladder training as discussed with physiatry as long as patient cannot tolerate  · DVT PPX: SCDs  HSQ  · Dispo - d/w CM - recommend acute SCI rehab    [facility] rehab following  Patient medically stable but will need to be weaned off of IV pain medications prior to dispo; all IV medications have been discontinued and we will monitor pain levels  · Spoke with CM - [facility] willing to accept but looking into plan for after 2wk completion of acute rehab    Plan of care discussed with RN Delores Romberg  Neurosurgery will continue to follow as primary  Patient is medically stable for discharge at this time  Call with questions/concerns

## 2022-05-21 NOTE — ASSESSMENT & PLAN NOTE
Jimenez removed   Intermittent straight cath - continue  Defer Jimenez replacement    Utilize PM&R SCI Bladder Protocol  - Once jimenez catheter removed, please institute following protocol at designated time interval or PRN bladder fullness:  Q6hr   - Allow patient to attempt to void  If patient voids, check PVR  Catheterize for >300cc  - If patient unable to void, bladder scan  If >400cc straight cath  - If patient has volumes >650-800 consistently (x3), increase frequency of straight cath until volumes <400cc consistently  - If you reach Q3hr straight caths, with continued high volumes, institute fluid restriction of 7229-0941 mL if hemodynamically stable/appropriate  - If patient is voiding and PVR x3 <150cc can discontinue protocol

## 2022-05-22 PROCEDURE — 97110 THERAPEUTIC EXERCISES: CPT

## 2022-05-22 PROCEDURE — 99024 POSTOP FOLLOW-UP VISIT: CPT | Performed by: NURSE PRACTITIONER

## 2022-05-22 PROCEDURE — 97116 GAIT TRAINING THERAPY: CPT

## 2022-05-22 PROCEDURE — 97530 THERAPEUTIC ACTIVITIES: CPT

## 2022-05-22 PROCEDURE — 97535 SELF CARE MNGMENT TRAINING: CPT

## 2022-05-22 RX ADMIN — BACLOFEN 10 MG: 10 TABLET ORAL at 08:41

## 2022-05-22 RX ADMIN — HEPARIN SODIUM 5000 UNITS: 5000 INJECTION INTRAVENOUS; SUBCUTANEOUS at 05:37

## 2022-05-22 RX ADMIN — HEPARIN SODIUM 5000 UNITS: 5000 INJECTION INTRAVENOUS; SUBCUTANEOUS at 21:42

## 2022-05-22 RX ADMIN — ATORVASTATIN CALCIUM 40 MG: 40 TABLET, FILM COATED ORAL at 18:21

## 2022-05-22 RX ADMIN — ACETAMINOPHEN 975 MG: 325 TABLET, FILM COATED ORAL at 13:46

## 2022-05-22 RX ADMIN — GABAPENTIN 800 MG: 400 CAPSULE ORAL at 21:42

## 2022-05-22 RX ADMIN — TAMSULOSIN HYDROCHLORIDE 0.4 MG: 0.4 CAPSULE ORAL at 18:21

## 2022-05-22 RX ADMIN — BUSPIRONE HYDROCHLORIDE 5 MG: 5 TABLET ORAL at 08:41

## 2022-05-22 RX ADMIN — HEPARIN SODIUM 5000 UNITS: 5000 INJECTION INTRAVENOUS; SUBCUTANEOUS at 13:46

## 2022-05-22 RX ADMIN — Medication 3 MG: at 21:42

## 2022-05-22 RX ADMIN — BACLOFEN 10 MG: 10 TABLET ORAL at 16:05

## 2022-05-22 RX ADMIN — BUSPIRONE HYDROCHLORIDE 5 MG: 5 TABLET ORAL at 21:42

## 2022-05-22 RX ADMIN — TAPENTADOL HYDROCHLORIDE 75 MG: 75 TABLET, FILM COATED ORAL at 16:05

## 2022-05-22 RX ADMIN — NORTRIPTYLINE HYDROCHLORIDE 10 MG: 10 CAPSULE ORAL at 21:42

## 2022-05-22 RX ADMIN — ACETAMINOPHEN 975 MG: 325 TABLET, FILM COATED ORAL at 21:42

## 2022-05-22 RX ADMIN — ACETAMINOPHEN 975 MG: 325 TABLET, FILM COATED ORAL at 05:37

## 2022-05-22 RX ADMIN — TAPENTADOL HYDROCHLORIDE 75 MG: 75 TABLET, FILM COATED ORAL at 21:42

## 2022-05-22 RX ADMIN — METHYLPREDNISOLONE 12 MG: 4 TABLET ORAL at 08:41

## 2022-05-22 RX ADMIN — GABAPENTIN 800 MG: 400 CAPSULE ORAL at 16:05

## 2022-05-22 RX ADMIN — BACLOFEN 10 MG: 10 TABLET ORAL at 21:42

## 2022-05-22 RX ADMIN — GABAPENTIN 800 MG: 400 CAPSULE ORAL at 08:41

## 2022-05-22 NOTE — PLAN OF CARE
Problem: OCCUPATIONAL THERAPY ADULT  Goal: Performs self-care activities at highest level of function for planned discharge setting  See evaluation for individualized goals  Description: Treatment Interventions: ADL retraining, Functional transfer training, UE strengthening/ROM, Endurance training, Cognitive reorientation, Patient/family training, Equipment evaluation/education, Compensatory technique education, Energy conservation, Activityengagement, Fine motor coordination activities          See flowsheet documentation for full assessment, interventions and recommendations  Outcome: Progressing  Note: Limitation: Decreased ADL status, Decreased UE ROM, Decreased UE strength, Decreased Safe judgement during ADL, Decreased cognition, Decreased endurance, Decreased sensation, Decreased fine motor control, Decreased self-care trans, Decreased high-level ADLs  Prognosis: Good, Fair  Assessment: Pt seen for occupational therapy with focus on activity tolerance, bed mob, unsupported sitting balance and tolerance for pt engagement in UB self-care  Pt cleared by KAYLA/Suri for participation in therapy session  Pt presented supine/HOB raised pt awake/alert and agreeable to participate in therapy following pt identifiers confirmed  Pt reported he was having a good day today and agreeable to participate in ADL tasks/grooming while sitting at the edge of bed  He required assistance for brushing teeth and to complete [facility] gown was sitting edge of bed 2* to pt decreased coordination strength and balance  Pt tolerated session well and able to participate sitting in bedside chair post therapy session  Pt will require post acute rehab services to continue to address pain pt noted deficits with decreased strength and activity tolerance which could impair pt ADLs and functional mob  Pt left out of bed to bedside chair post session chair alarm activated and all needs within reach       OT Discharge Recommendation: Post acute rehabilitation services  OT - OK to Discharge:  Yes

## 2022-05-22 NOTE — PROGRESS NOTES
[address] [facility]  Progress Note Helen Zarco 1985, 43 y o  male MRN: [id number]  Unit/Bed#: [facility] 623-01 Encounter: [id number]  Primary Care Provider: Deb Perez MD   Date and time admitted to hospital: 5/11/2022  9:16 AM    Anemia  Assessment & Plan  Multifactorial   Improved (Hgb 11 6)    Benign essential HTN  Assessment & Plan  Continue to monitor  Maintaining MAP>85    Urinary retention  Assessment & Plan  Jimenez removed   Intermittent straight cath - continue  Defer Jimenez replacement    Utilize PM&R SCI Bladder Protocol  - Once jimenez catheter removed, please institute following protocol at designated time interval or PRN bladder fullness:  Q6hr   - Allow patient to attempt to void  If patient voids, check PVR  Catheterize for >300cc  - If patient unable to void, bladder scan  If >400cc straight cath  - If patient has volumes >650-800 consistently (x3), increase frequency of straight cath until volumes <400cc consistently  - If you reach Q3hr straight caths, with continued high volumes, institute fluid restriction of 5625-1614 mL if hemodynamically stable/appropriate  - If patient is voiding and PVR x3 <150cc can discontinue protocol      * Cord compression myelopathy ([facility])  Assessment & Plan  POD 10 ACDF C6-7; Revision PCDF, revise/replace right C2 screw with laminar screw, revise left C2 screw (cap), remove bilateral C7 lateral mass screws, place navigated C7, T1 pedicle screws, C6-7 laminectomy, arthrodesis C2-3 and C6-T1 (5/12/22 [facility])  · Presented as direct admission after outpatient MRI demonstrate cord compression requiring surgical intervention  · s/p severe central spinal cord injury, Jania B spinal cord injury March 2021 after falling down stairs status post posterior cervical decompression fixation and fusion C2-C7 3/7/2021 Dr Matilde Upton  · Was seen in office prior to surgery for complaints of decline in function, new urinary difficulties and c/o increased neck pain, with intermittent radicular symptoms and clicking noise with head movement which is related to a new C6-7 disc herniation causing cord compression and edema as well as hardware failure  Imaging:   · Xray cervical spine, 5/14/22: Postsurgical changes of anterior cervical discectomy and fusion at C6-C7 and revision posterior fusion spanning C2-T1  Plan:  · Continue monitor neurological status and monitor for any new or progressive symptoms   · SARANYA removed 5/16/22  · Pain control - APS Input appreciated  Ketamine gtt discontinued  Medications adjusted per their recommendations  · Oral dilaudid changed to tapentadol 50/75mg PRN  · IV dilaudid discontinued  · Baclofen increased to 10mg TID  · Patient received a one time dose of toradol IV on 5/19 with good pain control afterwards  Can consider second dose on Sunday if pain is not improving  · Continue medrol dose pack  · Mobilize PT/OT - recommending acute rehab  Patient refusing to work with therapy 5/20 due to pain - none since  · PM&R consult placed, recommend SCI rehab  · Urinary retention  Continue straight catheterization to assist with bladder training as discussed with physiatry as long as patient can tolerate  · DVT PPX: SCDs  HSQ  · Dispo - d/w CM - recommend acute SCI rehab  [facility] rehab following  Patient medically stable but will need to be weaned off of IV pain medications prior to dispo; all IV medications have been discontinued and we will monitor pain levels  · Spoke with CM - [facility] willing to accept but looking into plan for after 2wk completion of acute rehab    Plan of care discussed with KAYLA Arce  Neurosurgery will continue to follow as primary  Patient is medically stable for discharge at this time  Call with questions/concerns           Subjective/Objective   Chief Complaint: "I'm having a lot of pain "    Subjective: Continues to endorse significant postoperative pain, particularly with movement to his entire neck  Objective: NAD  Vista brace in place  I/O       05/20 0701 05/21 0700 05/21 0701 05/22 0700 05/22 0701 05/23 0700    P  O  Total Intake(mL/kg)       Urine (mL/kg/hr) 1008 (0 5) 550 (0 3)     Total Output 1008 550     Net -1008 -550                  Invasive Devices  Report    None                 Physical Exam:  Vitals: Blood pressure 118/76, pulse 105, temperature 98 6 °F (37 °C), resp  rate 16, height 5' 7" (1 702 m), weight 78 2 kg (172 lb 6 4 oz), SpO2 96 %  ,Body mass index is 27 kg/m²  General appearance: alert, appears stated age, cooperative and no distress  Head: Normocephalic, without obvious abnormality, atraumatic  Eyes: EOMI, PERRL  Neck: vista brace, anterior and posterior incisions clean and dry, well-approximated  Back: no kyphosis present, no tenderness to percussion or palpation  Lungs: non labored breathing  Heart: regular heart rate  Neurologic:   Mental status: Alert, oriented x3, thought content appropriate  Cranial nerves: grossly intact (Cranial nerves II-XII)  Sensory: decreased to LE  Motor:  and UE 3/5, proximal LE 4/5, distal 0/5        Lab Results:        Invalid input(s):  EOSPCT        Invalid input(s): LABALBU              No results found for: TROPONINT  ABG:  Lab Results   Component Value Date    PHART 7 441 03/07/2021    UPF8PNB 36 7 03/07/2021    PO2ART 162 7 (H) 03/07/2021    PJM7MIC 24 4 03/07/2021    BEART 0 6 03/07/2021    SOURCE Line, Arterial 03/07/2021       Imaging Studies: I have personally reviewed pertinent reports  and I have personally reviewed pertinent films in PACS    XR cervical spine 2 or 3 views    Result Date: 5/15/2022  Impression: Postsurgical changes of anterior cervical discectomy and fusion at C6-C7 and revision posterior fusion spanning C2-T1   Workstation performed: [id number]     XR spine cervical 2 or 3 vw injury    Result Date: 5/12/2022  Impression: Fluoroscopic guidance provided for intraoperative localization during cervical fusion procedure  Please refer to the separate procedure notes for additional details  Workstation performed: [id number]     CT cervical spine without contrast    Result Date: 5/11/2022  Impression: Loosening of the bilateral C7 screws noted There is a fracture of the right lateral mass C2 screw Findings concur with the preliminary report by the referring clinician already in PACS and/or our electronic record EPIC  Workstation performed: [id number]     MRI cervical spine with and without contrast    Result Date: 5/11/2022  Impression: 1  New focus of cord compression at C6-C7 secondary to a new disc herniation with cord signal abnormality at this level, worrisome for new cord edema, along the caudal margin of the spinal construct  Spine surgical assessment advised  2   Interval development of cystic myelomalacia and cord atrophy at the C4-5 level in the region of prior cord atrophy which has been ameliorated by decompressive laminectomies C3-C5  3   Posterior fusion hardware C2-C7 noted  I personally discussed this study with Sawyer Dos Santos on 5/11/2022 at 8:15 AM  Workstation performed: [id number]       EKG, Pathology, and Other Studies: I have personally reviewed pertinent reports        VTE Pharmacologic Prophylaxis: Sequential compression device (Venodyne)  and Heparin    VTE Mechanical Prophylaxis: sequential compression device

## 2022-05-22 NOTE — OCCUPATIONAL THERAPY NOTE
Occupational Therapy Treatment Note       05/22/22 1110   OT Last Visit   OT Visit Date 05/22/22   Note Type   Note Type Cancelled Session   Restrictions/Precautions   Weight Bearing Precautions Per Order No   Braces or Orthoses C/S Collar   Other Precautions Fall Risk;Spinal precautions   Lifestyle   Autonomy PT REPORTS HAVING ASSIST WITH MOST ADLS/IADLS  PT ABLE TO COMPLETE FEEDING/GROOMING WITHOUT ASSIST  PT ABLE TO AMBULATE SHORT DISTANCES WITHOUT ASSISTANCES/AD  PT REPORTS HE IS ABLE TO SELF-PROPELL W/C WITH BUE/BLE "ON GOOD DAYS"   Reciprocal Relationships FROM [facility]   Intrinsic Gratification ENJOYS SPENDING TIME WITH HIS FAMILY   Pain Assessment   Pain Assessment Tool 0-10   Pain Score 7   Pain Location/Orientation Location: Neck   ADL   Where Assessed Edge of bed   Grooming Assistance 4  Minimal Assistance   Grooming Deficit Teeth care;Wash/dry hands; Wash/dry face   UB Dressing Assistance 3  Moderate Assistance   UB Dressing Deficit Thread RUE; Thread LUE;Pull around back   Bed Mobility   Supine to Sit 3  Moderate assistance   Additional items Assist x 1   Transfers   Sit to Stand 4  Minimal assistance   Additional items Assist x 2   Stand to Sit 4  Minimal assistance   Additional items Assist x 2   Stand pivot 3  Moderate assistance   Additional items Assist x 2   Cognition   Overall Cognitive Status [facility]   Arousal/Participation Alert; Cooperative   Attention Within functional limits   Orientation Level Oriented X4   Memory Within functional limits   Following Commands Follows all commands and directions without difficulty   Activity Tolerance   Activity Tolerance Patient tolerated treatment well   Assessment   Assessment Pt seen for occupational therapy with focus on activity tolerance, bed mob, unsupported sitting balance and tolerance for pt engagement in UB self-care  Pt cleared by KAYLA/Suri for participation in therapy session    Pt presented supine/HOB raised pt awake/alert and agreeable to participate in therapy following pt identifiers confirmed  Pt reported he was having a good day today and agreeable to participate in ADL tasks/grooming while sitting at the edge of bed  He required assistance for brushing teeth and to complete [facility] gown was sitting edge of bed 2* to pt decreased coordination strength and balance  Pt tolerated session well and able to participate sitting in bedside chair post therapy session  Pt will require post acute rehab services to continue to address pain pt noted deficits with decreased strength and activity tolerance which could impair pt ADLs and functional mob  Pt left out of bed to bedside chair post session chair alarm activated and all needs within reach     Plan   Treatment Interventions ADL retraining   Goal Expiration Date 05/27/22   OT Treatment Day 3   OT Frequency 3-5x/wk   Recommendation   OT Discharge Recommendation Post acute rehabilitation services   AM-PAC Daily Activity Inpatient   Lower Body Dressing 2   Bathing 2   Toileting 2   Upper Body Dressing 2   Grooming 2   Eating 2   Daily Activity Raw Score 12   Daily Activity Standardized Score (Calc for Raw Score >=11) 30 6   AM-PAC Applied Cognition Inpatient   Following a Speech/Presentation 4   Understanding Ordinary Conversation 4   Taking Medications 4   Remembering Where Things Are Placed or Put Away 4   Remembering List of 4-5 Errands 4   Taking Care of Complicated Tasks 4   Applied Cognition Raw Score 24   Applied Cognition Standardized Score 62 21   Barthel Index   Feeding 5   Bathing 0   Grooming Score 0   Dressing Score 0   Bladder Score 10   Bowels Score 10   Toilet Use Score 5   Transfers (Bed/Chair) Score 5   Mobility (Level Surface) Score 0   Stairs Score 0   Barthel Index Score 35   Modified [place] Scale   Modified [place] Scale 4       Jimmie MCBRIDE/KENTRELL

## 2022-05-22 NOTE — ASSESSMENT & PLAN NOTE
Jimenez removed   Intermittent straight cath - continue  Defer Jimenez replacement    Utilize PM&R SCI Bladder Protocol  - Once jimenez catheter removed, please institute following protocol at designated time interval or PRN bladder fullness:  Q6hr   - Allow patient to attempt to void  If patient voids, check PVR  Catheterize for >300cc  - If patient unable to void, bladder scan  If >400cc straight cath  - If patient has volumes >650-800 consistently (x3), increase frequency of straight cath until volumes <400cc consistently  - If you reach Q3hr straight caths, with continued high volumes, institute fluid restriction of 4780-4077 mL if hemodynamically stable/appropriate  - If patient is voiding and PVR x3 <150cc can discontinue protocol

## 2022-05-22 NOTE — ASSESSMENT & PLAN NOTE
POD 10 ACDF C6-7; Revision PCDF, revise/replace right C2 screw with laminar screw, revise left C2 screw (cap), remove bilateral C7 lateral mass screws, place navigated C7, T1 pedicle screws, C6-7 laminectomy, arthrodesis C2-3 and C6-T1 (5/12/22 [place])  · Presented as direct admission after outpatient MRI demonstrate cord compression requiring surgical intervention  · s/p severe central spinal cord injury, Jania B spinal cord injury March 2021 after falling down stairs status post posterior cervical decompression fixation and fusion C2-C7 3/7/2021 Dr Rubén Betancourt  · Was seen in office prior to surgery for complaints of decline in function, new urinary difficulties and c/o increased neck pain, with intermittent radicular symptoms and clicking noise with head movement which is related to a new C6-7 disc herniation causing cord compression and edema as well as hardware failure  Imaging:   · Xray cervical spine, 5/14/22: Postsurgical changes of anterior cervical discectomy and fusion at C6-C7 and revision posterior fusion spanning C2-T1  Plan:  · Continue monitor neurological status and monitor for any new or progressive symptoms   · SARANYA removed 5/16/22  · Pain control - APS Input appreciated  Ketamine gtt discontinued  Medications adjusted per their recommendations  · Oral dilaudid changed to tapentadol 50/75mg PRN  · IV dilaudid discontinued  · Baclofen increased to 10mg TID  · Patient received a one time dose of toradol IV on 5/19 with good pain control afterwards  Can consider second dose on Sunday if pain is not improving  · Continue medrol dose pack  · Mobilize PT/OT - recommending acute rehab  Patient refusing to work with therapy 5/20 due to pain - none since  · PM&R consult placed, recommend SCI rehab  · Urinary retention  Continue straight catheterization to assist with bladder training as discussed with physiatry as long as patient can tolerate  · DVT PPX: SCDs   HSQ  · Dispo - d/w CM - recommend acute SCI rehab  [employer] rehab following  Patient medically stable but will need to be weaned off of IV pain medications prior to dispo; all IV medications have been discontinued and we will monitor pain levels  · Spoke with CM - [facility] willing to accept but looking into plan for after 2wk completion of acute rehab    Plan of care discussed with KAYLA Reich  Neurosurgery will continue to follow as primary  Patient is medically stable for discharge at this time  Call with questions/concerns

## 2022-05-22 NOTE — PHYSICAL THERAPY NOTE
Physical Therapy Treatment Note    Patient's Name: Danielle Donohue  : 22 1115   PT Last Visit   PT Visit Date 22   Note Type   Note Type Treatment for insurance authorization   Pain Assessment   Pain Assessment Tool 0-10   Pain Score 7   Pain Location/Orientation Orientation: Bilateral;Location: Neck   Hospital Pain Intervention(s) Repositioned; Ambulation/increased activity   Restrictions/Precautions   Weight Bearing Precautions Per Order No   Braces or Orthoses C/S Collar   Other Precautions Fall Risk;Pain;Spinal precautions   General   Chart Reviewed Yes   Family/Caregiver Present No   Cognition   Overall Cognitive Status WFL   Orientation Level Oriented X4   Following Commands Follows all commands and directions without difficulty   Comments Pt very pleasant, cooperative, motivated to participate   Bed Mobility   Supine to Sit 3  Moderate assistance   Additional items Assist x 1; Increased time required;Verbal cues   Additional Comments VC's for sequencing   Transfers   Sit to Stand 4  Minimal assistance   Additional items Assist x 2; Increased time required;Verbal cues   Stand to Sit 4  Minimal assistance   Additional items Assist x 2; Increased time required;Verbal cues   Stand pivot 3  Moderate assistance   Additional items Assist x 2; Increased time required;Verbal cues   Additional Comments Performed initial STS with B/L HHA for safety, good control with cues for hip extension into standing, small steps to chair with modA x2  Multiple STS trials throughout session with final trial Elmo x1  Pt performed seated ADL's at EOB with MCBRIDE with cues for oriention to COG provided by PT  Ambulation/Elevation   Gait pattern Excessively slow; Short stride; Foward flexed;Decreased foot clearance;Narrow ANNELISE   Gait Assistance 3  Moderate assist   Additional items Assist x 1  (+2 for chair follow)   Assistive Device Platform walker  (B/L platforms)   Distance 8 ft, limited by fatigue, decline in gait quality with distance   Balance   Static Sitting Fair +   Dynamic Sitting Fair -   Static Standing Poor +   Dynamic Standing Poor   Activity Tolerance   Activity Tolerance Patient limited by fatigue   Medical Staff Made Aware Jesus Arguelles for co-treat focusing on separate goals due to medical complexity and limited activity tolerance   Nurse Made Aware RN updated   Exercises   Knee AROM Long Arc Quad Sitting;10 reps;AROM; Bilateral  (to target)   Ankle Pumps Sitting;15 reps;AROM; Left   Marching Sitting;5 reps;Bilateral  (AAROM R LE, AROM L LE)   Assessment   Prognosis Good   Problem List Decreased strength;Decreased endurance; Impaired balance;Decreased mobility; Decreased safety awareness;Pain;Orthopedic restrictions   Assessment Pt making good progress toward functional goals, able to increase ambulation distance this session with use of B/L platform walker  Pt requires min-modA for transfers, improving from assist x2 to assist x1 with repeition and cues for technique  Pt encouraged to perform seated LE exercise throughout the day  Remains limited by fatigue, tachycardic to 126 bpm with activity, recovers with seated rest breaks  Pt will benefit from continued skilled PT intervention during course of hospital stay to address the above mentioned impairments  Recommend IP rehab upon hospital D/C  Goals   Patient Goals to walk   STG Expiration Date 06/01/22  (goals extended as they remain appropriate)   PT Treatment Day 4   Plan   Treatment/Interventions Functional transfer training;LE strengthening/ROM; Therapeutic exercise; Endurance training;Patient/family training;Equipment eval/education; Bed mobility;Gait training   Progress Progressing toward goals   PT Frequency Other (Comment)  (3-6x/week)   Recommendation   PT Discharge Recommendation Post acute rehabilitation services   AM-PAC Basic Mobility Inpatient   Turning in Bed Without Bedrails 2 Lying on Back to Sitting on Edge of Flat Bed 2   Moving Bed to Chair 2   Standing Up From Chair 2   Walk in Room 2   Climb 3-5 Stairs 1   Basic Mobility Inpatient Raw Score 11   Basic Mobility Standardized Score 30 25   Highest Level Of Mobility   [facility] Goal 4: Move to chair/commode       Tiara Lawton, PT, DPT, GCS

## 2022-05-22 NOTE — PLAN OF CARE
Problem: PHYSICAL THERAPY ADULT  Goal: Performs mobility at highest level of function for planned discharge setting  See evaluation for individualized goals  Description: Treatment/Interventions: Functional transfer training, LE strengthening/ROM, Therapeutic exercise, Endurance training, Patient/family training, Equipment eval/education, Bed mobility, Gait training, Spoke to nursing, OT  Equipment Recommended: Wheelchair       See flowsheet documentation for full assessment, interventions and recommendations  Note: Prognosis: Good  Problem List: Decreased strength, Decreased endurance, Impaired balance, Decreased mobility, Decreased safety awareness, Pain, Orthopedic restrictions  Assessment: Pt making good progress toward functional goals, able to increase ambulation distance this session with use of B/L platform walker  Pt requires min-modA for transfers, improving from assist x2 to assist x1 with repeition and cues for technique  Pt encouraged to perform seated LE exercise throughout the day  Remains limited by fatigue, tachycardic to 126 bpm with activity, recovers with seated rest breaks  Pt will benefit from continued skilled PT intervention during course of hospital stay to address the above mentioned impairments  Recommend IP rehab upon hospital D/C  Barriers to Discharge: Decreased caregiver support, Inaccessible home environment        PT Discharge Recommendation: Post acute rehabilitation services          See flowsheet documentation for full assessment

## 2022-05-23 PROCEDURE — 97530 THERAPEUTIC ACTIVITIES: CPT

## 2022-05-23 PROCEDURE — 97116 GAIT TRAINING THERAPY: CPT

## 2022-05-23 PROCEDURE — 99024 POSTOP FOLLOW-UP VISIT: CPT | Performed by: NURSE PRACTITIONER

## 2022-05-23 RX ADMIN — TAPENTADOL HYDROCHLORIDE 75 MG: 75 TABLET, FILM COATED ORAL at 08:21

## 2022-05-23 RX ADMIN — Medication 3 MG: at 22:35

## 2022-05-23 RX ADMIN — BUSPIRONE HYDROCHLORIDE 5 MG: 5 TABLET ORAL at 08:21

## 2022-05-23 RX ADMIN — ACETAMINOPHEN 975 MG: 325 TABLET, FILM COATED ORAL at 15:04

## 2022-05-23 RX ADMIN — BUSPIRONE HYDROCHLORIDE 5 MG: 5 TABLET ORAL at 22:28

## 2022-05-23 RX ADMIN — ATORVASTATIN CALCIUM 40 MG: 40 TABLET, FILM COATED ORAL at 17:49

## 2022-05-23 RX ADMIN — HEPARIN SODIUM 5000 UNITS: 5000 INJECTION INTRAVENOUS; SUBCUTANEOUS at 22:27

## 2022-05-23 RX ADMIN — TAPENTADOL HYDROCHLORIDE 75 MG: 75 TABLET, FILM COATED ORAL at 15:04

## 2022-05-23 RX ADMIN — BACLOFEN 10 MG: 10 TABLET ORAL at 22:28

## 2022-05-23 RX ADMIN — GABAPENTIN 800 MG: 400 CAPSULE ORAL at 15:04

## 2022-05-23 RX ADMIN — BACLOFEN 10 MG: 10 TABLET ORAL at 15:04

## 2022-05-23 RX ADMIN — ACETAMINOPHEN 975 MG: 325 TABLET, FILM COATED ORAL at 22:28

## 2022-05-23 RX ADMIN — GABAPENTIN 800 MG: 400 CAPSULE ORAL at 08:21

## 2022-05-23 RX ADMIN — GABAPENTIN 800 MG: 400 CAPSULE ORAL at 22:28

## 2022-05-23 RX ADMIN — METHYLPREDNISOLONE 8 MG: 4 TABLET ORAL at 08:21

## 2022-05-23 RX ADMIN — TAPENTADOL HYDROCHLORIDE 75 MG: 75 TABLET, FILM COATED ORAL at 22:28

## 2022-05-23 RX ADMIN — TAMSULOSIN HYDROCHLORIDE 0.4 MG: 0.4 CAPSULE ORAL at 17:49

## 2022-05-23 RX ADMIN — NORTRIPTYLINE HYDROCHLORIDE 10 MG: 10 CAPSULE ORAL at 22:29

## 2022-05-23 RX ADMIN — HEPARIN SODIUM 5000 UNITS: 5000 INJECTION INTRAVENOUS; SUBCUTANEOUS at 15:04

## 2022-05-23 RX ADMIN — BACLOFEN 10 MG: 10 TABLET ORAL at 08:21

## 2022-05-23 NOTE — PLAN OF CARE
Problem: MOBILITY - ADULT  Goal: Maintain or return to baseline ADL function  Description: INTERVENTIONS:  -  Assess patient's ability to carry out ADLs; assess patient's baseline for ADL function and identify physical deficits which impact ability to perform ADLs (bathing, care of mouth/teeth, toileting, grooming, dressing, etc )  - Assess/evaluate cause of self-care deficits   - Assess range of motion  - Assess patient's mobility; develop plan if impaired  - Assess patient's need for assistive devices and provide as appropriate  - Encourage maximum independence but intervene and supervise when necessary  - Involve family in performance of ADLs  - Assess for home care needs following discharge   - Consider OT consult to assist with ADL evaluation and planning for discharge  - Provide patient education as appropriate  Outcome: Progressing  Goal: Maintains/Returns to pre admission functional level  Description: INTERVENTIONS:  - Perform BMAT or MOVE assessment daily    - Set and communicate daily mobility goal to care team and patient/family/caregiver  - Collaborate with rehabilitation services on mobility goals if consulted  - Perform Range of Motion  times a day  - Reposition patient every  hours    - Dangle patient  times a day  - Stand patient  times a day  - Ambulate patient  times a day  - Out of bed to chair  times a day   - Out of bed for meals  times a day  - Out of bed for toileting  - Record patient progress and toleration of activity level   Outcome: Progressing     Problem: PAIN - ADULT  Goal: Verbalizes/displays adequate comfort level or baseline comfort level  Description: Interventions:  - Encourage patient to monitor pain and request assistance  - Assess pain using appropriate pain scale  - Administer analgesics based on type and severity of pain and evaluate response  - Implement non-pharmacological measures as appropriate and evaluate response  - Consider cultural and social influences on pain and pain management  - Notify physician/advanced practitioner if interventions unsuccessful or patient reports new pain  Outcome: Progressing     Problem: SAFETY ADULT  Goal: Maintain or return to baseline ADL function  Description: INTERVENTIONS:  -  Assess patient's ability to carry out ADLs; assess patient's baseline for ADL function and identify physical deficits which impact ability to perform ADLs (bathing, care of mouth/teeth, toileting, grooming, dressing, etc )  - Assess/evaluate cause of self-care deficits   - Assess range of motion  - Assess patient's mobility; develop plan if impaired  - Assess patient's need for assistive devices and provide as appropriate  - Encourage maximum independence but intervene and supervise when necessary  - Involve family in performance of ADLs  - Assess for home care needs following discharge   - Consider OT consult to assist with ADL evaluation and planning for discharge  - Provide patient education as appropriate  Outcome: Progressing  Goal: Maintains/Returns to pre admission functional level  Description: INTERVENTIONS:  - Perform BMAT or MOVE assessment daily    - Set and communicate daily mobility goal to care team and patient/family/caregiver  - Collaborate with rehabilitation services on mobility goals if consulted  - Perform Range of Motion  times a day  - Reposition patient every  hours    - Dangle patient  times a day  - Stand patient  times a day  - Ambulate patient  times a day  - Out of bed to chair  times a day   - Out of bed for meals  times a day  - Out of bed for toileting  - Record patient progress and toleration of activity level   Outcome: Progressing  Goal: Patient will remain free of falls  Description: INTERVENTIONS:  - Educate patient/family on patient safety including physical limitations  - Instruct patient to call for assistance with activity   - Consult OT/PT to assist with strengthening/mobility   - Keep Call bell within reach  - Keep bed low and locked with side rails adjusted as appropriate  - Keep care items and personal belongings within reach  - Initiate and maintain comfort rounds  - Make Fall Risk Sign visible to staff  - Offer Toileting every  Hours, in advance of need  - Initiate/Maintain alarm  - Obtain necessary fall risk management equipment:   - Apply yellow socks and bracelet for high fall risk patients  - Consider moving patient to room near nurses station  Outcome: Progressing     Problem: DISCHARGE PLANNING  Goal: Discharge to home or other facility with appropriate resources  Description: INTERVENTIONS:  - Identify barriers to discharge w/patient and caregiver  - Arrange for needed discharge resources and transportation as appropriate  - Identify discharge learning needs (meds, wound care, etc )  - Arrange for interpretive services to assist at discharge as needed  - Refer to Case Management Department for coordinating discharge planning if the patient needs post-hospital services based on physician/advanced practitioner order or complex needs related to functional status, cognitive ability, or social support system  Outcome: Progressing     Problem: Potential for Falls  Goal: Patient will remain free of falls  Description: INTERVENTIONS:  - Educate patient/family on patient safety including physical limitations  - Instruct patient to call for assistance with activity   - Consult OT/PT to assist with strengthening/mobility   - Keep Call bell within reach  - Keep bed low and locked with side rails adjusted as appropriate  - Keep care items and personal belongings within reach  - Initiate and maintain comfort rounds  - Make Fall Risk Sign visible to staff  - Offer Toileting every  Hours, in advance of need  - Initiate/Maintainalarm  - Obtain necessary fall risk management equipme  - Apply yellow socks and bracelet for high fall risk patients  - Consider moving patient to room near nurses station  Outcome: Progressing     Problem: Nutrition/Hydration-ADULT  Goal: Nutrient/Hydration intake appropriate for improving, restoring or maintaining nutritional needs  Description: Monitor and assess patient's nutrition/hydration status for malnutrition  Collaborate with interdisciplinary team and initiate plan and interventions as ordered  Monitor patient's weight and dietary intake as ordered or per policy  Utilize nutrition screening tool and intervene as necessary  Determine patient's food preferences and provide high-protein, high-caloric foods as appropriate       INTERVENTIONS:  - Monitor oral intake, urinary output, labs, and treatment plans  - Assess nutrition and hydration status and recommend course of action  - Evaluate amount of meals eaten  - Assist patient with eating if necessary   - Allow adequate time for meals  - Recommend/ encourage appropriate diets, oral nutritional supplements, and vitamin/mineral supplements  - Order, calculate, and assess calorie counts as needed  - Recommend, monitor, and adjust tube feedings and TPN/PPN based on assessed needs  - Assess need for intravenous fluids  - Provide specific nutrition/hydration education as appropriate  - Include patient/family/caregiver in decisions related to nutrition  Outcome: Progressing     Problem: Prexisting or High Potential for Compromised Skin Integrity  Goal: Skin integrity is maintained or improved  Description: INTERVENTIONS:  - Identify patients at risk for skin breakdown  - Assess and monitor skin integrity  - Assess and monitor nutrition and hydration status  - Monitor labs   - Assess for incontinence   - Turn and reposition patient  - Assist with mobility/ambulation  - Relieve pressure over bony prominences  - Avoid friction and shearing  - Provide appropriate hygiene as needed including keeping skin clean and dry  - Evaluate need for skin moisturizer/barrier cream  - Collaborate with interdisciplinary team   - Patient/family teaching  - Consider wound care consult Outcome: Progressing

## 2022-05-23 NOTE — PLAN OF CARE
Problem: MOBILITY - ADULT  Goal: Maintain or return to baseline ADL function  Description: INTERVENTIONS:  -  Assess patient's ability to carry out ADLs; assess patient's baseline for ADL function and identify physical deficits which impact ability to perform ADLs (bathing, care of mouth/teeth, toileting, grooming, dressing, etc )  - Assess/evaluate cause of self-care deficits   - Assess range of motion  - Assess patient's mobility; develop plan if impaired  - Assess patient's need for assistive devices and provide as appropriate  - Encourage maximum independence but intervene and supervise when necessary  - Involve family in performance of ADLs  - Assess for home care needs following discharge   - Consider OT consult to assist with ADL evaluation and planning for discharge  - Provide patient education as appropriate  Outcome: Progressing  Goal: Maintains/Returns to pre admission functional level  Description: INTERVENTIONS:  - Perform BMAT or MOVE assessment daily    - Set and communicate daily mobility goal to care team and patient/family/caregiver  - Collaborate with rehabilitation services on mobility goals if consulted  - Perform Range of Motion  times a day  - Reposition patient every  hours    - Dangle patient  times a day  - Stand patient  times a day  - Ambulate patient  times a day  - Out of bed to chair  times a day   - Out of bed for meals  times a day  - Out of bed for toileting  - Record patient progress and toleration of activity level   Outcome: Progressing     Problem: PAIN - ADULT  Goal: Verbalizes/displays adequate comfort level or baseline comfort level  Description: Interventions:  - Encourage patient to monitor pain and request assistance  - Assess pain using appropriate pain scale  - Administer analgesics based on type and severity of pain and evaluate response  - Implement non-pharmacological measures as appropriate and evaluate response  - Consider cultural and social influences on pain and pain management  - Notify physician/advanced practitioner if interventions unsuccessful or patient reports new pain  Outcome: Progressing     Problem: SAFETY ADULT  Goal: Maintain or return to baseline ADL function  Description: INTERVENTIONS:  -  Assess patient's ability to carry out ADLs; assess patient's baseline for ADL function and identify physical deficits which impact ability to perform ADLs (bathing, care of mouth/teeth, toileting, grooming, dressing, etc )  - Assess/evaluate cause of self-care deficits   - Assess range of motion  - Assess patient's mobility; develop plan if impaired  - Assess patient's need for assistive devices and provide as appropriate  - Encourage maximum independence but intervene and supervise when necessary  - Involve family in performance of ADLs  - Assess for home care needs following discharge   - Consider OT consult to assist with ADL evaluation and planning for discharge  - Provide patient education as appropriate  Outcome: Progressing  Goal: Maintains/Returns to pre admission functional level  Description: INTERVENTIONS:  - Perform BMAT or MOVE assessment daily    - Set and communicate daily mobility goal to care team and patient/family/caregiver  - Collaborate with rehabilitation services on mobility goals if consulted  - Perform Range of Motion  times a day  - Reposition patient every  hours    - Dangle patient  times a day  - Stand patient  times a day  - Ambulate patient  times a day  - Out of bed to chair  times a day   - Out of bed for meals  times a day  - Out of bed for toileting  - Record patient progress and toleration of activity level   Outcome: Progressing  Goal: Patient will remain free of falls  Description: INTERVENTIONS:  - Educate patient/family on patient safety including physical limitations  - Instruct patient to call for assistance with activity   - Consult OT/PT to assist with strengthening/mobility   - Keep Call bell within reach  - Keep bed low and locked with side rails adjusted as appropriate  - Keep care items and personal belongings within reach  - Initiate and maintain comfort rounds  - Make Fall Risk Sign visible to staff  - Offer Toileting every  Hours, in advance of need  - Initiate/Maintain alarm  - Obtain necessary fall risk management equipment  - Apply yellow socks and bracelet for high fall risk patients  - Consider moving patient to room near nurses station  Outcome: Progressing

## 2022-05-23 NOTE — PHYSICAL THERAPY NOTE
Physical Therapy Progress Note     05/23/22 1351   PT Last Visit   PT Visit Date 05/23/22   Note Type   Note Type Treatment   Pain Assessment   Pain Assessment Tool FLACC   Pain Rating: FLACC (Rest) - Face 0   Pain Rating: FLACC (Rest) - Legs 0   Pain Rating: FLACC (Rest) - Activity 0   Pain Rating: FLACC (Rest) - Cry 1   Pain Rating: FLACC (Rest) - Consolability 0   Score: FLACC (Rest) 1   Pain Rating: FLACC (Activity) - Face 1   Pain Rating: FLACC (Activity) - Legs 1   Pain Rating: FLACC (Activity) - Activity 1   Pain Rating: FLACC (Activity) - Cry 1   Pain Rating: FLACC (Activity) - Consolability 0   Score: FLACC (Activity) 4   Restrictions/Precautions   Braces or Orthoses C/S Collar   Other Precautions Pain; Fall Risk;Spinal precautions   Subjective   Subjective Pt encountered supine in bed, reports no new complaints during session  Is happy he is making progress in recent sessions, but remains frustrated that he is much weaker post op than he expected, but remains motivated to particiapte in therapy  Bed Mobility   Supine to Sit 4  Minimal assistance   Additional items Assist x 1;HOB elevated; Increased time required   Transfers   Sit to Stand 3  Moderate assistance   Additional items Assist x 1   Stand to Sit 3  Moderate assistance   Additional items Assist x 1   Stand pivot 3  Moderate assistance   Additional items Assist x 1  (to back up to recliner)   Ambulation/Elevation   Gait pattern Step to;Short stride; Ataxia; Foward flexed; Inconsistent heidy;Decreased foot clearance;Narrow ANNELISE   Gait Assistance 3  Moderate assist   Additional items Assist x 1   Assistive Device Platform walker;L platform;R platform   Distance 25', 45'   Balance   Static Sitting Fair +   Static Standing Poor +   Ambulatory Poor   Activity Tolerance   Activity Tolerance Patient tolerated treatment well;Patient limited by fatigue;Patient limited by pain   Nurse Made Aware yes   Assessment   Prognosis Good   Problem List Decreased strength;Decreased endurance; Impaired balance;Decreased mobility; Decreased safety awareness;Pain;Orthopedic restrictions   Assessment Pt demosntrated significantly improved functional endurance & mobitliy today, performing all transfers with Ax1 with assist from bed functions & use of BUE platform RW  Pt continues to demonstrate signficiant deficits in LE coordination, with palpated RLE knee instability & ataxic gait without LOB  Standing rest taken during sesison, but pt declined seated rests when offered  Pt was able to self manage RW when performing turns & backing up, but requries extensive time to do so safely  Discussed PT POC, importance of daily mobility outside of PT, and goal to trial further ambulation & w/c mobiilty in upcoming sessions to progress to PLOF  Pt verbalized understanding & agreement at this time  Goals   Patient Goals to get stronger & be more independent   STG Expiration Date 06/01/22   PT Treatment Day 5   Plan   Treatment/Interventions Functional transfer training;LE strengthening/ROM; Therapeutic exercise; Endurance training;Patient/family training;Equipment eval/education;Gait training;Bed mobility   Progress Progressing toward goals   PT Frequency Other (Comment)  (3-6x/week)   Recommendation   PT Discharge Recommendation Post acute rehabilitation services   Equipment Recommended [facility] Recommended Wheeled walker   Walker Accessories Platform attachment - right arm;Platform attachment - left arm   AM-PAC Basic Mobility Inpatient   Turning in Bed Without Bedrails 2   Lying on Back to Sitting on Edge of Flat Bed 2   Moving Bed to Chair 2   Standing Up From Chair 2   Walk in Room 2   Climb 3-5 Stairs 1   Basic Mobility Inpatient Raw Score 11   Basic Mobility Standardized Score 30 25   Highest Level Of Mobility   JH-HLM Goal 4: Move to chair/commode   JH-HLM Achieved 7: Walk 25 feet or more     Jasmina Berg PTA    [facility] Basic [facility] Standardized Score of 40 78 suggests pt would benefit from post acute rehab

## 2022-05-23 NOTE — CASE MANAGEMENT
Case Management Discharge Planning Note    Patient name Cara Grimm  Location [address] [place] [address] 623/PPHP [id number] MRN [id number]  : 1985 Date 2022       Current Admission Date: 2022  Current Admission Diagnosis:Cord compression myelopathy [facility])   Patient Active Problem List    Diagnosis Date Noted    Anemia 2022    Cord compression myelopathy ([facility] [address] ) 2022    Lower urinary tract symptoms (LUTS) 2022    Encounter to discuss test results 2022    S/P orchiectomy 2021    Acute low back pain 2021    Hypokalemia 2021    Benign essential HTN 2021    HLD (hyperlipidemia) 2021    Testicular discomfort 2021    Intractable hiccups 2021    Depression 2021    Urinary retention 2021    Closed fracture of fifth cervical vertebra ([facility] [address] ) 03/10/2021    Spinal cord injury, C1-C7 ([facility] [address] ) 03/10/2021    Fall 03/10/2021    Pain 03/10/2021    Central cord syndrome ([facility] [address] ) 2021    Pulmonary insufficiency 2021      LOS (days): 12  Geometric Mean LOS (GMLOS) (days): 3 20  Days to GMLOS:-8 9     OBJECTIVE:  Risk of Unplanned Readmission Score: 13 84         Current admission status: Inpatient   Preferred Pharmacy:   [address] [place] [address] [place]  Tamika RODRIGUEZ [address]  Phone: [phone] Fax: 903 [address] [address] [address] [address]  Phone: [phone] Fax: [phone]    Primary Care Provider: Blaze Lovelace MD    Primary Insurance: Benjamin Flavors  Secondary Insurance:     DISCHARGE DETAILS:                                          Other Referral/Resources/Interventions Provided:  Referral Comments: CM spoke with patient at bedside, agreeable to go to [address], [place], [facility], [facility]  Patient stated that he will consider [facility]   CM spoke with Dexter Ferrer from [facility] to update  Hosea Moe stated that her boss is not in until Wednesday so a definite answer on acceptance cannot be give at this time  CM spoke with [facility] who stated she will review referral via [address]  FELIPA spoke with Leah from [facility], requesting updated therapy and labs to be sent via [address]          [facility] requesting BMP and CBC within 24-48 hours   TT sent to neurosurgery to request labs

## 2022-05-23 NOTE — ASSESSMENT & PLAN NOTE
Jimenez removed   Intermittent straight cath - has not required over last few days  Defer Jimenez replacement    Utilize PM&R SCI Bladder Protocol  - Once jimenez catheter removed, please institute following protocol at designated time interval or PRN bladder fullness:  Q6hr   - Allow patient to attempt to void  If patient voids, check PVR  Catheterize for >300cc  - If patient unable to void, bladder scan  If >400cc straight cath  - If patient has volumes >650-800 consistently (x3), increase frequency of straight cath until volumes <400cc consistently  - If you reach Q3hr straight caths, with continued high volumes, institute fluid restriction of 1699-2940 mL if hemodynamically stable/appropriate  - If patient is voiding and PVR x3 <150cc can discontinue protocol

## 2022-05-23 NOTE — PLAN OF CARE
Problem: PHYSICAL THERAPY ADULT  Goal: Performs mobility at highest level of function for planned discharge setting  See evaluation for individualized goals  Description: Treatment/Interventions: Functional transfer training, LE strengthening/ROM, Therapeutic exercise, Endurance training, Patient/family training, Equipment eval/education, Bed mobility, Gait training, Spoke to nursing, OT  Equipment Recommended: Wheelchair       See flowsheet documentation for full assessment, interventions and recommendations  Outcome: Progressing  Note: Prognosis: Good  Problem List: Decreased strength, Decreased endurance, Impaired balance, Decreased mobility, Decreased safety awareness, Pain, Orthopedic restrictions  Assessment: Pt demosntrated significantly improved functional endurance & mobitliy today, performing all transfers with Ax1 with assist from bed functions & use of BUE platform RW  Pt continues to demonstrate signficiant deficits in LE coordination, with palpated RLE knee instability & ataxic gait without LOB  Standing rest taken during sesison, but pt declined seated rests when offered  Pt was able to self manage RW when performing turns & backing up, but requries extensive time to do so safely  Discussed PT POC, importance of daily mobility outside of PT, and goal to trial further ambulation & w/c mobiilty in upcoming sessions to progress to PLOF  Pt verbalized understanding & agreement at this time  Barriers to Discharge: Decreased caregiver support, Inaccessible home environment        PT Discharge Recommendation: Post acute rehabilitation services          See flowsheet documentation for full assessment

## 2022-05-23 NOTE — ASSESSMENT & PLAN NOTE
POD 11 ACDF C6-7; Revision PCDF, revise/replace right C2 screw with laminar screw, revise left C2 screw (cap), remove bilateral C7 lateral mass screws, place navigated C7, T1 pedicle screws, C6-7 laminectomy, arthrodesis C2-3 and C6-T1 (5/12/22 [place])  · Presented as direct admission after outpatient MRI demonstrate cord compression requiring surgical intervention  · s/p severe central spinal cord injury, Jania B spinal cord injury March 2021 after falling down stairs status post posterior cervical decompression fixation and fusion C2-C7 3/7/2021 Dr Alison Vasques  · Was seen in office prior to surgery for complaints of decline in function, new urinary difficulties and c/o increased neck pain, with intermittent radicular symptoms and clicking noise with head movement which is related to a new C6-7 disc herniation causing cord compression and edema as well as hardware failure  Imaging:   · Xray cervical spine, 5/14/22: Postsurgical changes of anterior cervical discectomy and fusion at C6-C7 and revision posterior fusion spanning C2-T1  Plan:  · Continue monitor neurological status and monitor for any new or progressive symptoms   · SARANYA removed 5/16/22  · Pain control - APS Input appreciated  Ketamine gtt discontinued  Medications adjusted per their recommendations  · Oral dilaudid changed to tapentadol 50/75mg PRN  · IV dilaudid discontinued  · Baclofen increased to 10mg TID  · Patient received a one time dose of toradol IV on 5/19 with good pain control afterwards  · Continue medrol dose pack  · Mobilize PT/OT - recommending acute rehab  Patient worked with PT over weekend  · PM&R consult placed, recommend SCI rehab  · Urinary retention improving, able to void  · DVT PPX: SCDs  HSQ  · Dispo - d/w CM - recommend acute SCI rehab  [employer] rehab following    Patient medically stable but will need to be weaned off of IV pain medications prior to dispo; all IV medications have been discontinued and we will monitor pain levels  · Spoke with CM - [facility] willing to accept but looking into plan for after 2wk completion of acute rehab    Plan of care discussed with RN Noy Weeks  Neurosurgery will continue to follow as primary  Patient is medically stable for discharge at this time  Call with questions/concerns

## 2022-05-23 NOTE — UTILIZATION REVIEW
Continued Stay Review    Date:     5/21/2022                    Current Patient Class:  Inpatient   Current Level of Care:  Med surg     HPI:36 y o  male initially admitted on 5/11 with severe spinal cord injury - to the OR 5/12   S/p ACDF C6-7;   Revision PCDF: revise/replace right C2 screw with laminar screw,   revise left C2 screw (cap),   remove bilateral C7 lateral mass screws,   place navigated C7, T1 pedicle screws,   C6-7 laminectomy,   arthrodesis C2-3 and C6-T1      Assessment/Plan: 5/21 -  POD 9  Case mangement working on  rehab placement  [facility] following and needs a plan after a possible  2 week acute rehab  Mobilize with PT/OT, pain management- Urinary retention straight cath prn  Plan to wean off IV pain medications  5/22 - POD 10 - Continue current plan, Pain management  Therapy treatments         5/23 continue current plan,  Case management made additional referrals      Vital Signs:   Date/Time Temp Pulse Resp BP MAP (mmHg) SpO2 O2 Device   05/23/22 06:46:47 98 5 °F (36 9 °C) 98 19 139/86 104 97 % --   05/23/22 0549 -- -- -- -- -- -- None (Room air)   05/22/22 23:48:57 98 6 °F (37 °C) 89 17 135/93 107 94 % --   05/22/22 1500 -- -- -- -- -- 94 % None (Room air)   05/22/22 14:25:40 98 8 °F (37 1 °C) 102 -- 118/77 91 95 % --   05/22/22 07:37:43 98 6 °F (37 °C) 105 -- 118/76 90 96 % --   05/21/22 2206 98 6 °F (37 °C) 96 16 113/75 88 96 % --   05/21/22 2000 -- -- -- -- -- -- None (Room air)   05/21/22 14:20:32 98 8 °F (37 1 °C) 101 -- 114/73 87 96 % --   05/21/22 08:00:50 100 4 °F (38 °C) 124 Abnormal  18 131/82 98 95 % --         Pertinent Labs/Diagnostic Results:       Medications:   Scheduled Medications:  acetaminophen, 975 mg, Oral, Q8H [facility]  atorvastatin, 40 mg, Oral, After Dinner  baclofen, 10 mg, Oral, TID  busPIRone, 5 mg, Oral, BID  docusate sodium, 100 mg, Oral, BID  gabapentin, 800 mg, Oral, TID  heparin (porcine), 5,000 Units, Subcutaneous, Q8H [facility]  lidocaine, 2 patch, Topical, Daily  melatonin, 3 mg, Oral, HS  Medrol 16mg po - 5/23 - tappering dose   Medrol 12mg po - 5/22  Medrol 8mg po  -5/23  [START ON 5/24/2022] methylPREDNISolone, 4 mg, Oral, Daily  nortriptyline, 10 mg, Oral, HS  senna, 1 tablet, Oral, Daily  tamsulosin, 0 4 mg, Oral, After Dinner      Continuous IV Infusions:     PRN Meds:  bisacodyl, 10 mg, Rectal, Daily PRN  glycopyrrolate, 0 2 mg, Intravenous, Q4H PRN  haloperidol lactate, 2 mg, Intramuscular, Q30 Min PRN  LORazepam, 0 5 mg, Intravenous, Q4H PRN  naloxone, 0 04 mg, Intravenous, Q1MIN PRN  ondansetron, 4 mg, Intravenous, Q4H PRN  tapentadol, 50 mg, Oral, Q6H PRN   Or  tapentadol, 75 mg, Oral, Q6H PRN - 5/21 x 4 - 5/22 x 2 - 5/23 x 1         Discharge Plan:  D     Network Utilization Review Department  ATTENTION: Please call with any questions or concerns to [phone] and carefully listen to the prompts so that you are directed to the right person  All voicemails are confidential   Sylvia Salinas all requests for admission clinical reviews, approved or denied determinations and any other requests to dedicated fax number below belonging to the campus where the patient is receiving treatment   List of dedicated fax numbers for the Facilities:  [address] DENIALS (Administrative/Medical Necessity) [phone]   [address] (Maternity/NICU/Pediatrics) [phone]   [address] [place] [place] [address] [place] [phone]   [place] [address] [phone]

## 2022-05-23 NOTE — PROGRESS NOTES
[address] [facility]  Progress Note Zhanna South 1985, 43 y o  male MRN: [id number]  Unit/Bed#: [facility] 623-01 Encounter: [id number]  Primary Care Provider: Elaine Monsalve MD   Date and time admitted to hospital: 5/11/2022  9:16 AM    Anemia  Assessment & Plan  Multifactorial   Improved (Hgb 11 6)    Benign essential HTN  Assessment & Plan  Continue to monitor  Maintaining MAP>85    Urinary retention  Assessment & Plan  Jimenez removed   Intermittent straight cath - has not required over last few days  Defer Jimenez replacement    Utilize PM&R SCI Bladder Protocol  - Once jimenez catheter removed, please institute following protocol at designated time interval or PRN bladder fullness:  Q6hr   - Allow patient to attempt to void  If patient voids, check PVR  Catheterize for >300cc  - If patient unable to void, bladder scan  If >400cc straight cath  - If patient has volumes >650-800 consistently (x3), increase frequency of straight cath until volumes <400cc consistently  - If you reach Q3hr straight caths, with continued high volumes, institute fluid restriction of 0799-5550 mL if hemodynamically stable/appropriate  - If patient is voiding and PVR x3 <150cc can discontinue protocol      * Cord compression myelopathy (HCC)  Assessment & Plan  POD 11 ACDF C6-7; Revision PCDF, revise/replace right C2 screw with laminar screw, revise left C2 screw (cap), remove bilateral C7 lateral mass screws, place navigated C7, T1 pedicle screws, C6-7 laminectomy, arthrodesis C2-3 and C6-T1 (5/12/22 [place])  · Presented as direct admission after outpatient MRI demonstrate cord compression requiring surgical intervention  · s/p severe central spinal cord injury, Jania B spinal cord injury March 2021 after falling down stairs status post posterior cervical decompression fixation and fusion C2-C7 3/7/2021 Dr Yasmany Wild  · Was seen in office prior to surgery for complaints of decline in function, new urinary difficulties and c/o increased neck pain, with intermittent radicular symptoms and clicking noise with head movement which is related to a new C6-7 disc herniation causing cord compression and edema as well as hardware failure  Imaging:   · Xray cervical spine, 5/14/22: Postsurgical changes of anterior cervical discectomy and fusion at C6-C7 and revision posterior fusion spanning C2-T1  Plan:  · Continue monitor neurological status and monitor for any new or progressive symptoms   · SARANYA removed 5/16/22  · Pain control - APS Input appreciated  Ketamine gtt discontinued  Medications adjusted per their recommendations  · Oral dilaudid changed to tapentadol 50/75mg PRN  · IV dilaudid discontinued  · Baclofen increased to 10mg TID  · Patient received a one time dose of toradol IV on 5/19 with good pain control afterwards  · Continue medrol dose pack  · Mobilize PT/OT - recommending acute rehab  Patient worked with PT over weekend  · PM&R consult placed, recommend SCI rehab  · Urinary retention improving, able to void  · DVT PPX: SCDs  HSQ  · Dispo - d/w CM - recommend acute SCI rehab  [employer] rehab following  Patient medically stable but will need to be weaned off of IV pain medications prior to dispo; all IV medications have been discontinued and we will monitor pain levels  · Spoke with CM - [facility] willing to accept but looking into plan for after 2wk completion of acute rehab    Plan of care discussed with KAYLA Flores  Neurosurgery will continue to follow as primary  Patient is medically stable for discharge at this time  Call with questions/concerns  Subjective/Objective   Chief Complaint: "I"m doing ok "    Subjective: Continues to endorse intense cervical postoperative pain  Voiding without issue and has not been straight cathed in a few days  Does not have much appetite  Objective: NAD  Anterior and posterior cervical incisions clean dry and intact      I/O       05/21 0701  05/22 0700 05/22 0701  05/23 0700 05/23 0701  05/24 0700    P  O   440     Total Intake(mL/kg)  440 (5 6)     Urine (mL/kg/hr) 550 (0 3) 875 (0 5)     Stool  0     Total Output 550 875     Net -550 -435            Unmeasured Stool Occurrence  1 x           Invasive Devices  Report    None                 Physical Exam:  Vitals: Blood pressure 139/86, pulse 98, temperature 98 5 °F (36 9 °C), resp  rate 19, height 5' 7" (1 702 m), weight 78 2 kg (172 lb 6 4 oz), SpO2 97 %  ,Body mass index is 27 kg/m²  General appearance: alert, appears stated age, cooperative and no distress  Head: Normocephalic, without obvious abnormality, atraumatic  Eyes: EOMI, PERRL  Neck: vista brace in place, anterior and posterior cervical incisions clean and dry  Back: no kyphosis present, no tenderness to percussion or palpation  Lungs: non labored breathing  Heart: regular heart rate  Neurologic:   Mental status: Alert, oriented x3, thought content appropriate  Cranial nerves: grossly intact (Cranial nerves II-XII)  Sensory: normal to LT  Motor: bilateral UE 3/5, shivani LE proximal 4/5, distal 0/5    Lab Results:        Invalid input(s):  EOSPCT        Invalid input(s): LABALBU              No results found for: TROPONINT  ABG:  Lab Results   Component Value Date    PHART 7 441 03/07/2021    YZM3TBT 36 7 03/07/2021    PO2ART 162 7 (H) 03/07/2021    XDG4NKG 24 4 03/07/2021    BEART 0 6 03/07/2021    SOURCE Line, Arterial 03/07/2021       Imaging Studies: I have personally reviewed pertinent reports  and I have personally reviewed pertinent films in PACS    XR cervical spine 2 or 3 views    Result Date: 5/15/2022  Impression: Postsurgical changes of anterior cervical discectomy and fusion at C6-C7 and revision posterior fusion spanning C2-T1   Workstation performed: [id number]     XR spine cervical 2 or 3 vw injury    Result Date: 5/12/2022  Impression: Fluoroscopic guidance provided for intraoperative localization during cervical fusion procedure  Please refer to the separate procedure notes for additional details  Workstation performed: [id number]     CT cervical spine without contrast    Result Date: 5/11/2022  Impression: Loosening of the bilateral C7 screws noted There is a fracture of the right lateral mass C2 screw Findings concur with the preliminary report by the referring clinician already in PACS and/or our electronic record EPIC  Workstation performed: [id number]     MRI cervical spine with and without contrast    Result Date: 5/11/2022  Impression: 1  New focus of cord compression at C6-C7 secondary to a new disc herniation with cord signal abnormality at this level, worrisome for new cord edema, along the caudal margin of the spinal construct  Spine surgical assessment advised  2   Interval development of cystic myelomalacia and cord atrophy at the C4-5 level in the region of prior cord atrophy which has been ameliorated by decompressive laminectomies C3-C5  3   Posterior fusion hardware C2-C7 noted  I personally discussed this study with Sky Cardoza on 5/11/2022 at 8:15 AM  Workstation performed: [id number]       EKG, Pathology, and Other Studies: I have personally reviewed pertinent reports        VTE Pharmacologic Prophylaxis: Sequential compression device (Venodyne)  and Heparin    VTE Mechanical Prophylaxis: sequential compression device

## 2022-05-23 NOTE — PLAN OF CARE
Problem: MOBILITY - ADULT  Goal: Maintain or return to baseline ADL function  Description: INTERVENTIONS:  -  Assess patient's ability to carry out ADLs; assess patient's baseline for ADL function and identify physical deficits which impact ability to perform ADLs (bathing, care of mouth/teeth, toileting, grooming, dressing, etc )  - Assess/evaluate cause of self-care deficits   - Assess range of motion  - Assess patient's mobility; develop plan if impaired  - Assess patient's need for assistive devices and provide as appropriate  - Encourage maximum independence but intervene and supervise when necessary  - Involve family in performance of ADLs  - Assess for home care needs following discharge   - Consider OT consult to assist with ADL evaluation and planning for discharge  - Provide patient education as appropriate  Outcome: Progressing    Problem: PAIN - ADULT  Goal: Verbalizes/displays adequate comfort level or baseline comfort level  Description: Interventions:  - Encourage patient to monitor pain and request assistance  - Assess pain using appropriate pain scale  - Administer analgesics based on type and severity of pain and evaluate response  - Implement non-pharmacological measures as appropriate and evaluate response  - Consider cultural and social influences on pain and pain management  - Notify physician/advanced practitioner if interventions unsuccessful or patient reports new pain  Outcome: Progressing

## 2022-05-23 NOTE — PROGRESS NOTES
[address] [facility]  Progress Note Olive Mendez 1985, 43 y o  male MRN: [id number]  Unit/Bed#: [facility] 623-01 Encounter: [id number]  Primary Care Provider: Renetta Garcia MD   Date and time admitted to hospital: 5/11/2022  9:16 AM    * Cord compression myelopathy [facility])  Assessment & Plan  · Status post repeat cervical surgery  · Continues to complain of significant pain  · Continue Tylenol 975 mg p o  q 8 hours scheduled  · Continue baclofen 10 mg p o  t i d  scheduled  · Continue gabapentin 800 mg p o  t i d  scheduled  · Continue nortriptyline 10 mg p o  HS scheduled  · Continue Nucynta 50 mg p o  q 6 hours p r n  moderate pain  · Continue Nucynta 75 mg p o  q 6 hours p r n  severe pain  · Continue bowel regimen to avoid opioid induced constipation while on opioid pain medication  · Patient is currently on scheduled Tylenol, muscle relaxer, high-dose Gabapentinoid, TCA and lidocaine patches  NSAIDs are being avoided in light of recent surgery  Patient has been on several different opioid medications with little effect  Discussed possibility of starting oral morphine with patient, however I believe that this will cause more side effects without improving patient's pain  Also discussed increasing doses of opioids and that I feel this would only increase the risk of side effects without significantly impacting the patient's pain  · At discharge, suggest the following:  · Tylenol 975 mg p o  q 8 hours scheduled  · Baclofen 10 mg p o  t i d  scheduled  · Gabapentin 800 mg p o  t i d  scheduled  · Lidocaine patch x2, on for 12 hours and off for 12 hours  · Nortriptyline 10 mg p o  HS scheduled  · Nucynta 50 mg p o  q 6 hours p r n  moderate to severe pain x1 week, then discontinue  Acute pain due to cervical spine surgery  APS will sign off at this time  Thank you for the consult   All opioids and other analgesics to be written at discretion of primary team  Please contact Acute Pain Service - [facility] via [employer] from [phone] with additional questions or concerns  See Deyanira or Rafael for additional contacts and after hours information  Pain History  Current pain location(s):  Neck  Pain Scale:   8 to 10/10  Quality:  Sharp, aching  24 hour history:  Despite multiple pain regimens, patient continues to complain of significant pain in his neck  At this time, off feasible therapies available have been attempted  Patient's best option at this point is transfer to spinal cord rehab where alternate therapies can be attempted letter not available inpatient  Opioid requirement previous 24 hours:  Tapentadol 225 mg p o      Meds/Allergies   all current active meds have been reviewed, current meds:   Current Facility-Administered Medications   Medication Dose Route Frequency    acetaminophen (TYLENOL) tablet 975 mg  975 mg Oral Q8H [address]    atorvastatin (LIPITOR) tablet 40 mg  40 mg Oral After Dinner    baclofen tablet 10 mg  10 mg Oral TID    bisacodyl (DULCOLAX) rectal suppository 10 mg  10 mg Rectal Daily PRN    busPIRone (BUSPAR) tablet 5 mg  5 mg Oral BID    docusate sodium (COLACE) capsule 100 mg  100 mg Oral BID    gabapentin (NEURONTIN) capsule 800 mg  800 mg Oral TID    heparin (porcine) subcutaneous injection 5,000 Units  5,000 Units Subcutaneous Q8H [address]    lidocaine (LIDODERM) 5 % patch 2 patch  2 patch Topical Daily    LORazepam (ATIVAN) injection 0 5 mg  0 5 mg Intravenous Q4H PRN    melatonin tablet 3 mg  3 mg Oral HS    [START ON 5/24/2022] methylprednisolone (MEDROL) tablet 4 mg  4 mg Oral Daily    naloxone (NARCAN) 0 04 mg/mL syringe 0 04 mg  0 04 mg Intravenous Q1MIN PRN    nortriptyline (PAMELOR) capsule 10 mg  10 mg Oral HS    ondansetron (ZOFRAN) injection 4 mg  4 mg Intravenous Q4H PRN    senna (SENOKOT) tablet 8 6 mg  1 tablet Oral Daily    tamsulosin (FLOMAX) capsule 0 4 mg  0 4 mg Oral After Dinner    tapentadol (NUCYNTA) tablet 50 mg  50 mg Oral Q6H PRN    Or    tapentadol (NUCYNTA) tablet 75 mg  75 mg Oral Q6H PRN    and PTA meds:   Prior to Admission Medications   Prescriptions Last Dose Informant Patient Reported? Taking?    Baclofen 5 MG TABS 5/10/2022 at Unknown time Outside Facility (Specify) Yes Yes   Calcium Carbonate Antacid (CALCIUM CARBONATE PO) 5/10/2022 at Unknown time Outside Facility ([address] [place] [address]) Yes Yes   Sig: Take by mouth   Multiple Vitamin (multivitamin) capsule 5/10/2022 at Unknown time Outside Facility ([address] [place] [address]) Yes Yes   Sig: Take 1 capsule by mouth daily   acetaminophen (TYLENOL) 500 mg tablet Past Month at Unknown time Outside Facility ([address] [place] [address]) Yes Yes   Sig: Take 1,000 mg by mouth every 6 (six) hours as needed for mild pain   atorvastatin (LIPITOR) 40 mg tablet 5/10/2022 at Unknown time Outside Facility (Specify) No Yes   Sig: Take 1 tablet (40 mg total) by mouth daily with dinner   baclofen 10 mg tablet 5/10/2022 at Unknown time Outside Facility ([address] [place] [address]) Yes Yes   Sig: Take 5 mg by mouth 3 (three) times a day   bisacodyl (DULCOLAX) 10 mg suppository Past Week at Unknown time Outside Facility (Specify) No Yes   Sig: Insert 1 suppository (10 mg total) into the rectum daily as needed for constipation   busPIRone (BUSPAR) 5 mg tablet 5/10/2022 at Unknown time  Yes Yes   celecoxib (CeleBREX) 100 mg capsule Unknown at Unknown time Outside Facility (Specify) Yes No   Sig: Take 100 mg by mouth 2 (two) times a day   Patient not taking: No sig reported   gabapentin (NEURONTIN) 300 mg capsule 5/10/2022 at Unknown time Outside Facility (Specify) No Yes   Sig: Take 2 capsules (600 mg total) by mouth 3 (three) times a day   melatonin 3 mg 5/10/2022 at Unknown time Outside Facility (Specify) No Yes   Sig: Take 1 tablet (3 mg total) by mouth daily at bedtime   meloxicam (MOBIC) 15 mg tablet 5/10/2022 at Unknown time  Yes Yes   Sig:     nortriptyline (PAMELOR) 10 mg capsule 5/10/2022 at Unknown time Outside Facility (Specify) Yes Yes   Sig: Take 10 mg by mouth daily at bedtime   senna-docusate sodium (SENOKOT S) 8 6-50 mg per tablet Not Taking at Unknown time Outside Facility (Specify) No No   Sig: Take 1 tablet by mouth 2 (two) times a day   Patient not taking: Reported on 5/11/2022   tamsulosin (FLOMAX) 0 4 mg Unknown at Unknown time  No No   Sig: Take 1 capsule (0 4 mg total) by mouth daily with dinner      Facility-Administered Medications: None       No Known Allergies    Objective     Temp:  [98 5 °F (36 9 °C)-98 8 °F (37 1 °C)] 98 5 °F (36 9 °C)  HR:  [] 98  Resp:  [17-19] 19  BP: (118-139)/(77-93) 139/86    Physical Exam  Gen: Awake and alert, NAD, Does not appear ill  Vital signs reviewed  Nursing notes reviewed  Eyes: PERRL, sclera/conjunctiva normal   ENT: No JVD, trachea midline, moist mucus membranes  CV: Heart normal rhythm and normal rate  No extra systoles  Skin: Warm, dry  Cap refill <2 seconds  No diaphoresis  Neuro: A&O x 3, GCS 15 (E4, V5, M6)  No obvious focal motor deficit  Psych: Normal speech, normal attention, normal behavior  Lab Results:          Invalid input(s): LABALBU CrCl cannot be calculated (Patient's most recent lab result is older than the maximum 7 days allowed  )  Imaging Studies: I have personally reviewed pertinent reports  Counseling / Coordination of Care  Greater than 50% of total time was spent with the patient and / or family counseling and / or coordination of care  A description of the counseling / coordination of care:  Patient interview, physical examination, review of medical record, review of imaging and laboratory data, development of pain management plan, discussion of pain management plan with patient and primary service  Explanation of risks and benefits of suggested pain medications  Please note that the APS provides consultative services regarding pain management only    With the exception of ketamine and epidural infusions and except when indicated, final decisions regarding starting or changing doses of analgesic medications are at the discretion of the consulting service  Off hours consultation and/or medication management is generally not available  Portions of the record may have been created with voice recognition software  Occasional wrong-word or 'sound-a-like' substitutions may have occurred due to the inherent limitations of voice recognition software       Sofie Cooks, PA-C  Acute Pain Service

## 2022-05-23 NOTE — ASSESSMENT & PLAN NOTE
· Status post repeat cervical surgery  · Continues to complain of significant pain  · Continue Tylenol 975 mg p o  q 8 hours scheduled  · Continue baclofen 10 mg p o  t i d  scheduled  · Continue gabapentin 800 mg p o  t i d  scheduled  · Continue nortriptyline 10 mg p o  HS scheduled  · Continue Nucynta 50 mg p o  q 6 hours p r n  moderate pain  · Continue Nucynta 75 mg p o  q 6 hours p r n  severe pain  · Continue bowel regimen to avoid opioid induced constipation while on opioid pain medication  · Patient is currently on scheduled Tylenol, muscle relaxer, high-dose Gabapentinoid, TCA and lidocaine patches  NSAIDs are being avoided in light of recent surgery  Patient has been on several different opioid medications with little effect  Discussed possibility of starting oral morphine with patient, however I believe that this will cause more side effects without improving patient's pain  Also discussed increasing doses of opioids and that I feel this would only increase the risk of side effects without significantly impacting the patient's pain  · At discharge, suggest the following:  · Tylenol 975 mg p o  q 8 hours scheduled  · Baclofen 10 mg p o  t i d  scheduled  · Gabapentin 800 mg p o  t i d  scheduled  · Lidocaine patch x2, on for 12 hours and off for 12 hours  · Nortriptyline 10 mg p o  HS scheduled  · Nucynta 50 mg p o  q 6 hours p r n  moderate to severe pain x1 week, then discontinue

## 2022-05-24 LAB
ANION GAP SERPL CALCULATED.3IONS-SCNC: 2 MMOL/L (ref 4–13)
BASOPHILS # BLD AUTO: 0.06 THOUSANDS/ΜL (ref 0–0.1)
BASOPHILS NFR BLD AUTO: 0 % (ref 0–1)
BUN SERPL-MCNC: 19 MG/DL (ref 5–25)
CALCIUM SERPL-MCNC: 10.3 MG/DL (ref 8.3–10.1)
CHLORIDE SERPL-SCNC: 103 MMOL/L (ref 100–108)
CO2 SERPL-SCNC: 33 MMOL/L (ref 21–32)
CREAT SERPL-MCNC: 0.99 MG/DL (ref 0.6–1.3)
EOSINOPHIL # BLD AUTO: 0.17 THOUSAND/ΜL (ref 0–0.61)
EOSINOPHIL NFR BLD AUTO: 1 % (ref 0–6)
ERYTHROCYTE [DISTWIDTH] IN BLOOD BY AUTOMATED COUNT: 12.1 % (ref 11.6–15.1)
GFR SERPL CREATININE-BSD FRML MDRD: 96 ML/MIN/1.73SQ M
GLUCOSE SERPL-MCNC: 102 MG/DL (ref 65–140)
HCT VFR BLD AUTO: 39 % (ref 36.5–49.3)
HGB BLD-MCNC: 12.3 G/DL (ref 12–17)
IMM GRANULOCYTES # BLD AUTO: 0.11 THOUSAND/UL (ref 0–0.2)
IMM GRANULOCYTES NFR BLD AUTO: 1 % (ref 0–2)
LYMPHOCYTES # BLD AUTO: 4.37 THOUSANDS/ΜL (ref 0.6–4.47)
LYMPHOCYTES NFR BLD AUTO: 31 % (ref 14–44)
MCH RBC QN AUTO: 30.1 PG (ref 26.8–34.3)
MCHC RBC AUTO-ENTMCNC: 31.5 G/DL (ref 31.4–37.4)
MCV RBC AUTO: 95 FL (ref 82–98)
MONOCYTES # BLD AUTO: 1.23 THOUSAND/ΜL (ref 0.17–1.22)
MONOCYTES NFR BLD AUTO: 9 % (ref 4–12)
NEUTROPHILS # BLD AUTO: 8.29 THOUSANDS/ΜL (ref 1.85–7.62)
NEUTS SEG NFR BLD AUTO: 58 % (ref 43–75)
NRBC BLD AUTO-RTO: 0 /100 WBCS
PLATELET # BLD AUTO: 519 THOUSANDS/UL (ref 149–390)
PMV BLD AUTO: 9 FL (ref 8.9–12.7)
POTASSIUM SERPL-SCNC: 3.7 MMOL/L (ref 3.5–5.3)
RBC # BLD AUTO: 4.09 MILLION/UL (ref 3.88–5.62)
SODIUM SERPL-SCNC: 138 MMOL/L (ref 136–145)
WBC # BLD AUTO: 14.23 THOUSAND/UL (ref 4.31–10.16)

## 2022-05-24 PROCEDURE — 99024 POSTOP FOLLOW-UP VISIT: CPT | Performed by: NURSE PRACTITIONER

## 2022-05-24 PROCEDURE — 85025 COMPLETE CBC W/AUTO DIFF WBC: CPT | Performed by: PHYSICIAN ASSISTANT

## 2022-05-24 PROCEDURE — 80048 BASIC METABOLIC PNL TOTAL CA: CPT | Performed by: PHYSICIAN ASSISTANT

## 2022-05-24 RX ADMIN — BACLOFEN 10 MG: 10 TABLET ORAL at 10:06

## 2022-05-24 RX ADMIN — Medication 3 MG: at 21:14

## 2022-05-24 RX ADMIN — METHYLPREDNISOLONE 4 MG: 4 TABLET ORAL at 10:06

## 2022-05-24 RX ADMIN — HEPARIN SODIUM 5000 UNITS: 5000 INJECTION INTRAVENOUS; SUBCUTANEOUS at 05:22

## 2022-05-24 RX ADMIN — NORTRIPTYLINE HYDROCHLORIDE 10 MG: 10 CAPSULE ORAL at 21:15

## 2022-05-24 RX ADMIN — ACETAMINOPHEN 975 MG: 325 TABLET, FILM COATED ORAL at 14:17

## 2022-05-24 RX ADMIN — ATORVASTATIN CALCIUM 40 MG: 40 TABLET, FILM COATED ORAL at 17:32

## 2022-05-24 RX ADMIN — TAMSULOSIN HYDROCHLORIDE 0.4 MG: 0.4 CAPSULE ORAL at 17:32

## 2022-05-24 RX ADMIN — GABAPENTIN 800 MG: 400 CAPSULE ORAL at 21:14

## 2022-05-24 RX ADMIN — ACETAMINOPHEN 975 MG: 325 TABLET, FILM COATED ORAL at 05:21

## 2022-05-24 RX ADMIN — GABAPENTIN 800 MG: 400 CAPSULE ORAL at 17:34

## 2022-05-24 RX ADMIN — BACLOFEN 10 MG: 10 TABLET ORAL at 21:14

## 2022-05-24 RX ADMIN — TAPENTADOL HYDROCHLORIDE 75 MG: 75 TABLET, FILM COATED ORAL at 05:21

## 2022-05-24 RX ADMIN — BACLOFEN 10 MG: 10 TABLET ORAL at 17:32

## 2022-05-24 RX ADMIN — BUSPIRONE HYDROCHLORIDE 5 MG: 5 TABLET ORAL at 10:06

## 2022-05-24 RX ADMIN — BUSPIRONE HYDROCHLORIDE 5 MG: 5 TABLET ORAL at 21:14

## 2022-05-24 RX ADMIN — TAPENTADOL HYDROCHLORIDE 75 MG: 75 TABLET, FILM COATED ORAL at 21:14

## 2022-05-24 RX ADMIN — ACETAMINOPHEN 975 MG: 325 TABLET, FILM COATED ORAL at 21:13

## 2022-05-24 RX ADMIN — HEPARIN SODIUM 5000 UNITS: 5000 INJECTION INTRAVENOUS; SUBCUTANEOUS at 14:14

## 2022-05-24 RX ADMIN — HEPARIN SODIUM 5000 UNITS: 5000 INJECTION INTRAVENOUS; SUBCUTANEOUS at 21:14

## 2022-05-24 RX ADMIN — GABAPENTIN 800 MG: 400 CAPSULE ORAL at 10:06

## 2022-05-24 RX ADMIN — TAPENTADOL HYDROCHLORIDE 75 MG: 75 TABLET, FILM COATED ORAL at 14:17

## 2022-05-24 NOTE — PLAN OF CARE
Problem: MOBILITY - ADULT  Goal: Maintain or return to baseline ADL function  Description: INTERVENTIONS:  -  Assess patient's ability to carry out ADLs; assess patient's baseline for ADL function and identify physical deficits which impact ability to perform ADLs (bathing, care of mouth/teeth, toileting, grooming, dressing, etc )  - Assess/evaluate cause of self-care deficits   - Assess range of motion  - Assess patient's mobility; develop plan if impaired  - Assess patient's need for assistive devices and provide as appropriate  - Encourage maximum independence but intervene and supervise when necessary  - Involve family in performance of ADLs  - Assess for home care needs following discharge   - Consider OT consult to assist with ADL evaluation and planning for discharge  - Provide patient education as appropriate  Outcome: Progressing  Goal: Maintains/Returns to pre admission functional level  Description: INTERVENTIONS:  - Perform BMAT or MOVE assessment daily    - Set and communicate daily mobility goal to care team and patient/family/caregiver  - Collaborate with rehabilitation services on mobility goals if consulted  - Perform Range of Motion 3 times a day  - Reposition patient every *2** hours    - Dangle patient **3* times a day  - Stand patient *3** times a day  - Ambulate patient *3** times a day  - Out of bed to chair *3** times a day   - Out of bed for meals **3* times a day  - Out of bed for toileting  - Record patient progress and toleration of activity level   Outcome: Progressing     Problem: PAIN - ADULT  Goal: Verbalizes/displays adequate comfort level or baseline comfort level  Description: Interventions:  - Encourage patient to monitor pain and request assistance  - Assess pain using appropriate pain scale  - Administer analgesics based on type and severity of pain and evaluate response  - Implement non-pharmacological measures as appropriate and evaluate response  - Consider cultural and social influences on pain and pain management  - Notify physician/advanced practitioner if interventions unsuccessful or patient reports new pain  Outcome: Progressing     Problem: SAFETY ADULT  Goal: Maintain or return to baseline ADL function  Description: INTERVENTIONS:  -  Assess patient's ability to carry out ADLs; assess patient's baseline for ADL function and identify physical deficits which impact ability to perform ADLs (bathing, care of mouth/teeth, toileting, grooming, dressing, etc )  - Assess/evaluate cause of self-care deficits   - Assess range of motion  - Assess patient's mobility; develop plan if impaired  - Assess patient's need for assistive devices and provide as appropriate  - Encourage maximum independence but intervene and supervise when necessary  - Involve family in performance of ADLs  - Assess for home care needs following discharge   - Consider OT consult to assist with ADL evaluation and planning for discharge  - Provide patient education as appropriate  Outcome: Progressing  Goal: Maintains/Returns to pre admission functional level  Description: INTERVENTIONS:  - Perform BMAT or MOVE assessment daily    - Set and communicate daily mobility goal to care team and patient/family/caregiver  - Collaborate with rehabilitation services on mobility goals if consulted  - Perform Range of Motion *3** times a day  - Reposition patient every *2** hours    - Dangle patient **3* times a day  - Stand patient 3*times a day  - Ambulate patient *3** times a day  - Out of bed to chair *3** times a day   - Out of bed for meals **3* times a day  - Out of bed for toileting  - Record patient progress and toleration of activity level   Outcome: Progressing  Goal: Patient will remain free of falls  Description: INTERVENTIONS:  - Educate patient/family on patient safety including physical limitations  - Instruct patient to call for assistance with activity   - Consult OT/PT to assist with strengthening/mobility   - Keep Call bell within reach  - Keep bed low and locked with side rails adjusted as appropriate  - Keep care items and personal belongings within reach  - Initiate and maintain comfort rounds  - Make Fall Risk Sign visible to staff  - Offer Toileting every *2** Hours, in advance of need  - Initiate/Maintain *bed/chair**alarm  - Obtain necessary fall risk management equipment: - Apply yellow socks and bracelet for high fall risk patients  - Consider moving patient to room near nurses station  Outcome: Progressing     Problem: DISCHARGE PLANNING  Goal: Discharge to home or other facility with appropriate resources  Description: INTERVENTIONS:  - Identify barriers to discharge w/patient and caregiver  - Arrange for needed discharge resources and transportation as appropriate  - Identify discharge learning needs (meds, wound care, etc )  - Arrange for interpretive services to assist at discharge as needed  - Refer to Case Management Department for coordinating discharge planning if the patient needs post-hospital services based on physician/advanced practitioner order or complex needs related to functional status, cognitive ability, or social support system  Outcome: Progressing     Problem: Knowledge Deficit  Goal: Patient/family/caregiver demonstrates understanding of disease process, treatment plan, medications, and discharge instructions  Description: Complete learning assessment and assess knowledge base    Interventions:  - Provide teaching at level of understanding  - Provide teaching via preferred learning methods  Outcome: Progressing     Problem: Potential for Falls  Goal: Patient will remain free of falls  Description: INTERVENTIONS:  - Educate patient/family on patient safety including physical limitations  - Instruct patient to call for assistance with activity   - Consult OT/PT to assist with strengthening/mobility   - Keep Call bell within reach  - Keep bed low and locked with side rails adjusted as appropriate  - Keep care items and personal belongings within reach  - Initiate and maintain comfort rounds  - Make Fall Risk Sign visible to staff  - Offer Toileting every **2* Hours, in advance of need  - Initiate/Maintain **bed/chair*alarm  - Obtain necessary fall risk management equipment:   - Apply yellow socks and bracelet for high fall risk patients  - Consider moving patient to room near nurses station  Outcome: Progressing     Problem: Nutrition/Hydration-ADULT  Goal: Nutrient/Hydration intake appropriate for improving, restoring or maintaining nutritional needs  Description: Monitor and assess patient's nutrition/hydration status for malnutrition  Collaborate with interdisciplinary team and initiate plan and interventions as ordered  Monitor patient's weight and dietary intake as ordered or per policy  Utilize nutrition screening tool and intervene as necessary  Determine patient's food preferences and provide high-protein, high-caloric foods as appropriate       INTERVENTIONS:  - Monitor oral intake, urinary output, labs, and treatment plans  - Assess nutrition and hydration status and recommend course of action  - Evaluate amount of meals eaten  - Assist patient with eating if necessary   - Allow adequate time for meals  - Recommend/ encourage appropriate diets, oral nutritional supplements, and vitamin/mineral supplements  - Order, calculate, and assess calorie counts as needed  - Recommend, monitor, and adjust tube feedings and TPN/PPN based on assessed needs  - Assess need for intravenous fluids  - Provide specific nutrition/hydration education as appropriate  - Include patient/family/caregiver in decisions related to nutrition  Outcome: Progressing     Problem: Prexisting or High Potential for Compromised Skin Integrity  Goal: Skin integrity is maintained or improved  Description: INTERVENTIONS:  - Identify patients at risk for skin breakdown  - Assess and monitor skin integrity  - Assess and monitor nutrition and hydration status  - Monitor labs   - Assess for incontinence   - Turn and reposition patient  - Assist with mobility/ambulation  - Relieve pressure over bony prominences  - Avoid friction and shearing  - Provide appropriate hygiene as needed including keeping skin clean and dry  - Evaluate need for skin moisturizer/barrier cream  - Collaborate with interdisciplinary team   - Patient/family teaching  - Consider wound care consult   Outcome: Progressing

## 2022-05-24 NOTE — PROGRESS NOTES
[facility]  Progress Note Lisandra Trammell 1985, 43 y o  male MRN: [id number]  Unit/Bed#: [facility] 623-01 Encounter: [id number]  Primary Care Provider: Darryle Aris, MD   Date and time admitted to hospital: 5/11/2022  9:16 AM    Benign essential HTN  Assessment & Plan  Continue to monitor  Urinary retention  Assessment & Plan  Jimenez removed   Intermittent straight cath - has not required over last few days  Defer Jimenez replacement    Utilize PM&R SCI Bladder Protocol  - Once jimenez catheter removed, please institute following protocol at designated time interval or PRN bladder fullness:  Q6hr   - Allow patient to attempt to void  If patient voids, check PVR  Catheterize for >300cc  - If patient unable to void, bladder scan  If >400cc straight cath  - If patient has volumes >650-800 consistently (x3), increase frequency of straight cath until volumes <400cc consistently  - If you reach Q3hr straight caths, with continued high volumes, institute fluid restriction of 7567-3140 mL if hemodynamically stable/appropriate  - If patient is voiding and PVR x3 <150cc can discontinue protocol      * Cord compression myelopathy (HCC)  Assessment & Plan  POD 12 ACDF C6-7; Revision PCDF, revise/replace right C2 screw with laminar screw, revise left C2 screw (cap), remove bilateral C7 lateral mass screws, place navigated C7, T1 pedicle screws, C6-7 laminectomy, arthrodesis C2-3 and C6-T1 (5/12/22 [facility])  · Presented as direct admission after outpatient MRI demonstrate cord compression requiring surgical intervention  · s/p severe central spinal cord injury, Jania B spinal cord injury March 2021 after falling down stairs status post posterior cervical decompression fixation and fusion C2-C7 3/7/2021 Dr Reji Hawk  · Was seen in office prior to surgery for complaints of decline in function, new urinary difficulties and c/o increased neck pain, with intermittent radicular symptoms and clicking noise with head movement which is related to a new C6-7 disc herniation causing cord compression and edema as well as hardware failure  Imaging:   · Xray cervical spine, 5/14/22: Postsurgical changes of anterior cervical discectomy and fusion at C6-C7 and revision posterior fusion spanning C2-T1  Plan:  · Continue monitor neurological status and monitor for any new or progressive symptoms   · SARANYA removed 5/16/22  · Pain control - APS Input appreciated  Ketamine gtt discontinued  Medications adjusted per their recommendations  · Oral dilaudid changed to tapentadol 50/75mg PRN  · IV dilaudid discontinued  · Baclofen increased to 10mg TID  · Patient received a one time dose of toradol IV on 5/19 with good pain control afterwards  · Continue medrol dose pack  · Mobilize PT/OT - recommending acute rehab  Patient worked with PT over weekend  · PM&R consult placed, recommend SCI rehab  · Urinary retention improving, able to void  · DVT PPX: SCDs  HSQ  · Dispo - d/w CM - recommend acute SCI rehab  · Spoke with [facility] - [facility]  Plan of care discussed with KAYLA Simons  Neurosurgery will continue to follow as primary  Patient is medically stable for discharge at this time  Call with questions/concerns  Subjective/Objective   Chief Complaint:  I am doing okay      Subjective:  Continues to endorse pain but states that is better controlled  Has been working with rehab  Voiding without issue  States his appetite is a little bit better  Having bowel movements  Objective: The temporal at NAD  Posterior anterior cervical incisions clean dry and intact  I/O       05/22 0701  05/23 0700 05/23 0701  05/24 0700 05/24 0701 05/25 0700    P  O  440      Total Intake(mL/kg) 440 (5 6)      Urine (mL/kg/hr) 875 (0 5) 650 (0 3)     Stool 0      Total Output 875 650     Net -435 -650            Unmeasured Stool Occurrence 1 x            Invasive Devices  Report    None                 Physical Exam:  Vitals: Blood pressure 121/85, pulse 92, temperature 98 4 °F (36 9 °C), temperature source Oral, resp  rate 19, height 5' 7" (1 702 m), weight 82 7 kg (182 lb 5 1 oz), SpO2 96 %  ,Body mass index is 28 56 kg/m²  General appearance: alert, appears stated age, cooperative and no distress  Head: Normocephalic, without obvious abnormality, atraumatic  Eyes: EOMI, PERRL  Neck:  Anterior and posterior cervical incisions clean dry and intact  Back: no kyphosis present, no tenderness to percussion or palpation  Lungs: non labored breathing  Heart: regular heart rate  Neurologic:   Mental status: Alert, oriented x3, thought content appropriate  Cranial nerves: grossly intact (Cranial nerves II-XII)  Sensory: normal to light touch  Motor:  Bilateral upper extremities 3/5, bilateral lower extremities proximally 4/5, distally 0/5      Lab Results:  Results from last 7 days   Lab Units 05/24/22  0538   WBC Thousand/uL 14 23*   HEMOGLOBIN g/dL 12 3   HEMATOCRIT % 39 0   PLATELETS Thousands/uL 519*   NEUTROS PCT % 58   MONOS PCT % 9     Results from last 7 days   Lab Units 05/24/22  0538   POTASSIUM mmol/L 3 7   CHLORIDE mmol/L 103   CO2 mmol/L 33*   BUN mg/dL 19   CREATININE mg/dL 0 99   CALCIUM mg/dL 10 3*                 No results found for: TROPONINT  ABG:  Lab Results   Component Value Date    PHART 7 441 03/07/2021    HKW9RIY 36 7 03/07/2021    PO2ART 162 7 (H) 03/07/2021    LIP5GIJ 24 4 03/07/2021    BEART 0 6 03/07/2021    SOURCE Line, Arterial 03/07/2021       Imaging Studies: I have personally reviewed pertinent reports  and I have personally reviewed pertinent films in PACS    XR cervical spine 2 or 3 views    Result Date: 5/15/2022  Impression: Postsurgical changes of anterior cervical discectomy and fusion at C6-C7 and revision posterior fusion spanning C2-T1   Workstation performed: [id number]     XR spine cervical 2 or 3 vw injury    Result Date: 5/12/2022  Impression: Fluoroscopic guidance provided for intraoperative localization during cervical fusion procedure  Please refer to the separate procedure notes for additional details  Workstation performed: [id number]     CT cervical spine without contrast    Result Date: 5/11/2022  Impression: Loosening of the bilateral C7 screws noted There is a fracture of the right lateral mass C2 screw Findings concur with the preliminary report by the referring clinician already in PACS and/or our electronic record EPIC  Workstation performed: [id number]     MRI cervical spine with and without contrast    Result Date: 5/11/2022  Impression: 1  New focus of cord compression at C6-C7 secondary to a new disc herniation with cord signal abnormality at this level, worrisome for new cord edema, along the caudal margin of the spinal construct  Spine surgical assessment advised  2   Interval development of cystic myelomalacia and cord atrophy at the C4-5 level in the region of prior cord atrophy which has been ameliorated by decompressive laminectomies C3-C5  3   Posterior fusion hardware C2-C7 noted  I personally discussed this study with Donna Mackay on 5/11/2022 at 8:15 AM  Workstation performed: [id number]       EKG, Pathology, and Other Studies: I have personally reviewed pertinent reports        VTE Pharmacologic Prophylaxis: Sequential compression device (Venodyne)  and Heparin    VTE Mechanical Prophylaxis: sequential compression device

## 2022-05-24 NOTE — CASE MANAGEMENT
Case Management Progress Note    Patient name David Keith  Location [address] [place] [address] Rd 623/PPHP 085-05 MRN [id number]  : 1985 Date 2022       LOS (days): 13  Geometric Mean LOS (GMLOS) (days): 3 20  Days to GMLOS:-9 9        OBJECTIVE:        Current admission status: Inpatient  Preferred Pharmacy:   [address]  Phone: [phone] Fax: [phone] [facility], [address] [facility], [address]  [address] [place] [address]  Phone: [phone] Fax: [phone]    Primary Care Provider: Diana Emmanuel MD    Primary Insurance: [address] [place] [address]  Secondary Insurance:     PROGRESS NOTE: CBC and CMP results faxed to Dionisio Romero  Fax confirmation received

## 2022-05-24 NOTE — ASSESSMENT & PLAN NOTE
POD 12 ACDF C6-7; Revision PCDF, revise/replace right C2 screw with laminar screw, revise left C2 screw (cap), remove bilateral C7 lateral mass screws, place navigated C7, T1 pedicle screws, C6-7 laminectomy, arthrodesis C2-3 and C6-T1 (5/12/22 [facility])  · Presented as direct admission after outpatient MRI demonstrate cord compression requiring surgical intervention  · s/p severe central spinal cord injury, Jania B spinal cord injury March 2021 after falling down stairs status post posterior cervical decompression fixation and fusion C2-C7 3/7/2021 Dr Morales Koehler  · Was seen in office prior to surgery for complaints of decline in function, new urinary difficulties and c/o increased neck pain, with intermittent radicular symptoms and clicking noise with head movement which is related to a new C6-7 disc herniation causing cord compression and edema as well as hardware failure  Imaging:   · Xray cervical spine, 5/14/22: Postsurgical changes of anterior cervical discectomy and fusion at C6-C7 and revision posterior fusion spanning C2-T1  Plan:  · Continue monitor neurological status and monitor for any new or progressive symptoms   · SARANYA removed 5/16/22  · Pain control - APS Input appreciated  Ketamine gtt discontinued  Medications adjusted per their recommendations  · Oral dilaudid changed to tapentadol 50/75mg PRN  · IV dilaudid discontinued  · Baclofen increased to 10mg TID  · Patient received a one time dose of toradol IV on 5/19 with good pain control afterwards  · Continue medrol dose pack  · Mobilize PT/OT - recommending acute rehab  Patient worked with PT over weekend  · PM&R consult placed, recommend SCI rehab  · Urinary retention improving, able to void  · DVT PPX: SCDs  HSQ  · Dispo - d/w CM - recommend acute SCI rehab  · Spoke with CM - [facility], [facility]  Plan of care discussed with KAYLA Agustin Holstein  Neurosurgery will continue to follow as primary   Patient is medically stable for discharge at this time  Call with questions/concerns

## 2022-05-24 NOTE — ASSESSMENT & PLAN NOTE
Jimenez removed   Intermittent straight cath - has not required over last few days  Defer Jimenez replacement    Utilize PM&R SCI Bladder Protocol  - Once jimenez catheter removed, please institute following protocol at designated time interval or PRN bladder fullness:  Q6hr   - Allow patient to attempt to void  If patient voids, check PVR  Catheterize for >300cc  - If patient unable to void, bladder scan  If >400cc straight cath  - If patient has volumes >650-800 consistently (x3), increase frequency of straight cath until volumes <400cc consistently  - If you reach Q3hr straight caths, with continued high volumes, institute fluid restriction of 0485-0625 mL if hemodynamically stable/appropriate  - If patient is voiding and PVR x3 <150cc can discontinue protocol

## 2022-05-24 NOTE — CASE MANAGEMENT
Case Management Progress Note    Patient name Anel Patel  Location [address] [place] [address] 623/PPHP 909-82 MRN [id number]  : 1985 Date 2022       LOS (days): 13  Geometric Mean LOS (GMLOS) (days): 3 20  Days to GMLOS:-10        OBJECTIVE:        Current admission status: Inpatient  Preferred Pharmacy:   [address]  Phone: [phone] Fax: 216 [facility], [address] [address] [place] [address]  Phone: [phone] Fax: [phone]    Primary Care Provider: Justino Leyva MD    Primary Insurance: [address] [place] [address]  Secondary Insurance:     PROGRESS NOTE: Updated disability paperwork for housing sent to patient's OP

## 2022-05-25 ENCOUNTER — APPOINTMENT (INPATIENT)
Dept: RADIOLOGY | Facility: HOSPITAL | Age: 37
DRG: 321 | End: 2022-05-25
Payer: COMMERCIAL

## 2022-05-25 PROBLEM — D72.829 LEUKOCYTOSIS: Status: ACTIVE | Noted: 2022-05-25

## 2022-05-25 LAB
BACTERIA UR QL AUTO: ABNORMAL /HPF
BILIRUB UR QL STRIP: NEGATIVE
CLARITY UR: CLEAR
COLOR UR: YELLOW
GLUCOSE UR STRIP-MCNC: NEGATIVE MG/DL
HGB UR QL STRIP.AUTO: ABNORMAL
KETONES UR STRIP-MCNC: NEGATIVE MG/DL
LEUKOCYTE ESTERASE UR QL STRIP: ABNORMAL
MUCOUS THREADS UR QL AUTO: ABNORMAL
NITRITE UR QL STRIP: POSITIVE
NON-SQ EPI CELLS URNS QL MICRO: ABNORMAL /HPF
PH UR STRIP.AUTO: 6 [PH]
PROT UR STRIP-MCNC: ABNORMAL MG/DL
RBC #/AREA URNS AUTO: ABNORMAL /HPF
SP GR UR STRIP.AUTO: 1.04 (ref 1–1.03)
UROBILINOGEN UR STRIP-ACNC: 4 MG/DL
WBC #/AREA URNS AUTO: ABNORMAL /HPF

## 2022-05-25 PROCEDURE — 97535 SELF CARE MNGMENT TRAINING: CPT

## 2022-05-25 PROCEDURE — 97530 THERAPEUTIC ACTIVITIES: CPT

## 2022-05-25 PROCEDURE — 71046 X-RAY EXAM CHEST 2 VIEWS: CPT

## 2022-05-25 PROCEDURE — 81001 URINALYSIS AUTO W/SCOPE: CPT | Performed by: NURSE PRACTITIONER

## 2022-05-25 PROCEDURE — 97116 GAIT TRAINING THERAPY: CPT

## 2022-05-25 PROCEDURE — 97110 THERAPEUTIC EXERCISES: CPT

## 2022-05-25 PROCEDURE — 99024 POSTOP FOLLOW-UP VISIT: CPT | Performed by: NURSE PRACTITIONER

## 2022-05-25 RX ADMIN — BACLOFEN 10 MG: 10 TABLET ORAL at 22:00

## 2022-05-25 RX ADMIN — ACETAMINOPHEN 975 MG: 325 TABLET, FILM COATED ORAL at 21:52

## 2022-05-25 RX ADMIN — HEPARIN SODIUM 5000 UNITS: 5000 INJECTION INTRAVENOUS; SUBCUTANEOUS at 21:53

## 2022-05-25 RX ADMIN — HEPARIN SODIUM 5000 UNITS: 5000 INJECTION INTRAVENOUS; SUBCUTANEOUS at 14:04

## 2022-05-25 RX ADMIN — GABAPENTIN 800 MG: 400 CAPSULE ORAL at 17:18

## 2022-05-25 RX ADMIN — BACLOFEN 10 MG: 10 TABLET ORAL at 17:18

## 2022-05-25 RX ADMIN — BUSPIRONE HYDROCHLORIDE 5 MG: 5 TABLET ORAL at 10:23

## 2022-05-25 RX ADMIN — TAMSULOSIN HYDROCHLORIDE 0.4 MG: 0.4 CAPSULE ORAL at 17:18

## 2022-05-25 RX ADMIN — BUSPIRONE HYDROCHLORIDE 5 MG: 5 TABLET ORAL at 21:52

## 2022-05-25 RX ADMIN — BACLOFEN 10 MG: 10 TABLET ORAL at 10:23

## 2022-05-25 RX ADMIN — TAPENTADOL HYDROCHLORIDE 75 MG: 75 TABLET, FILM COATED ORAL at 14:04

## 2022-05-25 RX ADMIN — ATORVASTATIN CALCIUM 40 MG: 40 TABLET, FILM COATED ORAL at 17:18

## 2022-05-25 RX ADMIN — NORTRIPTYLINE HYDROCHLORIDE 10 MG: 10 CAPSULE ORAL at 21:54

## 2022-05-25 RX ADMIN — Medication 3 MG: at 22:03

## 2022-05-25 RX ADMIN — HEPARIN SODIUM 5000 UNITS: 5000 INJECTION INTRAVENOUS; SUBCUTANEOUS at 06:23

## 2022-05-25 RX ADMIN — GABAPENTIN 800 MG: 400 CAPSULE ORAL at 22:00

## 2022-05-25 RX ADMIN — GABAPENTIN 800 MG: 400 CAPSULE ORAL at 10:23

## 2022-05-25 RX ADMIN — ACETAMINOPHEN 975 MG: 325 TABLET, FILM COATED ORAL at 14:04

## 2022-05-25 NOTE — ASSESSMENT & PLAN NOTE
POD 13 ACDF C6-7; Revision PCDF, revise/replace right C2 screw with laminar screw, revise left C2 screw (cap), remove bilateral C7 lateral mass screws, place navigated C7, T1 pedicle screws, C6-7 laminectomy, arthrodesis C2-3 and C6-T1 (5/12/22 [facility])  · Presented as direct admission after outpatient MRI demonstrate cord compression requiring surgical intervention  · s/p severe central spinal cord injury, Jania B spinal cord injury March 2021 after falling down stairs status post posterior cervical decompression fixation and fusion C2-C7 3/7/2021 Dr Lanre Meeks  · Was seen in office prior to surgery for complaints of decline in function, new urinary difficulties and c/o increased neck pain, with intermittent radicular symptoms and clicking noise with head movement which is related to a new C6-7 disc herniation causing cord compression and edema as well as hardware failure  Imaging:   · Xray cervical spine, 5/14/22: Postsurgical changes of anterior cervical discectomy and fusion at C6-C7 and revision posterior fusion spanning C2-T1  Plan:  · Continue monitor neurological status and monitor for any new or progressive symptoms   · SARANYA removed 5/16/22  · Seen for 2 week POV - posterior staples discontinued and drain suture removed  · Pain control - APS Input appreciated  Ketamine gtt discontinued  Medications adjusted per their recommendations  · Oral dilaudid changed to tapentadol 50/75mg PRN  · IV dilaudid discontinued  · Baclofen increased to 10mg TID  · Patient received a one time dose of toradol IV on 5/19 with good pain control afterwards  · Continue medrol dose pack  · Mobilize PT/OT - recommending acute rehab  Patient worked with PT over weekend  · PM&R consult placed, recommend SCI rehab  · Urinary retention improving, able to void  · DVT PPX: SCDs  HSQ  · Dispo - d/w CM - recommend acute SCI rehab  · Spoke with CM - [facility], [facility]      Plan of care discussed with RN Verner Parsons  Neurosurgery will continue to follow as primary  Patient is medically stable for discharge at this time  Call with questions/concerns

## 2022-05-25 NOTE — ASSESSMENT & PLAN NOTE
Noted on CBC with [facility]BC 14 23  Will check CXR and urinalysis in setting of multiple straight catheterizations over past week

## 2022-05-25 NOTE — PLAN OF CARE
Problem: OCCUPATIONAL THERAPY ADULT  Goal: Performs self-care activities at highest level of function for planned discharge setting  See evaluation for individualized goals  Description: Treatment Interventions: ADL retraining, Functional transfer training, UE strengthening/ROM, Endurance training, Cognitive reorientation, Patient/family training, Equipment evaluation/education, Compensatory technique education, Energy conservation, Activityengagement, Fine motor coordination activities          See flowsheet documentation for full assessment, interventions and recommendations  Outcome: Progressing  Note: Limitation: Decreased ADL status, Decreased UE ROM, Decreased UE strength, Decreased Safe judgement during ADL, Decreased cognition, Decreased endurance, Decreased sensation, Decreased fine motor control, Decreased self-care trans, Decreased high-level ADLs  Prognosis: Good, Fair  Assessment: PT SEEN FOR OT TX SESSION WITH FOCUS ON BED MOBILITY, UB/LB ADLS, FUNCTIONAL TXFS/MOBILITY AND OVERALL ACTIVITY TOLERANCE  UPON ARRIVAL, PT AGREEABLE TO PARTICIPATE AND REPORTS HIS PAIN IS BETTER CONTROLLED HOWEVER CONTS TO REPORT "9/10" PAIN SCALE  SIGNIFICANT IMPROVEMENT NOTED IN SUPINE->SIT TXF TO SUPERVISION LEVEL WITH INCREASE TIME  WHILE SEATED EOB FOR ~5 MINUTES AT SUPERVISION LEVEL FOR SITTING BALANCE, PT COMPLETE UB DRESSING INCLUDING DOFFING/DONNING HOSPITAL GOWN WITH MOD A TO THREAD B/L UPPER ARMS AND PULL AROUND BACK  PT CONTS TO REQUIRE MAX A FOR LB DRESSING INCLUDING DOFFING/DONNING B/L SOCKS  IMPROVEMENT ALSO NOTED IN SIT<->STAND TXFS AND LIMITED FUNCTIONAL MOBILITY FROM BED->CHAIR TO MOD A WITH USE OF B/L PLATFORM WALKER  ONCE IN BEDSIDE CHAIR, PT COMPLETED LT GROOMING TASKING INCLUDING WASHING FACE/HANDS AT SUPERVISION LEVEL AFTER SET-UP AND MIN A FOR ORAL HYGIENE INCLUDING OPENING/CLOSING CONTAINERS, PLACING TOOTH PASTE ON TOOTHBRUSH, GRASPING CUP ETC 2' [facility], GMS AND IMPAIRED GRASP   PT LEFT OOB WITH ALL NEEDS IN REACH  CONT TO RECOMMEND INPT REHAB UPON D/C  SEE BELOW FOR UPDATED GOALS WITH GOAL DATED EXTENDED + 14 DAYS  WILL CONT TO FOLLOW  OT Discharge Recommendation: Post acute rehabilitation services  OT - OK to Discharge:  Yes

## 2022-05-25 NOTE — PLAN OF CARE
Problem: PHYSICAL THERAPY ADULT  Goal: Performs mobility at highest level of function for planned discharge setting  See evaluation for individualized goals  Description: Treatment/Interventions: Functional transfer training, LE strengthening/ROM, Therapeutic exercise, Endurance training, Patient/family training, Equipment eval/education, Bed mobility, Gait training, Spoke to nursing, OT  Equipment Recommended: Wheelchair       See flowsheet documentation for full assessment, interventions and recommendations  Outcome: Progressing  Note: Prognosis: Good  Problem List: Decreased strength, Decreased endurance, Impaired balance, Decreased mobility, Decreased safety awareness, Pain, Orthopedic restrictions  Assessment: pt demonstrated improved proficiency with sit to stand transfers, requring less assist overall comapred to previous sessions without need for UE support to push from seated surface  Once ambulatory though, pt continues to demosntrate LE ataxia, trendelenberg gait & remains heavily reliant on platform RW for balance, requiring assist for AD management to prevent lateral tipping & to move walker closer to pt when backing up to recliner  pt continues to ambualte near household distances prior to requiring seated rest to recvoer  Upon return to room, pt performed LE exercises as noted above, demosntrating weakness in all planes, requiring active assist to improve AROM, especially with marching & hip adduction in sitting  pt instructed to perform heelslides, hip abduction & ankle pumps in bed to improve strength & coordination in large muscle groups to improve balance, gross strength& muscular endurance in advance of increased ambulation in upcoming PT sessions  Remains appropriate for rehab at d/c to maximize functional independence & progress to PLOF    Barriers to Discharge: Decreased caregiver support, Inaccessible home environment        PT Discharge Recommendation: Post acute rehabilitation services          See flowsheet documentation for full assessment

## 2022-05-25 NOTE — OCCUPATIONAL THERAPY NOTE
Occupational Therapy Progress Note     Patient Name: Karel Dukes  BLBUV'U Date: 5/25/2022  Problem List  Principal Problem:    Cord compression myelopathy [facility])  Active Problems:    Urinary retention    Benign essential HTN    Leukocytosis          05/25/22 1115   OT Last Visit   OT Visit Date 05/25/22   Note Type   Note Type Treatment for insurance authorization   Restrictions/Precautions   Weight Bearing Precautions Per Order No   Braces or Orthoses (S)  C/S Collar   Other Precautions Multiple lines; Fall Risk;Pain;Spinal precautions   Pain Assessment   Pain Assessment Tool 0-10   Pain Score 9   Pain Location/Orientation Location: [facility]   Hospital Pain Intervention(s) Repositioned; Ambulation/increased activity; Emotional support   ADL   Grooming Assistance 4  Minimal Assistance   Grooming Deficit Setup;Supervision/safety; Increased time to complete;Wash/dry hands; Wash/dry face; Teeth care   UB Dressing Assistance 3  Moderate Assistance   UB Dressing Deficit Increased time to complete; Thread RUE; Thread LUE;Setup;Supervision/safety;Verbal cueing;Pull around back; Fasteners   LB Dressing Assistance 2  Maximal Assistance   LB Dressing Deficit Don/doff R sock; Don/doff L sock   Bed Mobility   Supine to Sit 5  Supervision   Additional items Assist x 1; Increased time required;Verbal cues;LE management   Transfers   Sit to Stand 3  Moderate assistance   Additional items Assist x 1; Increased time required;Verbal cues   Stand to Sit 3  Moderate assistance   Additional items Assist x 1; Increased time required;Verbal cues   Functional Mobility   Functional Mobility 3  Moderate assistance   Additional Comments b/l platform walker   Cognition   Overall Cognitive Status WFL   Arousal/Participation Alert   Attention Within functional limits   Orientation Level Oriented X4   Memory Within functional limits   Following Commands Follows multistep commands without difficulty   Activity Tolerance   Activity Tolerance Patient tolerated treatment well;Patient limited by pain   Medical Staff Made Aware CM   Assessment   Assessment PT SEEN FOR OT TX SESSION WITH FOCUS ON BED MOBILITY, UB/LB ADLS, FUNCTIONAL TXFS/MOBILITY AND OVERALL ACTIVITY TOLERANCE  UPON ARRIVAL, PT AGREEABLE TO PARTICIPATE AND REPORTS HIS PAIN IS BETTER CONTROLLED HOWEVER CONTS TO REPORT "9/10" PAIN SCALE  SIGNIFICANT IMPROVEMENT NOTED IN SUPINE->SIT TXF TO SUPERVISION LEVEL WITH INCREASE TIME  WHILE SEATED EOB FOR ~5 MINUTES AT SUPERVISION LEVEL FOR SITTING BALANCE, PT COMPLETE UB DRESSING INCLUDING DOFFING/DONNING HOSPITAL GOWN WITH MOD A TO THREAD B/L UPPER ARMS AND PULL AROUND BACK  PT CONTS TO REQUIRE MAX A FOR LB DRESSING INCLUDING DOFFING/DONNING B/L SOCKS  IMPROVEMENT ALSO NOTED IN SIT<->STAND TXFS AND LIMITED FUNCTIONAL MOBILITY FROM BED->CHAIR TO MOD A WITH USE OF B/L PLATFORM WALKER  ONCE IN BEDSIDE CHAIR, PT COMPLETED LT GROOMING TASKING INCLUDING WASHING FACE/HANDS AT SUPERVISION LEVEL AFTER SET-UP AND MIN A FOR ORAL HYGIENE INCLUDING OPENING/CLOSING CONTAINERS, PLACING TOOTH PASTE ON TOOTHBRUSH, GRASPING CUP ETC 2' [facility], GMS AND IMPAIRED GRASP  PT LEFT OOB WITH ALL NEEDS IN REACH  CONT TO RECOMMEND INPT REHAB UPON D/C  SEE BELOW FOR UPDATED GOALS WITH GOAL DATED EXTENDED + 14 DAYS  WILL CONT TO FOLLOW  Plan   Treatment Interventions ADL retraining;Functional transfer training; Endurance training;Cognitive reorientation;Patient/family training;Equipment evaluation/education; Compensatory technique education; Energy conservation; Activityengagement   Goal Expiration Date 06/08/22   OT Treatment Day 4   OT Frequency 3-5x/wk   Recommendation   OT Discharge Recommendation Post acute rehabilitation services   OT - OK to Discharge Yes   AM-PAC Daily Activity Inpatient   Lower Body Dressing 2   Bathing 2   Toileting 2   Upper Body Dressing 2   Grooming 3   Eating 3   Daily Activity Raw Score 14   Daily Activity Standardized Score (Calc for Raw Score >=11) 33 39   AM-PAC Applied Cognition Inpatient   Following a Speech/Presentation 4   Understanding Ordinary Conversation 4   Taking Medications 4   Remembering Where Things Are Placed or Put Away 4   Remembering List of 4-5 Errands 4   Taking Care of Complicated Tasks 3   Applied Cognition Raw Score 23   Applied Cognition Standardized Score 53 08   Modified [place] Scale   Modified [place] Scale 4        -Pt will increase bed mobility to MOD I to participate in functional activities   -Pt will improve functional mobility and transfers to S LEVEL on/off all surfaces including toileting   -Pt will participate in lt grooming/feeding task at S LEVEL after set-up sitting EOB for ~20 minutes  -Pt will tolerate sitting EOB for ~20 minutes in order to increase participation in self-care tasks  -Pt will increase independence in UB ADLS to S LEVEL with G carry over of learned compensatory/adaptive techniques   -Pt will increase independence in LB ADLS to MIN A with G carry over of learned compensatory/adaptive techniques   -Pt will improve activity tolerance to G for 30 min txment sessions w/ G carry over of learned energy conservation techniques   -Pt will demonstrate G carryover of learned spinal precautions, safety techniques and proper body mechanics in functional and leisure activities with use of DME   -Pt will be able to self-propel w/c at S LEVEL for household distances    -Pt will participate in fine motor coordination/strenthening/deterity exercises in order to increase participation in functional activities  -Pt will improve B/L UE ROM to [facility] via AROM/AAROM/PROM in all planes as tolerated in order to participate in functional activities  -Pt will complete additional cognitive assessment with 100% attention to task in order to assist with safe d/c plan       Documentation completed by Sánchez Presley, [address] [facility], OTR/L  [facility] Certified ID# [id number]

## 2022-05-25 NOTE — PLAN OF CARE
Problem: MOBILITY - ADULT  Goal: Maintain or return to baseline ADL function  Description: INTERVENTIONS:  -  Assess patient's ability to carry out ADLs; assess patient's baseline for ADL function and identify physical deficits which impact ability to perform ADLs (bathing, care of mouth/teeth, toileting, grooming, dressing, etc )  - Assess/evaluate cause of self-care deficits   - Assess range of motion  - Assess patient's mobility; develop plan if impaired  - Assess patient's need for assistive devices and provide as appropriate  - Encourage maximum independence but intervene and supervise when necessary  - Involve family in performance of ADLs  - Assess for home care needs following discharge   - Consider OT consult to assist with ADL evaluation and planning for discharge  - Provide patient education as appropriate  Outcome: Progressing  Go  Problem: PAIN - ADULT  Goal: Verbalizes/displays adequate comfort level or baseline comfort level  Description: Interventions:  - Encourage patient to monitor pain and request assistance  - Assess pain using appropriate pain scale  - Administer analgesics based on type and severity of pain and evaluate response  - Implement non-pharmacological measures as appropriate and evaluate response  - Consider cultural and social influences on pain and pain management  - Notify physician/advanced practitioner if interventions unsuccessful or patient reports new pain  Outcome: Progressing

## 2022-05-25 NOTE — PROGRESS NOTES
[facility]  Progress Note Curt Mishra 1985, 43 y o  male MRN: [id number]  Unit/Bed#: [facility] 623-01 Encounter: [id number]  Primary Care Provider: Vicky Colunga MD   Date and time admitted to hospital: 5/11/2022  9:16 AM    Leukocytosis  Assessment & Plan  Noted on CBC with WBC 14 23  Will check CXR and urinalysis in setting of multiple straight catheterizations over past week  Benign essential HTN  Assessment & Plan  Continue to monitor  Urinary retention  Assessment & Plan  Voiding now without issue  * Cord compression myelopathy (HCC)  Assessment & Plan  POD 13 ACDF C6-7; Revision PCDF, revise/replace right C2 screw with laminar screw, revise left C2 screw (cap), remove bilateral C7 lateral mass screws, place navigated C7, T1 pedicle screws, C6-7 laminectomy, arthrodesis C2-3 and C6-T1 (5/12/22 [place])  · Presented as direct admission after outpatient MRI demonstrate cord compression requiring surgical intervention  · s/p severe central spinal cord injury, Jania B spinal cord injury March 2021 after falling down stairs status post posterior cervical decompression fixation and fusion C2-C7 3/7/2021 Dr Marvin Matthews  · Was seen in office prior to surgery for complaints of decline in function, new urinary difficulties and c/o increased neck pain, with intermittent radicular symptoms and clicking noise with head movement which is related to a new C6-7 disc herniation causing cord compression and edema as well as hardware failure  Imaging:   · Xray cervical spine, 5/14/22: Postsurgical changes of anterior cervical discectomy and fusion at C6-C7 and revision posterior fusion spanning C2-T1  Plan:  · Continue monitor neurological status and monitor for any new or progressive symptoms   · SARANYA removed 5/16/22  · Pain control - APS Input appreciated  Ketamine gtt discontinued  Medications adjusted per their recommendations    · Oral dilaudid changed to tapentadol 50/75mg PRN  · IV dilaudid discontinued  · Baclofen increased to 10mg TID  · Patient received a one time dose of toradol IV on 5/19 with good pain control afterwards  · Continue medrol dose pack  · Mobilize PT/OT - recommending acute rehab  Patient worked with PT over weekend  · PM&R consult placed, recommend SCI rehab  · Urinary retention improving, able to void  · DVT PPX: SCDs  HSQ  · Dispo - d/w CM - recommend acute SCI rehab  · Spoke with CM - [facility], awaiting authorization  Plan of care discussed with KAYLA Christopher  Neurosurgery will continue to follow as primary  Patient is medically stable for discharge at this time  Call with questions/concerns  Subjective/Objective   Chief Complaint:  I am okay      Subjective:  Continues to endorse postoperative pain  States he has been voiding without issue  Moving his bowels  Still with lack of appetite  Objective:  NAD  Posterior cervical incision clean dry and intact  Anterior incision clean dry and intact with Steri-Strips in place  I/O       05/23 0701 05/24 0700 05/24 0701  05/25 0700 05/25 0701  05/26 0700    P  O   440     Total Intake(mL/kg)  440 (5 3)     Urine (mL/kg/hr) 650 (0 3) 501 (0 3) 300 (0 9)    Stool       Total Output 650 501 300    Net -650 -61 -300                 Invasive Devices  Report    None                 Physical Exam:  Vitals: Blood pressure 117/69, pulse 91, temperature 99 °F (37 2 °C), resp  rate 18, height 5' 7" (1 702 m), weight 82 7 kg (182 lb 5 1 oz), SpO2 97 %  ,Body mass index is 28 56 kg/m²        General appearance: alert, appears stated age, cooperative and no distress  Head: Normocephalic, without obvious abnormality, atraumatic  Eyes: EOMI, PERRL  Neck:  [place] brace in place, posterior and anterior cervical incisions clean dry and intact  Back: no kyphosis present, no tenderness to percussion or palpation  Lungs: non labored breathing  Heart: regular heart rate  Neurologic:   Mental status: Alert, oriented x3, thought content appropriate  Cranial nerves: grossly intact (Cranial nerves II-XII)  Sensory: normal to light touch  Motor:  Bilateral upper extremities 3/5,  strength 2/5 left stronger than right, proximal legs 4/5, distal legs 0/5      Lab Results:  Results from last 7 days   Lab Units 05/24/22  0538   WBC Thousand/uL 14 23*   HEMOGLOBIN g/dL 12 3   HEMATOCRIT % 39 0   PLATELETS Thousands/uL 519*   NEUTROS PCT % 58   MONOS PCT % 9     Results from last 7 days   Lab Units 05/24/22  0538   POTASSIUM mmol/L 3 7   CHLORIDE mmol/L 103   CO2 mmol/L 33*   BUN mg/dL 19   CREATININE mg/dL 0 99   CALCIUM mg/dL 10 3*                 No results found for: TROPONINT  ABG:  Lab Results   Component Value Date    PHART 7 441 03/07/2021    KYQ9SOS 36 7 03/07/2021    PO2ART 162 7 (H) 03/07/2021    AOZ2AFP 24 4 03/07/2021    BEART 0 6 03/07/2021    SOURCE Line, Arterial 03/07/2021       Imaging Studies: I have personally reviewed pertinent reports  and I have personally reviewed pertinent films in PACS    XR cervical spine 2 or 3 views    Result Date: 5/15/2022  Impression: Postsurgical changes of anterior cervical discectomy and fusion at C6-C7 and revision posterior fusion spanning C2-T1  Workstation performed: [id number]     XR spine cervical 2 or 3 vw injury    Result Date: 5/12/2022  Impression: Fluoroscopic guidance provided for intraoperative localization during cervical fusion procedure  Please refer to the separate procedure notes for additional details  Workstation performed: [id number]     CT cervical spine without contrast    Result Date: 5/11/2022  Impression: Loosening of the bilateral C7 screws noted There is a fracture of the right lateral mass C2 screw Findings concur with the preliminary report by the referring clinician already in PACS and/or our electronic record EPIC    Workstation performed: [id number]     MRI cervical spine with and without contrast    Result Date: 5/11/2022  Impression: 1  New focus of cord compression at C6-C7 secondary to a new disc herniation with cord signal abnormality at this level, worrisome for new cord edema, along the caudal margin of the spinal construct  Spine surgical assessment advised  2   Interval development of cystic myelomalacia and cord atrophy at the C4-5 level in the region of prior cord atrophy which has been ameliorated by decompressive laminectomies C3-C5  3   Posterior fusion hardware C2-C7 noted  I personally discussed this study with Tristen Connors on 5/11/2022 at 8:15 AM  Workstation performed: [id number]       EKG, Pathology, and Other Studies: I have personally reviewed pertinent reports        VTE Pharmacologic Prophylaxis: Sequential compression device (Venodyne)  and Heparin    VTE Mechanical Prophylaxis: sequential compression device

## 2022-05-25 NOTE — PLAN OF CARE
Problem: MOBILITY - ADULT  Goal: Maintain or return to baseline ADL function  Description: INTERVENTIONS:  -  Assess patient's ability to carry out ADLs; assess patient's baseline for ADL function and identify physical deficits which impact ability to perform ADLs (bathing, care of mouth/teeth, toileting, grooming, dressing, etc )  - Assess/evaluate cause of self-care deficits   - Assess range of motion  - Assess patient's mobility; develop plan if impaired  - Assess patient's need for assistive devices and provide as appropriate  - Encourage maximum independence but intervene and supervise when necessary  - Involve family in performance of ADLs  - Assess for home care needs following discharge   - Consider OT consult to assist with ADL evaluation and planning for discharge  - Provide patient education as appropriate  Outcome: Progressing  Goal: Maintains/Returns to pre admission functional level  Description: INTERVENTIONS:  - Perform BMAT or MOVE assessment daily    - Set and communicate daily mobility goal to care team and patient/family/caregiver     - Collaborate with rehabilitation services on mobility goals if consulted  - Out of bed for toileting  - Record patient progress and toleration of activity level   Outcome: Progressing     Problem: PAIN - ADULT  Goal: Verbalizes/displays adequate comfort level or baseline comfort level  Description: Interventions:  - Encourage patient to monitor pain and request assistance  - Assess pain using appropriate pain scale  - Administer analgesics based on type and severity of pain and evaluate response  - Implement non-pharmacological measures as appropriate and evaluate response  - Consider cultural and social influences on pain and pain management  - Notify physician/advanced practitioner if interventions unsuccessful or patient reports new pain  Outcome: Progressing     Problem: SAFETY ADULT  Goal: Maintain or return to baseline ADL function  Description: INTERVENTIONS:  -  Assess patient's ability to carry out ADLs; assess patient's baseline for ADL function and identify physical deficits which impact ability to perform ADLs (bathing, care of mouth/teeth, toileting, grooming, dressing, etc )  - Assess/evaluate cause of self-care deficits   - Assess range of motion  - Assess patient's mobility; develop plan if impaired  - Assess patient's need for assistive devices and provide as appropriate  - Encourage maximum independence but intervene and supervise when necessary  - Involve family in performance of ADLs  - Assess for home care needs following discharge   - Consider OT consult to assist with ADL evaluation and planning for discharge  - Provide patient education as appropriate  Outcome: Progressing  Goal: Maintains/Returns to pre admission functional level  Description: INTERVENTIONS:  - Perform BMAT or MOVE assessment daily    - Set and communicate daily mobility goal to care team and patient/family/caregiver     - Collaborate with rehabilitation services on mobility goals if consulted  - Out of bed for toileting  - Record patient progress and toleration of activity level   Outcome: Progressing  Goal: Patient will remain free of falls  Description: INTERVENTIONS:  - Educate patient/family on patient safety including physical limitations  - Instruct patient to call for assistance with activity   - Consult OT/PT to assist with strengthening/mobility   - Keep Call bell within reach  - Keep bed low and locked with side rails adjusted as appropriate  - Keep care items and personal belongings within reach  - Initiate and maintain comfort rounds  - Make Fall Risk Sign visible to staff  - Apply yellow socks and bracelet for high fall risk patients  - Consider moving patient to room near nurses station  Outcome: Progressing     Problem: DISCHARGE PLANNING  Goal: Discharge to home or other facility with appropriate resources  Description: INTERVENTIONS:  - Identify barriers to discharge w/patient and caregiver  - Arrange for needed discharge resources and transportation as appropriate  - Identify discharge learning needs (meds, wound care, etc )  - Arrange for interpretive services to assist at discharge as needed  - Refer to Case Management Department for coordinating discharge planning if the patient needs post-hospital services based on physician/advanced practitioner order or complex needs related to functional status, cognitive ability, or social support system  Outcome: Progressing     Problem: Knowledge Deficit  Goal: Patient/family/caregiver demonstrates understanding of disease process, treatment plan, medications, and discharge instructions  Description: Complete learning assessment and assess knowledge base  Interventions:  - Provide teaching at level of understanding  - Provide teaching via preferred learning methods  Outcome: Progressing     Problem: Potential for Falls  Goal: Patient will remain free of falls  Description: INTERVENTIONS:  - Educate patient/family on patient safety including physical limitations  - Instruct patient to call for assistance with activity   - Consult OT/PT to assist with strengthening/mobility   - Keep Call bell within reach  - Keep bed low and locked with side rails adjusted as appropriate  - Keep care items and personal belongings within reach  - Initiate and maintain comfort rounds  - Make Fall Risk Sign visible to staff  - Offer Toileting  - Initiate/Maintain alarm  - Obtain necessary fall risk management equipment  - Apply yellow socks and bracelet for high fall risk patients  - Consider moving patient to room near nurses station  Outcome: Progressing     Problem: Nutrition/Hydration-ADULT  Goal: Nutrient/Hydration intake appropriate for improving, restoring or maintaining nutritional needs  Description: Monitor and assess patient's nutrition/hydration status for malnutrition   Collaborate with interdisciplinary team and initiate plan and interventions as ordered  Monitor patient's weight and dietary intake as ordered or per policy  Utilize nutrition screening tool and intervene as necessary  Determine patient's food preferences and provide high-protein, high-caloric foods as appropriate       INTERVENTIONS:  - Monitor oral intake, urinary output, labs, and treatment plans  - Assess nutrition and hydration status and recommend course of action  - Evaluate amount of meals eaten  - Assist patient with eating if necessary   - Allow adequate time for meals  - Recommend/ encourage appropriate diets, oral nutritional supplements, and vitamin/mineral supplements  - Order, calculate, and assess calorie counts as needed  - Recommend, monitor, and adjust tube feedings and TPN/PPN based on assessed needs  - Assess need for intravenous fluids  - Provide specific nutrition/hydration education as appropriate  - Include patient/family/caregiver in decisions related to nutrition  Outcome: Progressing     Problem: Prexisting or High Potential for Compromised Skin Integrity  Goal: Skin integrity is maintained or improved  Description: INTERVENTIONS:  - Identify patients at risk for skin breakdown  - Assess and monitor skin integrity  - Assess and monitor nutrition and hydration status  - Monitor labs   - Assess for incontinence   - Turn and reposition patient  - Assist with mobility/ambulation  - Relieve pressure over bony prominences  - Avoid friction and shearing  - Provide appropriate hygiene as needed including keeping skin clean and dry  - Evaluate need for skin moisturizer/barrier cream  - Collaborate with interdisciplinary team   - Patient/family teaching  - Consider wound care consult   Outcome: Progressing

## 2022-05-25 NOTE — PHYSICAL THERAPY NOTE
Physical Therapy Progress Note     05/25/22 1428   PT Last Visit   PT Visit Date 05/25/22   Note Type   Note Type Treatment   Pain Assessment   Pain Assessment Tool 0-10   Pain Score 10 - Worst Possible Pain   [facility] Pain Intervention(s) Repositioned; Ambulation/increased activity; Rest;Elevated   Restrictions/Precautions   Braces or Orthoses C/S Collar   Other Precautions Pain; Fall Risk;Spinal precautions   Subjective   Subjective pt encountered in recliner, reporting no new complaints  Appears comfortable despite 10/10 pain  Pt generally has flat affect, so it is difficult to determine reactions to changes unless prompted  pt does endorse numbness & altered sensation in LEs when performing resistive exercises  Transfers   Sit to Stand 3  Moderate assistance   Additional items Assist x 1   Stand to Sit 3  Moderate assistance   Additional items Assist x 1   Ambulation/Elevation   Gait pattern Step to;Short stride; Foward flexed;Narrow ANNELISE; Decreased foot clearance; Improper Weight shift  (bilateral trendelenberg)   Gait Assistance 3  Moderate assist   Additional items Assist x 1   Assistive Device Platform walker;L platform;R platform   Distance 25', 40', 25'   Balance   Static Sitting Fair +   Static Standing Poor +   Ambulatory Poor   Activity Tolerance   Activity Tolerance Patient tolerated treatment well;Patient limited by fatigue;Patient limited by pain   Nurse [address], RN   Exercises   Hip Abduction Sitting;20 reps;Bilateral;AROM   Hip Adduction AAROM;20 reps; Sitting;Bilateral   Knee AROM Long Arc Quad Sitting;10 reps;AROM; Bilateral   Ankle Pumps Sitting;20 reps;AROM; Bilateral   Marching Sitting;20 reps;Bilateral;AAROM   Assessment   Prognosis Good   Problem List Decreased strength;Decreased endurance; Impaired balance;Decreased mobility; Decreased safety awareness;Pain;Orthopedic restrictions   Assessment pt demonstrated improved proficiency with sit to stand transfers, requring less assist overall comapred to previous sessions without need for UE support to push from seated surface  Once ambulatory though, pt continues to demosntrate LE ataxia, trendelenberg gait & remains heavily reliant on platform RW for balance, requiring assist for AD management to prevent lateral tipping & to move walker closer to pt when backing up to recliner  pt continues to ambualte near household distances prior to requiring seated rest to recvoer  Upon return to room, pt performed LE exercises as noted above, demosntrating weakness in all planes, requiring active assist to improve AROM, especially with marching & hip adduction in sitting  pt instructed to perform heelslides, hip abduction & ankle pumps in bed to improve strength & coordination in large muscle groups to improve balance, gross strength& muscular endurance in advance of increased ambulation in upcoming PT sessions  Remains appropriate for rehab at d/c to maximize functional independence & progress to PLOF  Goals   Patient Goals to keep getting better   STG Expiration Date 06/01/22   PT Treatment Day 6   Plan   Treatment/Interventions Functional transfer training;LE strengthening/ROM; Therapeutic exercise; Endurance training;Patient/family training;Equipment eval/education; Bed mobility;Gait training   Progress Progressing toward goals   PT Frequency Other (Comment)  (3-6x/week)   Recommendation   PT Discharge Recommendation Post acute rehabilitation services   Equipment Recommended [facility] Recommended Wheeled walker   Walker Accessories Platform attachment - right arm;Platform attachment - left arm   AM-PAC Basic Mobility Inpatient   Turning in Bed Without Bedrails 2   Lying on Back to Sitting on Edge of Flat Bed 2   Moving Bed to Chair 2   Standing Up From Chair 2   Walk in Room 2   Climb 3-5 Stairs 1   Basic Mobility Inpatient Raw Score 11   Basic Mobility Standardized Score 30 25   Highest Level Of Mobility   -[facility] Goal 4: Move to chair/commode   -HLM Achieved 7: Walk 25 feet or more     Prosper Joseph PTA    An [facility] Basic Mobility Standardized Score of 40 78 suggests pt would benefit from post acute rehab

## 2022-05-26 ENCOUNTER — TELEPHONE (OUTPATIENT)
Dept: NEUROSURGERY | Facility: CLINIC | Age: 37
End: 2022-05-26

## 2022-05-26 PROCEDURE — 99024 POSTOP FOLLOW-UP VISIT: CPT | Performed by: PHYSICIAN ASSISTANT

## 2022-05-26 RX ORDER — SULFAMETHOXAZOLE AND TRIMETHOPRIM 800; 160 MG/1; MG/1
1 TABLET ORAL EVERY 12 HOURS SCHEDULED
Status: COMPLETED | OUTPATIENT
Start: 2022-05-26 | End: 2022-05-28

## 2022-05-26 RX ORDER — BACLOFEN 10 MG/1
15 TABLET ORAL 3 TIMES DAILY
Status: DISCONTINUED | OUTPATIENT
Start: 2022-05-26 | End: 2022-05-31 | Stop reason: HOSPADM

## 2022-05-26 RX ADMIN — ACETAMINOPHEN 975 MG: 325 TABLET, FILM COATED ORAL at 13:36

## 2022-05-26 RX ADMIN — SULFAMETHOXAZOLE AND TRIMETHOPRIM 1 TABLET: 800; 160 TABLET ORAL at 21:59

## 2022-05-26 RX ADMIN — BACLOFEN 15 MG: 10 TABLET ORAL at 21:59

## 2022-05-26 RX ADMIN — BUSPIRONE HYDROCHLORIDE 5 MG: 5 TABLET ORAL at 09:23

## 2022-05-26 RX ADMIN — BACLOFEN 15 MG: 10 TABLET ORAL at 17:13

## 2022-05-26 RX ADMIN — TAPENTADOL HYDROCHLORIDE 75 MG: 75 TABLET, FILM COATED ORAL at 09:22

## 2022-05-26 RX ADMIN — HEPARIN SODIUM 5000 UNITS: 5000 INJECTION INTRAVENOUS; SUBCUTANEOUS at 06:03

## 2022-05-26 RX ADMIN — ATORVASTATIN CALCIUM 40 MG: 40 TABLET, FILM COATED ORAL at 17:13

## 2022-05-26 RX ADMIN — TAMSULOSIN HYDROCHLORIDE 0.4 MG: 0.4 CAPSULE ORAL at 17:13

## 2022-05-26 RX ADMIN — ACETAMINOPHEN 975 MG: 325 TABLET, FILM COATED ORAL at 21:59

## 2022-05-26 RX ADMIN — NORTRIPTYLINE HYDROCHLORIDE 10 MG: 10 CAPSULE ORAL at 21:59

## 2022-05-26 RX ADMIN — GABAPENTIN 800 MG: 400 CAPSULE ORAL at 17:16

## 2022-05-26 RX ADMIN — GABAPENTIN 800 MG: 400 CAPSULE ORAL at 21:59

## 2022-05-26 RX ADMIN — SULFAMETHOXAZOLE AND TRIMETHOPRIM 1 TABLET: 800; 160 TABLET ORAL at 13:36

## 2022-05-26 RX ADMIN — HEPARIN SODIUM 5000 UNITS: 5000 INJECTION INTRAVENOUS; SUBCUTANEOUS at 21:59

## 2022-05-26 RX ADMIN — TAPENTADOL HYDROCHLORIDE 75 MG: 75 TABLET, FILM COATED ORAL at 23:21

## 2022-05-26 RX ADMIN — GABAPENTIN 800 MG: 400 CAPSULE ORAL at 09:23

## 2022-05-26 RX ADMIN — BUSPIRONE HYDROCHLORIDE 5 MG: 5 TABLET ORAL at 21:59

## 2022-05-26 RX ADMIN — TAPENTADOL HYDROCHLORIDE 75 MG: 75 TABLET, FILM COATED ORAL at 17:13

## 2022-05-26 RX ADMIN — HEPARIN SODIUM 5000 UNITS: 5000 INJECTION INTRAVENOUS; SUBCUTANEOUS at 13:46

## 2022-05-26 RX ADMIN — BACLOFEN 10 MG: 10 TABLET ORAL at 09:23

## 2022-05-26 RX ADMIN — Medication 3 MG: at 21:59

## 2022-05-26 NOTE — ASSESSMENT & PLAN NOTE
POD 14 ACDF C6-7; Revision PCDF, revise/replace right C2 screw with laminar screw, revise left C2 screw (cap), remove bilateral C7 lateral mass screws, place navigated C7, T1 pedicle screws, C6-7 laminectomy, arthrodesis C2-3 and C6-T1 (5/12/22 [place])  · Presented as direct admission after outpatient MRI demonstrate cord compression requiring surgical intervention  · s/p severe central spinal cord injury, Jania B spinal cord injury March 2021 after falling down stairs status post posterior cervical decompression fixation and fusion C2-C7 3/7/2021 Dr Doug Ballesteros  · Was seen in office for for one year postoperative follow-up with complaints of decline in function, new urinary difficulties and c/o increased neck pain, with intermittent radicular symptoms and clicking noise with head movement for which MRI was ordered demonstrating new C6-7 disc herniation causing cord compression and edema as well as hardware failure  Imaging:   · Xray cervical spine, 5/14/22: Postsurgical changes of anterior cervical discectomy and fusion at C6-C7 and revision posterior fusion spanning C2-T1  Plan:  · Continue monitor neurological status for any new or progressive symptoms   · SARANYA removed 5/16/22  · Posterior staples discontinued and drain suture 5/25/22  · Continue collar at all times except for showering changes Kaylah collar  · Patient aware plan for collar anticipated six weeks  · Pain control - APS Input appreciated  · Tylenol 975 mg every 8 hours scheduled  · Continues on baclofen 10 mg p o  T i d  - will discuss any consideration for increase of dose - increase to 15mg TID  · Nucynta every 6 hours as needed, 50 mg for moderate pain and 75 mg for severe pain  · Completed medrol dose pack  · Mobilize PT/OT - recommending acute rehab  · PM&R consult appreciated, recommend SCI rehab  · DVT PPX: SCDs  HSQ  · Dispo - d/w CM - recommend acute SCI rehab  [facility] rehab now accepting    Awaiting insurance authorization  Neurosurgery will continue to follow as primary  Patient is medically stable for discharge at this time  Call with questions/concerns

## 2022-05-26 NOTE — CASE MANAGEMENT
Case Management Discharge Planning Note    Patient name Maicol Bonner  Location [address] [place] [address] [id number] [id number] MRN [id number]  : 1985 Date 2022       Current Admission Date: 2022  Current Admission Diagnosis:Cord compression myelopathy [facility])   Patient Active Problem List    Diagnosis Date Noted    Leukocytosis 2022    Cord compression myelopathy ([facility] [address] ) 2022    Lower urinary tract symptoms (LUTS) 2022    Encounter to discuss test results 2022    S/P orchiectomy 2021    Acute low back pain 2021    Hypokalemia 2021    Benign essential HTN 2021    HLD (hyperlipidemia) 2021    Testicular discomfort 2021    Intractable hiccups 2021    Depression 2021    Urinary retention 2021    Closed fracture of fifth cervical vertebra ([facility] [address] ) 03/10/2021    Spinal cord injury, C1-C7 ([facility] [address] ) 03/10/2021    Fall 03/10/2021    Pain 03/10/2021    Central cord syndrome ([facility] [address] ) 2021    Pulmonary insufficiency 2021      LOS (days): 15  Geometric Mean LOS (GMLOS) (days): 3 20  Days to GMLOS:-11 8     OBJECTIVE:  Risk of Unplanned Readmission Score: 10 21         Current admission status: Inpatient   Preferred Pharmacy:   [address] [place] [address]  Phone: [phone] Fax: [phone] [facility], [address] [address] [facility]  [address] [facility] [address]  Phone: [phone] Fax: [phone]    Primary Care Provider: Darylene Marsh, MD    Primary Insurance: [address] [place] [address]  Secondary Insurance:     DISCHARGE DETAILS:    Discharge planning discussed with[de-identified] Patient  Freedom of Choice: Yes  Comments - Freedom of Choice: Discussed FOC  CM contacted family/caregiver?: No- see comments (Declined)  Were Treatment Team discharge recommendations reviewed with patient/caregiver?: Yes  Did patient/caregiver verbalize understanding of patient care needs?: Yes  Were patient/caregiver advised of the risks associated with not following Treatment Team discharge recommendations?: Yes         [facility]         Is the patient interested in [address] at discharge?: No    DME Referral Provided  Referral made for DME?: No    Other Referral/Resources/Interventions Provided:  Interventions: Short Term Rehab  Referral Comments: TC from [facility], stating that [facility] is able to accept patient  Ole Bull states her medical director is aware of barriers to DC from rehab, and medical director is still approving patient coming to [facility]  Patient is agreeable to DCP, Ole Bull made aware and stated that she will start on insurance authorization

## 2022-05-26 NOTE — PLAN OF CARE
Problem: MOBILITY - ADULT  Goal: Maintain or return to baseline ADL function  Description: INTERVENTIONS:  -  Assess patient's ability to carry out ADLs; assess patient's baseline for ADL function and identify physical deficits which impact ability to perform ADLs (bathing, care of mouth/teeth, toileting, grooming, dressing, etc )  - Assess/evaluate cause of self-care deficits   - Assess range of motion  - Assess patient's mobility; develop plan if impaired  - Assess patient's need for assistive devices and provide as appropriate  - Encourage maximum independence but intervene and supervise when necessary  - Involve family in performance of ADLs  - Assess for home care needs following discharge   - Consider OT consult to assist with ADL evaluation and planning for discharge  - Provide patient education as appropriate  Outcome: Progressing  Goal: Maintains/Returns to pre admission functional level  Description: INTERVENTIONS:  - Perform BMAT or MOVE assessment daily    - Set and communicate daily mobility goal to care team and patient/family/caregiver     - Collaborate with rehabilitation services on mobility goals if consulted  - Record patient progress and toleration of activity level   Outcome: Progressing     Problem: PAIN - ADULT  Goal: Verbalizes/displays adequate comfort level or baseline comfort level  Description: Interventions:  - Encourage patient to monitor pain and request assistance  - Assess pain using appropriate pain scale  - Administer analgesics based on type and severity of pain and evaluate response  - Implement non-pharmacological measures as appropriate and evaluate response  - Consider cultural and social influences on pain and pain management  - Notify physician/advanced practitioner if interventions unsuccessful or patient reports new pain  Outcome: Progressing     Problem: SAFETY ADULT  Goal: Maintain or return to baseline ADL function  Description: INTERVENTIONS:  -  Assess patient's ability to carry out ADLs; assess patient's baseline for ADL function and identify physical deficits which impact ability to perform ADLs (bathing, care of mouth/teeth, toileting, grooming, dressing, etc )  - Assess/evaluate cause of self-care deficits   - Assess range of motion  - Assess patient's mobility; develop plan if impaired  - Assess patient's need for assistive devices and provide as appropriate  - Encourage maximum independence but intervene and supervise when necessary  - Involve family in performance of ADLs  - Assess for home care needs following discharge   - Consider OT consult to assist with ADL evaluation and planning for discharge  - Provide patient education as appropriate  Outcome: Progressing  Goal: Maintains/Returns to pre admission functional level  Description: INTERVENTIONS:  - Perform BMAT or MOVE assessment daily    - Set and communicate daily mobility goal to care team and patient/family/caregiver     - Collaborate with rehabilitation services on mobility goals if consulted  - Out of bed for toileting  - Record patient progress and toleration of activity level   Outcome: Progressing  Goal: Patient will remain free of falls  Description: INTERVENTIONS:  - Educate patient/family on patient safety including physical limitations  - Instruct patient to call for assistance with activity   - Consult OT/PT to assist with strengthening/mobility   - Keep Call bell within reach  - Keep bed low and locked with side rails adjusted as appropriate  - Keep care items and personal belongings within reach  - Initiate and maintain comfort rounds  - Make Fall Risk Sign visible to staff  - Apply yellow socks and bracelet for high fall risk patients  - Consider moving patient to room near nurses station  Outcome: Progressing     Problem: DISCHARGE PLANNING  Goal: Discharge to home or other facility with appropriate resources  Description: INTERVENTIONS:  - Identify barriers to discharge w/patient and caregiver  - Arrange for needed discharge resources and transportation as appropriate  - Identify discharge learning needs (meds, wound care, etc )  - Arrange for interpretive services to assist at discharge as needed  - Refer to Case Management Department for coordinating discharge planning if the patient needs post-hospital services based on physician/advanced practitioner order or complex needs related to functional status, cognitive ability, or social support system  Outcome: Progressing     Problem: Knowledge Deficit  Goal: Patient/family/caregiver demonstrates understanding of disease process, treatment plan, medications, and discharge instructions  Description: Complete learning assessment and assess knowledge base  Interventions:  - Provide teaching at level of understanding  - Provide teaching via preferred learning methods  Outcome: Progressing     Problem: Potential for Falls  Goal: Patient will remain free of falls  Description: INTERVENTIONS:  - Educate patient/family on patient safety including physical limitations  - Instruct patient to call for assistance with activity   - Consult OT/PT to assist with strengthening/mobility   - Keep Call bell within reach  - Keep bed low and locked with side rails adjusted as appropriate  - Keep care items and personal belongings within reach  - Initiate and maintain comfort rounds  - Make Fall Risk Sign visible to staff  - Apply yellow socks and bracelet for high fall risk patients  - Consider moving patient to room near nurses station  Outcome: Progressing     Problem: Nutrition/Hydration-ADULT  Goal: Nutrient/Hydration intake appropriate for improving, restoring or maintaining nutritional needs  Description: Monitor and assess patient's nutrition/hydration status for malnutrition  Collaborate with interdisciplinary team and initiate plan and interventions as ordered  Monitor patient's weight and dietary intake as ordered or per policy   Utilize nutrition screening tool and intervene as necessary  Determine patient's food preferences and provide high-protein, high-caloric foods as appropriate       INTERVENTIONS:  - Monitor oral intake, urinary output, labs, and treatment plans  - Assess nutrition and hydration status and recommend course of action  - Evaluate amount of meals eaten  - Assist patient with eating if necessary   - Allow adequate time for meals  - Recommend/ encourage appropriate diets, oral nutritional supplements, and vitamin/mineral supplements  - Order, calculate, and assess calorie counts as needed  - Recommend, monitor, and adjust tube feedings and TPN/PPN based on assessed needs  - Assess need for intravenous fluids  - Provide specific nutrition/hydration education as appropriate  - Include patient/family/caregiver in decisions related to nutrition  Outcome: Progressing     Problem: Prexisting or High Potential for Compromised Skin Integrity  Goal: Skin integrity is maintained or improved  Description: INTERVENTIONS:  - Identify patients at risk for skin breakdown  - Assess and monitor skin integrity  - Assess and monitor nutrition and hydration status  - Monitor labs   - Assess for incontinence   - Turn and reposition patient  - Assist with mobility/ambulation  - Relieve pressure over bony prominences  - Avoid friction and shearing  - Provide appropriate hygiene as needed including keeping skin clean and dry  - Evaluate need for skin moisturizer/barrier cream  - Collaborate with interdisciplinary team   - Patient/family teaching  - Consider wound care consult   Outcome: Progressing

## 2022-05-26 NOTE — ASSESSMENT & PLAN NOTE
Noted on CBC with WBC 14 23 (5/24)   CXR negative  Urinalysis concerning for infection  Will start abx

## 2022-05-26 NOTE — TELEPHONE ENCOUNTER
6/1/22- PT DISCHARGED   PLEASE SEE AMBERS NOTE    5/31/22- [facility]    5/27/22- [facility]    5/26/22- [facility]  6WK POV SCHEDULED 6/24/22  PT WILL NEED CSPINE XRAYS (PER Zack Goldmann)    5/25/22- Wilber Rey) DISPO PLANNING

## 2022-05-26 NOTE — UTILIZATION REVIEW
Continued Stay Review    Date: 05/26/2022                          Current Patient Class: Inpatient  Current Level of Care: Med/Surg    HPI:36 y o  male initially admitted on 05/11/2022    Assessment/Plan:  Cord Compression Myelopathy  POD 14 ACDF C6-7; Revision PCDF, revise/replace right C2 screw with laminar screw, revise left C2 screw (cap), remove bilateral C7 lateral mass screws, place navigated C7, T1 pedicle screws, C6-7 laminectomy, arthrodesis C2-3 and C6-T1 (5/12/22 [place])  Continue to monitor neuro status  Continue collar (6weeks) at all times except for showing changes [employer]  Pain Control PRN  PT/OT  DVT PPX:  H SQ * Bam SCDs      Vital Signs: /86   Pulse 93   Temp 98 6 °F (37 °C)   Resp 18   Ht 5' 7" (1 702 m) Comment: per patient  Wt 82 7 kg (182 lb 5 1 oz)   SpO2 97%   BMI 28 56 kg/m²     Pertinent Labs/Diagnostic Results:     Results from last 7 days   Lab Units 05/24/22  0538   WBC Thousand/uL 14 23*   HEMOGLOBIN g/dL 12 3   HEMATOCRIT % 39 0   PLATELETS Thousands/uL 519*   NEUTROS ABS Thousands/µL 8 29*     Results from last 7 days   Lab Units 05/24/22  0538   SODIUM mmol/L 138   POTASSIUM mmol/L 3 7   CHLORIDE mmol/L 103   CO2 mmol/L 33*   ANION GAP mmol/L 2*   BUN mg/dL 19   CREATININE mg/dL 0 99   EGFR ml/min/1 73sq m 96   CALCIUM mg/dL 10 3*     Results from last 7 days   Lab Units 05/24/22  0538   GLUCOSE RANDOM mg/dL 102     Results from last 7 days   Lab Units 05/25/22  2201   CLARITY UA  Clear   COLOR UA  Yellow   SPEC GRAV UA  1 036*   PH UA  6 0   GLUCOSE UA mg/dl Negative   KETONES UA mg/dl Negative   BLOOD UA  Trace*   PROTEIN UA mg/dl 50 (1+)*   NITRITE UA  Positive*   BILIRUBIN UA  Negative   UROBILINOGEN UA (BE) mg/dl 4 0*   LEUKOCYTES UA  Small*   WBC UA /hpf 10-20*   RBC UA /hpf 2-4*   BACTERIA UA /hpf Innumerable*   EPITHELIAL CELLS WET PREP /hpf Occasional   MUCUS THREADS  Innumerable*     Medications:   Scheduled Medications:  acetaminophen, 975 mg, Oral, Q8H [address]  atorvastatin, 40 mg, Oral, After Dinner  baclofen, 15 mg, Oral, TID  busPIRone, 5 mg, Oral, BID  docusate sodium, 100 mg, Oral, BID  gabapentin, 800 mg, Oral, TID  heparin (porcine), 5,000 Units, Subcutaneous, Q8H GILMAR  lidocaine, 2 patch, Topical, Daily  melatonin, 3 mg, Oral, HS  nortriptyline, 10 mg, Oral, HS  senna, 1 tablet, Oral, Daily  sulfamethoxazole-trimethoprim, 1 tablet, Oral, Q12H GILMAR  tamsulosin, 0 4 mg, Oral, After Dinner      Continuous IV Infusions:     PRN Meds:  bisacodyl, 10 mg, Rectal, Daily PRN  LORazepam, 0 5 mg, Intravenous, Q4H PRN  naloxone, 0 04 mg, Intravenous, Q1MIN PRN  ondansetron, 4 mg, Intravenous, Q4H PRN  tapentadol, 50 mg, Oral, Q6H PRN   Or  tapentadol, 75 mg, Oral, Q6H PRN x 1 dose 5/25      Discharge Plan:  TBD - CM - recommend acute SCI rehab  [facility] rehab now accepting  Awaiting insurance authorization  Network Utilization Review Department  ATTENTION: Please call with any questions or concerns to [phone] and carefully listen to the prompts so that you are directed to the right person  All voicemails are confidential   Elizabeth Ingles all requests for admission clinical reviews, approved or denied determinations and any other requests to dedicated fax number below belonging to the campus where the patient is receiving treatment   List of dedicated fax numbers for the Facilities:  [address] DENIALS (Administrative/Medical Necessity) [phone]   [address] [place] [address] (Maternity/NICU/Pediatrics) [phone]   [address] [phone]   [address]  [address] [facility] Yueida Abel Joellen 4521 [address] [facility] [phone] 187 [facility] Arian Burk 1481 [address] [place] [address] [phone]

## 2022-05-26 NOTE — PROGRESS NOTES
[facility]  Progress Note [place] 1985, 43 y o  male MRN: [id number]  Unit/Bed#: [facility] 623-01 Encounter: [id number]  Primary Care Provider: Bethany Jerry MD   Date and time admitted to hospital: 5/11/2022  9:16 AM    * Cord compression myelopathy (HCC)  Assessment & Plan  POD 14 ACDF C6-7; Revision PCDF, revise/replace right C2 screw with laminar screw, revise left C2 screw (cap), remove bilateral C7 lateral mass screws, place navigated C7, T1 pedicle screws, C6-7 laminectomy, arthrodesis C2-3 and C6-T1 (5/12/22 [place])  · Presented as direct admission after outpatient MRI demonstrate cord compression requiring surgical intervention  · s/p severe central spinal cord injury, Jania B spinal cord injury March 2021 after falling down stairs status post posterior cervical decompression fixation and fusion C2-C7 3/7/2021 Dr Rubén Betancourt  · Was seen in office for for one year postoperative follow-up with complaints of decline in function, new urinary difficulties and c/o increased neck pain, with intermittent radicular symptoms and clicking noise with head movement for which MRI was ordered demonstrating new C6-7 disc herniation causing cord compression and edema as well as hardware failure  Imaging:   · Xray cervical spine, 5/14/22: Postsurgical changes of anterior cervical discectomy and fusion at C6-C7 and revision posterior fusion spanning C2-T1  Plan:  · Continue monitor neurological status for any new or progressive symptoms   · SARANYA removed 5/16/22  · Posterior staples discontinued and drain suture 5/25/22  · Continue collar at all times except for showering changes Kaylah collar  · Patient aware plan for collar anticipated six weeks  · Pain control - APS Input appreciated    · Tylenol 975 mg every 8 hours scheduled  · Continues on baclofen 10 mg p o  T i d  - will discuss any consideration for increase of dose - increase to 15mg TID  · Nucynta every 6 hours as needed, 50 mg for moderate pain and 75 mg for severe pain  · Completed medrol dose pack  · Mobilize PT/OT - recommending acute rehab  · PM&R consult appreciated, recommend SCI rehab  · DVT PPX: SCDs  HSQ  · Dispo - d/w CM - recommend acute SCI rehab  [facility] rehab now accepting  Awaiting insurance authorization  Neurosurgery will continue to follow as primary  Patient is medically stable for discharge at this time  Call with questions/concerns  Leukocytosis  Assessment & Plan  Noted on CBC with WBC 14 23 (5/24)   CXR negative  Urinalysis concerning for infection  Will start abx  Benign essential HTN  Assessment & Plan  Continue to monitor  Urinary retention  Assessment & Plan  resolved  Voiding now without issue  Subjective/Objective   Chief Complaint:  "I am alright"    Subjective:  Patient denies any significant events overnight  He continues with left posterior head scalp numbness  Continues with paresthesias in his hands and bilateral feet with numbness of his bilateral lower extremities  Patient admits to some numbness of his abdomen which she states has been present though did not complain directly to me about this prior  He noted when receiving heparin or prior bladder scans sensation was decreased  Denies any ascending numbness  Unclear if legs are slightly more numb or just noticing more detail  Patient overall stable  Complaining of right scapular pain He denies any chest pain or shortness of breath  States appetite has returned  States he is voiding well on his own  Admits to bowel movements  Denies any nausea or vomiting  Objective:  Lying in bed  Resting well  I/O       05/24 0701 05/25 0700 05/25 0701 05/26 0700 05/26 0701 05/27 0700    P  O  440      Total Intake(mL/kg) 440 (5 3)      Urine (mL/kg/hr) 501 (0 3) 600 (0 3)     Total Output 501 600     Net -61 -600                  Invasive Devices  Report    None                 Physical Exam:  Vitals: Blood pressure 128/86, pulse 93, temperature 98 6 °F (37 °C), resp  rate 18, height 5' 7" (1 702 m), weight 82 7 kg (182 lb 5 1 oz), SpO2 97 %  ,Body mass index is 28 56 kg/m²  General appearance: alert, appears stated age, cooperative and no distress  Head: Normocephalic, without obvious abnormality, numbness left occipital regions  Eyes:  Conjugate gaze and tracks appropriately in room  Neck:  Collar in place  Anterior Steri-Strips in place  Back:  Tenderness palpation over right scapula  Lungs: non labored breathing  Heart: regular heart rate  Neurologic:   Mental status: Alert, oriented, thought content appropriate  Cranial nerves: grossly intact (Cranial nerves II-XII)  Sensory:  Patchy abnormal sensation  No barbara sensory level but soft sensory change T6     Light touch: decreased diffuse BUE, pins and needles in hands, decreased T6 b/l and diffuse b/l most noted lateral lower leg, pins/needles b/l feet  Pinprick: intact shoulders, decreased diffuse arms, decreased R T8 region, L T7 region, patchy BLE  Motor: moving all extremities   RUE:  2, IO2, WE2, WF0, Bicep 4, Tricep2, Shoulder abd 4  LUE:  2, IO2-3, WE3, WF2, Bicep 4, Tricep4, Shoulder abd 4  RLE: HF4, KF4-, KE4, DF 4-, PF3  LLE: HF4, KF4-, KE4, DF 2, PF2  Reflexes: b/l carson, difficult to test clonus 2/2 dysesthesia but not present    Lab Results:  Results from last 7 days   Lab Units 05/24/22  0538   WBC Thousand/uL 14 23*   HEMOGLOBIN g/dL 12 3   HEMATOCRIT % 39 0   PLATELETS Thousands/uL 519*   NEUTROS PCT % 58   MONOS PCT % 9     Results from last 7 days   Lab Units 05/24/22  0538   POTASSIUM mmol/L 3 7   CHLORIDE mmol/L 103   CO2 mmol/L 33*   BUN mg/dL 19   CREATININE mg/dL 0 99   CALCIUM mg/dL 10 3*                 No results found for: TROPONINT  ABG:  Lab Results   Component Value Date    PHART 7 441 03/07/2021    XAJ4ZEK 36 7 03/07/2021    PO2ART 162 7 (H) 03/07/2021    EMF8PEX 24 4 03/07/2021    BEART 0 6 03/07/2021    SOURCE Line, Arterial 03/07/2021       Imaging Studies: I have personally reviewed pertinent reports  and I have personally reviewed pertinent films in PACS    XR cervical spine 2 or 3 views    Result Date: 5/15/2022  Impression: Postsurgical changes of anterior cervical discectomy and fusion at C6-C7 and revision posterior fusion spanning C2-T1  Workstation performed: [id number]     XR spine cervical 2 or 3 vw injury    Result Date: 5/12/2022  Impression: Fluoroscopic guidance provided for intraoperative localization during cervical fusion procedure  Please refer to the separate procedure notes for additional details  Workstation performed: [id number]     CT cervical spine without contrast    Result Date: 5/11/2022  Impression: Loosening of the bilateral C7 screws noted There is a fracture of the right lateral mass C2 screw Findings concur with the preliminary report by the referring clinician already in PACS and/or our electronic record EPIC  Workstation performed: [id number]     MRI cervical spine with and without contrast    Result Date: 5/11/2022  Impression: 1  New focus of cord compression at C6-C7 secondary to a new disc herniation with cord signal abnormality at this level, worrisome for new cord edema, along the caudal margin of the spinal construct  Spine surgical assessment advised  2   Interval development of cystic myelomalacia and cord atrophy at the C4-5 level in the region of prior cord atrophy which has been ameliorated by decompressive laminectomies C3-C5  3   Posterior fusion hardware C2-C7 noted   I personally discussed this study with Rupert Hanna on 5/11/2022 at 8:15 AM  Workstation performed: [id number]       EKG, Pathology, and Other Studies: none    VTE Pharmacologic Prophylaxis: Heparin    VTE Mechanical Prophylaxis: sequential compression device

## 2022-05-27 PROCEDURE — 99024 POSTOP FOLLOW-UP VISIT: CPT | Performed by: NURSE PRACTITIONER

## 2022-05-27 PROCEDURE — 97116 GAIT TRAINING THERAPY: CPT

## 2022-05-27 PROCEDURE — 97530 THERAPEUTIC ACTIVITIES: CPT

## 2022-05-27 PROCEDURE — 0241U HB NFCT DS VIR RESP RNA 4 TRGT: CPT | Performed by: NURSE PRACTITIONER

## 2022-05-27 RX ADMIN — ACETAMINOPHEN 975 MG: 325 TABLET, FILM COATED ORAL at 14:22

## 2022-05-27 RX ADMIN — SULFAMETHOXAZOLE AND TRIMETHOPRIM 1 TABLET: 800; 160 TABLET ORAL at 21:10

## 2022-05-27 RX ADMIN — BACLOFEN 15 MG: 10 TABLET ORAL at 21:10

## 2022-05-27 RX ADMIN — ATORVASTATIN CALCIUM 40 MG: 40 TABLET, FILM COATED ORAL at 18:03

## 2022-05-27 RX ADMIN — Medication 3 MG: at 21:10

## 2022-05-27 RX ADMIN — HEPARIN SODIUM 5000 UNITS: 5000 INJECTION INTRAVENOUS; SUBCUTANEOUS at 21:10

## 2022-05-27 RX ADMIN — BUSPIRONE HYDROCHLORIDE 5 MG: 5 TABLET ORAL at 21:10

## 2022-05-27 RX ADMIN — BUSPIRONE HYDROCHLORIDE 5 MG: 5 TABLET ORAL at 09:15

## 2022-05-27 RX ADMIN — GABAPENTIN 800 MG: 400 CAPSULE ORAL at 21:10

## 2022-05-27 RX ADMIN — SULFAMETHOXAZOLE AND TRIMETHOPRIM 1 TABLET: 800; 160 TABLET ORAL at 09:15

## 2022-05-27 RX ADMIN — HEPARIN SODIUM 5000 UNITS: 5000 INJECTION INTRAVENOUS; SUBCUTANEOUS at 13:56

## 2022-05-27 RX ADMIN — NORTRIPTYLINE HYDROCHLORIDE 10 MG: 10 CAPSULE ORAL at 21:13

## 2022-05-27 RX ADMIN — GABAPENTIN 800 MG: 400 CAPSULE ORAL at 09:14

## 2022-05-27 RX ADMIN — TAPENTADOL HYDROCHLORIDE 75 MG: 75 TABLET, FILM COATED ORAL at 14:22

## 2022-05-27 RX ADMIN — TAMSULOSIN HYDROCHLORIDE 0.4 MG: 0.4 CAPSULE ORAL at 18:03

## 2022-05-27 RX ADMIN — TAPENTADOL HYDROCHLORIDE 75 MG: 75 TABLET, FILM COATED ORAL at 21:13

## 2022-05-27 RX ADMIN — GABAPENTIN 800 MG: 400 CAPSULE ORAL at 16:17

## 2022-05-27 RX ADMIN — BACLOFEN 15 MG: 10 TABLET ORAL at 16:17

## 2022-05-27 RX ADMIN — BACLOFEN 15 MG: 10 TABLET ORAL at 09:15

## 2022-05-27 NOTE — CASE MANAGEMENT
Case Management Discharge Planning Note    Patient name Cara Grimm  Location [address] [place] [address] 623/PPHP [id number] MRN [id number]  : 1985 Date 2022       Current Admission Date: 2022  Current Admission Diagnosis:Cord compression myelopathy [facility])   Patient Active Problem List    Diagnosis Date Noted    Leukocytosis 2022    Cord compression myelopathy ([facility] [address] ) 2022    Lower urinary tract symptoms (LUTS) 2022    Encounter to discuss test results 2022    S/P orchiectomy 2021    Acute low back pain 2021    Hypokalemia 2021    Benign essential HTN 2021    HLD (hyperlipidemia) 2021    Testicular discomfort 2021    Intractable hiccups 2021    Depression 2021    Urinary retention 2021    Closed fracture of fifth cervical vertebra ([facility] [address] ) 03/10/2021    Spinal cord injury, C1-C7 ([facility] [address] ) 03/10/2021    Fall 03/10/2021    Pain 03/10/2021    Central cord syndrome ([facility] [address] ) 2021    Pulmonary insufficiency 2021      LOS (days): 16  Geometric Mean LOS (GMLOS) (days): 3 20  Days to GMLOS:-12 9     OBJECTIVE:  Risk of Unplanned Readmission Score: 10 51         Current admission status: Inpatient   Preferred Pharmacy:   [address] [place] [address]  Phone: [phone] Fax: 275 [facility], [address] [address] [facility]  [address] [facility] [address]  Phone: [phone] Fax: [phone]    Primary Care Provider: Blaze Lovelace MD    Primary Insurance: [employer]  [place]  Secondary Insurance:     [employer] [place] Number: [place] request for tranport faxed to [facility] transport today to [facility]  Spinal precautions, fall, safety risk

## 2022-05-27 NOTE — PHYSICAL THERAPY NOTE
Physical Therapy Progress Note     05/27/22 1424   PT Last Visit   PT Visit Date 05/27/22   Note Type   Note Type Treatment   Pain Assessment   Pain Assessment Tool 0-10   Pain Score 10 - Worst Possible Pain   Hospital Pain Intervention(s) Medication (See MAR); Repositioned; Ambulation/increased activity; Rest   Restrictions/Precautions   Braces or Orthoses C/S Collar   Other Precautions Pain; Fall Risk;Spinal precautions   Subjective   Subjective Pt encountered seated in recliner, pleasant and agreeable to treatment  Reports pain to R side of spine below collar  Reports it feels like a stabbing pain and is tender to palpation  Skin looks unremarkable on visual exam, no excessive swelling palpated  Reports no other complaints at this time  Transfers   Sit to Stand 4  Minimal assistance   Additional items Assist x 1; Increased time required   Stand to Sit 3  Moderate assistance   Additional items Assist x 1   Stand pivot 3  Moderate assistance   Additional items Assist x 1   Ambulation/Elevation   Gait pattern Ataxia; Short stride; Excessively slow;Decreased foot clearance;Narrow ANNELISE; Improper Weight shift   Gait Assistance 3  Moderate assist   Additional items Assist x 1  (+ chair follow)   Assistive Device Platform walker;R platform;L platform   Distance 70' x 2   Balance   Static Sitting Fair +   Static Standing Poor +   Dynamic Standing Poor   Ambulatory Poor   Activity Tolerance   Activity Tolerance Patient tolerated treatment well;Patient limited by fatigue;Patient limited by pain   Nurse [address], RN   Assessment   Prognosis Good   Problem List Decreased strength;Decreased endurance; Impaired balance;Decreased mobility; Decreased safety awareness;Pain;Orthopedic restrictions   Assessment Pt continues to perform all functional transfers & household distance mobiilty with Ax1 & use of bilateral platform RW    Continues to demonstrate difficulty managing RW while perofrming dynamic turns & backwards walking, partly due to weight & balance of RW with attachments  pt continues to demosntrate minor lateral LOB due to LE weakness R>L, but is able to compensate with UEs on RW  Pt declined seated rests, only taking brief standing rests to recover after aforementioned LOB  Upon return to room, pt instructed in & performed elbow flexion/extension exercise to improve UE function, and was instructed to continue performing shoulder AROM in all planes as tolerated so he can better control RW & compensate for LE weakness  C/s collar pads changed during session & wound redressed with fresh ABD pad for cleanliness  Pt remains appropriate for rehab at this time to maximize long term functional independence  Goals   Patient Goals to keep getting stronger   STG Expiration Date 06/01/22   PT Treatment Day 7   Plan   Treatment/Interventions Functional transfer training;LE strengthening/ROM; Therapeutic exercise; Endurance training;Patient/family training;Equipment eval/education; Bed mobility;Gait training   Progress Progressing toward goals   PT Frequency Other (Comment)  (3-6x/week)   Recommendation   PT Discharge Recommendation Post acute rehabilitation services   Equipment Recommended [facility] Recommended Wheeled walker   [facility] attachment - right arm;Platform attachment - left arm   AM-PAC Basic Mobility Inpatient   Turning in Bed Without Bedrails 2   Lying on Back to Sitting on Edge of Flat Bed 2   Moving Bed to Chair 2   Standing Up From Chair 2   Walk in Room 2   Climb 3-5 Stairs 1   Basic Mobility Inpatient Raw Score 11   Basic Mobility Standardized Score 30 25   Turning Head Towards Sound 4   Follow Simple Instructions 4   Low Function Basic Mobility Raw Score 19   Low Function Basic Mobility Standardized Score 31 06   Highest Level Of Mobility   [facility] Goal 4: Move to chair/commode     Andi Sams PTA    An [facility] Basic Mobility Standardized Score of 40 78 suggests pt would benefit from post acute rehab

## 2022-05-27 NOTE — PLAN OF CARE
Problem: MOBILITY - ADULT  Goal: Maintain or return to baseline ADL function  Description: INTERVENTIONS:  -  Assess patient's ability to carry out ADLs; assess patient's baseline for ADL function and identify physical deficits which impact ability to perform ADLs (bathing, care of mouth/teeth, toileting, grooming, dressing, etc )  - Assess/evaluate cause of self-care deficits   - Assess range of motion  - Assess patient's mobility; develop plan if impaired  - Assess patient's need for assistive devices and provide as appropriate  - Encourage maximum independence but intervene and supervise when necessary  - Involve family in performance of ADLs  - Assess for home care needs following discharge   - Consider OT consult to assist with ADL evaluation and planning for discharge  - Provide patient education as appropriate  Outcome: Progressing  [facility]  Problem: PAIN - ADULT  Goal: Verbalizes/displays adequate comfort level or baseline comfort level  Description: Interventions:  - Encourage patient to monitor pain and request assistance  - Assess pain using appropriate pain scale  - Administer analgesics based on type and severity of pain and evaluate response  - Implement non-pharmacological measures as appropriate and evaluate response  - Consider cultural and social influences on pain and pain management  - Notify physician/advanced practitioner if interventions unsuccessful or patient reports new pain  Outcome: Progressing     Problem: SAFETY ADULT  Goal: Maintain or return to baseline ADL function  Description: INTERVENTIONS:  -  Assess patient's ability to carry out ADLs; assess patient's baseline for ADL function and identify physical deficits which impact ability to perform ADLs (bathing, care of mouth/teeth, toileting, grooming, dressing, etc )  - Assess/evaluate cause of self-care deficits   - Assess range of motion  - Assess patient's mobility; develop plan if impaired  - Assess patient's need for assistive devices and provide as appropriate  - Encourage maximum independence but intervene and supervise when necessary  - Involve family in performance of ADLs  - Assess for home care needs following discharge   - Consider OT consult to assist with ADL evaluation and planning for discharge  - Provide patient education as appropriate  Outcome: Progressing  Goal: Maintains/Returns to pre admission functional level  Description: INTERVENTIONS:  - Perform BMAT or MOVE assessment daily    - Set and communicate daily mobility goal to care team and patient/family/caregiver  - Collaborate with rehabilitation services on mobility goals if consulted  - Perform Range of Motion times a day  - Reposition patient every  hours    - Dangle patient  times a day  - Stand patient  times a day  - Ambulate patient  times a day  - Out of bed to chair times a day   - Out of bed for meals  times a day  - Out of bed for toileting  - Record patient progress and toleration of activity level   Outcome: Progressing  Goal: Patient will remain free of falls  Description: INTERVENTIONS:  - Educate patient/family on patient safety including physical limitations  - Instruct patient to call for assistance with activity   - Consult OT/PT to assist with strengthening/mobility   - Keep Call bell within reach  - Keep bed low and locked with side rails adjusted as appropriate  - Keep care items and personal belongings within reach  - Initiate and maintain comfort rounds  - Make Fall Risk Sign visible to staff  - Offer Toileting every Hours, in advance of need  - Initiate/Maintain alarm  - Obtain necessary fall risk management equipment:   - Apply yellow socks and bracelet for high fall risk patients  - Consider moving patient to room near nurses station  Outcome: Progressing

## 2022-05-27 NOTE — ASSESSMENT & PLAN NOTE
POD 15 ACDF C6-7; Revision PCDF, revise/replace right C2 screw with laminar screw, revise left C2 screw (cap), remove bilateral C7 lateral mass screws, place navigated C7, T1 pedicle screws, C6-7 laminectomy, arthrodesis C2-3 and C6-T1 (5/12/22 [place])  · Presented as direct admission after outpatient MRI demonstrate cord compression requiring surgical intervention  · s/p severe central spinal cord injury, Jania B spinal cord injury March 2021 after falling down stairs status post posterior cervical decompression fixation and fusion C2-C7 3/7/2021 Dr Reji Hawk  · Was seen in office for for one year postoperative follow-up with complaints of decline in function, new urinary difficulties and c/o increased neck pain, with intermittent radicular symptoms and clicking noise with head movement for which MRI was ordered demonstrating new C6-7 disc herniation causing cord compression and edema as well as hardware failure  Imaging:   · Xray cervical spine, 5/14/22: Postsurgical changes of anterior cervical discectomy and fusion at C6-C7 and revision posterior fusion spanning C2-T1  Plan:  · Continue monitor neurological status for any new or progressive symptoms   · SARANYA removed 5/16/22  · Posterior staples discontinued and drain suture 5/25/22  · Continue collar at all times except for showering changes Kaylah collar  · Patient aware plan for collar anticipated six weeks  · Pain control - APS Input appreciated  · Tylenol 975 mg every 8 hours scheduled  · Continues on baclofen 10 mg p o  T i d  - will discuss any consideration for increase of dose - increase to 15mg TID  · Nucynta every 6 hours as needed, 50 mg for moderate pain and 75 mg for severe pain  · Completed medrol dose pack  · Mobilize PT/OT - recommending acute rehab  · PM&R consult appreciated, recommend SCI rehab  · DVT PPX: SCDs  HSQ  · Dispo - d/w CM - recommend acute SCI rehab  [facility] now accepting    Awaiting insurance authorization  Neurosurgery will continue to follow as primary  Patient is medically stable for discharge at this time  Call with questions/concerns

## 2022-05-27 NOTE — PLAN OF CARE
Problem: PHYSICAL THERAPY ADULT  Goal: Performs mobility at highest level of function for planned discharge setting  See evaluation for individualized goals  Description: Treatment/Interventions: Functional transfer training, LE strengthening/ROM, Therapeutic exercise, Endurance training, Patient/family training, Equipment eval/education, Bed mobility, Gait training, Spoke to nursing, OT  Equipment Recommended: Wheelchair       See flowsheet documentation for full assessment, interventions and recommendations  Outcome: Progressing  Note: Prognosis: Good  Problem List: Decreased strength, Decreased endurance, Impaired balance, Decreased mobility, Decreased safety awareness, Pain, Orthopedic restrictions  Assessment: Pt continues to perform all functional transfers & household distance mobiilty with Ax1 & use of bilateral platform RW  Continues to demonstrate difficulty managing RW while perofrming dynamic turns & backwards walking, partly due to weight & balance of RW with attachments  pt continues to demosntrate minor lateral LOB due to LE weakness R>L, but is able to compensate with UEs on RW  Pt declined seated rests, only taking brief standing rests to recover after aforementioned LOB  Upon return to room, pt instructed in & performed elbow flexion/extension exercise to improve UE function, and was instructed to continue performing shoulder AROM in all planes as tolerated so he can better control RW & compensate for LE weakness  C/s collar pads changed during session & wound redressed with fresh ABD pad for cleanliness  Pt remains appropriate for rehab at this time to maximize long term functional independence  Barriers to Discharge: Decreased caregiver support, Inaccessible home environment        PT Discharge Recommendation: Post acute rehabilitation services          See flowsheet documentation for full assessment

## 2022-05-27 NOTE — ASSESSMENT & PLAN NOTE
Noted on CBC with WBC 14 23 (5/24)  CXR negative  Urinalysis concerning for infection  Will start abx - Bactrim DS q12h x 3 days

## 2022-05-27 NOTE — PROGRESS NOTES
[facility]  Progress Note Zhanna South 1985, 43 y o  male MRN: [id number]  Unit/Bed#: [facility] 623-01 Encounter: [id number]  Primary Care Provider: Elaine Monsalve MD   Date and time admitted to hospital: 5/11/2022  9:16 AM    Leukocytosis  Assessment & Plan  Noted on CBC with WBC 14 23 (5/24)  CXR negative  Urinalysis concerning for infection  Will start abx - Bactrim DS q12h x 3 days  Benign essential HTN  Assessment & Plan  Continue to monitor  Urinary retention  Assessment & Plan  resolved  Voiding now without issue  * Cord compression myelopathy (HCC)  Assessment & Plan  POD 15 ACDF C6-7; Revision PCDF, revise/replace right C2 screw with laminar screw, revise left C2 screw (cap), remove bilateral C7 lateral mass screws, place navigated C7, T1 pedicle screws, C6-7 laminectomy, arthrodesis C2-3 and C6-T1 (5/12/22 [place])  · Presented as direct admission after outpatient MRI demonstrate cord compression requiring surgical intervention  · s/p severe central spinal cord injury, Jania B spinal cord injury March 2021 after falling down stairs status post posterior cervical decompression fixation and fusion C2-C7 3/7/2021 Dr Yasmany Wild  · Was seen in office for for one year postoperative follow-up with complaints of decline in function, new urinary difficulties and c/o increased neck pain, with intermittent radicular symptoms and clicking noise with head movement for which MRI was ordered demonstrating new C6-7 disc herniation causing cord compression and edema as well as hardware failure  Imaging:   · Xray cervical spine, 5/14/22: Postsurgical changes of anterior cervical discectomy and fusion at C6-C7 and revision posterior fusion spanning C2-T1  Plan:  · Continue monitor neurological status for any new or progressive symptoms   · SARANYA removed 5/16/22  · Posterior staples discontinued and drain suture 5/25/22    · Continue collar at all times except for showering changes Kaylah collar  · Patient aware plan for collar anticipated six weeks  · Pain control - APS Input appreciated  · Tylenol 975 mg every 8 hours scheduled  · Continues on baclofen 10 mg p o  T i d  - will discuss any consideration for increase of dose - increase to 15mg TID  · Nucynta every 6 hours as needed, 50 mg for moderate pain and 75 mg for severe pain  · Completed medrol dose pack  · Mobilize PT/OT - recommending acute rehab  · PM&R consult appreciated, recommend SCI rehab  · DVT PPX: SCDs  HSQ  · Dispo - d/w CM - recommend acute SCI rehab  [facility] rehab now accepting  Awaiting insurance authorization  Neurosurgery will continue to follow as primary  Patient is medically stable for discharge at this time  Call with questions/concerns  Subjective/Objective   Chief Complaint:  I am doing okay      Subjective:  States pain continues to improve  Denies any new weakness or numbness  Still with lack of appetite  Moving bowels without issue and voiding without issue  Denies any dysuria  Objective:  NAD  Posterior and anterior cervical incisions clean dry and intact  I/O       05/25 0701 05/26 0700 05/26 0701 05/27 0700 05/27 0701 05/28 0700    P  O   540     Total Intake(mL/kg)  540 (6 4)     Urine (mL/kg/hr) 600 (0 3) 300 (0 1)     Total Output 600 300     Net -600 +240            Unmeasured Urine Occurrence  1 x           Invasive Devices  Report    None                 Physical Exam:  Vitals: Blood pressure 111/69, pulse 70, temperature 98 3 °F (36 8 °C), resp  rate 17, height 5' 7" (1 702 m), weight 84 4 kg (186 lb 1 1 oz), SpO2 100 %  ,Body mass index is 29 14 kg/m²        General appearance: alert, appears stated age, cooperative and no distress  Head: Normocephalic, without obvious abnormality, atraumatic  Eyes: EOMI, PERRL  Neck:  Posterior and anterior cervical incisions clean dry and intact  Back: no kyphosis present, no tenderness to percussion or palpation  Lungs: non labored breathing  Heart: regular heart rate  Neurologic:   Mental status: Alert, oriented x3, thought content appropriate  Cranial nerves: grossly intact (Cranial nerves II-XII)  Sensory: normal to light touch  Motor:  Bilateral upper extremities 3/5,  2-3/5, proximal lower extremities 4/5, distal lower extremities 1/5        Lab Results:  Results from last 7 days   Lab Units 05/24/22  0538   WBC Thousand/uL 14 23*   HEMOGLOBIN g/dL 12 3   HEMATOCRIT % 39 0   PLATELETS Thousands/uL 519*   NEUTROS PCT % 58   MONOS PCT % 9     Results from last 7 days   Lab Units 05/24/22  0538   POTASSIUM mmol/L 3 7   CHLORIDE mmol/L 103   CO2 mmol/L 33*   BUN mg/dL 19   CREATININE mg/dL 0 99   CALCIUM mg/dL 10 3*                 No results found for: TROPONINT  ABG:  Lab Results   Component Value Date    PHART 7 441 03/07/2021    WMX1EJF 36 7 03/07/2021    PO2ART 162 7 (H) 03/07/2021    GZH0GDX 24 4 03/07/2021    BEART 0 6 03/07/2021    SOURCE Line, Arterial 03/07/2021       Imaging Studies: I have personally reviewed pertinent reports  and I have personally reviewed pertinent films in PACS    XR cervical spine 2 or 3 views    Result Date: 5/15/2022  Impression: Postsurgical changes of anterior cervical discectomy and fusion at C6-C7 and revision posterior fusion spanning C2-T1  Workstation performed: [id number]     XR spine cervical 2 or 3 vw injury    Result Date: 5/12/2022  Impression: Fluoroscopic guidance provided for intraoperative localization during cervical fusion procedure  Please refer to the separate procedure notes for additional details  Workstation performed: [id number]     CT cervical spine without contrast    Result Date: 5/11/2022  Impression: Loosening of the bilateral C7 screws noted There is a fracture of the right lateral mass C2 screw Findings concur with the preliminary report by the referring clinician already in PACS and/or our electronic record EPIC    Workstation performed: [id number]     MRI cervical spine with and without contrast    Result Date: 5/11/2022  Impression: 1  New focus of cord compression at C6-C7 secondary to a new disc herniation with cord signal abnormality at this level, worrisome for new cord edema, along the caudal margin of the spinal construct  Spine surgical assessment advised  2   Interval development of cystic myelomalacia and cord atrophy at the C4-5 level in the region of prior cord atrophy which has been ameliorated by decompressive laminectomies C3-C5  3   Posterior fusion hardware C2-C7 noted  I personally discussed this study with Johnny Mo on 5/11/2022 at 8:15 AM  Workstation performed: [id number]       EKG, Pathology, and Other Studies: I have personally reviewed pertinent reports        VTE Pharmacologic Prophylaxis: Sequential compression device (Venodyne)  and Heparin    VTE Mechanical Prophylaxis: sequential compression device

## 2022-05-27 NOTE — PLAN OF CARE
Problem: MOBILITY - ADULT  Goal: Maintain or return to baseline ADL function  Description: INTERVENTIONS:  -  Assess patient's ability to carry out ADLs; assess patient's baseline for ADL function and identify physical deficits which impact ability to perform ADLs (bathing, care of mouth/teeth, toileting, grooming, dressing, etc )  - Assess/evaluate cause of self-care deficits   - Assess range of motion  - Assess patient's mobility; develop plan if impaired  - Assess patient's need for assistive devices and provide as appropriate  - Encourage maximum independence but intervene and supervise when necessary  - Involve family in performance of ADLs  - Assess for home care needs following discharge   - Consider OT consult to assist with ADL evaluation and planning for discharge  - Provide patient education as appropriate  Outcome: Progressing  Goal: Maintains/Returns to pre admission functional level  Description: INTERVENTIONS:  - Perform BMAT or MOVE assessment daily    - Set and communicate daily mobility goal to care team and patient/family/caregiver  - Collaborate with rehabilitation services on mobility goals if consulted  - Perform Range of Motion 3 times a day  - Reposition patient every 2 hours    - Dangle patient 3 times a day  - Stand patient 3 times a day  - Ambulate patient 3 times a day  - Out of bed to chair 3 times a day   - Out of bed for meals 3 times a day  - Out of bed for toileting  - Record patient progress and toleration of activity level   Outcome: Progressing     Problem: PAIN - ADULT  Goal: Verbalizes/displays adequate comfort level or baseline comfort level  Description: Interventions:  - Encourage patient to monitor pain and request assistance  - Assess pain using appropriate pain scale  - Administer analgesics based on type and severity of pain and evaluate response  - Implement non-pharmacological measures as appropriate and evaluate response  - Consider cultural and social influences on pain and pain management  - Notify physician/advanced practitioner if interventions unsuccessful or patient reports new pain  Outcome: Progressing     Problem: SAFETY ADULT  Goal: Maintain or return to baseline ADL function  Description: INTERVENTIONS:  -  Assess patient's ability to carry out ADLs; assess patient's baseline for ADL function and identify physical deficits which impact ability to perform ADLs (bathing, care of mouth/teeth, toileting, grooming, dressing, etc )  - Assess/evaluate cause of self-care deficits   - Assess range of motion  - Assess patient's mobility; develop plan if impaired  - Assess patient's need for assistive devices and provide as appropriate  - Encourage maximum independence but intervene and supervise when necessary  - Involve family in performance of ADLs  - Assess for home care needs following discharge   - Consider OT consult to assist with ADL evaluation and planning for discharge  - Provide patient education as appropriate  Outcome: Progressing  Goal: Maintains/Returns to pre admission functional level  Description: INTERVENTIONS:  - Perform BMAT or MOVE assessment daily    - Set and communicate daily mobility goal to care team and patient/family/caregiver  - Collaborate with rehabilitation services on mobility goals if consulted  - Perform Range of Motion 3 times a day  - Reposition patient every 2 hours    - Dangle patient 3 times a day  - Stand patient 3 times a day  - Ambulate patient 3 times a day  - Out of bed to chair 3 times a day   - Out of bed for meals 3 times a day  - Out of bed for toileting  - Record patient progress and toleration of activity level   Outcome: Progressing  Goal: Patient will remain free of falls  Description: INTERVENTIONS:  - Educate patient/family on patient safety including physical limitations  - Instruct patient to call for assistance with activity   - Consult OT/PT to assist with strengthening/mobility   - Keep Call bell within reach  - Keep bed low and locked with side rails adjusted as appropriate  - Keep care items and personal belongings within reach  - Initiate and maintain comfort rounds  - Make Fall Risk Sign visible to staff  - Offer Toileting every 2 Hours, in advance of need  - Initiate/Maintain alarm  - Obtain necessary fall risk management equipment  - Apply yellow socks and bracelet for high fall risk patients  - Consider moving patient to room near nurses station  Outcome: Progressing     Problem: DISCHARGE PLANNING  Goal: Discharge to home or other facility with appropriate resources  Description: INTERVENTIONS:  - Identify barriers to discharge w/patient and caregiver  - Arrange for needed discharge resources and transportation as appropriate  - Identify discharge learning needs (meds, wound care, etc )  - Arrange for interpretive services to assist at discharge as needed  - Refer to Case Management Department for coordinating discharge planning if the patient needs post-hospital services based on physician/advanced practitioner order or complex needs related to functional status, cognitive ability, or social support system  Outcome: Progressing     Problem: Knowledge Deficit  Goal: Patient/family/caregiver demonstrates understanding of disease process, treatment plan, medications, and discharge instructions  Description: Complete learning assessment and assess knowledge base    Interventions:  - Provide teaching at level of understanding  - Provide teaching via preferred learning methods  Outcome: Progressing     Problem: Potential for Falls  Goal: Patient will remain free of falls  Description: INTERVENTIONS:  - Educate patient/family on patient safety including physical limitations  - Instruct patient to call for assistance with activity   - Consult OT/PT to assist with strengthening/mobility   - Keep Call bell within reach  - Keep bed low and locked with side rails adjusted as appropriate  - Keep care items and personal belongings within reach  - Initiate and maintain comfort rounds  - Make Fall Risk Sign visible to staff  - Offer Toileting every 2 Hours, in advance of need  - Initiate/Maintain alarm  - Obtain necessary fall risk management equipment  - Apply yellow socks and bracelet for high fall risk patients  - Consider moving patient to room near nurses station  Outcome: Progressing     Problem: Nutrition/Hydration-ADULT  Goal: Nutrient/Hydration intake appropriate for improving, restoring or maintaining nutritional needs  Description: Monitor and assess patient's nutrition/hydration status for malnutrition  Collaborate with interdisciplinary team and initiate plan and interventions as ordered  Monitor patient's weight and dietary intake as ordered or per policy  Utilize nutrition screening tool and intervene as necessary  Determine patient's food preferences and provide high-protein, high-caloric foods as appropriate       INTERVENTIONS:  - Monitor oral intake, urinary output, labs, and treatment plans  - Assess nutrition and hydration status and recommend course of action  - Evaluate amount of meals eaten  - Assist patient with eating if necessary   - Allow adequate time for meals  - Recommend/ encourage appropriate diets, oral nutritional supplements, and vitamin/mineral supplements  - Order, calculate, and assess calorie counts as needed  - Recommend, monitor, and adjust tube feedings and TPN/PPN based on assessed needs  - Assess need for intravenous fluids  - Provide specific nutrition/hydration education as appropriate  - Include patient/family/caregiver in decisions related to nutrition  Outcome: Progressing     Problem: Prexisting or High Potential for Compromised Skin Integrity  Goal: Skin integrity is maintained or improved  Description: INTERVENTIONS:  - Identify patients at risk for skin breakdown  - Assess and monitor skin integrity  - Assess and monitor nutrition and hydration status  - Monitor labs   - Assess for incontinence   - Turn and reposition patient  - Assist with mobility/ambulation  - Relieve pressure over bony prominences  - Avoid friction and shearing  - Provide appropriate hygiene as needed including keeping skin clean and dry  - Evaluate need for skin moisturizer/barrier cream  - Collaborate with interdisciplinary team   - Patient/family teaching  - Consider wound care consult   Outcome: Progressing

## 2022-05-28 PROCEDURE — 99024 POSTOP FOLLOW-UP VISIT: CPT | Performed by: PHYSICIAN ASSISTANT

## 2022-05-28 RX ADMIN — SULFAMETHOXAZOLE AND TRIMETHOPRIM 1 TABLET: 800; 160 TABLET ORAL at 21:03

## 2022-05-28 RX ADMIN — BUSPIRONE HYDROCHLORIDE 5 MG: 5 TABLET ORAL at 21:03

## 2022-05-28 RX ADMIN — HEPARIN SODIUM 5000 UNITS: 5000 INJECTION INTRAVENOUS; SUBCUTANEOUS at 14:04

## 2022-05-28 RX ADMIN — BUSPIRONE HYDROCHLORIDE 5 MG: 5 TABLET ORAL at 08:07

## 2022-05-28 RX ADMIN — ATORVASTATIN CALCIUM 40 MG: 40 TABLET, FILM COATED ORAL at 18:13

## 2022-05-28 RX ADMIN — SULFAMETHOXAZOLE AND TRIMETHOPRIM 1 TABLET: 800; 160 TABLET ORAL at 08:06

## 2022-05-28 RX ADMIN — TAPENTADOL HYDROCHLORIDE 75 MG: 75 TABLET, FILM COATED ORAL at 08:23

## 2022-05-28 RX ADMIN — LIDOCAINE 5% 1 PATCH: 700 PATCH TOPICAL at 08:06

## 2022-05-28 RX ADMIN — TAPENTADOL HYDROCHLORIDE 75 MG: 75 TABLET, FILM COATED ORAL at 22:23

## 2022-05-28 RX ADMIN — Medication 3 MG: at 21:00

## 2022-05-28 RX ADMIN — LIDOCAINE 5% 1 PATCH: 700 PATCH TOPICAL at 16:02

## 2022-05-28 RX ADMIN — GABAPENTIN 800 MG: 400 CAPSULE ORAL at 15:47

## 2022-05-28 RX ADMIN — TAPENTADOL HYDROCHLORIDE 75 MG: 75 TABLET, FILM COATED ORAL at 15:47

## 2022-05-28 RX ADMIN — ACETAMINOPHEN 975 MG: 325 TABLET, FILM COATED ORAL at 14:04

## 2022-05-28 RX ADMIN — NORTRIPTYLINE HYDROCHLORIDE 10 MG: 10 CAPSULE ORAL at 21:04

## 2022-05-28 RX ADMIN — GABAPENTIN 800 MG: 400 CAPSULE ORAL at 21:03

## 2022-05-28 RX ADMIN — BACLOFEN 15 MG: 10 TABLET ORAL at 08:07

## 2022-05-28 RX ADMIN — TAMSULOSIN HYDROCHLORIDE 0.4 MG: 0.4 CAPSULE ORAL at 18:13

## 2022-05-28 RX ADMIN — BACLOFEN 15 MG: 10 TABLET ORAL at 15:47

## 2022-05-28 RX ADMIN — GABAPENTIN 800 MG: 400 CAPSULE ORAL at 08:06

## 2022-05-28 RX ADMIN — HEPARIN SODIUM 5000 UNITS: 5000 INJECTION INTRAVENOUS; SUBCUTANEOUS at 21:03

## 2022-05-28 RX ADMIN — ACETAMINOPHEN 975 MG: 325 TABLET, FILM COATED ORAL at 21:03

## 2022-05-28 RX ADMIN — BACLOFEN 15 MG: 10 TABLET ORAL at 21:03

## 2022-05-28 NOTE — CASE MANAGEMENT
Case Management Discharge Planning Note    Patient name Danielle Donohue  [facility] [id number]  : 1985 Date 2022       Current Admission Date: 2022  Current Admission Diagnosis:Cord compression myelopathy [facility])   Patient Active Problem List    Diagnosis Date Noted    Leukocytosis 2022    Cord compression myelopathy ([facility] ) 2022    Lower urinary tract symptoms (LUTS) 2022    Encounter to discuss test results 2022    S/P orchiectomy 2021    Acute low back pain 2021    Hypokalemia 2021    Benign essential HTN 2021    HLD (hyperlipidemia) 2021    Testicular discomfort 2021    Intractable hiccups 2021    Depression 2021    Urinary retention 2021    Closed fracture of fifth cervical vertebra ([facility] ) 03/10/2021    Spinal cord injury, C1-C7 ([facility] ) 03/10/2021    Fall 03/10/2021    Pain 03/10/2021    Central cord syndrome ([facility] ) 2021    Pulmonary insufficiency 2021      LOS (days): 17  Geometric Mean LOS (GMLOS) (days): 3 20  Days to GMLOS:-13 9     OBJECTIVE:  Risk of Unplanned Readmission Score: 10 65         Current admission status: Inpatient   Preferred Pharmacy:   [address] [place] [id number]  Phone: [phone] Fax: [address] [facility], [place], [address] [facility] [address] [address] [address] [address]  Phone: [phone] Fax: [phone]    Primary Care Provider: Ford Lopez MD    Primary Insurance: [employer] Castro  Secondary Insurance:     DISCHARGE DETAILS:    Other Referral/Resources/Interventions Provided:  Interventions: Acute Rehab  Referral Comments: TC from Zofia rogers/ [facility], informing insurance [employer] [place] remains pending at time   CM informed since insurance [employer] [place] was not recieved by 11:30AM today, patient will have to admit on 22 pending auth approval  [facility] transport cancelled via [address]  CM team will continue to follow  Update on same provided to patient          Discharge Destination Plan[de-identified] Acute Rehab  Transport at Discharge : [facility]

## 2022-05-28 NOTE — ASSESSMENT & PLAN NOTE
POD 16 ACDF C6-7; Revision PCDF, revise/replace right C2 screw with laminar screw, revise left C2 screw (cap), remove bilateral C7 lateral mass screws, place navigated C7, T1 pedicle screws, C6-7 laminectomy, arthrodesis C2-3 and C6-T1 (5/12/22 [place])  · Presented as direct admission after outpatient MRI demonstrate cord compression requiring surgical intervention  · s/p severe central spinal cord injury, Jania B spinal cord injury March 2021 after falling down stairs status post posterior cervical decompression fixation and fusion C2-C7 3/7/2021 Dr Clifford Schulz  · Was seen in office for for one year postoperative follow-up with complaints of decline in function, new urinary difficulties and c/o increased neck pain, with intermittent radicular symptoms and clicking noise with head movement for which MRI was ordered demonstrating new C6-7 disc herniation causing cord compression and edema as well as hardware failure  Imaging:   · Xray cervical spine, 5/14/22: Postsurgical changes of anterior cervical discectomy and fusion at C6-C7 and revision posterior fusion spanning C2-T1  Plan:  · Continue monitor neurological status for any new or progressive symptoms   · SARANYA removed 5/16/22  · Posterior staples discontinued and drain suture 5/25/22  · Continue collar at all times except for showering changes Kaylah collar  · Patient aware plan for collar anticipated six weeks  · Pain control - APS Input appreciated  · Tylenol 975 mg every 8 hours scheduled  · Continues on baclofen 10 mg p o  T i d  - will discuss any consideration for increase of dose - increase to 15mg TID  · Nucynta every 6 hours as needed, 50 mg for moderate pain and 75 mg for severe pain  · Completed medrol dose pack  · Mobilize PT/OT - recommending acute rehab  · PM&R consult appreciated, recommend SCI rehab  · DVT PPX: SCDs  HSQ  · Dispo - d/w CM - recommend acute [facility] rehab  [facility]    Awaiting insurance authorization  Neurosurgery will continue to follow as primary  Patient is medically stable for discharge at this time  Call with questions/concerns

## 2022-05-28 NOTE — PROGRESS NOTES
[facility]  Progress Note Boris Arrant 1985, 43 y o  male MRN: [id number]  Unit/Bed#: [place] 623-01 Encounter: [id number]  Primary Care Provider: Suhda Pope MD   Date and time admitted to hospital: 5/11/2022  9:16 AM    * Cord compression myelopathy (HCC)  Assessment & Plan  POD 17 ACDF C6-7; Revision PCDF, revise/replace right C2 screw with laminar screw, revise left C2 screw (cap), remove bilateral C7 lateral mass screws, place navigated C7, T1 pedicle screws, C6-7 laminectomy, arthrodesis C2-3 and C6-T1 (5/12/22 [place])  · Presented as direct admission after outpatient MRI demonstrate cord compression requiring surgical intervention  · s/p severe central spinal cord injury, Jania B spinal cord injury March 2021 after falling down stairs status post posterior cervical decompression fixation and fusion C2-C7 3/7/2021 Dr Marvel Alfonso  · Was seen in office for for one year postoperative follow-up with complaints of decline in function, new urinary difficulties and c/o increased neck pain, with intermittent radicular symptoms and clicking noise with head movement for which MRI was ordered demonstrating new C6-7 disc herniation causing cord compression and edema as well as hardware failure  Imaging:   · Xray cervical spine, 5/14/22: Postsurgical changes of anterior cervical discectomy and fusion at C6-C7 and revision posterior fusion spanning C2-T1  Plan:  · Continue monitor neurological status for any new or progressive symptoms   · SARANYA removed 5/16/22  · Posterior staples discontinued and drain suture 5/25/22  · Continue collar at all times except for showering changes Kaylah collar  · Patient aware plan for collar anticipated six weeks  · Pain control - APS Input appreciated    · Tylenol 975 mg every 8 hours scheduled  · Continues on baclofen 10 mg p o  T i d  - will discuss any consideration for increase of dose - increase to 15mg TID  · Nucynta every 6 hours as needed, 50 mg for moderate pain and 75 mg for severe pain  · Completed medrol dose pack  · Mobilize PT/OT - recommending acute rehab  · PM&R consult appreciated, recommend SCI rehab  · DVT PPX: SCDs  HSQ  Dispo - d/w CM - recommend acute SCI rehab  [facility] rehab now accepting  Awaiting insurance authorization  Neurosurgery will continue to follow as primary  Patient is medically stable for discharge at this time  Call with questions/concerns  Subjective/Objective     Patient seen Lona Fuchs in his bed with television on  Patient reports no changes in his symptoms  He does mention that in the last two days he has point tender pain along the paraspinal musculature of his thoracic spine distal to his incision  Explained that this is most likely muscular pain due to the bed  I/O       05/26 0701 05/27 0700 05/27 0701 05/28 0700 05/28 0701 05/29 0700    P  O  540      Total Intake(mL/kg) 540 (6 4)      Urine (mL/kg/hr) 300 (0 1) 550 (0 3)     Total Output 300 550     Net +240 -550            Unmeasured Urine Occurrence 1 x            Invasive Devices  Report    None                 Physical Exam:  Vitals: Blood pressure 121/80, pulse 97, temperature 98 5 °F (36 9 °C), resp  rate 17, height 5' 7" (1 702 m), weight 84 4 kg (186 lb 1 1 oz), SpO2 92 %  ,Body mass index is 29 14 kg/m²        General appearance: alert, appears stated age, cooperative and no distress  Head: Normocephalic, without obvious abnormality, atraumatic  Eyes: EOMI, PERRL  Neck: supple, symmetrical, trachea midline and NT  Back: no kyphosis present, no tenderness to percussion or palpation  Lungs: non labored breathing  Heart: regular heart rate  Neurologic:   Mental status: Alert, awake, oriented x 3, thought content appropriate  Cranial nerves: grossly intact (Cranial nerves II-XII)  Sensory: decreased sensation in arms, decreased R T8 region, L T7 region, patchy BLE  Motor: Bilateral upper extremities 3/5,  2/5, proximal lower extremities 4/5, distal lower extremities 1/5 on the right; distal lower extremities 4/5 on the left        Lab Results:  Results from last 7 days   Lab Units 05/24/22  0538   WBC Thousand/uL 14 23*   HEMOGLOBIN g/dL 12 3   HEMATOCRIT % 39 0   PLATELETS Thousands/uL 519*   NEUTROS PCT % 58   MONOS PCT % 9     Results from last 7 days   Lab Units 05/24/22  0538   POTASSIUM mmol/L 3 7   CHLORIDE mmol/L 103   CO2 mmol/L 33*   BUN mg/dL 19   CREATININE mg/dL 0 99   CALCIUM mg/dL 10 3*                 No results found for: TROPONINT  ABG:  Lab Results   Component Value Date    PHART 7 441 03/07/2021    BLS9ZPT 36 7 03/07/2021    PO2ART 162 7 (H) 03/07/2021    OFK7FCS 24 4 03/07/2021    BEART 0 6 03/07/2021    SOURCE Line, Arterial 03/07/2021       Imaging Studies: I have personally reviewed pertinent reports  and I have personally reviewed pertinent films in PACS    XR cervical spine 2 or 3 views    Result Date: 5/15/2022  Impression: Postsurgical changes of anterior cervical discectomy and fusion at C6-C7 and revision posterior fusion spanning C2-T1  Workstation performed: [id number]     XR spine cervical 2 or 3 vw injury    Result Date: 5/12/2022  Impression: Fluoroscopic guidance provided for intraoperative localization during cervical fusion procedure  Please refer to the separate procedure notes for additional details  Workstation performed: [id number]     CT cervical spine without contrast    Result Date: 5/11/2022  Impression: Loosening of the bilateral C7 screws noted There is a fracture of the right lateral mass C2 screw Findings concur with the preliminary report by the referring clinician already in PACS and/or our electronic record EPIC  Workstation performed: [id number]     MRI cervical spine with and without contrast    Result Date: 5/11/2022  Impression: 1    New focus of cord compression at C6-C7 secondary to a new disc herniation with cord signal abnormality at this level, worrisome for new cord edema, along the caudal margin of the spinal construct  Spine surgical assessment advised  2   Interval development of cystic myelomalacia and cord atrophy at the C4-5 level in the region of prior cord atrophy which has been ameliorated by decompressive laminectomies C3-C5  3   Posterior fusion hardware C2-C7 noted  I personally discussed this study with Monster Dunlap on 5/11/2022 at 8:15 AM  Workstation performed: [id number]       EKG, Pathology, and Other Studies: I have personally reviewed pertinent reports        VTE Pharmacologic Prophylaxis: Sequential compression device (Venodyne)  and Heparin    VTE Mechanical Prophylaxis: sequential compression device

## 2022-05-29 PROCEDURE — 99024 POSTOP FOLLOW-UP VISIT: CPT | Performed by: PHYSICIAN ASSISTANT

## 2022-05-29 RX ADMIN — TAPENTADOL HYDROCHLORIDE 75 MG: 75 TABLET, FILM COATED ORAL at 21:32

## 2022-05-29 RX ADMIN — TAPENTADOL HYDROCHLORIDE 75 MG: 75 TABLET, FILM COATED ORAL at 09:23

## 2022-05-29 RX ADMIN — TAPENTADOL HYDROCHLORIDE 75 MG: 75 TABLET, FILM COATED ORAL at 16:01

## 2022-05-29 RX ADMIN — GABAPENTIN 800 MG: 400 CAPSULE ORAL at 09:12

## 2022-05-29 RX ADMIN — ATORVASTATIN CALCIUM 40 MG: 40 TABLET, FILM COATED ORAL at 17:33

## 2022-05-29 RX ADMIN — ACETAMINOPHEN 975 MG: 325 TABLET, FILM COATED ORAL at 21:33

## 2022-05-29 RX ADMIN — HEPARIN SODIUM 5000 UNITS: 5000 INJECTION INTRAVENOUS; SUBCUTANEOUS at 15:24

## 2022-05-29 RX ADMIN — Medication 3 MG: at 21:33

## 2022-05-29 RX ADMIN — ACETAMINOPHEN 975 MG: 325 TABLET, FILM COATED ORAL at 15:24

## 2022-05-29 RX ADMIN — HEPARIN SODIUM 5000 UNITS: 5000 INJECTION INTRAVENOUS; SUBCUTANEOUS at 21:33

## 2022-05-29 RX ADMIN — BUSPIRONE HYDROCHLORIDE 5 MG: 5 TABLET ORAL at 09:12

## 2022-05-29 RX ADMIN — GABAPENTIN 800 MG: 400 CAPSULE ORAL at 21:33

## 2022-05-29 RX ADMIN — BACLOFEN 15 MG: 10 TABLET ORAL at 21:33

## 2022-05-29 RX ADMIN — LIDOCAINE 5% 1 PATCH: 700 PATCH TOPICAL at 09:12

## 2022-05-29 RX ADMIN — GABAPENTIN 800 MG: 400 CAPSULE ORAL at 15:24

## 2022-05-29 RX ADMIN — BACLOFEN 15 MG: 10 TABLET ORAL at 15:24

## 2022-05-29 RX ADMIN — BUSPIRONE HYDROCHLORIDE 5 MG: 5 TABLET ORAL at 21:33

## 2022-05-29 RX ADMIN — TAMSULOSIN HYDROCHLORIDE 0.4 MG: 0.4 CAPSULE ORAL at 17:32

## 2022-05-29 RX ADMIN — BACLOFEN 15 MG: 10 TABLET ORAL at 09:12

## 2022-05-29 RX ADMIN — NORTRIPTYLINE HYDROCHLORIDE 10 MG: 10 CAPSULE ORAL at 21:34

## 2022-05-29 NOTE — ASSESSMENT & PLAN NOTE
POD 17 ACDF C6-7; Revision PCDF, revise/replace right C2 screw with laminar screw, revise left C2 screw (cap), remove bilateral C7 lateral mass screws, place navigated C7, T1 pedicle screws, C6-7 laminectomy, arthrodesis C2-3 and C6-T1 (5/12/22 [place])  · Presented as direct admission after outpatient MRI demonstrate cord compression requiring surgical intervention  · s/p severe central spinal cord injury, Jania B spinal cord injury March 2021 after falling down stairs status post posterior cervical decompression fixation and fusion C2-C7 3/7/2021 Dr Dwayne Washburn  · Was seen in office for for one year postoperative follow-up with complaints of decline in function, new urinary difficulties and c/o increased neck pain, with intermittent radicular symptoms and clicking noise with head movement for which MRI was ordered demonstrating new C6-7 disc herniation causing cord compression and edema as well as hardware failure  Imaging:   · Xray cervical spine, 5/14/22: Postsurgical changes of anterior cervical discectomy and fusion at C6-C7 and revision posterior fusion spanning C2-T1  Plan:  · Continue monitor neurological status for any new or progressive symptoms   · SARANYA removed 5/16/22  · Posterior staples discontinued and drain suture 5/25/22  · Continue collar at all times except for showering changes Kaylah collar  · Patient aware plan for collar anticipated six weeks  · Pain control - APS Input appreciated  · Tylenol 975 mg every 8 hours scheduled  · Continues on baclofen 10 mg p o  T i d  - will discuss any consideration for increase of dose - increase to 15mg TID  · Nucynta every 6 hours as needed, 50 mg for moderate pain and 75 mg for severe pain  · Completed medrol dose pack  · Mobilize PT/OT - recommending acute rehab  · PM&R consult appreciated, recommend [facility] rehab  · DVT PPX: SCDs  HSQ  · Dispo - d/w CM - recommend acute SCI rehab  [facility] now accepting    Awaiting insurance authorization on Tuesday  Neurosurgery will continue to follow as primary  Patient is medically stable for discharge at this time  Call with questions/concerns

## 2022-05-29 NOTE — PROGRESS NOTES
[address] [facility]  Progress Note Lalo Bourne 1985, 43 y o  male MRN: [id number]  Unit/Bed#: [facility] 623-01 Encounter: [id number]  Primary Care Provider: Leah Lancaster MD   Date and time admitted to hospital: 5/11/2022  9:16 AM    * Cord compression myelopathy (HCC)  Assessment & Plan  POD 17 ACDF C6-7; Revision PCDF, revise/replace right C2 screw with laminar screw, revise left C2 screw (cap), remove bilateral C7 lateral mass screws, place navigated C7, T1 pedicle screws, C6-7 laminectomy, arthrodesis C2-3 and C6-T1 (5/12/22 [place])  · Presented as direct admission after outpatient MRI demonstrate cord compression requiring surgical intervention  · s/p severe central spinal cord injury, Jania B spinal cord injury March 2021 after falling down stairs status post posterior cervical decompression fixation and fusion C2-C7 3/7/2021 Dr Doug Ballesteros  · Was seen in office for for one year postoperative follow-up with complaints of decline in function, new urinary difficulties and c/o increased neck pain, with intermittent radicular symptoms and clicking noise with head movement for which MRI was ordered demonstrating new C6-7 disc herniation causing cord compression and edema as well as hardware failure  Imaging:   · Xray cervical spine, 5/14/22: Postsurgical changes of anterior cervical discectomy and fusion at C6-C7 and revision posterior fusion spanning C2-T1  Plan:  · Continue monitor neurological status for any new or progressive symptoms   · SARANYA removed 5/16/22  · Posterior staples discontinued and drain suture 5/25/22  · Continue collar at all times except for showering changes Kaylah collar  · Patient aware plan for collar anticipated six weeks  · Pain control - APS Input appreciated    · Tylenol 975 mg every 8 hours scheduled  · Continues on baclofen 10 mg p o  T i d  - will discuss any consideration for increase of dose - increase to 15mg TID  · Nucynta every 6 hours as needed, 50 mg for moderate pain and 75 mg for severe pain  · Completed medrol dose pack  · Mobilize PT/OT - recommending acute rehab  · PM&R consult appreciated, recommend SCI rehab  · DVT PPX: SCDs  HSQ  · Dispo - d/w CM - recommend acute SCI rehab  [facility] re[facility] now accepting  Awaiting insurance authorization  · Encourage use of IS  Neurosurgery will continue to follow as primary  Patient is medically stable for discharge at this time  Call with questions/concerns  Plan of care discussed with Kai Brink RN  Subjective/Objective     Patient seen laying in bed  Reports no new changes in his symptoms  I/O       05/27 0701 05/28 0700 05/28 0701 05/29 0700 05/29 0701 05/30 0700    P  O   705     Total Intake(mL/kg)  705 (8 4)     Urine (mL/kg/hr) 550 (0 3) 700 (0 3)     Total Output 550 700     Net -550 +5                  Invasive Devices  Report    None                 Physical Exam:  Vitals: Blood pressure 121/80, pulse 97, temperature 98 5 °F (36 9 °C), resp  rate 17, height 5' 7" (1 702 m), weight 84 4 kg (186 lb 1 1 oz), SpO2 92 %  ,Body mass index is 29 14 kg/m²          General appearance: alert, appears stated age, cooperative and no distress  Head: Normocephalic, without obvious abnormality, atraumatic  Eyes: EOMI, PERRL  Neck: supple, symmetrical, trachea midline and NT  Back: no kyphosis present, no tenderness to percussion or palpation  Lungs: non labored breathing  Heart: regular heart rate  Neurologic:   Mental status: Alert, awake, oriented x 3, thought content appropriate  Cranial nerves: grossly intact (Cranial nerves II-XII)  Sensory: decreased sensation in arms, decreased R T8 region, L T7 region, patchy BLE  Motor: Bilateral upper extremities 3/5,  2/5, proximal lower extremities 4/5, distal lower extremities 1/5 on the right; distal lower extremities 4/5 on the left    Lab Results:  Results from last 7 days   Lab Units 05/24/22  0538   WBC Thousand/uL 14 23* HEMOGLOBIN g/dL 12 3   HEMATOCRIT % 39 0   PLATELETS Thousands/uL 519*   NEUTROS PCT % 58   MONOS PCT % 9     Results from last 7 days   Lab Units 05/24/22  0538   POTASSIUM mmol/L 3 7   CHLORIDE mmol/L 103   CO2 mmol/L 33*   BUN mg/dL 19   CREATININE mg/dL 0 99   CALCIUM mg/dL 10 3*                 No results found for: TROPONINT  ABG:  Lab Results   Component Value Date    PHART 7 441 03/07/2021    JIR5SOS 36 7 03/07/2021    PO2ART 162 7 (H) 03/07/2021    UGV9YMQ 24 4 03/07/2021    BEART 0 6 03/07/2021    SOURCE Line, Arterial 03/07/2021       Imaging Studies: I have personally reviewed pertinent reports  and I have personally reviewed pertinent films in PACS    XR cervical spine 2 or 3 views    Result Date: 5/15/2022  Impression: Postsurgical changes of anterior cervical discectomy and fusion at C6-C7 and revision posterior fusion spanning C2-T1  Workstation performed: [id number]     XR spine cervical 2 or 3 vw injury    Result Date: 5/12/2022  Impression: Fluoroscopic guidance provided for intraoperative localization during cervical fusion procedure  Please refer to the separate procedure notes for additional details  Workstation performed: [id number]     CT cervical spine without contrast    Result Date: 5/11/2022  Impression: Loosening of the bilateral C7 screws noted There is a fracture of the right lateral mass C2 screw Findings concur with the preliminary report by the referring clinician already in PACS and/or our electronic record EPIC  Workstation performed: [id number]     MRI cervical spine with and without contrast    Result Date: 5/11/2022  Impression: 1  New focus of cord compression at C6-C7 secondary to a new disc herniation with cord signal abnormality at this level, worrisome for new cord edema, along the caudal margin of the spinal construct  Spine surgical assessment advised   2   Interval development of cystic myelomalacia and cord atrophy at the C4-5 level in the region of prior cord atrophy which has been ameliorated by decompressive laminectomies C3-C5  3   Posterior fusion hardware C2-C7 noted  I personally discussed this study with Bernabe Sena on 5/11/2022 at 8:15 AM  Workstation performed: [id number]       EKG, Pathology, and Other Studies: I have personally reviewed pertinent reports        VTE Pharmacologic Prophylaxis: Sequential compression device (Venodyne)  and Heparin    VTE Mechanical Prophylaxis: sequential compression device

## 2022-05-29 NOTE — ASSESSMENT & PLAN NOTE
POD 18 ACDF C6-7; Revision PCDF, revise/replace right C2 screw with laminar screw, revise left C2 screw (cap), remove bilateral C7 lateral mass screws, place navigated C7, T1 pedicle screws, C6-7 laminectomy, arthrodesis C2-3 and C6-T1 (5/12/22 [place])  · Presented as direct admission after outpatient MRI demonstrate cord compression requiring surgical intervention  · s/p severe central spinal cord injury, Jania B spinal cord injury March 2021 after falling down stairs status post posterior cervical decompression fixation and fusion C2-C7 3/7/2021 Dr Maira Oro  · Was seen in office for for one year postoperative follow-up with complaints of decline in function, new urinary difficulties and c/o increased neck pain, with intermittent radicular symptoms and clicking noise with head movement for which MRI was ordered demonstrating new C6-7 disc herniation causing cord compression and edema as well as hardware failure  Imaging:   · Xray cervical spine, 5/14/22: Postsurgical changes of anterior cervical discectomy and fusion at C6-C7 and revision posterior fusion spanning C2-T1  Plan:  · Continue monitor neurological status for any new or progressive symptoms   · SARANYA removed 5/16/22  · Posterior staples discontinued and drain suture 5/25/22  · Continue collar at all times except for showering changes Kaylah collar  · Patient aware plan for collar anticipated six weeks  · Pain control - APS Input appreciated  · Tylenol 975 mg every 8 hours scheduled  · Continues on baclofen 10 mg p o  T i d  - will discuss any consideration for increase of dose - increase to 15mg TID  · Nucynta every 6 hours as needed, 50 mg for moderate pain and 75 mg for severe pain  · Completed medrol dose pack  · Mobilize PT/OT - recommending acute rehab  · PM&R consult appreciated, recommend SCI rehab  · DVT PPX: SCDs  HSQ  · Dispo - d/w CM - recommend acute SCI rehab  [facility] now accepting    Awaiting insurance authorization  · Encourage use of IS  Neurosurgery will continue to follow as primary  Patient is medically stable for discharge at this time  Call with questions/concerns  Plan of care discussed with Leny Ellsworth RN

## 2022-05-30 PROCEDURE — 97112 NEUROMUSCULAR REEDUCATION: CPT

## 2022-05-30 PROCEDURE — 99024 POSTOP FOLLOW-UP VISIT: CPT | Performed by: PHYSICIAN ASSISTANT

## 2022-05-30 PROCEDURE — 97116 GAIT TRAINING THERAPY: CPT

## 2022-05-30 RX ADMIN — BACLOFEN 15 MG: 10 TABLET ORAL at 21:15

## 2022-05-30 RX ADMIN — GABAPENTIN 800 MG: 400 CAPSULE ORAL at 16:05

## 2022-05-30 RX ADMIN — ATORVASTATIN CALCIUM 40 MG: 40 TABLET, FILM COATED ORAL at 17:06

## 2022-05-30 RX ADMIN — TAPENTADOL HYDROCHLORIDE 75 MG: 75 TABLET, FILM COATED ORAL at 22:32

## 2022-05-30 RX ADMIN — BACLOFEN 15 MG: 10 TABLET ORAL at 16:05

## 2022-05-30 RX ADMIN — TAPENTADOL HYDROCHLORIDE 50 MG: 50 TABLET, FILM COATED ORAL at 16:05

## 2022-05-30 RX ADMIN — TAPENTADOL HYDROCHLORIDE 75 MG: 75 TABLET, FILM COATED ORAL at 08:52

## 2022-05-30 RX ADMIN — BUSPIRONE HYDROCHLORIDE 5 MG: 5 TABLET ORAL at 21:15

## 2022-05-30 RX ADMIN — ACETAMINOPHEN 975 MG: 325 TABLET, FILM COATED ORAL at 21:15

## 2022-05-30 RX ADMIN — TAMSULOSIN HYDROCHLORIDE 0.4 MG: 0.4 CAPSULE ORAL at 17:06

## 2022-05-30 RX ADMIN — LIDOCAINE 5% 2 PATCH: 700 PATCH TOPICAL at 08:49

## 2022-05-30 RX ADMIN — BACLOFEN 15 MG: 10 TABLET ORAL at 08:49

## 2022-05-30 RX ADMIN — GABAPENTIN 800 MG: 400 CAPSULE ORAL at 08:48

## 2022-05-30 RX ADMIN — ACETAMINOPHEN 975 MG: 325 TABLET, FILM COATED ORAL at 13:32

## 2022-05-30 RX ADMIN — HEPARIN SODIUM 5000 UNITS: 5000 INJECTION INTRAVENOUS; SUBCUTANEOUS at 21:16

## 2022-05-30 RX ADMIN — Medication 3 MG: at 21:20

## 2022-05-30 RX ADMIN — BUSPIRONE HYDROCHLORIDE 5 MG: 5 TABLET ORAL at 08:48

## 2022-05-30 RX ADMIN — GABAPENTIN 800 MG: 400 CAPSULE ORAL at 21:15

## 2022-05-30 RX ADMIN — NORTRIPTYLINE HYDROCHLORIDE 10 MG: 10 CAPSULE ORAL at 21:20

## 2022-05-30 RX ADMIN — HEPARIN SODIUM 5000 UNITS: 5000 INJECTION INTRAVENOUS; SUBCUTANEOUS at 13:33

## 2022-05-30 NOTE — PLAN OF CARE
Problem: MOBILITY - ADULT  Goal: Maintain or return to baseline ADL function  Description: INTERVENTIONS:  -  Assess patient's ability to carry out ADLs; assess patient's baseline for ADL function and identify physical deficits which impact ability to perform ADLs (bathing, care of mouth/teeth, toileting, grooming, dressing, etc )  - Assess/evaluate cause of self-care deficits   - Assess range of motion  - Assess patient's mobility; develop plan if impaired  - Assess patient's need for assistive devices and provide as appropriate  - Encourage maximum independence but intervene and supervise when necessary  - Involve family in performance of ADLs  - Assess for home care needs following discharge   - Consider OT consult to assist with ADL evaluation and planning for discharge  - Provide patient education as appropriate  Outcome: Progressing  Goal: Maintains/Returns to pre admission functional level  Description: INTERVENTIONS:  - Perform BMAT or MOVE assessment daily    - Set and communicate daily mobility goal to care team and patient/family/caregiver  - Collaborate with rehabilitation services on mobility goals if consulted  - Perform Range of Motion 3 times a day  - Reposition patient every 2 hours    - Dangle patient 3 times a day  - Stand patient 3 times a day  - Ambulate patient 3 times a day  - Out of bed to chair 3 times a day   - Out of bed for meals 3 times a day  - Out of bed for toileting  - Record patient progress and toleration of activity level   Outcome: Progressing     Problem: PAIN - ADULT  Goal: Verbalizes/displays adequate comfort level or baseline comfort level  Description: Interventions:  - Encourage patient to monitor pain and request assistance  - Assess pain using appropriate pain scale  - Administer analgesics based on type and severity of pain and evaluate response  - Implement non-pharmacological measures as appropriate and evaluate response  - Consider cultural and social influences on pain and pain management  - Notify physician/advanced practitioner if interventions unsuccessful or patient reports new pain  Outcome: Progressing     Problem: SAFETY ADULT  Goal: Maintain or return to baseline ADL function  Description: INTERVENTIONS:  -  Assess patient's ability to carry out ADLs; assess patient's baseline for ADL function and identify physical deficits which impact ability to perform ADLs (bathing, care of mouth/teeth, toileting, grooming, dressing, etc )  - Assess/evaluate cause of self-care deficits   - Assess range of motion  - Assess patient's mobility; develop plan if impaired  - Assess patient's need for assistive devices and provide as appropriate  - Encourage maximum independence but intervene and supervise when necessary  - Involve family in performance of ADLs  - Assess for home care needs following discharge   - Consider OT consult to assist with ADL evaluation and planning for discharge  - Provide patient education as appropriate  Outcome: Progressing  Goal: Maintains/Returns to pre admission functional level  Description: INTERVENTIONS:  - Perform BMAT or MOVE assessment daily    - Set and communicate daily mobility goal to care team and patient/family/caregiver  - Collaborate with rehabilitation services on mobility goals if consulted  - Perform Range of Motion 3 times a day  - Reposition patient every 2 hours    - Dangle patient 3 times a day  - Stand patient 3 times a day  - Ambulate patient 3 times a day  - Out of bed to chair 3 times a day   - Out of bed for meals 3 times a day  - Out of bed for toileting  - Record patient progress and toleration of activity level   Outcome: Progressing  Goal: Patient will remain free of falls  Description: INTERVENTIONS:  - Educate patient/family on patient safety including physical limitations  - Instruct patient to call for assistance with activity   - Consult OT/PT to assist with strengthening/mobility   - Keep Call bell within reach  - Keep bed low and locked with side rails adjusted as appropriate  - Keep care items and personal belongings within reach  - Initiate and maintain comfort rounds  - Make Fall Risk Sign visible to staff  - Apply yellow socks and bracelet for high fall risk patients  - Consider moving patient to room near nurses station  Outcome: Progressing     Problem: DISCHARGE PLANNING  Goal: Discharge to home or other facility with appropriate resources  Description: INTERVENTIONS:  - Identify barriers to discharge w/patient and caregiver  - Arrange for needed discharge resources and transportation as appropriate  - Identify discharge learning needs (meds, wound care, etc )  - Arrange for interpretive services to assist at discharge as needed  - Refer to Case Management Department for coordinating discharge planning if the patient needs post-hospital services based on physician/advanced practitioner order or complex needs related to functional status, cognitive ability, or social support system  Outcome: Progressing     Problem: Knowledge Deficit  Goal: Patient/family/caregiver demonstrates understanding of disease process, treatment plan, medications, and discharge instructions  Description: Complete learning assessment and assess knowledge base    Interventions:  - Provide teaching at level of understanding  - Provide teaching via preferred learning methods  Outcome: Progressing     Problem: Potential for Falls  Goal: Patient will remain free of falls  Description: INTERVENTIONS:  - Educate patient/family on patient safety including physical limitations  - Instruct patient to call for assistance with activity   - Consult OT/PT to assist with strengthening/mobility   - Keep Call bell within reach  - Keep bed low and locked with side rails adjusted as appropriate  - Keep care items and personal belongings within reach  - Initiate and maintain comfort rounds  - Make Fall Risk Sign visible to staff  - Apply yellow socks and bracelet for high fall risk patients  - Consider moving patient to room near nurses station  Outcome: Progressing     Problem: Nutrition/Hydration-ADULT  Goal: Nutrient/Hydration intake appropriate for improving, restoring or maintaining nutritional needs  Description: Monitor and assess patient's nutrition/hydration status for malnutrition  Collaborate with interdisciplinary team and initiate plan and interventions as ordered  Monitor patient's weight and dietary intake as ordered or per policy  Utilize nutrition screening tool and intervene as necessary  Determine patient's food preferences and provide high-protein, high-caloric foods as appropriate       INTERVENTIONS:  - Monitor oral intake, urinary output, labs, and treatment plans  - Assess nutrition and hydration status and recommend course of action  - Evaluate amount of meals eaten  - Assist patient with eating if necessary   - Allow adequate time for meals  - Recommend/ encourage appropriate diets, oral nutritional supplements, and vitamin/mineral supplements  - Order, calculate, and assess calorie counts as needed  - Recommend, monitor, and adjust tube feedings and TPN/PPN based on assessed needs  - Assess need for intravenous fluids  - Provide specific nutrition/hydration education as appropriate  - Include patient/family/caregiver in decisions related to nutrition  Outcome: Progressing     Problem: Prexisting or High Potential for Compromised Skin Integrity  Goal: Skin integrity is maintained or improved  Description: INTERVENTIONS:  - Identify patients at risk for skin breakdown  - Assess and monitor skin integrity  - Assess and monitor nutrition and hydration status  - Monitor labs   - Assess for incontinence   - Turn and reposition patient  - Assist with mobility/ambulation  - Relieve pressure over bony prominences  - Avoid friction and shearing  - Provide appropriate hygiene as needed including keeping skin clean and dry  - Evaluate need for skin moisturizer/barrier cream  - Collaborate with interdisciplinary team   - Patient/family teaching  - Consider wound care consult   Outcome: Progressing

## 2022-05-30 NOTE — PLAN OF CARE
Problem: PHYSICAL THERAPY ADULT  Goal: Performs mobility at highest level of function for planned discharge setting  See evaluation for individualized goals  Description: Treatment/Interventions: Functional transfer training, LE strengthening/ROM, Therapeutic exercise, Endurance training, Patient/family training, Equipment eval/education, Bed mobility, Gait training, Spoke to nursing, OT  Equipment Recommended: Wheelchair       See flowsheet documentation for full assessment, interventions and recommendations  Outcome: Progressing  Note: Prognosis: Good  Problem List: Decreased strength, Decreased endurance, Impaired balance, Decreased mobility, Decreased safety awareness, Pain, Orthopedic restrictions  Assessment: Pt continues to perform gait tasks with improved balance each session, but continues to demonstrate gait deviations as noted above that contribute to balance deficits when paired with LE strength deficits  Focused on standing balance & ambulatory balance using mirror for pt reference on posture & gait mechanics  Pt demosntrated improved upright posture with focus in mirror, as well as improved gait mechanics as session progressed  Pt demonstrates narrow ANNLEISE & R foot drop that he was able to correct, but not maintain due to LE weakness this session & will benefit from further practice to address in upcoming sesison prior to attempting ambulation without AD  Pt remains below baseline mobilty & will benefit from skilled PT intervention during hospital stay & rehab at d/c to safely progress to highest level of functional independence  Barriers to Discharge: Decreased caregiver support, Inaccessible home environment        PT Discharge Recommendation: Post acute rehabilitation services          See flowsheet documentation for full assessment

## 2022-05-30 NOTE — PHYSICAL THERAPY NOTE
Physical Therapy Progress Note     05/30/22 1436   PT Last Visit   PT Visit Date 05/30/22   Note Type   Note Type Treatment for insurance authorization   Pain Assessment   Pain Assessment Tool 0-10   Pain Score 9   Pain Location/Orientation Location: Shoulder;Orientation: Right   Hospital Pain Intervention(s) Repositioned; Ambulation/increased activity; Rest   Restrictions/Precautions   Braces or Orthoses C/S Collar   Other Precautions Pain; Fall Risk;Spinal precautions   Subjective   Subjective Pt encountered supine in bed, reports no new complaints  Does endorse continued R shoulder pain between scapula & spine  At end of session, pt reports he almost feels ready to attempt ambulation without AD  Transfers   Sit to Stand 4  Minimal assistance   Additional items Assist x 1; Increased time required   Stand to Sit 4  Minimal assistance   Additional items Assist x 1; Increased time required   Ambulation/Elevation   Gait pattern Narrow ANNELISE; Improper Weight shift;Decreased foot clearance; Short stride; Ataxia; Foward flexed; Inconsistent heidy  (R toe drop)   Gait Assistance 3  Moderate assist   Additional items Assist x 1  (min-mod A)   Assistive Device Platform walker;L platform;R platform   Distance 70' x 2 normal hallway walking, 25' x 4 in front of mirror   Balance   Static Sitting Fair +   Static Standing Poor +   Ambulatory Poor   Activity Tolerance   Activity Tolerance Patient tolerated treatment well;Patient limited by fatigue   Nurse [id number], RN   Exercises   Hip Abduction Standing;10 reps;AROM; Bilateral   Hip Extension Standing;10 reps;AROM; Bilateral   Knee AROM Long Arc Quad Standing;10 reps;AROM; Bilateral   Assessment   Prognosis Good   Problem List Decreased strength;Decreased endurance; Impaired balance;Decreased mobility; Decreased safety awareness;Pain;Orthopedic restrictions   Assessment Pt continues to perform gait tasks with improved balance each session, but continues to demonstrate gait deviations as noted above that contribute to balance deficits when paired with LE strength deficits  Focused on standing balance & ambulatory balance using mirror for pt reference on posture & gait mechanics  Pt demosntrated improved upright posture with focus in mirror, as well as improved gait mechanics as session progressed  Pt demonstrates narrow ANNELISE & R foot drop that he was able to correct, but not maintain due to LE weakness this session & will benefit from further practice to address in upcoming sesison prior to attempting ambulation without AD  Pt remains below baseline mobilty & will benefit from skilled PT intervention during hospital stay & rehab at d/c to safely progress to highest level of functional independence  Goals   Patient Goals to get better   STG Expiration Date 06/01/22   PT Treatment Day 8   Plan   Treatment/Interventions Functional transfer training;LE strengthening/ROM; Therapeutic exercise; Endurance training;Patient/family training;Equipment eval/education; Bed mobility;Gait training   Progress Progressing toward goals   PT Frequency Other (Comment)  (3-6x/week)   Recommendation   PT Discharge Recommendation Post acute rehabilitation services   Equipment Recommended Samantha Charles Accessories Platform attachment - right arm;Platform attachment - left arm   AM-PAC Basic Mobility Inpatient   Turning in Bed Without Bedrails 3   Lying on Back to Sitting on Edge of Flat Bed 3   Moving Bed to Chair 2   Standing Up From Chair 3   Walk in Room 2   Climb 3-5 Stairs 1   Basic Mobility Inpatient Raw Score 14   Basic Mobility Standardized Score 35 55   Highest Level Of Mobility   JH-HLM Goal 4: Move to chair/commode   JH-HLM Achieved 7: Walk 25 feet or more     Samara Carlos PTA    An [facility] Basic Mobility Standardized Score of 40 78 suggests pt would benefit from post acute rehab

## 2022-05-30 NOTE — PROGRESS NOTES
[address] [facility]  Progress Note Jessica Wolfe 1985, 43 y o  male MRN: [id number]  Unit/Bed#: [facility] 623-01 Encounter: [id number]  Primary Care Provider: Damien Dexter MD   Date and time admitted to hospital: 5/11/2022  9:16 AM    * Cord compression myelopathy (HCC)  Assessment & Plan  POD 18 ACDF C6-7; Revision PCDF, revise/replace right C2 screw with laminar screw, revise left C2 screw (cap), remove bilateral C7 lateral mass screws, place navigated C7, T1 pedicle screws, C6-7 laminectomy, arthrodesis C2-3 and C6-T1 (5/12/22 [facility])  · Presented as direct admission after outpatient MRI demonstrate cord compression requiring surgical intervention  · s/p severe central spinal cord injury, Jania B spinal cord injury March 2021 after falling down stairs status post posterior cervical decompression fixation and fusion C2-C7 3/7/2021 Dr Stefanie Rubio  · Was seen in office for for one year postoperative follow-up with complaints of decline in function, new urinary difficulties and c/o increased neck pain, with intermittent radicular symptoms and clicking noise with head movement for which MRI was ordered demonstrating new C6-7 disc herniation causing cord compression and edema as well as hardware failure  Imaging:   · Xray cervical spine, 5/14/22: Postsurgical changes of anterior cervical discectomy and fusion at C6-C7 and revision posterior fusion spanning C2-T1  Plan:  · Continue monitor neurological status for any new or progressive symptoms   · SARANYA removed 5/16/22  · Posterior staples discontinued and drain suture 5/25/22  · Continue collar at all times except for showering changes Kaylah collar  · Patient aware plan for collar anticipated six weeks  · Pain control - APS Input appreciated    · Tylenol 975 mg every 8 hours scheduled  · Continues on baclofen 10 mg p o  T i d  - will discuss any consideration for increase of dose - increase to 15mg TID  · Nucynta every 6 hours as needed, 50 mg for moderate pain and 75 mg for severe pain  · Completed medrol dose pack  · Mobilize PT/OT - recommending acute rehab  · PM&R consult appreciated, recommend SCI rehab  · DVT PPX: SCDs  HSQ  · Dispo - d/w CM - recommend acute SCI rehab  [facility] now accepting  Awaiting insurance authorization  · Encourage use of IS  Neurosurgery will continue to follow as primary  Patient is medically stable for discharge at this time  Call with questions/concerns  Plan of care discussed with Suleman Barraza RN  Subjective/Objective     Patient seen Carol Driver in bed with his Aspen collar  Reports no new changes in his symptoms of weakness  Continues to have pain as stated by the patient  I/O       05/28 0701 05/29 0700 05/29 0701 05/30 0700 05/30 0701 05/31 0700    P  O  705 480     Total Intake(mL/kg) 705 (8 4) 480 (5 7)     Urine (mL/kg/hr) 700 (0 3) 800 (0 4)     Total Output 700 800     Net +5 -320            Unmeasured Urine Occurrence  1 x           Invasive Devices  Report    None                 Physical Exam:  Vitals: Blood pressure 136/91, pulse 96, temperature 98 5 °F (36 9 °C), resp  rate 17, height 5' 7" (1 702 m), weight 84 4 kg (186 lb 1 1 oz), SpO2 92 %  ,Body mass index is 29 14 kg/m²        General appearance: alert, appears stated age, cooperative and no distress  Head: Normocephalic, without obvious abnormality, atraumatic  Eyes: EOMI, PERRL  Neck: supple, symmetrical, trachea midline and NT  Back: no kyphosis present, no tenderness to percussion or palpation  Lungs: non labored breathing  Heart: regular heart rate  Neurologic:   Mental status: Alert, awake, oriented x 3, thought content appropriate  Cranial nerves: grossly intact (Cranial nerves II-XII)  Sensory: decreased sensation in arms, decreased R T8 region, L T7 region, patchy BLE  Motor: Bilateral upper extremities 3/5,  2/5, proximal lower extremities 4/5, distal lower extremities 1/5 on the right; distal lower extremities 4/5 on the left      Lab Results:  Results from last 7 days   Lab Units 05/24/22  0538   WBC Thousand/uL 14 23*   HEMOGLOBIN g/dL 12 3   HEMATOCRIT % 39 0   PLATELETS Thousands/uL 519*   NEUTROS PCT % 58   MONOS PCT % 9     Results from last 7 days   Lab Units 05/24/22  0538   POTASSIUM mmol/L 3 7   CHLORIDE mmol/L 103   CO2 mmol/L 33*   BUN mg/dL 19   CREATININE mg/dL 0 99   CALCIUM mg/dL 10 3*                 No results found for: TROPONINT  ABG:  Lab Results   Component Value Date    PHART 7 441 03/07/2021    LXX4QCV 36 7 03/07/2021    PO2ART 162 7 (H) 03/07/2021    [id number] 24 4 03/07/2021    BEART 0 6 03/07/2021    SOURCE Line, Arterial 03/07/2021       Imaging Studies: I have personally reviewed pertinent reports  and I have personally reviewed pertinent films in PACS    XR cervical spine 2 or 3 views    Result Date: 5/15/2022  Impression: Postsurgical changes of anterior cervical discectomy and fusion at C6-C7 and revision posterior fusion spanning C2-T1  Workstation performed: [id number]     XR spine cervical 2 or 3 vw injury    Result Date: 5/12/2022  Impression: Fluoroscopic guidance provided for intraoperative localization during cervical fusion procedure  Please refer to the separate procedure notes for additional details  Workstation performed: [id number]     CT cervical spine without contrast    Result Date: 5/11/2022  Impression: Loosening of the bilateral C7 screws noted There is a fracture of the right lateral mass C2 screw Findings concur with the preliminary report by the referring clinician already in PACS and/or our electronic record EPIC  Workstation performed: [id number]     MRI cervical spine with and without contrast    Result Date: 5/11/2022  Impression: 1  New focus of cord compression at C6-C7 secondary to a new disc herniation with cord signal abnormality at this level, worrisome for new cord edema, along the caudal margin of the spinal construct    Spine surgical assessment advised  2   Interval development of cystic myelomalacia and cord atrophy at the C4-5 level in the region of prior cord atrophy which has been ameliorated by decompressive laminectomies C3-C5  3   Posterior fusion hardware C2-C7 noted  I personally discussed this study with Edil Hill on 5/11/2022 at 8:15 AM  Workstation performed: [id number]       EKG, Pathology, and Other Studies: I have personally reviewed pertinent reports        VTE Pharmacologic Prophylaxis: Sequential compression device (Venodyne)  and Heparin    VTE Mechanical Prophylaxis: sequential compression device

## 2022-05-31 VITALS
SYSTOLIC BLOOD PRESSURE: 116 MMHG | TEMPERATURE: 98.8 F | OXYGEN SATURATION: 93 % | WEIGHT: 186.07 LBS | HEIGHT: 67 IN | DIASTOLIC BLOOD PRESSURE: 71 MMHG | HEART RATE: 91 BPM | BODY MASS INDEX: 29.2 KG/M2 | RESPIRATION RATE: 18 BRPM

## 2022-05-31 PROCEDURE — 0241U HB NFCT DS VIR RESP RNA 4 TRGT: CPT | Performed by: PHYSICIAN ASSISTANT

## 2022-05-31 PROCEDURE — NC001 PR NO CHARGE: Performed by: PHYSICIAN ASSISTANT

## 2022-05-31 PROCEDURE — 99024 POSTOP FOLLOW-UP VISIT: CPT | Performed by: PHYSICIAN ASSISTANT

## 2022-05-31 RX ORDER — ACETAMINOPHEN 325 MG/1
975 TABLET ORAL EVERY 8 HOURS SCHEDULED
Refills: 0
Start: 2022-05-31 | End: 2022-07-21 | Stop reason: CLARIF

## 2022-05-31 RX ORDER — LIDOCAINE 50 MG/G
2 PATCH TOPICAL DAILY
Refills: 0
Start: 2022-06-01

## 2022-05-31 RX ORDER — BACLOFEN 5 MG/1
15 TABLET ORAL 3 TIMES DAILY
Refills: 0
Start: 2022-05-31 | End: 2022-07-21 | Stop reason: CLARIF

## 2022-05-31 RX ORDER — LORAZEPAM 2 MG/ML
0.5 INJECTION INTRAMUSCULAR EVERY 4 HOURS PRN
Refills: 0
Start: 2022-05-31 | End: 2022-06-10

## 2022-05-31 RX ORDER — SENNOSIDES 8.6 MG
8.6 TABLET ORAL DAILY
Refills: 0
Start: 2022-06-01

## 2022-05-31 RX ORDER — GABAPENTIN 400 MG/1
800 CAPSULE ORAL 3 TIMES DAILY
Refills: 0
Start: 2022-05-31

## 2022-05-31 RX ORDER — DOCUSATE SODIUM 100 MG/1
100 CAPSULE, LIQUID FILLED ORAL 2 TIMES DAILY
Refills: 0
Start: 2022-05-31

## 2022-05-31 RX ADMIN — BACLOFEN 15 MG: 10 TABLET ORAL at 09:12

## 2022-05-31 RX ADMIN — TAMSULOSIN HYDROCHLORIDE 0.4 MG: 0.4 CAPSULE ORAL at 17:14

## 2022-05-31 RX ADMIN — LIDOCAINE 5% 2 PATCH: 700 PATCH TOPICAL at 09:13

## 2022-05-31 RX ADMIN — GABAPENTIN 800 MG: 400 CAPSULE ORAL at 17:14

## 2022-05-31 RX ADMIN — HEPARIN SODIUM 5000 UNITS: 5000 INJECTION INTRAVENOUS; SUBCUTANEOUS at 12:39

## 2022-05-31 RX ADMIN — TAPENTADOL HYDROCHLORIDE 75 MG: 75 TABLET, FILM COATED ORAL at 09:17

## 2022-05-31 RX ADMIN — ACETAMINOPHEN 975 MG: 325 TABLET, FILM COATED ORAL at 12:39

## 2022-05-31 RX ADMIN — BUSPIRONE HYDROCHLORIDE 5 MG: 5 TABLET ORAL at 09:12

## 2022-05-31 RX ADMIN — BACLOFEN 15 MG: 10 TABLET ORAL at 17:14

## 2022-05-31 RX ADMIN — ATORVASTATIN CALCIUM 40 MG: 40 TABLET, FILM COATED ORAL at 17:14

## 2022-05-31 RX ADMIN — GABAPENTIN 800 MG: 400 CAPSULE ORAL at 09:12

## 2022-05-31 NOTE — PROGRESS NOTES
[facility] [facility]  Progress Note Иван Corbin 1985, 43 y o  male MRN: [id number]  Unit/Bed#: [facility] 623-01 Encounter: [id number]  Primary Care Provider: Patrica Degroot MD   Date and time admitted to hospital: 5/11/2022  9:16 AM    * Cord compression myelopathy (HCC)  Assessment & Plan  POD 19 ACDF C6-7; Revision PCDF, revise/replace right C2 screw with laminar screw, revise left C2 screw (cap), remove bilateral C7 lateral mass screws, place navigated C7, T1 pedicle screws, C6-7 laminectomy, arthrodesis C2-3 and C6-T1 (5/12/22 [facility])  · Presented as direct admission after outpatient MRI demonstrate cord compression requiring surgical intervention  · s/p severe central spinal cord injury, Jania B spinal cord injury March 2021 after falling down stairs status post posterior cervical decompression fixation and fusion C2-C7 3/7/2021 Dr Zeina Lawler  · Was seen in office for for one year postoperative follow-up with complaints of decline in function, new urinary difficulties and c/o increased neck pain, with intermittent radicular symptoms and clicking noise with head movement for which MRI was ordered demonstrating new C6-7 disc herniation causing cord compression and edema as well as hardware failure  Imaging:   · Xray cervical spine, 5/14/22: Postsurgical changes of anterior cervical discectomy and fusion at C6-C7 and revision posterior fusion spanning C2-T1  Plan:  · Continue monitor neurological status for any new or progressive symptoms   · SARANYA removed 5/16/22  · Posterior staples discontinued and drain suture 5/25/22  · Continue collar at all times except for showering changes Kaylah collar  · Patient aware plan for collar anticipated six weeks  · Pain control - APS Input appreciated    · Tylenol 975 mg every 8 hours scheduled  · Continues on baclofen 10 mg p o  T i d  - will discuss any consideration for increase of dose - increase to 15mg TID  · Nucynta every 6 hours as needed, 50 mg for moderate pain and 75 mg for severe pain  · Completed medrol dose pack  · Mobilize PT/OT - recommending acute rehab  · PM&R consult appreciated, recommend SCI rehab  · DVT PPX: SCDs  HSQ  · Dispo - d/w CM - recommend acute SCI rehab  [facility] rehab now accepting  Awaiting insurance authorization  · Encourage use of IS  Neurosurgery will continue to follow as primary  Patient is medically stable for discharge at this time  Call with questions/concerns  Plan of care discussed with Brooks Jaramillo RN  Subjective/Objective     Patient seen laying in the bed  No new complaints or concerns  Continued expected post operative pain  I/O       05/29 0701 05/30 0700 05/30 0701 05/31 0700 05/31 0701 06/01 0700    P  O  480 285     Total Intake(mL/kg) 480 (5 7) 285 (3 4)     Urine (mL/kg/hr) 800 (0 4) 900 (0 4)     Total Output 800 900     Net -320 -615            Unmeasured Urine Occurrence 1 x            Invasive Devices  Report    None                 Physical Exam:  Vitals: Blood pressure 112/74, pulse 73, temperature 98 °F (36 7 °C), resp  rate 18, height 5' 7" (1 702 m), weight 84 4 kg (186 lb 1 1 oz), SpO2 99 %  ,Body mass index is 29 14 kg/m²        General appearance: alert, appears stated age, cooperative and no distress  Head: Normocephalic, without obvious abnormality, atraumatic  Eyes: EOMI, PERRL  Neck: supple, symmetrical, trachea midline and NT  Back: no kyphosis present, no tenderness to percussion or palpation  Lungs: non labored breathing  Heart: regular heart rate  Neurologic:   Mental status: Alert, awake, oriented x 3, thought content appropriate  Cranial nerves: grossly intact (Cranial nerves II-XII)  Sensory: decreased sensation in arms, decreased R T8 region, L T7 region, patchy BLE  Motor: Bilateral upper extremities 3/5,  2/5, proximal lower extremities 4/5, distal lower extremities 1/5 on the right; distal lower extremities 4/5 on the left      Lab Results:        Invalid input(s):  EOSPCT        Invalid input(s): LABALBU              No results found for: TROPONINT  ABG:  Lab Results   Component Value Date    PHART 7 441 03/07/2021    JKW5UAF 36 7 03/07/2021    PO2ART 162 7 (H) 03/07/2021    JOZ6PVE 24 4 03/07/2021    BEART 0 6 03/07/2021    SOURCE Line, Arterial 03/07/2021       Imaging Studies: I have personally reviewed pertinent reports  and I have personally reviewed pertinent films in PACS    XR cervical spine 2 or 3 views    Result Date: 5/15/2022  Impression: Postsurgical changes of anterior cervical discectomy and fusion at C6-C7 and revision posterior fusion spanning C2-T1  Workstation performed: [id number]     XR spine cervical 2 or 3 vw injury    Result Date: 5/12/2022  Impression: Fluoroscopic guidance provided for intraoperative localization during cervical fusion procedure  Please refer to the separate procedure notes for additional details  Workstation performed: [id number]     CT cervical spine without contrast    Result Date: 5/11/2022  Impression: Loosening of the bilateral C7 screws noted There is a fracture of the right lateral mass C2 screw Findings concur with the preliminary report by the referring clinician already in PACS and/or our electronic record EPIC  Workstation performed: [id number]     MRI cervical spine with and without contrast    Result Date: 5/11/2022  Impression: 1  New focus of cord compression at C6-C7 secondary to a new disc herniation with cord signal abnormality at this level, worrisome for new cord edema, along the caudal margin of the spinal construct  Spine surgical assessment advised  2   Interval development of cystic myelomalacia and cord atrophy at the C4-5 level in the region of prior cord atrophy which has been ameliorated by decompressive laminectomies C3-C5  3   Posterior fusion hardware C2-C7 noted   I personally discussed this study with Shannon Simon on 5/11/2022 at 8:15 AM  Workstation performed: [id number]       EKG, Pathology, and Other Studies: I have personally reviewed pertinent reports        VTE Pharmacologic Prophylaxis: Sequential compression device (Venodyne)  and Heparin    VTE Mechanical Prophylaxis: sequential compression device

## 2022-05-31 NOTE — RESTORATIVE TECHNICIAN NOTE
Restorative Technician Note      Patient Name: Franko Melendez     Note Type: Bracing, Follow-up fitting  Patient Position Upon Consult: Supine  Brace Applied: Multipodous Boot (RLE)  Additional Brace Ordered: No  Patient Position When Brace Applied: Supine  Bracing Recommendations: None  Education Provided: Yes  Patient Position at End of Consult: Supine; All needs within reach  Nurse Communication: Nurse aware of consult, application of brace        Please call Mobility Coordinator at ext  [phone] or on [place] text " [facility]-PT-Restorative Tech" role in regards to bracing instruction and/or adjustment    Jama Justin Restorative Technician, BS

## 2022-05-31 NOTE — ASSESSMENT & PLAN NOTE
POD 19 ACDF C6-7; Revision PCDF, revise/replace right C2 screw with laminar screw, revise left C2 screw (cap), remove bilateral C7 lateral mass screws, place navigated C7, T1 pedicle screws, C6-7 laminectomy, arthrodesis C2-3 and C6-T1 (5/12/22 [place])  · Presented as direct admission after outpatient MRI demonstrate cord compression requiring surgical intervention  · s/p severe central spinal cord injury, Jania B spinal cord injury March 2021 after falling down stairs status post posterior cervical decompression fixation and fusion C2-C7 3/7/2021 Dr Veronica Eugene  · Was seen in office for for one year postoperative follow-up with complaints of decline in function, new urinary difficulties and c/o increased neck pain, with intermittent radicular symptoms and clicking noise with head movement for which MRI was ordered demonstrating new C6-7 disc herniation causing cord compression and edema as well as hardware failure  Imaging:   · Xray cervical spine, 5/14/22: Postsurgical changes of anterior cervical discectomy and fusion at C6-C7 and revision posterior fusion spanning C2-T1  Plan:  · Continue monitor neurological status for any new or progressive symptoms   · SARANYA removed 5/16/22  · Posterior staples discontinued and drain suture 5/25/22  · Continue collar at all times except for showering changes Kaylah collar  · Patient aware plan for collar anticipated six weeks  · Pain control - APS Input appreciated  · Tylenol 975 mg every 8 hours scheduled  · Continues on baclofen 10 mg p o  T i d  - will discuss any consideration for increase of dose - increase to 15mg TID  · Nucynta every 6 hours as needed, 50 mg for moderate pain and 75 mg for severe pain  · Completed medrol dose pack  · Mobilize PT/OT - recommending acute rehab  · PM&R consult appreciated, recommend SCI rehab  · DVT PPX: SCDs  HSQ  · Dispo - d/w CM - recommend acute SCI rehab  [facility] accepting    Awaiting insurance authorization  · Encourage use of IS  Neurosurgery will continue to follow as primary  Patient is medically stable for discharge at this time  Call with questions/concerns  Plan of care discussed with Joshua Luna RN

## 2022-05-31 NOTE — CASE MANAGEMENT
Case Management Discharge Planning Note    Patient name Denver Shadow  Location [address] [place] [address] Rd 623/PPHP 185-05 MRN [id number]  : 1985 Date 2022       Current Admission Date: 2022  Current Admission Diagnosis:Cord compression myelopathy [facility])   Patient Active Problem List    Diagnosis Date Noted    Leukocytosis 2022    Cord compression myelopathy ([facility] [address] ) 2022    Lower urinary tract symptoms (LUTS) 2022    Encounter to discuss test results 2022    S/P orchiectomy 2021    Acute low back pain 2021    Hypokalemia 2021    Benign essential HTN 2021    HLD (hyperlipidemia) 2021    Testicular discomfort 2021    Intractable hiccups 2021    Depression 2021    Urinary retention 2021    Closed fracture of fifth cervical vertebra ([facility] [address] ) 03/10/2021    Spinal cord injury, C1-C7 ([facility] [address] ) 03/10/2021    Fall 03/10/2021    Pain 03/10/2021    Central cord syndrome ([facility] [address] ) 2021    Pulmonary insufficiency 2021      LOS (days): 20  Geometric Mean LOS (GMLOS) (days): 3 20  Days to GMLOS:-16 9     OBJECTIVE:  Risk of Unplanned Readmission Score: 11 04         Current admission status: Inpatient   Preferred Pharmacy:   [address] [place] [address]  Phone: [phone] Fax: 7[facility] [address] [address] [facility]  [address] [facility] [address]  Phone: [phone] Fax: [phone]    Primary Care Provider: Jose Antonio Vázquez MD    Primary Insurance: [address] [place] [address]  Secondary Insurance:     DISCHARGE DETAILS:    Discharge planning discussed with[de-identified] Patient  Freedom of Choice: Yes  Comments - Freedom of Choice: Discussed FOC  CM contacted family/caregiver?: No- see comments (Declined)    Other Referral/Resources/Interventions Provided:  Interventions: Acute Rehab  Referral Comments: Patient requesting to go by [address] with his own high back WC  [facility] set up for 1430  Patient aware there will be an OOP expense associated with WCV, in agreement  Transport at Discharge : Wheelchair van  Dispatcher Contacted: Yes  Number/Name of Dispatcher: [facility]  Transported by [employer] and Unit #):  [facility]  ETA of Transport (Date): 05/31/22  ETA of Transport (Time): [address] Name, [address] : [address] [facility]  Receiving Facility/Agency Phone Number: [phone]  Facility/Agency Fax Number: [phone]

## 2022-05-31 NOTE — DISCHARGE SUMMARY
Discharge Summary - Neurosurgery   Lc Stockton 43 y o  male MRN: [id number]  [facility] Encounter: [id number]    Admission Date: 5/11/22  Admission Orders (From admission, onward)     Ordered        05/11/22 1000  Inpatient Admission  Once                         Discharge Date: 5/31/22    Admitting Diagnosis: Spinal cord compression ([address] ) [G95 20]  Herniated cervical disc [M50 20]  Benign essential HTN [I10]  Unspecified cord compression [G95 20]  Loosening of hardware in spine ([address] ) [T84 498A]  Hyperlipidemia, unspecified hyperlipidemia type [E78 5]    Discharge Diagnosis: Spinal cord compression ([address] ) [G95 20]  Herniated cervical disc [M50 20]  Benign essential HTN [I10]  Unspecified cord compression [G95 20]  Loosening of hardware in spine ([address] ) [T84 498A]  Hyperlipidemia, unspecified hyperlipidemia type [E78 5]    Medical Problems             Resolved Problems  Date Reviewed: 5/23/2022   None                 Attending: Dr Jermaine Shipley Physician(s): Anesthesia    Procedures Performed: CDF C6-7; Revision PCDF, revise/replace right C2 screw with laminar screw, navigated C7, T1 pedicle screws, C6-7 laminectomy    Pathology: N/A    Hospital Course: Lc Stockton is a 42 y/o male who presented to the outpatient office complaining of increasing neck pain, ambulatory dysfunction and decreased functionality of his arms  Imaging revealed herniated disc C6-7, with right C2 screw fracture, left C2 cap dislodged and bilateral C7 lateral mass screws with erosion of the bone  Patient underwent CDF C6-7; Revision PCDF, revise/replace right C2 screw with laminar screw, navigated C7, T1 pedicle screws, C6-7 laminectomy   under general anesthesia with minimal blood loss and no complications  Patient was kept in the PACU until stable and then moved to the floor  Patient received imaging postoperatively which revealed stable post operative findings    Drain was placed perioperatively and removed safely and without difficulty; patient tolerated well  PT and OT were consulted and recommend post acute rehab  Patient was cleared medically for discharge  Prior to discharge, postoperative instructions were discussed with patient  During that time, all questions and concerns were addressed  Patient will follow up outpatient in 2 weeks for an incision check and 6 weeks with repeat imaging with cervical x-rays  Condition at Discharge: good     Discharge instructions/Information to patient and family:   See after visit summary for information provided to patient and family  Provisions for Follow-Up Care:  See after visit summary for information related to follow-up care and any pertinent home health orders  Disposition: Short-term rehab at [facility]      Planned Readmission: No    Discharge Statement   I spent 20 minutes discharging the patient  This time was spent on the day of discharge  I had direct contact with the patient on the day of discharge  Additional documentation is required if more than 30 minutes were spent on discharge  Discharge Medications:  See after visit summary for reconciled discharge medications provided to patient and family

## 2022-05-31 NOTE — CASE MANAGEMENT
Case Management Discharge Planning Note    Patient name Rock Murrell  Location [address] [place] [address] [facility] [id number]  : 1985 Date 2022       Current Admission Date: 2022  Current Admission Diagnosis:Cord compression myelopathy [facility])   Patient Active Problem List    Diagnosis Date Noted    Leukocytosis 2022    Cord compression myelopathy ([facility] [address] ) 2022    Lower urinary tract symptoms (LUTS) 2022    Encounter to discuss test results 2022    S/P orchiectomy 2021    Acute low back pain 2021    Hypokalemia 2021    Benign essential HTN 2021    HLD (hyperlipidemia) 2021    Testicular discomfort 2021    Intractable hiccups 2021    Depression 2021    Urinary retention 2021    Closed fracture of fifth cervical vertebra ([facility] [address] ) 03/10/2021    Spinal cord injury, C1-C7 ([facility] [address] ) 03/10/2021    Fall 03/10/2021    Pain 03/10/2021    Central cord syndrome ([facility] [address] ) 2021    Pulmonary insufficiency 2021      LOS (days): 20  Geometric Mean LOS (GMLOS) (days): 3 20  Days to GMLOS:-16 8     OBJECTIVE:  Risk of Unplanned Readmission Score: 11 04         Current admission status: Inpatient   Preferred Pharmacy:   [address] [place] [address]  Phone: [phone] Fax: 016 [address] [address] [facility]  [address] [facility] [address]  Phone: [phone] Fax: [phone]    Primary Care Provider: Sudha Pope MD    Primary Insurance: [address] [place] [address]  Secondary Insurance:     DISCHARGE DETAILS:    Discharge planning discussed with[de-identified] Patient  Freedom of Choice: Yes     Other Referral/Resources/Interventions Provided:  Interventions: Acute Rehab  Referral Comments: [facility] states that insurance authorization for acute rehab is obtained  Patient can go to [facility] today    TC to [facility] [place] to obtain transportation for patient  [facility] dispatch department to notify this CM regarding which company can transport patient

## 2022-05-31 NOTE — DISCHARGE INSTRUCTIONS
Discharge Instructions  Posterior cervical decompression and fixation/fusion      Surgical incisional care:  Keep incision clean and dry  Avoid applying creams, lotion or antiseptic to incision area  Check the wound daily  If the incision becomes red, swollen, tender, warm, or has increased drainage please notify physician immediately  Okay to apply a padded dressing over incision while wearing VISTA collar in order to reduce risk of irritation  May shower 3 days after surgery, but do not soak in a tub and no swimming  Use mild antimicrobial soap and water  Pat incision dry after showering  Cervical VISTA collar to be worn at all times except for showering  Change from VISTA (grey) collar to [place]peach[place] collar prior to showering  Incision may be cleaned with water and a mild antimicrobial soap using a clean washcloth  Incision is to be gently patted dry with a clean towel  Once dry, collar should be changed back to a VISTA (grey) collar with clean pads in place  Hand wash collar pads with mild soap and water  They are to be laid flat to dry on a clean towel  Recommend changing every 1-2 days  Please refer to VISTA collar instructions for further details  Activity Restrictions:  No heavy lifting greater than 5 - 10lbs  No strenuous activities  May walk as tolerated  Encourage at least 4 short walks per day  No driving while requiring cervical collar, anticipated six weeks  No significant neck movement  Postoperative medication:  Please take pain medications to relieve incision pain, and muscle relaxants to prevent spasms as directed  Please see after visit summary (AVS) for details  Please take over the counter stool softeners such as colace or senna-s to avoid constipation while on narcotics  Do not take ibuprofen, Naproxen/Aleve or any NSAID until cleared by surgeon  May take Tylenol instead    If taking Coumadin, Aspirin, or Plavix, you may resume these medications when cleared by Neurosurgery  Follow-up as scheduled for a 2 week incision check  Follow-up 6 weeks after surgery with a repeat cervical spine upright x-rays to be completed prior to visit  **Please notify MD immediately if you experience a fever of 101  F, have increased neck or arm pain, new numbness and/or weakness in your arms, difficulty swallowing or breathing especially while lying down, numbness or weakness in arms or legs  **      Discharge instructions  Anterior cervical decompression and fixation/fusion      Surgical incisional care:  Keep incision clean and dry  Avoid applying creams, lotion or antiseptic to incision area  Allow steri-strips to fall off  If still in place at two week postoperative visit, we will remove them  Check the wound daily  If the incision becomes red, swollen, tender, warm, or has increased drainage please notify physician immediately  May shower 3 days after surgery, but do not soak in a tub and no swimming  Use mild antimicrobial soap and water with a clean washcloth  Pat incision dry after showering and a clean towel daily  Cervical VISTA collar to be worn at all times except for showering  Change from VISTA (grey) collar to [place]pe[place] collar prior to showering  Incision may be cleaned with water and a mild antimicrobial soap using a clean washcloth  Incision is to be gently patted dry with a clean towel  Once dry, collar should be changed back to a VISTA (grey) collar with clean pads in place  Wash collar pads with mild soap and water  They are to be laid flat to dry on a clean towel  Recommend changing every 1-2 days  Please refer to VISTA collar instructions for further details  Activity Restrictions:  No heavy lifting greater than 5 - 10lbs  No strenuous activities  May walk as tolerated  Encourage at least 4 short walks per day  No driving while requiring cervical collar, anticipated six weeks  No significant neck movement    Diet: consider soft minced food with gravy  Recommend small bites with sips of water between  Postoperative medication:  Take pain medications to relieve incision pain, and muscle relaxants to prevent spasms as directed  Please see after visit summary (AVS) for details  Take over the counter stool softeners such as colace or senna-s to avoid constipation while on narcotics  Intake water and fiber intake  Do not take ibuprofen, Naproxen/Aleve or any NSAID until cleared by surgeon  May take Tylenol instead  If taking Coumadin, Aspirin, or Plavix, you may resume these medications when cleared by Neurosurgery  Follow-up as scheduled for a 2 week incision check  Follow-up 6 weeks after surgery with a repeat cervical spine upright x-rays to be completed prior to visit  **Please notify MD immediately if you experience a fever of 101F, have increased neck or arm pain, new numbness and/or weakness in your arms/hands, difficulty swallowing or breathing especially while lying down, numbness or weakness in your legs  **

## 2022-06-01 NOTE — UTILIZATION REVIEW
Notification of Discharge   This is a Notification of Discharge from our facility [address]  Please be advised that this patient has been discharge from our facility  Below you will find the admission and discharge date and time including the patients disposition  UTILIZATION REVIEW CONTACT:  Meagan Valiente  Utilization   Network Utilization Review Department  Phone: [phone] x carefully listen to the prompts  All voicemails are confidential   Email: [email or web address]     PHYSICIAN ADVISORY SERVICES:  FOR [id number] REVIEW - MEDICAL NECESSITY DENIAL  Phone: [phone]  Fax: [phone]  Email: [email or web address]     PRESENTATION DATE: 5/11/2022  9:16 AM  OBERVATION ADMISSION DATE:  INPATIENT ADMISSION DATE: 5/11/22 10:00 AM   DISCHARGE DATE: 5/31/2022  6:01 PM  DISPOSITION: Non [facility] Non [facility]      IMPORTANT INFORMATION:  Send all requests for admission clinical reviews, approved or denied determinations and any other requests to dedicated fax number below belonging to the campus where the patient is receiving treatment   List of dedicated fax numbers:  [address] DENIALS (Administrative/Medical Necessity) [phone]   [address] (Maternity/NICU/Pediatrics) [phone]   Aundra Small [phone]   130 [facility] [phone]   [address] [place] [address] [phone]   2000 [facility] [address] [place] [address],[address] [place] [address] [phone]   [facility]  [phone]   [address] [place] [address], [facility]  2401 [facility] 1000 [address] [facility] [phone]

## 2022-06-01 NOTE — TELEPHONE ENCOUNTER
Reached out and spoke to Arnold Ly at [facility] regarding how patient is doing postop  Arnold Ly reports patient is doing well overall and denies any incisional issues or fevers  Patient is currently a stand and pivot for transfer  Patient is not complaining of any pain  Patient has moved his bowels since the surgery however Arnold Ly reported patient has not gone since 5/29 and if he does not have a BM today he will need to take bowel regimen medications  Arnold Ly stated patient refused these medications today  Reviewed incision care with the Arnold Ly  Advised to gently wash the surgical site with soap and water daily  Use clean wash cloth, towels, and clothing  Do not submerge in water until cleared by the surgeon  Do not apply any creams, ointments, or lotions to the site  Arnold Ly is aware to call the office if any redness, swelling, drainage, dehiscence of incision, or fever >100 F occurs  Reminded Arnold Ly of patient's upcoming appointments with the date/time/location  Also informed her that patient will need a C spine xray prior to appt on 6/24  This RN will fax over the xray script to [phone]  Arnold Ly was appreciative for the call

## 2022-06-21 ENCOUNTER — TELEPHONE (OUTPATIENT)
Dept: NEUROSURGERY | Facility: CLINIC | Age: 37
End: 2022-06-21

## 2022-06-21 NOTE — TELEPHONE ENCOUNTER
Spoke with both [facility] and [facility], stated patient is no longer in their facility, [facility] (PT AT [facility] [address]) stated patient signed himself out against medical advice and that is the reason he is no longer in their facility  Called and left patient detailed message requesting a return call to confirm upcoming appointment as well as remind needs x-ray c-spine prior to appointment  I will also remove the [facility] contact from patients chart so there is no confusion on who to call since again, patient is no longer in any of the facilities

## 2022-06-22 NOTE — TELEPHONE ENCOUNTER
Disc dropped off via [facility] ludmila Simmons is uploading the disc into PACS

## 2022-06-24 ENCOUNTER — TELEMEDICINE (OUTPATIENT)
Dept: NEUROSURGERY | Facility: CLINIC | Age: 37
End: 2022-06-24

## 2022-06-24 VITALS
HEIGHT: 67 IN | RESPIRATION RATE: 14 BRPM | SYSTOLIC BLOOD PRESSURE: 141 MMHG | HEART RATE: 107 BPM | BODY MASS INDEX: 27.65 KG/M2 | TEMPERATURE: 97.7 F | WEIGHT: 176.15 LBS | OXYGEN SATURATION: 99 % | DIASTOLIC BLOOD PRESSURE: 84 MMHG

## 2022-06-24 DIAGNOSIS — S14.109D INJURY OF CERVICAL SPINAL CORD, SUBSEQUENT ENCOUNTER (HCC): ICD-10-CM

## 2022-06-24 DIAGNOSIS — T84.498A LOOSENING OF HARDWARE IN SPINE (HCC): ICD-10-CM

## 2022-06-24 DIAGNOSIS — M40.209 KYPHOSIS: ICD-10-CM

## 2022-06-24 DIAGNOSIS — S14.109A: ICD-10-CM

## 2022-06-24 DIAGNOSIS — S14.129D CENTRAL CORD SYNDROME, SUBSEQUENT ENCOUNTER (HCC): ICD-10-CM

## 2022-06-24 DIAGNOSIS — S12.400A: ICD-10-CM

## 2022-06-24 DIAGNOSIS — S14.129A CENTRAL CORD SYNDROME, INITIAL ENCOUNTER (HCC): Primary | ICD-10-CM

## 2022-06-24 PROCEDURE — 99024 POSTOP FOLLOW-UP VISIT: CPT | Performed by: NEUROLOGICAL SURGERY

## 2022-06-24 RX ORDER — HYDROCODONE BITARTRATE AND ACETAMINOPHEN 5; 325 MG/1; MG/1
1 TABLET ORAL EVERY 6 HOURS PRN
COMMUNITY
End: 2022-07-21 | Stop reason: CLARIF

## 2022-06-24 RX ORDER — TRAZODONE HYDROCHLORIDE 100 MG/1
100 TABLET ORAL
Status: ON HOLD | COMMUNITY
End: 2022-07-29 | Stop reason: SDUPTHER

## 2022-06-24 RX ORDER — TIZANIDINE 4 MG/1
4 TABLET ORAL 2 TIMES DAILY
COMMUNITY
End: 2022-07-21 | Stop reason: CLARIF

## 2022-06-24 NOTE — PROGRESS NOTES
Virtual Regular Visit    Verification of patient location:    Patient is located in the following state in which I hold an active license [place]      Assessment/Plan:    Problem List Items Addressed This Visit        Nervous and Auditory    Central cord syndrome ([facility] ) - Primary    Relevant Orders    XR spine cervical complete 6+ vw flex/ext/obl    XR spine cervical complete 6+ vw flex/ext/obl    Spinal cord injury, C1-C7 ([facility] )    Relevant Orders    XR spine cervical complete 6+ vw flex/ext/obl    XR spine cervical complete 6+ vw flex/ext/obl       Musculoskeletal and Integument    Closed fracture of fifth cervical vertebra (HCC)    Relevant Orders    XR spine cervical complete 6+ vw flex/ext/obl      Other Visit Diagnoses     Loosening of hardware in spine ([facility] )        Relevant Orders    XR spine cervical complete 6+ vw flex/ext/obl    Kyphosis        Relevant Orders    XR spine cervical complete 6+ vw flex/ext/obl        S/p revision posterior cervical fixation fusion, extension to T1, with ACDF C6-7 on 5/12/22  Patient with a prior PCDF C2-7, but had hardware failure superiorly at C2 with some fractured screws/screw pull out, as well as screw pull out at C7 and failure at the C 6-7 disc space  He was having neurologic decline, secondary to disc herniation from C6-7  He was revised as above  Tells us today that he is neurologically moving forward  Many of the symptoms that began with his hardware loosening and disc herniation have resolved and he is improving in his neurologic function  He has some right trapezius neck pain, they are using kinesiology tape and tizanidine to try and manage at better  His incision is well healed, see image  I have instructed the patient to wean from their cervical collar    They should do this by not wearing the collar 1-2 hours per day for the next week, then 2-4 hours per day the week following, then 4-6 hours per day the subsequent week, such that within the next several weeks they will of wean out of the collar entirely  These periods without the collar should be during more sedentary periods, e g  Reading, watching TV  They should continue to wear the collar when doing strenuous activity (such as therapy etc ) until the weaning period is complete  They do not need to wear the collar while sleeping, but can during this weaning period if by doing so they feel more comfortable  We will plan to see the patient back at 6 months postop, with new x-rays which I have ordered  Reason for visit is   Chief Complaint   Patient presents with    Virtual Regular Visit     6 wk POV Cspine xrays 6/21/22        Encounter provider Katherine Colvin MD    Provider located at [address] [place] [address]  [address] [address]      Recent Visits  No visits were found meeting these conditions  Showing recent visits within past 7 days and meeting all other requirements  Today's Visits  Date Type Provider Dept   06/24/22 Telemedicine Katherine Colvin MD [facility] today's visits and meeting all other requirements  Future Appointments  No visits were found meeting these conditions  Showing future appointments within next 150 days and meeting all other requirements       The patient was identified by name and date of birth  Mauricio Car was informed that this is a telemedicine visit and that the visit is being conducted through [address] [place] [address] Now and patient was informed that this is a secure, HIPAA-compliant platform  He agrees to proceed     My office door was closed  No one else was in the room  He acknowledged consent and understanding of privacy and security of the video platform  The patient has agreed to participate and understands they can discontinue the visit at any time  Patient is aware this is a billable service  Eron [place] is a 43 y o  male    See discussion         HPI     Past Medical History:   Diagnosis Date    Chronic depression     PTSD (post-traumatic stress disorder)        Past Surgical History:   Procedure Laterality Date    CERVICAL FUSION N/A 3/6/2021    Procedure: POSTERIOR C3-5 laminectomy, C2-7 fixation fusion, possible additional levels;  Surgeon: Eneida Garcia MD;  [facility];  Service: Neurosurgery    IR PICC PLACEMENT DOUBLE LUMEN  3/8/2021    NJ ARTHRODESIS ANT INTERBODY MIN DISCECTOMY, CERVICAL BELOW C2 N/A 5/12/2022    Procedure: ACDF C6-7; Revision PCDF, revise/replace right C2 screw with laminar screw, revise left C2 screw (cap), remove bilateral C7 lateral mass screws, place navigated C7, T1 pedicle screws, C6-7 laminectomy, arthrodesis C2-3 and C6-T1;  Surgeon: Darleen Funes MD;  [facility];  Service: Neurosurgery    SCROTAL SURGERY Right 7/20/2021    Procedure: Right scrotal exploration, Irrigation and debridement Right orchiectomy;  Surgeon: Ashwin Clark MD;  [facility];  Service: Urology       Current Outpatient Medications   Medication Sig Dispense Refill    acetaminophen (TYLENOL) 500 mg tablet Take 500 mg by mouth every 8 (eight) hours      atorvastatin (LIPITOR) 40 mg tablet Take 1 tablet (40 mg total) by mouth daily with dinner  0    baclofen 20 mg tablet Take 20 mg by mouth 3 (three) times a day      bisacodyl (DULCOLAX) 10 mg suppository Insert 1 suppository (10 mg total) into the rectum daily as needed for constipation 12 suppository 0    busPIRone (BUSPAR) 5 mg tablet Take 5 mg by mouth 2 (two) times a day      docusate sodium (COLACE) 100 mg capsule Take 1 capsule (100 mg total) by mouth 2 (two) times a day  0    gabapentin (NEURONTIN) 400 mg capsule Take 2 capsules (800 mg total) by mouth 3 (three) times a day  0    HYDROcodone-acetaminophen (NORCO) 5-325 mg per tablet Take 1 tablet by mouth every 6 (six) hours as needed for pain      melatonin 3 mg Take 1 tablet (3 mg total) by mouth daily at bedtime 10 tablet 0    nortriptyline (PAMELOR) 50 mg capsule Take 50 mg by mouth 2 (two) times a day      senna (SENOKOT) 8 6 mg Take 1 tablet (8 6 mg total) by mouth daily  0    tamsulosin (FLOMAX) 0 4 mg Take 1 capsule (0 4 mg total) by mouth daily with dinner 30 capsule 1    tiZANidine (Zanaflex) 4 mg tablet Take 4 mg by mouth 2 (two) times a day      traZODone (DESYREL) 100 mg tablet Take 100 mg by mouth daily at bedtime      acetaminophen (TYLENOL) 325 mg tablet Take 3 tablets (975 mg total) by mouth every 8 (eight) hours (Patient not taking: Reported on 6/24/2022)  0    baclofen 5 MG TABS Take 15 mg by mouth 3 (three) times a day (Patient not taking: Reported on 6/24/2022)  0    Calcium Carbonate Antacid (CALCIUM CARBONATE PO) Take by mouth (Patient not taking: Reported on 6/24/2022)      celecoxib (CeleBREX) 100 mg capsule Take 100 mg by mouth 2 (two) times a day (Patient not taking: No sig reported)      lidocaine (LIDODERM) 5 % Apply 2 patches topically daily Remove & Discard patch within 12 hours or as directed by MD (Patient not taking: Reported on 6/24/2022)  0    LORazepam (ATIVAN) 2 mg/mL Infuse 0 25 mL (0 5 mg total) into a venous catheter every 4 (four) hours as needed for anxiety for up to 10 days  0    meloxicam (MOBIC) 15 mg tablet   (Patient not taking: Reported on 6/24/2022)      Multiple Vitamin (multivitamin) capsule Take 1 capsule by mouth daily (Patient not taking: Reported on 6/24/2022)      senna-docusate sodium (SENOKOT S) 8 6-50 mg per tablet Take 1 tablet by mouth 2 (two) times a day (Patient not taking: No sig reported) 10 tablet 0     No current facility-administered medications for this visit  No Known Allergies    Review of Systems   Constitutional: Negative  HENT: Negative  Eyes: Negative  Respiratory: Negative  Cardiovascular: Negative  Gastrointestinal: Positive for constipation  Endocrine: Negative  Genitourinary: Negative  Musculoskeletal: Positive for myalgias (all over his body), neck pain (currently states right shoulder and upper back pain which rates 7/10, previous visit stated radates to b/l shoulders, arms and hands since 8/2021) and neck stiffness (Decrease ROM, turning head to the right is worse)  S/p PCDF  C2-C7 3/6/2021-- HDM   PT and Occupational  Therapy done  8/2021   Skin: Negative  Allergic/Immunologic: Negative  Neurological: Positive for weakness (b/l arms and hands, difficulty grasping thing and feeding himself) and numbness (and tingling on b/l arms and hands)  Hematological: Negative  Psychiatric/Behavioral: Negative  Video Exam    Vitals:    06/24/22 1427   BP: 141/84   Pulse: (!) 107   Resp: 14   Temp: 97 7 °F (36 5 °C)   SpO2: 99%   Weight: 79 9 kg (176 lb 2 4 oz)   Height: 5' 7" (1 702 m)       Physical Exam                 VIRTUAL VISIT DISCLAIMER      Denver Shadow verbally agrees to participate in [employer]  Pt is aware that [employer] could be limited without vital signs or the ability to perform a full hands-on physical Yumiko Nj understands he or the provider may request at any time to terminate the video visit and request the patient to seek care or treatment in person

## 2022-07-13 ENCOUNTER — TELEPHONE (OUTPATIENT)
Dept: OTHER | Facility: OTHER | Age: 37
End: 2022-07-13

## 2022-07-15 NOTE — TELEPHONE ENCOUNTER
Called [facility] and they said the dr  there said patient now has continence and no longer has any other urinary issues so they dont want cysto done

## 2022-07-15 NOTE — TELEPHONE ENCOUNTER
Patient with history of spinal cord injury, orchiectomy in 2021  Seen in February by Melchor Girard for LUTS which a cysto was subsequently advised  This has been canceled twice now       Will call [facility] to inquire as to why its being canceled, then figure out if it should be rescheduled

## 2022-07-21 ENCOUNTER — APPOINTMENT (INPATIENT)
Dept: NON INVASIVE DIAGNOSTICS | Facility: HOSPITAL | Age: 37
DRG: 134 | End: 2022-07-21
Payer: COMMERCIAL

## 2022-07-21 ENCOUNTER — APPOINTMENT (EMERGENCY)
Dept: RADIOLOGY | Facility: HOSPITAL | Age: 37
DRG: 134 | End: 2022-07-21
Payer: COMMERCIAL

## 2022-07-21 ENCOUNTER — HOSPITAL ENCOUNTER (INPATIENT)
Facility: HOSPITAL | Age: 37
LOS: 8 days | Discharge: NURSING, REHAB OR LONG-TERM CARE | DRG: 134 | End: 2022-07-29
Attending: EMERGENCY MEDICINE | Admitting: INTERNAL MEDICINE
Payer: COMMERCIAL

## 2022-07-21 ENCOUNTER — APPOINTMENT (EMERGENCY)
Dept: CT IMAGING | Facility: HOSPITAL | Age: 37
DRG: 134 | End: 2022-07-21
Payer: COMMERCIAL

## 2022-07-21 DIAGNOSIS — R79.89 ELEVATED D-DIMER: ICD-10-CM

## 2022-07-21 DIAGNOSIS — R09.02 HYPOXIA: ICD-10-CM

## 2022-07-21 DIAGNOSIS — G95.20 CORD COMPRESSION MYELOPATHY (HCC): ICD-10-CM

## 2022-07-21 DIAGNOSIS — R79.89 ELEVATED SERUM CREATININE: ICD-10-CM

## 2022-07-21 DIAGNOSIS — I26.99 PULMONARY EMBOLISM (HCC): Primary | ICD-10-CM

## 2022-07-21 PROBLEM — J98.11 ATELECTASIS: Status: ACTIVE | Noted: 2022-07-21

## 2022-07-21 LAB
ALBUMIN SERPL BCP-MCNC: 3.9 G/DL (ref 3.5–5)
ALP SERPL-CCNC: 79 U/L (ref 46–116)
ALT SERPL W P-5'-P-CCNC: 37 U/L (ref 12–78)
ANION GAP SERPL CALCULATED.3IONS-SCNC: 12 MMOL/L (ref 4–13)
AORTIC ROOT: 2.8 CM
APICAL FOUR CHAMBER EJECTION FRACTION: 52 %
APTT PPP: 33 SECONDS (ref 23–37)
ASCENDING AORTA: 2.6 CM
AST SERPL W P-5'-P-CCNC: 16 U/L (ref 5–45)
ATRIAL RATE: 121 BPM
BACTERIA UR QL AUTO: ABNORMAL /HPF
BASOPHILS # BLD AUTO: 0.06 THOUSANDS/ΜL (ref 0–0.1)
BASOPHILS NFR BLD AUTO: 1 % (ref 0–1)
BILIRUB SERPL-MCNC: 0.35 MG/DL (ref 0.2–1)
BILIRUB UR QL STRIP: NEGATIVE
BUN SERPL-MCNC: 13 MG/DL (ref 5–25)
CALCIUM SERPL-MCNC: 9.2 MG/DL (ref 8.3–10.1)
CHLORIDE SERPL-SCNC: 98 MMOL/L (ref 96–108)
CLARITY UR: ABNORMAL
CO2 SERPL-SCNC: 28 MMOL/L (ref 21–32)
COLOR UR: YELLOW
CREAT SERPL-MCNC: 1.32 MG/DL (ref 0.6–1.3)
D DIMER PPP FEU-MCNC: 1.43 UG/ML FEU
E WAVE DECELERATION TIME: 144 MS
EOSINOPHIL # BLD AUTO: 0.05 THOUSAND/ΜL (ref 0–0.61)
EOSINOPHIL NFR BLD AUTO: 1 % (ref 0–6)
ERYTHROCYTE [DISTWIDTH] IN BLOOD BY AUTOMATED COUNT: 12.1 % (ref 11.6–15.1)
FRACTIONAL SHORTENING: 33 (ref 28–44)
GFR SERPL CREATININE-BSD FRML MDRD: 67 ML/MIN/1.73SQ M
GLUCOSE SERPL-MCNC: 114 MG/DL (ref 65–140)
GLUCOSE UR STRIP-MCNC: NEGATIVE MG/DL
HCT VFR BLD AUTO: 39.1 % (ref 36.5–49.3)
HGB BLD-MCNC: 13 G/DL (ref 12–17)
HGB UR QL STRIP.AUTO: ABNORMAL
IMM GRANULOCYTES # BLD AUTO: 0.05 THOUSAND/UL (ref 0–0.2)
IMM GRANULOCYTES NFR BLD AUTO: 1 % (ref 0–2)
INR PPP: 1.06 (ref 0.84–1.19)
INTERVENTRICULAR SEPTUM IN DIASTOLE (PARASTERNAL SHORT AXIS VIEW): 0.9 CM
INTERVENTRICULAR SEPTUM: 0.9 CM (ref 0.6–1.1)
KETONES UR STRIP-MCNC: NEGATIVE MG/DL
LAAS-AP2: 11.2 CM2
LAAS-AP4: 13.2 CM2
LACTATE SERPL-SCNC: 0.6 MMOL/L (ref 0.5–2)
LEFT ATRIUM SIZE: 2.9 CM
LEFT INTERNAL DIMENSION IN SYSTOLE: 3.3 CM (ref 2.1–4)
LEFT VENTRICLE DIASTOLIC VOLUME (MOD BIPLANE): 104 ML
LEFT VENTRICLE SYSTOLIC VOLUME (MOD BIPLANE): 44 ML
LEFT VENTRICULAR INTERNAL DIMENSION IN DIASTOLE: 4.9 CM (ref 3.5–6)
LEFT VENTRICULAR POSTERIOR WALL IN END DIASTOLE: 1 CM
LEFT VENTRICULAR STROKE VOLUME: 67 ML
LEUKOCYTE ESTERASE UR QL STRIP: NEGATIVE
LIPASE SERPL-CCNC: 83 U/L (ref 73–393)
LV EF: 58 %
LVSV (TEICH): 67 ML
LYMPHOCYTES # BLD AUTO: 1.67 THOUSANDS/ΜL (ref 0.6–4.47)
LYMPHOCYTES NFR BLD AUTO: 16 % (ref 14–44)
MCH RBC QN AUTO: 30.6 PG (ref 26.8–34.3)
MCHC RBC AUTO-ENTMCNC: 33.2 G/DL (ref 31.4–37.4)
MCV RBC AUTO: 92 FL (ref 82–98)
MONOCYTES # BLD AUTO: 1.21 THOUSAND/ΜL (ref 0.17–1.22)
MONOCYTES NFR BLD AUTO: 12 % (ref 4–12)
MV E'TISSUE VEL-SEP: 7 CM/S
MV PEAK A VEL: 0.65 M/S
MV PEAK E VEL: 78 CM/S
MV STENOSIS PRESSURE HALF TIME: 42 MS
MV VALVE AREA P 1/2 METHOD: 5.24
NEUTROPHILS # BLD AUTO: 7.52 THOUSANDS/ΜL (ref 1.85–7.62)
NEUTS SEG NFR BLD AUTO: 69 % (ref 43–75)
NITRITE UR QL STRIP: NEGATIVE
NON-SQ EPI CELLS URNS QL MICRO: ABNORMAL /HPF
NRBC BLD AUTO-RTO: 0 /100 WBCS
P AXIS: 25 DEGREES
PH UR STRIP.AUTO: 5.5 [PH]
PLATELET # BLD AUTO: 215 THOUSANDS/UL (ref 149–390)
PMV BLD AUTO: 9.8 FL (ref 8.9–12.7)
POTASSIUM SERPL-SCNC: 4.1 MMOL/L (ref 3.5–5.3)
PR INTERVAL: 140 MS
PROCALCITONIN SERPL-MCNC: 0.08 NG/ML
PROT SERPL-MCNC: 8.3 G/DL (ref 6.4–8.4)
PROT UR STRIP-MCNC: NEGATIVE MG/DL
PROTHROMBIN TIME: 13.8 SECONDS (ref 11.6–14.5)
QRS AXIS: 58 DEGREES
QRSD INTERVAL: 84 MS
QT INTERVAL: 308 MS
QTC INTERVAL: 437 MS
RBC # BLD AUTO: 4.25 MILLION/UL (ref 3.88–5.62)
RBC #/AREA URNS AUTO: ABNORMAL /HPF
RIGHT ATRIUM AREA SYSTOLE A4C: 11.8 CM2
RIGHT VENTRICLE ID DIMENSION: 3.9 CM
SL CV LEFT ATRIUM LENGTH A2C: 4 CM
SL CV PED ECHO LEFT VENTRICLE DIASTOLIC VOLUME (MOD BIPLANE) 2D: 113 ML
SL CV PED ECHO LEFT VENTRICLE SYSTOLIC VOLUME (MOD BIPLANE) 2D: 45 ML
SODIUM SERPL-SCNC: 138 MMOL/L (ref 135–147)
SP GR UR STRIP.AUTO: 1.02 (ref 1–1.03)
T WAVE AXIS: -14 DEGREES
TR MAX PG: 19 MMHG
TR PEAK VELOCITY: 2.2 M/S
TRICUSPID VALVE PEAK REGURGITATION VELOCITY: 2.16 M/S
UROBILINOGEN UR QL STRIP.AUTO: 0.2 E.U./DL
VENTRICULAR RATE: 121 BPM
WBC # BLD AUTO: 10.56 THOUSAND/UL (ref 4.31–10.16)
WBC #/AREA URNS AUTO: ABNORMAL /HPF

## 2022-07-21 PROCEDURE — G1004 CDSM NDSC: HCPCS

## 2022-07-21 PROCEDURE — 96376 TX/PRO/DX INJ SAME DRUG ADON: CPT

## 2022-07-21 PROCEDURE — 71275 CT ANGIOGRAPHY CHEST: CPT

## 2022-07-21 PROCEDURE — 93970 EXTREMITY STUDY: CPT

## 2022-07-21 PROCEDURE — 71045 X-RAY EXAM CHEST 1 VIEW: CPT

## 2022-07-21 PROCEDURE — 99285 EMERGENCY DEPT VISIT HI MDM: CPT | Performed by: PHYSICIAN ASSISTANT

## 2022-07-21 PROCEDURE — 80053 COMPREHEN METABOLIC PANEL: CPT | Performed by: PHYSICIAN ASSISTANT

## 2022-07-21 PROCEDURE — 93306 TTE W/DOPPLER COMPLETE: CPT

## 2022-07-21 PROCEDURE — 93970 EXTREMITY STUDY: CPT | Performed by: SURGERY

## 2022-07-21 PROCEDURE — 85025 COMPLETE CBC W/AUTO DIFF WBC: CPT | Performed by: PHYSICIAN ASSISTANT

## 2022-07-21 PROCEDURE — 84145 PROCALCITONIN (PCT): CPT | Performed by: PHYSICIAN ASSISTANT

## 2022-07-21 PROCEDURE — 99285 EMERGENCY DEPT VISIT HI MDM: CPT

## 2022-07-21 PROCEDURE — 81001 URINALYSIS AUTO W/SCOPE: CPT | Performed by: PHYSICIAN ASSISTANT

## 2022-07-21 PROCEDURE — 96361 HYDRATE IV INFUSION ADD-ON: CPT

## 2022-07-21 PROCEDURE — 85379 FIBRIN DEGRADATION QUANT: CPT | Performed by: PHYSICIAN ASSISTANT

## 2022-07-21 PROCEDURE — 85730 THROMBOPLASTIN TIME PARTIAL: CPT | Performed by: PHYSICIAN ASSISTANT

## 2022-07-21 PROCEDURE — 97163 PT EVAL HIGH COMPLEX 45 MIN: CPT | Performed by: PHYSICAL THERAPIST

## 2022-07-21 PROCEDURE — 99223 1ST HOSP IP/OBS HIGH 75: CPT | Performed by: INTERNAL MEDICINE

## 2022-07-21 PROCEDURE — 83690 ASSAY OF LIPASE: CPT | Performed by: PHYSICIAN ASSISTANT

## 2022-07-21 PROCEDURE — 83605 ASSAY OF LACTIC ACID: CPT | Performed by: PHYSICIAN ASSISTANT

## 2022-07-21 PROCEDURE — 97167 OT EVAL HIGH COMPLEX 60 MIN: CPT

## 2022-07-21 PROCEDURE — 85610 PROTHROMBIN TIME: CPT | Performed by: PHYSICIAN ASSISTANT

## 2022-07-21 PROCEDURE — 87040 BLOOD CULTURE FOR BACTERIA: CPT | Performed by: PHYSICIAN ASSISTANT

## 2022-07-21 PROCEDURE — 93010 ELECTROCARDIOGRAM REPORT: CPT

## 2022-07-21 PROCEDURE — 36415 COLL VENOUS BLD VENIPUNCTURE: CPT | Performed by: PHYSICIAN ASSISTANT

## 2022-07-21 PROCEDURE — 96374 THER/PROPH/DIAG INJ IV PUSH: CPT

## 2022-07-21 PROCEDURE — 93005 ELECTROCARDIOGRAM TRACING: CPT

## 2022-07-21 PROCEDURE — 74177 CT ABD & PELVIS W/CONTRAST: CPT

## 2022-07-21 PROCEDURE — 93306 TTE W/DOPPLER COMPLETE: CPT | Performed by: INTERNAL MEDICINE

## 2022-07-21 RX ORDER — PANTOPRAZOLE SODIUM 40 MG/1
40 TABLET, DELAYED RELEASE ORAL DAILY
COMMUNITY

## 2022-07-21 RX ORDER — HYDROMORPHONE HCL/PF 1 MG/ML
0.5 SYRINGE (ML) INJECTION EVERY 4 HOURS PRN
Status: DISCONTINUED | OUTPATIENT
Start: 2022-07-21 | End: 2022-07-29 | Stop reason: HOSPADM

## 2022-07-21 RX ORDER — CLONIDINE 0.1 MG/24H
1 PATCH, EXTENDED RELEASE TRANSDERMAL WEEKLY
Status: DISCONTINUED | OUTPATIENT
Start: 2022-07-25 | End: 2022-07-29 | Stop reason: HOSPADM

## 2022-07-21 RX ORDER — DIAZEPAM 5 MG/1
5 TABLET ORAL
Status: ON HOLD | COMMUNITY
End: 2022-07-29 | Stop reason: SDUPTHER

## 2022-07-21 RX ORDER — DIAZEPAM 5 MG/1
5 TABLET ORAL
Status: DISCONTINUED | OUTPATIENT
Start: 2022-07-21 | End: 2022-07-29 | Stop reason: HOSPADM

## 2022-07-21 RX ORDER — GABAPENTIN 300 MG/1
600 CAPSULE ORAL 3 TIMES DAILY
Status: DISCONTINUED | OUTPATIENT
Start: 2022-07-21 | End: 2022-07-29 | Stop reason: HOSPADM

## 2022-07-21 RX ORDER — OXYCODONE HYDROCHLORIDE 5 MG/1
5 TABLET ORAL EVERY 4 HOURS PRN
Status: DISCONTINUED | OUTPATIENT
Start: 2022-07-21 | End: 2022-07-29 | Stop reason: HOSPADM

## 2022-07-21 RX ORDER — HYDROMORPHONE HCL/PF 1 MG/ML
1 SYRINGE (ML) INJECTION ONCE
Status: COMPLETED | OUTPATIENT
Start: 2022-07-21 | End: 2022-07-21

## 2022-07-21 RX ORDER — BACLOFEN 10 MG/1
10 TABLET ORAL 3 TIMES DAILY
Status: DISCONTINUED | OUTPATIENT
Start: 2022-07-21 | End: 2022-07-29 | Stop reason: HOSPADM

## 2022-07-21 RX ORDER — MORPHINE SULFATE 4 MG/ML
4 INJECTION, SOLUTION INTRAMUSCULAR; INTRAVENOUS ONCE
Status: COMPLETED | OUTPATIENT
Start: 2022-07-21 | End: 2022-07-21

## 2022-07-21 RX ORDER — ATORVASTATIN CALCIUM 40 MG/1
40 TABLET, FILM COATED ORAL
Status: DISCONTINUED | OUTPATIENT
Start: 2022-07-21 | End: 2022-07-29 | Stop reason: HOSPADM

## 2022-07-21 RX ORDER — BUSPIRONE HYDROCHLORIDE 5 MG/1
5 TABLET ORAL 2 TIMES DAILY
Status: DISCONTINUED | OUTPATIENT
Start: 2022-07-21 | End: 2022-07-29 | Stop reason: HOSPADM

## 2022-07-21 RX ORDER — ACETAMINOPHEN 325 MG/1
975 TABLET ORAL EVERY 8 HOURS SCHEDULED
Status: DISCONTINUED | OUTPATIENT
Start: 2022-07-21 | End: 2022-07-29 | Stop reason: HOSPADM

## 2022-07-21 RX ORDER — TAMSULOSIN HYDROCHLORIDE 0.4 MG/1
0.4 CAPSULE ORAL
Status: DISCONTINUED | OUTPATIENT
Start: 2022-07-21 | End: 2022-07-29 | Stop reason: HOSPADM

## 2022-07-21 RX ORDER — ACETAMINOPHEN 325 MG/1
975 TABLET ORAL EVERY 6 HOURS PRN
Status: DISCONTINUED | OUTPATIENT
Start: 2022-07-21 | End: 2022-07-21

## 2022-07-21 RX ORDER — OXYCODONE HYDROCHLORIDE 10 MG/1
10 TABLET ORAL EVERY 4 HOURS PRN
Status: DISCONTINUED | OUTPATIENT
Start: 2022-07-21 | End: 2022-07-29 | Stop reason: HOSPADM

## 2022-07-21 RX ORDER — ACETAMINOPHEN 160 MG/5ML
1 SUSPENSION, ORAL (FINAL DOSE FORM) ORAL ONCE
Status: COMPLETED | OUTPATIENT
Start: 2022-07-21 | End: 2022-07-21

## 2022-07-21 RX ORDER — ENOXAPARIN SODIUM 100 MG/ML
1 INJECTION SUBCUTANEOUS EVERY 12 HOURS SCHEDULED
Status: DISCONTINUED | OUTPATIENT
Start: 2022-07-21 | End: 2022-07-22

## 2022-07-21 RX ORDER — ONDANSETRON 2 MG/ML
4 INJECTION INTRAMUSCULAR; INTRAVENOUS EVERY 6 HOURS PRN
Status: DISCONTINUED | OUTPATIENT
Start: 2022-07-21 | End: 2022-07-22

## 2022-07-21 RX ORDER — MAGNESIUM HYDROXIDE/ALUMINUM HYDROXICE/SIMETHICONE 120; 1200; 1200 MG/30ML; MG/30ML; MG/30ML
30 SUSPENSION ORAL EVERY 6 HOURS PRN
Status: DISCONTINUED | OUTPATIENT
Start: 2022-07-21 | End: 2022-07-29 | Stop reason: HOSPADM

## 2022-07-21 RX ORDER — TRAMADOL HYDROCHLORIDE 50 MG/1
75 TABLET ORAL EVERY 6 HOURS PRN
Status: DISCONTINUED | OUTPATIENT
Start: 2022-07-21 | End: 2022-07-21

## 2022-07-21 RX ORDER — HEPARIN SODIUM 5000 [USP'U]/ML
5000 INJECTION, SOLUTION INTRAVENOUS; SUBCUTANEOUS EVERY 8 HOURS SCHEDULED
COMMUNITY
End: 2022-07-29

## 2022-07-21 RX ORDER — LANOLIN ALCOHOL/MO/W.PET/CERES
6 CREAM (GRAM) TOPICAL
Status: DISCONTINUED | OUTPATIENT
Start: 2022-07-21 | End: 2022-07-29 | Stop reason: HOSPADM

## 2022-07-21 RX ORDER — SIMETHICONE 80 MG
80 TABLET,CHEWABLE ORAL 4 TIMES DAILY PRN
Status: DISCONTINUED | OUTPATIENT
Start: 2022-07-21 | End: 2022-07-29 | Stop reason: HOSPADM

## 2022-07-21 RX ORDER — PANTOPRAZOLE SODIUM 40 MG/1
40 TABLET, DELAYED RELEASE ORAL
Status: DISCONTINUED | OUTPATIENT
Start: 2022-07-21 | End: 2022-07-29 | Stop reason: HOSPADM

## 2022-07-21 RX ORDER — CLONIDINE 0.1 MG/24H
1 PATCH, EXTENDED RELEASE TRANSDERMAL WEEKLY
COMMUNITY

## 2022-07-21 RX ORDER — SODIUM CHLORIDE 9 MG/ML
100 INJECTION, SOLUTION INTRAVENOUS CONTINUOUS
Status: DISCONTINUED | OUTPATIENT
Start: 2022-07-21 | End: 2022-07-22

## 2022-07-21 RX ORDER — TRAMADOL HYDROCHLORIDE 50 MG/1
75 TABLET ORAL EVERY 6 HOURS PRN
Status: ON HOLD | COMMUNITY
End: 2022-07-29 | Stop reason: SDUPTHER

## 2022-07-21 RX ORDER — TRAZODONE HYDROCHLORIDE 100 MG/1
100 TABLET ORAL
Status: DISCONTINUED | OUTPATIENT
Start: 2022-07-21 | End: 2022-07-29 | Stop reason: HOSPADM

## 2022-07-21 RX ORDER — ENOXAPARIN SODIUM 100 MG/ML
1 INJECTION SUBCUTANEOUS EVERY 12 HOURS SCHEDULED
Status: DISCONTINUED | OUTPATIENT
Start: 2022-07-21 | End: 2022-07-21

## 2022-07-21 RX ORDER — DOCUSATE SODIUM 100 MG/1
100 CAPSULE, LIQUID FILLED ORAL 2 TIMES DAILY
Status: DISCONTINUED | OUTPATIENT
Start: 2022-07-21 | End: 2022-07-29 | Stop reason: HOSPADM

## 2022-07-21 RX ORDER — BISACODYL 10 MG
10 SUPPOSITORY, RECTAL RECTAL DAILY PRN
Status: DISCONTINUED | OUTPATIENT
Start: 2022-07-21 | End: 2022-07-29 | Stop reason: HOSPADM

## 2022-07-21 RX ORDER — BISACODYL 10 MG
10 SUPPOSITORY, RECTAL RECTAL DAILY PRN
Status: DISCONTINUED | OUTPATIENT
Start: 2022-07-21 | End: 2022-07-21

## 2022-07-21 RX ADMIN — ACETAMINOPHEN 325MG 975 MG: 325 TABLET ORAL at 14:31

## 2022-07-21 RX ADMIN — HYDROMORPHONE HYDROCHLORIDE 0.5 MG: 1 INJECTION, SOLUTION INTRAMUSCULAR; INTRAVENOUS; SUBCUTANEOUS at 13:40

## 2022-07-21 RX ADMIN — OXYCODONE HYDROCHLORIDE 10 MG: 10 TABLET ORAL at 20:32

## 2022-07-21 RX ADMIN — SODIUM CHLORIDE 100 ML/HR: 0.9 INJECTION, SOLUTION INTRAVENOUS at 17:57

## 2022-07-21 RX ADMIN — GABAPENTIN 600 MG: 300 CAPSULE ORAL at 17:58

## 2022-07-21 RX ADMIN — SODIUM CHLORIDE 1000 ML: 0.9 INJECTION, SOLUTION INTRAVENOUS at 01:34

## 2022-07-21 RX ADMIN — GABAPENTIN 600 MG: 300 CAPSULE ORAL at 22:20

## 2022-07-21 RX ADMIN — ATORVASTATIN CALCIUM 40 MG: 40 TABLET, FILM COATED ORAL at 18:06

## 2022-07-21 RX ADMIN — BACLOFEN 10 MG: 10 TABLET ORAL at 14:30

## 2022-07-21 RX ADMIN — GABAPENTIN 600 MG: 300 CAPSULE ORAL at 08:11

## 2022-07-21 RX ADMIN — IOHEXOL 70 ML: 350 INJECTION, SOLUTION INTRAVENOUS at 04:19

## 2022-07-21 RX ADMIN — MORPHINE SULFATE 4 MG: 4 INJECTION INTRAVENOUS at 01:34

## 2022-07-21 RX ADMIN — BACLOFEN 10 MG: 10 TABLET ORAL at 08:11

## 2022-07-21 RX ADMIN — DOCUSATE SODIUM 100 MG: 100 CAPSULE, LIQUID FILLED ORAL at 08:11

## 2022-07-21 RX ADMIN — OXYCODONE HYDROCHLORIDE 10 MG: 10 TABLET ORAL at 14:30

## 2022-07-21 RX ADMIN — BUSPIRONE HYDROCHLORIDE 5 MG: 5 TABLET ORAL at 22:20

## 2022-07-21 RX ADMIN — TRAZODONE HYDROCHLORIDE 100 MG: 100 TABLET ORAL at 22:21

## 2022-07-21 RX ADMIN — TRAMADOL HYDROCHLORIDE 75 MG: 50 TABLET, COATED ORAL at 10:30

## 2022-07-21 RX ADMIN — MELATONIN 6 MG: at 22:21

## 2022-07-21 RX ADMIN — DOCUSATE SODIUM 100 MG: 100 CAPSULE, LIQUID FILLED ORAL at 17:58

## 2022-07-21 RX ADMIN — ENOXAPARIN SODIUM 90 MG: 100 INJECTION SUBCUTANEOUS at 22:20

## 2022-07-21 RX ADMIN — HYDROMORPHONE HYDROCHLORIDE 1 MG: 1 INJECTION, SOLUTION INTRAMUSCULAR; INTRAVENOUS; SUBCUTANEOUS at 06:08

## 2022-07-21 RX ADMIN — PANTOPRAZOLE SODIUM 40 MG: 40 TABLET, DELAYED RELEASE ORAL at 07:39

## 2022-07-21 RX ADMIN — TAMSULOSIN HYDROCHLORIDE 0.4 MG: 0.4 CAPSULE ORAL at 17:58

## 2022-07-21 RX ADMIN — BACLOFEN 10 MG: 10 TABLET ORAL at 22:20

## 2022-07-21 RX ADMIN — BUSPIRONE HYDROCHLORIDE 5 MG: 5 TABLET ORAL at 08:11

## 2022-07-21 RX ADMIN — HYDROMORPHONE HYDROCHLORIDE 0.5 MG: 1 INJECTION, SOLUTION INTRAMUSCULAR; INTRAVENOUS; SUBCUTANEOUS at 18:04

## 2022-07-21 RX ADMIN — MORPHINE SULFATE 4 MG: 4 INJECTION INTRAVENOUS at 02:48

## 2022-07-21 RX ADMIN — DIAZEPAM 5 MG: 5 TABLET ORAL at 22:21

## 2022-07-21 RX ADMIN — HYDROMORPHONE HYDROCHLORIDE 0.5 MG: 1 INJECTION, SOLUTION INTRAMUSCULAR; INTRAVENOUS; SUBCUTANEOUS at 22:48

## 2022-07-21 RX ADMIN — ENOXAPARIN SODIUM 90 MG: 100 INJECTION SUBCUTANEOUS at 08:11

## 2022-07-21 NOTE — PHYSICAL THERAPY NOTE
Physical Therapy Evaluation    Performed at least 2 patient identifiers during session:  Patient Active Problem List   Diagnosis    Central cord syndrome [facility])    Pulmonary insufficiency    Closed fracture of fifth cervical vertebra ([facility] [address] )    Spinal cord injury, C1-C7 ([facility] [address] )    Fall    Pain    Intractable hiccups    Depression    Urinary retention    Testicular discomfort    Acute low back pain    Hypokalemia    Benign essential HTN    HLD (hyperlipidemia)    S/P orchiectomy    Lower urinary tract symptoms (LUTS)    Encounter to discuss test results    Cord compression myelopathy (HCC)    Leukocytosis    Acute pulmonary embolism without acute cor pulmonale (HCC)    Atelectasis       Past Medical History:   Diagnosis Date    Chronic depression     PTSD (post-traumatic stress disorder)        Past Surgical History:   Procedure Laterality Date    CERVICAL FUSION N/A 03/06/2021    Procedure: POSTERIOR C3-5 laminectomy, C2-7 fixation fusion, possible additional levels;  Surgeon: Anni Gardner MD;  [facility];  Service: Neurosurgery    IR PICC PLACEMENT DOUBLE LUMEN  03/08/2021    NECK SURGERY  05/12/2022    NE ARTHRODESIS ANT INTERBODY MIN DISCECTOMY, CERVICAL BELOW C2 N/A 05/12/2022    Procedure: ACDF C6-7; Revision PCDF, revise/replace right C2 screw with laminar screw, revise left C2 screw (cap), remove bilateral C7 lateral mass screws, place navigated C7, T1 pedicle screws, C6-7 laminectomy, arthrodesis C2-3 and C6-T1;  Surgeon: Alayna Alfaro MD;  [facility];  Service: Neurosurgery    SCROTAL SURGERY Right 07/20/2021    Procedure: Right scrotal exploration, Irrigation and debridement Right orchiectomy;  Surgeon: Everett Amato MD;  [facility];  Service: Urology        07/21/22 0944   PT Last Visit   PT Visit Date 07/21/22   Note Type   Note type Evaluation   Pain Assessment   Pain Assessment Tool 0-10   Pain Score   (14/10)   Pain Location/Orientation Orientation: Left;Location: [place]   Hospital Pain Intervention(s) Emotional support  (declined mobility, nursing notified)   Restrictions/Precautions   Weight Bearing Precautions Per Order No   Other Precautions Multiple lines;Telemetry;O2;Fall Risk;Pain   Home Living   Type of Home SNF  ([facility])   Home Layout One level;Ramped entrance;Elevator   [address]  (tilt in space w/c)   Prior Function   Level of [place] Needs assistance with IADLs; Needs assistance with ADLs and functional mobility   Lives With Facility staff   Receives Help From Personal care attendant  (OT/PTat rehab)   ADL Assistance Needs assistance   IADLs Needs assistance   Falls in the last 6 months   (antonellaes falls, "not that I can recall")   Comments pt currently at [facility], reports being able to amb 300-600' without assistive device  reports needing assist for bed mobility and supine to /from sitting  reports able to toilet self, use adaptive eating utensils     General   Family/Caregiver Present No   Cognition   Overall Cognitive Status WFL   Orientation Level Oriented X4   Comments ? submaximal effort during testing  minimally talkative   Subjective   Subjective reports severe l chest wall pain   RUE Assessment   RUE Assessment WFL   LUE Assessment   LUE Assessment   (dec rom, dec srength, see OT note)   RLE Assessment   RLE Assessment X  (HF 3+/5, quads 4-/5, ankle 3/5, submaximal effort?)   LLE Assessment   LLE Assessment X  (HF 4/5, quads 4/5, ankle 4+/5)   Coordination   Movements are Fluid and Coordinated 1  (through available strength)   Sensation X  (absent below knees)   Bed Mobility   Rolling R Unable to assess  (pt declined movement testing)   Balance   Static Sitting   (pt declined due to pain)   Endurance Deficit   Endurance Deficit Yes   Endurance Deficit Description O2 at 2L/min   Activity Tolerance   Activity Tolerance Patient limited by pain;Treatment limited secondary to medical complications (Comment)   Medical Staff Made Aware OT, nursing   Nurse Made Aware yes   Assessment   Prognosis Fair   Problem List Decreased strength;Decreased range of motion;Decreased endurance; Impaired balance;Decreased mobility; Impaired sensation;Pain   Assessment pt admitted with L sided chest pain, dyspnea, abdominal pain  pt dx with acute PE without cor pulmonale  pt refueed to PT  pt was at [facility] for recent revision of cervical fusion and spinal cord myelopathy  claims to be indep without assistive device for walking and toileting but needing assist bed mobility and iadl's  pt was originally living at [facility]  pt demonstrated severe functoinal limitations at this time due to uncontrolled pain, refusal to mobilize during eval and prior debility  pt was only evlauated for strength and rom, sensation, pulmonary function  pt has current and chronic deficits in rom, strength, sensation, locomotion, bowel function and acute impairment in pulmonry function and pain  control  pt will need mobility assessment next session, currently recommend return to rehab  Barriers to Discharge None   Goals   Patient Goals less pain   STG Expiration Date 08/04/22   Short Term Goal #1 improve strength by 1/2 grade, activity tolerance 45 minutes, demonstrate good safety practices  eval all mobility and update PT goals  Plan   Treatment/Interventions Functional transfer training;LE strengthening/ROM; Therapeutic exercise; Endurance training;Patient/family training;Equipment eval/education; Bed mobility;Gait training; Compensatory technique education;Spoke to nursing;OT   PT Frequency 4-6x/wk   Recommendation   PT Discharge Recommendation Post acute rehabilitation services  (return to [facility])   Arian Carter [phone]   Turning in Bed Without Bedrails 2   Lying on Back to Sitting on Edge of Flat Bed 2   Moving Bed to Chair 2   Standing Up From Chair 2   Walk in Room 2   Climb 3-5 Stairs 1   Basic Mobility Inpatient Raw Score 11   Basic Mobility Standardized Score 30 25   Highest Level Of Mobility   -[facility] Goal 4: Move to chair/commode   -HLM Achieved 2: Bed activities/Dependent transfer       An AM-PAC Basic Mobility standardized score less than 42 9 suggests the patient may benefit from discharge to post-acute rehab services      History: co - morbidities, fall risk, use of assistive device, assist for adl's, multiple lines  Exam: systems evaluated :pain control, musculoskeletal,  joint integrity,  cardiac, pulmonary,   Clinical: unstable/unpredictable- acute PE  Complexity:high  Massimo Gross, PT

## 2022-07-21 NOTE — ASSESSMENT & PLAN NOTE
· Moderate to marked bibasilar subsegmental atelectasis seen on CT study  Provide patient with incentive spirometer    Monitor O2 sats closely

## 2022-07-21 NOTE — PLAN OF CARE
Problem: PHYSICAL THERAPY ADULT  Goal: Performs mobility at highest level of function for planned discharge setting  See evaluation for individualized goals  Description: Treatment/Interventions: Functional transfer training, LE strengthening/ROM, Therapeutic exercise, Endurance training, Patient/family training, Equipment eval/education, Bed mobility, Gait training, Compensatory technique education, Spoke to nursing, OT          See flowsheet documentation for full assessment, interventions and recommendations  7/21/2022 1207 by Eugene Arnold, PT  Note: Prognosis: Fair  Problem List: Decreased strength, Decreased range of motion, Decreased endurance, Impaired balance, Decreased mobility, Impaired sensation, Pain  Assessment: pt admitted with L sided chest pain, dyspnea, abdominal pain  pt dx with acute PE without cor pulmonale  pt refueed to PT  pt was at [facility] for recent revision of cervical fusion and spinal cord myelopathy  claims to be indep without assistive device for walking and toileting but needing assist bed mobility and iadl's  pt was originally living at [facility]  pt demonstrated severe functoinal limitations at this time due to uncontrolled pain, refusal to mobilize during eval and prior debility  pt was only evlauated for strength and rom, sensation, pulmonary function  pt has current and chronic deficits in rom, strength, sensation, locomotion, bowel function and acute impairment in pulmonry function and pain  control  pt will need mobility assessment next session, currently recommend return to rehab  Barriers to Discharge: None     PT Discharge Recommendation: Post acute rehabilitation services (return to [facility])    See flowsheet documentation for full assessment       7/21/2022 1206 by Eugene Arnold, PT  Note: Prognosis: Fair  Problem List: Decreased strength, Decreased range of motion, Decreased endurance, Impaired balance, Decreased mobility, Impaired sensation, Pain  Assessment: pt admitted with L sided chest pain, dyspnea, abdominal pain  pt dx with acute PE without cor pulmonale  pt refueed to PT  pt was at [facility] for recent revision of cervical fusion and spinal cord myelopathy  claims to be indep without assistive device for walking and toileting but needing assist bed mobility and iadl's  pt was originally living at [facility]  pt demonstrated severe functoinal limitations at this time due to uncontrolled pain, refusal to mobilize during eval and prior debility  pt was only evlauated for strength and rom, sensation, pulmonary function  pt has current and chronic deficits in rom, strength, sensation, locomotion, bowel function and acute impairment in pulmonry function and pain  control  pt will need mobility assessment next session, currently recommend return to rehab  Barriers to Discharge: None     PT Discharge Recommendation: Post acute rehabilitation services (return to [facility])    See flowsheet documentation for full assessment

## 2022-07-21 NOTE — OCCUPATIONAL THERAPY NOTE
Occupational Therapy Evaluation     Patient Name: Yo Cain  RWHYK'F Date: 7/21/2022  Problem List  Principal Problem:    Acute pulmonary embolism without acute cor pulmonale ([address] )  Active Problems:    Urinary retention    Benign essential HTN    HLD (hyperlipidemia)    Cord compression myelopathy (HCC)    Atelectasis    Past Medical History  Past Medical History:   Diagnosis Date    Chronic depression     PTSD (post-traumatic stress disorder)      Past Surgical History  Past Surgical History:   Procedure Laterality Date    CERVICAL FUSION N/A 03/06/2021    Procedure: POSTERIOR C3-5 laminectomy, C2-7 fixation fusion, possible additional levels;  Surgeon: Tessie Garcia MD;  [facility];  Service: Neurosurgery    IR PICC PLACEMENT DOUBLE LUMEN  03/08/2021    NECK SURGERY  05/12/2022    MA ARTHRODESIS ANT INTERBODY MIN DISCECTOMY, CERVICAL BELOW C2 N/A 05/12/2022    Procedure: ACDF C6-7; Revision PCDF, revise/replace right C2 screw with laminar screw, revise left C2 screw (cap), remove bilateral C7 lateral mass screws, place navigated C7, T1 pedicle screws, C6-7 laminectomy, arthrodesis C2-3 and C6-T1;  Surgeon: Garrett Lam MD;  [facility];  Service: Neurosurgery    SCROTAL SURGERY Right 07/20/2021    Procedure: Right scrotal exploration, Irrigation and debridement Right orchiectomy;  Surgeon: Jarad Houser MD;  [facility];  Service: Urology             07/21/22 0959   OT Last Visit   OT Visit Date 07/21/22   Note Type   Note type Evaluation   Restrictions/Precautions   Weight Bearing Precautions Per Order No   Other Precautions Multiple lines;Telemetry;O2;Fall Risk;Pain   Pain Assessment   Pain Assessment Tool 0-10   Pain Score 10 - Worst Possible Pain   Pain Location/Orientation Orientation: Left; Other (Comment)  (chest wall around to back)   Pain Onset/Description Onset: Ongoing   Patient's Stated Pain Goal No pain   Hospital Pain Intervention(s) Emotional support; Rest   Home Living   Type of Home SNF  ([address]; currently at [facility])   Home Layout One level;Ramped entrance;Elevator   Bathroom Shower/Tub Walk-in shower   Bathroom Toilet Raised   Bathroom Equipment Grab bars in shower; Shower chair;Grab bars around toilet   [address] (Comment)  (Tilt in space WC)   Prior Function   Lives With Facility staff   Receives Help From Personal care attendant   ADL Assistance Needs assistance   IADLs Needs assistance   Falls in the last 6 months   (Denies; "not that I can recall")   Lifestyle   Autonomy Pt is currently at [facility] and reports needing assist for bed mobility, ADLS, and toileting but able to ambulate without -600 ft  Does not recall any falls  Uses adaptive equipment to feed self  Reciprocal Relationships Staff   Psychosocial   Psychosocial (WDL) X   Patient Behaviors/Mood Flat affect; Other (Comment)  (Lethargic)   Subjective   Subjective "I'm in so much pain"   ADL   Eating Assistance 4  Minimal Assistance   Eating Deficit   (R hand dominant; Decreased ROM L hand  Needs assist for cutting food and s/u )   Grooming Assistance 4  Minimal Assistance   Grooming Deficit Setup   UB Bathing Assistance 3  Moderate Assistance   UB Bathing Deficit Setup   LB Bathing Assistance 2  Maximal Assistance   LB Bathing Deficit Setup   UB Dressing Assistance 2  Maximal Assistance   UB Dressing Deficit Setup   LB Dressing Assistance 1  Total Assistance   LB Dressing Deficit Setup   Toileting Assistance  1  Total Assistance   Bed Mobility   Rolling R Unable to assess   Additional Comments Declined movement 2' intense pain at chest wall around back     Activity Tolerance   Activity Tolerance Patient limited by pain;Treatment limited secondary to medical complications (Comment)   Medical Staff Made Aware PT, Óscarcheryl Coronado   Nurse Made Aware Niyah YANEZ Assessment   RUE Assessment WFL  (4-/5)   LUE Assessment   LUE Assessment X  (Decreased shld, AAROM elbow, AAROM hand  Question partial submaximal effort vs pain in chest wall vs limitations from prior injury )   Hand Function   Gross Motor Coordination Impaired  (L hand)   Fine Motor Coordination Impaired  (L hand)   Sensation   Light Touch Partial deficits in the RUE;Partial deficits in the LUE;Partial deficits in the RLE;Partial deficits in the LLE  (Hands and feet decreased sensation per pt)   Vision-Basic Assessment   Current Vision No visual deficits  (Declines any visual deficits)   Cognition   Arousal/Participation Responsive   Attention Attends with cues to redirect   Orientation Level Oriented X4  (Questionable historian)   Memory Decreased short term memory;Decreased recall of recent events   Following Commands Follows one step commands without difficulty   Comments Lethargic  Slow soft verbalizations  Submaximal effort at times  Questionable historian  Assessment   Limitation Decreased ADL status; Decreased UE ROM; Decreased UE strength;Decreased cognition;Decreased endurance;Decreased self-care trans;Decreased high-level ADLs   Prognosis Fair   Assessment Pt admit 7/21/22 with left- sided chest pain and dyspnea  Elevated D-dimer  Dx: acute pulmonary embolism  Pt s/p cervical fixation fusion revision in May 2022  DC'd to [facility]  OT completed extensive review of pt's medical and social history  Pt with h/o severe central spinal cord injury s/p falling down steps, HTN, HLD, depression, PTSD  Prior to admit was living at [facility] but was at [facility] for therapy  Questionable historian  Reports needing assist for ADLs, toileting, and bed mobility but able to ambulate without -600 ft  Does not recall any recent falls  Pt presents to OT below baseline due to the following performance deficits: ROM; strength; Coordination; sensation; pain; balance; stand tolerance; functional mobility; problem solving; attention; coping; community integration; self care   Therefore, pt would benefit from OT services to achieve optimal level of performance and decrease caregiver burden  Occupational performance areas to be addressed include: eating, grooming, bathing, toileting, dressing, activity tolerance, functional mobility, and clothing management  Pt is Min for grooming and eating (R hand dominant)  Decreased L UE ROM  Mod/Max for UB ADLS and Max/D for LB ADLS and toileting  Severe pain in L chest wall and around back  Question submaximal effort at times  Declines movement during evaluation  SPO2 95% 2L O2   Pt does not require oxygen at baseline  Based on findings, pt is of high complexity  The patient's raw score on the AM-PAC Daily Activity inpatient short form is 12, standardized score is 30 6, less than 39 4  Patients at this level are likely to benefit from discharge to post-acute rehabilitation services  Recommend pt return to rehab to continue to achieve optimal performance  Goals   Patient Goals To feel better   Plan   Treatment Interventions ADL retraining;Functional transfer training;UE strengthening/ROM; Endurance training;Patient/family training;Equipment evaluation/education; Activityengagement   Goal Expiration Date 08/04/22   OT Treatment Day 0   OT Frequency 3-5x/wk   Recommendation   OT Discharge Recommendation Return to facility with rehabilitation services   AM-PAC Daily Activity Inpatient   Lower Body Dressing 1   Bathing 2   Toileting 1   Upper Body Dressing 2   Grooming 3   Eating 3   Daily Activity Raw Score 12   Daily Activity Standardized Score (Calc for Raw Score >=11) 30 6   AM-PAC Applied Cognition Inpatient   Following a Speech/Presentation 2   Understanding Ordinary Conversation 3   Taking Medications 3   Remembering Where Things Are Placed or Put Away 2   Remembering List of 4-5 Errands 2   Taking Care of Complicated Tasks 2   Applied Cognition Raw Score 14   Applied Cognition Standardized Score 32 02     GOALS:     Pt will complete UB ADL's w/ Min    Pt will complete LB ADL's w/ Mod using AE    Pt will complete toileting including hygiene and clothing management w/ Min     OT to assess functional transfers and set appropriate goals     Pt will improve activity tolerance to F+ for 20- 30 min tx session for increased engagement in functional tasks     Pt will achieve UE MMT 4+/5 to increase independence w/ ADL txfs         [facility] MS, OTR/L

## 2022-07-21 NOTE — ED NOTES
Pt aware of need for urine sample but remains unable to provide one       Analy Riggs  07/21/22 [address]  07/21/22 3422

## 2022-07-21 NOTE — PLAN OF CARE
Problem: OCCUPATIONAL THERAPY ADULT  Goal: Performs self-care activities at highest level of function for planned discharge setting  See evaluation for individualized goals  Description: Treatment Interventions: ADL retraining, Functional transfer training, UE strengthening/ROM, Endurance training, Patient/family training, Equipment evaluation/education, Activityengagement          See flowsheet documentation for full assessment, interventions and recommendations  7/21/2022 1247 by Anthony Dueñas OT  Note: Limitation: Decreased ADL status, Decreased UE ROM, Decreased UE strength, Decreased cognition, Decreased endurance, Decreased self-care trans, Decreased high-level ADLs  Prognosis: Fair  Assessment: Pt admit 7/21/22 with left- sided chest pain and dyspnea  Elevated D-dimer  Dx: acute pulmonary embolism  Pt s/p cervical fixation fusion revision in May 2022  DC'd to [place] [address]  OT completed extensive review of pt's medical and social history  Pt with h/o severe central spinal cord injury s/p falling down steps, HTN, HLD, depression, PTSD  Prior to admit was living at [facility] but was at [place] [address] for therapy  Questionable historian  Reports needing assist for ADLs, toileting, and bed mobility but able to ambulate without -600 ft  Does not recall any recent falls  Pt presents to OT below baseline due to the following performance deficits: ROM; strength; Coordination; sensation; pain; balance; stand tolerance; functional mobility; problem solving; attention; coping; community integration; self care  Therefore, pt would benefit from OT services to achieve optimal level of performance and decrease caregiver burden  Occupational performance areas to be addressed include: eating, grooming, bathing, toileting, dressing, activity tolerance, functional mobility, and clothing management  Pt is Min for grooming and eating (R hand dominant)  Decreased L UE ROM   Mod/Max for UB ADLS and Max/D for LB ADLS and toileting  Severe pain in L chest wall and around back  Question submaximal effort at times  Declines movement during evaluation  SPO2 95% 2L O2   Pt does not require oxygen at baseline  Based on findings, pt is of high complexity  The patient's raw score on the AM-PAC Daily Activity inpatient short form is 12, standardized score is 30 6, less than 39 4  Patients at this level are likely to benefit from discharge to post-acute rehabilitation services  Recommend pt return to rehab to continue to achieve optimal performance  OT Discharge Recommendation: Return to facility with rehabilitation services       7/21/2022 1247 by Chance Vigil OT  Note: Limitation: Decreased ADL status, Decreased UE ROM, Decreased UE strength, Decreased cognition, Decreased endurance, Decreased self-care trans, Decreased high-level ADLs  Prognosis: Fair  Assessment: Pt admit 7/21/22 with left- sided chest pain and dyspnea  Elevated D-dimer  Dx: acute pulmonary embolism  Pt s/p cervical fixation fusion revision in May 2022  DC'd to [place] [address]  OT completed extensive review of pt's medical and social history  Pt with h/o severe central spinal cord injury s/p falling down steps, HTN, HLD, depression, PTSD  Prior to admit was living at [facility] but was at [place] [address] for therapy  Questionable historian  Reports needing assist for ADLs, toileting, and bed mobility but able to ambulate without -600 ft  Does not recall any recent falls  Pt presents to OT below baseline due to the following performance deficits: ROM; strength; Coordination; sensation; pain; balance; stand tolerance; functional mobility; problem solving; attention; coping; community integration; self care  Therefore, pt would benefit from OT services to achieve optimal level of performance and decrease caregiver burden   Occupational performance areas to be addressed include: eating, grooming, bathing, toileting, dressing, activity tolerance, functional mobility, and clothing management  Pt is Min for grooming and eating (R hand dominant)  Decreased L UE ROM  Mod/Max for UB ADLS and Max/D for LB ADLS and toileting  Severe pain in L chest wall and around back  Question submaximal effort at times  Declines movement during evaluation  SPO2 95% 2L O2   Pt does not require oxygen at baseline  Based on findings, pt is of high complexity  The patient's raw score on the AM-PAC Daily Activity inpatient short form is 12, standardized score is 30 6, less than 39 4  Patients at this level are likely to benefit from discharge to post-acute rehabilitation services  Recommend pt return to rehab to continue to achieve optimal performance       OT Discharge Recommendation: Return to facility with rehabilitation services

## 2022-07-21 NOTE — UTILIZATION REVIEW
Inpatient Admission Authorization Request   NOTIFICATION OF INPATIENT ADMISSION/INPATIENT AUTHORIZATION REQUEST   SERVICING FACILITY:   [address] [place] [address], [address]   Tax ID: [id number]  NPI: [id number]  Place of Service: [facility]  [address]  Place of Service Code: 24     ATTENDING PROVIDER:  Attending Name and NPI#: Lala Vazquez [[id number]]  Address: [address] [place] [address], [address]   Phone: [phone]     UTILIZATION REVIEW CONTACT:  Franko Garcia Utilization   Network Utilization Review Department  Phone: [phone]  Fax: [phone]  Email: [email or web address]     PHYSICIAN ADVISORY SERVICES:  FOR [id number] REVIEW - MEDICAL NECESSITY DENIAL  Phone: [phone]  Fax: [phone]  Email: [email or web address]     TYPE OF REQUEST:  Inpatient Status     ADMISSION INFORMATION:  ADMISSION DATE/TIME: 7/21/22  5:34 AM  PATIENT DIAGNOSIS CODE/DESCRIPTION:  Known medical problems [Z78 9]  DISCHARGE DATE/TIME: No discharge date for patient encounter  IMPORTANT INFORMATION:  Please contact Franko Garcia directly with any questions or concerns regarding this request  Department voicemails are confidential     Send requests for admission clinical reviews, concurrent reviews, approvals, and administrative denials due to lack of clinical to fax [phone]

## 2022-07-21 NOTE — ASSESSMENT & PLAN NOTE
· 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  · Admitted to [facility] in [place] for spinal cord compression and herniated cervical disc s/p CDF C6-7; Revision PCDF, revise/replace right C2 screw with laminar screw, navigated C7, T1 pedicle screws, C6-7 laminectomy May 2022    Followed outpatient by Neurosurgery

## 2022-07-21 NOTE — H&P
[address]  H&P- Yadiel Sher 1985, 43 y o  male MRN: [id number]  Unit/Bed#: ED 10 Encounter: [id number]  Primary Care Provider: Sayda Adamson MD   Date and time admitted to hospital: 7/21/2022  1:01 AM    * Acute pulmonary embolism without acute cor pulmonale ([address] )  Assessment & Plan  · Presented to ED with complaints of left-sided chest pain and dyspnea  Elevated D-dimer  · PE study reveals Multiple intraluminal filling defects involving branches of the left superior and inferior pulmonary arteries compatible with acute pulmonary emboli  Normal RV LV ratio  · Status post cervical fixation fusion in May  Was discharged to short-term rehab at [facility]  On anticoagulation with subcutaneous heparin  · Check B/LLE duplex  · Treat with subq Lovenox weight based dosing    Atelectasis  Assessment & Plan  · Moderate to marked bibasilar subsegmental atelectasis seen on CT study  Provide patient with incentive spirometer  Monitor O2 sats closely    Cord compression myelopathy [facility])  Assessment & Plan  · 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  · Admitted to [facility] [place] for spinal cord compression and herniated cervical disc s/p CDF C6-7; Revision PCDF, revise/replace right C2 screw with laminar screw, navigated C7, T1 pedicle screws, C6-7 laminectomy May 2022  Followed outpatient by Neurosurgery    HLD (hyperlipidemia)  Assessment & Plan  · Continue statin    Benign essential HTN  Assessment & Plan  · Clonidine TD patch every Monday    Urinary retention  Assessment & Plan  · Maintained on Flomax, monitor urine output    VTE Prophylaxis: Enoxaparin (Lovenox)  / reason for no mechanical VTE prophylaxis ac   Code Status: fC  POLST: POLST is not applicable to this patient  Discussion with family:     Anticipated Length of Stay:  Patient will be admitted on an Inpatient basis with an anticipated length of stay of  > 2 midnights     Justification for Hospital Stay: PE    Total Time for Visit, including Counseling / Coordination of Care: 60 minutes  Greater than 50% of this total time spent on direct patient counseling and coordination of care  Chief Complaint:   "Serious pain"    History of Present Illness:    Venkat Giang is a 43 y o  male who presents with c/o severe abdominal pain  Reports pain started in left side of abdomen, he thought pain was secondary to constipation although he had a large bowel movement last night  He tried to lay down but pain became worse, pain radiated from left flank to left chest and to back; pain increased with inspiration  Reports associated dyspnea and one episode of fever prior to coming to ED  Admitted in May at [facility] for spinal cord compression and herniated cervical disc s/p cervical fixation fusion  On discharge he was sent to rehab at [facility]  Is anticoagulated on subcutaneous heparin q 8h, reports compliance with anticoagulation  Denies lower extremity swelling, pain or erythema  Review of Systems:    Review of Systems   Constitutional: Positive for fever  Fever x1   HENT: Negative  Respiratory: Positive for shortness of breath  Negative for cough  Pleuritic chest pain, worse with inspiration   Gastrointestinal: Positive for abdominal pain  Negative for constipation, diarrhea, nausea and vomiting  Genitourinary: Negative  Musculoskeletal: Positive for back pain  Skin: Negative  Neurological: Negative  Psychiatric/Behavioral: Negative          Past Medical and Surgical History:     Past Medical History:   Diagnosis Date    Chronic depression     PTSD (post-traumatic stress disorder)        Past Surgical History:   Procedure Laterality Date    CERVICAL FUSION N/A 03/06/2021    Procedure: POSTERIOR C3-5 laminectomy, C2-7 fixation fusion, possible additional levels;  Surgeon: Monse Brady MD;  [facility];  Service: Neurosurgery    IR PICC PLACEMENT DOUBLE LUMEN  03/08/2021    NECK SURGERY  05/12/2022    WI ARTHRODESIS ANT INTERBODY MIN DISCECTOMY, CERVICAL BELOW C2 N/A 05/12/2022    Procedure: ACDF C6-7; Revision PCDF, revise/replace right C2 screw with laminar screw, revise left C2 screw (cap), remove bilateral C7 lateral mass screws, place navigated C7, T1 pedicle screws, C6-7 laminectomy, arthrodesis C2-3 and C6-T1;  Surgeon: Urban Carranza MD;  [facility];  Service: Neurosurgery    SCROTAL SURGERY Right 07/20/2021    Procedure: Right scrotal exploration, Irrigation and debridement Right orchiectomy;  Surgeon: Ursula Saldivar MD;  [facility];  Service: Urology       Meds/Allergies:    Prior to Admission medications    Medication Sig Start Date End Date Taking?  Authorizing Provider   acetaminophen (TYLENOL) 325 mg tablet Take 3 tablets (975 mg total) by mouth every 8 (eight) hours  Patient not taking: Reported on 6/24/2022 5/31/22   Jessica Escamilla PA-C   acetaminophen (TYLENOL) 500 mg tablet Take 500 mg by mouth every 8 (eight) hours    Historical Provider, MD   atorvastatin (LIPITOR) 40 mg tablet Take 1 tablet (40 mg total) by mouth daily with dinner 7/23/21   Marlene Marshall MD   baclofen 20 mg tablet Take 20 mg by mouth 3 (three) times a day    Historical Provider, MD   baclofen 5 MG TABS Take 15 mg by mouth 3 (three) times a day  Patient not taking: Reported on 6/24/2022 5/31/22   Gina Tiwari PA-C   bisacodyl (DULCOLAX) 10 mg suppository Insert 1 suppository (10 mg total) into the rectum daily as needed for constipation 7/23/21   Marlene Marshall MD   busPIRone (BUSPAR) 5 mg tablet Take 5 mg by mouth 2 (two) times a day 4/3/22   Historical Provider, MD   Calcium Carbonate Antacid (CALCIUM CARBONATE PO) Take by mouth  Patient not taking: Reported on 6/24/2022    Historical Provider, MD   celecoxib (CeleBREX) 100 mg capsule Take 100 mg by mouth 2 (two) times a day  Patient not taking: No sig reported Historical Provider, MD   docusate sodium (COLACE) 100 mg capsule Take 1 capsule (100 mg total) by mouth 2 (two) times a day 5/31/22   Tiana Hansen PA-C   gabapentin (NEURONTIN) 400 mg capsule Take 2 capsules (800 mg total) by mouth 3 (three) times a day 5/31/22   Tiana Hansen PA-C   HYDROcodone-acetaminophen (NORCO) 5-325 mg per tablet Take 1 tablet by mouth every 6 (six) hours as needed for pain    Historical Provider, MD   lidocaine (LIDODERM) 5 % Apply 2 patches topically daily Remove & Discard patch within 12 hours or as directed by MD  Patient not taking: Reported on 6/24/2022 6/1/22   Gina Nova PA-C   LORazepam (ATIVAN) 2 mg/mL Infuse 0 25 mL (0 5 mg total) into a venous catheter every 4 (four) hours as needed for anxiety for up to 10 days 5/31/22 6/10/22  Gina Nova PA-C   melatonin 3 mg Take 1 tablet (3 mg total) by mouth daily at bedtime 3/16/21   Lea Infante PA-C   meloxicam (MOBIC) 15 mg tablet    Patient not taking: Reported on 6/24/2022 2/2/22   Historical Provider, MD   Multiple Vitamin (multivitamin) capsule Take 1 capsule by mouth daily  Patient not taking: Reported on 6/24/2022    Historical Provider, MD   nortriptyline (PAMELOR) 50 mg capsule Take 50 mg by mouth 2 (two) times a day    Historical Provider, MD   senna (SENOKOT) 8 6 mg Take 1 tablet (8 6 mg total) by mouth daily 6/1/22   Gina Nova PA-C   senna-docusate sodium (SENOKOT S) 8 6-50 mg per tablet Take 1 tablet by mouth 2 (two) times a day  Patient not taking: No sig reported 3/16/21   Lea Infante PA-C   tamsulosin (FLOMAX) 0 4 mg Take 1 capsule (0 4 mg total) by mouth daily with dinner 2/24/22   Estefania Brown PA-C   tiZANidine (Zanaflex) 4 mg tablet Take 4 mg by mouth 2 (two) times a day    Historical Provider, MD   traZODone (DESYREL) 100 mg tablet Take 100 mg by mouth daily at bedtime    Historical Provider, MD     I have reveiwed home medications using records provided by SNF  Allergies: No Known Allergies    Social History:     Marital Status: Single   Occupation: disabled  Patient Pre-hospital Living Situation: rehab at [facility]  Patient Pre-hospital Level of Mobility: ambulatory  Patient Pre-hospital Diet Restrictions:   Substance Use History:   Social History     Substance and Sexual Activity   Alcohol Use Not Currently    Comment: none     Social History     Tobacco Use   Smoking Status Former Smoker    Packs/day: 0 25    Years: 10 00    Pack years: 2 50    Types: Cigarettes   Smokeless Tobacco Never Used     Social History     Substance and Sexual Activity   Drug Use Never    Comment: none       Family History:    Family History   Problem Relation Age of Onset    No Known Problems Mother     No Known Problems Father        Physical Exam:     Vitals:   Blood Pressure: 118/76 (07/21/22 0630)  Pulse: (!) 122 (07/21/22 0630)  Temperature: 99 8 °F (37 7 °C) (07/21/22 0103)  Temp Source: Oral (07/21/22 0103)  Respirations: 17 (07/21/22 0630)  Weight - Scale: 90 kg (198 lb 6 6 oz) (07/21/22 0440)  SpO2: 95 % (07/21/22 0630)    Physical Exam  Constitutional:       General: He is not in acute distress  Appearance: Normal appearance  He is obese  He is not ill-appearing, toxic-appearing or diaphoretic  HENT:      Head: Normocephalic and atraumatic  Nose: No congestion or rhinorrhea  Mouth/Throat:      Mouth: Mucous membranes are moist    Eyes:      Conjunctiva/sclera: Conjunctivae normal    Cardiovascular:      Rate and Rhythm: Normal rate and regular rhythm  Heart sounds: Normal heart sounds  Pulmonary:      Effort: Pulmonary effort is normal       Breath sounds: Normal breath sounds  Abdominal:      General: Bowel sounds are normal  There is no distension  Palpations: Abdomen is soft  Tenderness: There is no abdominal tenderness  There is no guarding  Musculoskeletal:      Left lower leg: No edema     Skin:     General: Skin is warm  Neurological:      Mental Status: He is alert and oriented to person, place, and time  Psychiatric:         Behavior: Behavior normal          Thought Content: Thought content normal          Judgment: Judgment normal       Comments: Blunted affect       Additional Data:     Lab Results: I have personally reviewed pertinent reports  Results from last 7 days   Lab Units 07/21/22  0127   WBC Thousand/uL 10 56*   HEMOGLOBIN g/dL 13 0   HEMATOCRIT % 39 1   PLATELETS Thousands/uL 215   NEUTROS PCT % 69   LYMPHS PCT % 16   MONOS PCT % 12   EOS PCT % 1     Results from last 7 days   Lab Units 07/21/22  0127   SODIUM mmol/L 138   POTASSIUM mmol/L 4 1   CHLORIDE mmol/L 98   CO2 mmol/L 28   BUN mg/dL 13   CREATININE mg/dL 1 32*   ANION GAP mmol/L 12   CALCIUM mg/dL 9 2   ALBUMIN g/dL 3 9   TOTAL BILIRUBIN mg/dL 0 35   ALK PHOS U/L 79   ALT U/L 37   AST U/L 16   GLUCOSE RANDOM mg/dL 114     Results from last 7 days   Lab Units 07/21/22  0127   INR  1 06             Results from last 7 days   Lab Units 07/21/22  0127   LACTIC ACID mmol/L 0 6   PROCALCITONIN ng/ml 0 08       Imaging: I have personally reviewed pertinent reports  PE Study with CT abdomen & pelvis with contrast   Final Result by Lai Rojas MD (07/21 9608)      Multiple intraluminal filling defects involving branches of the left superior and inferior pulmonary arteries compatible with acute pulmonary emboli  Measured RV/LV ratio is within normal limits at less than 0 9  Moderate to marked bibasilar subsegmental atelectasis  Early infiltrates cannot be excluded  No pneumothorax  No acute intra-abdominal abnormality  No free air or free fluid                     I personally discussed this study with Dr Drew Clark on 7/21/2022 at 4:32 AM                   Workstation performed: [id number]         XR chest portable - 1 view    (Results Pending)   VAS lower limb venous duplex study, complete bilateral    (Results Pending) EKG, Pathology, and Other Studies Reviewed on Admission:   · EKG: stac 121 ct pe    Allscripts / Epic Records Reviewed: Yes     ** Please Note: This note has been constructed using a voice recognition system   **

## 2022-07-21 NOTE — ASSESSMENT & PLAN NOTE
· Presented to ED with complaints of left-sided chest pain and dyspnea  Elevated D-dimer  · PE study reveals Multiple intraluminal filling defects involving branches of the left superior and inferior pulmonary arteries compatible with acute pulmonary emboli  Normal RV LV ratio  · Status post cervical fixation fusion in May  Was discharged to short-term rehab at [facility]    On anticoagulation with subcutaneous heparin  · Check B/LLE duplex  · Treat with subq Lovenox weight based dosing

## 2022-07-21 NOTE — ED PROVIDER NOTES
History  Chief Complaint   Patient presents with    Medical Problem     Pt [facility] from [facility] after starting with left sided pain when he breathes in  Pt states it started about 3 hours ago that started in "left lower abdomen and worked its way up"  20-year-old male h/o central cord syndrome s/p cervical spine surgery  BIBA from [place] [address] for evaluation of left-sided pain  Patient states around 3 hours prior to arrival he started with his gradual onset of left side abdominal pain that progressed upward and is now located to the left flank and left lateral chest   Patient is complaining of pain with deep inspiration  Patient had recent ACDF/PCDF with revision and hardware placement 05/12/2022  Patient states pain is a 14/10 currently  He is unable to move without discomfort  He denies any N/V/D/C, fevers or chills  Per EMS, pt had temp of 100 9 pta  He denies any CP, LH, HA, LE edema, history of PE/DVT  Denies urinary symptoms  History provided by:  Patient and EMS personnel   used: No    Medical Problem  Location:  L side  Quality:  Sharp   Severity:  Severe  Onset quality:  Gradual  Duration:  3 hours  Timing:  Constant  Progression:  Worsening  Chronicity:  New  Relieved by:  None tried  Worsened by: Movement, deep inspiration  Associated symptoms: abdominal pain, chest pain (L lateral) and shortness of breath    Associated symptoms: no congestion, no cough, no diarrhea, no fatigue, no fever, no headaches, no loss of consciousness, no nausea, no rash, no rhinorrhea, no sore throat, no vomiting and no wheezing        Prior to Admission Medications   Prescriptions Last Dose Informant Patient Reported? Taking?    Calcium Carbonate Antacid (CALCIUM CARBONATE PO)  Outside Facility (Specify) Yes No   Sig: Take by mouth   Patient not taking: Reported on 6/24/2022   HYDROcodone-acetaminophen (NORCO) 5-325 mg per tablet  Outside Facility (Specify) Yes No   Sig: Take 1 tablet by mouth every 6 (six) hours as needed for pain   LORazepam (ATIVAN) 2 mg/mL   No No   Sig: Infuse 0 25 mL (0 5 mg total) into a venous catheter every 4 (four) hours as needed for anxiety for up to 10 days   Multiple Vitamin (multivitamin) capsule  Outside Facility (Specify) Yes No   Sig: Take 1 capsule by mouth daily   Patient not taking: Reported on 6/24/2022   acetaminophen (TYLENOL) 325 mg tablet  Outside Facility (Specify) No No   Sig: Take 3 tablets (975 mg total) by mouth every 8 (eight) hours   Patient not taking: Reported on 6/24/2022   acetaminophen (TYLENOL) 500 mg tablet  Outside Facility (Specify) Yes No   Sig: Take 500 mg by mouth every 8 (eight) hours   atorvastatin (LIPITOR) 40 mg tablet  Outside Facility (Specify) No No   Sig: Take 1 tablet (40 mg total) by mouth daily with dinner   baclofen 20 mg tablet  Outside Facility (Specify) Yes No   Sig: Take 20 mg by mouth 3 (three) times a day   baclofen 5 MG TABS  Outside Facility (Specify) No No   Sig: Take 15 mg by mouth 3 (three) times a day   Patient not taking: Reported on 6/24/2022   bisacodyl (DULCOLAX) 10 mg suppository  Outside Facility (Specify) No No   Sig: Insert 1 suppository (10 mg total) into the rectum daily as needed for constipation   busPIRone (BUSPAR) 5 mg tablet  Outside Facility (Specify) Yes No   Sig: Take 5 mg by mouth 2 (two) times a day   celecoxib (CeleBREX) 100 mg capsule   Yes No   Sig: Take 100 mg by mouth 2 (two) times a day   Patient not taking: No sig reported   docusate sodium (COLACE) 100 mg capsule  Outside Facility (Specify) No No   Sig: Take 1 capsule (100 mg total) by mouth 2 (two) times a day   gabapentin (NEURONTIN) 400 mg capsule  Outside Facility (Specify) No No   Sig: Take 2 capsules (800 mg total) by mouth 3 (three) times a day   lidocaine (LIDODERM) 5 %  Outside Facility (Specify) No No   Sig: Apply 2 patches topically daily Remove & Discard patch within 12 hours or as directed by MD   Patient not taking: Reported on 6/24/2022   melatonin 3 mg  Outside Facility (Specify) No No   Sig: Take 1 tablet (3 mg total) by mouth daily at bedtime   meloxicam (MOBIC) 15 mg tablet  Outside Facility (Specify) Yes No   Sig:     Patient not taking: Reported on 6/24/2022   nortriptyline (PAMELOR) 50 mg capsule  Outside Facility (Specify) Yes No   Sig: Take 50 mg by mouth 2 (two) times a day   senna (SENOKOT) 8 6 mg  Outside Facility (Specify) No No   Sig: Take 1 tablet (8 6 mg total) by mouth daily   senna-docusate sodium (SENOKOT S) 8 6-50 mg per tablet   No No   Sig: Take 1 tablet by mouth 2 (two) times a day   Patient not taking: No sig reported   tamsulosin (FLOMAX) 0 4 mg  Outside Facility (Specify) No No   Sig: Take 1 capsule (0 4 mg total) by mouth daily with dinner   tiZANidine (Zanaflex) 4 mg tablet  Outside Facility (Specify) Yes No   Sig: Take 4 mg by mouth 2 (two) times a day   traZODone (DESYREL) 100 mg tablet  Outside Facility (Specify) Yes No   Sig: Take 100 mg by mouth daily at bedtime      Facility-Administered Medications: None       Past Medical History:   Diagnosis Date    Chronic depression     PTSD (post-traumatic stress disorder)        Past Surgical History:   Procedure Laterality Date    CERVICAL FUSION N/A 03/06/2021    Procedure: POSTERIOR C3-5 laminectomy, C2-7 fixation fusion, possible additional levels;  Surgeon: Anni Gardner MD;  [facility];  Service: Neurosurgery    IR PICC PLACEMENT DOUBLE LUMEN  03/08/2021    NECK SURGERY  05/12/2022    IN ARTHRODESIS ANT INTERBODY MIN DISCECTOMY, CERVICAL BELOW C2 N/A 05/12/2022    Procedure: ACDF C6-7; Revision PCDF, revise/replace right C2 screw with laminar screw, revise left C2 screw (cap), remove bilateral C7 lateral mass screws, place navigated C7, T1 pedicle screws, C6-7 laminectomy, arthrodesis C2-3 and C6-T1;  Surgeon: Alayna Alfaro MD;  [facility];  Service: Neurosurgery    SCROTAL SURGERY Right 07/20/2021 Procedure: Right scrotal exploration, Irrigation and debridement Right orchiectomy;  Surgeon: Marixa Horton MD;  [facility];  Service: Urology       Family History   Problem Relation Age of Onset    No Known Problems Mother     No Known Problems Father      I have reviewed and agree with the history as documented  E-Cigarette/Vaping    E-Cigarette Use Never User      E-Cigarette/Vaping Substances    Nicotine No     THC No     CBD No     Flavoring No     Other No     Unknown No      Social History     Tobacco Use    Smoking status: Former Smoker     Packs/day: 0 25     Years: 10 00     Pack years: 2 50     Types: Cigarettes    Smokeless tobacco: Never Used   Vaping Use    Vaping Use: Never used   Substance Use Topics    Alcohol use: Not Currently     Comment: none    Drug use: Never     Comment: none       Review of Systems   Constitutional: Negative for appetite change, chills, fatigue and fever  HENT: Negative for congestion, hearing loss, rhinorrhea, sore throat and trouble swallowing  Eyes: Negative for pain, discharge and visual disturbance  Respiratory: Positive for shortness of breath  Negative for cough, chest tightness and wheezing  Cardiovascular: Positive for chest pain (L lateral) and palpitations  Negative for leg swelling  Gastrointestinal: Positive for abdominal pain  Negative for constipation, diarrhea, nausea and vomiting  Endocrine: Negative for polydipsia  Genitourinary: Positive for flank pain  Negative for dysuria, frequency and urgency  Musculoskeletal: Negative for arthralgias, neck pain and neck stiffness  Skin: Negative for color change and rash  Neurological: Negative for dizziness, loss of consciousness, weakness, light-headedness, numbness and headaches  Psychiatric/Behavioral: Negative for dysphoric mood and sleep disturbance  The patient is not nervous/anxious  All other systems reviewed and are negative        Physical Exam  Physical Exam  Vitals reviewed  Constitutional:       General: He is not in acute distress  Appearance: Normal appearance  He is well-developed and well-groomed  He is not ill-appearing, toxic-appearing or diaphoretic  HENT:      Head: Normocephalic and atraumatic  Right Ear: External ear normal       Left Ear: External ear normal       Nose: Nose normal  No congestion or rhinorrhea  Mouth/Throat:      Mouth: Mucous membranes are moist       Pharynx: Oropharynx is clear  No oropharyngeal exudate or posterior oropharyngeal erythema  Eyes:      General: No scleral icterus  Right eye: No discharge  Left eye: No discharge  Extraocular Movements: Extraocular movements intact  Conjunctiva/sclera: Conjunctivae normal    Cardiovascular:      Rate and Rhythm: Regular rhythm  Tachycardia present  Pulses: Normal pulses  Heart sounds: No murmur heard  No friction rub  No gallop  Pulmonary:      Effort: Pulmonary effort is normal  No respiratory distress  Breath sounds: Normal breath sounds  No wheezing, rhonchi or rales  Chest:      Chest wall: Tenderness present  Comments: TTP along L lower ribs  Abdominal:      General: Abdomen is protuberant  There is distension  Palpations: Abdomen is soft  Tenderness: There is abdominal tenderness in the left upper quadrant and left lower quadrant  There is left CVA tenderness and guarding  There is no right CVA tenderness or rebound  Comments: Distended abdomen, +guarding  TTP of LUQ/LLQ  Musculoskeletal:         General: No deformity  Normal range of motion  Cervical back: Normal range of motion and neck supple  Right lower leg: No edema  Left lower leg: No edema  Skin:     General: Skin is warm and dry  Coloration: Skin is not jaundiced or pale  Findings: No rash  Neurological:      General: No focal deficit present  Mental Status: He is alert  Psychiatric:         Mood and Affect: Mood normal          Behavior: Behavior normal  Behavior is cooperative  Vital Signs  ED Triage Vitals   Temperature Pulse Respirations Blood Pressure SpO2   07/21/22 0103 07/21/22 0103 07/21/22 0103 07/21/22 0103 07/21/22 0103   99 8 °F (37 7 °C) (!) 121 18 134/79 94 %      Temp Source Heart Rate Source Patient Position - Orthostatic VS BP Location FiO2 (%)   07/21/22 0103 07/21/22 0103 07/21/22 0103 07/21/22 0103 --   Oral Monitor Lying Right arm       Pain Score       07/21/22 0134       10 - Worst Possible Pain           Vitals:    07/21/22 0103 07/21/22 0419   BP: 134/79 114/65   Pulse: (!) 121 (!) 115   Patient Position - Orthostatic VS: Lying Lying         Visual Acuity      ED Medications  Medications   enoxaparin (LOVENOX) subcutaneous injection 90 mg (has no administration in time range)   acetaminophen (FOR EMS ONLY) (TYLENOL) oral suspension 650 mg (0 mg Does not apply Given to EMS 7/21/22 0110)   sodium chloride 0 9 % bolus 1,000 mL (0 mL Intravenous Stopped 7/21/22 0304)   morphine injection 4 mg (4 mg Intravenous Given 7/21/22 0134)   morphine injection 4 mg (4 mg Intravenous Given 7/21/22 0248)   iohexol (OMNIPAQUE) 350 MG/ML injection (MULTI-DOSE) 70 mL (70 mL Intravenous Given 7/21/22 0419)       Diagnostic Studies  Results Reviewed     Procedure Component Value Units Date/Time    Procalcitonin [[id number]]  (Normal) Collected: 07/21/22 0127    Lab Status: Final result Specimen: Blood from Arm, Left Updated: 07/21/22 0220     Procalcitonin 0 08 ng/ml     Lactic acid [[id number]]  (Normal) Collected: 07/21/22 0127    Lab Status: Final result Specimen: Blood from Arm, Left Updated: 07/21/22 0219     LACTIC ACID 0 6 mmol/L     Narrative:      Result may be elevated if tourniquet was used during collection      D-Dimer [[id number]]  (Abnormal) Collected: 07/21/22 0127    Lab Status: Final result Specimen: Blood from Arm, Left Updated: 07/21/22 7748 D-Dimer, Quant 1 43 ug/ml [facility]     Comprehensive metabolic panel [[id number]]  (Abnormal) Collected: 07/21/22 0127    Lab Status: Final result Specimen: Blood from Arm, Left Updated: 07/21/22 9138     Sodium 138 mmol/L      Potassium 4 1 mmol/L      Chloride 98 mmol/L      CO2 28 mmol/L      ANION GAP 12 mmol/L      BUN 13 mg/dL      Creatinine 1 32 mg/dL      Glucose 114 mg/dL      Calcium 9 2 mg/dL      AST 16 U/L      ALT 37 U/L      Alkaline Phosphatase 79 U/L      Total Protein 8 3 g/dL      Albumin 3 9 g/dL      Total Bilirubin 0 35 mg/dL      eGFR 67 ml/min/1 73sq m     Narrative:      [place] guidelines for Chronic Kidney Disease (CKD):     Stage 1 with normal or high GFR (GFR > 90 mL/min/1 73 square meters)    Stage 2 Mild CKD (GFR = 60-89 mL/min/1 73 square meters)    Stage 3A Moderate CKD (GFR = 45-59 mL/min/1 73 square meters)    Stage 3B Moderate CKD (GFR = 30-44 mL/min/1 73 square meters)    Stage 4 Severe CKD (GFR = 15-29 mL/min/1 73 square meters)    Stage 5 End Stage CKD (GFR <15 mL/min/1 73 square meters)  Note: GFR calculation is accurate only with a steady state creatinine    Lipase [[id number]]  (Normal) Collected: 07/21/22 0127    Lab Status: Final result Specimen: Blood from Arm, Left Updated: 07/21/22 0212     Lipase 83 u/L     Protime-INR [[id number]]  (Normal) Collected: 07/21/22 0127    Lab Status: Final result Specimen: Blood from Arm, Left Updated: 07/21/22 0211     Protime 13 8 seconds      INR 1 06    APTT [[id number]]  (Normal) Collected: 07/21/22 0127    Lab Status: Final result Specimen: Blood from Arm, Left Updated: 07/21/22 0211     PTT 33 seconds     CBC and differential [[id number]]  (Abnormal) Collected: 07/21/22 0127    Lab Status: Final result Specimen: Blood from Arm, Left Updated: 07/21/22 0152     WBC 10 56 Thousand/uL      RBC 4 25 Million/uL      Hemoglobin 13 0 g/dL      Hematocrit 39 1 %      MCV 92 fL      MCH 30 6 pg      MCHC 33 2 g/dL RDW 12 1 %      MPV 9 8 fL      Platelets 087 Thousands/uL      nRBC 0 /100 WBCs      Neutrophils Relative 69 %      Immat GRANS % 1 %      Lymphocytes Relative 16 %      Monocytes Relative 12 %      Eosinophils Relative 1 %      Basophils Relative 1 %      Neutrophils Absolute 7 52 Thousands/µL      Immature Grans Absolute 0 05 Thousand/uL      Lymphocytes Absolute 1 67 Thousands/µL      Monocytes Absolute 1 21 Thousand/µL      Eosinophils Absolute 0 05 Thousand/µL      Basophils Absolute 0 06 Thousands/µL     Blood culture #2 [[id number]] Collected: 07/21/22 0127    Lab Status: In process Specimen: Blood from Arm, Right Updated: 07/21/22 0151    Blood culture #1 [[id number]] Collected: 07/21/22 0127    Lab Status: In process Specimen: Blood from Arm, Left Updated: 07/21/22 0151    UA w Reflex to Microscopic w Reflex to Culture [[id number]]     Lab Status: No result Specimen: Urine                  PE Study with CT abdomen & pelvis with contrast   Final Result by Sara Mireles MD (07/21 5290)      Multiple intraluminal filling defects involving branches of the left superior and inferior pulmonary arteries compatible with acute pulmonary emboli  Measured RV/LV ratio is within normal limits at less than 0 9  Moderate to marked bibasilar subsegmental atelectasis  Early infiltrates cannot be excluded  No pneumothorax  No acute intra-abdominal abnormality  No free air or free fluid                     I personally discussed this study with Dr Carter Orona on 7/21/2022 at 4:32 AM                   Workstation performed: [id number]         XR chest portable - 1 view    (Results Pending)              Procedures  ECG 12 Lead Documentation Only    Date/Time: 7/21/2022 1:30 AM  Performed by: Alberteen Brittle, PA-C  Authorized by: Alberteen Brittle, PA-C     Indications / Diagnosis:  Chest pain  ECG reviewed by me, the ED Provider: yes    Patient location:  ED  Previous ECG:     Previous ECG:  Compared to current    Similarity:  Changes noted    Comparison to cardiac monitor: Yes    Interpretation:     Interpretation: abnormal    Rate:     ECG rate:  121    ECG rate assessment: tachycardic    Rhythm:     Rhythm: sinus rhythm    Ectopy:     Ectopy: none    Conduction:     Conduction: normal    ST segments:     ST segments:  Normal  Comments:      Abnormal QRS-T angle, consider primary T wave abnormality  ED Course  ED Course as of 07/21/22 0535   Thu Jul 21, 2022   0106 100 9 fever for EMS, , meets SIRS   0218 D-Dimer, Quant(!): 1 43  PE study ordered   0406 Creatinine(!): 1 32  IVF running   0443 PE Study with CT abdomen & pelvis with contrast  Multiple intraluminal filling defects involving branches of the left superior and inferior pulmonary arteries compatible with acute pulmonary emboli      Measured RV/LV ratio is within normal limits at less than 0 9        Moderate to marked bibasilar subsegmental atelectasis  Early infiltrates cannot be excluded  No pneumothorax      No acute intra-abdominal abnormality  No free air or free fluid  PERC Rule for PE    Flowsheet Row Most Recent Value   PERC Rule for PE    Age >=50 0 Filed at: 07/21/2022 0123   HR >=100 1 Filed at: 07/21/2022 0123   O2 Sat on room air < 95% 1 Filed at: 07/21/2022 0123   History of PE or DVT 0 Filed at: 07/21/2022 9201   Recent trauma or surgery 1 Filed at: 07/21/2022 0123   Hemoptysis 0 Filed at: 07/21/2022 0123   Exogenous estrogen 0 Filed at: 07/21/2022 0123   Unilateral leg swelling 0 Filed at: 07/21/2022 0123   PERC Rule for PE Results 3 Filed at: 07/21/2022 0123              SBIRT 22yo+    Flowsheet Row Most Recent Value   SBIRT (23 yo +)    In order to provide better care to our patients, we are screening all of our patients for alcohol and drug use  Would it be okay to ask you these screening questions?  No Filed at: 07/21/2022 0110          Danie' Criteria for PE    Flowsheet Row Most Recent Value   Moni Birgit Criteria for PE    Clinical signs and symptoms of DVT 0 Filed at: 07/21/2022 4692   PE is primary diagnosis or equally likely 3 Filed at: 07/21/2022 0123   HR >100 1 5 Filed at: 07/21/2022 0123   Immobilization at least 3 days or Surgery in the previous 4 weeks 1 5 Filed at: 07/21/2022 0123   Previous, objectively diagnosed PE or DVT 0 Filed at: 07/21/2022 0123   Hemoptysis 0 Filed at: 07/21/2022 0123   Malignancy with treatment within 6 months or palliative 0 Filed at: 07/21/2022 2251   Wells' Criteria Total 6 Filed at: 07/21/2022 0123                MDM  Number of Diagnoses or Management Options  Elevated d-dimer: new and requires workup  Elevated serum creatinine: new and requires workup  Hypoxia: new and requires workup  Pulmonary embolism [facility]): new and requires workup  Diagnosis management comments:     42-year-old male presenting for evaluation of sudden-onset left-sided pain  Patient states pain is worse with deep inspiration  Patient is tender to palpation along the left upper and lower quadrants of the abdomen as well as the left lower ribs  Patient is tachycardic to 121 here, 94% on room, febrile PTA per EMS  Due to tachycardia and fever, will order sepsis panel  Due to tachycardia and pleuritic chest pain with recent surgery, concern for PE at this time, D-dimer and PE study ordered  PERC of 3 and Well's of 6  Patient having abdominal distention and pain as well, CT abdomen pelvis ordered as well  CT PE and A/P: Multiple intraluminal filling defects involving branches of the left superior and inferior pulmonary arteries compatible with acute pulmonary emboli  Measured RV/LV ratio is within normal limits at less than 0 9  Moderate to marked bibasilar subsegmental atelectasis  Early infiltrates cannot be excluded  No pneumothorax  No acute intra-abdominal abnormality  No free air or free fluid  Patient states he was receiving SubQ heparin at Rehab  Lovenox ordered at this time   Will discuss admission with AMARIS GLEZ agreeable to admission  Patient stable and in no acute distress at time of transfer of care  Amount and/or Complexity of Data Reviewed  Clinical lab tests: ordered and reviewed  Tests in the radiology section of CPT®: ordered and reviewed  Tests in the medicine section of CPT®: ordered and reviewed  Decide to obtain previous medical records or to obtain history from someone other than the patient: yes  Review and summarize past medical records: yes  Discuss the patient with other providers: yes  Independent visualization of images, tracings, or specimens: yes    Risk of Complications, Morbidity, and/or Mortality  Presenting problems: high  Diagnostic procedures: moderate  Management options: moderate    Patient Progress  Patient progress: stable      Disposition  Final diagnoses:   Pulmonary embolism ([address] )   Elevated d-dimer   Elevated serum creatinine   Hypoxia     Time reflects when diagnosis was documented in both MDM as applicable and the Disposition within this note     Time User Action Codes Description Comment    7/21/2022  4:52 AM Do Bottom Add [I26 99] Pulmonary embolism ([facility] [address] )     7/21/2022  4:53 AM Do Bottom Add [R79 89] Elevated d-dimer     7/21/2022  5:32 AM Do Bottom Add [R79 89] Elevated serum creatinine     7/21/2022  5:32 AM Do Bottom Add [R09 02] Hypoxia       ED Disposition     ED Disposition   Admit    Condition   Stable    Date/Time   Thu Jul 21, 2022  5:32 AM    Comment   Case was discussed with AMARIS and the patient's admission status was agreed to be Admission Status: inpatient status to the service of Dr Hugh Zaldivar              Follow-up Information    None         Current Discharge Medication List      CONTINUE these medications which have NOT CHANGED    Details   !! acetaminophen (TYLENOL) 325 mg tablet Take 3 tablets (975 mg total) by mouth every 8 (eight) hours  Refills: 0    Associated Diagnoses: Spinal cord compression ([facility] )      !! acetaminophen (TYLENOL) 500 mg tablet Take 500 mg by mouth every 8 (eight) hours      atorvastatin (LIPITOR) 40 mg tablet Take 1 tablet (40 mg total) by mouth daily with dinner  Refills: 0    Associated Diagnoses: Hyperlipidemia, unspecified hyperlipidemia type      !! baclofen 20 mg tablet Take 20 mg by mouth 3 (three) times a day      !! baclofen 5 MG TABS Take 15 mg by mouth 3 (three) times a day  Refills: 0    Associated Diagnoses: Spinal cord compression (HCC)      bisacodyl (DULCOLAX) 10 mg suppository Insert 1 suppository (10 mg total) into the rectum daily as needed for constipation  Qty: 12 suppository, Refills: 0    Associated Diagnoses: Injury of cervical spinal cord, initial encounter (HCC)      busPIRone (BUSPAR) 5 mg tablet Take 5 mg by mouth 2 (two) times a day      Calcium Carbonate Antacid (CALCIUM CARBONATE PO) Take by mouth      celecoxib (CeleBREX) 100 mg capsule Take 100 mg by mouth 2 (two) times a day      docusate sodium (COLACE) 100 mg capsule Take 1 capsule (100 mg total) by mouth 2 (two) times a day  Refills: 0    Associated Diagnoses: Spinal cord compression (HCC)      gabapentin (NEURONTIN) 400 mg capsule Take 2 capsules (800 mg total) by mouth 3 (three) times a day  Refills: 0    Associated Diagnoses: Spinal cord compression (HCC)      HYDROcodone-acetaminophen (NORCO) 5-325 mg per tablet Take 1 tablet by mouth every 6 (six) hours as needed for pain      lidocaine (LIDODERM) 5 % Apply 2 patches topically daily Remove & Discard patch within 12 hours or as directed by MD  Refills: 0    Associated Diagnoses: Spinal cord compression (HCC)      LORazepam (ATIVAN) 2 mg/mL Infuse 0 25 mL (0 5 mg total) into a venous catheter every 4 (four) hours as needed for anxiety for up to 10 days  Refills: 0    Associated Diagnoses: Spinal cord compression (HCC)      melatonin 3 mg Take 1 tablet (3 mg total) by mouth daily at bedtime  Qty: 10 tablet, Refills: 0    Associated Diagnoses: Injury of cervical spinal cord, initial encounter (HCC)      meloxicam (MOBIC) 15 mg tablet        Multiple Vitamin (multivitamin) capsule Take 1 capsule by mouth daily      nortriptyline (PAMELOR) 50 mg capsule Take 50 mg by mouth 2 (two) times a day      senna (SENOKOT) 8 6 mg Take 1 tablet (8 6 mg total) by mouth daily  Refills: 0    Associated Diagnoses: Spinal cord compression (HCC)      senna-docusate sodium (SENOKOT S) 8 6-50 mg per tablet Take 1 tablet by mouth 2 (two) times a day  Qty: 10 tablet, Refills: 0    Associated Diagnoses: Injury of cervical spinal cord, initial encounter ([facility])      tamsulosin (FLOMAX) 0 4 mg Take 1 capsule (0 4 mg total) by mouth daily with dinner  Qty: 30 capsule, Refills: 1    Associated Diagnoses: Lower urinary tract symptoms (LUTS); Injury of cervical spinal cord, initial encounter (Formerly [facility])      tiZANidine (Zanaflex) 4 mg tablet Take 4 mg by mouth 2 (two) times a day      traZODone (DESYREL) 100 mg tablet Take 100 mg by mouth daily at bedtime       !! - Potential duplicate medications found  Please discuss with provider  No discharge procedures on file      PDMP Review       Value Time User    PDMP Reviewed  Yes 7/21/2022  1:13 AM Musa Ramey PA-C          ED Provider  Electronically Signed by DENITA Paris PA-C  07/21/22 9523

## 2022-07-21 NOTE — PLAN OF CARE
Problem: Potential for Falls  Goal: Patient will remain free of falls  Description: INTERVENTIONS:  - Educate patient/family on patient safety including physical limitations  - Instruct patient to call for assistance with activity   - Consult OT/PT to assist with strengthening/mobility   - Keep Call bell within reach  - Keep bed low and locked with side rails adjusted as appropriate  - Keep care items and personal belongings within reach  - Initiate and maintain comfort rounds  - Make Fall Risk Sign visible to staff  - Offer Toileting every 2  Hours, in advance of need  - Initiate/Maintain bed alarm    - Apply yellow socks and bracelet for high fall risk patients  - Consider moving patient to room near nurses station  7/21/2022 1442 by Dee Galo RN  Outcome: Progressing  7/21/2022 1441 by Dee Galo RN  Outcome: Progressing     Problem: MOBILITY - ADULT  Goal: Maintain or return to baseline ADL function  Description: INTERVENTIONS:  -  Assess patient's ability to carry out ADLs; assess patient's baseline for ADL function and identify physical deficits which impact ability to perform ADLs (bathing, care of mouth/teeth, toileting, grooming, dressing, etc )  - Assess/evaluate cause of self-care deficits   - Assess range of motion  - Assess patient's mobility; develop plan if impaired  - Assess patient's need for assistive devices and provide as appropriate  - Encourage maximum independence but intervene and supervise when necessary  - Involve family in performance of ADLs  - Assess for home care needs following discharge   - Consider OT consult to assist with ADL evaluation and planning for discharge  - Provide patient education as appropriate  Outcome: Progressing     Problem: Prexisting or High Potential for Compromised Skin Integrity  Goal: Skin integrity is maintained or improved  Description: INTERVENTIONS:  - Identify patients at risk for skin breakdown  - Assess and monitor skin integrity  - Assess and monitor nutrition and hydration status  - Monitor labs   - Assess for incontinence   - Turn and reposition patient  - Assist with mobility/ambulation  - Relieve pressure over bony prominences  - Avoid friction and shearing  - Provide appropriate hygiene as needed including keeping skin clean and dry  - Evaluate need for skin moisturizer/barrier cream  - Collaborate with interdisciplinary team   - Patient/family teaching  - Consider wound care consult   Outcome: Progressing     Problem: RESPIRATORY - ADULT  Goal: Achieves optimal ventilation and oxygenation  Description: INTERVENTIONS:  - Assess for changes in respiratory status  - Assess for changes in mentation and behavior  - Position to facilitate oxygenation and minimize respiratory effort  - Oxygen administered by appropriate delivery if ordered  - Initiate smoking cessation education as indicated  - Encourage broncho-pulmonary hygiene including cough, deep breathe, Incentive Spirometry  - Assess the need for suctioning and aspirate as needed  - Assess and instruct to report SOB or any respiratory difficulty  - Respiratory Therapy support as indicated  Outcome: Progressing

## 2022-07-22 LAB
ANION GAP SERPL CALCULATED.3IONS-SCNC: 5 MMOL/L (ref 4–13)
BASOPHILS # BLD AUTO: 0.04 THOUSANDS/ΜL (ref 0–0.1)
BASOPHILS NFR BLD AUTO: 1 % (ref 0–1)
BUN SERPL-MCNC: 9 MG/DL (ref 5–25)
CALCIUM SERPL-MCNC: 9 MG/DL (ref 8.3–10.1)
CHLORIDE SERPL-SCNC: 101 MMOL/L (ref 96–108)
CO2 SERPL-SCNC: 33 MMOL/L (ref 21–32)
CREAT SERPL-MCNC: 1.1 MG/DL (ref 0.6–1.3)
EOSINOPHIL # BLD AUTO: 0.11 THOUSAND/ΜL (ref 0–0.61)
EOSINOPHIL NFR BLD AUTO: 1 % (ref 0–6)
ERYTHROCYTE [DISTWIDTH] IN BLOOD BY AUTOMATED COUNT: 12 % (ref 11.6–15.1)
GFR SERPL CREATININE-BSD FRML MDRD: 84 ML/MIN/1.73SQ M
GLUCOSE SERPL-MCNC: 96 MG/DL (ref 65–140)
HCT VFR BLD AUTO: 36 % (ref 36.5–49.3)
HGB BLD-MCNC: 11.2 G/DL (ref 12–17)
IMM GRANULOCYTES # BLD AUTO: 0.03 THOUSAND/UL (ref 0–0.2)
IMM GRANULOCYTES NFR BLD AUTO: 0 % (ref 0–2)
LYMPHOCYTES # BLD AUTO: 2.3 THOUSANDS/ΜL (ref 0.6–4.47)
LYMPHOCYTES NFR BLD AUTO: 29 % (ref 14–44)
MCH RBC QN AUTO: 29.4 PG (ref 26.8–34.3)
MCHC RBC AUTO-ENTMCNC: 31.1 G/DL (ref 31.4–37.4)
MCV RBC AUTO: 95 FL (ref 82–98)
MONOCYTES # BLD AUTO: 1.11 THOUSAND/ΜL (ref 0.17–1.22)
MONOCYTES NFR BLD AUTO: 14 % (ref 4–12)
NEUTROPHILS # BLD AUTO: 4.34 THOUSANDS/ΜL (ref 1.85–7.62)
NEUTS SEG NFR BLD AUTO: 55 % (ref 43–75)
NRBC BLD AUTO-RTO: 0 /100 WBCS
PLATELET # BLD AUTO: 192 THOUSANDS/UL (ref 149–390)
PMV BLD AUTO: 9.4 FL (ref 8.9–12.7)
POTASSIUM SERPL-SCNC: 4.4 MMOL/L (ref 3.5–5.3)
RBC # BLD AUTO: 3.81 MILLION/UL (ref 3.88–5.62)
SODIUM SERPL-SCNC: 139 MMOL/L (ref 135–147)
WBC # BLD AUTO: 7.93 THOUSAND/UL (ref 4.31–10.16)

## 2022-07-22 PROCEDURE — 99232 SBSQ HOSP IP/OBS MODERATE 35: CPT | Performed by: INTERNAL MEDICINE

## 2022-07-22 PROCEDURE — 85025 COMPLETE CBC W/AUTO DIFF WBC: CPT | Performed by: NURSE PRACTITIONER

## 2022-07-22 PROCEDURE — 80048 BASIC METABOLIC PNL TOTAL CA: CPT | Performed by: NURSE PRACTITIONER

## 2022-07-22 RX ORDER — ONDANSETRON 2 MG/ML
4 INJECTION INTRAMUSCULAR; INTRAVENOUS EVERY 4 HOURS PRN
Status: DISCONTINUED | OUTPATIENT
Start: 2022-07-22 | End: 2022-07-29 | Stop reason: HOSPADM

## 2022-07-22 RX ADMIN — BUSPIRONE HYDROCHLORIDE 5 MG: 5 TABLET ORAL at 21:29

## 2022-07-22 RX ADMIN — ACETAMINOPHEN 325MG 975 MG: 325 TABLET ORAL at 14:06

## 2022-07-22 RX ADMIN — GABAPENTIN 600 MG: 300 CAPSULE ORAL at 16:52

## 2022-07-22 RX ADMIN — TAMSULOSIN HYDROCHLORIDE 0.4 MG: 0.4 CAPSULE ORAL at 16:52

## 2022-07-22 RX ADMIN — DIAZEPAM 5 MG: 5 TABLET ORAL at 21:28

## 2022-07-22 RX ADMIN — BUSPIRONE HYDROCHLORIDE 5 MG: 5 TABLET ORAL at 08:48

## 2022-07-22 RX ADMIN — OXYCODONE HYDROCHLORIDE 10 MG: 10 TABLET ORAL at 14:26

## 2022-07-22 RX ADMIN — APIXABAN 10 MG: 5 TABLET, FILM COATED ORAL at 08:48

## 2022-07-22 RX ADMIN — HYDROMORPHONE HYDROCHLORIDE 0.5 MG: 1 INJECTION, SOLUTION INTRAMUSCULAR; INTRAVENOUS; SUBCUTANEOUS at 11:41

## 2022-07-22 RX ADMIN — MELATONIN 6 MG: at 21:28

## 2022-07-22 RX ADMIN — APIXABAN 10 MG: 5 TABLET, FILM COATED ORAL at 17:15

## 2022-07-22 RX ADMIN — GABAPENTIN 600 MG: 300 CAPSULE ORAL at 08:48

## 2022-07-22 RX ADMIN — DOCUSATE SODIUM 100 MG: 100 CAPSULE, LIQUID FILLED ORAL at 08:48

## 2022-07-22 RX ADMIN — OXYCODONE HYDROCHLORIDE 10 MG: 10 TABLET ORAL at 08:48

## 2022-07-22 RX ADMIN — HYDROMORPHONE HYDROCHLORIDE 0.5 MG: 1 INJECTION, SOLUTION INTRAMUSCULAR; INTRAVENOUS; SUBCUTANEOUS at 16:53

## 2022-07-22 RX ADMIN — HYDROMORPHONE HYDROCHLORIDE 0.5 MG: 1 INJECTION, SOLUTION INTRAMUSCULAR; INTRAVENOUS; SUBCUTANEOUS at 23:15

## 2022-07-22 RX ADMIN — TRAZODONE HYDROCHLORIDE 100 MG: 100 TABLET ORAL at 21:29

## 2022-07-22 RX ADMIN — BACLOFEN 10 MG: 10 TABLET ORAL at 16:52

## 2022-07-22 RX ADMIN — OXYCODONE HYDROCHLORIDE 10 MG: 10 TABLET ORAL at 03:46

## 2022-07-22 RX ADMIN — PANTOPRAZOLE SODIUM 40 MG: 40 TABLET, DELAYED RELEASE ORAL at 05:29

## 2022-07-22 RX ADMIN — DOCUSATE SODIUM 100 MG: 100 CAPSULE, LIQUID FILLED ORAL at 17:15

## 2022-07-22 RX ADMIN — ACETAMINOPHEN 325MG 975 MG: 325 TABLET ORAL at 05:29

## 2022-07-22 RX ADMIN — ACETAMINOPHEN 325MG 975 MG: 325 TABLET ORAL at 21:29

## 2022-07-22 RX ADMIN — OXYCODONE HYDROCHLORIDE 10 MG: 10 TABLET ORAL at 21:28

## 2022-07-22 RX ADMIN — GABAPENTIN 600 MG: 300 CAPSULE ORAL at 21:29

## 2022-07-22 RX ADMIN — BACLOFEN 10 MG: 10 TABLET ORAL at 21:29

## 2022-07-22 RX ADMIN — ATORVASTATIN CALCIUM 40 MG: 40 TABLET, FILM COATED ORAL at 16:52

## 2022-07-22 RX ADMIN — BACLOFEN 10 MG: 10 TABLET ORAL at 08:48

## 2022-07-22 NOTE — PROGRESS NOTES
[facility] [facility]  Progress Note Elvira Baker 1985, 43 y o  male MRN: [id number]  Unit/Bed#: E5 -01 Encounter: [id number]  Primary Care Provider: Stefany Rosenberg MD   Date and time admitted to hospital: 7/21/2022  1:01 AM    * Acute pulmonary embolism without acute cor pulmonale ([address] )  Assessment & Plan  · Presented to ED with complaints of left-sided chest pain and dyspnea  Elevated D-dimer  · PE study reveals Multiple intraluminal filling defects involving branches of the left superior and inferior pulmonary arteries compatible with acute pulmonary emboli  Normal RV LV ratio  · Likely provoked Status post cervical fixation fusion in May  Was discharged to short-term rehab at [facility]  On anticoagulation with subcutaneous heparin  · Lower extremity duplex negative for DVT  · Echo with preserved EF  · Vital signs of remains stable on heparin drip  · Transition to Eliquis loading dose    Atelectasis  Assessment & Plan  · Moderate to marked bibasilar subsegmental atelectasis seen on CT study  · Likely splinting related to pain from pulmonary embolism  · Incentive spirometry  · P r n  Analgesia    Cord compression myelopathy [facility])  Assessment & Plan  · 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  · Admitted to [facility] in Jun for spinal cord compression and herniated cervical disc s/p CDF C6-7; Revision PCDF, revise/replace right C2 screw with laminar screw, navigated C7, T1 pedicle screws, C6-7 laminectomy May 2022  Followed outpatient by Neurosurgery  · Evaluated by PT/OT    Recommendation return to [facility]    HLD (hyperlipidemia)  Assessment & Plan  · Continue statin    Benign essential HTN  Assessment & Plan  · Clonidine TD patch every Monday    Urinary retention  Assessment & Plan  · Continue Flomax      VTE Pharmacologic Prophylaxis:  Eliquis    Patient Centered Rounds:  Patient care rounds were performed with nursing    Time Spent for Care: 30  More than 50% of total time spent on counseling and coordination of care as described above  Current Length of Stay: 1 day(s)    Current Patient Status: Inpatient   Certification Statement: The patient will continue to require additional inpatient hospital stay due to awaiting placement back to rehab    Discharge Plan:  Medically stable for discharge back to rehab    Code Status: Level 1 - Full Code      Subjective:   Patient seen evaluated at bedside  Continues to have pain with respiration  Objective:     Vitals:   Temp (24hrs), Av °F (36 7 °C), Min:97 9 °F (36 6 °C), Max:98 1 °F (36 7 °C)    Temp:  [97 9 °F (36 6 °C)-98 1 °F (36 7 °C)] 97 9 °F (36 6 °C)  HR:  [77-94] 77  Resp:  [18] 18  BP: (101-109)/(62-65) 109/65  SpO2:  [95 %-96 %] 96 %  Body mass index is 29 76 kg/m²  Input and Output Summary (last 24 hours): Intake/Output Summary (Last 24 hours) at 2022 1413  Last data filed at 2022 1409  Gross per 24 hour   Intake --   Output 1700 ml   Net -1700 ml       Physical Exam:     Physical Exam  Vitals reviewed  Constitutional:       General: He is not in acute distress  Appearance: He is well-developed  He is not ill-appearing, toxic-appearing or diaphoretic  HENT:      Head: Normocephalic and atraumatic  Mouth/Throat:      Mouth: Mucous membranes are moist    Eyes:      General: No scleral icterus  Extraocular Movements: Extraocular movements intact  Cardiovascular:      Rate and Rhythm: Normal rate and regular rhythm  Heart sounds: Normal heart sounds  Pulmonary:      Effort: Pulmonary effort is normal  No respiratory distress  Breath sounds: No wheezing or rales  Comments: Decreased breath sounds in the bases  Abdominal:      General: There is no distension  Palpations: Abdomen is soft  Tenderness: There is no abdominal tenderness  There is no guarding or rebound     Musculoskeletal:         General: No swelling, tenderness or deformity  Skin:     General: Skin is warm and dry  Neurological:      General: No focal deficit present  Mental Status: He is alert  Mental status is at baseline  Psychiatric:         Mood and Affect: Mood normal          Behavior: Behavior normal          Thought Content: Thought content normal          Judgment: Judgment normal          Additional Data:     Labs: I have reviewed pertinent results     Results from last 7 days   Lab Units 07/22/22  0526   WBC Thousand/uL 7 93   HEMOGLOBIN g/dL 11 2*   HEMATOCRIT % 36 0*   PLATELETS Thousands/uL 192   NEUTROS PCT % 55   LYMPHS PCT % 29   MONOS PCT % 14*   EOS PCT % 1     Results from last 7 days   Lab Units 07/22/22  0526 07/21/22  0127   SODIUM mmol/L 139 138   POTASSIUM mmol/L 4 4 4 1   CHLORIDE mmol/L 101 98   CO2 mmol/L 33* 28   BUN mg/dL 9 13   CREATININE mg/dL 1 10 1 32*   ANION GAP mmol/L 5 12   CALCIUM mg/dL 9 0 9 2   ALBUMIN g/dL  --  3 9   TOTAL BILIRUBIN mg/dL  --  0 35   ALK PHOS U/L  --  79   ALT U/L  --  37   AST U/L  --  16   GLUCOSE RANDOM mg/dL 96 114     Results from last 7 days   Lab Units 07/21/22  0127   INR  1 06             Results from last 7 days   Lab Units 07/21/22  0127   LACTIC ACID mmol/L 0 6   PROCALCITONIN ng/ml 0 08         Imaging: I have reviewed pertinent imaging       Recent Cultures (last 7 days):     Results from last 7 days   Lab Units 07/21/22  0127   BLOOD CULTURE  No Growth at 24 hrs  No Growth at 24 hrs         Last 24 Hours Medication List:   Current Facility-Administered Medications   Medication Dose Route Frequency Provider Last Rate    acetaminophen  975 mg Oral [facility] Asim Dick DO      aluminum-magnesium hydroxide-simethicone  30 mL Oral Q6H PRN JAYLYN Chau      apixaban  10 mg Oral BID Asim Dick DO      atorvastatin  40 mg Oral Daily With JAYLYN Kelley      baclofen  10 mg Oral TID JAYLYN Chau      bisacodyl  10 mg Rectal Daily PRN Stuart Panning, CRNP      busPIRone  5 mg Oral BID Stuart Panning, CRNP      [START ON 7/25/2022] cloNIDine  1 patch Transdermal Weekly Stuart Panning, CRNP      diazepam  5 mg Oral HS Stuart Panning, CRNP      docusate sodium  100 mg Oral BID Stuart Panning, CRNP      gabapentin  600 mg Oral TID Stuart Panning, CRNP      HYDROmorphone  0 5 mg Intravenous Q4H PRN Agatha Georges,       melatonin  6 mg Oral HS Stuart Panning, CRNP      ondansetron  4 mg Intravenous Q4H PRN Florin Basurto PA-C      oxyCODONE  5 mg Oral Q4H PRN Agatha Georges,       Or    oxyCODONE  10 mg Oral Q4H PRN Agatha Georges,       pantoprazole  40 mg Oral Early Morning Stuart Panning, CRNP      simethicone  80 mg Oral 4x Daily PRN Stuart Panning, CRNP      tamsulosin  0 4 mg Oral Daily With [address] [facility], CRNP      traZODone  100 mg Oral HS Stuart Panning, CRNP          Today, Patient Was Seen By: Agatha Georges DO    ** Please Note: Dictation voice to text software may have been used in the creation of this document   **

## 2022-07-22 NOTE — ASSESSMENT & PLAN NOTE
· Presented to ED with complaints of left-sided chest pain and dyspnea  Elevated D-dimer  · PE study reveals Multiple intraluminal filling defects involving branches of the left superior and inferior pulmonary arteries compatible with acute pulmonary emboli  Normal RV LV ratio  · Likely provoked Status post cervical fixation fusion in May  Was discharged to short-term rehab at [facility]    On anticoagulation with subcutaneous heparin  · Lower extremity duplex negative for DVT  · Echo with preserved EF  · Vital signs of remains stable on heparin drip  · Transition to Eliquis loading dose

## 2022-07-22 NOTE — PLAN OF CARE
Problem: Potential for Falls  Goal: Patient will remain free of falls  Description: INTERVENTIONS:  - Educate patient/family on patient safety including physical limitations  - Instruct patient to call for assistance with activity   - Consult OT/PT to assist with strengthening/mobility   - Keep Call bell within reach  - Keep bed low and locked with side rails adjusted as appropriate  - Keep care items and personal belongings within reach  - Initiate and maintain comfort rounds  - Make Fall Risk Sign visible to staff  - Offer Toileting every 2Hours, in advance of need  - Initiate/Maintain bed  alarm  - Obtain necessary fall risk management equipment:   - Apply yellow socks and bracelet for high fall risk patients  - Consider moving patient to room near nurses station  Outcome: Progressing     Problem: MOBILITY - ADULT  Goal: Maintain or return to baseline ADL function  Description: INTERVENTIONS:  -  Assess patient's ability to carry out ADLs; assess patient's baseline for ADL function and identify physical deficits which impact ability to perform ADLs (bathing, care of mouth/teeth, toileting, grooming, dressing, etc )  - Assess/evaluate cause of self-care deficits   - Assess range of motion  - Assess patient's mobility; develop plan if impaired  - Assess patient's need for assistive devices and provide as appropriate  - Encourage maximum independence but intervene and supervise when necessary  - Involve family in performance of ADLs  - Assess for home care needs following discharge   - Consider OT consult to assist with ADL evaluation and planning for discharge  - Provide patient education as appropriate  Outcome: Progressing     Problem: Prexisting or High Potential for Compromised Skin Integrity  Goal: Skin integrity is maintained or improved  Description: INTERVENTIONS:  - Identify patients at risk for skin breakdown  - Assess and monitor skin integrity  - Assess and monitor nutrition and hydration status  - Monitor labs   - Assess for incontinence   - Turn and reposition patient  - Assist with mobility/ambulation  - Relieve pressure over bony prominences  - Avoid friction and shearing  - Provide appropriate hygiene as needed including keeping skin clean and dry  - Evaluate need for skin moisturizer/barrier cream  - Collaborate with interdisciplinary team   - Patient/family teaching  - Consider wound care consult   Outcome: Progressing     Problem: RESPIRATORY - ADULT  Goal: Achieves optimal ventilation and oxygenation  Description: INTERVENTIONS:  - Assess for changes in respiratory status  - Assess for changes in mentation and behavior  - Position to facilitate oxygenation and minimize respiratory effort  - Oxygen administered by appropriate delivery if ordered  - Initiate smoking cessation education as indicated  - Encourage broncho-pulmonary hygiene including cough, deep breathe, Incentive Spirometry  - Assess the need for suctioning and aspirate as needed  - Assess and instruct to report SOB or any respiratory difficulty  - Respiratory Therapy support as indicated  Outcome: Progressing

## 2022-07-22 NOTE — ASSESSMENT & PLAN NOTE
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

## 2022-07-22 NOTE — ASSESSMENT & PLAN NOTE
· Moderate to marked bibasilar subsegmental atelectasis seen on CT study  · Likely splinting related to pain from pulmonary embolism  · Incentive spirometry  · P r n   Analgesia

## 2022-07-22 NOTE — PLAN OF CARE
Problem: Potential for Falls  Goal: Patient will remain free of falls  Description: INTERVENTIONS:  - Educate patient/family on patient safety including physical limitations  - Instruct patient to call for assistance with activity   - Consult OT/PT to assist with strengthening/mobility   - Keep Call bell within reach  - Keep bed low and locked with side rails adjusted as appropriate  - Keep care items and personal belongings within reach  - Initiate and maintain comfort rounds  - Make Fall Risk Sign visible to staff  - Offer Toileting every 2 Hours, in advance of need  - Initiate/Maintain bed alarm  - Apply yellow socks and bracelet for high fall risk patients  - Consider moving patient to room near nurses station  Outcome: Progressing     Problem: MOBILITY - ADULT  Goal: Maintain or return to baseline ADL function  Description: INTERVENTIONS:  -  Assess patient's ability to carry out ADLs; assess patient's baseline for ADL function and identify physical deficits which impact ability to perform ADLs (bathing, care of mouth/teeth, toileting, grooming, dressing, etc )  - Assess/evaluate cause of self-care deficits   - Assess range of motion  - Assess patient's mobility; develop plan if impaired  - Assess patient's need for assistive devices and provide as appropriate  - Encourage maximum independence but intervene and supervise when necessary  - Involve family in performance of ADLs  - Assess for home care needs following discharge   - Consider OT consult to assist with ADL evaluation and planning for discharge  - Provide patient education as appropriate  Outcome: Progressing     Problem: Prexisting or High Potential for Compromised Skin Integrity  Goal: Skin integrity is maintained or improved  Description: INTERVENTIONS:  - Identify patients at risk for skin breakdown  - Assess and monitor skin integrity  - Assess and monitor nutrition and hydration status  - Monitor labs   - Assess for incontinence   - Turn and reposition patient  - Assist with mobility/ambulation  - Relieve pressure over bony prominences  - Avoid friction and shearing  - Provide appropriate hygiene as needed including keeping skin clean and dry  - Evaluate need for skin moisturizer/barrier cream  - Collaborate with interdisciplinary team   - Patient/family teaching  - Consider wound care consult   Outcome: Progressing     Problem: RESPIRATORY - ADULT  Goal: Achieves optimal ventilation and oxygenation  Description: INTERVENTIONS:  - Assess for changes in respiratory status  - Assess for changes in mentation and behavior  - Position to facilitate oxygenation and minimize respiratory effort  - Oxygen administered by appropriate delivery if ordered  - Initiate smoking cessation education as indicated  - Encourage broncho-pulmonary hygiene including cough, deep breathe, Incentive Spirometry  - Assess the need for suctioning and aspirate as needed  - Assess and instruct to report SOB or any respiratory difficulty  - Respiratory Therapy support as indicated  Outcome: Progressing

## 2022-07-22 NOTE — UTILIZATION REVIEW
Initial Clinical Review    Admission: Date/Time/Statement:   Admission Orders (From admission, onward)     Ordered        07/21/22 0533  INPATIENT ADMISSION  Once                      Orders Placed This Encounter   Procedures    INPATIENT ADMISSION     Standing Status:   Standing     Number of Occurrences:   1     Order Specific Question:   Level of Care     Answer:   Med Surg [16]     Order Specific Question:   Estimated length of stay     Answer:   More than 2 Midnights     Order Specific Question:   Certification     Answer:   I certify that inpatient services are medically necessary for this patient for a duration of greater than two midnights  See H&P and MD Progress Notes for additional information about the patient's course of treatment  ED Arrival Information     Expected   -    Arrival   7/21/2022 01:01    Acuity   Urgent            Means of arrival   Ambulance    Escorted by   [facility] [place] ([address])    Service   Hospitalist    Admission type   Urgent            Arrival complaint   Flank Pain           Chief Complaint   Patient presents with    Medical Problem     Pt comes from [facility] rehab after starting with left sided pain when he breathes in  Pt states it started about 3 hours ago that started in "left lower abdomen and worked its way up"  Initial Presentation: 43 y o  male , presented to  The ED @ [facility], from home via EMS  Admitted as Inpatient due to Acute pulmonary embolism without acute cor pulmonale  Date: 07/21/2022    Presents with c/o severe abdominal pain  Reports pain started in left side of abdomen, he thought pain was secondary to constipation although he had a large bowel movement last night  He tried to lay down but pain became worse, pain radiated from left flank to left chest and to back; pain increased with inspiration  Reports associated dyspnea and one episode of fever prior to coming to ED  Elevated D-Dimer       PE study reveals Multiple intraluminal filling defects involving branches of the left superior and inferior pulmonary arteries compatible with acute pulmonary emboli  Normal RV LV ratio  Status post cervical fixation fusion in May  Was discharged to short-term rehab at [facility]  On anticoagulation with subcutaneous heparin  Check B/LLE duplex  Treat with subq Lovenox weight based dosing  PT/OT  Day 2: 07/22/2022   Lower extremity duplex negative for DVT  Echo with preserved EF  Vital signs of remains stable on heparin drip  Transition to Eliquis loading dose          ED Triage Vitals   Temperature Pulse Respirations Blood Pressure SpO2   07/21/22 0103 07/21/22 0103 07/21/22 0103 07/21/22 0103 07/21/22 0103   99 8 °F (37 7 °C) (!) 121 18 134/79 94 %      Temp Source Heart Rate Source Patient Position - Orthostatic VS BP Location FiO2 (%)   07/21/22 0103 07/21/22 0103 07/21/22 0103 07/21/22 0103 --   Oral Monitor Lying Right arm       Pain Score       07/21/22 0134       10 - Worst Possible Pain          Wt Readings from Last 1 Encounters:   07/21/22 86 2 kg (190 lb 0 6 oz)     Additional Vital Signs:   Date/Time Temp Pulse Resp BP MAP (mmHg) SpO2 Calculated FIO2 (%) - Nasal Cannula Nasal Cannula O2 Flow Rate (L/min) O2 Device Patient Position - Orthostatic VS   07/22/22 07:56:21 97 9 °F (36 6 °C) 77 18 109/65 80 96 % -- -- -- Lying   07/21/22 2030 -- -- -- -- -- -- 32 3 L/min Nasal cannula --   07/21/22 14:14:09 98 1 °F (36 7 °C) 94 18 101/62 75 95 % -- -- -- Lying   07/21/22 1207 -- 103 16 112/76 -- 96 % 28 2 L/min Nasal cannula Lying   07/21/22 1029 -- 103 16 117/71 -- 97 % -- -- None (Room air) Lying   07/21/22 0829 -- 107 Abnormal  18 118/72 -- 94 % -- -- None (Room air) Lying   07/21/22 0738 -- 109 Abnormal  -- -- -- -- -- -- -- --   07/21/22 0630 -- 122 Abnormal  17 118/76 90 95 % 28 2 L/min Nasal cannula Lying   07/21/22 0616 -- 111 Abnormal  18 -- -- 96 % 28 2 L/min Nasal cannula --   07/21/22 0419 -- 115 Abnormal  -- 114/65 -- 91 % -- -- None (Room air) Lying           Pertinent Labs/Diagnostic Test Results:   VAS lower limb venous duplex study, complete bilateral   Final Result by Chetna Qiu MD (07/21 2017)      PE Study with CT abdomen & pelvis with contrast   Final Result by Sara Mireles MD (07/21 0211)      Multiple intraluminal filling defects involving branches of the left superior and inferior pulmonary arteries compatible with acute pulmonary emboli  Measured RV/LV ratio is within normal limits at less than 0 9  Moderate to marked bibasilar subsegmental atelectasis  Early infiltrates cannot be excluded  No pneumothorax  No acute intra-abdominal abnormality  No free air or free fluid  XR chest portable - 1 view   Final Result by Magda Amado MD (07/21 2976)      Left basilar opacity likely represents atelectasis  Infection is to be excluded on clinical grounds          Results from last 7 days   Lab Units 07/22/22  0526 07/21/22  0127   WBC Thousand/uL 7 93 10 56*   HEMOGLOBIN g/dL 11 2* 13 0   HEMATOCRIT % 36 0* 39 1   PLATELETS Thousands/uL 192 215   NEUTROS ABS Thousands/µL 4 34 7 52     Results from last 7 days   Lab Units 07/22/22  0526 07/21/22  0127   SODIUM mmol/L 139 138   POTASSIUM mmol/L 4 4 4 1   CHLORIDE mmol/L 101 98   CO2 mmol/L 33* 28   ANION GAP mmol/L 5 12   BUN mg/dL 9 13   CREATININE mg/dL 1 10 1 32*   EGFR ml/min/1 73sq m 84 67   CALCIUM mg/dL 9 0 9 2     Results from last 7 days   Lab Units 07/21/22  0127   AST U/L 16   ALT U/L 37   ALK PHOS U/L 79   TOTAL PROTEIN g/dL 8 3   ALBUMIN g/dL 3 9   TOTAL BILIRUBIN mg/dL 0 35     Results from last 7 days   Lab Units 07/22/22  0526 07/21/22  0127   GLUCOSE RANDOM mg/dL 96 114     Results from last 7 days   Lab Units 07/21/22  0127   D-DIMER QUANTITATIVE ug/ml FEU 1 43*     Results from last 7 days   Lab Units 07/21/22  0127   PROTIME seconds 13 8   INR  1 06   PTT seconds 33     Results from last 7 days   Lab Units 07/21/22  0127   PROCALCITONIN ng/ml 0 08     Results from last 7 days   Lab Units 07/21/22  0127   LACTIC ACID mmol/L 0 6     Results from last 7 days   Lab Units 07/21/22  0127   LIPASE u/L 83     Results from last 7 days   Lab Units 07/21/22  0633   CLARITY UA  Cloudy   COLOR UA  Yellow   SPEC GRAV UA  1 020   PH UA  5 5   GLUCOSE UA mg/dl Negative   KETONES UA mg/dl Negative   BLOOD UA  Small*   PROTEIN UA mg/dl Negative   NITRITE UA  Negative   BILIRUBIN UA  Negative   UROBILINOGEN UA E U /dl 0 2   LEUKOCYTES UA  Negative   WBC UA /hpf 1-2*   RBC UA /hpf None Seen   BACTERIA UA /hpf Innumerable*   EPITHELIAL CELLS WET PREP /hpf None Seen     Results from last 7 days   Lab Units 07/21/22  0127   BLOOD CULTURE  No Growth at 24 hrs  No Growth at 24 hrs       ED Treatment:   Medication Administration from 07/21/2022 0101 to 07/21/2022 1403       Date/Time Order Dose Route Action     07/21/2022 0110 acetaminophen (FOR EMS ONLY) (TYLENOL) oral suspension 650 mg 0 mg Does not apply Given to EMS     07/21/2022 0134 sodium chloride 0 9 % bolus 1,000 mL 1,000 mL Intravenous New Bag     07/21/2022 0134 morphine injection 4 mg 4 mg Intravenous Given     07/21/2022 0248 morphine injection 4 mg 4 mg Intravenous Given     07/21/2022 0419 iohexol (OMNIPAQUE) 350 MG/ML injection (MULTI-DOSE) 70 mL 70 mL Intravenous Given     07/21/2022 0608 HYDROmorphone (DILAUDID) injection 1 mg 1 mg Intravenous Given     07/21/2022 0811 enoxaparin (LOVENOX) subcutaneous injection 90 mg 90 mg Subcutaneous Given     07/21/2022 0811 baclofen tablet 10 mg 10 mg Oral Given     07/21/2022 0811 busPIRone (BUSPAR) tablet 5 mg 5 mg Oral Given     07/21/2022 0811 docusate sodium (COLACE) capsule 100 mg 100 mg Oral Given     07/21/2022 0811 gabapentin (NEURONTIN) capsule 600 mg 600 mg Oral Given     07/21/2022 0739 pantoprazole (PROTONIX) EC tablet 40 mg 40 mg Oral Given     07/21/2022 1030 traMADol (ULTRAM) tablet 75 mg 75 mg Oral Given     07/21/2022 1340 HYDROmorphone (DILAUDID) injection 0 5 mg 0 5 mg Intravenous Given        Past Medical History:   Diagnosis Date    Chronic depression     PTSD (post-traumatic stress disorder)      Present on Admission:   Cord compression myelopathy (HCC)   Benign essential HTN   Acute pulmonary embolism without acute cor pulmonale (HCC)   Urinary retention   HLD (hyperlipidemia)   Atelectasis      Admitting Diagnosis: Pulmonary embolism (HCC) [I26 99]  Hypoxia [R09 02]  Elevated serum creatinine [R79 89]  Elevated d-dimer [R79 89]  Known medical problems [Z78 9]  Age/Sex: 43 y o  male  Admission Orders:  Regular diet  Up with assistance / Ambulate QID  IS  Consult PT/OT    Scheduled Medications:  acetaminophen, 975 mg, Oral, Q8H GILMAR  apixaban, 10 mg, Oral, BID  atorvastatin, 40 mg, Oral, Daily With Dinner  baclofen, 10 mg, Oral, TID  busPIRone, 5 mg, Oral, BID  [START ON 7/25/2022] cloNIDine, 1 patch, Transdermal, Weekly  diazepam, 5 mg, Oral, HS  docusate sodium, 100 mg, Oral, BID  gabapentin, 600 mg, Oral, TID  melatonin, 6 mg, Oral, HS  pantoprazole, 40 mg, Oral, Early Morning  tamsulosin, 0 4 mg, Oral, Daily With Dinner  traZODone, 100 mg, Oral, HS  enoxaparin (LOVENOX) subcutaneous injection 90 mg  Dose: 1 mg/kg  Weight Dosing Info: 90 kg  Freq: Every 12 hours scheduled Route: SC  Start: 07/21/22 0900 End: 07/22/22 0811    Continuous IV Infusions:  sodium chloride 0 9 % infusion  Rate: 100 mL/hr Dose: 100 mL/hr  Freq: Continuous Route: IV  Last Dose: Stopped (07/22/22 1236)  Start: 07/21/22 1715 End: 07/22/22 0556    PRN Meds:  aluminum-magnesium hydroxide-simethicone, 30 mL, Oral, Q6H PRN  bisacodyl, 10 mg, Rectal, Daily PRN  HYDROmorphone, 0 5 mg, Intravenous, Q4H PRN   X 3 doses 7/21;  X 1 dose 7/22  ondansetron, 4 mg, Intravenous, Q4H PRN  oxyCODONE, 5 mg, Oral, Q4H PRN   Or  oxyCODONE, 10 mg, Oral, Q4H PRN  simethicone, 80 mg, Oral, 4x Daily PRN            Network Utilization Review Department  ATTENTION: Please call with any questions or concerns to [phone] and carefully listen to the prompts so that you are directed to the right person  All voicemails are confidential   Zoraida Joe all requests for admission clinical reviews, approved or denied determinations and any other requests to dedicated fax number below belonging to the campus where the patient is receiving treatment   List of dedicated fax numbers for the Facilities:  [address] DENIALS (Administrative/Medical Necessity) [phone]   [address] (Maternity/NICU/Pediatrics) [phone]   [address] [place] [address]  [address] [place] [address] [id number] [place] [address] [address] [place] [address] [phone]

## 2022-07-22 NOTE — CASE MANAGEMENT
Case Management Progress Note    Patient name Edison Olmedo  Location [facility] MRN [id number]  : 1985 Date 2022       LOS (days): 1  Geometric Mean LOS (GMLOS) (days):   Days to GMLOS:        OBJECTIVE:        Current admission status: Inpatient  Preferred Pharmacy:   [id number] [address]  Phone: [phone] Fax: 230 [facility], [address] [place] [address], [address]  [address] [address]  Phone: [phone] Fax: [phone]    Primary Care Provider: Aislinn Valiente MD    Primary Insurance: Aleksandra Saldana  Secondary Insurance:     PROGRESS NOTE:    CM spoke with [facility] CM Vance Matthews [phone] after receiving a vm from him earlier this morning  Vance Matthews informed this CM that Pt was originally receiving STR at [facility] -admitted to hospital and subsequently discharged to [facility] for what was meant to be a 16 day rehab encounter  Pt remained at [facility] for 46 days  Vance Matthews stated that the primary reason for Pts extended stay was due to his MA which was up for renewal during Pts rehabilitation  As per Tamera Patino insurance was effectively renewed and on 22,  Pt was approved for STR at [facility] in [place], [place]  One day before Pts discharge to [facility]- Pt required acute care and as a result discharged to [facility]  At this time Pt is now medically stable to discharge - Pt is asking to return to [facility]- unfortunately-Pt has not received covid vaccinations, and this facility does not have an available bed for unvaccinated  CM will send referrals on Pts behalf  CM diandra continue to follow

## 2022-07-23 LAB
ANION GAP SERPL CALCULATED.3IONS-SCNC: 8 MMOL/L (ref 4–13)
BUN SERPL-MCNC: 9 MG/DL (ref 5–25)
CALCIUM SERPL-MCNC: 9.3 MG/DL (ref 8.3–10.1)
CHLORIDE SERPL-SCNC: 100 MMOL/L (ref 96–108)
CO2 SERPL-SCNC: 30 MMOL/L (ref 21–32)
CREAT SERPL-MCNC: 1.1 MG/DL (ref 0.6–1.3)
ERYTHROCYTE [DISTWIDTH] IN BLOOD BY AUTOMATED COUNT: 11.9 % (ref 11.6–15.1)
GFR SERPL CREATININE-BSD FRML MDRD: 84 ML/MIN/1.73SQ M
GLUCOSE SERPL-MCNC: 89 MG/DL (ref 65–140)
HCT VFR BLD AUTO: 36.9 % (ref 36.5–49.3)
HGB BLD-MCNC: 12 G/DL (ref 12–17)
MCH RBC QN AUTO: 29.9 PG (ref 26.8–34.3)
MCHC RBC AUTO-ENTMCNC: 32.5 G/DL (ref 31.4–37.4)
MCV RBC AUTO: 92 FL (ref 82–98)
PLATELET # BLD AUTO: 215 THOUSANDS/UL (ref 149–390)
PMV BLD AUTO: 9.2 FL (ref 8.9–12.7)
POTASSIUM SERPL-SCNC: 4.1 MMOL/L (ref 3.5–5.3)
RBC # BLD AUTO: 4.02 MILLION/UL (ref 3.88–5.62)
SODIUM SERPL-SCNC: 138 MMOL/L (ref 135–147)
WBC # BLD AUTO: 6.62 THOUSAND/UL (ref 4.31–10.16)

## 2022-07-23 PROCEDURE — 85027 COMPLETE CBC AUTOMATED: CPT | Performed by: INTERNAL MEDICINE

## 2022-07-23 PROCEDURE — 80048 BASIC METABOLIC PNL TOTAL CA: CPT | Performed by: INTERNAL MEDICINE

## 2022-07-23 PROCEDURE — 99232 SBSQ HOSP IP/OBS MODERATE 35: CPT | Performed by: INTERNAL MEDICINE

## 2022-07-23 RX ADMIN — ATORVASTATIN CALCIUM 40 MG: 40 TABLET, FILM COATED ORAL at 16:41

## 2022-07-23 RX ADMIN — PANTOPRAZOLE SODIUM 40 MG: 40 TABLET, DELAYED RELEASE ORAL at 06:13

## 2022-07-23 RX ADMIN — DIAZEPAM 5 MG: 5 TABLET ORAL at 22:02

## 2022-07-23 RX ADMIN — GABAPENTIN 600 MG: 300 CAPSULE ORAL at 16:40

## 2022-07-23 RX ADMIN — BUSPIRONE HYDROCHLORIDE 5 MG: 5 TABLET ORAL at 22:02

## 2022-07-23 RX ADMIN — TAMSULOSIN HYDROCHLORIDE 0.4 MG: 0.4 CAPSULE ORAL at 16:40

## 2022-07-23 RX ADMIN — OXYCODONE HYDROCHLORIDE 10 MG: 10 TABLET ORAL at 06:17

## 2022-07-23 RX ADMIN — APIXABAN 10 MG: 5 TABLET, FILM COATED ORAL at 10:02

## 2022-07-23 RX ADMIN — HYDROMORPHONE HYDROCHLORIDE 0.5 MG: 1 INJECTION, SOLUTION INTRAMUSCULAR; INTRAVENOUS; SUBCUTANEOUS at 19:50

## 2022-07-23 RX ADMIN — ACETAMINOPHEN 325MG 975 MG: 325 TABLET ORAL at 13:27

## 2022-07-23 RX ADMIN — APIXABAN 10 MG: 5 TABLET, FILM COATED ORAL at 17:00

## 2022-07-23 RX ADMIN — DOCUSATE SODIUM 100 MG: 100 CAPSULE, LIQUID FILLED ORAL at 10:02

## 2022-07-23 RX ADMIN — BACLOFEN 10 MG: 10 TABLET ORAL at 16:41

## 2022-07-23 RX ADMIN — OXYCODONE HYDROCHLORIDE 10 MG: 10 TABLET ORAL at 22:03

## 2022-07-23 RX ADMIN — DOCUSATE SODIUM 100 MG: 100 CAPSULE, LIQUID FILLED ORAL at 17:00

## 2022-07-23 RX ADMIN — BACLOFEN 10 MG: 10 TABLET ORAL at 10:02

## 2022-07-23 RX ADMIN — GABAPENTIN 600 MG: 300 CAPSULE ORAL at 22:02

## 2022-07-23 RX ADMIN — OXYCODONE HYDROCHLORIDE 10 MG: 10 TABLET ORAL at 16:41

## 2022-07-23 RX ADMIN — ACETAMINOPHEN 325MG 975 MG: 325 TABLET ORAL at 06:13

## 2022-07-23 RX ADMIN — OXYCODONE HYDROCHLORIDE 10 MG: 10 TABLET ORAL at 10:18

## 2022-07-23 RX ADMIN — HYDROMORPHONE HYDROCHLORIDE 0.5 MG: 1 INJECTION, SOLUTION INTRAMUSCULAR; INTRAVENOUS; SUBCUTANEOUS at 07:50

## 2022-07-23 RX ADMIN — BACLOFEN 10 MG: 10 TABLET ORAL at 22:02

## 2022-07-23 RX ADMIN — BUSPIRONE HYDROCHLORIDE 5 MG: 5 TABLET ORAL at 10:02

## 2022-07-23 RX ADMIN — ACETAMINOPHEN 325MG 975 MG: 325 TABLET ORAL at 22:03

## 2022-07-23 RX ADMIN — MELATONIN 6 MG: at 22:02

## 2022-07-23 RX ADMIN — HYDROMORPHONE HYDROCHLORIDE 0.5 MG: 1 INJECTION, SOLUTION INTRAMUSCULAR; INTRAVENOUS; SUBCUTANEOUS at 13:27

## 2022-07-23 RX ADMIN — TRAZODONE HYDROCHLORIDE 100 MG: 100 TABLET ORAL at 22:02

## 2022-07-23 RX ADMIN — GABAPENTIN 600 MG: 300 CAPSULE ORAL at 10:02

## 2022-07-23 NOTE — PLAN OF CARE
Problem: Potential for Falls  Goal: Patient will remain free of falls  Description: INTERVENTIONS:  - Educate patient/family on patient safety including physical limitations  - Instruct patient to call for assistance with activity   - Consult OT/PT to assist with strengthening/mobility   - Keep Call bell within reach  - Keep bed low and locked with side rails adjusted as appropriate  - Keep care items and personal belongings within reach  - Initiate and maintain comfort rounds  - Make Fall Risk Sign visible to staff  - Offer Toileting every  Hours, in advance of need  - Initiate/Maintain alarm  - Obtain necessary fall risk management equipment  - Apply yellow socks and bracelet for high fall risk patients  - Consider moving patient to room near nurses station  Outcome: Progressing     Problem: MOBILITY - ADULT  Goal: Maintain or return to baseline ADL function  Description: INTERVENTIONS:  -  Assess patient's ability to carry out ADLs; assess patient's baseline for ADL function and identify physical deficits which impact ability to perform ADLs (bathing, care of mouth/teeth, toileting, grooming, dressing, etc )  - Assess/evaluate cause of self-care deficits   - Assess range of motion  - Assess patient's mobility; develop plan if impaired  - Assess patient's need for assistive devices and provide as appropriate  - Encourage maximum independence but intervene and supervise when necessary  - Involve family in performance of ADLs  - Assess for home care needs following discharge   - Consider OT consult to assist with ADL evaluation and planning for discharge  - Provide patient education as appropriate  Outcome: Progressing     Problem: Prexisting or High Potential for Compromised Skin Integrity  Goal: Skin integrity is maintained or improved  Description: INTERVENTIONS:  - Identify patients at risk for skin breakdown  - Assess and monitor skin integrity  - Assess and monitor nutrition and hydration status  - Monitor labs   - Assess for incontinence   - Turn and reposition patient  - Assist with mobility/ambulation  - Relieve pressure over bony prominences  - Avoid friction and shearing  - Provide appropriate hygiene as needed including keeping skin clean and dry  - Evaluate need for skin moisturizer/barrier cream  - Collaborate with interdisciplinary team   - Patient/family teaching  - Consider wound care consult   Outcome: Progressing     Problem: RESPIRATORY - ADULT  Goal: Achieves optimal ventilation and oxygenation  Description: INTERVENTIONS:  - Assess for changes in respiratory status  - Assess for changes in mentation and behavior  - Position to facilitate oxygenation and minimize respiratory effort  - Oxygen administered by appropriate delivery if ordered  - Initiate smoking cessation education as indicated  - Encourage broncho-pulmonary hygiene including cough, deep breathe, Incentive Spirometry  - Assess the need for suctioning and aspirate as needed  - Assess and instruct to report SOB or any respiratory difficulty  - Respiratory Therapy support as indicated  Outcome: Progressing

## 2022-07-23 NOTE — PLAN OF CARE
Problem: Potential for Falls  Goal: Patient will remain free of falls  Description: INTERVENTIONS:  - Educate patient/family on patient safety including physical limitations  - Instruct patient to call for assistance with activity   - Consult OT/PT to assist with strengthening/mobility   - Keep Call bell within reach  - Keep bed low and locked with side rails adjusted as appropriate  - Keep care items and personal belongings within reach  - Initiate and maintain comfort rounds  - Make Fall Risk Sign visible to staff  - Offer Toileting every  Hours, in advance of need  - Initiate/Maintain alarm  - Obtain necessary fall risk management equipment:   -Apply yellow socks and bracelet for high fall risk patients  - Consider moving patient to room near nurses station  Outcome: Progressing     Problem: MOBILITY - ADULT  Goal: Maintain or return to baseline ADL function  Description: INTERVENTIONS:  -  Assess patient's ability to carry out ADLs; assess patient's baseline for ADL function and identify physical deficits which impact ability to perform ADLs (bathing, care of mouth/teeth, toileting, grooming, dressing, etc )  - Assess/evaluate cause of self-care deficits   - Assess range of motion  - Assess patient's mobility; develop plan if impaired  - Assess patient's need for assistive devices and provide as appropriate  - Encourage maximum independence but intervene and supervise when necessary  - Involve family in performance of ADLs  - Assess for home care needs following discharge   - Consider OT consult to assist with ADL evaluation and planning for discharge  - Provide patient education as appropriate  Outcome: Progressing     Problem: Prexisting or High Potential for Compromised Skin Integrity  Goal: Skin integrity is maintained or improved  Description: INTERVENTIONS:  - Identify patients at risk for skin breakdown  - Assess and monitor skin integrity  - Assess and monitor nutrition and hydration status  - Monitor labs   - Assess for incontinence   - Turn and reposition patient  - Assist with mobility/ambulation  - Relieve pressure over bony prominences  - Avoid friction and shearing  - Provide appropriate hygiene as needed including keeping skin clean and dry  - Evaluate need for skin moisturizer/barrier cream  - Collaborate with interdisciplinary team   - Patient/family teaching  - Consider wound care consult   Outcome: Progressing     Problem: RESPIRATORY - ADULT  Goal: Achieves optimal ventilation and oxygenation  Description: INTERVENTIONS:  - Assess for changes in respiratory status  - Assess for changes in mentation and behavior  - Position to facilitate oxygenation and minimize respiratory effort  - Oxygen administered by appropriate delivery if ordered  - Initiate smoking cessation education as indicated  - Encourage broncho-pulmonary hygiene including cough, deep breathe, Incentive Spirometry  - Assess the need for suctioning and aspirate as needed  - Assess and instruct to report SOB or any respiratory difficulty  - Respiratory Therapy support as indicated  Outcome: Progressing

## 2022-07-23 NOTE — PROGRESS NOTES
[facility]  Progress Note Valentino Ireland 1985, 43 y o  male MRN: [id number]  Unit/Bed#: E5 -01 Encounter: [id number]  Primary Care Provider: Alfonso Schilling MD   Date and time admitted to hospital: 7/21/2022  1:01 AM    * Acute pulmonary embolism without acute cor pulmonale ([address] )  Assessment & Plan  · Presented to ED with complaints of left-sided chest pain and dyspnea  Elevated D-dimer  · PE study reveals Multiple intraluminal filling defects involving branches of the left superior and inferior pulmonary arteries compatible with acute pulmonary emboli  Normal RV LV ratio  · Likely provoked Status post cervical fixation fusion in May  Was discharged to short-term rehab at [facility]  On anticoagulation with subcutaneous heparin  · Lower extremity duplex negative for DVT  · Echo with preserved EF  · Vital signs remained stable on heparin drip  · Transitioned to Eliquis loading dose    Atelectasis  Assessment & Plan  · Moderate to marked bibasilar subsegmental atelectasis seen on CT study  · Likely splinting related to pain from pulmonary embolism  · Incentive spirometry  · P r n  Analgesia    Cord compression myelopathy [facility])  Assessment & Plan  · 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  · Admitted to [facility] in [place] for spinal cord compression and herniated cervical disc s/p CDF C6-7; Revision PCDF, revise/replace right C2 screw with laminar screw, navigated C7, T1 pedicle screws, C6-7 laminectomy May 2022  Followed outpatient by Neurosurgery  · Evaluated by PT/OT    Recommendation return to [facility]  · Case management assisting with placement    HLD (hyperlipidemia)  Assessment & Plan  · Continue statin    Benign essential HTN  Assessment & Plan  · Clonidine TD patch every Monday    Urinary retention  Assessment & Plan  · Continue Flomax      VTE Pharmacologic Prophylaxis:  Apixaban    Patient Centered Rounds:  Patient care rounds were performed with nursing    Time Spent for Care: 30  More than 50% of total time spent on counseling and coordination of care as described above  Current Length of Stay: 2 day(s)    Current Patient Status: Inpatient   Certification Statement: The patient will continue to require additional inpatient hospital stay due to medically stable for discharge    Discharge Plan:  Pending return to rehab    Code Status: Level 1 - Full Code      Subjective:   Patient seen and evaluated at bedside  Continues to have pain with inspiration but is mildly improved  Objective:     Vitals:   Temp (24hrs), Av 3 °F (36 8 °C), Min:97 9 °F (36 6 °C), Max:98 6 °F (37 °C)    Temp:  [97 9 °F (36 6 °C)-98 6 °F (37 °C)] 97 9 °F (36 6 °C)  HR:  [] 89  Resp:  [18] 18  BP: ()/(63-66) 105/66  SpO2:  [90 %-92 %] 90 %  Body mass index is 29 76 kg/m²  Input and Output Summary (last 24 hours): Intake/Output Summary (Last 24 hours) at 2022 1318  Last data filed at 2022 0817  Gross per 24 hour   Intake --   Output 1350 ml   Net -1350 ml       Physical Exam:     Physical Exam  Vitals reviewed  Constitutional:       General: He is not in acute distress  Appearance: He is well-developed  He is not ill-appearing, toxic-appearing or diaphoretic  HENT:      Head: Normocephalic and atraumatic  Mouth/Throat:      Mouth: Mucous membranes are moist    Eyes:      General: No scleral icterus  Extraocular Movements: Extraocular movements intact  Cardiovascular:      Rate and Rhythm: Normal rate and regular rhythm  Heart sounds: Normal heart sounds  Pulmonary:      Effort: Pulmonary effort is normal  No respiratory distress  Breath sounds: Normal breath sounds  No wheezing or rales  Comments: Decreased breath sounds in the bases  Abdominal:      General: There is no distension  Palpations: Abdomen is soft  Tenderness: There is no abdominal tenderness   There is no guarding or rebound  Musculoskeletal:         General: No swelling, tenderness or deformity  Comments: Right lower extremity in Cam boot   Skin:     General: Skin is warm and dry  Neurological:      General: No focal deficit present  Mental Status: He is alert  Mental status is at baseline  Psychiatric:         Mood and Affect: Mood normal          Behavior: Behavior normal          Thought Content: Thought content normal          Judgment: Judgment normal          Additional Data:     Labs: I have reviewed pertinent results     Results from last 7 days   Lab Units 07/23/22  0501 07/22/22  0526   WBC Thousand/uL 6 62 7 93   HEMOGLOBIN g/dL 12 0 11 2*   HEMATOCRIT % 36 9 36 0*   PLATELETS Thousands/uL 215 192   NEUTROS PCT %  --  55   LYMPHS PCT %  --  29   MONOS PCT %  --  14*   EOS PCT %  --  1     Results from last 7 days   Lab Units 07/23/22  0501 07/22/22  0526 07/21/22  0127   SODIUM mmol/L 138   < > 138   POTASSIUM mmol/L 4 1   < > 4 1   CHLORIDE mmol/L 100   < > 98   CO2 mmol/L 30   < > 28   BUN mg/dL 9   < > 13   CREATININE mg/dL 1 10   < > 1 32*   ANION GAP mmol/L 8   < > 12   CALCIUM mg/dL 9 3   < > 9 2   ALBUMIN g/dL  --   --  3 9   TOTAL BILIRUBIN mg/dL  --   --  0 35   ALK PHOS U/L  --   --  79   ALT U/L  --   --  37   AST U/L  --   --  16   GLUCOSE RANDOM mg/dL 89   < > 114    < > = values in this interval not displayed  Results from last 7 days   Lab Units 07/21/22  0127   INR  1 06             Results from last 7 days   Lab Units 07/21/22  0127   LACTIC ACID mmol/L 0 6   PROCALCITONIN ng/ml 0 08         Imaging: I have reviewed pertinent imaging       Recent Cultures (last 7 days):     Results from last 7 days   Lab Units 07/21/22  0127   BLOOD CULTURE  No Growth at 48 hrs  No Growth at 48 hrs         Last 24 Hours Medication List:   Current Facility-Administered Medications   Medication Dose Route Frequency Provider Last Rate    acetaminophen  975 mg Oral [facility] Tan Silva Nevin,       aluminum-magnesium hydroxide-simethicone  30 mL Oral Q6H PRN Relda Qualia, CRNP      apixaban  10 mg Oral BID Chari Marte, DO      atorvastatin  40 mg Oral Daily With [employer], CRNP      baclofen  10 mg Oral TID Relda Qualia, CRNP      bisacodyl  10 mg Rectal Daily PRN Relda Qualia, CRNP      busPIRone  5 mg Oral BID Relda Qualia, CRNP      [START ON 7/25/2022] cloNIDine  1 patch Transdermal Weekly Relda Qualia, CRNP      diazepam  5 mg Oral HS Relda Qualia, CRNP      docusate sodium  100 mg Oral BID Relda Qualia, CRNP      gabapentin  600 mg Oral TID Relda Qualia, CRNP      HYDROmorphone  0 5 mg Intravenous Q4H PRN Chari Dye, DO      melatonin  6 mg Oral HS Relda Qualia, CRNP      ondansetron  4 mg Intravenous Q4H PRN Milton Alaniz PA-C      oxyCODONE  5 mg Oral Q4H PRN Chari Dye, DO      Or    oxyCODONE  10 mg Oral Q4H PRN Chari Dye, DO      pantoprazole  40 mg Oral Early Morning Relda Qualia, CRNP      simethicone  80 mg Oral 4x Daily PRN Relda Qualia, CRNP      tamsulosin  0 4 mg Oral Daily With [employer], CRNP      traZODone  100 mg Oral HS Relda Qualia, CRNP          Today, Patient Was Seen By: Chari Marte DO    ** Please Note: Dictation voice to text software may have been used in the creation of this document   **

## 2022-07-23 NOTE — ASSESSMENT & PLAN NOTE
· 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  · Admitted to [facility] in [place] for spinal cord compression and herniated cervical disc s/p CDF C6-7; Revision PCDF, revise/replace right C2 screw with laminar screw, navigated C7, T1 pedicle screws, C6-7 laminectomy May 2022  Followed outpatient by Neurosurgery  · Evaluated by PT/OT    Recommendation return to [employer]  · Case management assisting with placement

## 2022-07-23 NOTE — ASSESSMENT & PLAN NOTE
· Presented to ED with complaints of left-sided chest pain and dyspnea  Elevated D-dimer  · PE study reveals Multiple intraluminal filling defects involving branches of the left superior and inferior pulmonary arteries compatible with acute pulmonary emboli  Normal RV LV ratio  · Likely provoked Status post cervical fixation fusion in May  Was discharged to short-term rehab at [facility]    On anticoagulation with subcutaneous heparin  · Lower extremity duplex negative for DVT  · Echo with preserved EF  · Vital signs remained stable on heparin drip  · Transitioned to Eliquis loading dose

## 2022-07-24 PROCEDURE — 99232 SBSQ HOSP IP/OBS MODERATE 35: CPT | Performed by: INTERNAL MEDICINE

## 2022-07-24 RX ADMIN — BACLOFEN 10 MG: 10 TABLET ORAL at 08:43

## 2022-07-24 RX ADMIN — MELATONIN 6 MG: at 21:47

## 2022-07-24 RX ADMIN — GABAPENTIN 600 MG: 300 CAPSULE ORAL at 08:43

## 2022-07-24 RX ADMIN — HYDROMORPHONE HYDROCHLORIDE 0.5 MG: 1 INJECTION, SOLUTION INTRAMUSCULAR; INTRAVENOUS; SUBCUTANEOUS at 15:46

## 2022-07-24 RX ADMIN — ACETAMINOPHEN 325MG 975 MG: 325 TABLET ORAL at 06:08

## 2022-07-24 RX ADMIN — HYDROMORPHONE HYDROCHLORIDE 0.5 MG: 1 INJECTION, SOLUTION INTRAMUSCULAR; INTRAVENOUS; SUBCUTANEOUS at 08:43

## 2022-07-24 RX ADMIN — DIAZEPAM 5 MG: 5 TABLET ORAL at 21:47

## 2022-07-24 RX ADMIN — HYDROMORPHONE HYDROCHLORIDE 0.5 MG: 1 INJECTION, SOLUTION INTRAMUSCULAR; INTRAVENOUS; SUBCUTANEOUS at 21:47

## 2022-07-24 RX ADMIN — BUSPIRONE HYDROCHLORIDE 5 MG: 5 TABLET ORAL at 21:46

## 2022-07-24 RX ADMIN — OXYCODONE HYDROCHLORIDE 10 MG: 10 TABLET ORAL at 19:39

## 2022-07-24 RX ADMIN — PANTOPRAZOLE SODIUM 40 MG: 40 TABLET, DELAYED RELEASE ORAL at 06:08

## 2022-07-24 RX ADMIN — BACLOFEN 10 MG: 10 TABLET ORAL at 21:46

## 2022-07-24 RX ADMIN — ACETAMINOPHEN 325MG 975 MG: 325 TABLET ORAL at 13:27

## 2022-07-24 RX ADMIN — GABAPENTIN 600 MG: 300 CAPSULE ORAL at 15:46

## 2022-07-24 RX ADMIN — DOCUSATE SODIUM 100 MG: 100 CAPSULE, LIQUID FILLED ORAL at 08:43

## 2022-07-24 RX ADMIN — DOCUSATE SODIUM 100 MG: 100 CAPSULE, LIQUID FILLED ORAL at 17:05

## 2022-07-24 RX ADMIN — OXYCODONE HYDROCHLORIDE 10 MG: 10 TABLET ORAL at 12:22

## 2022-07-24 RX ADMIN — OXYCODONE HYDROCHLORIDE 10 MG: 10 TABLET ORAL at 06:08

## 2022-07-24 RX ADMIN — APIXABAN 10 MG: 5 TABLET, FILM COATED ORAL at 08:43

## 2022-07-24 RX ADMIN — BUSPIRONE HYDROCHLORIDE 5 MG: 5 TABLET ORAL at 08:43

## 2022-07-24 RX ADMIN — APIXABAN 10 MG: 5 TABLET, FILM COATED ORAL at 17:05

## 2022-07-24 RX ADMIN — BACLOFEN 10 MG: 10 TABLET ORAL at 15:46

## 2022-07-24 RX ADMIN — GABAPENTIN 600 MG: 300 CAPSULE ORAL at 21:46

## 2022-07-24 RX ADMIN — TAMSULOSIN HYDROCHLORIDE 0.4 MG: 0.4 CAPSULE ORAL at 15:46

## 2022-07-24 RX ADMIN — TRAZODONE HYDROCHLORIDE 100 MG: 100 TABLET ORAL at 21:46

## 2022-07-24 RX ADMIN — ATORVASTATIN CALCIUM 40 MG: 40 TABLET, FILM COATED ORAL at 15:46

## 2022-07-24 RX ADMIN — ACETAMINOPHEN 325MG 975 MG: 325 TABLET ORAL at 21:45

## 2022-07-24 NOTE — PLAN OF CARE
Problem: Potential for Falls  Goal: Patient will remain free of falls  Description: INTERVENTIONS:  - Educate patient/family on patient safety including physical limitations  - Instruct patient to call for assistance with activity   - Consult OT/PT to assist with strengthening/mobility   - Keep Call bell within reach  - Keep bed low and locked with side rails adjusted as appropriate  - Keep care items and personal belongings within reach  - Initiate and maintain comfort rounds  - Make Fall Risk Sign visible to staff  - Offer Toileting every 2 Hours, in advance of need  - Initiate/Maintain bed alarm  - Obtain necessary fall risk management equipment: bed alarm   - Apply yellow socks and bracelet for high fall risk patients  - Consider moving patient to room near nurses station  Outcome: Progressing     Problem: MOBILITY - ADULT  Goal: Maintain or return to baseline ADL function  Description: INTERVENTIONS:  -  Assess patient's ability to carry out ADLs; assess patient's baseline for ADL function and identify physical deficits which impact ability to perform ADLs (bathing, care of mouth/teeth, toileting, grooming, dressing, etc )  - Assess/evaluate cause of self-care deficits   - Assess range of motion  - Assess patient's mobility; develop plan if impaired  - Assess patient's need for assistive devices and provide as appropriate  - Encourage maximum independence but intervene and supervise when necessary  - Involve family in performance of ADLs  - Assess for home care needs following discharge   - Consider OT consult to assist with ADL evaluation and planning for discharge  - Provide patient education as appropriate  Outcome: Progressing     Problem: Prexisting or High Potential for Compromised Skin Integrity  Goal: Skin integrity is maintained or improved  Description: INTERVENTIONS:  - Identify patients at risk for skin breakdown  - Assess and monitor skin integrity  - Assess and monitor nutrition and hydration status  - Monitor labs   - Assess for incontinence   - Turn and reposition patient  - Assist with mobility/ambulation  - Relieve pressure over bony prominences  - Avoid friction and shearing  - Provide appropriate hygiene as needed including keeping skin clean and dry  - Evaluate need for skin moisturizer/barrier cream  - Collaborate with interdisciplinary team   - Patient/family teaching  - Consider wound care consult   Outcome: Progressing     Problem: RESPIRATORY - ADULT  Goal: Achieves optimal ventilation and oxygenation  Description: INTERVENTIONS:  - Assess for changes in respiratory status  - Assess for changes in mentation and behavior  - Position to facilitate oxygenation and minimize respiratory effort  - Oxygen administered by appropriate delivery if ordered  - Initiate smoking cessation education as indicated  - Encourage broncho-pulmonary hygiene including cough, deep breathe, Incentive Spirometry  - Assess the need for suctioning and aspirate as needed  - Assess and instruct to report SOB or any respiratory difficulty  - Respiratory Therapy support as indicated  Outcome: Progressing     Problem: DISCHARGE PLANNING  Goal: Discharge to home or other facility with appropriate resources  Description: INTERVENTIONS:  - Identify barriers to discharge w/patient and caregiver  - Arrange for needed discharge resources and transportation as appropriate  - Identify discharge learning needs (meds, wound care, etc )  - Arrange for interpretive services to assist at discharge as needed  - Refer to Case Management Department for coordinating discharge planning if the patient needs post-hospital services based on physician/advanced practitioner order or complex needs related to functional status, cognitive ability, or social support system  Outcome: Progressing

## 2022-07-24 NOTE — CASE MANAGEMENT
Case Management Assessment & Discharge Planning Note    Patient name Manjula Krause  Location [address] [facility] MRN [id number]  : 1985 Date 2022       Current Admission Date: 2022  Current Admission Diagnosis:Acute pulmonary embolism without acute cor pulmonale [facility])   Patient Active Problem List    Diagnosis Date Noted    Acute pulmonary embolism without acute cor pulmonale ([facility] ) 2022    Atelectasis 2022    Leukocytosis 2022    Cord compression myelopathy ([facility] ) 2022    Lower urinary tract symptoms (LUTS) 2022    Encounter to discuss test results 2022    S/P orchiectomy 2021    Acute low back pain 2021    Hypokalemia 2021    Benign essential HTN 2021    HLD (hyperlipidemia) 2021    Testicular discomfort 2021    Intractable hiccups 2021    Depression 2021    Urinary retention 2021    Closed fracture of fifth cervical vertebra ([facility] ) 03/10/2021    Spinal cord injury, C1-C7 ([facility] ) 03/10/2021    Fall 03/10/2021    Pain 03/10/2021    Central cord syndrome ([facility] ) 2021    Pulmonary insufficiency 2021      LOS (days): 3  Geometric Mean LOS (GMLOS) (days):   Days to GMLOS:     OBJECTIVE:    Risk of Unplanned Readmission Score: 16 34         Current admission status: Inpatient  Referral Reason:  (CM CONSULT FOR  DISPO PLANNING)    Preferred Pharmacy:   [id number] [place] [facility] [address] [facility] [address]  Phone: [phone] Fax: [address] [address] [facility]  [address] [facility]  [place]  [address]  Phone: [phone] Fax: [phone]    Primary Care Provider: Timmy Rahman MD    Primary Insurance: Allison Galeana  Secondary Insurance:     ASSESSMENT:  [address] [facility] - [facility]   Primary Phone: [phone] (Mobile) Readmission Root Cause  30 Day Readmission: No    Patient Information  Admitted from[de-identified] Facility  Mental Status: Alert  During Assessment patient was accompanied by: Not accompanied during assessment  Assessment information provided by[de-identified] Patient  Primary Caregiver: Other (Comment)  Caregiver's Name[de-identified] Deonna Flores [phone]  Caregiver's Relationship to Patient[de-identified] Other (Specify)  Caregiver's Telephone Number[de-identified] Deonna Flores [phone]  Support Systems: Other (Comment) ([facility] [phone])  [place] of Residence: [address] [place] [address] do you live in?: [place]- Pt is in waiting list for [facility] [address] (under construction)    Activities of Daily Living Prior to Admission  Functional Status: Assistance  Completes ADLs independently?: No  Level of ADL dependence: Assistance  Ambulates independently?: No  Level of ambulatory dependence: Assistance  Does patient use assisted devices?: Yes  Assisted Devices (DME) used: Wheelchair  Does patient currently own DME?: Yes  What DME does the patient currently own?: Wheelchair  Does the patient have a history of Short-Term Rehab?: Yes ([facility] and ACUTE REHAB at [facility])  Does patient have a history of [address]?: No  Does patient currently have [address]?: No    Patient Information Continued  Income Source: SSI/SSD  Does patient have prescription coverage?: Yes      DISCHARGE DETAILS:    Discharge planning discussed with[de-identified] Patient     Freedom of Choice: Yes  Comments - Freedom of Choice: Pt states his preference to discharge to a [facility] facility where he will receive therapy -and reside - while waiting for completion of his apartment  REFERRALS SENT - WAITING FOR [place] Ver[place] Discharge plan in progress       CM contacted family/caregiver?: No- see comments  Were Treatment Team discharge recommendations reviewed with patient/caregiver?: Yes  Were patient/caregiver advised of the risks associated with not following Treatment Team discharge recommendations?: Yes    Contacts  Patient Contacts: Tony Madelyn [phone]  Relationship to Patient[de-identified] Other (Comment)  Contact Method: Phone  Phone Number: Tony Madelyn [phone]  Reason/Outcome: Continuity of Care, Discharge Planning, Other (Comment)    [address]         Is the patient interested in [address] at discharge?: No  DME Referral Provided  Referral made for DME?: No    Treatment Team Recommendation: Short Term Rehab

## 2022-07-24 NOTE — ASSESSMENT & PLAN NOTE
· 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  · Admitted to [facility] in [place] for spinal cord compression and herniated cervical disc s/p CDF C6-7; Revision PCDF, revise/replace right C2 screw with laminar screw, navigated C7, T1 pedicle screws, C6-7 laminectomy May 2022  Followed outpatient by Neurosurgery  · Evaluated by PT/OT    Recommendation return to [facility]  · Case management assisting with placement

## 2022-07-24 NOTE — ASSESSMENT & PLAN NOTE
· Presented to ED with complaints of left-sided chest pain and dyspnea  Elevated D-dimer  · PE study reveals Multiple intraluminal filling defects involving branches of the left superior and inferior pulmonary arteries compatible with acute pulmonary emboli  Normal RV LV ratio  · Likely provoked Status post cervical fixation fusion in May  Was discharged to short-term rehab at [facility]    On anticoagulation with subcutaneous heparin  · Lower extremity duplex negative for DVT  · Echo with preserved EF  · Vital signs remained stable on heparin drip  · Transitioned to Eliquis loading dose

## 2022-07-24 NOTE — PROGRESS NOTES
[facility]  Progress Note José Miguel Szymanski 1985, 43 y o  male MRN: [id number]  Unit/Bed#: E5 -01 Encounter: [id number]  Primary Care Provider: Jacy Tomas MD   Date and time admitted to hospital: 7/21/2022  1:01 AM    * Acute pulmonary embolism without acute cor pulmonale ([address] )  Assessment & Plan  · Presented to ED with complaints of left-sided chest pain and dyspnea  Elevated D-dimer  · PE study reveals Multiple intraluminal filling defects involving branches of the left superior and inferior pulmonary arteries compatible with acute pulmonary emboli  Normal RV LV ratio  · Likely provoked Status post cervical fixation fusion in May  Was discharged to short-term rehab at [facility]  On anticoagulation with subcutaneous heparin  · Lower extremity duplex negative for DVT  · Echo with preserved EF  · Vital signs remained stable on heparin drip  · Transitioned to Eliquis loading dose    Atelectasis  Assessment & Plan  · Moderate to marked bibasilar subsegmental atelectasis seen on CT study  · Likely splinting related to pain from pulmonary embolism  · Incentive spirometry  · P r n  Analgesia    Cord compression myelopathy [facility])  Assessment & Plan  · 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  · Admitted to [facility] in Jun for spinal cord compression and herniated cervical disc s/p CDF C6-7; Revision PCDF, revise/replace right C2 screw with laminar screw, navigated C7, T1 pedicle screws, C6-7 laminectomy May 2022  Followed outpatient by Neurosurgery  · Evaluated by PT/OT    Recommendation return to [facility]  · Case management assisting with placement    HLD (hyperlipidemia)  Assessment & Plan  · Continue statin    Benign essential HTN  Assessment & Plan  · Clonidine TD patch every Monday    Urinary retention  Assessment & Plan  · Continue Flomax      VTE Pharmacologic Prophylaxis:  Eliquis    Patient Centered Rounds:  Patient care rounds were performed with nursing    Time Spent for Care: 30  More than 50% of total time spent on counseling and coordination of care as described above  Current Length of Stay: 3 day(s)    Current Patient Status: Inpatient   Certification Statement: The patient will continue to require additional inpatient hospital stay due to awaiting placement to rehab    Discharge Plan:  Awaiting placement to rehab    Code Status: Level 1 - Full Code      Subjective:   Patient seen evaluated at bedside  Continues to have some pain with deep breaths  Objective:     Vitals:   Temp (24hrs), Av °F (36 7 °C), Min:97 6 °F (36 4 °C), Max:98 2 °F (36 8 °C)    Temp:  [97 6 °F (36 4 °C)-98 2 °F (36 8 °C)] 97 6 °F (36 4 °C)  HR:  [] 79  Resp:  [18] 18  BP: (91-98)/(58-63) 98/62  SpO2:  [92 %-93 %] 92 %  Body mass index is 29 76 kg/m²  Input and Output Summary (last 24 hours): Intake/Output Summary (Last 24 hours) at 2022 1459  Last data filed at 2022 1239  Gross per 24 hour   Intake --   Output 475 ml   Net -475 ml       Physical Exam:     Physical Exam  Vitals reviewed  Constitutional:       General: He is not in acute distress  Appearance: He is well-developed  He is not ill-appearing, toxic-appearing or diaphoretic  HENT:      Head: Normocephalic and atraumatic  Mouth/Throat:      Mouth: Mucous membranes are moist    Eyes:      General: No scleral icterus  Extraocular Movements: Extraocular movements intact  Cardiovascular:      Rate and Rhythm: Normal rate and regular rhythm  Heart sounds: Normal heart sounds  Pulmonary:      Effort: Pulmonary effort is normal  No respiratory distress  Breath sounds: Normal breath sounds  No wheezing or rales  Abdominal:      General: There is no distension  Palpations: Abdomen is soft  Tenderness: There is no abdominal tenderness  There is no guarding or rebound     Musculoskeletal:         General: No swelling, tenderness or deformity  Skin:     General: Skin is warm and dry  Neurological:      General: No focal deficit present  Mental Status: He is alert  Mental status is at baseline  Psychiatric:         Mood and Affect: Mood normal          Behavior: Behavior normal          Thought Content: Thought content normal          Judgment: Judgment normal          Additional Data:     Labs: I have reviewed pertinent results     Results from last 7 days   Lab Units 07/23/22  0501 07/22/22  0526   WBC Thousand/uL 6 62 7 93   HEMOGLOBIN g/dL 12 0 11 2*   HEMATOCRIT % 36 9 36 0*   PLATELETS Thousands/uL 215 192   NEUTROS PCT %  --  55   LYMPHS PCT %  --  29   MONOS PCT %  --  14*   EOS PCT %  --  1     Results from last 7 days   Lab Units 07/23/22  0501 07/22/22  0526 07/21/22  0127   SODIUM mmol/L 138   < > 138   POTASSIUM mmol/L 4 1   < > 4 1   CHLORIDE mmol/L 100   < > 98   CO2 mmol/L 30   < > 28   BUN mg/dL 9   < > 13   CREATININE mg/dL 1 10   < > 1 32*   ANION GAP mmol/L 8   < > 12   CALCIUM mg/dL 9 3   < > 9 2   ALBUMIN g/dL  --   --  3 9   TOTAL BILIRUBIN mg/dL  --   --  0 35   ALK PHOS U/L  --   --  79   ALT U/L  --   --  37   AST U/L  --   --  16   GLUCOSE RANDOM mg/dL 89   < > 114    < > = values in this interval not displayed  Results from last 7 days   Lab Units 07/21/22  0127   INR  1 06             Results from last 7 days   Lab Units 07/21/22  0127   LACTIC ACID mmol/L 0 6   PROCALCITONIN ng/ml 0 08         Imaging: I have reviewed pertinent imaging       Recent Cultures (last 7 days):     Results from last 7 days   Lab Units 07/21/22  0127   BLOOD CULTURE  No Growth at 72 hrs  No Growth at 72 hrs         Last 24 Hours Medication List:   Current Facility-Administered Medications   Medication Dose Route Frequency Provider Last Rate    acetaminophen  975 mg Oral [facility] Cheryle Drilling, DO      aluminum-magnesium hydroxide-simethicone  30 mL Oral Q6H PRN JAYLYN Jalloh      apixaban  10 mg Oral BID Bianca Jimenez DO      atorvastatin  40 mg Oral Daily With [employer], JAYLYN      baclofen  10 mg Oral TID Claudette Heller, JAYLYN      bisacodyl  10 mg Rectal Daily PRN Claudette Heller, JAYLYN      busPIRone  5 mg Oral BID Claudette Heller, CRNP      [START ON 7/25/2022] cloNIDine  1 patch Transdermal Weekly Claudette Heller, JAYLYN      diazepam  5 mg Oral HS Claudette Heller, JAYLYN      docusate sodium  100 mg Oral BID Claudette Heller, JAYLYN      gabapentin  600 mg Oral TID Claudette Heller, JAYLYN      HYDROmorphone  0 5 mg Intravenous Q4H PRN Bianca Jimenez,       melatonin  6 mg Oral HS Claudette Heller, CRNP      ondansetron  4 mg Intravenous Q4H PRN Lawrence Guan PA-C      oxyCODONE  5 mg Oral Q4H PRN Bianca Jimenez,       Or    oxyCODONE  10 mg Oral Q4H PRN Bianca Jimenez,       pantoprazole  40 mg Oral Early Morning Claudette Heller, CRNP      simethicone  80 mg Oral 4x Daily PRN Claudette Heller, CRNP      tamsulosin  0 4 mg Oral Daily With [employer], JAYLYN      traZODone  100 mg Oral HS Claudette Heller, CRNP          Today, Patient Was Seen By: Bianca Jimenez DO    ** Please Note: Dictation voice to text software may have been used in the creation of this document   **

## 2022-07-24 NOTE — PLAN OF CARE
Problem: Potential for Falls  Goal: Patient will remain free of falls  Description: INTERVENTIONS:  - Educate patient/family on patient safety including physical limitations  - Instruct patient to call for assistance with activity   - Consult OT/PT to assist with strengthening/mobility   - Keep Call bell within reach  - Keep bed low and locked with side rails adjusted as appropriate  - Keep care items and personal belongings within reach  - Initiate and maintain comfort rounds  - Make Fall Risk Sign visible to staff  - Offer Toileting every 2 Hours, in advance of need  - Initiate/Maintain bed alarm  - Apply yellow socks and bracelet for high fall risk patients  - Consider moving patient to room near nurses station  Outcome: Progressing     Problem: MOBILITY - ADULT  Goal: Maintain or return to baseline ADL function  Description: INTERVENTIONS:  -  Assess patient's ability to carry out ADLs; assess patient's baseline for ADL function and identify physical deficits which impact ability to perform ADLs (bathing, care of mouth/teeth, toileting, grooming, dressing, etc )  - Assess/evaluate cause of self-care deficits   - Assess range of motion  - Assess patient's mobility; develop plan if impaired  - Assess patient's need for assistive devices and provide as appropriate  - Encourage maximum independence but intervene and supervise when necessary  - Involve family in performance of ADLs  - Assess for home care needs following discharge   - Consider OT consult to assist with ADL evaluation and planning for discharge  - Provide patient education as appropriate  Outcome: Progressing     Problem: Prexisting or High Potential for Compromised Skin Integrity  Goal: Skin integrity is maintained or improved  Description: INTERVENTIONS:  - Identify patients at risk for skin breakdown  - Assess and monitor skin integrity  - Assess and monitor nutrition and hydration status  - Monitor labs   - Assess for incontinence   - Turn and reposition patient  - Assist with mobility/ambulation  - Relieve pressure over bony prominences  - Avoid friction and shearing  - Provide appropriate hygiene as needed including keeping skin clean and dry  - Evaluate need for skin moisturizer/barrier cream  - Collaborate with interdisciplinary team   - Patient/family teaching  - Consider wound care consult   Outcome: Progressing     Problem: RESPIRATORY - ADULT  Goal: Achieves optimal ventilation and oxygenation  Description: INTERVENTIONS:  - Assess for changes in respiratory status  - Assess for changes in mentation and behavior  - Position to facilitate oxygenation and minimize respiratory effort  - Oxygen administered by appropriate delivery if ordered  - Initiate smoking cessation education as indicated  - Encourage broncho-pulmonary hygiene including cough, deep breathe, Incentive Spirometry  - Assess the need for suctioning and aspirate as needed  - Assess and instruct to report SOB or any respiratory difficulty  - Respiratory Therapy support as indicated  Outcome: Progressing     Problem: DISCHARGE PLANNING  Goal: Discharge to home or other facility with appropriate resources  Description: INTERVENTIONS:  - Identify barriers to discharge w/patient and caregiver  - Arrange for needed discharge resources and transportation as appropriate  - Identify discharge learning needs (meds, wound care, etc )  - Arrange for interpretive services to assist at discharge as needed  - Refer to Case Management Department for coordinating discharge planning if the patient needs post-hospital services based on physician/advanced practitioner order or complex needs related to functional status, cognitive ability, or social support system  Outcome: Progressing

## 2022-07-25 PROCEDURE — 99232 SBSQ HOSP IP/OBS MODERATE 35: CPT | Performed by: HOSPITALIST

## 2022-07-25 RX ADMIN — PANTOPRAZOLE SODIUM 40 MG: 40 TABLET, DELAYED RELEASE ORAL at 06:17

## 2022-07-25 RX ADMIN — BACLOFEN 10 MG: 10 TABLET ORAL at 16:08

## 2022-07-25 RX ADMIN — CLONIDINE 0.1 MG: 0.1 PATCH TRANSDERMAL at 10:18

## 2022-07-25 RX ADMIN — HYDROMORPHONE HYDROCHLORIDE 0.5 MG: 1 INJECTION, SOLUTION INTRAMUSCULAR; INTRAVENOUS; SUBCUTANEOUS at 16:25

## 2022-07-25 RX ADMIN — BUSPIRONE HYDROCHLORIDE 5 MG: 5 TABLET ORAL at 20:20

## 2022-07-25 RX ADMIN — ACETAMINOPHEN 325MG 975 MG: 325 TABLET ORAL at 22:01

## 2022-07-25 RX ADMIN — BACLOFEN 10 MG: 10 TABLET ORAL at 09:44

## 2022-07-25 RX ADMIN — TRAZODONE HYDROCHLORIDE 100 MG: 100 TABLET ORAL at 22:02

## 2022-07-25 RX ADMIN — APIXABAN 10 MG: 5 TABLET, FILM COATED ORAL at 09:44

## 2022-07-25 RX ADMIN — ATORVASTATIN CALCIUM 40 MG: 40 TABLET, FILM COATED ORAL at 16:08

## 2022-07-25 RX ADMIN — OXYCODONE HYDROCHLORIDE 10 MG: 10 TABLET ORAL at 13:33

## 2022-07-25 RX ADMIN — GABAPENTIN 600 MG: 300 CAPSULE ORAL at 16:08

## 2022-07-25 RX ADMIN — TAMSULOSIN HYDROCHLORIDE 0.4 MG: 0.4 CAPSULE ORAL at 16:08

## 2022-07-25 RX ADMIN — HYDROMORPHONE HYDROCHLORIDE 0.5 MG: 1 INJECTION, SOLUTION INTRAMUSCULAR; INTRAVENOUS; SUBCUTANEOUS at 22:02

## 2022-07-25 RX ADMIN — ACETAMINOPHEN 325MG 975 MG: 325 TABLET ORAL at 06:18

## 2022-07-25 RX ADMIN — ACETAMINOPHEN 325MG 975 MG: 325 TABLET ORAL at 13:11

## 2022-07-25 RX ADMIN — BACLOFEN 10 MG: 10 TABLET ORAL at 20:19

## 2022-07-25 RX ADMIN — OXYCODONE HYDROCHLORIDE 10 MG: 10 TABLET ORAL at 20:20

## 2022-07-25 RX ADMIN — BUSPIRONE HYDROCHLORIDE 5 MG: 5 TABLET ORAL at 09:44

## 2022-07-25 RX ADMIN — DIAZEPAM 5 MG: 5 TABLET ORAL at 22:02

## 2022-07-25 RX ADMIN — HYDROMORPHONE HYDROCHLORIDE 0.5 MG: 1 INJECTION, SOLUTION INTRAMUSCULAR; INTRAVENOUS; SUBCUTANEOUS at 10:07

## 2022-07-25 RX ADMIN — GABAPENTIN 600 MG: 300 CAPSULE ORAL at 09:44

## 2022-07-25 RX ADMIN — DOCUSATE SODIUM 100 MG: 100 CAPSULE, LIQUID FILLED ORAL at 09:44

## 2022-07-25 RX ADMIN — GABAPENTIN 600 MG: 300 CAPSULE ORAL at 20:20

## 2022-07-25 RX ADMIN — OXYCODONE HYDROCHLORIDE 10 MG: 10 TABLET ORAL at 06:17

## 2022-07-25 RX ADMIN — APIXABAN 10 MG: 5 TABLET, FILM COATED ORAL at 17:03

## 2022-07-25 RX ADMIN — DOCUSATE SODIUM 100 MG: 100 CAPSULE, LIQUID FILLED ORAL at 17:03

## 2022-07-25 RX ADMIN — MELATONIN 6 MG: at 22:04

## 2022-07-25 NOTE — CASE MANAGEMENT
Case Management Progress Note    Patient name Geoff Hernández  Location East 5 /E5 MS 0326 [id number]-* MRN [id number]  : 1985 Date 2022       LOS (days): 4  Geometric Mean LOS (GMLOS) (days):   Days to GMLOS:        OBJECTIVE:        Current admission status: Inpatient  Preferred Pharmacy:   [address] [facility] [address] [address]  Phone: [phone] Fax: [facility], [address] [facility], [address]  [address] [facility]  [address] [place] [address]  Phone: [phone] Fax: [phone]    Primary Care Provider: Blanca Monsalve MD    Primary Insurance: [employer]  Secondary Insurance:     PROGRESS NOTE:      CM met with Pt at bedside to review Pts referral to [facility]- this where Pt can receive rehab then transition to LTC while waiting for his subsidized apartment  Pt is asking why he cannot return to [facility] Acute rehab at this time-     CM plans to communicate W/ [facility] Liaison to discuss  Pt will communicate w  [address] [phone] to review other options for Pt as he is medically stable to discharge from the acute care setting  CM continues to follow

## 2022-07-25 NOTE — ASSESSMENT & PLAN NOTE
· Presented to ED with complaints of left-sided chest pain and dyspnea  Elevated D-dimer  · PE study reveals Multiple intraluminal filling defects involving branches of the left superior and inferior pulmonary arteries compatible with acute pulmonary emboli  Normal RV LV ratio  · Likely provoked Status post cervical fixation fusion in May  Was discharged to short-term rehab at [facility]    On anticoagulation with subcutaneous heparin  · Lower extremity duplex negative for DVT  · Echo with preserved EF  · Vital signs remained stable on heparin drip    Now on eliquis

## 2022-07-25 NOTE — PLAN OF CARE
Problem: Potential for Falls  Goal: Patient will remain free of falls  Description: INTERVENTIONS:  - Educate patient/family on patient safety including physical limitations  - Instruct patient to call for assistance with activity   - Consult OT/PT to assist with strengthening/mobility   - Keep Call bell within reach  - Keep bed low and locked with side rails adjusted as appropriate  - Keep care items and personal belongings within reach  - Initiate and maintain comfort rounds  - Make Fall Risk Sign visible to staff  - Apply yellow socks and bracelet for high fall risk patients  - Consider moving patient to room near nurses station  Outcome: Progressing     Problem: MOBILITY - ADULT  Goal: Maintain or return to baseline ADL function  Description: INTERVENTIONS:  -  Assess patient's ability to carry out ADLs; assess patient's baseline for ADL function and identify physical deficits which impact ability to perform ADLs (bathing, care of mouth/teeth, toileting, grooming, dressing, etc )  - Assess/evaluate cause of self-care deficits   - Assess range of motion  - Assess patient's mobility; develop plan if impaired  - Assess patient's need for assistive devices and provide as appropriate  - Encourage maximum independence but intervene and supervise when necessary  - Involve family in performance of ADLs  - Assess for home care needs following discharge   - Consider OT consult to assist with ADL evaluation and planning for discharge  - Provide patient education as appropriate  Outcome: Progressing     Problem: Prexisting or High Potential for Compromised Skin Integrity  Goal: Skin integrity is maintained or improved  Description: INTERVENTIONS:  - Identify patients at risk for skin breakdown  - Assess and monitor skin integrity  - Assess and monitor nutrition and hydration status  - Monitor labs   - Assess for incontinence   - Turn and reposition patient  - Assist with mobility/ambulation  - Relieve pressure over bony prominences  - Avoid friction and shearing  - Provide appropriate hygiene as needed including keeping skin clean and dry  - Evaluate need for skin moisturizer/barrier cream  - Collaborate with interdisciplinary team   - Patient/family teaching  - Consider wound care consult   Outcome: Progressing     Problem: RESPIRATORY - ADULT  Goal: Achieves optimal ventilation and oxygenation  Description: INTERVENTIONS:  - Assess for changes in respiratory status  - Assess for changes in mentation and behavior  - Position to facilitate oxygenation and minimize respiratory effort  - Oxygen administered by appropriate delivery if ordered  - Initiate smoking cessation education as indicated  - Encourage broncho-pulmonary hygiene including cough, deep breathe, Incentive Spirometry  - Assess the need for suctioning and aspirate as needed  - Assess and instruct to report SOB or any respiratory difficulty  - Respiratory Therapy support as indicated  Outcome: Progressing     Problem: DISCHARGE PLANNING  Goal: Discharge to home or other facility with appropriate resources  Description: INTERVENTIONS:  - Identify barriers to discharge w/patient and caregiver  - Arrange for needed discharge resources and transportation as appropriate  - Identify discharge learning needs (meds, wound care, etc )  - Arrange for interpretive services to assist at discharge as needed  - Refer to Case Management Department for coordinating discharge planning if the patient needs post-hospital services based on physician/advanced practitioner order or complex needs related to functional status, cognitive ability, or social support system  Outcome: Progressing

## 2022-07-25 NOTE — PROGRESS NOTES
[facility]  Progress Note Maxi Pope 1985, 43 y o  male MRN: [id number]  Unit/Bed#: E5 -01 Encounter: [id number]  Primary Care Provider: Barber Amaya MD   Date and time admitted to hospital: 2022  1:01 AM    * Acute pulmonary embolism without acute cor pulmonale ([address] )  Assessment & Plan  · Presented to ED with complaints of left-sided chest pain and dyspnea  Elevated D-dimer  · PE study reveals Multiple intraluminal filling defects involving branches of the left superior and inferior pulmonary arteries compatible with acute pulmonary emboli  Normal RV LV ratio  · Likely provoked Status post cervical fixation fusion in May  Was discharged to short-term rehab at 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  · Case management assisting with placement            Subjective:   Doing well  No SOB  No pain      Objective:     Vitals:   Temp (24hrs), Av 3 °F (36 8 °C), Min:98 3 °F (36 8 °C), Max:98 4 °F (36 9 °C)    Temp:  [98 3 °F (36 8 °C)-98 4 °F (36 9 °C)] 98 4 °F (36 9 °C)  HR:  [89-98] 89  Resp:  [18] 18  BP: ()/(68-75) 114/72  SpO2:  [92 %-94 %] 94 %  Body mass index is 29 76 kg/m²       Input and Output Summary (last 24 hours):     No intake or output data in the 24 hours ending 22 4627    Physical Exam:     Physical Exam  Vitals and nursing note reviewed  HENT:      Head: Normocephalic and atraumatic  Eyes:      Pupils: Pupils are equal, round, and reactive to light  Cardiovascular:      Rate and Rhythm: Normal rate and regular rhythm  Heart sounds: No murmur heard  No friction rub  No gallop  Pulmonary:      Effort: Pulmonary effort is normal       Breath sounds: Normal breath sounds  No wheezing or rales  Abdominal:      General: Bowel sounds are normal       Palpations: Abdomen is soft  Tenderness: There is no abdominal tenderness  Musculoskeletal:      Right lower leg: No edema  Left lower leg: No edema          Additional Data:     Labs:    Results from last 7 days   Lab Units 07/23/22  0501 07/22/22  0526   WBC Thousand/uL 6 62 7 93   HEMOGLOBIN g/dL 12 0 11 2*   HEMATOCRIT % 36 9 36 0*   PLATELETS Thousands/uL 215 192   NEUTROS PCT %  --  55   LYMPHS PCT %  --  29   MONOS PCT %  --  14*   EOS PCT %  --  1     Results from last 7 days   Lab Units 07/23/22  0501 07/22/22  0526 07/21/22  0127   POTASSIUM mmol/L 4 1   < > 4 1   CHLORIDE mmol/L 100   < > 98   CO2 mmol/L 30   < > 28   BUN mg/dL 9   < > 13   CREATININE mg/dL 1 10   < > 1 32*   CALCIUM mg/dL 9 3   < > 9 2   ALK PHOS U/L  --   --  79   ALT U/L  --   --  37   AST U/L  --   --  16    < > = values in this interval not displayed  Results from last 7 days   Lab Units 07/21/22  0127   INR  1 06                   * I Have Reviewed All Lab Data     Recent Cultures (last 7 days):     Results from last 7 days   Lab Units 07/21/22  0127   BLOOD CULTURE  No Growth After 4 Days  No Growth After 4 Days           Last 24 Hours Medication List:   Current Facility-Administered Medications   Medication Dose Route Frequency Provider Last Rate    acetaminophen  975 mg Oral [facility] Chari Dye, DO      aluminum-magnesium hydroxide-simethicone  30 mL Oral Q6H PRN Relda [employer], CRNP      apixaban  10 mg Oral BID Chari Dye, DO      atorvastatin  40 mg Oral Daily With [employer], CRNP      baclofen  10 mg Oral TID Laina [place], CRNP      bisacodyl  10 mg Rectal Daily PRN Laina [place], CRNP      busPIRone  5 mg Oral BID Laina [place], CRNP      cloNIDine  1 patch Transdermal Weekly Laina [place], CRNP      diazepam  5 mg Oral HS Laina [place], CRNP      docusate sodium  100 mg Oral BID Laina [place], CRNP      gabapentin  600 mg Oral TID L[place]a [place], CRNP      HYDROmorphone  0 5 mg Intravenous Q4H PRN Tressa Batch, DO      melatonin  6 mg Oral HS Laina [place], CRNP      ondansetron  4 mg Intravenous Q4H PRN Tony Vargas, PA-C      oxyCODONE  5 mg Oral Q4H PRN Tressa Batch, DO      Or    oxyCODONE  10 mg Oral Q4H PRN Tressa Batch, DO      pantoprazole  40 mg Oral Early Morning Laina [place], CRNP      simethicone  80 mg Oral 4x Daily PRN Laina [place], CRNP      tamsulosin  0 4 mg Oral Daily With [employer], CRNP      traZODone  100 mg Oral HS Laina [place], CRNP           VTE Pharmacologic Prophylaxis:   Pharmacologic: Apixaban (Eliquis)      Current Length of Stay: 4 day(s)    Current Patient Status: Inpatient       Discharge Plan: looking for placement    Code Status: Level 1 - Full Code           Today, Patient Was Seen By: Karel Stearns DO    ** Please Note: Dictation voice to text software may have been used in the creation of this document   **

## 2022-07-26 LAB
BACTERIA BLD CULT: NORMAL
BACTERIA BLD CULT: NORMAL

## 2022-07-26 PROCEDURE — 99232 SBSQ HOSP IP/OBS MODERATE 35: CPT | Performed by: HOSPITALIST

## 2022-07-26 PROCEDURE — 97110 THERAPEUTIC EXERCISES: CPT

## 2022-07-26 PROCEDURE — 97535 SELF CARE MNGMENT TRAINING: CPT

## 2022-07-26 PROCEDURE — 97530 THERAPEUTIC ACTIVITIES: CPT

## 2022-07-26 RX ADMIN — HYDROMORPHONE HYDROCHLORIDE 0.5 MG: 1 INJECTION, SOLUTION INTRAMUSCULAR; INTRAVENOUS; SUBCUTANEOUS at 08:15

## 2022-07-26 RX ADMIN — TAMSULOSIN HYDROCHLORIDE 0.4 MG: 0.4 CAPSULE ORAL at 15:34

## 2022-07-26 RX ADMIN — HYDROMORPHONE HYDROCHLORIDE 0.5 MG: 1 INJECTION, SOLUTION INTRAMUSCULAR; INTRAVENOUS; SUBCUTANEOUS at 21:42

## 2022-07-26 RX ADMIN — ATORVASTATIN CALCIUM 40 MG: 40 TABLET, FILM COATED ORAL at 15:34

## 2022-07-26 RX ADMIN — OXYCODONE HYDROCHLORIDE 10 MG: 10 TABLET ORAL at 12:49

## 2022-07-26 RX ADMIN — ACETAMINOPHEN 325MG 975 MG: 325 TABLET ORAL at 05:24

## 2022-07-26 RX ADMIN — APIXABAN 10 MG: 5 TABLET, FILM COATED ORAL at 08:15

## 2022-07-26 RX ADMIN — PANTOPRAZOLE SODIUM 40 MG: 40 TABLET, DELAYED RELEASE ORAL at 05:25

## 2022-07-26 RX ADMIN — TRAZODONE HYDROCHLORIDE 100 MG: 100 TABLET ORAL at 21:41

## 2022-07-26 RX ADMIN — GABAPENTIN 600 MG: 300 CAPSULE ORAL at 15:34

## 2022-07-26 RX ADMIN — APIXABAN 10 MG: 5 TABLET, FILM COATED ORAL at 17:00

## 2022-07-26 RX ADMIN — DOCUSATE SODIUM 100 MG: 100 CAPSULE, LIQUID FILLED ORAL at 08:15

## 2022-07-26 RX ADMIN — DIAZEPAM 5 MG: 5 TABLET ORAL at 21:44

## 2022-07-26 RX ADMIN — BACLOFEN 10 MG: 10 TABLET ORAL at 15:34

## 2022-07-26 RX ADMIN — GABAPENTIN 600 MG: 300 CAPSULE ORAL at 08:15

## 2022-07-26 RX ADMIN — OXYCODONE HYDROCHLORIDE 10 MG: 10 TABLET ORAL at 18:36

## 2022-07-26 RX ADMIN — BACLOFEN 10 MG: 10 TABLET ORAL at 21:41

## 2022-07-26 RX ADMIN — BUSPIRONE HYDROCHLORIDE 5 MG: 5 TABLET ORAL at 21:41

## 2022-07-26 RX ADMIN — BUSPIRONE HYDROCHLORIDE 5 MG: 5 TABLET ORAL at 08:15

## 2022-07-26 RX ADMIN — OXYCODONE HYDROCHLORIDE 10 MG: 10 TABLET ORAL at 05:24

## 2022-07-26 RX ADMIN — MELATONIN 6 MG: at 21:41

## 2022-07-26 RX ADMIN — BACLOFEN 10 MG: 10 TABLET ORAL at 08:15

## 2022-07-26 RX ADMIN — GABAPENTIN 600 MG: 300 CAPSULE ORAL at 21:41

## 2022-07-26 RX ADMIN — DOCUSATE SODIUM 100 MG: 100 CAPSULE, LIQUID FILLED ORAL at 17:00

## 2022-07-26 RX ADMIN — ACETAMINOPHEN 325MG 975 MG: 325 TABLET ORAL at 21:41

## 2022-07-26 RX ADMIN — HYDROMORPHONE HYDROCHLORIDE 0.5 MG: 1 INJECTION, SOLUTION INTRAMUSCULAR; INTRAVENOUS; SUBCUTANEOUS at 15:34

## 2022-07-26 RX ADMIN — ACETAMINOPHEN 325MG 975 MG: 325 TABLET ORAL at 14:10

## 2022-07-26 NOTE — OCCUPATIONAL THERAPY NOTE
Occupational Therapy Progress Note     Patient Name: Jovany Hillman  EENJM'O Date: 7/26/2022  Problem List  Principal Problem:    Acute pulmonary embolism without acute cor pulmonale [facility])  Active Problems:    Urinary retention    Benign essential HTN    HLD (hyperlipidemia)    Cord compression myelopathy (HCC)    Atelectasis            07/26/22 1037   OT Last Visit   OT Visit Date 07/26/22   Note Type   Note Type Treatment   Restrictions/Precautions   Weight Bearing Precautions Per Order No   Other Precautions Chair Alarm; Bed Alarm; Fall Risk;Pain   General   Response to Previous Treatment Patient with no complaints from previous session   Pain Assessment   Pain Assessment Tool 0-10   Pain Score 5   Pain Location/Orientation Orientation: Right;Location: Leg   Patient's Stated Pain Goal No pain   Hospital Pain Intervention(s) Repositioned; Ambulation/increased activity; Emotional support; Rest   Multiple Pain Sites No   ADL   Where Assessed Edge of bed   Grooming Assistance 5  Supervision/Setup   Grooming Deficit Setup;Supervision/safety; Increased time to complete;Wash/dry face; Wash/dry hands   UB Dressing Assistance 3  Moderate Assistance   UB Dressing Deficit Setup;Verbal cueing;Supervision/safety; Increased time to complete; Thread RUE; Thread LUE;Pull around back;Pull down in back   LB Dressing Assistance 2  Maximal Assistance   LB Dressing Deficit Setup;Steadying; Requires assistive device for steadying;Verbal cueing;Supervision/safety; Increased time to complete; Don/doff R sock; Don/doff L sock; Thread RLE into underwear; Thread LLE into underwear;Pull up over hips   Functional Standing Tolerance   Time ~8 mins   Activity Dynamic standing balance activity   Comments Min A for balance/steadying   Bed Mobility   Supine to Sit 5  Supervision   Additional items HOB elevated; Bedrails; Increased time required;Verbal cues   Sit to Supine 4  Minimal assistance   Additional items Assist x 1; Increased time required;Verbal cues;LE management   Additional Comments Pt lying supine with bed alarm activated at end of session  Call bell and phone within reach  All needs met and pt reports no further questions for OT at this time  Transfers   Sit to Stand 5  Supervision   Additional items Bedrails; Increased time required;Verbal cues   Stand to Sit 5  Supervision   Additional items Increased time required;Verbal cues   Functional Mobility   Functional Mobility 4  Minimal assistance   Additional Comments Assist x1 for balance/steadying  Pt w/ 3 LOBs   Additional items   (Pt declined AD despite education on benefits)   Cognition   Overall Cognitive Status WFL   Arousal/Participation Alert   Attention Within functional limits   Memory Decreased recall of precautions;Decreased recall of recent events   Following Commands Follows multistep commands without difficulty   Comments Flat affect  Decreased safety awareness   Activity Tolerance   Activity Tolerance Patient limited by fatigue;Treatment limited secondary to medical complications (Comment)   Medical Staff Made Aware Moon Arriaga, PT; Savannah Kyle RN   Assessment   Assessment Pt seen for OT treatment session focusing on functional activity tolerance, bed mobility, ADLs, and functional transfers/mobility  Pt alert and cooperative throughout session  Pt lying supine at start of session, completing bed mobility (supine>sit) w/ Supervision w/ HOB elevated and use of bed rails  Pt agreeable to OOB trial  Transfers (sit<>stand) completed w/ Supervision w/ verbal cues for safe technique and hand placement  Min A required for functional mobility w/ 3 LOBs noted w/o use of AD, requiring Min A from therapist to regain  Pt declining use of both RW and SPC despite education on benefits from therapists  ADL tasks completed while seated EOB  UB dressing completed w/ Mod A with Min A to thread UEs into gown and assist to bring gown over shldrs/down in back 2* decreased UE ROM   LB dressing completed w/ Max A w/ limited functional reach demonstrated to don/doff B/L socks and thread LEs into underwear  Pt required assist to pull underwear over hips 2* decreased dynamic standing balance and decreased FMC to  pants  Grooming tasks completed w/ Supervision w/ setup required and increased time to complete  Please see updated OT goals w/ OOB assessment  Pt lying supine with bed alarm activated at end of session  Call bell and phone within reach  All needs met and pt reports no further questions for OT at this time  Continue to recommend STR when medically cleared  OT to follow pt on caseload  Plan   Treatment Interventions ADL retraining;Functional transfer training; Endurance training;UE strengthening/ROM; Patient/family training;Equipment evaluation/education; Compensatory technique education; Energy conservation; Activityengagement   Goal Expiration Date 08/04/22   OT Treatment Day 1   OT Frequency 3-5x/wk   Recommendation   OT Discharge Recommendation Post acute rehabilitation services   Additional Comments  The patient's raw score on the AM-PAC Daily Activity inpatient short form is 14, standardized score is 33 39, less than 39 4  Patients at this level are likely to benefit from discharge to post-acute rehabilitation services  Please refer to the recommendation of the Occupational Therapist for safe discharge planning     AM-PAC Daily Activity Inpatient   Lower Body Dressing 2   Bathing 2   Toileting 2   Upper Body Dressing 2   Grooming 3   Eating 3   Daily Activity Raw Score 14   Daily Activity Standardized Score (Calc for Raw Score >=11) 33 39   AM-[facility] Applied Cognition Inpatient   Following a Speech/Presentation 4   Understanding Ordinary Conversation 4   Taking Medications 3   Remembering Where Things Are Placed or Put Away 3   Remembering List of 4-5 Errands 3   Taking Care of Complicated Tasks 3   Applied Cognition Raw Score 20   Applied Cognition Standardized Score 41 76     GOALS      Pt will improve functional transfers to Mod I on/off all surfaces using DME as needed w/ G balance/safety     Pt will improve functional mobility during ADL/IADL/leisure tasks to Mod I using DME as needed w/ G balance/safety     Pt will increase standing tolerance to 15-20 mins with Fair+ dynamic standing balance to increase safety during participation in ADLs         Lovely Covarrubias, OTR/L

## 2022-07-26 NOTE — UTILIZATION REVIEW
Continued Stay Review    Date: 07/26/22                          Current Patient Class: IP  Current Level of Care: Med/Surg    HPI:36 y o  male initially admitted on 7/21 for acute PE  Assessment/Plan: On exam, diminished breath sounds, RLE foot drop, flat affect  PT/OT recommending acute rehab  Plan: continue Eliquis and current meds, I&O  Pending placement for rehab       Vital Signs:   Date/Time Temp Pulse Resp BP MAP (mmHg) SpO2 O2 Device   07/26/22 15:08:44 98 °F (36 7 °C) 75 16 118/76 90 95 % --   07/26/22 08:11:40 97 9 °F (36 6 °C) 78 -- 113/72 86 93 % --   07/26/22 00:15:47 97 9 °F (36 6 °C) -- -- 102/64 77 -- --   07/26/22 00:15:31 97 9 °F (36 6 °C) 78 -- -- -- 93 % --   07/25/22 15:05:36 98 4 °F (36 9 °C) 89 -- 114/72 86 94 % --   07/25/22 10:21:32 -- 98 -- 109/71 84 92 % --   07/25/22 1018 -- -- -- 109/71 -- -- --   07/25/22 07:17:50 98 3 °F (36 8 °C) 95 -- 103/75 84 92 % --   07/24/22 23:40:21 98 3 °F (36 8 °C) 96 -- 96/68 77 93 % --   07/24/22 23:40:13 98 3 °F (36 8 °C) -- 18 96/68 77 -- --   07/24/22 2147 -- -- -- -- -- -- None (Room air)   07/24/22 15:33:12 98 1 °F (36 7 °C) 96 18 105/67 80 93 % --   07/24/22 07:37:33 97 6 °F (36 4 °C) 79 18 98/62 74 92 % --         Pertinent Labs/Diagnostic Results:   Results from last 7 days   Lab Units 07/23/22  0501 07/22/22  0526 07/21/22  0127   WBC Thousand/uL 6 62 7 93 10 56*   HEMOGLOBIN g/dL 12 0 11 2* 13 0   HEMATOCRIT % 36 9 36 0* 39 1   PLATELETS Thousands/uL 215 192 215   NEUTROS ABS Thousands/µL  --  4 34 7 52         Results from last 7 days   Lab Units 07/23/22  0501 07/22/22  0526 07/21/22  0127   SODIUM mmol/L 138 139 138   POTASSIUM mmol/L 4 1 4 4 4 1   CHLORIDE mmol/L 100 101 98   CO2 mmol/L 30 33* 28   ANION GAP mmol/L 8 5 12   BUN mg/dL 9 9 13   CREATININE mg/dL 1 10 1 10 1 32*   EGFR ml/min/1 73sq m 84 84 67   CALCIUM mg/dL 9 3 9 0 9 2     Results from last 7 days   Lab Units 07/21/22  0127   AST U/L 16   ALT U/L 37   ALK PHOS U/L 79   TOTAL PROTEIN g/dL 8 3   ALBUMIN g/dL 3 9   TOTAL BILIRUBIN mg/dL 0 35         Results from last 7 days   Lab Units 07/23/22  0501 07/22/22  0526 07/21/22  0127   GLUCOSE RANDOM mg/dL 89 96 114     Results from last 7 days   Lab Units 07/21/22  0127   D-DIMER QUANTITATIVE ug/ml FEU 1 43*     Results from last 7 days   Lab Units 07/21/22  0127   PROTIME seconds 13 8   INR  1 06   PTT seconds 33         Results from last 7 days   Lab Units 07/21/22  0127   PROCALCITONIN ng/ml 0 08     Results from last 7 days   Lab Units 07/21/22  0127   LACTIC ACID mmol/L 0 6     Results from last 7 days   Lab Units 07/21/22  0127   LIPASE u/L 83     Results from last 7 days   Lab Units 07/21/22  0633   CLARITY UA  Cloudy   COLOR UA  Yellow   SPEC GRAV UA  1 020   PH UA  5 5   GLUCOSE UA mg/dl Negative   KETONES UA mg/dl Negative   BLOOD UA  Small*   PROTEIN UA mg/dl Negative   NITRITE UA  Negative   BILIRUBIN UA  Negative   UROBILINOGEN UA E U /dl 0 2   LEUKOCYTES UA  Negative   WBC UA /hpf 1-2*   RBC UA /hpf None Seen   BACTERIA UA /hpf Innumerable*   EPITHELIAL CELLS WET PREP /hpf None Seen     Results from last 7 days   Lab Units 07/21/22  0127   BLOOD CULTURE  No Growth After 5 Days  No Growth After 5 Days         Medications:   Scheduled Medications:  acetaminophen, 975 mg, Oral, Q8H GILMAR  apixaban, 10 mg, Oral, BID  atorvastatin, 40 mg, Oral, Daily With Dinner  baclofen, 10 mg, Oral, TID  busPIRone, 5 mg, Oral, BID  cloNIDine, 1 patch, Transdermal, Weekly  diazepam, 5 mg, Oral, HS  docusate sodium, 100 mg, Oral, BID  gabapentin, 600 mg, Oral, TID  melatonin, 6 mg, Oral, HS  pantoprazole, 40 mg, Oral, Early Morning  tamsulosin, 0 4 mg, Oral, Daily With Dinner  traZODone, 100 mg, Oral, HS    Continuous IV Infusions: none    PRN Meds:  aluminum-magnesium hydroxide-simethicone, 30 mL, Oral, Q6H PRN  bisacodyl, 10 mg, Rectal, Daily PRN  HYDROmorphone, 0 5 mg, Intravenous, Q4H PRN; 7/25 x3, 7/26 x2  ondansetron, 4 mg, Intravenous, Q4H PRN  oxyCODONE, 5 mg, Oral, Q4H PRN  oxyCODONE, 10 mg, Oral, Q4H PRN; 7/25 x3, 7/26 x2  simethicone, 80 mg, Oral, 4x Daily PRN        Discharge Plan: TBD, pending placement for rehab    Network Utilization Review Department  ATTENTION: Please call with any questions or concerns to [phone] and carefully listen to the prompts so that you are directed to the right person  All voicemails are confidential   [facility] [place] all requests for admission clinical reviews, approved or denied determinations and any other requests to dedicated fax number below belonging to the campus where the patient is receiving treatment   List of dedicated fax numbers for the Facilities:  [address] DENIALS (Administrative/Medical Necessity) [phone]   [address] (Maternity/NICU/Pediatrics) [phone] [address] [place] [address]  [address] [address] [place] [address] [address] [facility] [place] [address] [phone]

## 2022-07-26 NOTE — PLAN OF CARE
Problem: OCCUPATIONAL THERAPY ADULT  Goal: Performs self-care activities at highest level of function for planned discharge setting  See evaluation for individualized goals  Description: Treatment Interventions: ADL retraining, Functional transfer training, UE strengthening/ROM, Endurance training, Patient/family training, Equipment evaluation/education, Activityengagement          See flowsheet documentation for full assessment, interventions and recommendations  Outcome: Progressing  Note: Limitation: Decreased ADL status, Decreased UE ROM, Decreased UE strength, Decreased cognition, Decreased endurance, Decreased self-care trans, Decreased high-level ADLs  Prognosis: Fair  Assessment: Pt seen for OT treatment session focusing on functional activity tolerance, bed mobility, ADLs, and functional transfers/mobility  Pt alert and cooperative throughout session  Pt lying supine at start of session, completing bed mobility (supine>sit) w/ Supervision w/ HOB elevated and use of bed rails  Pt agreeable to OOB trial  Transfers (sit<>stand) completed w/ Supervision w/ verbal cues for safe technique and hand placement  Min A required for functional mobility w/ 3 LOBs noted w/o use of AD, requiring Min A from therapist to regain  Pt declining use of both RW and SPC despite education on benefits from therapists  ADL tasks completed while seated EOB  UB dressing completed w/ Mod A with Min A to thread UEs into gown and assist to bring gown over shldrs/down in back 2* decreased UE ROM  LB dressing completed w/ Max A w/ limited functional reach demonstrated to don/doff B/L socks and thread LEs into underwear  Pt required assist to pull underwear over hips 2* decreased dynamic standing balance and decreased FMC to  pants  Grooming tasks completed w/ Supervision w/ setup required and increased time to complete  Please see updated OT goals w/ OOB assessment  Pt lying supine with bed alarm activated at end of session   Call bell and phone within reach  All needs met and pt reports no further questions for OT at this time  Continue to recommend STR when medically cleared  OT to follow pt on caseload       OT Discharge Recommendation: Post acute rehabilitation services

## 2022-07-26 NOTE — PLAN OF CARE
Problem: PHYSICAL THERAPY ADULT  Goal: Performs mobility at highest level of function for planned discharge setting  See evaluation for individualized goals  Description: Treatment/Interventions: Functional transfer training, LE strengthening/ROM, Therapeutic exercise, Endurance training, Patient/family training, Equipment eval/education, Bed mobility, Gait training, Compensatory technique education, Spoke to nursing, OT          See flowsheet documentation for full assessment, interventions and recommendations  Outcome: Progressing  Note: Prognosis: Good  Problem List: Decreased strength, Impaired balance, Decreased mobility, Impaired judgement, Decreased safety awareness, Pain  Assessment: Clarisa Grider was seen for a f/u session and to update POC as appropriate  Pt demonstrates significant progress towards goals today compared to IE including improved am-pac score, decreased level of assist for bed mobility and improved tolerance to OOB mobility  Primarily functioning at supervision-min A level  Able to ambulate limited community distances with no signs of fatigue  Did note generalized weakness impacting ease of transf and gait sequencing  Encouraged use of DME multiple times to improve stability and for support of RLE given acute discomfort however pt decline despite education  With multiple episodes of LOB during walking and turning requiring therapist assistance to recover balance  Would continue to benefit from therapy services to address deficits of strength and balance, facilitate recovery and reduce fall risk during ADLs and functional mobility  Barriers to Discharge: None     PT Discharge Recommendation: Post acute rehabilitation services    See flowsheet documentation for full assessment

## 2022-07-26 NOTE — ASSESSMENT & PLAN NOTE
· Presented to ED with complaints of left-sided chest pain and dyspnea  Elevated D-dimer  · PE study reveals Multiple intraluminal filling defects involving branches of the left superior and inferior pulmonary arteries compatible with acute pulmonary emboli  Normal RV LV ratio  · Likely provoked Status post cervical fixation fusion in May  Was discharged to short-term rehab at [facility]    On anticoagulation with subcutaneous heparin  · Lower extremity duplex negative for DVT  · Echo with preserved EF  · Vital signs remained stable on heparin drip    Now on eliquis

## 2022-07-26 NOTE — PROGRESS NOTES
[address] [facility]  Progress Note Meg Number 1985, 43 y o  male MRN: [id number]  Unit/Bed#: E5 -01 Encounter: [id number]  Primary Care Provider: Zacarias Rivera MD   Date and time admitted to hospital: 2022  1:01 AM    * Acute pulmonary embolism without acute cor pulmonale ([address] )  Assessment & Plan  · Presented to ED with complaints of left-sided chest pain and dyspnea  Elevated D-dimer  · PE study reveals Multiple intraluminal filling defects involving branches of the left superior and inferior pulmonary arteries compatible with acute pulmonary emboli  Normal RV LV ratio  · Likely provoked Status post cervical fixation fusion in May  Was discharged to short-term rehab at [facility]  On anticoagulation with subcutaneous heparin  · Lower extremity duplex negative for DVT  · Echo with preserved EF  · Vital signs remained stable on heparin drip    Now on eliquis          Subjective:   Feels better  Hoping to go to rehab soon      Objective:     Vitals:   Temp (24hrs), Av 9 °F (36 6 °C), Min:97 9 °F (36 6 °C), Max:98 °F (36 7 °C)    Temp:  [97 9 °F (36 6 °C)-98 °F (36 7 °C)] 98 °F (36 7 °C)  HR:  [75-78] 75  Resp:  [16] 16  BP: (102-118)/(64-76) 118/76  SpO2:  [93 %-95 %] 95 %  Body mass index is 29 76 kg/m²  Input and Output Summary (last 24 hours): Intake/Output Summary (Last 24 hours) at 2022 1729  Last data filed at 2022 1222  Gross per 24 hour   Intake --   Output 500 ml   Net -500 ml       Physical Exam:     Physical Exam  Vitals and nursing note reviewed  HENT:      Head: Normocephalic and atraumatic  Eyes:      Pupils: Pupils are equal, round, and reactive to light  Cardiovascular:      Rate and Rhythm: Normal rate and regular rhythm  Heart sounds: No murmur heard  No friction rub  No gallop  Pulmonary:      Effort: Pulmonary effort is normal       Breath sounds: Normal breath sounds  No wheezing or rales     Abdominal: General: Bowel sounds are normal       Palpations: Abdomen is soft  Tenderness: There is no abdominal tenderness  Musculoskeletal:      Right lower leg: No edema  Left lower leg: No edema          Additional Data:     Labs:    Results from last 7 days   Lab Units 07/23/22  0501 07/22/22  0526   WBC Thousand/uL 6 62 7 93   HEMOGLOBIN g/dL 12 0 11 2*   HEMATOCRIT % 36 9 36 0*   PLATELETS Thousands/uL 215 192   NEUTROS PCT %  --  55   LYMPHS PCT %  --  29   MONOS PCT %  --  14*   EOS PCT %  --  1     Results from last 7 days   Lab Units 07/23/22  0501 07/22/22  0526 07/21/22  0127   POTASSIUM mmol/L 4 1   < > 4 1   CHLORIDE mmol/L 100   < > 98   CO2 mmol/L 30   < > 28   BUN mg/dL 9   < > 13   CREATININE mg/dL 1 10   < > 1 32*   CALCIUM mg/dL 9 3   < > 9 2   ALK PHOS U/L  --   --  79   ALT U/L  --   --  37   AST U/L  --   --  16    < > = values in this interval not displayed  Results from last 7 days   Lab Units 07/21/22  0127   INR  1 06                   * I Have Reviewed All Lab Data     Recent Cultures (last 7 days):     Results from last 7 days   Lab Units 07/21/22  0127   BLOOD CULTURE  No Growth After 5 Days  No Growth After 5 Days           Last 24 Hours Medication List:   Current Facility-Administered Medications   Medication Dose Route Frequency Provider Last Rate    acetaminophen  975 mg Oral [facility] Tacos Driss, DO      aluminum-magnesium hydroxide-simethicone  30 mL Oral Q6H PRN Avtar Loge, CRNP      apixaban  10 mg Oral BID Tacos Driss, DO      atorvastatin  40 mg Oral Daily With [employer], CRNP      baclofen  10 mg Oral TID Avtar Loge, CRNP      bisacodyl  10 mg Rectal Daily PRN Avtar Loge, CRNP      busPIRone  5 mg Oral BID Avtar Loge, CRNP      cloNIDine  1 patch Transdermal Weekly Avtar Loge, CRNP      diazepam  5 mg Oral HS Avtar Loge, CRNP      docusate sodium  100 mg Oral BID Everett Glass Nikos Moe, JAYLYN      gabapentin  600 mg Oral TID Nancy Gorman, JAYLYN      HYDROmorphone  0 5 mg Intravenous Q4H PRN Zamzam Albarran, DO      melatonin  6 mg Oral HS JAYLYN Tucker      ondansetron  4 mg Intravenous Q4H PRN Wang Ortega PA-C      oxyCODONE  5 mg Oral Q4H PRN Zamzam Albarran, DO      Or    oxyCODONE  10 mg Oral Q4H PRN Zamzam Albarran, DO      pantoprazole  40 mg Oral Early Morning Nancy Gorman, JAYLYN      simethicone  80 mg Oral 4x Daily PRN Nancy Gorman, JAYLYN      tamsulosin  0 4 mg Oral Daily With [employer], CRNP      traZODone  100 mg Oral HS JAYLYN Tucker           VTE Pharmacologic Prophylaxis:   Pharmacologic: Apixaban (Eliquis)      Current Length of Stay: 5 day(s)    Current Patient Status: Inpatient       Discharge Plan: looking for rehab    Code Status: Level 1 - Full Code           Today, Patient Was Seen By: Jayden Vega DO    ** Please Note: Dictation voice to text software may have been used in the creation of this document   **

## 2022-07-26 NOTE — PHYSICAL THERAPY NOTE
PHYSICAL THERAPY NOTE          Patient Name: Cassie Denton  SVPCB'M Date: 7/26/2022  Time: 1591-6250       07/26/22 1038   PT Last Visit   PT Visit Date 07/26/22   Note Type   Note Type Treatment   Pain Assessment   Pain Assessment Tool 0-10   Pain Score 5   Pain Location/Orientation Orientation: Right;Location: Leg   Effect of Pain on Daily Activities pain w initial ambulation impacting gait sequencing, per patient improved w time   Hospital Pain Intervention(s) Repositioned; Ambulation/increased activity; Emotional support; Rest   Restrictions/Precautions   Weight Bearing Precautions Per Order No   Other Precautions Chair Alarm; Bed Alarm; Fall Risk;Pain   General   Chart Reviewed Yes   Response to Previous Treatment Patient with no complaints from previous session  Cognition   Overall Cognitive Status WFL   Arousal/Participation Cooperative   Following Commands Follows multistep commands without difficulty   Comments flat affect  limited insight/safety awareness   Subjective   Subjective "I dont want to use the DME because I feel like I can do it myself"   Bed Mobility   Supine to Sit 5  Supervision   Additional items HOB elevated; Bedrails; Increased time required   Sit to Supine 4  Minimal assistance   Additional items Assist x 1; Increased time required;LE management   Transfers   Sit to Stand 5  Supervision   Additional items Increased time required   Stand to Sit 5  Supervision   Additional items Increased time required   Additional Comments increased reliance on momentum to stand   Ambulation/Elevation   Gait pattern Poor UE support; Improper Weight shift;Decreased foot clearance; Inconsistent heidy; Short stride; Excessively slow  (decreased arm swing   x3 LOB)   Gait Assistance 4  Minimal assist   Additional items Assist x 1   Assistive Device None  (pt declining use of DME despite education)   Distance 130'   Balance   Static Standing Fair -   Dynamic Standing Poor +   Ambulatory Poor +   Endurance Deficit   Endurance Deficit No   Endurance Deficit Description O2 100% post amb   Activity Tolerance   Activity Tolerance Patient tolerated treatment well   Medical Staff Made [address] OT   Nurse Made Aware Liliya Henson RN   Assessment   Prognosis Good   Problem List Decreased strength; Impaired balance;Decreased mobility; Impaired judgement;Decreased safety awareness;Pain   Assessment Aj Newman was seen for a f/u session and to update POC as appropriate  Pt demonstrates significant progress towards goals today compared to IE including improved am-pac score, decreased level of assist for bed mobility and improved tolerance to OOB mobility  Primarily functioning at supervision-min A level  Able to ambulate limited community distances with no signs of fatigue  Did note generalized weakness impacting ease of transf and gait sequencing  Encouraged use of DME multiple times to improve stability and for support of RLE given acute discomfort however pt decline despite education  With multiple episodes of LOB during walking and turning requiring therapist assistance to recover balance  Would continue to benefit from therapy services to address deficits of strength and balance, facilitate recovery and reduce fall risk during ADLs and functional mobility  Goals   Patient Goals none offered   STG Expiration Date 08/04/22   Short Term Goal #2 (S)  1)  Pt will perform bed mobility with Jaime demonstrating appropriate technique 100% of the time in order to improve function  2)  Perform all transfers with Jaime demonstrating safe and appropriate technique 100% of the time in order to improve ability to negotiate safely in home environment  3) Amb with least restrictive AD > 200'x1 with mod I in order to demonstrate ability to negotiate in home environment  4)  Improve overall strength and balance 1/2 grade in order to optimize ability to perform functional tasks and reduce fall risk  5) Increase activity tolerance to 45 minutes in order to improve endurance to functional tasks  6)  Negotiate stairs using most appropriate technique and S in order to be able to negotiate safely in home environment  7) PT for ongoing patient and family/caregiver education, DME needs and d/c planning in order to promote highest level of function in least restrictive environment  Plan   Treatment/Interventions Functional transfer training;LE strengthening/ROM; Therapeutic exercise;Elevations; Patient/family training;Bed mobility; Equipment eval/education;Gait training;Continued evaluation; Compensatory technique education;Spoke to nursing;OT   Progress Progressing toward goals   PT Frequency 2-3x/wk   Recommendation   PT Discharge Recommendation Post acute rehabilitation services   AM-PAC Basic Mobility Inpatient   Turning in Bed Without Bedrails 2   Lying on Back to Sitting on Edge of Flat Bed 2   Moving Bed to Chair 3   Standing Up From Chair 3   Walk in Room 3   Climb 3-5 Stairs 3   Basic Mobility Inpatient Raw Score 16   Basic Mobility Standardized Score 38 32   Highest Level Of Mobility   JH-HLM Goal 5: Stand one or more mins   JH-HLM Achieved 7: Walk 25 feet or more   Education   Education Provided Mobility training;Assistive device   Patient Demonstrates acceptance/verbal understanding;Reinforcement needed   End of Consult   Patient Position at End of Consult Supine; All needs within reach;Bed/Chair alarm activated       Donta Garcia, PT

## 2022-07-27 PROCEDURE — 99232 SBSQ HOSP IP/OBS MODERATE 35: CPT | Performed by: HOSPITALIST

## 2022-07-27 RX ADMIN — OXYCODONE HYDROCHLORIDE 10 MG: 10 TABLET ORAL at 05:41

## 2022-07-27 RX ADMIN — MELATONIN 6 MG: at 22:07

## 2022-07-27 RX ADMIN — OXYCODONE HYDROCHLORIDE 10 MG: 10 TABLET ORAL at 22:15

## 2022-07-27 RX ADMIN — HYDROMORPHONE HYDROCHLORIDE 0.5 MG: 1 INJECTION, SOLUTION INTRAMUSCULAR; INTRAVENOUS; SUBCUTANEOUS at 14:46

## 2022-07-27 RX ADMIN — ACETAMINOPHEN 325MG 975 MG: 325 TABLET ORAL at 13:22

## 2022-07-27 RX ADMIN — APIXABAN 10 MG: 5 TABLET, FILM COATED ORAL at 08:41

## 2022-07-27 RX ADMIN — BACLOFEN 10 MG: 10 TABLET ORAL at 17:48

## 2022-07-27 RX ADMIN — BACLOFEN 10 MG: 10 TABLET ORAL at 08:41

## 2022-07-27 RX ADMIN — BACLOFEN 10 MG: 10 TABLET ORAL at 22:07

## 2022-07-27 RX ADMIN — HYDROMORPHONE HYDROCHLORIDE 0.5 MG: 1 INJECTION, SOLUTION INTRAMUSCULAR; INTRAVENOUS; SUBCUTANEOUS at 20:11

## 2022-07-27 RX ADMIN — TAMSULOSIN HYDROCHLORIDE 0.4 MG: 0.4 CAPSULE ORAL at 17:48

## 2022-07-27 RX ADMIN — HYDROMORPHONE HYDROCHLORIDE 0.5 MG: 1 INJECTION, SOLUTION INTRAMUSCULAR; INTRAVENOUS; SUBCUTANEOUS at 08:41

## 2022-07-27 RX ADMIN — OXYCODONE HYDROCHLORIDE 10 MG: 10 TABLET ORAL at 13:22

## 2022-07-27 RX ADMIN — BUSPIRONE HYDROCHLORIDE 5 MG: 5 TABLET ORAL at 08:41

## 2022-07-27 RX ADMIN — APIXABAN 10 MG: 5 TABLET, FILM COATED ORAL at 17:48

## 2022-07-27 RX ADMIN — OXYCODONE HYDROCHLORIDE 10 MG: 10 TABLET ORAL at 17:47

## 2022-07-27 RX ADMIN — GABAPENTIN 600 MG: 300 CAPSULE ORAL at 17:48

## 2022-07-27 RX ADMIN — TRAZODONE HYDROCHLORIDE 100 MG: 100 TABLET ORAL at 22:07

## 2022-07-27 RX ADMIN — GABAPENTIN 600 MG: 300 CAPSULE ORAL at 08:41

## 2022-07-27 RX ADMIN — BUSPIRONE HYDROCHLORIDE 5 MG: 5 TABLET ORAL at 22:07

## 2022-07-27 RX ADMIN — DOCUSATE SODIUM 100 MG: 100 CAPSULE, LIQUID FILLED ORAL at 08:41

## 2022-07-27 RX ADMIN — ACETAMINOPHEN 325MG 975 MG: 325 TABLET ORAL at 22:07

## 2022-07-27 RX ADMIN — DOCUSATE SODIUM 100 MG: 100 CAPSULE, LIQUID FILLED ORAL at 17:48

## 2022-07-27 RX ADMIN — ACETAMINOPHEN 325MG 975 MG: 325 TABLET ORAL at 05:41

## 2022-07-27 RX ADMIN — DIAZEPAM 5 MG: 5 TABLET ORAL at 22:07

## 2022-07-27 RX ADMIN — PANTOPRAZOLE SODIUM 40 MG: 40 TABLET, DELAYED RELEASE ORAL at 05:41

## 2022-07-27 RX ADMIN — GABAPENTIN 600 MG: 300 CAPSULE ORAL at 22:07

## 2022-07-27 RX ADMIN — ATORVASTATIN CALCIUM 40 MG: 40 TABLET, FILM COATED ORAL at 17:48

## 2022-07-27 NOTE — PLAN OF CARE
Problem: Potential for Falls  Goal: Patient will remain free of falls  Description: INTERVENTIONS:  - Educate patient/family on patient safety including physical limitations  - Instruct patient to call for assistance with activity   - Consult OT/PT to assist with strengthening/mobility   - Keep Call bell within reach  - Keep bed low and locked with side rails adjusted as appropriate  - Keep care items and personal belongings within reach  - Initiate and maintain comfort rounds  - Make Fall Risk Sign visible to staff  - Offer Toileting every 2 Hours, in advance of need  - Initiate/Maintain bed alarm  - Obtain necessary fall risk management equipment:  - Apply yellow socks and bracelet for high fall risk patients  - Consider moving patient to room near nurses station  Outcome: Progressing

## 2022-07-27 NOTE — ASSESSMENT & PLAN NOTE
· 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  · Admitted to [facility] in [place] for spinal cord compression and herniated cervical disc s/p CDF C6-7; Revision PCDF, revise/replace right C2 screw with laminar screw, navigated C7, T1 pedicle screws, C6-7 laminectomy May 2022  Followed outpatient by Neurosurgery  · Evaluated by PT/OT    Recommendation return to [facility]  · Case management assisting with placement

## 2022-07-27 NOTE — ASSESSMENT & PLAN NOTE
· Presented to ED with complaints of left-sided chest pain and dyspnea  Elevated D-dimer  · PE study reveals Multiple intraluminal filling defects involving branches of the left superior and inferior pulmonary arteries compatible with acute pulmonary emboli  Normal RV LV ratio  · Likely provoked Status post cervical fixation fusion in May  Was discharged to short-term rehab at [facility]    On anticoagulation with subcutaneous heparin  · Lower extremity duplex negative for DVT  · Echo with preserved EF  · Vital signs remained stable on heparin drip    Now on eliquis

## 2022-07-27 NOTE — CASE MANAGEMENT
Case Management Progress Note    Patient name Lidia Bueno  Location [place] 5 [facility] [place]-* MRN [id number]  : 1985 Date 2022       LOS (days): 6  Geometric Mean LOS (GMLOS) (days):   Days to GMLOS:        OBJECTIVE:        Current admission status: Inpatient  Preferred Pharmacy:   [address] [facility] [address] [address]  Phone: [phone] Fax: 129 [facility], [facility], [address]  [address] [address] [address]  Phone: [phone] Fax: [phone]    Primary Care Provider: Zacarias Rivera MD    Primary Insurance: [address] [place] [address]  Secondary Insurance:     PROGRESS NOTE:      CM sent referral for STR to [address] [place] [address]- the facility that had previously accepted Pt prior to his admission to [facility]  CM requested their bed availablity and if they are able to accept Pt again  CM waiting for response  UPDATE: [facility] unable to accept Patient at this time  CM lvm for Damon Model- to discuss dcp and other possible available facilities  As per Lorraine's outgoing Azul Cosby is a Nursing Home Coordinator for the [employer]  Alternate contact info : Office ELAGI:[phone] [phone]  answered and stated that Damon Model is in the field- will likely return this CMs call when she is able  CM continues to follow  CM expanded the STR bed search to [place]

## 2022-07-27 NOTE — PROGRESS NOTES
[address] [place] [address]  Progress Note Jama Bryant 1985, 43 y o  male MRN: [id number]  Unit/Bed#: E5 -01 Encounter: [id number]  Primary Care Provider: Иван Stroud MD   Date and time admitted to hospital: 2022  1:01 AM    * Acute pulmonary embolism without acute cor pulmonale ([address] )  Assessment & Plan  · Presented to ED with complaints of left-sided chest pain and dyspnea  Elevated D-dimer  · PE study reveals Multiple intraluminal filling defects involving branches of the left superior and inferior pulmonary arteries compatible with acute pulmonary emboli  Normal RV LV ratio  · Likely provoked Status post cervical fixation fusion in May  Was discharged to short-term rehab at [facility]  On anticoagulation with subcutaneous heparin  · Lower extremity duplex negative for DVT  · Echo with preserved EF  · Vital signs remained stable on heparin drip    Now on eliquis    Cord compression myelopathy [facility])  Assessment & Plan  · 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  · Admitted to [facility] in  for spinal cord compression and herniated cervical disc s/p CDF C6-7; Revision PCDF, revise/replace right C2 screw with laminar screw, navigated C7, T1 pedicle screws, C6-7 laminectomy May 2022  Followed outpatient by Neurosurgery  · Evaluated by PT/OT  Recommendation return to [facility]  · Case management assisting with placement            Subjective:   Feels well  No complaints  hopeing to go to rehab      Objective:     Vitals:   Temp (24hrs), Av °F (36 7 °C), Min:97 9 °F (36 6 °C), Max:98 °F (36 7 °C)    Temp:  [97 9 °F (36 6 °C)-98 °F (36 7 °C)] 98 °F (36 7 °C)  HR:  [74-79] 74  Resp:  [16-18] 18  BP: (106-118)/(71-76) 106/71  SpO2:  [94 %-95 %] 94 %  Body mass index is 29 76 kg/m²  Input and Output Summary (last 24 hours):        Intake/Output Summary (Last 24 hours) at 2022 1154  Last data filed at 2022 0806  Gross per 24 hour   Intake --   Output 1050 ml   Net -1050 ml       Physical Exam:     Physical Exam  Vitals and nursing note reviewed  HENT:      Head: Normocephalic and atraumatic  Eyes:      Pupils: Pupils are equal, round, and reactive to light  Cardiovascular:      Rate and Rhythm: Normal rate and regular rhythm  Heart sounds: No murmur heard  No friction rub  No gallop  Pulmonary:      Effort: Pulmonary effort is normal       Breath sounds: Normal breath sounds  No wheezing or rales  Abdominal:      General: Bowel sounds are normal       Palpations: Abdomen is soft  Tenderness: There is no abdominal tenderness  Musculoskeletal:      Right lower leg: No edema  Left lower leg: No edema          Additional Data:     Labs:    Results from last 7 days   Lab Units 07/23/22  0501 07/22/22  0526   WBC Thousand/uL 6 62 7 93   HEMOGLOBIN g/dL 12 0 11 2*   HEMATOCRIT % 36 9 36 0*   PLATELETS Thousands/uL 215 192   NEUTROS PCT %  --  55   LYMPHS PCT %  --  29   MONOS PCT %  --  14*   EOS PCT %  --  1     Results from last 7 days   Lab Units 07/23/22  0501 07/22/22  0526 07/21/22  0127   POTASSIUM mmol/L 4 1   < > 4 1   CHLORIDE mmol/L 100   < > 98   CO2 mmol/L 30   < > 28   BUN mg/dL 9   < > 13   CREATININE mg/dL 1 10   < > 1 32*   CALCIUM mg/dL 9 3   < > 9 2   ALK PHOS U/L  --   --  79   ALT U/L  --   --  37   AST U/L  --   --  16    < > = values in this interval not displayed  Results from last 7 days   Lab Units 07/21/22  0127   INR  1 06                   * I Have Reviewed All Lab Data     Recent Cultures (last 7 days):     Results from last 7 days   Lab Units 07/21/22  0127   BLOOD CULTURE  No Growth After 5 Days  No Growth After 5 Days           Last 24 Hours Medication List:   Current Facility-Administered Medications   Medication Dose Route Frequency Provider Last Rate    acetaminophen  975 mg Oral [facility] Choco T[place]on DO      aluminum-magnesium hydroxide-simethicone  30 mL Oral Q6H PRN Laina [place], CRNP      apixaban  10 mg Oral BID Tressa Batch, DO      atorvastatin  40 mg Oral Daily With [employer], CRNP      baclofen  10 mg Oral TID Laina [place], CRNP      bisacodyl  10 mg Rectal Daily PRN Laina [place], CRNP      busPIRone  5 mg Oral BID Laina [place], CRNP      cloNIDine  1 patch Transdermal Weekly Laina [place], CRNP      diazepam  5 mg Oral HS Laina [place], CRNP      docusate sodium  100 mg Oral BID Laina [place], CRNP      gabapentin  600 mg Oral TID Laina [place], CRNP      HYDROmorphone  0 5 mg Intravenous Q4H PRN Tressa Batch, DO      melatonin  6 mg Oral HS Laina [place], CRNP      ondansetron  4 mg Intravenous Q4H PRN Tony Vargas, PA-C      oxyCODONE  5 mg Oral Q4H PRN Tressa Batch, DO      Or    oxyCODONE  10 mg Oral Q4H PRN Tressa Batch, DO      pantoprazole  40 mg Oral Early Morning Laina [place], CRNP      simethicone  80 mg Oral 4x Daily PRN Laina [place], CRNP      tamsulosin  0 4 mg Oral Daily With [employer], CRNP      traZODone  100 mg Oral HS Laina [place], CRNP           VTE Pharmacologic Prophylaxis:   Pharmacologic: Apixaban (Eliquis)      Current Length of Stay: 6 day(s)    Current Patient Status: Inpatient       Discharge Plan:   Code Status: Level 1 - Full Code           Today, Patient Was Seen By: Karel Stearns DO    ** Please Note: Dictation voice to text software may have been used in the creation of this document   **

## 2022-07-28 PROCEDURE — 97530 THERAPEUTIC ACTIVITIES: CPT

## 2022-07-28 PROCEDURE — 99232 SBSQ HOSP IP/OBS MODERATE 35: CPT | Performed by: HOSPITALIST

## 2022-07-28 PROCEDURE — 97110 THERAPEUTIC EXERCISES: CPT

## 2022-07-28 PROCEDURE — 97116 GAIT TRAINING THERAPY: CPT

## 2022-07-28 RX ADMIN — ATORVASTATIN CALCIUM 40 MG: 40 TABLET, FILM COATED ORAL at 17:47

## 2022-07-28 RX ADMIN — GABAPENTIN 600 MG: 300 CAPSULE ORAL at 15:28

## 2022-07-28 RX ADMIN — HYDROMORPHONE HYDROCHLORIDE 0.5 MG: 1 INJECTION, SOLUTION INTRAMUSCULAR; INTRAVENOUS; SUBCUTANEOUS at 00:31

## 2022-07-28 RX ADMIN — MELATONIN 6 MG: at 22:19

## 2022-07-28 RX ADMIN — TAMSULOSIN HYDROCHLORIDE 0.4 MG: 0.4 CAPSULE ORAL at 17:48

## 2022-07-28 RX ADMIN — APIXABAN 10 MG: 5 TABLET, FILM COATED ORAL at 17:48

## 2022-07-28 RX ADMIN — ACETAMINOPHEN 325MG 975 MG: 325 TABLET ORAL at 13:03

## 2022-07-28 RX ADMIN — BACLOFEN 10 MG: 10 TABLET ORAL at 15:28

## 2022-07-28 RX ADMIN — HYDROMORPHONE HYDROCHLORIDE 0.5 MG: 1 INJECTION, SOLUTION INTRAMUSCULAR; INTRAVENOUS; SUBCUTANEOUS at 15:28

## 2022-07-28 RX ADMIN — OXYCODONE HYDROCHLORIDE 10 MG: 10 TABLET ORAL at 05:43

## 2022-07-28 RX ADMIN — BACLOFEN 10 MG: 10 TABLET ORAL at 08:28

## 2022-07-28 RX ADMIN — HYDROMORPHONE HYDROCHLORIDE 0.5 MG: 1 INJECTION, SOLUTION INTRAMUSCULAR; INTRAVENOUS; SUBCUTANEOUS at 08:28

## 2022-07-28 RX ADMIN — BUSPIRONE HYDROCHLORIDE 5 MG: 5 TABLET ORAL at 08:27

## 2022-07-28 RX ADMIN — OXYCODONE HYDROCHLORIDE 10 MG: 10 TABLET ORAL at 13:03

## 2022-07-28 RX ADMIN — OXYCODONE HYDROCHLORIDE 10 MG: 10 TABLET ORAL at 17:48

## 2022-07-28 RX ADMIN — BUSPIRONE HYDROCHLORIDE 5 MG: 5 TABLET ORAL at 22:20

## 2022-07-28 RX ADMIN — ACETAMINOPHEN 325MG 975 MG: 325 TABLET ORAL at 05:42

## 2022-07-28 RX ADMIN — HYDROMORPHONE HYDROCHLORIDE 0.5 MG: 1 INJECTION, SOLUTION INTRAMUSCULAR; INTRAVENOUS; SUBCUTANEOUS at 19:47

## 2022-07-28 RX ADMIN — TRAZODONE HYDROCHLORIDE 100 MG: 100 TABLET ORAL at 22:20

## 2022-07-28 RX ADMIN — APIXABAN 10 MG: 5 TABLET, FILM COATED ORAL at 08:27

## 2022-07-28 RX ADMIN — DOCUSATE SODIUM 100 MG: 100 CAPSULE, LIQUID FILLED ORAL at 17:47

## 2022-07-28 RX ADMIN — BACLOFEN 10 MG: 10 TABLET ORAL at 22:20

## 2022-07-28 RX ADMIN — OXYCODONE HYDROCHLORIDE 10 MG: 10 TABLET ORAL at 22:20

## 2022-07-28 RX ADMIN — GABAPENTIN 600 MG: 300 CAPSULE ORAL at 22:20

## 2022-07-28 RX ADMIN — GABAPENTIN 600 MG: 300 CAPSULE ORAL at 08:27

## 2022-07-28 RX ADMIN — DOCUSATE SODIUM 100 MG: 100 CAPSULE, LIQUID FILLED ORAL at 08:27

## 2022-07-28 RX ADMIN — DIAZEPAM 5 MG: 5 TABLET ORAL at 22:20

## 2022-07-28 RX ADMIN — ACETAMINOPHEN 325MG 975 MG: 325 TABLET ORAL at 22:20

## 2022-07-28 RX ADMIN — PANTOPRAZOLE SODIUM 40 MG: 40 TABLET, DELAYED RELEASE ORAL at 05:43

## 2022-07-28 NOTE — ASSESSMENT & PLAN NOTE
· 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  · Admitted to [facility] in [place] for spinal cord compression and herniated cervical disc s/p CDF C6-7; Revision PCDF, revise/replace right C2 screw with laminar screw, navigated C7, T1 pedicle screws, C6-7 laminectomy May 2022  Followed outpatient by Neurosurgery  · Evaluated by PT/OT    Recommendation return to [facility]  · Case management assisting with placement

## 2022-07-28 NOTE — ASSESSMENT & PLAN NOTE
· Presented to ED with complaints of left-sided chest pain and dyspnea  Elevated D-dimer  · PE study reveals Multiple intraluminal filling defects involving branches of the left superior and inferior pulmonary arteries compatible with acute pulmonary emboli  Normal RV LV ratio  · Likely provoked Status post cervical fixation fusion in May  Was discharged to short-term rehab at [facility]    On anticoagulation with subcutaneous heparin  · Lower extremity duplex negative for DVT  · Echo with preserved EF  · Vital signs remained stable on heparin drip    Now on eliquis

## 2022-07-28 NOTE — CASE MANAGEMENT
Case Management Progress Note    Patient name Cassie Denton  Location [place] [address] [place] [address]-* MRN [id number]  : 1985 Date 2022       LOS (days): 7  Geometric Mean LOS (GMLOS) (days):   Days to GMLOS:        OBJECTIVE:        Current admission status: Inpatient  Preferred Pharmacy:   [address] [place] [address] [address] [address]  Phone: [phone] Fax: [phone] [facility], [address] [facility], [address]  [address] [address] [address]  Phone: [phone] Fax: [phone]    Primary Care Provider: Skip Lacy MD    Primary Insurance: Lisa Fitzpatrick  Secondary Insurance:     PROGRESS NOTE:    CM received tc from [facility]  Admissions coordinator [facility] [phone] who reports they now have a bed available for Pt to admit for STR  CM submitted request for authorisation to the [facility] discharge Support Team    Expectation is approval and Pt to discharge to [facility] Once Kade Jaeger is received  CM continues to follow

## 2022-07-28 NOTE — CASE MANAGEMENT
Case Management Discharge Planning Note    Patient name Tierra Belcher  Location [place] [address] [place] [address] [place]-* MRN [id number]  : 1985 Date 2022       Current Admission Date: 2022  Current Admission Diagnosis:Acute pulmonary embolism without acute cor pulmonale [facility])   Patient Active Problem List    Diagnosis Date Noted    Acute pulmonary embolism without acute cor pulmonale ([facility] ) 2022    Atelectasis 2022    Leukocytosis 2022    Cord compression myelopathy ([facility] ) 2022    Lower urinary tract symptoms (LUTS) 2022    Encounter to discuss test results 2022    S/P orchiectomy 2021    Acute low back pain 2021    Hypokalemia 2021    Benign essential HTN 2021    HLD (hyperlipidemia) 2021    Testicular discomfort 2021    Intractable hiccups 2021    Depression 2021    Urinary retention 2021    Closed fracture of fifth cervical vertebra ([facility] ) 03/10/2021    Spinal cord injury, C1-C7 ([facility] ) 03/10/2021    Fall 03/10/2021    Pain 03/10/2021    Central cord syndrome ([facility] ) 2021    Pulmonary insufficiency 2021      LOS (days): 7  Geometric Mean LOS (GMLOS) (days):   Days to GMLOS:     OBJECTIVE:  Risk of Unplanned Readmission Score: 17 32         Current admission status: Inpatient   Preferred Pharmacy:   [address] [facility] [address]  Phone: [phone] Fax: [phone] [address] [address] [address]  Phone: [phone] Fax: [phone]    Primary Care Provider: Everett Ley MD    Primary Insurance: [facility]  Secondary Insurance:     [address] [place] [address] Number: submitted SNF auth request to [employer] for [address]  NPI: [id number]  Ish Pagan  NPI: [id number]  Clinicals faxed to [phone]

## 2022-07-28 NOTE — PLAN OF CARE
Problem: PHYSICAL THERAPY ADULT  Goal: Performs mobility at highest level of function for planned discharge setting  See evaluation for individualized goals  Description: Treatment/Interventions: Functional transfer training, LE strengthening/ROM, Therapeutic exercise, Endurance training, Patient/family training, Equipment eval/education, Bed mobility, Gait training, Compensatory technique education, Spoke to nursing, OT          See flowsheet documentation for full assessment, interventions and recommendations  Outcome: Progressing  Note: Prognosis: Good  Problem List: Decreased strength, Decreased range of motion, Impaired balance, Decreased mobility, Decreased coordination, Pain  Assessment: pt  progressing well with mobility  No physical given for transfers except Min A for LE management for sit to supine transfer  pt  reported he is unable to use UEs for transfers  Pt  requires extended time to complete all tasks  Multiple mild LOBs during ambulation however he was able to correct it with minimal Assist  CGA /Min A given for standing activiites  Educated patient in using AD for safety and balance and also use of shoes  However patient declined to use either except patient used RW for ambulation of 10ft  Unsteady gait with LE instability noted and patient continues to be risk of fall  Pt  in bed post session with multipodus boot on RLE  Decreased  strength noted in RUE  Will continue to follow to [facility] functional mobility  Pt  needs assist to don and doff multipodus boot  Barriers to Discharge: None     PT Discharge Recommendation: Post acute rehabilitation services    See flowsheet documentation for full assessment

## 2022-07-28 NOTE — PHYSICAL THERAPY NOTE
Physical Therapy Treatment Note     07/28/22 1507   PT Last Visit   PT Visit Date 07/28/22   Note Type   Note Type Treatment   Pain Assessment   Pain Assessment Tool 0-10   Pain Score 10 - Worst Possible Pain   Pain Location/Orientation Orientation: Left; Location: Rib Cage   Restrictions/Precautions   Weight Bearing Precautions Per Order No   Braces or Orthoses Other (Comment)  (Multipodus boot on RLE)   Other Precautions Bed Alarm; Fall Risk;Pain   General   Chart Reviewed Yes   Subjective   Subjective pt  in bed upon entry  Agreeable to PT   Bed Mobility   Supine to Sit 5  Supervision   Additional items Assist x 1;Bedrails; Increased time required;HOB elevated   Sit to Supine 4  Minimal assistance   Additional items Assist x 1;LE management; Increased time required; Bedrails   Additional Comments Max A x 2 for scooting up in bed towards HOB   Transfers   Sit to Stand 5  Supervision   Additional items Increased time required   Stand to Sit 5  Supervision   Additional items Increased time required   Stand pivot 5  Supervision   Additional items Increased time required   Ambulation/Elevation   Gait pattern Improper Weight shift;Decreased foot clearance; Inconsistent heidy; Short stride; Excessively slow;Decreased heel strike;Decreased toe off   Gait Assistance 4  Minimal assist   Additional items Assist x 1;Verbal cues   Assistive Device None; Other (Comment); Rolling walker  (pt  declined to use AD despite education)   Distance 10ft with RW and 120ft without AD   Balance   Static Sitting Fair +   Dynamic Sitting Fair -   Static Standing Fair -   Dynamic Standing Poor +   Ambulatory Poor +   Endurance Deficit   Endurance Deficit No   Activity Tolerance   Activity Tolerance Patient tolerated treatment well   Nurse Made Aware Yes   Exercises   Balance training  standing marches, repeated STS transfers   Assessment   Prognosis Good   Problem List Decreased strength;Decreased range of motion; Impaired balance;Decreased mobility; Decreased coordination;Pain   Assessment pt  progressing well with mobility  No physical given for transfers except Min A for LE management for sit to supine transfer  pt  reported he is unable to use UEs for transfers  Pt  requires extended time to complete all tasks  Multiple mild LOBs during ambulation however he was able to correct it with minimal Assist  CGA /Min A given for standing activiites  Educated patient in using AD for safety and balance and also use of shoes  However patient declined to use either except patient used RW for ambulation of 10ft  Unsteady gait with LE instability noted and patient continues to be risk of fall  Pt  in bed post session with multipodus boot on RLE  Decreased  strength noted in RUE  Will continue to follow to stevenson functional mobility  Pt  needs assist to don and doff multipodus boot  Barriers to Discharge None   Goals   Patient Goals None reported   [facility] Expiration Date 08/04/22   PT Treatment Day 1   Plan   Treatment/Interventions LE strengthening/ROM; Functional transfer training;Gait training;Bed mobility; Equipment eval/education;Patient/family training;Spoke to nursing;Spoke to case management   Progress Progressing toward goals   PT Frequency 2-3x/wk   Recommendation   PT Discharge Recommendation Post acute rehabilitation services   Equipment Recommended Walker; Wheelchair   AM-PAC Basic Mobility Inpatient   Turning in Bed Without Bedrails 3   Lying on Back to Sitting on Edge of Flat Bed 4   Moving Bed to Chair 3   Standing Up From Chair 4   Walk in Room 3   Climb 3-5 Stairs 2   Basic Mobility Inpatient Raw Score 19   Basic Mobility Standardized Score 42 48   Highest Level Of Mobility   JH-HLM Goal 6: Walk 10 steps or more   JH-HLM Achieved 7: Walk 25 feet or more         Blessing Hodge PTA    An AM-PAC basic mobility standardized score less than 42 9 suggest the patient may benefit from discharge to post-acute rehab services

## 2022-07-28 NOTE — PLAN OF CARE
Problem: Potential for Falls  Goal: Patient will remain free of falls  Description: INTERVENTIONS:  - Educate patient/family on patient safety including physical limitations  - Instruct patient to call for assistance with activity   - Consult OT/PT to assist with strengthening/mobility   - Keep Call bell within reach  - Keep bed low and locked with side rails adjusted as appropriate  - Keep care items and personal belongings within reach  - Initiate and maintain comfort rounds  - Make Fall Risk Sign visible to staff  - Offer Toileting every  Hours, in advance of need  - Initiate/Maintain alarm  - Obtain necessary fall risk management equipment  - Apply yellow socks and bracelet for high fall risk patients  - Consider moving patient to room near nurses station  Outcome: Progressing     Problem: MOBILITY - ADULT  Goal: Maintain or return to baseline ADL function  Description: INTERVENTIONS:  -  Assess patient's ability to carry out ADLs; assess patient's baseline for ADL function and identify physical deficits which impact ability to perform ADLs (bathing, care of mouth/teeth, toileting, grooming, dressing, etc )  - Assess/evaluate cause of self-care deficits   - Assess range of motion  - Assess patient's mobility; develop plan if impaired  - Assess patient's need for assistive devices and provide as appropriate  - Encourage maximum independence but intervene and supervise when necessary  - Involve family in performance of ADLs  - Assess for home care needs following discharge   - Consider OT consult to assist with ADL evaluation and planning for discharge  - Provide patient education as appropriate  Outcome: Progressing     Problem: Prexisting or High Potential for Compromised Skin Integrity  Goal: Skin integrity is maintained or improved  Description: INTERVENTIONS:  - Identify patients at risk for skin breakdown  - Assess and monitor skin integrity  - Assess and monitor nutrition and hydration status  - Monitor labs   - Assess for incontinence   - Turn and reposition patient  - Assist with mobility/ambulation  - Relieve pressure over bony prominences  - Avoid friction and shearing  - Provide appropriate hygiene as needed including keeping skin clean and dry  - Evaluate need for skin moisturizer/barrier cream  - Collaborate with interdisciplinary team   - Patient/family teaching  - Consider wound care consult   Outcome: Progressing     Problem: RESPIRATORY - ADULT  Goal: Achieves optimal ventilation and oxygenation  Description: INTERVENTIONS:  - Assess for changes in respiratory status  - Assess for changes in mentation and behavior  - Position to facilitate oxygenation and minimize respiratory effort  - Oxygen administered by appropriate delivery if ordered  - Initiate smoking cessation education as indicated  - Encourage broncho-pulmonary hygiene including cough, deep breathe, Incentive Spirometry  - Assess the need for suctioning and aspirate as needed  - Assess and instruct to report SOB or any respiratory difficulty  - Respiratory Therapy support as indicated  Outcome: Progressing     Problem: DISCHARGE PLANNING  Goal: Discharge to home or other facility with appropriate resources  Description: INTERVENTIONS:  - Identify barriers to discharge w/patient and caregiver  - Arrange for needed discharge resources and transportation as appropriate  - Identify discharge learning needs (meds, wound care, etc )  - Arrange for interpretive services to assist at discharge as needed  - Refer to Case Management Department for coordinating discharge planning if the patient needs post-hospital services based on physician/advanced practitioner order or complex needs related to functional status, cognitive ability, or social support system  Outcome: Progressing

## 2022-07-28 NOTE — CASE MANAGEMENT
Case Management Progress Note    Patient name Karly Laureano  Location East [address] [place] [address] [place] 0326 [id number]-* MRN [id number]  : 1985 Date 2022       LOS (days): 7  Geometric Mean LOS (GMLOS) (days):   Days to [facility]:        OBJECTIVE:        Current admission status: Inpatient  Preferred Pharmacy:   [address] [address]  Phone: [phone] Fax: [phone] [facility], [facility], [address]  [address] [address]  Phone: [phone] Fax: [phone]    Primary Care Provider: Alfonso Schilling MD    Primary Insurance: [address] [place] [address]  Secondary Insurance:     PROGRESS NOTE:      Pt accepted to [facility] for Mejia Schwab  CM submitted auth and updated Pt who is refusing the  Bed  No other available beds within a 100 mile radius  CM unable to expand the search as Pt does not want to discharge outside of [facility]  Pt is refusing the bed a [facility] stating he does not want to  Be an hour from his children and childrens mother  Pt states this is where his support system is- and Pt states he will not leave the hospital     CM informed Pt he is no longer appropriate for an acute care setting  CM encouraged Pt to speak with his casework Maciej LEO placed tc to [address] lvm for [place] regarding Pts refusal to discharge, and Pt is refusing the available bed at [facility]  CM informed Pt that Pt will have the choice to discharge from hospital to [employer] at [facility], or pay for a hotel  [facility]  (Pt states there is no one available to share a room or home ), or pay out of pocket by the day- for acute LOC here at [facility]  CM following for AUTH for STR at [facility]  For Call back from [facility]

## 2022-07-28 NOTE — CASE MANAGEMENT
Case Management Progress Note    Patient name Hayley Stack  Location East [address] [facility]-* MRN [id number]  : 1985 Date 2022       LOS (days): 7  Geometric Mean LOS (GMLOS) (days):   Days to GMLOS:        OBJECTIVE:        Current admission status: Inpatient  Preferred Pharmacy:   [address] [facility] [address] [facility] [place] [address]  Phone: [phone] Fax: 323 [facility], [address] [facility], [address]  [address] [place] [address]  Phone: [phone] Fax: [phone]    Primary Care Provider: Barber Amaya MD    Primary Insurance: [employer]  Secondary Insurance:     PROGRESS NOTE:    CM spoke w/ Pts Sylvia Stevenson who states she spoke with Pt and informed Pt that d/c to [facility] is the safest dcp for Pt  Pt now agreeable to d/c to [facility] for STR and transition to LTC while waiting for [address] in [facility]  Jaquelin Henderson informed CM that Pt will transfer to a different nursing home transitional  as Pt will be [place] to a different county- however he will remain on the [facility] [employer] for [facility]  CM discussed same with Pt  Pt states his only concern is his need for a wheelchaor- [employer] provided a wheelchair for him while he was at their facility  CM informed Pt that Donzell Lusher will be able to supply him a whieelchair as well  Pt is also requesting another encounter w/ boy Little to confirm he no longer has "a blood clot in his lung"  CM shared w/ RN and informed SLIM of same  Waiting for auth- insurance approval -Pt will [facility] [address] is received  CM continues to follow     No other concerns

## 2022-07-28 NOTE — PROGRESS NOTES
[facility]  Progress Note Bear Adamson 1985, 43 y o  male MRN: [id number]  Unit/Bed#: E5 -01 Encounter: [id number]  Primary Care Provider: Flory Pickett MD   Date and time admitted to hospital: 2022  1:01 AM    * Acute pulmonary embolism without acute cor pulmonale ([address] )  Assessment & Plan  · Presented to ED with complaints of left-sided chest pain and dyspnea  Elevated D-dimer  · PE study reveals Multiple intraluminal filling defects involving branches of the left superior and inferior pulmonary arteries compatible with acute pulmonary emboli  Normal RV LV ratio  · Likely provoked Status post cervical fixation fusion in May  Was discharged to short-term rehab at [facility]  On anticoagulation with subcutaneous heparin  · Lower extremity duplex negative for DVT  · Echo with preserved EF  · Vital signs remained stable on heparin drip    Now on eliquis    Cord compression myelopathy [facility])  Assessment & Plan  · 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  · Admitted to [facility] in  for spinal cord compression and herniated cervical disc s/p CDF C6-7; Revision PCDF, revise/replace right C2 screw with laminar screw, navigated C7, T1 pedicle screws, C6-7 laminectomy May 2022  Followed outpatient by Neurosurgery  · Evaluated by PT/OT  Recommendation return to [facility]  · Case management assisting with placement            Subjective:   Feels well  No SOB  No chest pain      Objective:     Vitals:   Temp (24hrs), Av 6 °F (36 4 °C), Min:97 6 °F (36 4 °C), Max:97 6 °F (36 4 °C)    Temp:  [97 6 °F (36 4 °C)] 97 6 °F (36 4 °C)  HR:  [66-87] 66  Resp:  [17-18] 17  BP: (104-108)/(68-71) 104/68  SpO2:  [93 %-95 %] 95 %  Body mass index is 29 76 kg/m²  Input and Output Summary (last 24 hours):     No intake or output data in the 24 hours ending 22 1436    Physical Exam:     Physical Exam  Vitals and nursing note reviewed  HENT:      Head: Normocephalic and atraumatic  Eyes:      Pupils: Pupils are equal, round, and reactive to light  Cardiovascular:      Rate and Rhythm: Normal rate and regular rhythm  Heart sounds: No murmur heard  No friction rub  No gallop  Pulmonary:      Effort: Pulmonary effort is normal       Breath sounds: Normal breath sounds  No wheezing or rales  Abdominal:      General: Bowel sounds are normal       Palpations: Abdomen is soft  Tenderness: There is no abdominal tenderness  Musculoskeletal:      Right lower leg: No edema  Left lower leg: No edema                 Additional Data:     Labs:    Results from last 7 days   Lab Units 07/23/22  0501 07/22/22  0526   WBC Thousand/uL 6 62 7 93   HEMOGLOBIN g/dL 12 0 11 2*   HEMATOCRIT % 36 9 36 0*   PLATELETS Thousands/uL 215 192   NEUTROS PCT %  --  55   LYMPHS PCT %  --  29   MONOS PCT %  --  14*   EOS PCT %  --  1     Results from last 7 days   Lab Units 07/23/22  0501   POTASSIUM mmol/L 4 1   CHLORIDE mmol/L 100   CO2 mmol/L 30   BUN mg/dL 9   CREATININE mg/dL 1 10   CALCIUM mg/dL 9 3                       * I Have Reviewed All Lab Data     Recent Cultures (last 7 days):             Last 24 Hours Medication List:   Current Facility-Administered Medications   Medication Dose Route Frequency Provider Last Rate    acetaminophen  975 mg Oral [facility] Cheryle Drilling, DO      aluminum-magnesium hydroxide-simethicone  30 mL Oral Q6H PRN Leeland Power, CRNP      apixaban  10 mg Oral BID Cheryle Drilling, DO      atorvastatin  40 mg Oral Daily With [employer], CRNP      baclofen  10 mg Oral TID Leeland Power, CRNP      bisacodyl  10 mg Rectal Daily PRN Leeland Power, CRNP      busPIRone  5 mg Oral BID Leeland Power, CRNP      cloNIDine  1 patch Transdermal Weekly Leeland Power, CRNP      diazepam  5 mg Oral HS Leeland Power, CRNP      docusate sodium  100 mg Oral BID Shane Granda Dianelys Michaels, JAYLYN      gabapentin  600 mg Oral TID JAYLYN Jackson      HYDROmorphone  0 5 mg Intravenous Q4H PRN Catron Mount, DO      melatonin  6 mg Oral HS JAYLYN Jackson      ondansetron  4 mg Intravenous Q4H PRN Momo Alves PA-C      oxyCODONE  5 mg Oral Q4H PRN Catron Mount, DO      Or    oxyCODONE  10 mg Oral Q4H PRN Catron Mount, DO      pantoprazole  40 mg Oral Early Morning JAYLYN Jackson      simethicone  80 mg Oral 4x Daily PRN JAYLYN Jackson      tamsulosin  0 4 mg Oral Daily With [address], JAYLYN      traZODone  100 mg Oral HS JAYLYN Jackson           VTE Pharmacologic Prophylaxis:   Pharmacologic: Apixaban (Eliquis)      Current Length of Stay: 7 day(s)    Current Patient Status: Inpatient       Discharge Plan: waiting for placement    Code Status: Level 1 - Full Code           Today, Patient Was Seen By: Daniella Art DO    ** Please Note: Dictation voice to text software may have been used in the creation of this document   **

## 2022-07-29 VITALS
RESPIRATION RATE: 18 BRPM | DIASTOLIC BLOOD PRESSURE: 71 MMHG | TEMPERATURE: 98.7 F | HEART RATE: 85 BPM | HEIGHT: 67 IN | WEIGHT: 190.04 LBS | OXYGEN SATURATION: 95 % | SYSTOLIC BLOOD PRESSURE: 112 MMHG | BODY MASS INDEX: 29.83 KG/M2

## 2022-07-29 PROCEDURE — 0241U HB NFCT DS VIR RESP RNA 4 TRGT: CPT | Performed by: HOSPITALIST

## 2022-07-29 PROCEDURE — 99239 HOSP IP/OBS DSCHRG MGMT >30: CPT | Performed by: HOSPITALIST

## 2022-07-29 RX ORDER — TRAMADOL HYDROCHLORIDE 50 MG/1
75 TABLET ORAL EVERY 6 HOURS PRN
Qty: 20 TABLET | Refills: 0 | Status: SHIPPED | OUTPATIENT
Start: 2022-07-29 | End: 2022-08-08

## 2022-07-29 RX ORDER — BACLOFEN 10 MG/1
10 TABLET ORAL 3 TIMES DAILY
Qty: 30 TABLET | Refills: 0 | Status: SHIPPED | OUTPATIENT
Start: 2022-07-29

## 2022-07-29 RX ORDER — TRAZODONE HYDROCHLORIDE 100 MG/1
100 TABLET ORAL
Qty: 20 TABLET | Refills: 0 | Status: SHIPPED | OUTPATIENT
Start: 2022-07-29

## 2022-07-29 RX ORDER — OXYCODONE HYDROCHLORIDE 10 MG/1
10 TABLET ORAL EVERY 4 HOURS PRN
Qty: 20 TABLET | Refills: 0 | Status: SHIPPED | OUTPATIENT
Start: 2022-07-29 | End: 2022-08-08

## 2022-07-29 RX ORDER — DIAZEPAM 5 MG/1
5 TABLET ORAL
Qty: 10 TABLET | Refills: 0 | Status: SHIPPED | OUTPATIENT
Start: 2022-07-29 | End: 2022-08-08

## 2022-07-29 RX ADMIN — BACLOFEN 10 MG: 10 TABLET ORAL at 15:38

## 2022-07-29 RX ADMIN — DOCUSATE SODIUM 100 MG: 100 CAPSULE, LIQUID FILLED ORAL at 08:12

## 2022-07-29 RX ADMIN — OXYCODONE HYDROCHLORIDE 10 MG: 10 TABLET ORAL at 13:27

## 2022-07-29 RX ADMIN — ATORVASTATIN CALCIUM 40 MG: 40 TABLET, FILM COATED ORAL at 15:38

## 2022-07-29 RX ADMIN — PANTOPRAZOLE SODIUM 40 MG: 40 TABLET, DELAYED RELEASE ORAL at 05:54

## 2022-07-29 RX ADMIN — TAMSULOSIN HYDROCHLORIDE 0.4 MG: 0.4 CAPSULE ORAL at 15:38

## 2022-07-29 RX ADMIN — OXYCODONE HYDROCHLORIDE 10 MG: 10 TABLET ORAL at 18:02

## 2022-07-29 RX ADMIN — HYDROMORPHONE HYDROCHLORIDE 0.5 MG: 1 INJECTION, SOLUTION INTRAMUSCULAR; INTRAVENOUS; SUBCUTANEOUS at 15:38

## 2022-07-29 RX ADMIN — GABAPENTIN 600 MG: 300 CAPSULE ORAL at 15:38

## 2022-07-29 RX ADMIN — BUSPIRONE HYDROCHLORIDE 5 MG: 5 TABLET ORAL at 08:13

## 2022-07-29 RX ADMIN — OXYCODONE HYDROCHLORIDE 10 MG: 10 TABLET ORAL at 05:55

## 2022-07-29 RX ADMIN — GABAPENTIN 600 MG: 300 CAPSULE ORAL at 08:12

## 2022-07-29 RX ADMIN — HYDROMORPHONE HYDROCHLORIDE 0.5 MG: 1 INJECTION, SOLUTION INTRAMUSCULAR; INTRAVENOUS; SUBCUTANEOUS at 01:40

## 2022-07-29 RX ADMIN — HYDROMORPHONE HYDROCHLORIDE 0.5 MG: 1 INJECTION, SOLUTION INTRAMUSCULAR; INTRAVENOUS; SUBCUTANEOUS at 20:00

## 2022-07-29 RX ADMIN — ACETAMINOPHEN 325MG 975 MG: 325 TABLET ORAL at 13:27

## 2022-07-29 RX ADMIN — ACETAMINOPHEN 325MG 975 MG: 325 TABLET ORAL at 05:54

## 2022-07-29 RX ADMIN — BACLOFEN 10 MG: 10 TABLET ORAL at 08:13

## 2022-07-29 RX ADMIN — HYDROMORPHONE HYDROCHLORIDE 0.5 MG: 1 INJECTION, SOLUTION INTRAMUSCULAR; INTRAVENOUS; SUBCUTANEOUS at 08:13

## 2022-07-29 NOTE — CASE MANAGEMENT
Case Management Discharge Planning Note    Patient name Kezia Watson  Location [facility] [facility] [address] [facility]-* MRN [id number]  : 1985 Date 2022       Current Admission Date: 2022  Current Admission Diagnosis:Acute pulmonary embolism without acute cor pulmonale [facility])   Patient Active Problem List    Diagnosis Date Noted    Acute pulmonary embolism without acute cor pulmonale ([facility] ) 2022    Atelectasis 2022    Leukocytosis 2022    Cord compression myelopathy ([facility] ) 2022    Lower urinary tract symptoms (LUTS) 2022    Encounter to discuss test results 2022    S/P orchiectomy 2021    Acute low back pain 2021    Hypokalemia 2021    Benign essential HTN 2021    HLD (hyperlipidemia) 2021    Testicular discomfort 2021    Intractable hiccups 2021    Depression 2021    Urinary retention 2021    Closed fracture of fifth cervical vertebra ([facility] ) 03/10/2021    Spinal cord injury, C1-C7 ([facility] ) 03/10/2021    Fall 03/10/2021    Pain 03/10/2021    Central cord syndrome ([facility] ) 2021    Pulmonary insufficiency 2021      LOS (days): 8  Geometric Mean LOS (GMLOS) (days):   Days to GMLOS:     OBJECTIVE:  Risk of Unplanned Readmission Score: 17 43         Current admission status: Inpatient   Preferred Pharmacy:   [address] [place] [address]  Phone: [phone] Fax: 265 [facility], [address] [address] [address] [address]  Phone: [phone] Fax: [phone]    Primary Care Provider: Maryse Demarco MD    Primary Insurance: Adry Smith  Secondary Insurance:     [address] [place] [address] Number: approved BOZENA Estes #[id number] for [facility]:   NRD:   Fax updated clinicals to [phone]

## 2022-07-29 NOTE — CASE MANAGEMENT
Case Management Progress Note    Patient name Manjula Krause  Location East [address] [facility]-* MRN [id number]  : 1985 Date 2022       LOS (days): 8  Geometric Mean LOS (GMLOS) (days):   Days to GMLOS:        OBJECTIVE:        Current admission status: Inpatient  Preferred Pharmacy:   [facility] [address] [address]  Phone: [phone] Fax: 9[facility], [facility], [address]  [facility] 74 [address] [facility] [address]  Phone: [phone] Fax: [phone]    Primary Care Provider: Timmy Rahman MD    Primary Insurance: [address] [place] [address]  Secondary Insurance:     PROGRESS NOTE:      CM collaborating with Christine [facility] [phone], the admissions coordinator at [facility]- to facilitate Pts admission to [facility]  Still waiting for auth  Covid swab requested  Report numbers listed in flowsheet  CM will request a [phone] p/u time in anticipation of receiving the auth  Pt will transport BLS  CM following       UPDATE: REFERRAL FOR TRANSPORTATION SENT-REQUESTED AN 1830 p/u TIME  Still waiting for Auth

## 2022-07-29 NOTE — CASE MANAGEMENT
Case Management Discharge Planning Note    Patient name Venkat Giang  Location [address]-* MRN [id number]  : 1985 Date 2022       Current Admission Date: 2022  Current Admission Diagnosis:Acute pulmonary embolism without acute cor pulmonale [facility])   Patient Active Problem List    Diagnosis Date Noted    Acute pulmonary embolism without acute cor pulmonale ([facility] ) 2022    Atelectasis 2022    Leukocytosis 2022    Cord compression myelopathy ([facility] ) 2022    Lower urinary tract symptoms (LUTS) 2022    Encounter to discuss test results 2022    S/P orchiectomy 2021    Acute low back pain 2021    Hypokalemia 2021    Benign essential HTN 2021    HLD (hyperlipidemia) 2021    Testicular discomfort 2021    Intractable hiccups 2021    Depression 2021    Urinary retention 2021    Closed fracture of fifth cervical vertebra ([facility] ) 03/10/2021    Spinal cord injury, C1-C7 ([facility] ) 03/10/2021    Fall 03/10/2021    Pain 03/10/2021    Central cord syndrome ([facility] ) 2021    Pulmonary insufficiency 2021      LOS (days): 8  Geometric Mean LOS (GMLOS) (days):   Days to GMLOS:     OBJECTIVE:  Risk of Unplanned Readmission Score: 17 43         Current admission status: Inpatient   Preferred Pharmacy:   [address] [place] [address]  Phone: [phone] Fax: [address] [address] [facility]  [address] [facility]  [facility] [address]  Phone: [phone] Fax: [phone]    Primary Care Provider: Jolie Leblanc MD    Primary Insurance: Richa Melvin  Secondary Insurance:     DISCHARGE DETAILS:    Discharge planning discussed with[de-identified] Patient    Comments - Freedom of Choice: Pt will discharge TODAY to [facility] for STR- and temporary LTC while waiting for his [address]  Treatment Team Recommendation: Short Term Rehab  Discharge Destination Plan[de-identified] SNF, Short Term Rehab  Transport at Discharge : S Ambulance  Dispatcher Contacted: Yes  Number/Name of Dispatcher: [phone]/SLETS  Transported by [employer] and Unit #):  DASHAWN  ETA of Transport (Date): 07/29/22  ETA of Transport (Time): 1830    Transfer Mode: Stretcher  Accompanied by: EMS personnel  Transfer Equipment: BLS devices

## 2022-07-29 NOTE — ASSESSMENT & PLAN NOTE
· 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  · Admitted to [facility] in [place] for spinal cord compression and herniated cervical disc s/p CDF C6-7; Revision PCDF, revise/replace right C2 screw with laminar screw, navigated C7, T1 pedicle screws, C6-7 laminectomy May 2022  Followed outpatient by Neurosurgery  · Evaluated by PT/OT     · He is going to short-term rehab

## 2022-07-29 NOTE — DISCHARGE SUMMARY
[address]  Discharge- Sherley Octavia 1985, 43 y o  male MRN: [id number]  Unit/Bed#: E5 -01 Encounter: [id number]  Primary Care Provider: Francine Lobo MD   Date and time admitted to hospital: 7/21/2022  1:01 AM    * Acute pulmonary embolism without acute cor pulmonale ([address] )  Assessment & Plan  · Presented to ED with complaints of left-sided chest pain and dyspnea  Elevated D-dimer  · PE study reveals Multiple intraluminal filling defects involving branches of the left superior and inferior pulmonary arteries compatible with acute pulmonary emboli  Normal RV LV ratio  · Likely provoked Status post cervical fixation fusion in May  Was discharged to short-term rehab at [facility]  On anticoagulation with subcutaneous heparin  · Lower extremity duplex negative for DVT  · Echo with preserved EF    Patient initially was on heparin drip  Now transition over to Eliquis  He has already finished the 7 days induction Eliquis  Now can go on normal dosing 5 mg p o  B i d  He should be on Eliquis for least 6 months, but possibly longer if he still minimally ambulatory    Cord compression myelopathy [facility])  Assessment & Plan  · 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  · Admitted to B in Jun for spinal cord compression and herniated cervical disc s/p CDF C6-7; Revision PCDF, revise/replace right C2 screw with laminar screw, navigated C7, T1 pedicle screws, C6-7 laminectomy May 2022  Followed outpatient by Neurosurgery  · Evaluated by PT/OT     · He is going to short-term rehab    Urinary retention  Assessment & Plan  · Continue Flomax        Discharging Physician / Practitioner: April Pérez DO  PCP: Francine Lobo MD  Admission Date:   Admission Orders (From admission, onward)     Ordered        07/21/22 0533  INPATIENT ADMISSION  Once                      Discharge Date: 07/29/22    Medical Problems             Resolved Problems Date Reviewed: 7/21/2022   None                     Reason for Admission:  Abdominal pain      Hospital Course:     Leidy Zarate is a 43 y o  male patient who originally presented to the hospital on 7/21/2022 due to abdominal pain  States he was acute in his severe  In the any was actually found have a acute pulmonary emboli  This is likely from being sedentary at [facility] after a cervical fusion  So actually able to walk  But lays in bed most of the day  Making more prone to thromboemboli  His placed on IV heparin drip  He was never unstable  [facility]  He should be on this for least 6 months  If he still minimally ambulating after 6 months it may even be worthwhile to treat him longer  I will defer that to his future physicians  He is going to short-term rehab  Please see above list of diagnoses and related plan for additional information  Condition at Discharge: stable       Discharge Day Visit / Exam:     Subjective:  Feels well  No SOB  No abdominal pain      Vitals: Blood Pressure: 112/71 (07/29/22 1515)  Pulse: 85 (07/29/22 1515)  Temperature: 98 7 °F (37 1 °C) (07/29/22 1515)  Temp Source: Oral (07/22/22 0756)  Respirations: 18 (07/29/22 1515)  Height: 5' 7" (170 2 cm) (07/21/22 1414)  Weight - Scale: 86 2 kg (190 lb 0 6 oz) (07/21/22 1414)  SpO2: 95 % (07/29/22 1515)    Exam:     Physical Exam  Vitals and nursing note reviewed  HENT:      Head: Normocephalic and atraumatic  Eyes:      Pupils: Pupils are equal, round, and reactive to light  Cardiovascular:      Rate and Rhythm: Normal rate and regular rhythm  Heart sounds: No murmur heard  No friction rub  No gallop  Pulmonary:      Effort: Pulmonary effort is normal       Breath sounds: Normal breath sounds  No wheezing or rales  Abdominal:      General: Bowel sounds are normal       Palpations: Abdomen is soft  Tenderness: There is no abdominal tenderness     Musculoskeletal: Right lower leg: No edema  Left lower leg: No edema            Discharge instructions/Information to patient and family:   See after visit summary for information provided to patient and family  Provisions for Follow-Up Care:  See after visit summary for information related to follow-up care and any pertinent home health orders  Disposition:     Other: short term rehab       Discharge Statement:  I spent 45 minutes discharging the patient  This time was spent on the day of discharge  I had direct contact with the patient on the day of discharge  Greater than 50% of the total time was spent examining patient, answering all patient questions, arranging and discussing plan of care with patient as well as directly providing post-discharge instructions  Additional time then spent on discharge activities  Discharge Medications:  See after visit summary for reconciled discharge medications provided to patient and family        ** Please Note: This note has been constructed using a voice recognition system **

## 2022-07-29 NOTE — ASSESSMENT & PLAN NOTE
· Presented to ED with complaints of left-sided chest pain and dyspnea  Elevated D-dimer  · PE study reveals Multiple intraluminal filling defects involving branches of the left superior and inferior pulmonary arteries compatible with acute pulmonary emboli  Normal RV LV ratio  · Likely provoked Status post cervical fixation fusion in May  Was discharged to short-term rehab at [facility]  On anticoagulation with subcutaneous heparin  · Lower extremity duplex negative for DVT  · Echo with preserved EF    Patient initially was on heparin drip  Now transition over to Eliquis  He has already finished the 7 days induction Eliquis  Now can go on normal dosing 5 mg p o  B i d    He should be on Eliquis for least 6 months, but possibly longer if he still minimally ambulatory

## 2022-07-29 NOTE — PLAN OF CARE
Problem: Potential for Falls  Goal: Patient will remain free of falls  Description: INTERVENTIONS:  - Educate patient/family on patient safety including physical limitations  - Instruct patient to call for assistance with activity   - Consult OT/PT to assist with strengthening/mobility   - Keep Call bell within reach  - Keep bed low and locked with side rails adjusted as appropriate  - Keep care items and personal belongings within reach  - Initiate and maintain comfort rounds  - Make Fall Risk Sign visible to staff  - Initiate/Maintain bed alarm  - Apply yellow socks and bracelet for high fall risk patients  - Consider moving patient to room near nurses station  Outcome: Progressing     Problem: MOBILITY - ADULT  Goal: Maintain or return to baseline ADL function  Description: INTERVENTIONS:  -  Assess patient's ability to carry out ADLs; assess patient's baseline for ADL function and identify physical deficits which impact ability to perform ADLs (bathing, care of mouth/teeth, toileting, grooming, dressing, etc )  - Assess/evaluate cause of self-care deficits   - Assess range of motion  - Assess patient's mobility; develop plan if impaired  - Assess patient's need for assistive devices and provide as appropriate  - Encourage maximum independence but intervene and supervise when necessary  - Involve family in performance of ADLs  - Assess for home care needs following discharge   - Consider OT consult to assist with ADL evaluation and planning for discharge  - Provide patient education as appropriate  Outcome: Progressing     Problem: Prexisting or High Potential for Compromised Skin Integrity  Goal: Skin integrity is maintained or improved  Description: INTERVENTIONS:  - Identify patients at risk for skin breakdown  - Assess and monitor skin integrity  - Assess and monitor nutrition and hydration status  - Monitor labs   - Assess for incontinence   - Turn and reposition patient  - Assist with mobility/ambulation  - Relieve pressure over bony prominences  - Avoid friction and shearing  - Provide appropriate hygiene as needed including keeping skin clean and dry  - Evaluate need for skin moisturizer/barrier cream  - Collaborate with interdisciplinary team   - Patient/family teaching  - Consider wound care consult   Outcome: Progressing     Problem: RESPIRATORY - ADULT  Goal: Achieves optimal ventilation and oxygenation  Description: INTERVENTIONS:  - Assess for changes in respiratory status  - Assess for changes in mentation and behavior  - Position to facilitate oxygenation and minimize respiratory effort  - Oxygen administered by appropriate delivery if ordered  - Initiate smoking cessation education as indicated  - Encourage broncho-pulmonary hygiene including cough, deep breathe, Incentive Spirometry  - Assess the need for suctioning and aspirate as needed  - Assess and instruct to report SOB or any respiratory difficulty  - Respiratory Therapy support as indicated  Outcome: Progressing     Problem: DISCHARGE PLANNING  Goal: Discharge to home or other facility with appropriate resources  Description: INTERVENTIONS:  - Identify barriers to discharge w/patient and caregiver  - Arrange for needed discharge resources and transportation as appropriate  - Identify discharge learning needs (meds, wound care, etc )  - Arrange for interpretive services to assist at discharge as needed  - Refer to Case Management Department for coordinating discharge planning if the patient needs post-hospital services based on physician/advanced practitioner order or complex needs related to functional status, cognitive ability, or social support system  Outcome: Progressing

## 2022-07-29 NOTE — CASE MANAGEMENT
Case Management Progress Note    Patient name Jason Santos  Location [facility]-* MRN [id number]  : 1985 Date 2022       LOS (days): 8  Geometric Mean LOS (GMLOS) (days):   Days to GMLOS:        OBJECTIVE:        Current admission status: Inpatient  Preferred Pharmacy:   [address] [place] [address] [facility] [place] [address]  Phone: [phone] Fax: 619 [facility], [address] [facility], [address]  [address] [facility]  [address] [place] [address] [address] [address]  Phone: [phone] Fax: [phone]    Primary Care Provider: Rayshawn Zavala MD    Primary Insurance: Deonna Nelli  Secondary Insurance:     PROGRESS NOTE:    CM placed tc to Pts [employer] to check on status od the prior auth for SNF LOC  CM spoke with [facility] forwarded this call to the RN- reviewer  Pt uth was simply re-opened and given a new sSOC, as Pt never went to [facility] - Pt was brought to [facility] the day before Pts scheduled day to d/c to [facility]  CM informed liaison and Pt of same  [facility] team made aware

## 2022-07-29 NOTE — PLAN OF CARE
Problem: Potential for Falls  Goal: Patient will remain free of falls  Description: INTERVENTIONS:  - Educate patient/family on patient safety including physical limitations  - Instruct patient to call for assistance with activity   - Consult OT/PT to assist with strengthening/mobility   - Keep Call bell within reach  - Keep bed low and locked with side rails adjusted as appropriate  - Keep care items and personal belongings within reach  - Initiate and maintain comfort rounds  - Make Fall Risk Sign visible to staff  - Offer Toileting every Hours, in advance of need  - Initiate/Maintain alarm  - Obtain necessary fall risk management equipment:   - Apply yellow socks and bracelet for high fall risk patients  - Consider moving patient to room near nurses station  Outcome: Progressing     Problem: MOBILITY - ADULT  Goal: Maintain or return to baseline ADL function  Description: INTERVENTIONS:  -  Assess patient's ability to carry out ADLs; assess patient's baseline for ADL function and identify physical deficits which impact ability to perform ADLs (bathing, care of mouth/teeth, toileting, grooming, dressing, etc )  - Assess/evaluate cause of self-care deficits   - Assess range of motion  - Assess patient's mobility; develop plan if impaired  - Assess patient's need for assistive devices and provide as appropriate  - Encourage maximum independence but intervene and supervise when necessary  - Involve family in performance of ADLs  - Assess for home care needs following discharge   - Consider OT consult to assist with ADL evaluation and planning for discharge  - Provide patient education as appropriate  Outcome: Progressing     Problem: Prexisting or High Potential for Compromised Skin Integrity  Goal: Skin integrity is maintained or improved  Description: INTERVENTIONS:  - Identify patients at risk for skin breakdown  - Assess and monitor skin integrity  - Assess and monitor nutrition and hydration status  - Monitor labs   - Assess for incontinence   - Turn and reposition patient  - Assist with mobility/ambulation  - Relieve pressure over bony prominences  - Avoid friction and shearing  - Provide appropriate hygiene as needed including keeping skin clean and dry  - Evaluate need for skin moisturizer/barrier cream  - Collaborate with interdisciplinary team   - Patient/family teaching  - Consider wound care consult   Outcome: Progressing     Problem: RESPIRATORY - ADULT  Goal: Achieves optimal ventilation and oxygenation  Description: INTERVENTIONS:  - Assess for changes in respiratory status  - Assess for changes in mentation and behavior  - Position to facilitate oxygenation and minimize respiratory effort  - Oxygen administered by appropriate delivery if ordered  - Initiate smoking cessation education as indicated  - Encourage broncho-pulmonary hygiene including cough, deep breathe, Incentive Spirometry  - Assess the need for suctioning and aspirate as needed  - Assess and instruct to report SOB or any respiratory difficulty  - Respiratory Therapy support as indicated  Outcome: Progressing     Problem: DISCHARGE PLANNING  Goal: Discharge to home or other facility with appropriate resources  Description: INTERVENTIONS:  - Identify barriers to discharge w/patient and caregiver  - Arrange for needed discharge resources and transportation as appropriate  - Identify discharge learning needs (meds, wound care, etc )  - Arrange for interpretive services to assist at discharge as needed  - Refer to Case Management Department for coordinating discharge planning if the patient needs post-hospital services based on physician/advanced practitioner order or complex needs related to functional status, cognitive ability, or social support system  Outcome: Progressing

## 2022-07-30 NOTE — NURSING NOTE
Pt left with all belongings  IV taken out  Report handed off by previous RN  Transport papers sent with pt

## 2022-08-04 NOTE — UTILIZATION REVIEW
Notification of Discharge   This is a Notification of Discharge from our facility [address]  Please be advised that this patient has been discharge from our facility  Below you will find the admission and discharge date and time including the patients disposition  UTILIZATION REVIEW CONTACT:  Carmen Colunga  Utilization   Network Utilization Review Department  Phone: [phone] x carefully listen to the prompts  All voicemails are confidential   Email: [email or web address]     PHYSICIAN ADVISORY SERVICES:  FOR [id number] REVIEW - MEDICAL NECESSITY DENIAL  Phone: [phone]  Fax: [phone]  Email: [email or web address]     PRESENTATION DATE: 7/21/2022  1:01 AM    INPATIENT ADMISSION DATE: 7/21/22  5:34 AM   DISCHARGE DATE: 7/29/2022  8:30 PM  DISPOSITION: Non [facility] SNF/TCU/SNU Non [facility] SNF/TCU/SNU      IMPORTANT INFORMATION:  Send all requests for admission clinical reviews, approved or denied determinations and any other requests to dedicated fax number below belonging to the campus where the patient is receiving treatment   List of dedicated fax numbers:  [address] [place] [address] DENIALS (Administrative/Medical Necessity) [phone]   [address]  (Maternity/NICU/Pediatrics) [phone]   Nadiailee Baptise [phone]   [address] [facility] [phone]   [address] [place] [address] [phone]   [address] [facility] [address] [place] [address],[address] Floor [address] [phone]   [facility]  [phone]   [address] [facility]  [address] [facility] [address] [facility] [phone]

## 2022-09-27 ENCOUNTER — TELEPHONE (OUTPATIENT)
Dept: NEUROSURGERY | Facility: CLINIC | Age: 37
End: 2022-09-27

## 2022-12-23 ENCOUNTER — HOSPITAL ENCOUNTER (OUTPATIENT)
Dept: RADIOLOGY | Facility: HOSPITAL | Age: 37
Discharge: HOME | End: 2022-12-23
Attending: NEUROLOGICAL SURGERY

## 2022-12-23 ENCOUNTER — OFFICE VISIT (OUTPATIENT)
Dept: NEUROSURGERY | Facility: CLINIC | Age: 37
End: 2022-12-23

## 2022-12-23 VITALS
DIASTOLIC BLOOD PRESSURE: 64 MMHG | RESPIRATION RATE: 16 BRPM | HEART RATE: 82 BPM | TEMPERATURE: 98.2 F | SYSTOLIC BLOOD PRESSURE: 102 MMHG

## 2022-12-23 DIAGNOSIS — Z98.1 S/P CERVICAL SPINAL FUSION: Primary | ICD-10-CM

## 2022-12-23 DIAGNOSIS — S14.109D INJURY OF CERVICAL SPINAL CORD, SUBSEQUENT ENCOUNTER (HCC): ICD-10-CM

## 2022-12-23 DIAGNOSIS — T84.498A LOOSENING OF HARDWARE IN SPINE (HCC): ICD-10-CM

## 2022-12-23 DIAGNOSIS — M40.209 KYPHOSIS: ICD-10-CM

## 2022-12-23 DIAGNOSIS — S14.129D CENTRAL CORD SYNDROME, SUBSEQUENT ENCOUNTER (HCC): ICD-10-CM

## 2022-12-23 DIAGNOSIS — S14.109A: ICD-10-CM

## 2022-12-23 DIAGNOSIS — S14.129A CENTRAL CORD SYNDROME, INITIAL ENCOUNTER (HCC): ICD-10-CM

## 2022-12-23 DIAGNOSIS — S12.400A: ICD-10-CM

## 2022-12-23 RX ORDER — POLYETHYLENE GLYCOL 3350 17 G/17G
17 POWDER, FOR SOLUTION ORAL DAILY
COMMUNITY

## 2022-12-23 RX ORDER — MINERAL OIL 100 G/100G
1 OIL RECTAL DAILY PRN
COMMUNITY

## 2022-12-23 RX ORDER — OXYCODONE HYDROCHLORIDE 10 MG/1
10 TABLET ORAL EVERY 8 HOURS PRN
COMMUNITY
Start: 2022-12-17

## 2022-12-23 NOTE — PROGRESS NOTES
Neurosurgery Office Note  Mesfin Delacruz 42 y o  male MRN: [id number]      Assessment/Plan      Patient is a 42 yrs old young gentleman with history of traumatic spinal cord injury following fall down flight of stairs after feeling dizziness, he is  status post ACDF C6-7 revision of PCDF with right C2 screw and laminar screw revision and Arthrodesis of C2-3 and C6-T1 on 5/12/2022) by Dr Raven Jean Baptiste  Patient is here today for 6 months follow up with Flex/Ext Cervical spine xrays  Images shows intact hard ware with stable right C2 fractured Screw  No loosening of hardware  Patient  Reports 7-8/10 posterior neck and shoulder blade  Pain, neck stiffness with limited Flex/Ext residual Bilateral arm and legs weaknesses,  weakness, fine motor dysfunction, gait instability and severe ambulatory dysfunction  Patient says he started walking with a walker when he did PT/OT at [facility], but denies any good PT/OT in a facility where he resides now  Patient taking Baclofen, Acetaminophen, Diazepam, and Gabapentin  He didn't see pain Mx  Exam-A&OX3, Patient in wheel chair, Jama-Bilateral UEs strength 4-/5 including ,iOS, elbow flex/ext and abduction ROM, LEs 3+ bilateral distal and proximal muscle weakness including DF/PF, Positive Gomez's tests bilaterally  DTR 2+  Mo clonus bilaterally  Well healed Anterior and posterior fusion wounds  Tenderness in the shoulder blade and posterior lower central neck  Hx, PEx, and images reviewed with the patient  Mx plan discussed  Patient's PCDF and C6-7 ACDF hard ware intact  Dr Raven Jean Baptiste discussed with the patient and recommends Ambulatory referral to pain Mx for trigger point injection, PT/OT referral for ROM, hand therapy, core body muscle and balance training  F/U in 6 months with Flex/Ext Cx spine x-rays  All questions and concerns were answered to patient's satisfaction  Patient verbalized his understandings and agreed with the plan  Plan:   1   Ambulatory referral to pain management  2  Ambulatory referral to PT-referred to [facility]-patient preferrence  3  Ambulatory referral to Occupational Therapy-Patient preferrence  4  Flexion-extension cervical spine x-rays in 6 months  5  Follow-up in 6 months, SNPx Dr James Lawrence  6  Call with question or concern    I spent 30 minutes with the patient today in which >50% of the time was spent counseling/coordination of care regarding diagnosis, imaging review, symptoms and treatment plan  Chief Complaint   Patient presents with   • Follow-up     F/u        C/C: " 6 months F/U for Cervical fusion"    History of Present Illness     All patient's medical Hx were reviewed and updated as appropriate: Allergies, current medication lists, Past medical Hx, Past surgical Hx,  Family Hx, social Hx and current medical lists  Patient is here today for 6 months follow up s/p revision of cervical fixation/Fusion  Patient had flex/ext xrays, findings as described in the assessment section above  He complains posterior neck pain, down shoulder blade  Residual weakness in all extremities, with severe myelopathy, ambulatory dysfunction  No B/B dysfunction  Denies any fever, chills, rigors, cough or chest pain  Patient asks for better PT/OT referral as the current place doesn't have complete equipments to do his PT/OT  He prefers [facility], as he has good results with his previous referral at [facility]  Patient taking baclofen, Valium, Acetaminophen, and Gabapentin  He didn't follow up with pain Mx  REVIEW OF SYSTEMS  ROS personally reviewed and updated as follows:  Review of Systems   Constitutional: Negative  HENT: Negative  Eyes: Negative  Respiratory: Negative  Cardiovascular: Negative  Gastrointestinal: Positive for constipation  Endocrine: Negative  Genitourinary: Negative      Musculoskeletal: Positive for myalgias (all over his body), neck pain (currently states right shoulder and upper back pain which rates 7/10, previous visit stated radates to b/l shoulders, arms and hands since 8/2021) and neck stiffness (Decrease ROM, turning head to the right is worse)  S/p PCDF  C2-C7 3/6/2021-- [facility]  PT and Occupational  Therapy done  8/2021    Pain 9/10    No recent PT/OT   Skin: Negative  Allergic/Immunologic: Negative  Neurological: Positive for weakness (b/l arms and hands, difficulty grasping thing and feeding himself-dropping items) and numbness (and tingling on b/l arms and hands-dropping items)  Hematological: Negative  Eliquis   Psychiatric/Behavioral: Negative  All other systems reviewed and are negative          Meds/Allergies     Current Outpatient Medications   Medication Sig Dispense Refill   • acetaminophen (TYLENOL) 500 mg tablet Take 500 mg by mouth every 8 (eight) hours     • apixaban (Eliquis) 5 mg Take 1 tablet (5 mg total) by mouth 2 (two) times a day  0   • atorvastatin (LIPITOR) 40 mg tablet Take 1 tablet (40 mg total) by mouth daily with dinner  0   • baclofen 10 mg tablet Take 1 tablet (10 mg total) by mouth 3 (three) times a day 30 tablet 0   • bisacodyl (DULCOLAX) 10 mg suppository Insert 1 suppository (10 mg total) into the rectum daily as needed for constipation 12 suppository 0   • busPIRone (BUSPAR) 5 mg tablet Take 5 mg by mouth 2 (two) times a day     • cloNIDine (CATAPRES-TTS-1) 0 1 mg/24 hr Place 1 patch on the skin once a week Every Monday     • diazepam (VALIUM) 5 mg tablet Take 1 tablet (5 mg total) by mouth daily at bedtime as needed for sleep for up to 10 days Hold if sedated 10 tablet 0   • docusate sodium (COLACE) 100 mg capsule Take 1 capsule (100 mg total) by mouth 2 (two) times a day  0   • gabapentin (NEURONTIN) 400 mg capsule Take 2 capsules (800 mg total) by mouth 3 (three) times a day  0   • lidocaine (LIDODERM) 5 % Apply 2 patches topically daily Remove & Discard patch within 12 hours or as directed by MD (Patient not taking: Reported on 6/24/2022)  0   • LORazepam (ATIVAN) 2 mg/mL Infuse 0 25 mL (0 5 mg total) into a venous catheter every 4 (four) hours as needed for anxiety for up to 10 days  0   • melatonin 3 mg Take 1 tablet (3 mg total) by mouth daily at bedtime 10 tablet 0   • pantoprazole (PROTONIX) 40 mg tablet Take 40 mg by mouth daily     • senna (SENOKOT) 8 6 mg Take 1 tablet (8 6 mg total) by mouth daily  0   • tamsulosin (FLOMAX) 0 4 mg Take 1 capsule (0 4 mg total) by mouth daily with dinner 30 capsule 1   • traZODone (DESYREL) 100 mg tablet Take 1 tablet (100 mg total) by mouth daily at bedtime Hold if sedated 20 tablet 0     No current facility-administered medications for this visit         No Known Allergies    PAST HISTORY    Past Medical History:   Diagnosis Date   • Chronic depression    • PTSD (post-traumatic stress disorder)        Past Surgical History:   Procedure Laterality Date   • CERVICAL FUSION N/A 03/06/2021    Procedure: POSTERIOR C3-5 laminectomy, C2-7 fixation fusion, possible additional levels;  Surgeon: Arnold Armijo MD;  Location: [facility];  Service: Neurosurgery   • IR PICC PLACEMENT DOUBLE LUMEN  03/08/2021   • NECK SURGERY  05/12/2022   • NH ARTHRD ANT INTERBODY  [address] CRV BELOW C2 N/A 05/12/2022    Procedure: ACDF C6-7; Revision PCDF, revise/replace right C2 screw with laminar screw, revise left C2 screw (cap), remove bilateral C7 lateral mass screws, place navigated C7, T1 pedicle screws, C6-7 laminectomy, arthrodesis C2-3 and C6-T1;  Surgeon: Daphnie Boykin MD;  Location: [facility];  Service: Neurosurgery   • SCROTAL SURGERY Right 07/20/2021    Procedure: Right scrotal exploration, Irrigation and debridement Right orchiectomy;  Surgeon: Socrates Vega MD;  Location: [facility];  Service: Urology       Social History     Tobacco Use   • Smoking status: Former     Packs/day: 0 25     Years: 10 00     Pack years: 2 50     Types: Cigarettes   • Smokeless tobacco: Never   Vaping Use   • Vaping Use: Never used   Substance Use Topics   • Alcohol use: Not Currently     Comment: none   • Drug use: Never     Comment: none       Family History   Problem Relation Age of Onset   • No Known Problems Mother    • No Known Problems Father          Above history personally reviewed  EXAM    Vitals:Blood pressure 102/64, pulse 82, temperature 98 2 °F (36 8 °C), temperature source Temporal, resp  rate 16 ,There is no height or weight on file to calculate BMI  Physical Exam  Constitutional:       Appearance: Normal appearance  HENT:      Head: Normocephalic and atraumatic  Cardiovascular:      Rate and Rhythm: Normal rate and regular rhythm  Pulses: Normal pulses  Pulmonary:      Effort: Pulmonary effort is normal    Musculoskeletal:         General: Normal range of motion  Cervical back: Tenderness present  Neurological:      Mental Status: He is alert  GCS: GCS eye subscore is 4  GCS verbal subscore is 5  GCS motor subscore is 6  Cranial Nerves: Cranial nerves 2-12 are intact  Sensory: Sensory deficit present  Motor: Weakness present  Coordination: Finger-Nose-Finger Test normal       Gait: Gait abnormal       Deep Tendon Reflexes: Reflexes are normal and symmetric  Reflex Scores:       Tricep reflexes are 2+ on the right side and 2+ on the left side  Bicep reflexes are 2+ on the right side and 2+ on the left side  Brachioradialis reflexes are 2+ on the right side and 2+ on the left side  Patellar reflexes are 2+ on the right side and 2+ on the left side  Achilles reflexes are 2+ on the right side and 2+ on the left side  Psychiatric:         Speech: Speech normal          Neurologic Exam     Mental Status   Speech: speech is normal   Level of consciousness: alert    Cranial Nerves   Cranial nerves II through XII intact       CN III, IV, VI   Nystagmus: none     CN XI   CN XI normal      Motor Exam   Muscle bulk: normal  Overall muscle tone: normal  Right arm tone: normal  Left arm tone: normal  Right arm pronator drift: absent  Left arm pronator drift: absent  Right leg tone: normal  Left leg tone: normal    Sensory Exam   Light touch normal      Gait, Coordination, and Reflexes     Coordination   Finger to nose coordination: normal    Reflexes   Right brachioradialis: 2+  Left brachioradialis: 2+  Right biceps: 2+  Left biceps: 2+  Right triceps: 2+  Left triceps: 2+  Right patellar: 2+  Left patellar: 2+  Right achilles: 2+  Left achilles: 2+  Right : 2+  Left : 2+  Right Gomez: present  Left Gomez: absent  Right ankle clonus: absent  Left ankle clonus: present  Left pendular knee jerk: absent        MEDICAL DECISION MAKING    Imaging Studies:     No results found      I have personally reviewed pertinent reports   , I have personally reviewed pertinent films in PACS and I have personally reviewed pertinent films in PACS with a Radiologist

## 2023-04-17 NOTE — PLAN OF CARE
Problem: Nutrition/Hydration-ADULT  Goal: Nutrient/Hydration intake appropriate for improving, restoring or maintaining nutritional needs  Description: Monitor and assess patient's nutrition/hydration status for malnutrition  Collaborate with interdisciplinary team and initiate plan and interventions as ordered  Monitor patient's weight and dietary intake as ordered or per policy  Utilize nutrition screening tool and intervene as necessary  Determine patient's food preferences and provide high-protein, high-caloric foods as appropriate       INTERVENTIONS:  - Monitor oral intake, urinary output, labs, and treatment plans  - Assess nutrition and hydration status and recommend course of action  - Evaluate amount of meals eaten  - Assist patient with eating if necessary   - Allow adequate time for meals  - Recommend/ encourage appropriate diets, oral nutritional supplements, and vitamin/mineral supplements  - Order, calculate, and assess calorie counts as needed  - Recommend, monitor, and adjust tube feedings and TPN/PPN based on assessed needs  - Assess need for intravenous fluids  - Provide specific nutrition/hydration education as appropriate  - Include patient/family/caregiver in decisions related to nutrition  Outcome: Progressing Detail Level: Generalized Include Location In Plan?: No

## 2023-06-30 ENCOUNTER — TELEPHONE (OUTPATIENT)
Dept: NEUROSURGERY | Facility: CLINIC | Age: 38
End: 2023-06-30

## 2023-06-30 NOTE — TELEPHONE ENCOUNTER
6/30/23: NO SHOW FOR TODAYS APPOINTMENT  SPOKE TO JOSSELYN HE STATED HIS TRANSPORTATION DID NOT COME  HE WILL CALL US BACK TO RESCHEDULE WHEN HE ARRANGES TRANSPORTATION  CONFIRMED HE HAS

## 2023-08-21 ENCOUNTER — TELEPHONE (OUTPATIENT)
Dept: NEUROSURGERY | Facility: CLINIC | Age: 38
End: 2023-08-21

## (undated) DEVICE — ANTIBACTERIAL VIOLET BRAIDED (POLYGLACTIN 910), SYNTHETIC ABSORBABLE SUTURE: Brand: COATED VICRYL

## (undated) DEVICE — SYRINGE 10ML LL

## (undated) DEVICE — SPECIMEN CONTAINER STERILE PEEL PACK

## (undated) DEVICE — GLOVE SRG BIOGEL ECLIPSE 7

## (undated) DEVICE — SILVER-COATED ANTIMICROBIAL BARRIER DRESSING: Brand: ACTICOAT   4" X 8"

## (undated) DEVICE — MINOR PROCEDURE DRAPE: Brand: CONVERTORS

## (undated) DEVICE — INSTRUMENT 874-445 ML 9X11MMDP CTTR 5MM: Brand: MEDTRONIC REUSABLE INSTRUMENT

## (undated) DEVICE — INSTRUMENT 874-447 ML 9X11MMDP CTTR 7MM: Brand: MEDTRONIC REUSABLE INSTRUMENT

## (undated) DEVICE — BETADINE OINTMENT FOIL PACK

## (undated) DEVICE — MONITORING SPINAL IMPULSE CASE FEE

## (undated) DEVICE — INSTRUMENT 7756010 2.4MM DRILL BIT

## (undated) DEVICE — PROXIMATE PLUS MD MULTI-DIRECTIONAL RELEASE SKIN STAPLERS CONTAINS 35 STAINLESS STEEL STAPLES APPROXIMATE CLOSED DIMENSIONS: 6.9MM X 3.9MM WIDE: Brand: PROXIMATE

## (undated) DEVICE — INVIEW CLEAR LEGGINGS: Brand: CONVERTORS

## (undated) DEVICE — GLOVE INDICATOR PI UNDERGLOVE SZ 8 BLUE

## (undated) DEVICE — INTENDED FOR TISSUE SEPARATION, AND OTHER PROCEDURES THAT REQUIRE A SHARP SURGICAL BLADE TO PUNCTURE OR CUT.: Brand: BARD-PARKER ® CARBON RIB-BACK BLADES

## (undated) DEVICE — 3M™ IOBAN™ 2 ANTIMICROBIAL INCISE DRAPE 6650EZ: Brand: IOBAN™ 2

## (undated) DEVICE — SUT ETHILON 2-0 FSLX 30 IN 1674H

## (undated) DEVICE — CULTURE TUBE AEROBIC

## (undated) DEVICE — TOOL 14BA20 LEGEND 14CM 2MM BA: Brand: MIDAS REX ™

## (undated) DEVICE — LIGHT HANDLE COVER SLEEVE DISP BLUE STELLAR

## (undated) DEVICE — CHLORAPREP HI-LITE 26ML ORANGE

## (undated) DEVICE — HEMOSTATIC MATRIX SURGIFLO 8ML W/THROMBIN

## (undated) DEVICE — ANTIBACTERIAL UNDYED BRAIDED (POLYGLACTIN 910), SYNTHETIC ABSORBABLE SUTURE: Brand: COATED VICRYL

## (undated) DEVICE — TRAY FOLEY 16FR URIMETER SURESTEP

## (undated) DEVICE — DRAPE SHEET X-LG

## (undated) DEVICE — PENCIL ELECTROSURG E-Z CLEAN -0035H

## (undated) DEVICE — Device

## (undated) DEVICE — SURGIFOAM 8.5 X 12.5

## (undated) DEVICE — 3M™ TEGADERM™ TRANSPARENT FILM DRESSING FRAME STYLE, 1626W, 4 IN X 4-3/4 IN (10 CM X 12 CM), 50/CT 4CT/CASE: Brand: 3M™ TEGADERM™

## (undated) DEVICE — BIPOLAR SEALER 23-113-1 AQM 2.3: Brand: AQUAMANTYS™

## (undated) DEVICE — JACKSON-PRATT 100CC BULB RESERVOIR: Brand: CARDINAL HEALTH

## (undated) DEVICE — GLOVE SRG BIOGEL ECLIPSE 8

## (undated) DEVICE — GAUZE SPONGES,USP TYPE VII GAUZE, 12 PLY: Brand: CURITY

## (undated) DEVICE — ROSEBUD DISSECTORS: Brand: DEROYAL

## (undated) DEVICE — DISTRACTION SCREW 12MM DISP

## (undated) DEVICE — TOOL 14MH30 LEGEND 14CM 3MM: Brand: MIDAS REX ™

## (undated) DEVICE — PREP SURGICAL PURPREP 26ML

## (undated) DEVICE — MEDI-VAC YANK SUCT HNDL W/TPRD BULBOUS TIP: Brand: CARDINAL HEALTH

## (undated) DEVICE — SPONGE PVP SCRUB WING STERILE

## (undated) DEVICE — CULTURE TUBE ANAEROBIC

## (undated) DEVICE — 3M™ TEGADERM™ TRANSPARENT FILM DRESSING FRAME STYLE, 1628, 6 IN X 8 IN (15 CM X 20 CM), 10/CT 8CT/CASE: Brand: 3M™ TEGADERM™

## (undated) DEVICE — TUBING SUCTION 5MM X 12 FT

## (undated) DEVICE — BIPOLAR CORD DISP

## (undated) DEVICE — ELECTRODE BLADE E-Z CLEAN 4IN -0014A

## (undated) DEVICE — SUT VICRYL 4-0 RB-1 27 IN J214H

## (undated) DEVICE — INTENDED FOR TISSUE SEPARATION, AND OTHER PROCEDURES THAT REQUIRE A SHARP SURGICAL BLADE TO PUNCTURE OR CUT.: Brand: BARD-PARKER SAFETY BLADES SIZE 15, STERILE

## (undated) DEVICE — SPONGE CHERRY 1/2IN

## (undated) DEVICE — 1840 FOAM BLOCK NEEDLE COUNTER: Brand: DEVON

## (undated) DEVICE — JP PERF DRN SIL FLT 7MM FULL: Brand: CARDINAL HEALTH

## (undated) DEVICE — DRAPE TOWEL: Brand: CONVERTORS

## (undated) DEVICE — SUPPLY FEE STD

## (undated) DEVICE — PLUMEPEN PRO 10FT

## (undated) DEVICE — ELECTRODE BLADE MOD E-Z CLEAN 4IN -0014AM

## (undated) DEVICE — BOWL ASSY BM210 DUAL BLADE DISPOSABLE: Brand: MIDAS REX™

## (undated) DEVICE — DRILL BIT G3606010 2.4MM

## (undated) DEVICE — SUT CHROMIC 4-0 PS-2 18 IN 1637G

## (undated) DEVICE — DRAPE EQUIPMENT RF WAND

## (undated) DEVICE — SNAP KOVER: Brand: UNBRANDED

## (undated) DEVICE — DRAPE MICROSCOPE OPMI PENTERO

## (undated) DEVICE — JACKSON TABLE FOAM POSITIONING KIT: Brand: CARDINAL HEALTH

## (undated) DEVICE — TOOL 15MH22 LEGEND 15CM 2.2MM MH: Brand: MIDAS REX ™

## (undated) DEVICE — NEEDLE SPINAL18G X 3.5 IN QUINCKE

## (undated) DEVICE — BETHLEHEM UNIVERSAL MINOR GEN: Brand: CARDINAL HEALTH

## (undated) DEVICE — DRAPE SHEET THREE QUARTER

## (undated) DEVICE — GAUZE SPONGES,16 PLY: Brand: CURITY

## (undated) DEVICE — INSTRUMENT 874-446 ML 9X11MMDP CTTR 6MM: Brand: MEDTRONIC REUSABLE INSTRUMENT

## (undated) DEVICE — 3000CC GUARDIAN II: Brand: GUARDIAN

## (undated) DEVICE — DRESSING ALLEVYN LIFE SACRAL 6.75 X 6.5 IN

## (undated) DEVICE — BETHLEHEM UNIVERSAL SPINE, KIT: Brand: CARDINAL HEALTH

## (undated) DEVICE — SILVER-COATED ANTIBACTERIAL BARRIER DRESSING: Brand: ACTICOAT SURGIC 10X25CM 5PK US

## (undated) DEVICE — DRAPE SURGIKIT SADDLE BAG

## (undated) DEVICE — MAYFIELD® DISPOSABLE ADULT SKULL PIN (PLASTIC BASE): Brand: MAYFIELD®

## (undated) DEVICE — SUT MONOCRYL PLUS 4-0 PS-2 18 IN MCP496G

## (undated) DEVICE — PENROSE DRAIN, 18 X 3 8: Brand: CARDINAL HEALTH

## (undated) DEVICE — SUT VICRYL 2-0 SH 27 IN UNDYED J417H

## (undated) DEVICE — DRAPE ADOLESCENT LAPAROTOMY

## (undated) DEVICE — INTENDED FOR TISSUE SEPARATION, AND OTHER PROCEDURES THAT REQUIRE A SHARP SURGICAL BLADE TO PUNCTURE OR CUT.: Brand: BARD-PARKER SAFETY BLADES SIZE 10, STERILE

## (undated) DEVICE — MARKER REFLECTIVE RADIOPAQUE SPHERE

## (undated) DEVICE — NEEDLE 25G X 1 1/2

## (undated) DEVICE — ADHESIVE SKIN HIGH VISCOSITY EXOFIN 1ML